# Patient Record
Sex: FEMALE | Race: WHITE | NOT HISPANIC OR LATINO | Employment: OTHER | URBAN - NONMETROPOLITAN AREA
[De-identification: names, ages, dates, MRNs, and addresses within clinical notes are randomized per-mention and may not be internally consistent; named-entity substitution may affect disease eponyms.]

---

## 2017-01-04 ENCOUNTER — OFFICE VISIT (OUTPATIENT)
Dept: ORTHOPEDICS | Facility: OTHER | Age: 62
End: 2017-01-04
Attending: ORTHOPAEDIC SURGERY
Payer: COMMERCIAL

## 2017-01-04 VITALS
SYSTOLIC BLOOD PRESSURE: 124 MMHG | TEMPERATURE: 98 F | BODY MASS INDEX: 24.84 KG/M2 | WEIGHT: 135 LBS | HEART RATE: 77 BPM | DIASTOLIC BLOOD PRESSURE: 70 MMHG | HEIGHT: 62 IN | OXYGEN SATURATION: 98 %

## 2017-01-04 DIAGNOSIS — M75.111 INCOMPLETE ROTATOR CUFF TEAR OR RUPTURE OF RIGHT SHOULDER, NOT SPECIFIED AS TRAUMATIC: Primary | ICD-10-CM

## 2017-01-04 PROCEDURE — 99213 OFFICE O/P EST LOW 20 MIN: CPT | Performed by: ORTHOPAEDIC SURGERY

## 2017-01-04 RX ORDER — TACROLIMUS 1 MG/G
OINTMENT TOPICAL
COMMUNITY
Start: 2016-10-24 | End: 2018-12-31

## 2017-01-04 ASSESSMENT — PAIN SCALES - GENERAL: PAINLEVEL: MILD PAIN (3)

## 2017-01-04 NOTE — MR AVS SNAPSHOT
After Visit Summary   1/4/2017    Chyna Delgado    MRN: [id number]           Patient Information     Date Of Birth          1955        Visit Information        Provider Department      1/4/2017 10:40 AM Jose Guadalupe Peterson MD  [facility]        Today's Diagnoses     Incomplete rotator cuff tear or rupture of right shoulder, not specified as traumatic    -  1       Care Instructions    Continue home exercise program        Follow-ups after your visit        Follow-up notes from your care team     Return in about 1 month (around 2/4/2017).      Your next 10 appointments already scheduled     Feb 24, 2017 10:20 AM   (Arrive by 10:00 AM)   SHORT with Arie Camarillo, DO   [facility] ([facility])    [address]  [facility] [address] [phone]              Who to contact     If you have questions or need follow up information about today's clinic visit or your schedule please contact  ORTH[facility]EDICS directly at [phone].  Normal or non-critical lab and imaging results will be communicated to you by Survelahart, letter or phone within 4 business days after the clinic has received the results. If you do not hear from us within 7 days, please contact the clinic through [facility] or phone. If you have a critical or abnormal lab result, we will notify you by phone as soon as possible.  Submit refill requests through M[facility] or call your pharmacy and they will forward the refill request to us. Please allow 3 business days for your refill to be completed.          Additional Information About Your Visit        MyChart Information     M[facility]oni gives you secure access to your electronic health record. If you see a primary care provider, you can also send messages to your care team and make appointments. If you have questions, please call your primary care clinic.  If you do not have a primary care provider, please call [phone] and they will assist you.        Care  "EveryWhere ID     This is your Care EveryWhere ID. This could be used by other organizations to access your [facility] medical records  [id number]        Your Vitals Were     Pulse Temperature Height BMI (Body Mass Index) Pulse Oximetry       77 98  F (36.7  C) (Tympanic) 5' 2\" (1.575 m) 24.69 kg/m2 98%        Blood Pressure from Last 3 Encounters:   01/04/17 124/70   11/22/16 118/74   11/03/16 118/68    Weight from Last 3 Encounters:   01/04/17 135 lb (61.236 kg)   11/22/16 130 lb (58.968 kg)   11/03/16 138 lb 12.8 oz (62.959 kg)              Today, you had the following     No orders found for display         Today's Medication Changes          These changes are accurate as of: 1/4/17 10:54 AM.  If you have any questions, ask your nurse or doctor.               These medicines have changed or have updated prescriptions.        Dose/Directions    ranitidine 150 MG tablet   Commonly known as:  ZANTAC   This may have changed:  when to take this   Used for:  Gastroesophageal reflux disease, esophagitis presence not specified        Dose:  150 mg   Take 1 tablet (150 mg) by mouth 2 times daily   Quantity:  60 tablet   Refills:  1                Primary Care Provider Office Phone # Fax #    Arie Bennie Camarillo [phone]       [facility] [place] [address]  [place] [address]        Thank you!     Thank you for choosing  ORTH[facility]EDICS  for your care. Our goal is always to provide you with excellent care. Hearing back from our patients is one way we can continue to improve our services. Please take a few minutes to complete the written survey that you may receive in the mail after your visit with us. Thank you!             Your Updated Medication List - Protect others around you: Learn how to safely use, store and throw away your medicines at www.disposemymeds.org.          This list is accurate as of: 1/4/17 10:54 AM.  Always use your most recent med list.                   Brand Name " Dispense Instructions for use    ANTI-DIARRHEAL PO      Take  by mouth.       ASPIRIN PO      Take 81 mg by mouth daily       Calcium-Vitamin D 600-200 MG-UNIT Tabs          dexamethasone 4 MG/ML injection    DECADRON    1 mL    Inject 1 mL (4 mg) as directed once for 1 dose Use 4 mg or dose determined by provider for iontophoresis.       diclofenac 1 % Gel topical gel    VOLTAREN    100 g    Apply to the outside of the elbow 3 times per day.       FLUoxetine 40 MG capsule    PROzac    90 capsule    Take 1 capsule (40 mg) by mouth daily       fluticasone-salmeterol 100-50 MCG/DOSE diskus inhaler    ADVAIR DISKUS    1 Inhaler    INHALE 1 PUFF TWICE DAILY       GAS-X PO      Take by mouth 2 times daily       ibuprofen 200 MG tablet    ADVIL/MOTRIN     Take 200 mg by mouth       iron 325 (65 FE) MG tablet      Take 1 tablet by mouth One tablet every 3 days       ketotifen 0.025 % Soln ophthalmic solution    ZADITOR/REFRESH ANTI-ITCH    5 mL    PLACE 2 DROPS INTO BOTH EYES 2 TIMES DAILY       magnesium 100 MG Caps      Take by mouth 2 times daily       methimazole 10 MG tablet    TAPAZOLE    90 tablet    Take 0.5 tablets (5 mg) by mouth daily       multivitamin per tablet      Take 1 tablet by mouth daily. Every day with food       omeprazole 20 MG CR capsule    priLOSEC    90 capsule    Take 1 capsule (20 mg) by mouth daily       pramipexole 0.5 MG tablet    MIRAPEX    90 tablet    TAKE 1 TABLET BY MOUTH DAILY AT BEDTIME       PSYLLIUM PO      Take 3 tsp by mouth 2 times daily.       ranitidine 150 MG tablet    ZANTAC    60 tablet    Take 1 tablet (150 mg) by mouth 2 times daily       tacrolimus 0.1 % ointment    PROTOPIC         traMADol 50 MG tablet    ULTRAM    90 tablet    TAKE 1-2 TABLETS BY MOUTH EVERY 6 HOURS AS NEEDED       vitamin E 200 UNIT capsule      Take 200 Units by mouth 2 times daily

## 2017-01-04 NOTE — NURSING NOTE
"Chief Complaint   Patient presents with     RECHECK     Follow up right shoulder.       Initial /70 mmHg  Pulse 77  Temp(Src) 98  F (36.7  C) (Tympanic)  Ht 5' 2\" (1.575 m)  Wt 135 lb (61.236 kg)  BMI 24.69 kg/m2  SpO2 98% Estimated body mass index is 24.69 kg/(m^2) as calculated from the following:    Height as of this encounter: 5' 2\" (1.575 m).    Weight as of this encounter: 135 lb (61.236 kg).  BP completed using cuff size: regular  Sarah Echols LPN      "

## 2017-01-04 NOTE — PROGRESS NOTES
Chief complaint: Rotator cuff tear right shoulder    Subjective: This 60-year-old right-handed retired female worked for [employer] until she retired in April 2016.  She presented to me on 11/22/16 with a 6 week history of right shoulder pain that came on after doing some digging.  Plain films were negative.  An MRI on 11/7/16 showed a small high-grade partial if not full-thickness tear of the anterior portion of the supraspinatus tendon.  Hoping to avoid surgery she consented to a subacromial steroid injection, agreed to go on high-strength ibuprofen, and agreed to go to physical therapy.    Today she reports that her shoulder feels much improved although she still has some pain with certain positions and activities.  Her last therapy visit was 12/29/16.  The therapist plan to continue working with her however her health insurance changed and she has not yet received her card for 2017 from the new carrier.  She therefore has not been back to therapy in a week.  She feels that she can do the exercises on her own using the elastic bands the therapist provided.  She wants to try continuing a home exercise program.    Nothing else has changed with respect to her musculoskeletal or neurologic review of systems since seen last.    Examination: Healthy-appearing female with appropriate mood and affect.  Her right shoulder range of motion is full.  Strength testing reveals mild weakness in abduction, motor weakness in external rotation, and no weakness in forward flexion.  Sandoval's test is still positive.  The neurovascular status of the right hand is intact.    Impression: Small rotator cuff tear right shoulder with symptomatic improvement    Plan: She will continue exercising at home and return a month for recheck.  She understands that if she does not improve she will be referred back to therapy.  By then her new insurance card should have come through.

## 2017-02-08 ENCOUNTER — OFFICE VISIT (OUTPATIENT)
Dept: ORTHOPEDICS | Facility: OTHER | Age: 62
End: 2017-02-08
Attending: ORTHOPAEDIC SURGERY
Payer: COMMERCIAL

## 2017-02-08 VITALS
DIASTOLIC BLOOD PRESSURE: 74 MMHG | HEART RATE: 82 BPM | RESPIRATION RATE: 17 BRPM | TEMPERATURE: 97.4 F | WEIGHT: 147 LBS | BODY MASS INDEX: 26.88 KG/M2 | OXYGEN SATURATION: 97 % | SYSTOLIC BLOOD PRESSURE: 119 MMHG

## 2017-02-08 DIAGNOSIS — M75.111 INCOMPLETE ROTATOR CUFF TEAR OR RUPTURE OF RIGHT SHOULDER, NOT SPECIFIED AS TRAUMATIC: Primary | ICD-10-CM

## 2017-02-08 PROCEDURE — 99212 OFFICE O/P EST SF 10 MIN: CPT | Performed by: ORTHOPAEDIC SURGERY

## 2017-02-08 ASSESSMENT — PAIN SCALES - GENERAL: PAINLEVEL: MILD PAIN (3)

## 2017-02-08 NOTE — MR AVS SNAPSHOT
After Visit Summary   2/8/2017    Chyna Delgado    MRN: [id number]           Patient Information     Date Of Birth          1955        Visit Information        Provider Department      2/8/2017 10:40 AM Jose Guadalupe Peterson MD  ORTHOPEDICS        Today's Diagnoses     Incomplete rotator cuff tear or rupture of right shoulder, not specified as traumatic    -  1        Follow-ups after your visit        Follow-up notes from your care team     Return if symptoms worsen or fail to improve.      Your next 10 appointments already scheduled     Feb 08, 2017 10:40 AM   (Arrive by 10:20 AM)   Return Visit with Jose Guadalupe Peterson MD    [facility] ([facility])    [address]   [place] [id number] [phone]            Feb 24, 2017 10:20 AM   (Arrive by 10:00 AM)   SHORT with Arie Camarillo DO   [facility] ([facility]    [address] [address]   [phone]              Who to contact     If you have questions or need follow up information about today's clinic visit or your schedule please contact  ORTHOPEDICS directly at [phone].  Normal or non-critical lab and imaging results will be communicated to you by MyChart, letter or phone within 4 business days after the clinic has received the results. If you do not hear from us within 7 days, please contact the clinic through MyChart or phone. If you have a critical or abnormal lab result, we will notify you by phone as soon as possible.  Submit refill requests through Accountable or call your pharmacy and they will forward the refill request to us. Please allow 3 business days for your refill to be completed.          Additional Information About Your Visit        MyChart Information     Accountable gives you secure access to your electronic health record. If you see a primary care provider, you can also send messages to your care team and make appointments. If you have questions, please  call your primary care clinic.  If you do not have a primary care provider, please call [phone] and they will assist you.        Care EveryWhere ID     This is your Care EveryWhere ID. This could be used by other organizations to access your [facility] medical records  [id number]        Your Vitals Were     Pulse Temperature Respirations Pulse Oximetry          82 97.4  F (36.3  C) 17 97%         Blood Pressure from Last 3 Encounters:   02/08/17 119/74   01/04/17 124/70   11/22/16 118/74    Weight from Last 3 Encounters:   02/08/17 147 lb (66.679 kg)   01/04/17 135 lb (61.236 kg)   11/22/16 130 lb (58.968 kg)              Today, you had the following     No orders found for display         Today's Medication Changes          These changes are accurate as of: 2/8/17 10:38 AM.  If you have any questions, ask your nurse or doctor.               These medicines have changed or have updated prescriptions.        Dose/Directions    ranitidine 150 MG tablet   Commonly known as:  ZANTAC   This may have changed:  when to take this   Used for:  Gastroesophageal reflux disease, esophagitis presence not specified        Dose:  150 mg   Take 1 tablet (150 mg) by mouth 2 times daily   Quantity:  60 tablet   Refills:  1                Primary Care Provider Office Phone # Fax #    Arie Bennie Camarillo [phone]       [facility] [place] [address] [facility] [address]        Thank you!     Thank you for choosing  ORTH[facility]EDICS  for your care. Our goal is always to provide you with excellent care. Hearing back from our patients is one way we can continue to improve our services. Please take a few minutes to complete the written survey that you may receive in the mail after your visit with us. Thank you!             Your Updated Medication List - Protect others around you: Learn how to safely use, store and throw away your medicines at www.disposemymeds.org.          This list is accurate as of:  2/8/17 10:38 AM.  Always use your most recent med list.                   Brand Name Dispense Instructions for use    ANTI-DIARRHEAL PO      Take  by mouth.       ASPIRIN PO      Take 81 mg by mouth daily       Calcium-Vitamin D 600-200 MG-UNIT Tabs          diclofenac 1 % Gel topical gel    VOLTAREN    100 g    Apply to the outside of the elbow 3 times per day.       FLUoxetine 40 MG capsule    PROzac    90 capsule    Take 1 capsule (40 mg) by mouth daily       fluticasone-salmeterol 100-50 MCG/DOSE diskus inhaler    ADVAIR DISKUS    1 Inhaler    INHALE 1 PUFF TWICE DAILY       GAS-X PO      Take by mouth 2 times daily       ibuprofen 200 MG tablet    ADVIL/MOTRIN     Take 200 mg by mouth       iron 325 (65 FE) MG tablet      Take 1 tablet by mouth One tablet every 3 days       ketotifen 0.025 % Soln ophthalmic solution    ZADITOR/REFRESH ANTI-ITCH    5 mL    PLACE 2 DROPS INTO BOTH EYES 2 TIMES DAILY       magnesium 100 MG Caps      Take by mouth 2 times daily       methimazole 10 MG tablet    TAPAZOLE    90 tablet    Take 0.5 tablets (5 mg) by mouth daily       multivitamin per tablet      Take 1 tablet by mouth daily. Every day with food       omeprazole 20 MG CR capsule    priLOSEC    90 capsule    Take 1 capsule (20 mg) by mouth daily       pramipexole 0.5 MG tablet    MIRAPEX    90 tablet    TAKE 1 TABLET BY MOUTH DAILY AT BEDTIME       PSYLLIUM PO      Take 3 tsp by mouth 2 times daily.       ranitidine 150 MG tablet    ZANTAC    60 tablet    Take 1 tablet (150 mg) by mouth 2 times daily       tacrolimus 0.1 % ointment    PROTOPIC         traMADol 50 MG tablet    ULTRAM    90 tablet    TAKE 1-2 TABLETS BY MOUTH EVERY 6 HOURS AS NEEDED       vitamin E 200 UNIT capsule      Take 200 Units by mouth 2 times daily

## 2017-02-08 NOTE — NURSING NOTE
"Chief Complaint   Patient presents with     Shoulder Pain     right shoulder follow up - much improved        Initial /74 mmHg  Pulse 82  Temp(Src) 97.4  F (36.3  C)  Resp 17  Wt 147 lb (66.679 kg)  SpO2 97% Estimated body mass index is 26.88 kg/(m^2) as calculated from the following:    Height as of 1/4/17: 5' 2\" (1.575 m).    Weight as of this encounter: 147 lb (66.679 kg).  Medication Reconciliation: complete  "

## 2017-02-08 NOTE — PROGRESS NOTES
Chief complaint: Rotator cuff tear right shoulder    Subjective: This 60-year-old right-handed retired female worked for [employer] until she retired in April 2016.  She presented to me on 11/22/16 with a 6 week history of right shoulder pain that came on after doing some digging.  Plain films were negative.  An MRI on 11/7/16 showed a small high-grade partial if not full-thickness tear of the anterior portion of the supraspinatus tendon.  Hoping to avoid surgery she consented to a subacromial steroid injection, agreed to go on high-strength ibuprofen, and agreed to go to physical therapy. In follow-up on 1/4/17 her pain was significantly less.  She chose to continue exercising the shoulder with a home program.    Today she reports continued improvement.  She still has mild anterosuperior right shoulder pain with certain activities such as stirring a pot using a horizontal motion at 90  of shoulder elevation, or with pushing material into her sewing machine.  Nevertheless, she feels she has improved enough that she is not interested in surgical intervention.    Examination: Healthy-appearing female with appropriate mood and affect.  Vital signs stable and afebrile.  The right shoulder is mildly tender to palpation on the anterior greater tuberosity.  Her right shoulder active range of motion is full.  Strength testing reveals normal strength of the supraspinatus, infraspinatus, subscapularis, and long head of the biceps, all to gross manual testing.  The neurovascular status of the right hand is intact.    Impression: Right shoulder partial or small full-thickness rotator cuff tear.  Significant symptom improvement with exercises.    Plan: The patient chooses to continue a home exercise program.  I told her that at any time she has the option of a surgical repair but at this point she is not interested in that.  She will return as needed.

## 2017-02-24 ENCOUNTER — OFFICE VISIT (OUTPATIENT)
Dept: PEDIATRICS | Facility: OTHER | Age: 62
End: 2017-02-24
Attending: INTERNAL MEDICINE
Payer: COMMERCIAL

## 2017-02-24 VITALS
HEIGHT: 62 IN | BODY MASS INDEX: 23.92 KG/M2 | SYSTOLIC BLOOD PRESSURE: 118 MMHG | OXYGEN SATURATION: 98 % | HEART RATE: 90 BPM | RESPIRATION RATE: 20 BRPM | WEIGHT: 130 LBS | DIASTOLIC BLOOD PRESSURE: 62 MMHG

## 2017-02-24 DIAGNOSIS — G25.81 RESTLESS LEGS SYNDROME: Primary | ICD-10-CM

## 2017-02-24 DIAGNOSIS — M25.511 ACUTE PAIN OF RIGHT SHOULDER: ICD-10-CM

## 2017-02-24 DIAGNOSIS — E05.00 GRAVES DISEASE: ICD-10-CM

## 2017-02-24 DIAGNOSIS — E61.1 IRON DEFICIENCY: ICD-10-CM

## 2017-02-24 LAB
ERYTHROCYTE [DISTWIDTH] IN BLOOD BY AUTOMATED COUNT: 12.3 % (ref 10–15)
FERRITIN SERPL-MCNC: 35 NG/ML (ref 8–252)
HCT VFR BLD AUTO: 37.1 % (ref 35–47)
HGB BLD-MCNC: 12.5 G/DL (ref 11.7–15.7)
IRON SATN MFR SERPL: 15 % (ref 15–46)
IRON SERPL-MCNC: 56 UG/DL (ref 35–180)
MCH RBC QN AUTO: 30.2 PG (ref 26.5–33)
MCHC RBC AUTO-ENTMCNC: 33.7 G/DL (ref 31.5–36.5)
MCV RBC AUTO: 90 FL (ref 78–100)
PLATELET # BLD AUTO: 220 10E9/L (ref 150–450)
RBC # BLD AUTO: 4.14 10E12/L (ref 3.8–5.2)
TIBC SERPL-MCNC: 378 UG/DL (ref 240–430)
WBC # BLD AUTO: 7.4 10E9/L (ref 4–11)

## 2017-02-24 PROCEDURE — 82728 ASSAY OF FERRITIN: CPT | Performed by: INTERNAL MEDICINE

## 2017-02-24 PROCEDURE — 83550 IRON BINDING TEST: CPT | Performed by: INTERNAL MEDICINE

## 2017-02-24 PROCEDURE — 99214 OFFICE O/P EST MOD 30 MIN: CPT | Performed by: INTERNAL MEDICINE

## 2017-02-24 PROCEDURE — 36415 COLL VENOUS BLD VENIPUNCTURE: CPT | Performed by: INTERNAL MEDICINE

## 2017-02-24 PROCEDURE — 83540 ASSAY OF IRON: CPT | Performed by: INTERNAL MEDICINE

## 2017-02-24 PROCEDURE — 85027 COMPLETE CBC AUTOMATED: CPT | Performed by: INTERNAL MEDICINE

## 2017-02-24 ASSESSMENT — ENCOUNTER SYMPTOMS
NAUSEA: 0
HEARTBURN: 0
DYSURIA: 0
DIARRHEA: 0
ABDOMINAL PAIN: 0
BLOOD IN STOOL: 0
CHILLS: 0
CONSTIPATION: 0
COUGH: 1
HEMATURIA: 0
FEVER: 0
WHEEZING: 0
HEADACHES: 0
SHORTNESS OF BREATH: 0
DIZZINESS: 0

## 2017-02-24 ASSESSMENT — PAIN SCALES - GENERAL: PAINLEVEL: NO PAIN (0)

## 2017-02-24 NOTE — PROGRESS NOTES
HPI  Patient is a 62 yo female who presents for a follow up on her hyperthyroidism, right shoulder pain and her restless leg syndrome.  She has recently been to her endocrinologist and her thyroid has been stable off her thyroid medications.  In regards to her right shoulder she has graduated from PT and reoports little pain, but has throbbing at night.  She reports that she is able to deal with this pain.    Additionally, she reports that her RLS has been terrible.  She reports that her legs bother her into late into the night and prevent her from sleeping.      Past Medical History   Diagnosis Date     Abnormal mammogram, unspecified 01/08/2003     Normal pathology     Back Pain 02/10/2000     Sacroiliac pain     Benign neoplasm of colon 03/10/2000     Benign paroxysmal positional vertigo 06/07/2012     Depressive disorder, not elsewhere classified 02/10/2004     Diarrhea 12/15/2010     Secondary to colectomy for familial polyposis     Gastric polyp 12/11/15     History of normal mammogram 07/18/2014     Idiopathic osteoporosis 12/15/2010     Interstitial emphysema (H) 12/15/2010     menopausal or female climacteric states 08/31/1999     Mixed hyperlipidemia 12/15/2010     Other bursitis disorders 02/04/2011     Greater trochanteric     Other forms of retinal detachment(361.89) 12/15/2010     Other malaise and fatigue 01/10/2003     Personal history of tobacco use, presenting hazards to health 12/15/2010     Restless legs syndrome (RLS) 12/15/2010     Sacroiliitis, not elsewhere classified (H) 12/15/2010     multiple sites     Tobacco use disorder 08/22/2012     Unspecified asthma(493.90) 12/15/2010     Past Surgical History   Procedure Laterality Date     Tonsillectomy       tonsillitus     Excised-benign  2002     tongue lesion     Right etopic pregnancy       Pregnancy     Removal of colon and large intestine  2000     Familial polyposis     Hc repair of nasal septum  2002     Deviated nasal septum      Lumpectomy right breast       Breast Lump     Pilonidal cyst surgery  1976     Benign tumors removed from back       Upper gi endoscopy  2009     Stomach polyps     Laparoscopic cholecystectomy       Mouth surgery       removal of spot from roof of mouth     Endoscopy upper, colonoscopy, combined N/A 9/5/2014     Procedure: COMBINED ENDOSCOPY UPPER, COLONOSCOPY;  Surgeon: Delbert Patel MD;  Location: [facility] OR     Esophagoscopy, gastroscopy, duodenoscopy (egd), combined N/A 12/11/2015     Procedure: COMBINED ESOPHAGOSCOPY, GASTROSCOPY, DUODENOSCOPY (EGD);  Surgeon: Delbert Patel MD;  Location: [facility] OR         Review of Systems   Constitutional: Negative for chills and fever.   Respiratory: Positive for cough. Negative for shortness of breath and wheezing.    Cardiovascular: Negative for chest pain and leg swelling.   Gastrointestinal: Negative for abdominal pain, blood in stool, constipation, diarrhea, heartburn and nausea.   Genitourinary: Negative for dysuria and hematuria.   Musculoskeletal: Positive for joint pain.   Neurological: Negative for dizziness and headaches.         Physical Exam   Constitutional: She is oriented to person, place, and time and well-developed, well-nourished, and in no distress. No distress.   HENT:   Mouth/Throat: No oropharyngeal exudate.   Eyes: Conjunctivae are normal. No scleral icterus.   Neck: Neck supple. No thyromegaly present.   Cardiovascular: Normal rate, regular rhythm, normal heart sounds and intact distal pulses.    No murmur heard.  Pulses:       Radial pulses are 2+ on the right side, and 2+ on the left side.   Pulmonary/Chest: Effort normal and breath sounds normal. She has no wheezes. She has no rales.   Abdominal: Soft. Bowel sounds are normal. She exhibits no shifting dullness, no distension, no abdominal bruit, no pulsatile midline mass and no mass. There is no tenderness.   Musculoskeletal: She exhibits no edema.   Neurological: She is alert and oriented to  person, place, and time.   Psychiatric: Mood, memory, affect and judgment normal.       Labs:  Results for orders placed or performed in visit on 02/24/17   CBC with platelets   Result Value Ref [facility]    WBC 7.4 4.0 - 11.0 10e9/L    RBC Count 4.14 3.8 - 5.2 10e12/L    Hemoglobin 12.5 11.7 - 15.7 g/dL    Hematocrit 37.1 35.0 - 47.0 %    MCV 90 78 - 100 fl    MCH 30.2 26.5 - 33.0 pg    MCHC 33.7 31.5 - 36.5 g/dL    RDW 12.3 10.0 - 15.0 %    Platelet Count 220 150 - 450 10e9/L   Ferritin   Result Value Ref Range    Ferritin 35 8 - 252 ng/mL   Iron and iron binding capacity   Result Value Ref Range    Iron 56 35 - 180 ug/dL    Iron Binding Cap 378 240 - 430 ug/dL    Iron Saturation Index 15 15 - 46 %           Imaging:  NA      ASSESSMENT /PLAN:  (G25.81) Restless legs syndrome  (primary encounter diagnosis)  Comment: Her iron levels are normal.  Plan:   She will continue Mirapex 0.5 mg at bedtime and add valium 2 mg at bedtime.    (E05.00) Graves disease  Comment: Stable.  Plan:   She will continue following with her endocrinologist.    (M25.511) Acute pain of right shoulder  Comment: Improved and stable.  Plan:   No further therapy needed.    (E61.1) Iron deficiency  Comment: Her iron levels are good in mid normal range.  This is unlikely affecting her RLS.  Plan:   Repeat iron levels every 6 months.  She will continue ferrous sulfate 3-4 times per day.        Follow up with Provider - 3 weeks for RLS        Arie Camarillo DO

## 2017-02-24 NOTE — MR AVS SNAPSHOT
After Visit Summary   2/24/2017    Chyna Delgado    MRN: [id number]           Patient Information     Date Of Birth          1955        Visit Information        Provider Department      2/24/2017 10:20 AM Arie Camarillo DO [facility] [place]        Today's Diagnoses     Restless legs syndrome    -  1    Graves disease        Acute pain of right shoulder        Iron deficiency           Follow-ups after your visit        Follow-up notes from your care team     Return in about 3 months (around 5/24/2017), or RLS.      Your next 10 appointments already scheduled     May 24, 2017 10:20 AM CDT   (Arrive by 10:00 AM)   SHORT with DO Zack Roca [place] ([facility])    [address]  [place] [address] [phone]              Who to contact     If you have questions or need follow up information about today's clinic visit or your schedule please contact [facility] directly at [phone].  Normal or non-critical lab and imaging results will be communicated to you by Usabillahart, letter or phone within 4 business days after the clinic has received the results. If you do not hear from us within 7 days, please contact the clinic through [email or web address] or phone. If you have a critical or abnormal lab result, we will notify you by phone as soon as possible.  Submit refill requests through [employer] or call your pharmacy and they will forward the refill request to us. Please allow 3 business days for your refill to be completed.          Additional Information About Your Visit        MyChart Information     [employer] gives you secure access to your electronic health record. If you see a primary care provider, you can also send messages to your care team and make appointments. If you have questions, please call your primary care clinic.  If you do not have a primary care provider, please call [phone] and they will assist you.        Care  "EveryWhere ID     This is your Care EveryWhere ID. This could be used by other organizations to access your [place] medical records  [id number]        Your Vitals Were     Pulse Respirations Height Pulse Oximetry BMI (Body Mass Index)       90 20 5' 2\" (1.575 m) 98% 23.78 kg/m2        Blood Pressure from Last 3 Encounters:   02/24/17 118/62   02/08/17 119/74   01/04/17 124/70    Weight from Last 3 Encounters:   02/24/17 130 lb (59 kg)   02/08/17 147 lb (66.7 kg)   01/04/17 135 lb (61.2 kg)              We Performed the Following     CBC with platelets     Ferritin     Iron and iron binding capacity          Today's Medication Changes          These changes are accurate as of: 2/24/17 12:38 PM.  If you have any questions, ask your nurse or doctor.               Stop taking these medicines if you haven't already. Please contact your care team if you have questions.     iron 325 (65 FE) MG tablet   Stopped by:  Arie Camarillo DO                    Primary Care Provider Office Phone # Fax #    Arie Camarillo [phone]       [facility] [place] [address]  [place] [address]        Thank you!     Thank you for choosing [facility]  for your care. Our goal is always to provide you with excellent care. Hearing back from our patients is one way we can continue to improve our services. Please take a few minutes to complete the written survey that you may receive in the mail after your visit with us. Thank you!             Your Updated Medication List - Protect others around you: Learn how to safely use, store and throw away your medicines at www.disposemymeds.org.          This list is accurate as of: 2/24/17 12:38 PM.  Always use your most recent med list.                   Brand Name Dispense Instructions for use    ANTI-DIARRHEAL PO      Take  by mouth.       ASPIRIN PO      Take 81 mg by mouth daily       Calcium-Vitamin D 600-200 MG-UNIT Tabs          " diclofenac 1 % Gel topical gel    VOLTAREN    100 g    Apply to the outside of the elbow 3 times per day.       FLUoxetine 40 MG capsule    PROzac    90 capsule    Take 1 capsule (40 mg) by mouth daily       fluticasone-salmeterol 100-50 MCG/DOSE diskus inhaler    ADVAIR DISKUS    1 Inhaler    INHALE 1 PUFF TWICE DAILY       GAS-X PO      Take by mouth 2 times daily       ibuprofen 200 MG tablet    ADVIL/MOTRIN     Take 200 mg by mouth       ketotifen 0.025 % Soln ophthalmic solution    ZADITOR/REFRESH ANTI-ITCH    5 mL    PLACE 2 DROPS INTO BOTH EYES 2 TIMES DAILY       magnesium 100 MG Caps      Take by mouth 2 times daily       methimazole 10 MG tablet    TAPAZOLE    90 tablet    Take 0.5 tablets (5 mg) by mouth daily       multivitamin per tablet      Take 1 tablet by mouth daily. Every day with food       omeprazole 20 MG CR capsule    priLOSEC    90 capsule    Take 1 capsule (20 mg) by mouth daily       pramipexole 0.5 MG tablet    MIRAPEX    90 tablet    TAKE 1 TABLET BY MOUTH DAILY AT BEDTIME       PSYLLIUM PO      Take 3 tsp by mouth 2 times daily.       tacrolimus 0.1 % ointment    PROTOPIC         traMADol 50 MG tablet    ULTRAM    90 tablet    TAKE 1-2 TABLETS BY MOUTH EVERY 6 HOURS AS NEEDED       vitamin E 200 UNIT capsule      Take 200 Units by mouth 2 times daily

## 2017-02-24 NOTE — NURSING NOTE
"Chief Complaint   Patient presents with     Recheck Medication     follow up GERD, taking Ranitidine once daily not helping at all       Initial /62 (BP Location: Left arm, Patient Position: Chair, Cuff Size: Adult Regular)  Pulse 90  Resp 20  Ht 5' 2\" (1.575 m)  Wt 130 lb (59 kg)  SpO2 98%  BMI 23.78 kg/m2 Estimated body mass index is 23.78 kg/(m^2) as calculated from the following:    Height as of this encounter: 5' 2\" (1.575 m).    Weight as of this encounter: 130 lb (59 kg).  Medication Reconciliation: complete   Micki Nicole      "

## 2017-03-05 RX ORDER — FERROUS SULFATE 325(65) MG
325 TABLET ORAL 2 TIMES DAILY WITH MEALS
Qty: 90 TABLET | Refills: 2 | COMMUNITY
Start: 2017-03-05

## 2017-03-06 DIAGNOSIS — K21.9 GASTROESOPHAGEAL REFLUX DISEASE, ESOPHAGITIS PRESENCE NOT SPECIFIED: ICD-10-CM

## 2017-03-13 ENCOUNTER — OFFICE VISIT (OUTPATIENT)
Dept: CHIROPRACTIC MEDICINE | Facility: OTHER | Age: 62
End: 2017-03-13
Attending: CHIROPRACTOR
Payer: COMMERCIAL

## 2017-03-13 DIAGNOSIS — M99.02 SEGMENTAL AND SOMATIC DYSFUNCTION OF THORACIC REGION: ICD-10-CM

## 2017-03-13 DIAGNOSIS — M99.03 SEGMENTAL AND SOMATIC DYSFUNCTION OF LUMBAR REGION: Primary | ICD-10-CM

## 2017-03-13 DIAGNOSIS — M54.50 ACUTE BILATERAL LOW BACK PAIN WITHOUT SCIATICA: ICD-10-CM

## 2017-03-13 PROCEDURE — 98940 CHIROPRACT MANJ 1-2 REGIONS: CPT | Performed by: CHIROPRACTOR

## 2017-03-14 NOTE — PROGRESS NOTES
Subjective Finding:    Chief compalint: Patient presents with:  Back Pain: from a fall  , Pain Scale: 6/10, Intensity: dull, Duration: 5 days, Change since last visit: , Radiating: Buttocks    Date of injury:     Activities that the pain restricts:   Home/household activities: yes.  Work duties: no.  Hobbies/social: yes.  Sleep: no.  Makes symptoms better: ice .  Makes symptoms worse: activity, lumbar extension and lumbar flexion.  Have you seen anyone else for the symptoms? Yes: MD.  Work related: no.  Automobile related injury: no.    Objective and Assessment:    Posture Analysis:   High shoulder: .  Head tilt: .  High iliac crest: .  Head carriage: .  Thoracic Kyphosis: neutral.  Lumbar Lordosis: reverse.    Lumbar Range of Motion: flexion decreased and extension decreased.  Cervical Range of Motion: .  Thoracic Range of Motion: .  Extremity Range of Motion: .    Palpation:   Quad lumb: right, referred pain: no    Segmental dysfunction pre-treatment: T8, T9, L4, L5 and Sacrum.    Assessment post-treatment:  Cervical: .  Thoracic: .  Lumbar: ROM increased and muscle spasm decreased.    Comments: .      Complicating Factors: 3 or more episodes of similar pain.    Plan / Procedure:    Expected release date: .  Treatment plan: PRN.  Instructed patient: ice 20 minutes every other hour as needed.  Short term goals: reduce pain and increase ROM.  Long term goals: increase patient functional independence.  Prognosis: very good.

## 2017-03-30 ENCOUNTER — OFFICE VISIT (OUTPATIENT)
Dept: PEDIATRICS | Facility: OTHER | Age: 62
End: 2017-03-30
Attending: INTERNAL MEDICINE
Payer: COMMERCIAL

## 2017-03-30 VITALS
WEIGHT: 140 LBS | OXYGEN SATURATION: 96 % | HEIGHT: 62 IN | DIASTOLIC BLOOD PRESSURE: 70 MMHG | SYSTOLIC BLOOD PRESSURE: 102 MMHG | BODY MASS INDEX: 25.76 KG/M2 | HEART RATE: 75 BPM | RESPIRATION RATE: 22 BRPM | TEMPERATURE: 99.2 F

## 2017-03-30 DIAGNOSIS — G25.81 RESTLESS LEG SYNDROME: Primary | ICD-10-CM

## 2017-03-30 PROCEDURE — 99213 OFFICE O/P EST LOW 20 MIN: CPT | Performed by: INTERNAL MEDICINE

## 2017-03-30 RX ORDER — FLUOXETINE 40 MG/1
40 CAPSULE ORAL DAILY
Qty: 90 CAPSULE | Refills: 1 | Status: SHIPPED | OUTPATIENT
Start: 2017-03-30 | End: 2017-10-16

## 2017-03-30 RX ORDER — PRAMIPEXOLE DIHYDROCHLORIDE 0.5 MG/1
TABLET ORAL
Qty: 180 TABLET | Refills: 2 | Status: SHIPPED | OUTPATIENT
Start: 2017-03-30 | End: 2017-08-31 | Stop reason: ALTCHOICE

## 2017-03-30 RX ORDER — TRAMADOL HYDROCHLORIDE 50 MG/1
TABLET ORAL
Qty: 90 TABLET | Refills: 0 | Status: SHIPPED | OUTPATIENT
Start: 2017-03-30 | End: 2017-05-30

## 2017-03-30 ASSESSMENT — ENCOUNTER SYMPTOMS
CONSTIPATION: 0
PALPITATIONS: 0
VOMITING: 0
COUGH: 0
HEMATURIA: 0
HEADACHES: 0
DIARRHEA: 1
WHEEZING: 0
BLOOD IN STOOL: 0
DIZZINESS: 0
ABDOMINAL PAIN: 0
MYALGIAS: 1
NAUSEA: 0
DYSURIA: 0
SHORTNESS OF BREATH: 0
FEVER: 0
CHILLS: 0

## 2017-03-30 ASSESSMENT — PAIN SCALES - GENERAL: PAINLEVEL: NO PAIN (0)

## 2017-03-30 NOTE — NURSING NOTE
"Chief Complaint   Patient presents with     Anemia     relates fu on iron levels       Initial /70 (BP Location: Left arm, Patient Position: Chair, Cuff Size: Adult Regular)  Pulse 75  Temp 99.2  F (37.3  C) (Tympanic)  Resp 22  Ht 5' 2\" (1.575 m)  Wt 140 lb (63.5 kg)  SpO2 96%  BMI 25.61 kg/m2 Estimated body mass index is 25.61 kg/(m^2) as calculated from the following:    Height as of this encounter: 5' 2\" (1.575 m).    Weight as of this encounter: 140 lb (63.5 kg).  Medication Reconciliation: complete   Mary Kahn      "

## 2017-03-30 NOTE — MR AVS SNAPSHOT
After Visit Summary   3/30/2017    Chyna Delgado    MRN: [id number]           Patient Information     Date Of Birth          1955        Visit Information        Provider Department      3/30/2017 9:40 AM Arie Camarillo DO [facility] [place]        Today's Diagnoses     Chronic midline low back pain without sciatica    -  1    Restless leg syndrome        MARGARITA (generalized anxiety disorder)        Gastroesophageal reflux disease, esophagitis presence not specified           Follow-ups after your visit        Follow-up notes from your care team     Return in about 2 months (around 5/30/2017), or RLS and iron .      Your next 10 appointments already scheduled     May 30, 2017  9:40 AM CDT   (Arrive by 9:20 AM)   SHORT with DO Zack Roca ([facility])    [address]  [place] [address]   [phone]              Who to contact     If you have questions or need follow up information about today's clinic visit or your schedule please contact [facility] directly at [phone].  Normal or non-critical lab and imaging results will be communicated to you by MyChart, letter or phone within 4 business days after the clinic has received the results. If you do not hear from us within 7 days, please contact the clinic through [employer] or phone. If you have a critical or abnormal lab result, we will notify you by phone as soon as possible.  Submit refill requests through [employer] or call your pharmacy and they will forward the refill request to us. Please allow 3 business days for your refill to be completed.          Additional Information About Your Visit        MyChart Information     [employer] gives you secure access to your electronic health record. If you see a primary care provider, you can also send messages to your care team and make appointments. If you have questions, please call your primary care clinic.  If you do  "not have a primary care provider, please call [phone] and they will assist you.        Care EveryWhere ID     This is your Care EveryWhere ID. This could be used by other organizations to access your [place] medical records  [id number]        Your Vitals Were     Pulse Temperature Respirations Height Pulse Oximetry BMI (Body Mass Index)    75 99.2  F (37.3  C) (Tympanic) 22 5' 2\" (1.575 m) 96% 25.61 kg/m2       Blood Pressure from Last 3 Encounters:   03/30/17 102/70   02/24/17 118/62   02/08/17 119/74    Weight from Last 3 Encounters:   03/30/17 140 lb (63.5 kg)   02/24/17 130 lb (59 kg)   02/08/17 147 lb (66.7 kg)              Today, you had the following     No orders found for display         Today's Medication Changes          These changes are accurate as of: 3/30/17 10:07 AM.  If you have any questions, ask your nurse or doctor.               These medicines have changed or have updated prescriptions.        Dose/Directions    pramipexole 0.5 MG tablet   Commonly known as:  MIRAPEX   This may have changed:  additional instructions   Used for:  Restless leg syndrome   Changed by:  Arie Camarillo, DO        TAKE 2 TABLET BY MOUTH DAILY AT BEDTIME   Quantity:  180 tablet   Refills:  2       traMADol 50 MG tablet   Commonly known as:  ULTRAM   This may have changed:  See the new instructions.   Used for:  Chronic midline low back pain without sciatica   Changed by:  Arie Camarillo, DO        TAKE 1-2 TABLETS BY MOUTH EVERY 6 HOURS AS NEEDED   Quantity:  90 tablet   Refills:  0            Where to get your medicines      These medications were sent to [facility] [address] [address]     Phone:  [phone]     FLUoxetine 40 MG capsule    pramipexole 0.5 MG tablet         Some of these will need a paper prescription and others can be bought over the counter.  Ask your nurse if you have questions.     Bring a paper prescription for each " of these medications     traMADol 50 MG tablet                Primary Care Provider Office Phone # Fax #    Arie Camarillo [phone]       [facility] [place] [address]  [place] [address]        Thank you!     Thank you for choosing [facility] [place]  for your care. Our goal is always to provide you with excellent care. Hearing back from our patients is one way we can continue to improve our services. Please take a few minutes to complete the written survey that you may receive in the mail after your visit with us. Thank you!             Your Updated Medication List - Protect others around you: Learn how to safely use, store and throw away your medicines at www.disposemymeds.org.          This list is accurate as of: 3/30/17 10:07 AM.  Always use your most recent med list.                   Brand Name Dispense Instructions for use    ANTI-DIARRHEAL PO      Take  by mouth.       ASPIRIN PO      Take 81 mg by mouth daily       Calcium-Vitamin D 600-200 MG-UNIT Tabs          diclofenac 1 % Gel topical gel    VOLTAREN    100 g    Apply to the outside of the elbow 3 times per day.       ferrous sulfate 325 (65 FE) MG tablet    IRON    90 tablet    Take 1 tablet (325 mg) by mouth 3 times daily (with meals)       FLUoxetine 40 MG capsule    PROzac    90 capsule    Take 1 capsule (40 mg) by mouth daily       fluticasone-salmeterol 100-50 MCG/DOSE diskus inhaler    ADVAIR DISKUS    1 Inhaler    INHALE 1 PUFF TWICE DAILY       GAS-X PO      Take by mouth 2 times daily       ibuprofen 200 MG tablet    ADVIL/MOTRIN     Take 200 mg by mouth       ketotifen 0.025 % Soln ophthalmic solution    ZADITOR/REFRESH ANTI-ITCH    5 mL    PLACE 2 DROPS INTO BOTH EYES 2 TIMES DAILY       magnesium 100 MG Caps      Take by mouth 2 times daily       multivitamin per tablet      Take 1 tablet by mouth daily. Every day with food       omeprazole 20 MG CR capsule    priLOSEC    90 capsule    Take  1 capsule (20 mg) by mouth daily       pramipexole 0.5 MG tablet    MIRAPEX    180 tablet    TAKE 2 TABLET BY MOUTH DAILY AT BEDTIME       PSYLLIUM PO      Take 3 tsp by mouth 2 times daily.       tacrolimus 0.1 % ointment    PROTOPIC         traMADol 50 MG tablet    ULTRAM    90 tablet    TAKE 1-2 TABLETS BY MOUTH EVERY 6 HOURS AS NEEDED       vitamin E 200 UNIT capsule      Take 200 Units by mouth 2 times daily

## 2017-03-30 NOTE — PROGRESS NOTES
HPI  Patient is a 62 yo female who presents for her RLS.  Her symptoms vary day to day.  She reports poor sleep on a bad night as she has to constantly move her leg.  She described and zapping which is not painful.  She has been taking 0.75 mg of Mirapex at night.  She has tried going off this mediation with no relief of her symptoms.  She has been taking her iron supplement.        Past Medical History:   Diagnosis Date     Abnormal mammogram, unspecified 01/08/2003    Normal pathology     Back Pain 02/10/2000    Sacroiliac pain     Benign neoplasm of colon 03/10/2000     Benign paroxysmal positional vertigo 06/07/2012     Depressive disorder, not elsewhere classified 02/10/2004     Diarrhea 12/15/2010    Secondary to colectomy for familial polyposis     Gastric polyp 12/11/15     History of normal mammogram 07/18/2014     Idiopathic osteoporosis 12/15/2010     Interstitial emphysema (H) 12/15/2010     menopausal or female climacteric states 08/31/1999     Mixed hyperlipidemia 12/15/2010     Other bursitis disorders 02/04/2011    Greater trochanteric     Other forms of retinal detachment(361.89) 12/15/2010     Other malaise and fatigue 01/10/2003     Personal history of tobacco use, presenting hazards to health 12/15/2010     Restless legs syndrome (RLS) 12/15/2010     Rotator cuff tendonitis      Rotator cuff tendonitis      Sacroiliitis, not elsewhere classified (H) 12/15/2010    multiple sites     Tobacco use disorder 08/22/2012     Unspecified asthma(493.90) 12/15/2010     Past Surgical History:   Procedure Laterality Date     benign tumors removed from back       ENDOSCOPY UPPER, COLONOSCOPY, COMBINED N/A 9/5/2014    Procedure: COMBINED ENDOSCOPY UPPER, COLONOSCOPY;  Surgeon: Delbert Patel MD;  Location: [facility] OR     ESOPHAGOSCOPY, GASTROSCOPY, DUODENOSCOPY (EGD), COMBINED N/A 12/11/2015    Procedure: COMBINED ESOPHAGOSCOPY, GASTROSCOPY, DUODENOSCOPY (EGD);  Surgeon: Delbert Patel MD;  Location: [facility] OR      excised-benign  2002    tongue lesion     HC REPAIR OF NASAL SEPTUM  2002    Deviated nasal septum     LAPAROSCOPIC CHOLECYSTECTOMY       lumpectomy Right breast      Breast Lump     MOUTH SURGERY      removal of spot from roof of mouth     pilonidal cyst surgery  1976     removal of colon and large intestine  2000    Familial polyposis     Right etopic pregnancy      Pregnancy     TONSILLECTOMY      tonsillitus     UPPER GI ENDOSCOPY  2009    Stomach polyps       Review of Systems   Constitutional: Negative for chills and fever.   Respiratory: Negative for cough, shortness of breath and wheezing.    Cardiovascular: Negative for chest pain, palpitations and leg swelling.   Gastrointestinal: Positive for diarrhea. Negative for abdominal pain, blood in stool, constipation, melena, nausea and vomiting.   Genitourinary: Negative for dysuria and hematuria.   Musculoskeletal: Positive for myalgias.   Neurological: Negative for dizziness and headaches.         Physical Exam   Constitutional: She is oriented to person, place, and time and well-developed, well-nourished, and in no distress. No distress.   HENT:   Head: Normocephalic.   Mouth/Throat: No oropharyngeal exudate.   Eyes: Conjunctivae are normal. No scleral icterus.   Neck: Neck supple. No thyromegaly present.   Cardiovascular: Normal rate, regular rhythm, normal heart sounds and intact distal pulses.    No murmur heard.  Pulses:       Radial pulses are 2+ on the right side, and 2+ on the left side.   Pulmonary/Chest: Effort normal and breath sounds normal. She has no wheezes. She has no rales.   Musculoskeletal: She exhibits no edema.   Neurological: She is alert and oriented to person, place, and time.     Labs:  NA      Imaging:  NA      ASSESSMENT /PLAN:  (G25.81) Restless leg syndrome  Comment: Poorly controlled despite starting ferrous sulfate for low iron stores.  Plan:   She will continue ferrous sulfate and increase pramipexole (MIRAPEX) 0.5 MG tablet to  2 tablets 2-3 hours prior to bedtime.      Follow up with Provider - 2 months for RLS and iron studies.        Arie Camarillo DO

## 2017-04-11 ENCOUNTER — OFFICE VISIT (OUTPATIENT)
Dept: CHIROPRACTIC MEDICINE | Facility: OTHER | Age: 62
End: 2017-04-11
Attending: CHIROPRACTOR
Payer: COMMERCIAL

## 2017-04-11 DIAGNOSIS — M99.03 SEGMENTAL AND SOMATIC DYSFUNCTION OF LUMBAR REGION: ICD-10-CM

## 2017-04-11 DIAGNOSIS — M54.2 CERVICALGIA: ICD-10-CM

## 2017-04-11 DIAGNOSIS — M99.02 SEGMENTAL AND SOMATIC DYSFUNCTION OF THORACIC REGION: ICD-10-CM

## 2017-04-11 DIAGNOSIS — M99.01 SEGMENTAL AND SOMATIC DYSFUNCTION OF CERVICAL REGION: Primary | ICD-10-CM

## 2017-04-11 PROCEDURE — 98941 CHIROPRACT MANJ 3-4 REGIONS: CPT | Performed by: CHIROPRACTOR

## 2017-04-11 NOTE — MR AVS SNAPSHOT
After Visit Summary   4/11/2017    Chyna Delgado    MRN: [id number]           Patient Information     Date Of Birth          1955        Visit Information        Provider Department      4/11/2017 9:40 AM [facility] [place] [facility]        Today's Diagnoses     Segmental and somatic dysfunction of cervical region    -  1    Cervicalgia        Segmental and somatic dysfunction of lumbar region        Segmental and somatic dysfunction of thoracic region           Follow-ups after your visit        Your next 10 appointments already scheduled     May 30, 2017  9:40 AM CDT   (Arrive by 9:20 AM)   SHORT with Arie Camarillo DO   [facility] ([facility])    [address]  [facility] [address]   [phone]              Who to contact     If you have questions or need follow up information about today's clinic visit or your schedule please contact  [facility] [place] directly at [phone].  Normal or non-critical lab and imaging results will be communicated to you by MyChart, letter or phone within 4 business days after the clinic has received the results. If you do not hear from us within 7 days, please contact the clinic through [employer] or phone. If you have a critical or abnormal lab result, we will notify you by phone as soon as possible.  Submit refill requests through [employer] or call your pharmacy and they will forward the refill request to us. Please allow 3 business days for your refill to be completed.          Additional Information About Your Visit        MyChart Information     [employer] gives you secure access to your electronic health record. If you see a primary care provider, you can also send messages to your care team and make appointments. If you have questions, please call your primary care clinic.  If you do not have a primary care provider, please call [phone] and they will assist you.        Care EveryWhere ID     This is  your Care EveryWhere ID. This could be used by other organizations to access your [place] medical records  [id number]         Blood Pressure from Last 3 Encounters:   03/30/17 102/70   02/24/17 118/62   02/08/17 119/74    Weight from Last 3 Encounters:   03/30/17 140 lb (63.5 kg)   02/24/17 130 lb (59 kg)   02/08/17 147 lb (66.7 kg)              We Performed the Following     CHIROPRAC MANIP,SPINAL,3-4 REGIONS        Primary Care Provider Office Phone # Fax #    Arie Camarillo [phone]       [facility] [place] [address] [place] [place] [address]        Thank you!     Thank you for choosing  [facility] [place] [facility]  for your care. Our goal is always to provide you with excellent care. Hearing back from our patients is one way we can continue to improve our services. Please take a few minutes to complete the written survey that you may receive in the mail after your visit with us. Thank you!             Your Updated Medication List - Protect others around you: Learn how to safely use, store and throw away your medicines at www.disposemymeds.org.          This list is accurate as of: 4/11/17 11:59 PM.  Always use your most recent med list.                   Brand Name Dispense Instructions for use    ANTI-DIARRHEAL PO      Take  by mouth.       ASPIRIN PO      Take 81 mg by mouth daily       Calcium-Vitamin D 600-200 MG-UNIT Tabs          diclofenac 1 % Gel topical gel    VOLTAREN    100 g    Apply to the outside of the elbow 3 times per day.       ferrous sulfate 325 (65 FE) MG tablet    IRON    90 tablet    Take 1 tablet (325 mg) by mouth 3 times daily (with meals)       FLUoxetine 40 MG capsule    PROzac    90 capsule    Take 1 capsule (40 mg) by mouth daily       fluticasone-salmeterol 100-50 MCG/DOSE diskus inhaler    ADVAIR DISKUS    1 Inhaler    INHALE 1 PUFF TWICE DAILY       GAS-X PO      Take by mouth 2 times daily       ibuprofen 200 MG tablet    ADVIL/MOTRIN     Take  200 mg by mouth       ketotifen 0.025 % Soln ophthalmic solution    ZADITOR/REFRESH ANTI-ITCH    5 mL    PLACE 2 DROPS INTO BOTH EYES 2 TIMES DAILY       magnesium 100 MG Caps      Take by mouth 2 times daily       multivitamin per tablet      Take 1 tablet by mouth daily. Every day with food       omeprazole 20 MG CR capsule    priLOSEC    90 capsule    Take 1 capsule (20 mg) by mouth daily       pramipexole 0.5 MG tablet    MIRAPEX    180 tablet    TAKE 2 TABLET BY MOUTH DAILY AT BEDTIME       PSYLLIUM PO      Take 3 tsp by mouth 2 times daily.       tacrolimus 0.1 % ointment    PROTOPIC         traMADol 50 MG tablet    ULTRAM    90 tablet    TAKE 1-2 TABLETS BY MOUTH EVERY 6 HOURS AS NEEDED       vitamin E 200 UNIT capsule      Take 200 Units by mouth 2 times daily

## 2017-04-17 NOTE — PROGRESS NOTES
Subjective Finding:    Chief compalint: Patient presents with:  Back Pain  Neck Pain  , Pain Scale: 6/10, Intensity: dull, Duration: 2 days, Change since last visit: , Radiating: Buttocks    Date of injury:     Activities that the pain restricts:   Home/household activities: yes.  Work duties: no.  Hobbies/social: yes.  Sleep: no.  Makes symptoms better: ice .  Makes symptoms worse: activity, lumbar extension and lumbar flexion.  Have you seen anyone else for the symptoms? no.  Work related: no.  Automobile related injury: no.    Objective and Assessment:    Posture Analysis:   High shoulder: .  Head tilt: .  High iliac crest: .  Head carriage: .  Thoracic Kyphosis: neutral.  Lumbar Lordosis: reverse.    Lumbar Range of Motion: flexion decreased and extension decreased.  Cervical Range of Motion: .  Thoracic Range of Motion: .  Extremity Range of Motion: .    Palpation:   Quad lumb: right, referred pain: no    Segmental dysfunction pre-treatment: T8, T9, L4, L5 and Sacrum.    C5-6    Assessment post-treatment:  Cervical: .  Thoracic: .  Lumbar: ROM increased and muscle spasm decreased.    Comments: .      Complicating Factors: .    Plan / Procedure:    Expected release date: .  Treatment plan: PRN.  Instructed patient: ice 20 minutes every other hour as needed.  Short term goals: reduce pain and increase ROM.  Long term goals: increase patient functional independence.  Prognosis: very good.

## 2017-05-02 ENCOUNTER — TELEPHONE (OUTPATIENT)
Dept: PEDIATRICS | Facility: OTHER | Age: 62
End: 2017-05-02

## 2017-05-05 ENCOUNTER — OFFICE VISIT (OUTPATIENT)
Dept: PEDIATRICS | Facility: OTHER | Age: 62
End: 2017-05-05
Attending: INTERNAL MEDICINE
Payer: COMMERCIAL

## 2017-05-05 VITALS
HEART RATE: 90 BPM | DIASTOLIC BLOOD PRESSURE: 60 MMHG | OXYGEN SATURATION: 97 % | WEIGHT: 136 LBS | SYSTOLIC BLOOD PRESSURE: 110 MMHG | BODY MASS INDEX: 24.87 KG/M2 | RESPIRATION RATE: 20 BRPM | TEMPERATURE: 98.3 F

## 2017-05-05 DIAGNOSIS — S39.011A ABDOMINAL MUSCLE STRAIN, INITIAL ENCOUNTER: ICD-10-CM

## 2017-05-05 DIAGNOSIS — M99.05 SOMATIC DYSFUNCTION OF PELVIC REGION: Primary | ICD-10-CM

## 2017-05-05 DIAGNOSIS — S76.019A STRAIN OF FLEXOR MUSCLE OF HIP, UNSPECIFIED LATERALITY, INITIAL ENCOUNTER: ICD-10-CM

## 2017-05-05 PROCEDURE — 99213 OFFICE O/P EST LOW 20 MIN: CPT | Mod: 25 | Performed by: INTERNAL MEDICINE

## 2017-05-05 PROCEDURE — 98925 OSTEOPATH MANJ 1-2 REGIONS: CPT | Performed by: INTERNAL MEDICINE

## 2017-05-05 ASSESSMENT — ENCOUNTER SYMPTOMS
PALPITATIONS: 0
CHILLS: 0
ABDOMINAL PAIN: 1
NECK PAIN: 1
HEMATURIA: 0
COUGH: 0
BLOOD IN STOOL: 0
DIZZINESS: 0
NAUSEA: 0
VOMITING: 0
FEVER: 0
SHORTNESS OF BREATH: 0
WHEEZING: 0
CONSTIPATION: 0
DYSURIA: 0
HEADACHES: 1
DIARRHEA: 0

## 2017-05-05 ASSESSMENT — PAIN SCALES - GENERAL: PAINLEVEL: MODERATE PAIN (5)

## 2017-05-05 NOTE — MR AVS SNAPSHOT
After Visit Summary   5/5/2017    Chyna Delgado    MRN: [id number]           Patient Information     Date Of Birth          1955        Visit Information        Provider Department      5/5/2017 2:00 PM Arie Camarillo DO [facility] [place]        Today's Diagnoses     Somatic dysfunction of pelvic region    -  1    Abdominal muscle strain, initial encounter        Strain of flexor muscle of hip, unspecified laterality, initial encounter           Follow-ups after your visit        Your next 10 appointments already scheduled     May 30, 2017  9:40 AM CDT   (Arrive by 9:20 AM)   SHORT with DO Kasi Rocaview St[facility] [place] ([facility] [place] [facility])    [address]  [place] [address]   [phone]              Who to contact     If you have questions or need follow up information about today's clinic visit or your schedule please contact [facility] at [phone].  Normal or non-critical lab and imaging results will be communicated to you by MyChart, letter or phone within 4 business days after the clinic has received the results. If you do not hear from us within 7 days, please contact the clinic through [employer] or phone. If you have a critical or abnormal lab result, we will notify you by phone as soon as possible.  Submit refill requests through [employer] or call your pharmacy and they will forward the refill request to us. Please allow 3 business days for your refill to be completed.          Additional Information About Your Visit        MyChart Information     [employer] gives you secure access to your electronic health record. If you see a primary care provider, you can also send messages to your care team and make appointments. If you have questions, please call your primary care clinic.  If you do not have a primary care provider, please call [phone] and they will assist you.        Care EveryWhere ID     This is your Care  EveryWhere ID. This could be used by other organizations to access your [place] medical records  [id number]        Your Vitals Were     Pulse Temperature Respirations Pulse Oximetry BMI (Body Mass Index)       90 98.3  F (36.8  C) (Tympanic) 20 97% 24.87 kg/m2        Blood Pressure from Last 3 Encounters:   05/05/17 110/60   03/30/17 102/70   02/24/17 118/62    Weight from Last 3 Encounters:   05/05/17 136 lb (61.7 kg)   03/30/17 140 lb (63.5 kg)   02/24/17 130 lb (59 kg)              Today, you had the following     No orders found for display       Primary Care Provider Office Phone # Fax #    Arie Avery Rabia,  [phone]       [facility] [place] [address] [address]        Thank you!     Thank you for choosing [facility] [place]  for your care. Our goal is always to provide you with excellent care. Hearing back from our patients is one way we can continue to improve our services. Please take a few minutes to complete the written survey that you may receive in the mail after your visit with us. Thank you!             Your Updated Medication List - Protect others around you: Learn how to safely use, store and throw away your medicines at www.disposemymeds.org.          This list is accurate as of: 5/5/17  2:50 PM.  Always use your most recent med list.                   Brand Name Dispense Instructions for use    ANTI-DIARRHEAL PO      Take  by mouth.       ASPIRIN PO      Take 81 mg by mouth daily       Calcium-Vitamin D 600-200 MG-UNIT Tabs          diclofenac 1 % Gel topical gel    VOLTAREN    100 g    Apply to the outside of the elbow 3 times per day.       ferrous sulfate 325 (65 FE) MG tablet    IRON    90 tablet    Take 1 tablet (325 mg) by mouth 3 times daily (with meals)       FLUoxetine 40 MG capsule    PROzac    90 capsule    Take 1 capsule (40 mg) by mouth daily       fluticasone-salmeterol 100-50 MCG/DOSE diskus inhaler    ADVAIR DISKUS    1  Inhaler    INHALE 1 PUFF TWICE DAILY       GAS-X PO      Take by mouth 2 times daily       ibuprofen 200 MG tablet    ADVIL/MOTRIN     Take 200 mg by mouth       ketotifen 0.025 % Soln ophthalmic solution    ZADITOR/REFRESH ANTI-ITCH    5 mL    PLACE 2 DROPS INTO BOTH EYES 2 TIMES DAILY       magnesium 100 MG Caps      Take by mouth 2 times daily       multivitamin per tablet      Take 1 tablet by mouth daily. Every day with food       omeprazole 20 MG CR capsule    priLOSEC    90 capsule    Take 1 capsule (20 mg) by mouth daily       pramipexole 0.5 MG tablet    MIRAPEX    180 tablet    TAKE 2 TABLET BY MOUTH DAILY AT BEDTIME       PSYLLIUM PO      Take 3 tsp by mouth 2 times daily.       tacrolimus 0.1 % ointment    PROTOPIC         traMADol 50 MG tablet    ULTRAM    90 tablet    TAKE 1-2 TABLETS BY MOUTH EVERY 6 HOURS AS NEEDED       vitamin E 200 UNIT capsule      Take 200 Units by mouth 2 times daily

## 2017-05-05 NOTE — NURSING NOTE
"Chief Complaint   Patient presents with     Shoulder Pain     fell twice in last two months, now bought land and was doing heavy lifting getting rid of things and thinks she over did it.      Abdominal Pain     possible hernia       Initial /60 (BP Location: Left arm, Patient Position: Chair, Cuff Size: Adult Regular)  Pulse 90  Temp 98.3  F (36.8  C) (Tympanic)  Resp 20  Wt 136 lb (61.7 kg)  SpO2 97%  BMI 24.87 kg/m2 Estimated body mass index is 24.87 kg/(m^2) as calculated from the following:    Height as of 3/30/17: 5' 2\" (1.575 m).    Weight as of this encounter: 136 lb (61.7 kg).  Medication Reconciliation: grayson Díaz LPN      "

## 2017-05-05 NOTE — PROGRESS NOTES
HPI  Patient is a 60 yo female who presents for shoulder pains after moving several items out of a house over 4 days.  She has had shoulder pain on the right which she was getting physical therapy to help in her pain and strength.  She now reports left shoulder pain.  She also reports that she has fallen on the ice without any shoulder impact.  She does have reports buttock pain bilaterally with sitting only.    Additionally, she reports that she has inguinal pain on bothe sides of the abdomen and also over the right superior region to the pubic bone.      Past Medical History:   Diagnosis Date     Abnormal mammogram, unspecified 01/08/2003    Normal pathology     Back Pain 02/10/2000    Sacroiliac pain     Benign neoplasm of colon 03/10/2000     Benign paroxysmal positional vertigo 06/07/2012     Depressive disorder, not elsewhere classified 02/10/2004     Diarrhea 12/15/2010    Secondary to colectomy for familial polyposis     Gastric polyp 12/11/15     History of normal mammogram 07/18/2014     Idiopathic osteoporosis 12/15/2010     Interstitial emphysema (H) 12/15/2010     menopausal or female climacteric states 08/31/1999     Mixed hyperlipidemia 12/15/2010     Other bursitis disorders 02/04/2011    Greater trochanteric     Other forms of retinal detachment(361.89) 12/15/2010     Other malaise and fatigue 01/10/2003     Personal history of tobacco use, presenting hazards to health 12/15/2010     Restless legs syndrome (RLS) 12/15/2010     Rotator cuff tendonitis      Rotator cuff tendonitis      Sacroiliitis, not elsewhere classified (H) 12/15/2010    multiple sites     Tobacco use disorder 08/22/2012     Unspecified asthma(493.90) 12/15/2010     Past Surgical History:   Procedure Laterality Date     benign tumors removed from back       ENDOSCOPY UPPER, COLONOSCOPY, COMBINED N/A 9/5/2014    Procedure: COMBINED ENDOSCOPY UPPER, COLONOSCOPY;  Surgeon: Delbert Patel MD;  Location: [facility] OR     ESOPHAGOSCOPY,  GASTROSCOPY, DUODENOSCOPY (EGD), COMBINED N/A 12/11/2015    Procedure: COMBINED ESOPHAGOSCOPY, GASTROSCOPY, DUODENOSCOPY (EGD);  Surgeon: Delbert Patel MD;  Location: [facility] OR     excised-benign  2002    tongue lesion     HC REPAIR OF NASAL SEPTUM  2002    Deviated nasal septum     LAPAROSCOPIC CHOLECYSTECTOMY       lumpectomy Right breast      Breast Lump     MOUTH SURGERY      removal of spot from roof of mouth     pilonidal cyst surgery  1976     removal of colon and large intestine  2000    Familial polyposis     Right etopic pregnancy      Pregnancy     TONSILLECTOMY      tonsillitus     UPPER GI ENDOSCOPY  2009    Stomach polyps           Review of Systems   Constitutional: Negative for chills and fever.   Respiratory: Negative for cough, shortness of breath and wheezing.    Cardiovascular: Negative for chest pain, palpitations and leg swelling.   Gastrointestinal: Positive for abdominal pain. Negative for blood in stool, constipation, diarrhea, nausea and vomiting.   Genitourinary: Positive for urgency. Negative for dysuria and hematuria.   Musculoskeletal: Positive for joint pain and neck pain.   Neurological: Positive for headaches. Negative for dizziness.         Physical Exam   Constitutional: She is oriented to person, place, and time and well-developed, well-nourished, and in no distress. No distress.   Abdominal: No hernia. Hernia confirmed negative in the ventral area, confirmed negative in the right inguinal area and confirmed negative in the left inguinal area.   Musculoskeletal: She exhibits no edema.   The left innominate is upslipped by 1 cm as confirmed by a superior medial malleolus, ASIS, pubic tubercle, and PSIS on the left.      Shoulder exam:  Both shoulders were examined.  The  left shoulder and right/ left upper extremity:  Has normal strength in  testing, flexion, extension, abduction, internal rotation, external rotation.     There is no noted weakness in flexion, extension,  abduction, internal rotation, external rotation.  Empty can testing is negative.  Jensen testing is negative.  Neer's testing is normal.        Neurological: She is alert and oriented to person, place, and time. She has normal strength.     Labs:  NA      Imaging:  NA      ASSESSMENT /PLAN:  (M99.05) Somatic dysfunction of pelvic region  (primary encounter diagnosis)  Comment: Left Lorenzo-pelvis (innominate up slip)  This is the reason for her buttock or ischial pain.  Plan:   OMT of the pelvis    (S39.011A) Abdominal muscle strain, initial encounter  Comment: No palpable hernia.  Plan:   Reassurance and symptoms of hernia were reviewed with the patient in the case that her symptoms change.    (S76.019A) Strain of flexor muscle of hip, unspecified laterality, initial encounter  Comment: right side which is giving her the sensation of a hernia.  Plan:   Rest from lifting and strenuous activity advised.               Innominate upslip procedure:  The lleft innominate is upslipped as confirmed by a superior ASIS, Pubic tubercle, PSIS and medial malleolus on the indicated side.    The patient was placed in the supine position.  The examiner placed one hand on the posterior calf of the left leg and the other hand on the anterior distal tibia of the leg.  The patient was asked to breath deeply and allow the examiner to hold the full weight of his/ her right/left leg and thigh.  Traction was applied in a caudad direction until the examiner sensed that the patient had relaxed the hip.  Then, a quick caudad force was applied to the left leg.    Post treatment assessment showed that the left and right innominates were inline as confirmed by the anatomical landmarks of the ASIS, pubic tubercles, PSIS, and the medial malleoli.  The patient reported that a change in her buttock pain was nit nted at this time.      Follow up with Provider - NEIDA Camarillo DO

## 2017-05-08 ENCOUNTER — OFFICE VISIT (OUTPATIENT)
Dept: CHIROPRACTIC MEDICINE | Facility: OTHER | Age: 62
End: 2017-05-08
Attending: CHIROPRACTOR
Payer: COMMERCIAL

## 2017-05-08 DIAGNOSIS — M99.02 SEGMENTAL AND SOMATIC DYSFUNCTION OF THORACIC REGION: ICD-10-CM

## 2017-05-08 DIAGNOSIS — M99.03 SEGMENTAL AND SOMATIC DYSFUNCTION OF LUMBAR REGION: Primary | ICD-10-CM

## 2017-05-08 DIAGNOSIS — M54.50 ACUTE BILATERAL LOW BACK PAIN WITHOUT SCIATICA: ICD-10-CM

## 2017-05-08 DIAGNOSIS — M99.01 SEGMENTAL AND SOMATIC DYSFUNCTION OF CERVICAL REGION: ICD-10-CM

## 2017-05-08 PROCEDURE — 98941 CHIROPRACT MANJ 3-4 REGIONS: CPT | Performed by: CHIROPRACTOR

## 2017-05-08 NOTE — MR AVS SNAPSHOT
After Visit Summary   5/8/2017    Chyna Delgado    MRN: [id number]           Patient Information     Date Of Birth          1955        Visit Information        Provider Department      5/8/2017 3:00 PM Luciano Marcum DC [facility] [place] [facility]        Today's Diagnoses     Segmental and somatic dysfunction of lumbar region    -  1    Acute bilateral low back pain without sciatica        Segmental and somatic dysfunction of thoracic region        Segmental and somatic dysfunction of cervical region           Follow-ups after your visit        Your next 10 appointments already scheduled     May 11, 2017  1:20 PM CDT   Return Visit with Luciano Marcum DC   [facility] [place] ([facility]    [address]  [place] [address]   [phone]            May 30, 2017  9:40 AM CDT   (Arrive by 9:20 AM)   SHORT with Arie Camarillo DO   [facility] [place] ([facility] [place] [facility])    [address] [address]   [phone]              Who to contact     If you have questions or need follow up information about today's clinic visit or your schedule please contact  [facility] directly at [phone].  Normal or non-critical lab and imaging results will be communicated to you by MyChart, letter or phone within 4 business days after the clinic has received the results. If you do not hear from us within 7 days, please contact the clinic through [employer] or phone. If you have a critical or abnormal lab result, we will notify you by phone as soon as possible.  Submit refill requests through [employer] or call your pharmacy and they will forward the refill request to us. Please allow 3 business days for your refill to be completed.          Additional Information About Your Visit        MyChart Information     [employer] gives you secure access to your electronic health record. If you see a primary care provider, you can also send messages to your care team and  make appointments. If you have questions, please call your primary care clinic.  If you do not have a primary care provider, please call [phone] and they will assist you.        Care EveryWhere ID     This is your Care EveryWhere ID. This could be used by other organizations to access your [place] medical records  [id number]         Blood Pressure from Last 3 Encounters:   05/05/17 110/60   03/30/17 102/70   02/24/17 118/62    Weight from Last 3 Encounters:   05/05/17 136 lb (61.7 kg)   03/30/17 140 lb (63.5 kg)   02/24/17 130 lb (59 kg)              We Performed the Following     CHIROPRAC MANIP,SPINAL,3-4 REGIONS        Primary Care Provider Office Phone # Fax #    Arie Avery DO [place] [phone]       [facility] [place] [address]  [place] [address]        Thank you!     Thank you for choosing  [facility] [place] [facility]  for your care. Our goal is always to provide you with excellent care. Hearing back from our patients is one way we can continue to improve our services. Please take a few minutes to complete the written survey that you may receive in the mail after your visit with us. Thank you!             Your Updated Medication List - Protect others around you: Learn how to safely use, store and throw away your medicines at www.disposemymeds.org.          This list is accurate as of: 5/8/17 11:59 PM.  Always use your most recent med list.                   Brand Name Dispense Instructions for use    ANTI-DIARRHEAL PO      Take  by mouth.       ASPIRIN PO      Take 81 mg by mouth daily       Calcium-Vitamin D 600-200 MG-UNIT Tabs          diclofenac 1 % Gel topical gel    VOLTAREN    100 g    Apply to the outside of the elbow 3 times per day.       ferrous sulfate 325 (65 FE) MG tablet    IRON    90 tablet    Take 1 tablet (325 mg) by mouth 3 times daily (with meals)       FLUoxetine 40 MG capsule    PROzac    90 capsule    Take 1 capsule (40 mg) by mouth daily        fluticasone-salmeterol 100-50 MCG/DOSE diskus inhaler    ADVAIR DISKUS    1 Inhaler    INHALE 1 PUFF TWICE DAILY       GAS-X PO      Take by mouth 2 times daily       ibuprofen 200 MG tablet    ADVIL/MOTRIN     Take 200 mg by mouth       ketotifen 0.025 % Soln ophthalmic solution    ZADITOR/REFRESH ANTI-ITCH    5 mL    PLACE 2 DROPS INTO BOTH EYES 2 TIMES DAILY       magnesium 100 MG Caps      Take by mouth 2 times daily       multivitamin per tablet      Take 1 tablet by mouth daily. Every day with food       omeprazole 20 MG CR capsule    priLOSEC    90 capsule    Take 1 capsule (20 mg) by mouth daily       pramipexole 0.5 MG tablet    MIRAPEX    180 tablet    TAKE 2 TABLET BY MOUTH DAILY AT BEDTIME       PSYLLIUM PO      Take 3 tsp by mouth 2 times daily.       tacrolimus 0.1 % ointment    PROTOPIC         traMADol 50 MG tablet    ULTRAM    90 tablet    TAKE 1-2 TABLETS BY MOUTH EVERY 6 HOURS AS NEEDED       vitamin E 200 UNIT capsule      Take 200 Units by mouth 2 times daily

## 2017-05-09 NOTE — PROGRESS NOTES
Subjective Finding:    Chief compalint: Patient presents with:  Back Pain: stiffness  Neck Pain  , Pain Scale: 6/10, Intensity: dull, Duration: 2 days, Change since last visit: , Radiating: Buttocks    Date of injury:     Activities that the pain restricts:   Home/household activities: yes.  Work duties: no.  Hobbies/social: yes.  Sleep: no.  Makes symptoms better: ice .  Makes symptoms worse: activity, lumbar extension and lumbar flexion.  Have you seen anyone else for the symptoms? no.  Work related: no.  Automobile related injury: no.    Objective and Assessment:    Posture Analysis:   High shoulder: .  Head tilt: .  High iliac crest: .  Head carriage: .  Thoracic Kyphosis: neutral.  Lumbar Lordosis: reverse.    Lumbar Range of Motion: flexion decreased and extension decreased.  Cervical Range of Motion: .  Thoracic Range of Motion: .  Extremity Range of Motion: .    Palpation:   Quad lumb: right, referred pain: no    Segmental dysfunction pre-treatment: T8, T9, L4, L5 and Sacrum.    C5-6    Assessment post-treatment:  Cervical: .  Thoracic: .  Lumbar: ROM increased and muscle spasm decreased.    Comments: .      Complicating Factors: .    Plan / Procedure:    Expected release date: .  Treatment plan: PRN.  Instructed patient: ice 20 minutes every other hour as needed.  Short term goals: reduce pain and increase ROM.  Long term goals: increase patient functional independence.  Prognosis: very good.

## 2017-05-11 ENCOUNTER — OFFICE VISIT (OUTPATIENT)
Dept: CHIROPRACTIC MEDICINE | Facility: OTHER | Age: 62
End: 2017-05-11
Attending: CHIROPRACTOR
Payer: COMMERCIAL

## 2017-05-11 DIAGNOSIS — M99.02 SEGMENTAL AND SOMATIC DYSFUNCTION OF THORACIC REGION: ICD-10-CM

## 2017-05-11 DIAGNOSIS — M54.50 ACUTE BILATERAL LOW BACK PAIN WITHOUT SCIATICA: ICD-10-CM

## 2017-05-11 DIAGNOSIS — M99.03 SEGMENTAL AND SOMATIC DYSFUNCTION OF LUMBAR REGION: Primary | ICD-10-CM

## 2017-05-11 DIAGNOSIS — M99.01 SEGMENTAL AND SOMATIC DYSFUNCTION OF CERVICAL REGION: ICD-10-CM

## 2017-05-11 PROCEDURE — 98941 CHIROPRACT MANJ 3-4 REGIONS: CPT | Performed by: CHIROPRACTOR

## 2017-05-11 NOTE — MR AVS SNAPSHOT
After Visit Summary   5/11/2017    Chyna Delgado    MRN: [id number]           Patient Information     Date Of Birth          1955        Visit Information        Provider Department      5/11/2017 1:20 PM Luciano Marcum DC [facility] [facility]        Today's Diagnoses     Segmental and somatic dysfunction of lumbar region    -  1    Acute bilateral low back pain without sciatica        Segmental and somatic dysfunction of thoracic region        Segmental and somatic dysfunction of cervical region           Follow-ups after your visit        Your next 10 appointments already scheduled     May 16, 2017 12:50 PM CDT   Return Visit with Luciano Marcum DC   [facility]    [address] [place], [place] [address] [address]   [phone]            May 18, 2017  1:10 PM CDT   Return Visit with Luciano Marcum DC   [facility]    [address] [place], [place] [address] [address]   [phone]            May 30, 2017  9:40 AM CDT   (Arrive by 9:20 AM)   SHORT with Arie Camarillo,    [facility]    [address] [facility] [address]   [phone]              Who to contact     If you have questions or need follow up information about today's clinic visit or your schedule please contact  [facility] LA[facility]L [facility]ON[facility] directly at [phone].  Normal or non-critical lab and imaging results will be communicated to you by MyChart, letter or phone within 4 business days after the clinic has received the results. If you do not hear from us within 7 days, please contact the clinic through MyChart or phone. If you have a critical or abnormal lab result, we will notify you by phone as soon as possible.  Submit refill requests through [employer] or call your pharmacy and they will forward the refill request to us. Please allow 3 business days for your refill to be completed.          Additional Information About Your Visit         Invup Information     Invup gives you secure access to your electronic health record. If you see a primary care provider, you can also send messages to your care team and make appointments. If you have questions, please call your primary care clinic.  If you do not have a primary care provider, please call [phone] and they will assist you.        Care EveryWhere ID     This is your Care EveryWhere ID. This could be used by other organizations to access your [place] medical records  [id number]         Blood Pressure from Last 3 Encounters:   05/05/17 110/60   03/30/17 102/70   02/24/17 118/62    Weight from Last 3 Encounters:   05/05/17 136 lb (61.7 kg)   03/30/17 140 lb (63.5 kg)   02/24/17 130 lb (59 kg)              We Performed the Following     CHIROPRAC MANIP,SPINAL,3-4 REGIONS        Primary Care Provider Office Phone # Fax #    Arie Avery DO [place] [phone]       [facility] [place] [address] [facility]  [place] [address]        Thank you!     Thank you for choosing  [facility]  for your care. Our goal is always to provide you with excellent care. Hearing back from our patients is one way we can continue to improve our services. Please take a few minutes to complete the written survey that you may receive in the mail after your visit with us. Thank you!             Your Updated Medication List - Protect others around you: Learn how to safely use, store and throw away your medicines at www.disposemymeds.org.          This list is accurate as of: 5/11/17  3:32 PM.  Always use your most recent med list.                   Brand Name Dispense Instructions for use    ANTI-DIARRHEAL PO      Take  by mouth.       ASPIRIN PO      Take 81 mg by mouth daily       Calcium-Vitamin D 600-200 MG-UNIT Tabs          diclofenac 1 % Gel topical gel    VOLTAREN    100 g    Apply to the outside of the elbow 3 times per day.       ferrous sulfate 325 (65 FE) MG tablet    IRON     90 tablet    Take 1 tablet (325 mg) by mouth 3 times daily (with meals)       FLUoxetine 40 MG capsule    PROzac    90 capsule    Take 1 capsule (40 mg) by mouth daily       fluticasone-salmeterol 100-50 MCG/DOSE diskus inhaler    ADVAIR DISKUS    1 Inhaler    INHALE 1 PUFF TWICE DAILY       GAS-X PO      Take by mouth 2 times daily       ibuprofen 200 MG tablet    ADVIL/MOTRIN     Take 200 mg by mouth       ketotifen 0.025 % Soln ophthalmic solution    ZADITOR/REFRESH ANTI-ITCH    5 mL    PLACE 2 DROPS INTO BOTH EYES 2 TIMES DAILY       magnesium 100 MG Caps      Take by mouth 2 times daily       multivitamin per tablet      Take 1 tablet by mouth daily. Every day with food       omeprazole 20 MG CR capsule    priLOSEC    90 capsule    Take 1 capsule (20 mg) by mouth daily       pramipexole 0.5 MG tablet    MIRAPEX    180 tablet    TAKE 2 TABLET BY MOUTH DAILY AT BEDTIME       PSYLLIUM PO      Take 3 tsp by mouth 2 times daily.       tacrolimus 0.1 % ointment    PROTOPIC         traMADol 50 MG tablet    ULTRAM    90 tablet    TAKE 1-2 TABLETS BY MOUTH EVERY 6 HOURS AS NEEDED       vitamin E 200 UNIT capsule      Take 200 Units by mouth 2 times daily

## 2017-05-16 ENCOUNTER — OFFICE VISIT (OUTPATIENT)
Dept: CHIROPRACTIC MEDICINE | Facility: OTHER | Age: 62
End: 2017-05-16
Attending: CHIROPRACTOR
Payer: COMMERCIAL

## 2017-05-16 DIAGNOSIS — M99.01 SEGMENTAL AND SOMATIC DYSFUNCTION OF CERVICAL REGION: ICD-10-CM

## 2017-05-16 DIAGNOSIS — M54.50 ACUTE BILATERAL LOW BACK PAIN WITHOUT SCIATICA: ICD-10-CM

## 2017-05-16 DIAGNOSIS — M99.02 SEGMENTAL AND SOMATIC DYSFUNCTION OF THORACIC REGION: ICD-10-CM

## 2017-05-16 DIAGNOSIS — M99.03 SEGMENTAL AND SOMATIC DYSFUNCTION OF LUMBAR REGION: Primary | ICD-10-CM

## 2017-05-16 PROCEDURE — 98941 CHIROPRACT MANJ 3-4 REGIONS: CPT | Performed by: CHIROPRACTOR

## 2017-05-16 NOTE — MR AVS SNAPSHOT
After Visit Summary   5/16/2017    Chyna Delgado    MRN: [id number]           Patient Information     Date Of Birth          1955        Visit Information        Provider Department      5/16/2017 12:50 PM [facility] [facility]        Today's Diagnoses     Segmental and somatic dysfunction of lumbar region    -  1    Acute bilateral low back pain without sciatica        Segmental and somatic dysfunction of thoracic region        Segmental and somatic dysfunction of cervical region           Follow-ups after your visit        Your next 10 appointments already scheduled     May 22, 2017 10:30 AM CDT   Return Visit with Luciano Marcum DC   [facility]    [address] [place], [place] [address] [address]   [phone]            May 25, 2017 10:30 AM CDT   Return Visit with Luciano Marcum DC   [facility]    [address] [place], [place] [address] [address]   [phone]            May 30, 2017  9:40 AM CDT   (Arrive by 9:20 AM)   SHORT with Arie Camarillo,    [facility]    [address] [facility] [address]   [phone]              Who to contact     If you have questions or need follow up information about today's clinic visit or your schedule please contact  [facility]ON[facility] directly at [phone].  Normal or non-critical lab and imaging results will be communicated to you by MyChart, letter or phone within 4 business days after the clinic has received the results. If you do not hear from us within 7 days, please contact the clinic through MyChart or phone. If you have a critical or abnormal lab result, we will notify you by phone as soon as possible.  Submit refill requests through [employer] or call your pharmacy and they will forward the refill request to us. Please allow 3 business days for your refill to be completed.          Additional Information About Your Visit         Dabble DB Information     [employer] gives you secure access to your electronic health record. If you see a primary care provider, you can also send messages to your care team and make appointments. If you have questions, please call your primary care clinic.  If you do not have a primary care provider, please call [phone] and they will assist you.        Care EveryWhere ID     This is your Care EveryWhere ID. This could be used by other organizations to access your [place] medical records  [id number]         Blood Pressure from Last 3 Encounters:   05/05/17 110/60   03/30/17 102/70   02/24/17 118/62    Weight from Last 3 Encounters:   05/05/17 136 lb (61.7 kg)   03/30/17 140 lb (63.5 kg)   02/24/17 130 lb (59 kg)              We Performed the Following     CHIROPRAC MANIP,SPINAL,1-2 REGIONS        Primary Care Provider Office Phone # Fax #    Arie Avery DO [place] [phone]       [facility] [place] [address]  [place] [address]        Thank you!     Thank you for choosing  [facility]  for your care. Our goal is always to provide you with excellent care. Hearing back from our patients is one way we can continue to improve our services. Please take a few minutes to complete the written survey that you may receive in the mail after your visit with us. Thank you!             Your Updated Medication List - Protect others around you: Learn how to safely use, store and throw away your medicines at www.disposemymeds.org.          This list is accurate as of: 5/16/17 11:59 PM.  Always use your most recent med list.                   Brand Name Dispense Instructions for use    ANTI-DIARRHEAL PO      Take  by mouth.       ASPIRIN PO      Take 81 mg by mouth daily       Calcium-Vitamin D 600-200 MG-UNIT Tabs          diclofenac 1 % Gel topical gel    VOLTAREN    100 g    Apply to the outside of the elbow 3 times per day.       ferrous sulfate 325 (65 FE) MG tablet    IRON     90 tablet    Take 1 tablet (325 mg) by mouth 3 times daily (with meals)       FLUoxetine 40 MG capsule    PROzac    90 capsule    Take 1 capsule (40 mg) by mouth daily       fluticasone-salmeterol 100-50 MCG/DOSE diskus inhaler    ADVAIR DISKUS    1 Inhaler    INHALE 1 PUFF TWICE DAILY       GAS-X PO      Take by mouth 2 times daily       ibuprofen 200 MG tablet    ADVIL/MOTRIN     Take 200 mg by mouth       ketotifen 0.025 % Soln ophthalmic solution    ZADITOR/REFRESH ANTI-ITCH    5 mL    PLACE 2 DROPS INTO BOTH EYES 2 TIMES DAILY       magnesium 100 MG Caps      Take by mouth 2 times daily       multivitamin per tablet      Take 1 tablet by mouth daily. Every day with food       omeprazole 20 MG CR capsule    priLOSEC    90 capsule    Take 1 capsule (20 mg) by mouth daily       pramipexole 0.5 MG tablet    MIRAPEX    180 tablet    TAKE 2 TABLET BY MOUTH DAILY AT BEDTIME       PSYLLIUM PO      Take 3 tsp by mouth 2 times daily.       tacrolimus 0.1 % ointment    PROTOPIC         traMADol 50 MG tablet    ULTRAM    90 tablet    TAKE 1-2 TABLETS BY MOUTH EVERY 6 HOURS AS NEEDED       vitamin E 200 UNIT capsule      Take 200 Units by mouth 2 times daily

## 2017-05-18 ENCOUNTER — OFFICE VISIT (OUTPATIENT)
Dept: CHIROPRACTIC MEDICINE | Facility: OTHER | Age: 62
End: 2017-05-18
Attending: CHIROPRACTOR
Payer: COMMERCIAL

## 2017-05-18 DIAGNOSIS — M54.42 ACUTE BILATERAL LOW BACK PAIN WITH BILATERAL SCIATICA: ICD-10-CM

## 2017-05-18 DIAGNOSIS — M54.41 ACUTE BILATERAL LOW BACK PAIN WITH BILATERAL SCIATICA: ICD-10-CM

## 2017-05-18 DIAGNOSIS — M99.02 SEGMENTAL AND SOMATIC DYSFUNCTION OF THORACIC REGION: ICD-10-CM

## 2017-05-18 DIAGNOSIS — M99.03 SEGMENTAL AND SOMATIC DYSFUNCTION OF LUMBAR REGION: Primary | ICD-10-CM

## 2017-05-18 DIAGNOSIS — M99.01 SEGMENTAL AND SOMATIC DYSFUNCTION OF CERVICAL REGION: ICD-10-CM

## 2017-05-18 PROCEDURE — 98941 CHIROPRACT MANJ 3-4 REGIONS: CPT | Performed by: CHIROPRACTOR

## 2017-05-18 NOTE — MR AVS SNAPSHOT
After Visit Summary   5/18/2017    Chyna Delgado    MRN: [id number]           Patient Information     Date Of Birth          1955        Visit Information        Provider Department      5/18/2017 1:10 PM [facility] [place] [facility]        Today's Diagnoses     Segmental and somatic dysfunction of lumbar region    -  1    Acute bilateral low back pain with bilateral sciatica        Segmental and somatic dysfunction of thoracic region        Segmental and somatic dysfunction of cervical region           Follow-ups after your visit        Your next 10 appointments already scheduled     May 25, 2017 10:30 AM CDT   Return Visit with Luciano Marcum DC   [facility] [place] [facility]    [address] [place], [place] [address] [address]   [phone]            May 30, 2017  8:50 AM CDT   Return Visit with Luciano Marcum DC   [facility] [place] [facility]    [address] [place], [place] [address] [address]   [phone]            May 30, 2017  9:40 AM CDT   (Arrive by 9:20 AM)   SHORT with Arie Camarillo DO   [facility] [place] [facility]    [address] [facility]  [place] [address]   [phone]            Jun 01, 2017 10:10 AM CDT   Return Visit with Luciano Marcum DC   [facility] [place] [facility]    [address] [place], [place] [address] [address]   [phone]              Who to contact     If you have questions or need follow up information about today's clinic visit or your schedule please contact  [facility] directly at [phone].  Normal or non-critical lab and imaging results will be communicated to you by MyChart, letter or phone within 4 business days after the clinic has received the results. If you do not hear from us within 7 days, please contact the clinic through MyChart or phone. If you have a critical or abnormal lab result, we will notify you by phone as soon as possible.  Submit refill requests through  [employer] or call your pharmacy and they will forward the refill request to us. Please allow 3 business days for your refill to be completed.          Additional Information About Your Visit        Vicin[employer]art Information     [employer] gives you secure access to your electronic health record. If you see a primary care provider, you can also send messages to your care team and make appointments. If you have questions, please call your primary care clinic.  If you do not have a primary care provider, please call [phone] and they will assist you.        Care EveryWhere ID     This is your Care EveryWhere ID. This could be used by other organizations to access your [place] medical records  [id number]         Blood Pressure from Last 3 Encounters:   05/05/17 110/60   03/30/17 102/70   02/24/17 118/62    Weight from Last 3 Encounters:   05/05/17 136 lb (61.7 kg)   03/30/17 140 lb (63.5 kg)   02/24/17 130 lb (59 kg)              We Performed the Following     CHIROPRAC MANIP,SPINAL,3-4 REGIONS        Primary Care Provider Office Phone # Fax #    Arie Avery DO [place] [phone]       [facility] [place] [address] [facility] [address]        Thank you!     Thank you for choosing  [facility] [place] [facility]  for your care. Our goal is always to provide you with excellent care. Hearing back from our patients is one way we can continue to improve our services. Please take a few minutes to complete the written survey that you may receive in the mail after your visit with us. Thank you!             Your Updated Medication List - Protect others around you: Learn how to safely use, store and throw away your medicines at www.disposemymeds.org.          This list is accurate as of: 5/18/17 11:59 PM.  Always use your most recent med list.                   Brand Name Dispense Instructions for use    ANTI-DIARRHEAL PO      Take  by mouth.       ASPIRIN PO      Take 81 mg by mouth daily       Calcium-Vitamin  D 600-200 MG-UNIT Tabs          diclofenac 1 % Gel topical gel    VOLTAREN    100 g    Apply to the outside of the elbow 3 times per day.       ferrous sulfate 325 (65 FE) MG tablet    IRON    90 tablet    Take 1 tablet (325 mg) by mouth 3 times daily (with meals)       FLUoxetine 40 MG capsule    PROzac    90 capsule    Take 1 capsule (40 mg) by mouth daily       fluticasone-salmeterol 100-50 MCG/DOSE diskus inhaler    ADVAIR DISKUS    1 Inhaler    INHALE 1 PUFF TWICE DAILY       GAS-X PO      Take by mouth 2 times daily       ibuprofen 200 MG tablet    ADVIL/MOTRIN     Take 200 mg by mouth       ketotifen 0.025 % Soln ophthalmic solution    ZADITOR/REFRESH ANTI-ITCH    5 mL    PLACE 2 DROPS INTO BOTH EYES 2 TIMES DAILY       magnesium 100 MG Caps      Take by mouth 2 times daily       multivitamin per tablet      Take 1 tablet by mouth daily. Every day with food       omeprazole 20 MG CR capsule    priLOSEC    90 capsule    Take 1 capsule (20 mg) by mouth daily       pramipexole 0.5 MG tablet    MIRAPEX    180 tablet    TAKE 2 TABLET BY MOUTH DAILY AT BEDTIME       PSYLLIUM PO      Take 3 tsp by mouth 2 times daily.       tacrolimus 0.1 % ointment    PROTOPIC         traMADol 50 MG tablet    ULTRAM    90 tablet    TAKE 1-2 TABLETS BY MOUTH EVERY 6 HOURS AS NEEDED       vitamin E 200 UNIT capsule      Take 200 Units by mouth 2 times daily

## 2017-05-22 ENCOUNTER — OFFICE VISIT (OUTPATIENT)
Dept: CHIROPRACTIC MEDICINE | Facility: OTHER | Age: 62
End: 2017-05-22
Attending: CHIROPRACTOR
Payer: COMMERCIAL

## 2017-05-22 DIAGNOSIS — M99.02 SEGMENTAL AND SOMATIC DYSFUNCTION OF THORACIC REGION: ICD-10-CM

## 2017-05-22 DIAGNOSIS — M54.50 ACUTE BILATERAL LOW BACK PAIN WITHOUT SCIATICA: ICD-10-CM

## 2017-05-22 DIAGNOSIS — M99.03 SEGMENTAL AND SOMATIC DYSFUNCTION OF LUMBAR REGION: Primary | ICD-10-CM

## 2017-05-22 DIAGNOSIS — M99.01 SEGMENTAL AND SOMATIC DYSFUNCTION OF CERVICAL REGION: ICD-10-CM

## 2017-05-22 PROCEDURE — 98941 CHIROPRACT MANJ 3-4 REGIONS: CPT | Performed by: CHIROPRACTOR

## 2017-05-22 NOTE — MR AVS SNAPSHOT
After Visit Summary   5/22/2017    Chyna Delgado    MRN: [id number]           Patient Information     Date Of Birth          1955        Visit Information        Provider Department      5/22/2017 10:30 AM [facility]um DC [facility] [place] [facility]        Today's Diagnoses     Segmental and somatic dysfunction of lumbar region    -  1    Acute bilateral low back pain without sciatica        Segmental and somatic dysfunction of thoracic region        Segmental and somatic dysfunction of cervical region           Follow-ups after your visit        Your next 10 appointments already scheduled     May 25, 2017 10:30 AM CDT   Return Visit with Luciano Marcum DC   [facility] [place] [facility]    [address] [place], [place] [address] [address]   [phone]            May 30, 2017  8:50 AM CDT   Return Visit with Luciano Marcum DC   [facility] [place] [facility]    [address] [place], [place] [address] [address]   [phone]            May 30, 2017  9:40 AM CDT   (Arrive by 9:20 AM)   SHORT with Arie Camarillo DO   [facility] [place] [facility] [place] [facility]    [address] [facility]  [place] [address]   [phone]            Jun 01, 2017 10:10 AM CDT   Return Visit with Luciano Marcum DC   [facility] [place] [facility]    [address] [place], [place] [address] [address]   [phone]              Who to contact     If you have questions or need follow up information about today's clinic visit or your schedule please contact  [facility] directly at [phone].  Normal or non-critical lab and imaging results will be communicated to you by MyChart, letter or phone within 4 business days after the clinic has received the results. If you do not hear from us within 7 days, please contact the clinic through [employer] or phone. If you have a critical or abnormal lab result, we will notify you by phone as soon as possible.  Submit refill requests through [employer] or  call your pharmacy and they will forward the refill request to us. Please allow 3 business days for your refill to be completed.          Additional Information About Your Visit        Photobuckethart Information     [employer] gives you secure access to your electronic health record. If you see a primary care provider, you can also send messages to your care team and make appointments. If you have questions, please call your primary care clinic.  If you do not have a primary care provider, please call [phone] and they will assist you.        Care EveryWhere ID     This is your Care EveryWhere ID. This could be used by other organizations to access your [place] medical records  [id number]         Blood Pressure from Last 3 Encounters:   05/05/17 110/60   03/30/17 102/70   02/24/17 118/62    Weight from Last 3 Encounters:   05/05/17 136 lb (61.7 kg)   03/30/17 140 lb (63.5 kg)   02/24/17 130 lb (59 kg)              We Performed the Following     CHIROPRAC MANIP,SPINAL,3-4 REGIONS        Primary Care Provider Office Phone # Fax #    Arie Bennie Camarillo [phone]       [facility] [place] [address] [facility] [address]        Thank you!     Thank you for choosing  [facility] [place] [facility]  for your care. Our goal is always to provide you with excellent care. Hearing back from our patients is one way we can continue to improve our services. Please take a few minutes to complete the written survey that you may receive in the mail after your visit with us. Thank you!             Your Updated Medication List - Protect others around you: Learn how to safely use, store and throw away your medicines at www.disposemymeds.org.          This list is accurate as of: 5/22/17 11:59 PM.  Always use your most recent med list.                   Brand Name Dispense Instructions for use    ANTI-DIARRHEAL PO      Take  by mouth.       ASPIRIN PO      Take 81 mg by mouth daily       Calcium-Vitamin D 600-200  MG-UNIT Tabs          diclofenac 1 % Gel topical gel    VOLTAREN    100 g    Apply to the outside of the elbow 3 times per day.       ferrous sulfate 325 (65 FE) MG tablet    IRON    90 tablet    Take 1 tablet (325 mg) by mouth 3 times daily (with meals)       FLUoxetine 40 MG capsule    PROzac    90 capsule    Take 1 capsule (40 mg) by mouth daily       fluticasone-salmeterol 100-50 MCG/DOSE diskus inhaler    ADVAIR DISKUS    1 Inhaler    INHALE 1 PUFF TWICE DAILY       GAS-X PO      Take by mouth 2 times daily       ibuprofen 200 MG tablet    ADVIL/MOTRIN     Take 200 mg by mouth       ketotifen 0.025 % Soln ophthalmic solution    ZADITOR/REFRESH ANTI-ITCH    5 mL    PLACE 2 DROPS INTO BOTH EYES 2 TIMES DAILY       magnesium 100 MG Caps      Take by mouth 2 times daily       multivitamin per tablet      Take 1 tablet by mouth daily. Every day with food       omeprazole 20 MG CR capsule    priLOSEC    90 capsule    Take 1 capsule (20 mg) by mouth daily       pramipexole 0.5 MG tablet    MIRAPEX    180 tablet    TAKE 2 TABLET BY MOUTH DAILY AT BEDTIME       PSYLLIUM PO      Take 3 tsp by mouth 2 times daily.       tacrolimus 0.1 % ointment    PROTOPIC         traMADol 50 MG tablet    ULTRAM    90 tablet    TAKE 1-2 TABLETS BY MOUTH EVERY 6 HOURS AS NEEDED       vitamin E 200 UNIT capsule      Take 200 Units by mouth 2 times daily

## 2017-05-22 NOTE — PROGRESS NOTES
Subjective Finding:    Chief compalint: Patient presents with:  Back Pain  , Pain Scale: 6/10, Intensity: dull, Duration: 2 days, Change since last visit: , Radiating: Buttocks    Date of injury:     Activities that the pain restricts:   Home/household activities: yes.  Work duties: no.  Hobbies/social: yes.  Sleep: no.  Makes symptoms better: ice .  Makes symptoms worse: activity, lumbar extension and lumbar flexion.  Have you seen anyone else for the symptoms? no.  Work related: no.  Automobile related injury: no.    Objective and Assessment:    Posture Analysis:   High shoulder: .  Head tilt: .  High iliac crest: .  Head carriage: .  Thoracic Kyphosis: neutral.  Lumbar Lordosis: reverse.    Lumbar Range of Motion: flexion decreased and extension decreased.  Cervical Range of Motion: .  Thoracic Range of Motion: .  Extremity Range of Motion: .    Palpation:   Quad lumb: right, referred pain: no    Segmental dysfunction pre-treatment: T8, T9, L4, L5 and Sacrum.    C5-6    Assessment post-treatment:  Cervical: .  Thoracic: .  Lumbar: ROM increased and muscle spasm decreased.    Comments: .      Complicating Factors: .    Plan / Procedure:    Expected release date: .  Treatment plan: PRN.  Instructed patient: ice 20 minutes every other hour as needed.  Short term goals: reduce pain and increase ROM.  Long term goals: increase patient functional independence.  Prognosis: very good.

## 2017-05-23 NOTE — PROGRESS NOTES
Subjective Finding:    Chief compalint: Patient presents with:  Back Pain: still having low back pain and leg pains  Neck Pain  , Pain Scale: 6/10, Intensity: dull, Duration: 2 days, Change since last visit: , Radiating: Buttocks    Date of injury:     Activities that the pain restricts:   Home/household activities: yes.  Work duties: no.  Hobbies/social: yes.  Sleep: no.  Makes symptoms better: ice .  Makes symptoms worse: activity, lumbar extension and lumbar flexion.  Have you seen anyone else for the symptoms? no.  Work related: no.  Automobile related injury: no.    Objective and Assessment:    Posture Analysis:   High shoulder: .  Head tilt: .  High iliac crest: .  Head carriage: .  Thoracic Kyphosis: neutral.  Lumbar Lordosis: reverse.    Lumbar Range of Motion: flexion decreased and extension decreased.  Cervical Range of Motion: .  Thoracic Range of Motion: .  Extremity Range of Motion: .    Palpation:   Quad lumb: right, referred pain: no    Segmental dysfunction pre-treatment: T8, T9, L4, L5 and Sacrum.    C5-6    Assessment post-treatment:  Cervical: .  Thoracic: .  Lumbar: ROM increased and muscle spasm decreased.    Comments: .      Complicating Factors: .    Plan / Procedure:    Expected release date: .  Treatment plan: PRN.  Instructed patient: ice 20 minutes every other hour as needed.  Short term goals: reduce pain and increase ROM.  Long term goals: increase patient functional independence.  Prognosis: very good.

## 2017-05-25 ENCOUNTER — OFFICE VISIT (OUTPATIENT)
Dept: CHIROPRACTIC MEDICINE | Facility: OTHER | Age: 62
End: 2017-05-25
Attending: CHIROPRACTOR
Payer: COMMERCIAL

## 2017-05-25 DIAGNOSIS — M54.50 ACUTE BILATERAL LOW BACK PAIN WITHOUT SCIATICA: ICD-10-CM

## 2017-05-25 DIAGNOSIS — M99.01 SEGMENTAL AND SOMATIC DYSFUNCTION OF CERVICAL REGION: ICD-10-CM

## 2017-05-25 DIAGNOSIS — M99.02 SEGMENTAL AND SOMATIC DYSFUNCTION OF THORACIC REGION: ICD-10-CM

## 2017-05-25 DIAGNOSIS — M99.03 SEGMENTAL AND SOMATIC DYSFUNCTION OF LUMBAR REGION: Primary | ICD-10-CM

## 2017-05-25 PROCEDURE — 98941 CHIROPRACT MANJ 3-4 REGIONS: CPT | Performed by: CHIROPRACTOR

## 2017-05-25 NOTE — MR AVS SNAPSHOT
After Visit Summary   5/25/2017    Chyna Delgado    MRN: [id number]           Patient Information     Date Of Birth          1955        Visit Information        Provider Department      5/25/2017 10:30 AM Luciano Marcum DC  [facility] [place]         Follow-ups after your visit        Your next 10 appointments already scheduled     May 30, 2017  9:40 AM CDT   (Arrive by 9:20 AM)   SHORT with Arie Camarillo DO   [facility] [place] ([facility] [place] [facility]    [address]  [place] [address]   [phone]            Jun 01, 2017 10:10 AM CDT   Return Visit with Luciano Marcum DC   [facility] [place] ([facility])    [address]  [place] [address]   [phone]              Who to contact     If you have questions or need follow up information about today's clinic visit or your schedule please contact  [facility] [place] [facility] directly at [phone].  Normal or non-critical lab and imaging results will be communicated to you by LoanLogicshart, letter or phone within 4 business days after the clinic has received the results. If you do not hear from us within 7 days, please contact the clinic through iTB Holdingst or phone. If you have a critical or abnormal lab result, we will notify you by phone as soon as possible.  Submit refill requests through [email or web address] or call your pharmacy and they will forward the refill request to us. Please allow 3 business days for your refill to be completed.          Additional Information About Your Visit        MyChart Information     [email or web address] gives you secure access to your electronic health record. If you see a primary care provider, you can also send messages to your care team and make appointments. If you have questions, please call your primary care clinic.  If you do not have a primary care provider, please call [phone] and they will assist you.        Care EveryWhere ID     This is your Care EveryWhere ID. This could be used  by other organizations to access your [place] medical records  [id number]         Blood Pressure from Last 3 Encounters:   05/05/17 110/60   03/30/17 102/70   02/24/17 118/62    Weight from Last 3 Encounters:   05/05/17 136 lb (61.7 kg)   03/30/17 140 lb (63.5 kg)   02/24/17 130 lb (59 kg)              Today, you had the following     No orders found for display       Primary Care Provider Office Phone # Fax #    Arie Camarillo [phone]       [facility] [place] [address]  [place] [address]        Thank you!     Thank you for choosing  [facility] [place] [facility]  for your care. Our goal is always to provide you with excellent care. Hearing back from our patients is one way we can continue to improve our services. Please take a few minutes to complete the written survey that you may receive in the mail after your visit with us. Thank you!             Your Updated Medication List - Protect others around you: Learn how to safely use, store and throw away your medicines at www.disposemymeds.org.          This list is accurate as of: 5/25/17  3:24 PM.  Always use your most recent med list.                   Brand Name Dispense Instructions for use    ANTI-DIARRHEAL PO      Take  by mouth.       ASPIRIN PO      Take 81 mg by mouth daily       Calcium-Vitamin D 600-200 MG-UNIT Tabs          diclofenac 1 % Gel topical gel    VOLTAREN    100 g    Apply to the outside of the elbow 3 times per day.       ferrous sulfate 325 (65 FE) MG tablet    IRON    90 tablet    Take 1 tablet (325 mg) by mouth 3 times daily (with meals)       FLUoxetine 40 MG capsule    PROzac    90 capsule    Take 1 capsule (40 mg) by mouth daily       fluticasone-salmeterol 100-50 MCG/DOSE diskus inhaler    ADVAIR DISKUS    1 Inhaler    INHALE 1 PUFF TWICE DAILY       GAS-X PO      Take by mouth 2 times daily       ibuprofen 200 MG tablet    ADVIL/MOTRIN     Take 200 mg by mouth       ketotifen 0.025 % Soln  ophthalmic solution    ZADITOR/REFRESH ANTI-ITCH    5 mL    PLACE 2 DROPS INTO BOTH EYES 2 TIMES DAILY       magnesium 100 MG Caps      Take by mouth 2 times daily       multivitamin per tablet      Take 1 tablet by mouth daily. Every day with food       omeprazole 20 MG CR capsule    priLOSEC    90 capsule    Take 1 capsule (20 mg) by mouth daily       pramipexole 0.5 MG tablet    MIRAPEX    180 tablet    TAKE 2 TABLET BY MOUTH DAILY AT BEDTIME       PSYLLIUM PO      Take 3 tsp by mouth 2 times daily.       tacrolimus 0.1 % ointment    PROTOPIC         traMADol 50 MG tablet    ULTRAM    90 tablet    TAKE 1-2 TABLETS BY MOUTH EVERY 6 HOURS AS NEEDED       vitamin E 200 UNIT capsule      Take 200 Units by mouth 2 times daily

## 2017-05-30 ENCOUNTER — OFFICE VISIT (OUTPATIENT)
Dept: PEDIATRICS | Facility: OTHER | Age: 62
End: 2017-05-30
Attending: INTERNAL MEDICINE
Payer: COMMERCIAL

## 2017-05-30 VITALS
HEART RATE: 84 BPM | OXYGEN SATURATION: 98 % | BODY MASS INDEX: 25.03 KG/M2 | HEIGHT: 62 IN | RESPIRATION RATE: 20 BRPM | WEIGHT: 136 LBS | DIASTOLIC BLOOD PRESSURE: 80 MMHG | SYSTOLIC BLOOD PRESSURE: 120 MMHG | TEMPERATURE: 98.5 F

## 2017-05-30 DIAGNOSIS — G25.81 RESTLESS LEG SYNDROME: ICD-10-CM

## 2017-05-30 DIAGNOSIS — M79.10 MYALGIA: ICD-10-CM

## 2017-05-30 DIAGNOSIS — R79.0 LOW IRON STORES: Primary | ICD-10-CM

## 2017-05-30 DIAGNOSIS — E06.9 THYROIDITIS: ICD-10-CM

## 2017-05-30 LAB
CRP SERPL-MCNC: 30.3 MG/L (ref 0–8)
ERYTHROCYTE [DISTWIDTH] IN BLOOD BY AUTOMATED COUNT: 13 % (ref 10–15)
FERRITIN SERPL-MCNC: 130 NG/ML (ref 8–252)
HCT VFR BLD AUTO: 40.7 % (ref 35–47)
HGB BLD-MCNC: 13 G/DL (ref 11.7–15.7)
IRON SATN MFR SERPL: 12 % (ref 15–46)
IRON SERPL-MCNC: 41 UG/DL (ref 35–180)
MCH RBC QN AUTO: 28.3 PG (ref 26.5–33)
MCHC RBC AUTO-ENTMCNC: 31.9 G/DL (ref 31.5–36.5)
MCV RBC AUTO: 89 FL (ref 78–100)
PLATELET # BLD AUTO: 322 10E9/L (ref 150–450)
RBC # BLD AUTO: 4.59 10E12/L (ref 3.8–5.2)
T4 FREE SERPL-MCNC: 1.19 NG/DL (ref 0.76–1.46)
TIBC SERPL-MCNC: 335 UG/DL (ref 240–430)
TSH SERPL DL<=0.05 MIU/L-ACNC: 0.61 MU/L (ref 0.4–4)
WBC # BLD AUTO: 8.2 10E9/L (ref 4–11)

## 2017-05-30 PROCEDURE — 84439 ASSAY OF FREE THYROXINE: CPT | Performed by: INTERNAL MEDICINE

## 2017-05-30 PROCEDURE — 86140 C-REACTIVE PROTEIN: CPT | Performed by: INTERNAL MEDICINE

## 2017-05-30 PROCEDURE — 84443 ASSAY THYROID STIM HORMONE: CPT | Performed by: INTERNAL MEDICINE

## 2017-05-30 PROCEDURE — 83550 IRON BINDING TEST: CPT | Performed by: INTERNAL MEDICINE

## 2017-05-30 PROCEDURE — 99214 OFFICE O/P EST MOD 30 MIN: CPT | Performed by: INTERNAL MEDICINE

## 2017-05-30 PROCEDURE — 36415 COLL VENOUS BLD VENIPUNCTURE: CPT | Performed by: INTERNAL MEDICINE

## 2017-05-30 PROCEDURE — 82728 ASSAY OF FERRITIN: CPT | Performed by: INTERNAL MEDICINE

## 2017-05-30 PROCEDURE — 85027 COMPLETE CBC AUTOMATED: CPT | Performed by: INTERNAL MEDICINE

## 2017-05-30 PROCEDURE — 83540 ASSAY OF IRON: CPT | Performed by: INTERNAL MEDICINE

## 2017-05-30 RX ORDER — TRAMADOL HYDROCHLORIDE 50 MG/1
TABLET ORAL
Qty: 90 TABLET | Refills: 0 | Status: SHIPPED | OUTPATIENT
Start: 2017-05-30 | End: 2018-08-13 | Stop reason: SINTOL

## 2017-05-30 RX ORDER — PREDNISONE 20 MG/1
20 TABLET ORAL DAILY
Qty: 5 TABLET | Refills: 0 | Status: SHIPPED | OUTPATIENT
Start: 2017-05-30 | End: 2017-06-04

## 2017-05-30 ASSESSMENT — ENCOUNTER SYMPTOMS
BLOOD IN STOOL: 0
VOMITING: 0
SENSORY CHANGE: 0
SPEECH CHANGE: 0
HEADACHES: 0
HEMATURIA: 0
NAUSEA: 0
PALPITATIONS: 0
HEARTBURN: 0
SHORTNESS OF BREATH: 0
CHILLS: 1
COUGH: 0
DIZZINESS: 0
DIARRHEA: 0
WHEEZING: 0
DYSURIA: 0
ABDOMINAL PAIN: 0
FEVER: 0
CONSTIPATION: 0

## 2017-05-30 ASSESSMENT — PAIN SCALES - GENERAL: PAINLEVEL: EXTREME PAIN (8)

## 2017-05-30 NOTE — MR AVS SNAPSHOT
After Visit Summary   5/30/2017    Chyna Delgado    MRN: [id number]           Patient Information     Date Of Birth          1955        Visit Information        Provider Department      5/30/2017 9:40 AM Arie Camarillo DO [facility] [place]        Today's Diagnoses     Low iron stores    -  1    Restless leg syndrome        Thyroiditis        Myalgia           Follow-ups after your visit        Your next 10 appointments already scheduled     Jun 01, 2017 10:10 AM CDT   Return Visit with Luciano Marcum DC   [facility]    [address]  [place] [address]   [phone]            Jun 13, 2017 10:00 AM CDT   (Arrive by 9:40 AM)   SHORT with Arie Camarillo DO   [facility] [place] ([facility]    [address]  [facility] [address]   [phone]              Who to contact     If you have questions or need follow up information about today's clinic visit or your schedule please contact [facility] directly at [phone].  Normal or non-critical lab and imaging results will be communicated to you by Chaologixhart, letter or phone within 4 business days after the clinic has received the results. If you do not hear from us within 7 days, please contact the clinic through Chaologixhart or phone. If you have a critical or abnormal lab result, we will notify you by phone as soon as possible.  Submit refill requests through Radario or call your pharmacy and they will forward the refill request to us. Please allow 3 business days for your refill to be completed.          Additional Information About Your Visit        MyChart Information     Radario gives you secure access to your electronic health record. If you see a primary care provider, you can also send messages to your care team and make appointments. If you have questions, please call your primary care clinic.  If you do not have a primary care provider, please  "call [phone] and they will assist you.        Care EveryWhere ID     This is your Care EveryWhere ID. This could be used by other organizations to access your [place] medical records  [id number]        Your Vitals Were     Pulse Temperature Respirations Height Pulse Oximetry BMI (Body Mass Index)    84 98.5  F (36.9  C) (Tympanic) 20 5' 2\" (1.575 m) 98% 24.87 kg/m2       Blood Pressure from Last 3 Encounters:   05/30/17 120/80   05/05/17 110/60   03/30/17 102/70    Weight from Last 3 Encounters:   05/30/17 136 lb (61.7 kg)   05/05/17 136 lb (61.7 kg)   03/30/17 140 lb (63.5 kg)              We Performed the Following     CBC with platelets     CRP inflammation     Ferritin     Iron and iron binding capacity     T4 free     TSH          Today's Medication Changes          These changes are accurate as of: 5/30/17 10:11 AM.  If you have any questions, ask your nurse or doctor.               Start taking these medicines.        Dose/Directions    predniSONE 20 MG tablet   Commonly known as:  DELTASONE   Used for:  Myalgia   Started by:  Arie Camarillo DO        Dose:  20 mg   Take 1 tablet (20 mg) by mouth daily for 5 days   Quantity:  5 tablet   Refills:  0            Where to get your medicines      These medications were sent to [facility] [address] [address]     Phone:  [phone]     predniSONE 20 MG tablet         Some of these will need a paper prescription and others can be bought over the counter.  Ask your nurse if you have questions.     Bring a paper prescription for each of these medications     traMADol 50 MG tablet                Primary Care Provider Office Phone # Fax #    Arie Camarillo [phone]       [facility] [place] [address]  [place] [address]        Thank you!     Thank you for choosing [facility]  for your care. Our goal is always to provide you with excellent " care. Hearing back from our patients is one way we can continue to improve our services. Please take a few minutes to complete the written survey that you may receive in the mail after your visit with us. Thank you!             Your Updated Medication List - Protect others around you: Learn how to safely use, store and throw away your medicines at www.disposemymeds.org.          This list is accurate as of: 5/30/17 10:11 AM.  Always use your most recent med list.                   Brand Name Dispense Instructions for use    ANTI-DIARRHEAL PO      Take  by mouth.       ASPIRIN PO      Take 81 mg by mouth daily       Calcium-Vitamin D 600-200 MG-UNIT Tabs          diclofenac 1 % Gel topical gel    VOLTAREN    100 g    Apply to the outside of the elbow 3 times per day.       ferrous sulfate 325 (65 FE) MG tablet    IRON    90 tablet    Take 1 tablet (325 mg) by mouth 3 times daily (with meals)       FLUoxetine 40 MG capsule    PROzac    90 capsule    Take 1 capsule (40 mg) by mouth daily       fluticasone-salmeterol 100-50 MCG/DOSE diskus inhaler    ADVAIR DISKUS    1 Inhaler    INHALE 1 PUFF TWICE DAILY       GAS-X PO      Take by mouth 2 times daily       ibuprofen 200 MG tablet    ADVIL/MOTRIN     Take 200 mg by mouth       ketotifen 0.025 % Soln ophthalmic solution    ZADITOR/REFRESH ANTI-ITCH    5 mL    PLACE 2 DROPS INTO BOTH EYES 2 TIMES DAILY       magnesium 100 MG Caps      Take by mouth 2 times daily       multivitamin per tablet      Take 1 tablet by mouth daily. Every day with food       omeprazole 20 MG CR capsule    priLOSEC    90 capsule    Take 1 capsule (20 mg) by mouth daily       pramipexole 0.5 MG tablet    MIRAPEX    180 tablet    TAKE 2 TABLET BY MOUTH DAILY AT BEDTIME       predniSONE 20 MG tablet    DELTASONE    5 tablet    Take 1 tablet (20 mg) by mouth daily for 5 days       PSYLLIUM PO      Take 3 tsp by mouth 2 times daily.       tacrolimus 0.1 % ointment    PROTOPIC         traMADol 50 MG  tablet    ULTRAM    90 tablet    TAKE 1-2 TABLETS BY MOUTH EVERY 6 HOURS AS NEEDED       vitamin E 200 UNIT capsule      Take 200 Units by mouth 2 times daily

## 2017-05-30 NOTE — PROGRESS NOTES
HPI  Patient is a 60 yo female with RLS who presents for a follow up.  She reports that her RLS symptoms have not been well controlled even with taking her ferrous sulfate 3 times per day as she has anemia.  She reports cold feet at night.  She has no relief with wrapping up her feet.     Additionally, she reports that her shoulders continue to have pain from a fall.  She has difficulty with lifting her shoulders without taking ibuprofen.  She reports shoulder aching at night.      Past Medical History:   Diagnosis Date     Abnormal mammogram, unspecified 01/08/2003    Normal pathology     Back Pain 02/10/2000    Sacroiliac pain     Benign neoplasm of colon 03/10/2000     Benign paroxysmal positional vertigo 06/07/2012     Depressive disorder, not elsewhere classified 02/10/2004     Diarrhea 12/15/2010    Secondary to colectomy for familial polyposis     Gastric polyp 12/11/15     History of normal mammogram 07/18/2014     Idiopathic osteoporosis 12/15/2010     Interstitial emphysema (H) 12/15/2010     menopausal or female climacteric states 08/31/1999     Mixed hyperlipidemia 12/15/2010     Other bursitis disorders 02/04/2011    Greater trochanteric     Other forms of retinal detachment(361.89) 12/15/2010     Other malaise and fatigue 01/10/2003     Personal history of tobacco use, presenting hazards to health 12/15/2010     Restless legs syndrome (RLS) 12/15/2010     Rotator cuff tendonitis      Rotator cuff tendonitis      Sacroiliitis, not elsewhere classified (H) 12/15/2010    multiple sites     Tobacco use disorder 08/22/2012     Unspecified asthma(493.90) 12/15/2010     Past Surgical History:   Procedure Laterality Date     benign tumors removed from back       ENDOSCOPY UPPER, COLONOSCOPY, COMBINED N/A 9/5/2014    Procedure: COMBINED ENDOSCOPY UPPER, COLONOSCOPY;  Surgeon: Delbert Patel MD;  Location: [facility]     ESOPHAGOSCOPY, GASTROSCOPY, DUODENOSCOPY (EGD), COMBINED N/A 12/11/2015    Procedure: COMBINED  ESOPHAGOSCOPY, GASTROSCOPY, DUODENOSCOPY (EGD);  Surgeon: Delbert Patel MD;  Location: [facility]     excised-benign  2002    tongue lesion     HC REPAIR OF NASAL SEPTUM  2002    Deviated nasal septum     LAPAROSCOPIC CHOLECYSTECTOMY       lumpectomy Right breast      Breast Lump     MOUTH SURGERY      removal of spot from roof of mouth     pilonidal cyst surgery  1976     removal of colon and large intestine  2000    Familial polyposis     Right etopic pregnancy      Pregnancy     TONSILLECTOMY      tonsillitus     UPPER GI ENDOSCOPY  2009    Stomach polyps         Review of Systems   Constitutional: Positive for chills. Negative for fever and malaise/fatigue.   Respiratory: Negative for cough, shortness of breath and wheezing.    Cardiovascular: Negative for chest pain, palpitations and leg swelling.   Gastrointestinal: Negative for abdominal pain, blood in stool, constipation, diarrhea, heartburn, melena, nausea and vomiting.   Genitourinary: Negative for dysuria and hematuria.   Musculoskeletal:        See HPI.   Neurological: Negative for dizziness, sensory change, speech change and headaches.         Physical Exam   Constitutional: She is oriented to person, place, and time and well-developed, well-nourished, and in no distress. No distress.   HENT:   Head: Normocephalic.   Eyes: Conjunctivae are normal. No scleral icterus.   Neck: No thyromegaly present.   Cardiovascular: Normal rate, regular rhythm, normal heart sounds and intact distal pulses.    No murmur heard.  Pulses:       Radial pulses are 2+ on the right side, and 2+ on the left side.        Femoral pulses are 2+ on the right side, and 2+ on the left side.       Popliteal pulses are 2+ on the right side, and 2+ on the left side.        Dorsalis pedis pulses are 1+ on the right side, and 1+ on the left side.        Posterior tibial pulses are 2+ on the right side, and 2+ on the left side.   Pulmonary/Chest: Effort normal and breath sounds normal. She  has no wheezes. She has no rales.   Abdominal: Soft. Bowel sounds are normal. She exhibits no shifting dullness, no distension, no abdominal bruit, no pulsatile midline mass and no mass. There is no hepatosplenomegaly. There is no tenderness.   Musculoskeletal: She exhibits no edema.   Shoulder exam:  Both shoulders were examined.  The right and left shoulder and right and left upper extremity:  Has normal strength in  testing, flexion and  extension,      There is weakness with 4/5 strength in strength in  abduction, internal rotation, external rotation.  Empty can testing is positive with 3/5 strength.  Jensen testing is negative.        .          Lymphadenopathy:     She has no cervical adenopathy.   Neurological: She is alert and oriented to person, place, and time.           Labs:  Results for orders placed or performed in visit on 05/30/17   CBC with platelets   Result Value Ref Range    WBC 8.2 4.0 - 11.0 10e9/L    RBC Count 4.59 3.8 - 5.2 10e12/L    Hemoglobin 13.0 11.7 - 15.7 g/dL    Hematocrit 40.7 35.0 - 47.0 %    MCV 89 78 - 100 fl    MCH 28.3 26.5 - 33.0 pg    MCHC 31.9 31.5 - 36.5 g/dL    RDW 13.0 10.0 - 15.0 %    Platelet Count 322 150 - 450 10e9/L   Ferritin   Result Value Ref Range    Ferritin 130 8 - 252 ng/mL   Iron and iron binding capacity   Result Value Ref Range    Iron 41 35 - 180 ug/dL    Iron Binding Cap 335 240 - 430 ug/dL    Iron Saturation Index 12 (L) 15 - 46 %   TSH   Result Value Ref Range    TSH 0.61 0.40 - 4.00 mU/L   T4 free   Result Value Ref Range    T4 Free 1.19 0.76 - 1.46 ng/dL   CRP inflammation   Result Value Ref Range    CRP Inflammation 30.3 (H) 0.0 - 8.0 mg/L           Imaging:  NA    ASSESSMENT /PLAN:  (R79.0) Low iron stores  (primary encounter diagnosis)  Comment: She is responding well to her iron.  Plan:   She will continue her iron TID    (G25.81) Restless leg syndrome  Comment: This may be related to PMR as she has an elevated CRP.  Her iron stores are  improving.  Plan:    traMADol (ULTRAM) 50 MG  Tablet every 8 hours as needed.    (E06.9) Thyroiditis  Comment: Her thyroid levels are normal.  Plan:   Repeat thyroid labs as needed.    (M79.1) Myalgia  Comment: Her CRP is elevated and her pain distribution matches PMR locations of pain.  Plan:  Trial prednisone predniSONE (DELTASONE) 20 MG tablet daily for 5 days, traMADol         (ULTRAM) 50 MG tablet every 8 hours as needed for pain.        Follow up with Provider - 2 weeks.        Arie Camarillo DO

## 2017-05-30 NOTE — NURSING NOTE
"Chief Complaint   Patient presents with     Musculoskeletal Problem     restless legs syndrome      Blood Draw     check iron     RECHECK     Follow up fall 8 weeks - pain all over     *_* Health Care Directive *_*     Has one at home       Initial /80  Pulse 84  Temp 98.5  F (36.9  C) (Tympanic)  Resp 20  Ht 5' 2\" (1.575 m)  Wt 136 lb (61.7 kg)  SpO2 98%  BMI 24.87 kg/m2 Estimated body mass index is 24.87 kg/(m^2) as calculated from the following:    Height as of this encounter: 5' 2\" (1.575 m).    Weight as of this encounter: 136 lb (61.7 kg).  Medication Reconciliation: complete   Chyna Black LPN      "

## 2017-06-13 ENCOUNTER — OFFICE VISIT (OUTPATIENT)
Dept: PEDIATRICS | Facility: OTHER | Age: 62
End: 2017-06-13
Attending: INTERNAL MEDICINE
Payer: COMMERCIAL

## 2017-06-13 VITALS
BODY MASS INDEX: 25.06 KG/M2 | RESPIRATION RATE: 20 BRPM | HEART RATE: 80 BPM | DIASTOLIC BLOOD PRESSURE: 66 MMHG | OXYGEN SATURATION: 98 % | SYSTOLIC BLOOD PRESSURE: 116 MMHG | WEIGHT: 137 LBS | TEMPERATURE: 97.9 F

## 2017-06-13 DIAGNOSIS — M79.10 MYALGIA: ICD-10-CM

## 2017-06-13 DIAGNOSIS — G25.81 RESTLESS LEGS SYNDROME (RLS): Primary | ICD-10-CM

## 2017-06-13 LAB
CRP SERPL-MCNC: 24.6 MG/L (ref 0–8)
ERYTHROCYTE [SEDIMENTATION RATE] IN BLOOD BY WESTERGREN METHOD: 16 MM/H (ref 0–30)

## 2017-06-13 PROCEDURE — 86140 C-REACTIVE PROTEIN: CPT | Performed by: INTERNAL MEDICINE

## 2017-06-13 PROCEDURE — 85652 RBC SED RATE AUTOMATED: CPT | Performed by: INTERNAL MEDICINE

## 2017-06-13 PROCEDURE — 36415 COLL VENOUS BLD VENIPUNCTURE: CPT | Performed by: INTERNAL MEDICINE

## 2017-06-13 PROCEDURE — 99213 OFFICE O/P EST LOW 20 MIN: CPT | Performed by: INTERNAL MEDICINE

## 2017-06-13 RX ORDER — PREDNISONE 20 MG/1
20 TABLET ORAL DAILY
Qty: 5 TABLET | Refills: 0 | Status: SHIPPED | OUTPATIENT
Start: 2017-06-13 | End: 2017-06-30

## 2017-06-13 RX ORDER — GABAPENTIN 100 MG/1
200 CAPSULE ORAL 3 TIMES DAILY
Qty: 180 CAPSULE | Refills: 1 | Status: SHIPPED | OUTPATIENT
Start: 2017-06-13 | End: 2017-06-22

## 2017-06-13 ASSESSMENT — ENCOUNTER SYMPTOMS
NAUSEA: 0
DYSURIA: 0
DOUBLE VISION: 0
BLOOD IN STOOL: 0
HEADACHES: 0
NECK PAIN: 1
COUGH: 0
MYALGIAS: 1
DIARRHEA: 0
FEVER: 0
BLURRED VISION: 0
CONSTIPATION: 0
DIZZINESS: 0
ABDOMINAL PAIN: 0
WHEEZING: 0
PALPITATIONS: 0
SENSORY CHANGE: 0
HEARTBURN: 0
SHORTNESS OF BREATH: 0
CHILLS: 0

## 2017-06-13 ASSESSMENT — PAIN SCALES - GENERAL: PAINLEVEL: EXTREME PAIN (8)

## 2017-06-13 NOTE — MR AVS SNAPSHOT
After Visit Summary   6/13/2017    Chyna Delgado    MRN: [id number]           Patient Information     Date Of Birth          1955        Visit Information        Provider Department      6/13/2017 10:00 AM Arie Camarillo DO [facility] [place]        Today's Diagnoses     Restless legs syndrome (RLS)    -  1    Myalgia           Follow-ups after your visit        Your next 10 appointments already scheduled     Jul 05, 2017 11:00 AM CDT   (Arrive by 10:40 AM)   SHORT with Arie Camarillo DO   [facility] [place] ([facility] [place] )    [address]  [place] [address]   [phone]              Who to contact     If you have questions or need follow up information about today's clinic visit or your schedule please contact [facility] directly at [phone].  Normal or non-critical lab and imaging results will be communicated to you by MyChart, letter or phone within 4 business days after the clinic has received the results. If you do not hear from us within 7 days, please contact the clinic through MyChart or phone. If you have a critical or abnormal lab result, we will notify you by phone as soon as possible.  Submit refill requests through [employer] or call your pharmacy and they will forward the refill request to us. Please allow 3 business days for your refill to be completed.          Additional Information About Your Visit        MyChart Information     [employer] gives you secure access to your electronic health record. If you see a primary care provider, you can also send messages to your care team and make appointments. If you have questions, please call your primary care clinic.  If you do not have a primary care provider, please call [phone] and they will assist you.        Care EveryWhere ID     This is your Care EveryWhere ID. This could be used by other organizations to access your [facility]  records  [id number]        Your Vitals Were     Pulse Temperature Respirations Pulse Oximetry BMI (Body Mass Index)       80 97.9  F (36.6  C) (Tympanic) 20 98% 25.06 kg/m2        Blood Pressure from Last 3 Encounters:   06/13/17 116/66   05/30/17 120/80   05/05/17 110/60    Weight from Last 3 Encounters:   06/13/17 137 lb (62.1 kg)   05/30/17 136 lb (61.7 kg)   05/05/17 136 lb (61.7 kg)              We Performed the Following     CRP inflammation     ESR: Erythrocyte sedimentation rate          Today's Medication Changes          These changes are accurate as of: 6/13/17  1:20 PM.  If you have any questions, ask your nurse or doctor.               Start taking these medicines.        Dose/Directions    gabapentin 100 MG capsule   Commonly known as:  NEURONTIN   Used for:  Restless legs syndrome (RLS)   Started by:  Arie Camarillo DO        Dose:  200 mg   Take 2 capsules (200 mg) by mouth 3 times daily   Quantity:  180 capsule   Refills:  1       predniSONE 20 MG tablet   Commonly known as:  DELTASONE   Used for:  Myalgia   Started by:  Arie Camarillo DO        Dose:  20 mg   Take 1 tablet (20 mg) by mouth daily   Quantity:  5 tablet   Refills:  0            Where to get your medicines      These medications were sent to [facility] [address] [address]     Phone:  [phone]     gabapentin 100 MG capsule    predniSONE 20 MG tablet                Primary Care Provider Office Phone # Fax #    Arie Camarillo [phone]       [facility] [place] [address] [address]        Thank you!     Thank you for choosing [facility]  for your care. Our goal is always to provide you with excellent care. Hearing back from our patients is one way we can continue to improve our services. Please take a few minutes to complete the written survey that you may receive in the mail after your visit  with us. Thank you!             Your Updated Medication List - Protect others around you: Learn how to safely use, store and throw away your medicines at www.disposemymeds.org.          This list is accurate as of: 6/13/17  1:20 PM.  Always use your most recent med list.                   Brand Name Dispense Instructions for use    ANTI-DIARRHEAL PO      Take  by mouth.       ASPIRIN PO      Take 81 mg by mouth daily       Calcium-Vitamin D 600-200 MG-UNIT Tabs          diclofenac 1 % Gel topical gel    VOLTAREN    100 g    Apply to the outside of the elbow 3 times per day.       ferrous sulfate 325 (65 FE) MG tablet    IRON    90 tablet    Take 1 tablet (325 mg) by mouth 3 times daily (with meals)       FLUoxetine 40 MG capsule    PROzac    90 capsule    Take 1 capsule (40 mg) by mouth daily       fluticasone-salmeterol 100-50 MCG/DOSE diskus inhaler    ADVAIR DISKUS    1 Inhaler    INHALE 1 PUFF TWICE DAILY       gabapentin 100 MG capsule    NEURONTIN    180 capsule    Take 2 capsules (200 mg) by mouth 3 times daily       GAS-X PO      Take by mouth 2 times daily       ibuprofen 200 MG tablet    ADVIL/MOTRIN     Take 200 mg by mouth       ketotifen 0.025 % Soln ophthalmic solution    ZADITOR/REFRESH ANTI-ITCH    5 mL    PLACE 2 DROPS INTO BOTH EYES 2 TIMES DAILY       magnesium 100 MG Caps      Take by mouth 2 times daily       multivitamin per tablet      Take 1 tablet by mouth daily. Every day with food       omeprazole 20 MG CR capsule    priLOSEC    90 capsule    Take 1 capsule (20 mg) by mouth daily       pramipexole 0.5 MG tablet    MIRAPEX    180 tablet    TAKE 2 TABLET BY MOUTH DAILY AT BEDTIME       predniSONE 20 MG tablet    DELTASONE    5 tablet    Take 1 tablet (20 mg) by mouth daily       PSYLLIUM PO      Take 3 tsp by mouth 2 times daily.       tacrolimus 0.1 % ointment    PROTOPIC         traMADol 50 MG tablet    ULTRAM    90 tablet    TAKE 1-2 TABLETS BY MOUTH EVERY 6 HOURS AS NEEDED       vitamin  E 200 UNIT capsule      Take 200 Units by mouth 2 times daily

## 2017-06-13 NOTE — NURSING NOTE
"Chief Complaint   Patient presents with     Musculoskeletal Problem     RLS- No improvement     Shoulder Pain     f/u. Prednisone helped for 3 days but then tried digging a hole for her garden, and        Initial /66 (BP Location: Right arm, Patient Position: Chair, Cuff Size: Adult Large)  Pulse 80  Temp 97.9  F (36.6  C) (Tympanic)  Resp 20  Wt 137 lb (62.1 kg)  SpO2 98%  BMI 25.06 kg/m2 Estimated body mass index is 25.06 kg/(m^2) as calculated from the following:    Height as of 5/30/17: 5' 2\" (1.575 m).    Weight as of this encounter: 137 lb (62.1 kg).  Medication Reconciliation: grayson Díaz LPN      "

## 2017-06-13 NOTE — PROGRESS NOTES
HPI  Patient is a 62 yo female who presents for her right shoulder pain follow up an d her RLS follow up.  In regards to her shoulder, she reports immediate pain relief with prednisone, however, she was digging and injured her shoulder again.  Her current shoulder pain is 7-8/10.  She reports tightness and aching over the entire shoulder area, across her chest and acorss her lower neck.      In regards to her restless legs, she reports pain in her legs when they are not moving.  She reports that with movement her pain is gone.  She reports daytime symptoms with prolonged driving and resting with her legs up.  She has noticed that exercise shortly before bed helped for a few hours in relieving her pain.            Past Medical History:   Diagnosis Date     Abnormal mammogram, unspecified 01/08/2003    Normal pathology     Back Pain 02/10/2000    Sacroiliac pain     Benign neoplasm of colon 03/10/2000     Benign paroxysmal positional vertigo 06/07/2012     Depressive disorder, not elsewhere classified 02/10/2004     Diarrhea 12/15/2010    Secondary to colectomy for familial polyposis     Gastric polyp 12/11/15     History of normal mammogram 07/18/2014     Idiopathic osteoporosis 12/15/2010     Interstitial emphysema (H) 12/15/2010     menopausal or female climacteric states 08/31/1999     Mixed hyperlipidemia 12/15/2010     Other bursitis disorders 02/04/2011    Greater trochanteric     Other forms of retinal detachment(361.89) 12/15/2010     Other malaise and fatigue 01/10/2003     Personal history of tobacco use, presenting hazards to health 12/15/2010     Restless legs syndrome (RLS) 12/15/2010     Rotator cuff tendonitis      Rotator cuff tendonitis      Sacroiliitis, not elsewhere classified (H) 12/15/2010    multiple sites     Tobacco use disorder 08/22/2012     Unspecified asthma(493.90) 12/15/2010     Past Surgical History:   Procedure Laterality Date     benign tumors removed from back       ENDOSCOPY UPPER,  COLONOSCOPY, COMBINED N/A 9/5/2014    Procedure: COMBINED ENDOSCOPY UPPER, COLONOSCOPY;  Surgeon: Delbert Patel MD;  Location: [facility]     ESOPHAGOSCOPY, GASTROSCOPY, DUODENOSCOPY (EGD), COMBINED N/A 12/11/2015    Procedure: COMBINED ESOPHAGOSCOPY, GASTROSCOPY, DUODENOSCOPY (EGD);  Surgeon: Delbert Patel MD;  Location: [facility] OR     excised-benign  2002    tongue lesion     HC REPAIR OF NASAL SEPTUM  2002    Deviated nasal septum     LAPAROSCOPIC CHOLECYSTECTOMY       lumpectomy Right breast      Breast Lump     MOUTH SURGERY      removal of spot from roof of mouth     pilonidal cyst surgery  1976     removal of colon and large intestine  2000    Familial polyposis     Right etopic pregnancy      Pregnancy     TONSILLECTOMY      tonsillitus     UPPER GI ENDOSCOPY  2009    Stomach polyps       Review of Systems   Constitutional: Negative for chills and fever.   Eyes: Negative for blurred vision and double vision.   Respiratory: Negative for cough, shortness of breath and wheezing.    Cardiovascular: Negative for chest pain and palpitations.   Gastrointestinal: Negative for abdominal pain, blood in stool, constipation, diarrhea, heartburn and nausea.   Genitourinary: Negative for dysuria.   Musculoskeletal: Positive for myalgias and neck pain.   Neurological: Negative for dizziness, sensory change and headaches.        See HPI         Physical Exam   Constitutional: She is oriented to person, place, and time and well-developed, well-nourished, and in no distress. No distress.   HENT:   Head: Normocephalic.   Mouth/Throat: No oropharyngeal exudate.   Eyes: EOM are normal. No scleral icterus.   Neck: Neck supple.   Cardiovascular: Normal rate, regular rhythm, normal heart sounds and intact distal pulses.    No murmur heard.  Pulmonary/Chest: Effort normal and breath sounds normal. She has no wheezes. She has no rales.   Abdominal: Soft. Bowel sounds are normal. She exhibits no shifting dullness, no distension and no  pulsatile midline mass. There is no hepatosplenomegaly. There is no tenderness.   Musculoskeletal: She exhibits no edema.   Lymphadenopathy:     She has no cervical adenopathy.   Neurological: She is alert and oriented to person, place, and time.   Psychiatric: Mood, memory, affect and judgment normal.       Labs:  Results for orders placed or performed in visit on 06/13/17   CRP inflammation   Result Value Ref Range    CRP Inflammation 24.6 (H) 0.0 - 8.0 mg/L   ESR: Erythrocyte sedimentation rate   Result Value Ref Range    Sed Rate 16 0 - 30 mm/h           Imaging:  NA      ASSESSMENT /PLAN:  (G25.81) Restless legs syndrome (RLS)  (primary encounter diagnosis)  Comment: She has limited response to Mirapex 1.0 MG at bedtime.  Plan:   Start gabapentin (NEURONTIN) 100 MG capsule, 200 mg TID and continue Mirapex      (M79.1) Myalgia  Comment: The distribution of her pain suggests either fibromyalgia or polymyalgia rheumatica.  Given that she has an elevated CRP which is lower than her previous after prednisone, the latter is more likely.  Plan:   predniSONE (DELTASONE) 20 MG tablet, daily for 5 days.      Follow up with Provider - 3 weeks for RLS and shoulder pains.           Arie Camarillo DO

## 2017-06-20 ENCOUNTER — TELEPHONE (OUTPATIENT)
Dept: INTERNAL MEDICINE | Facility: OTHER | Age: 62
End: 2017-06-20

## 2017-06-20 NOTE — TELEPHONE ENCOUNTER
9:07 AM    Reason for Call: Phone Call    Description: Pt called and said she was given prednisone, and her pain is getting way worse, she said she has an upcoming appointment in about 2 weeks, she said she would like to talk to the nurse about this    Was an appointment offered for this call? Yes    Preferred method for responding to this message: Telephone Call [phone]    If we cannot reach you directly, may we leave a detailed response at the number you provided? Yes    Can this message wait until your PCP/provider returns, if available today? PCP is in    Garima Longoria

## 2017-06-20 NOTE — TELEPHONE ENCOUNTER
Patient states her pain is at an 8-9 on pain scale. Her pain is right back to where it was previously. She states she doesn't see you for 2 weeks and the pain is so bad she can barely get out of bed. She is wondering what else we can do. Please advise. Thank you

## 2017-06-22 DIAGNOSIS — G25.81 RESTLESS LEGS SYNDROME (RLS): ICD-10-CM

## 2017-06-22 RX ORDER — GABAPENTIN 400 MG/1
400 CAPSULE ORAL 3 TIMES DAILY
Qty: 90 CAPSULE | Refills: 1 | Status: SHIPPED | OUTPATIENT
Start: 2017-06-22 | End: 2017-09-08

## 2017-06-22 NOTE — TELEPHONE ENCOUNTER
PT called again regarding this, she wants to know if her pain medication can be bumped up a little

## 2017-06-28 ENCOUNTER — TRANSFERRED RECORDS (OUTPATIENT)
Dept: HEALTH INFORMATION MANAGEMENT | Facility: HOSPITAL | Age: 62
End: 2017-06-28

## 2017-06-29 ENCOUNTER — TELEPHONE (OUTPATIENT)
Dept: PEDIATRICS | Facility: OTHER | Age: 62
End: 2017-06-29

## 2017-06-29 DIAGNOSIS — M79.10 MYALGIA: ICD-10-CM

## 2017-06-30 RX ORDER — PREDNISONE 20 MG/1
TABLET ORAL
Qty: 10 TABLET | Refills: 0 | Status: SHIPPED | OUTPATIENT
Start: 2017-06-30 | End: 2017-07-05

## 2017-06-30 NOTE — TELEPHONE ENCOUNTER
I 90 percent sure she has PMR which I discussed with her previously.  I placed an order for prednisone.  She needs to see me next week.

## 2017-06-30 NOTE — TELEPHONE ENCOUNTER
Patient states she has been having so much pain she would rather be dead than live this way. States it all started about 2.5months ago. Thought she overdid it when they were cleaning out the house next door. But it takes her 30 minutes to get out of bed in the morning. She takes the tramadol when it is really bad and it helps somewhat, but her legs hurt, her shoulders hurt, and her butt bone so much that she just cant live this way. Gabapentin 400mg tid is not helping. She is wanting to know what is going on. I asked if she remembered having any ticks on her which she states she did have small ticks noted but never has seen any bullseye rash etc. Please advise. Thank you. Before the last 2.5 months patient was fine.

## 2017-07-05 ENCOUNTER — OFFICE VISIT (OUTPATIENT)
Dept: PEDIATRICS | Facility: OTHER | Age: 62
End: 2017-07-05
Attending: INTERNAL MEDICINE
Payer: COMMERCIAL

## 2017-07-05 VITALS
RESPIRATION RATE: 20 BRPM | OXYGEN SATURATION: 96 % | BODY MASS INDEX: 24.69 KG/M2 | HEART RATE: 84 BPM | DIASTOLIC BLOOD PRESSURE: 70 MMHG | SYSTOLIC BLOOD PRESSURE: 118 MMHG | TEMPERATURE: 98.2 F | WEIGHT: 135 LBS

## 2017-07-05 DIAGNOSIS — R19.7 DIARRHEA DUE TO MALABSORPTION: Primary | ICD-10-CM

## 2017-07-05 DIAGNOSIS — M99.05 SOMATIC DYSFUNCTION OF PELVIC REGION: ICD-10-CM

## 2017-07-05 DIAGNOSIS — M79.10 MYALGIA: ICD-10-CM

## 2017-07-05 DIAGNOSIS — M35.3 POLYMYALGIA RHEUMATICA (H): ICD-10-CM

## 2017-07-05 DIAGNOSIS — K90.9 DIARRHEA DUE TO MALABSORPTION: Primary | ICD-10-CM

## 2017-07-05 LAB — CRP SERPL-MCNC: 9.3 MG/L (ref 0–8)

## 2017-07-05 PROCEDURE — 86140 C-REACTIVE PROTEIN: CPT | Performed by: INTERNAL MEDICINE

## 2017-07-05 PROCEDURE — 83993 ASSAY FOR CALPROTECTIN FECAL: CPT | Mod: 90 | Performed by: INTERNAL MEDICINE

## 2017-07-05 PROCEDURE — 83516 IMMUNOASSAY NONANTIBODY: CPT | Mod: 90 | Performed by: INTERNAL MEDICINE

## 2017-07-05 PROCEDURE — 99000 SPECIMEN HANDLING OFFICE-LAB: CPT | Performed by: INTERNAL MEDICINE

## 2017-07-05 PROCEDURE — 99214 OFFICE O/P EST MOD 30 MIN: CPT | Mod: 25 | Performed by: INTERNAL MEDICINE

## 2017-07-05 PROCEDURE — 82306 VITAMIN D 25 HYDROXY: CPT | Mod: 90 | Performed by: INTERNAL MEDICINE

## 2017-07-05 PROCEDURE — 98925 OSTEOPATH MANJ 1-2 REGIONS: CPT | Performed by: INTERNAL MEDICINE

## 2017-07-05 PROCEDURE — 36415 COLL VENOUS BLD VENIPUNCTURE: CPT | Performed by: INTERNAL MEDICINE

## 2017-07-05 PROCEDURE — 82607 VITAMIN B-12: CPT | Mod: 90 | Performed by: INTERNAL MEDICINE

## 2017-07-05 RX ORDER — PREDNISONE 20 MG/1
TABLET ORAL
Qty: 30 TABLET | Refills: 1 | Status: SHIPPED | OUTPATIENT
Start: 2017-07-05 | End: 2017-08-08

## 2017-07-05 ASSESSMENT — ENCOUNTER SYMPTOMS
DIZZINESS: 0
ABDOMINAL PAIN: 0
CHILLS: 0
FEVER: 0
BLOOD IN STOOL: 0
NAUSEA: 0
DIARRHEA: 1
HEADACHES: 0
WHEEZING: 0
PALPITATIONS: 0
MYALGIAS: 1
HEMOPTYSIS: 0
COUGH: 0
CONSTIPATION: 0
VOMITING: 0

## 2017-07-05 ASSESSMENT — PAIN SCALES - GENERAL: PAINLEVEL: NO PAIN (1)

## 2017-07-05 NOTE — PROGRESS NOTES
HPI  Patient is a 62 yo female who presents for a follow up on her muscular pains.  She has been worked up for rheumatological diseases which were all negative.  Her distribution of pain is Similar to that of PMR.  Today, she reports that she is feeling better on her prednisone.  She reports no further shoulder pain.  She reports some buttock pain over the ischial bones.    Despite her improvement omn her muscle pains her RLS is still active with 3 of 5 days where she has difficulty.  Additionally, she reports that her bowel movements are frequent and some foods have been noticed to go through her system unchanged.      Past Medical History:   Diagnosis Date     Abnormal mammogram, unspecified 01/08/2003    Normal pathology     Back Pain 02/10/2000    Sacroiliac pain     Benign neoplasm of colon 03/10/2000     Benign paroxysmal positional vertigo 06/07/2012     Depressive disorder, not elsewhere classified 02/10/2004     Diarrhea 12/15/2010    Secondary to colectomy for familial polyposis     Gastric polyp 12/11/15     History of normal mammogram 07/18/2014     Idiopathic osteoporosis 12/15/2010     Interstitial emphysema (H) 12/15/2010     menopausal or female climacteric states 08/31/1999     Mixed hyperlipidemia 12/15/2010     Other bursitis disorders 02/04/2011    Greater trochanteric     Other malaise and fatigue 01/10/2003     Personal history of tobacco use, presenting hazards to health 12/15/2010     Restless legs syndrome (RLS) 12/15/2010     Retinal detachment NEC 12/15/2010     Rotator cuff tendonitis      Rotator cuff tendonitis      Sacroiliitis, not elsewhere classified (H) 12/15/2010    multiple sites     Tobacco use disorder 08/22/2012     Unspecified asthma(493.90) 12/15/2010     Past Surgical History:   Procedure Laterality Date     benign tumors removed from back       ENDOSCOPY UPPER, COLONOSCOPY, COMBINED N/A 9/5/2014    Procedure: COMBINED ENDOSCOPY UPPER, COLONOSCOPY;  Surgeon: Delbert Patel  MD MIKE;  Location: [facility]     ESOPHAGOSCOPY, GASTROSCOPY, DUODENOSCOPY (EGD), COMBINED N/A 12/11/2015    Procedure: COMBINED ESOPHAGOSCOPY, GASTROSCOPY, DUODENOSCOPY (EGD);  Surgeon: Delbert Patel MD;  Location: [facility]     excised-benign  2002    tongue lesion     HC REPAIR OF NASAL SEPTUM  2002    Deviated nasal septum     LAPAROSCOPIC CHOLECYSTECTOMY       lumpectomy Right breast      Breast Lump     MOUTH SURGERY      removal of spot from roof of mouth     pilonidal cyst surgery  1976     removal of colon and large intestine  2000    Familial polyposis     Right etopic pregnancy      Pregnancy     TONSILLECTOMY      tonsillitus     UPPER GI ENDOSCOPY  2009    Stomach polyps       Review of Systems   Constitutional: Negative for chills and fever.   Respiratory: Negative for cough, hemoptysis and wheezing.    Cardiovascular: Negative for chest pain, palpitations and leg swelling.   Gastrointestinal: Positive for diarrhea. Negative for abdominal pain, blood in stool, constipation, melena, nausea and vomiting.   Musculoskeletal: Positive for myalgias.        See HPI.   Skin: Negative for rash.   Neurological: Negative for dizziness and headaches.       /70 (BP Location: Left arm, Patient Position: Chair, Cuff Size: Adult Regular)  Pulse 84  Temp 98.2  F (36.8  C) (Tympanic)  Resp 20  Wt 135 lb (61.2 kg)  SpO2 96%  BMI 24.69 kg/m2     Physical Exam   Constitutional: She is oriented to person, place, and time and well-developed, well-nourished, and in no distress. No distress.   HENT:   Head: Normocephalic.   Mouth/Throat: No oropharyngeal exudate.   Eyes: No scleral icterus.   Neck: Neck supple. No thyromegaly present.   Cardiovascular: Normal rate, regular rhythm, normal heart sounds and intact distal pulses.    No murmur heard.  Pulmonary/Chest: Effort normal and breath sounds normal. She has no wheezes. She has no rales.   Abdominal: Soft. She exhibits no shifting dullness, no distension, no pulsatile  midline mass and no mass. Bowel sounds are hyperactive. There is no hepatosplenomegaly. There is no tenderness.   Musculoskeletal: She exhibits no edema.   The left innominate is upslipped by 3 cm as confirmed by a superior medial malleolus, ASIS, pubic tub, and PSIS on the leftt.       Lymphadenopathy:     She has no cervical adenopathy.   Neurological: She is alert and oriented to person, place, and time.       Labs:  Results for orders placed or performed in visit on 07/05/17   CRP inflammation   Result Value Ref Range    CRP Inflammation 9.3 (H) 0.0 - 8.0 mg/L     Pending labs: Vitamin D, B12, Celiac antibodies and Calprotectin feces.      Imaging:  NA      ASSESSMENT /PLAN:  (K90.9,  R19.7) Diarrhea due to malabsorption  (primary encounter diagnosis)  Comment: Patient reports ongoing diarrhea which she did not reports previously.  She also has skin psoriasis both suggesting possible malabsorption issues.  She does get an EGD next week.  Plan:   I am sending correspondence to Dr. Patel to request that a duodenal biopsy be done to rule ot celiac and crohn's disease.   Deamidated Gliadin Peptide Sharee IgA IgG, Tissue transglutaminase sharee IgA and IgG, Vitamin D         Deficiency, Vitamin B12, Calprotectin Feces,           (M79.1) Myalgia  Comment: Her CRP and pain have come down with prednisone.  Plan:   She will continue pulse dosing of prednisone as needed.    (M35.3) Polymyalgia rheumatica (H)  Comment: She is responding well to prednisone bothe by onflammatory markers and by her symptoms.  Plan: as above.    (M99.05) Somatic dysfunction of pelvic region  Comment: Patient has a left up slipped innominate which is giving her ischial pain while sitting and walking   Plan:   OMT of the pelvis.  She was advised to follow her therapy for strengthening the pelvic girdle.        Innominate upslip procedure:  The left innominate is upslipped as confirmed by a superior ASIS, PSIS and medial malleolus on the indicated  side.    The patient was placed in the supine position.  The examiner placed one hand on the posterior calf of the left leg and the other hand on the anterior distal tibia of the leg.  The patient was asked to breath deeply and allow the examiner to hold the full weight of her left leg and thigh.  Traction was applied in a caudad direction until the examiner sensed that the patient had relaxed the hip.  Then, a quick caudad force was applied to the left leg.    Post treatment assessment showed that the left and right innominates were inline as confirmed by the anatomical landmarks of the ASIS, PSIS, and the medial malleoli.  The patient reported that her back buttock pain was better.        Follow up with Provider - NEIDA Camarillo DO

## 2017-07-05 NOTE — MR AVS SNAPSHOT
After Visit Summary   7/5/2017    Chyna Delgado    MRN: [id number]           Patient Information     Date Of Birth          1955        Visit Information        Provider Department      7/5/2017 11:00 AM Arie Camarillo, DO [facility]        Today's Diagnoses     Diarrhea due to malabsorption    -  1    Myalgia        Polymyalgia rheumatica (H)        Somatic dysfunction of pelvic region           Follow-ups after your visit        Who to contact     If you have questions or need follow up information about today's clinic visit or your schedule please contact [facility] directly at [phone].  Normal or non-critical lab and imaging results will be communicated to you by MyChart, letter or phone within 4 business days after the clinic has received the results. If you do not hear from us within 7 days, please contact the clinic through [employer] or phone. If you have a critical or abnormal lab result, we will notify you by phone as soon as possible.  Submit refill requests through Share[employer] or call your pharmacy and they will forward the refill request to us. Please allow 3 business days for your refill to be completed.          Additional Information About Your Visit        MyChart Information     Share[employer] gives you secure access to your electronic health record. If you see a primary care provider, you can also send messages to your care team and make appointments. If you have questions, please call your primary care clinic.  If you do not have a primary care provider, please call [phone] and they will assist you.        Care EveryWhere ID     This is your Care EveryWhere ID. This could be used by other organizations to access your [place] medical records  [id number]        Your Vitals Were     Pulse Temperature Respirations Pulse Oximetry BMI (Body Mass Index)       84 98.2  F (36.8  C) (Tympanic) 20 96% 24.69 kg/m2        Blood Pressure from Last 3  Encounters:   07/05/17 118/70   06/13/17 116/66   05/30/17 120/80    Weight from Last 3 Encounters:   07/05/17 135 lb (61.2 kg)   06/13/17 137 lb (62.1 kg)   05/30/17 136 lb (61.7 kg)              We Performed the Following     CRP inflammation     Deamidated Gliadin Peptide Sharee IgA IgG     HCL CALPROTECTIN FECAL     Tissue transglutaminase sharee IgA and IgG     Vitamin B12     Vitamin D Deficiency          Today's Medication Changes          These changes are accurate as of: 7/5/17 11:29 AM.  If you have any questions, ask your nurse or doctor.               These medicines have changed or have updated prescriptions.        Dose/Directions    predniSONE 20 MG tablet   Commonly known as:  DELTASONE   This may have changed:  additional instructions   Used for:  Myalgia   Changed by:  Arie Camarillo, DO        Take 40 mg by mouth for 3 days, then 20 mg by mouth for 4 days, then repeat as needed for shoulder and hip muscular pains   Quantity:  30 tablet   Refills:  1            Where to get your medicines      These medications were sent to [facility]  [place] [address] [address]     Phone:  [phone]     predniSONE 20 MG tablet                Primary Care Provider Office Phone # Fax #    Arie Camarillo [phone]       [facility] [place] [address]  [place] [address]        Equal Access to Services     [facility] CenterMANDA AH: Hadii aad ku hadasho Soomaali, waaxda luqadaha, qaybta kaalmada adeegyada, patience hinkle hayskye santana . So [facility] [phone].    ATENCIÓN: Si habla español, tiene a tinajero disposición servicios gratuitos de asistencia lingüística. Llame al [phone].    We comply with applicable federal civil rights laws and [place] laws. We do not discriminate on the basis of race, color, national origin, age, disability sex, sexual orientation or gender identity.            Thank you!     Thank you for  choosing [facility] [place]  for your care. Our goal is always to provide you with excellent care. Hearing back from our patients is one way we can continue to improve our services. Please take a few minutes to complete the written survey that you may receive in the mail after your visit with us. Thank you!             Your Updated Medication List - Protect others around you: Learn how to safely use, store and throw away your medicines at www.disposemymeds.org.          This list is accurate as of: 7/5/17 11:29 AM.  Always use your most recent med list.                   Brand Name Dispense Instructions for use Diagnosis    ANTI-DIARRHEAL PO      Take  by mouth.        ASPIRIN PO      Take 81 mg by mouth daily        Calcium-Vitamin D 600-200 MG-UNIT Tabs           diclofenac 1 % Gel topical gel    VOLTAREN    100 g    Apply to the outside of the elbow 3 times per day.    Overuse injury       ferrous sulfate 325 (65 FE) MG tablet    IRON    90 tablet    Take 325 mg by mouth 2 times daily (with meals)    Iron deficiency       FLUoxetine 40 MG capsule    PROzac    90 capsule    Take 1 capsule (40 mg) by mouth daily        fluticasone-salmeterol 100-50 MCG/DOSE diskus inhaler    ADVAIR DISKUS    1 Inhaler    INHALE 1 PUFF TWICE DAILY    Mild persistent asthma without complication       gabapentin 400 MG capsule    NEURONTIN    90 capsule    Take 1 capsule (400 mg) by mouth 3 times daily    Restless legs syndrome (RLS)       GAS-X PO      Take by mouth 2 times daily        ibuprofen 200 MG tablet    ADVIL/MOTRIN     Take 200 mg by mouth        ketotifen 0.025 % Soln ophthalmic solution    ZADITOR/REFRESH ANTI-ITCH    5 mL    PLACE 2 DROPS INTO BOTH EYES 2 TIMES DAILY    Allergic eye reaction       magnesium 100 MG Caps      Take by mouth 2 times daily        multivitamin per tablet      Take 1 tablet by mouth daily. Every day with food        omeprazole 20 MG CR capsule    priLOSEC    90 capsule    Take 1 capsule  (20 mg) by mouth daily    Gastroesophageal reflux disease, esophagitis presence not specified       pramipexole 0.5 MG tablet    MIRAPEX    180 tablet    TAKE 2 TABLET BY MOUTH DAILY AT BEDTIME    Restless leg syndrome       predniSONE 20 MG tablet    DELTASONE    30 tablet    Take 40 mg by mouth for 3 days, then 20 mg by mouth for 4 days, then repeat as needed for shoulder and hip muscular pains    Myalgia       PSYLLIUM PO      Take 3 tsp by mouth 2 times daily.        tacrolimus 0.1 % ointment    PROTOPIC          traMADol 50 MG tablet    ULTRAM    90 tablet    TAKE 1-2 TABLETS BY MOUTH EVERY 6 HOURS AS NEEDED    Restless leg syndrome, Myalgia       vitamin E 200 UNIT capsule      Take 200 Units by mouth 2 times daily

## 2017-07-05 NOTE — NURSING NOTE
"Chief Complaint   Patient presents with     Pain       Initial /70 (BP Location: Left arm, Patient Position: Chair, Cuff Size: Adult Regular)  Pulse 84  Temp 98.2  F (36.8  C) (Tympanic)  Resp 20  Wt 135 lb (61.2 kg)  SpO2 96%  BMI 24.69 kg/m2 Estimated body mass index is 24.69 kg/(m^2) as calculated from the following:    Height as of 5/30/17: 5' 2\" (1.575 m).    Weight as of this encounter: 135 lb (61.2 kg).  Medication Reconciliation: complete   Lina Díaz LPN      "

## 2017-07-06 DIAGNOSIS — R19.7 DIARRHEA DUE TO MALABSORPTION: ICD-10-CM

## 2017-07-06 DIAGNOSIS — K90.9 DIARRHEA DUE TO MALABSORPTION: ICD-10-CM

## 2017-07-06 LAB
DEPRECATED CALCIDIOL+CALCIFEROL SERPL-MC: 44 UG/L (ref 20–75)
VIT B12 SERPL-MCNC: 746 PG/ML (ref 193–986)

## 2017-07-07 ENCOUNTER — HOSPITAL ENCOUNTER (OUTPATIENT)
Facility: HOSPITAL | Age: 62
End: 2017-07-07
Attending: INTERNAL MEDICINE | Admitting: INTERNAL MEDICINE
Payer: COMMERCIAL

## 2017-07-08 ENCOUNTER — HEALTH MAINTENANCE LETTER (OUTPATIENT)
Age: 62
End: 2017-07-08

## 2017-07-10 ENCOUNTER — ANESTHESIA EVENT (OUTPATIENT)
Dept: SURGERY | Facility: HOSPITAL | Age: 62
End: 2017-07-10

## 2017-07-10 ENCOUNTER — ANESTHESIA (OUTPATIENT)
Dept: SURGERY | Facility: HOSPITAL | Age: 62
End: 2017-07-10

## 2017-07-10 ENCOUNTER — TELEPHONE (OUTPATIENT)
Dept: PEDIATRICS | Facility: OTHER | Age: 62
End: 2017-07-10

## 2017-07-10 LAB
GLIADIN IGA SER-ACNC: 1 U/ML
GLIADIN IGG SER-ACNC: NORMAL U/ML
TTG IGA SER-ACNC: NORMAL U/ML
TTG IGG SER-ACNC: 1 U/ML

## 2017-07-10 RX ORDER — ONDANSETRON 4 MG/1
4 TABLET, ORALLY DISINTEGRATING ORAL EVERY 30 MIN PRN
Status: CANCELLED | OUTPATIENT
Start: 2017-07-10

## 2017-07-10 RX ORDER — LIDOCAINE 40 MG/G
CREAM TOPICAL
Status: CANCELLED | OUTPATIENT
Start: 2017-07-10

## 2017-07-10 RX ORDER — ONDANSETRON 2 MG/ML
4 INJECTION INTRAMUSCULAR; INTRAVENOUS EVERY 30 MIN PRN
Status: CANCELLED | OUTPATIENT
Start: 2017-07-10

## 2017-07-10 RX ORDER — NALOXONE HYDROCHLORIDE 0.4 MG/ML
.1-.4 INJECTION, SOLUTION INTRAMUSCULAR; INTRAVENOUS; SUBCUTANEOUS
Status: CANCELLED | OUTPATIENT
Start: 2017-07-10 | End: 2017-07-11

## 2017-07-10 RX ORDER — SODIUM CHLORIDE, SODIUM LACTATE, POTASSIUM CHLORIDE, CALCIUM CHLORIDE 600; 310; 30; 20 MG/100ML; MG/100ML; MG/100ML; MG/100ML
INJECTION, SOLUTION INTRAVENOUS CONTINUOUS
Status: CANCELLED | OUTPATIENT
Start: 2017-07-10

## 2017-07-10 RX ORDER — MEPERIDINE HYDROCHLORIDE 25 MG/ML
12.5 INJECTION INTRAMUSCULAR; INTRAVENOUS; SUBCUTANEOUS
Status: CANCELLED | OUTPATIENT
Start: 2017-07-10

## 2017-07-10 ASSESSMENT — COPD QUESTIONNAIRES
CAT_SEVERITY: MILD
COPD: 1

## 2017-07-10 ASSESSMENT — LIFESTYLE VARIABLES: TOBACCO_USE: 1

## 2017-07-10 NOTE — ANESTHESIA PREPROCEDURE EVALUATION
Anesthesia Evaluation     . Pt has had prior anesthetic. Type: General    No history of anesthetic complications          ROS/MED HX    ENT/Pulmonary:     (+)tobacco use, Past use Intermittent asthma Treatment: Inhaler prn,  mild COPD, , . .    Neurologic:  - neg neurologic ROS     Cardiovascular:  - neg cardiovascular ROS       METS/Exercise Tolerance:     Hematologic:         Musculoskeletal:   (+) arthritis, , , -       GI/Hepatic:     (+) GERD Asymptomatic on medication,       Renal/Genitourinary:  - ROS Renal section negative       Endo:     (+) thyroid problem  hyperthyroidism, .      Psychiatric:     (+) psychiatric history anxiety and depression      Infectious Disease:  - neg infectious disease ROS       Malignancy:      - no malignancy   Other:                                                         .

## 2017-07-10 NOTE — TELEPHONE ENCOUNTER
7:41 AM    Reason for Call: Phone Call    Description: Pt was suppose to have an endoscopy today but is not feeling well. Has sinus congestion and sore throat. She called same day surgery to cancel and they told her to speak with her doctor to resched. Please call her back at [phone]    Was an appointment offered for this call? No    Preferred method for responding to this message: Telephone Call    If we cannot reach you directly, may we leave a detailed response at the number you provided? Yes    Can this message wait until your PCP/provider returns, if available today? YES, pt aware provider out today    Mana Luna

## 2017-07-11 LAB — CALPROTECTIN STL-MCNT: NORMAL MG/KG (ref 0–49.9)

## 2017-07-11 NOTE — TELEPHONE ENCOUNTER
Patient was scheduled to have endoscopy done with [facility]. Writer has contacted [facility] to notify them patient needs to be rescheduled.

## 2017-07-20 ENCOUNTER — MYC MEDICAL ADVICE (OUTPATIENT)
Dept: PEDIATRICS | Facility: OTHER | Age: 62
End: 2017-07-20

## 2017-07-31 ENCOUNTER — MYC MEDICAL ADVICE (OUTPATIENT)
Dept: PEDIATRICS | Facility: OTHER | Age: 62
End: 2017-07-31

## 2017-08-08 ENCOUNTER — MYC MEDICAL ADVICE (OUTPATIENT)
Dept: PEDIATRICS | Facility: OTHER | Age: 62
End: 2017-08-08

## 2017-08-08 DIAGNOSIS — M79.10 MYALGIA: ICD-10-CM

## 2017-08-08 RX ORDER — PREDNISONE 20 MG/1
TABLET ORAL
Qty: 30 TABLET | Refills: 1 | Status: SHIPPED | OUTPATIENT
Start: 2017-08-08 | End: 2017-08-31

## 2017-08-08 NOTE — TELEPHONE ENCOUNTER
Chyna, I reordered your prednisone to [facility].  There are refills on your script.  I will see you at yours scheduled appointment.

## 2017-08-31 ENCOUNTER — OFFICE VISIT (OUTPATIENT)
Dept: PEDIATRICS | Facility: OTHER | Age: 62
End: 2017-08-31
Attending: INTERNAL MEDICINE
Payer: COMMERCIAL

## 2017-08-31 VITALS
DIASTOLIC BLOOD PRESSURE: 72 MMHG | TEMPERATURE: 98.8 F | RESPIRATION RATE: 18 BRPM | HEART RATE: 78 BPM | WEIGHT: 138 LBS | OXYGEN SATURATION: 98 % | BODY MASS INDEX: 25.4 KG/M2 | SYSTOLIC BLOOD PRESSURE: 120 MMHG | HEIGHT: 62 IN

## 2017-08-31 DIAGNOSIS — M79.10 MYALGIA: ICD-10-CM

## 2017-08-31 DIAGNOSIS — G25.81 RESTLESS LEGS SYNDROME: Primary | ICD-10-CM

## 2017-08-31 PROCEDURE — 99214 OFFICE O/P EST MOD 30 MIN: CPT | Performed by: INTERNAL MEDICINE

## 2017-08-31 RX ORDER — PREDNISONE 20 MG/1
TABLET ORAL
Qty: 30 TABLET | Refills: 1 | Status: SHIPPED | OUTPATIENT
Start: 2017-08-31 | End: 2017-11-01

## 2017-08-31 ASSESSMENT — ENCOUNTER SYMPTOMS
DYSURIA: 0
VOMITING: 0
SHORTNESS OF BREATH: 0
FEVER: 0
HEMATURIA: 0
DIZZINESS: 0
NAUSEA: 0
ABDOMINAL PAIN: 0
HEADACHES: 0
COUGH: 1
MYALGIAS: 0
CONSTIPATION: 0
PALPITATIONS: 0
DIARRHEA: 0
BLOOD IN STOOL: 0
WHEEZING: 0
CHILLS: 0

## 2017-08-31 ASSESSMENT — PAIN SCALES - GENERAL: PAINLEVEL: NO PAIN (0)

## 2017-08-31 NOTE — MR AVS SNAPSHOT
"              After Visit Summary   8/31/2017    Chyna Delgado    MRN: [id number]           Patient Information     Date Of Birth          1955        Visit Information        Provider Department      8/31/2017 4:00 PM Arie Camarillo, DO [facility]        Today's Diagnoses     Restless legs syndrome    -  1    Myalgia           Follow-ups after your visit        Follow-up notes from your care team     Return in about 2 months (around 10/31/2017).      Who to contact     If you have questions or need follow up information about today's clinic visit or your schedule please contact [facility] directly at [phone].  Normal or non-critical lab and imaging results will be communicated to you by MyChart, letter or phone within 4 business days after the clinic has received the results. If you do not hear from us within 7 days, please contact the clinic through [employer] or phone. If you have a critical or abnormal lab result, we will notify you by phone as soon as possible.  Submit refill requests through [employer] or call your pharmacy and they will forward the refill request to us. Please allow 3 business days for your refill to be completed.          Additional Information About Your Visit        MyChart Information     [employer] gives you secure access to your electronic health record. If you see a primary care provider, you can also send messages to your care team and make appointments. If you have questions, please call your primary care clinic.  If you do not have a primary care provider, please call [phone] and they will assist you.        Care EveryWhere ID     This is your Care EveryWhere ID. This could be used by other organizations to access your [place] medical records  [id number]        Your Vitals Were     Pulse Temperature Respirations Height Pulse Oximetry BMI (Body Mass Index)    78 98.8  F (37.1  C) (Temporal) 18 5' 2\" (1.575 m) 98% 25.24 kg/m2       " Blood Pressure from Last 3 Encounters:   08/31/17 120/72   07/05/17 118/70   06/13/17 116/66    Weight from Last 3 Encounters:   08/31/17 138 lb (62.6 kg)   07/05/17 135 lb (61.2 kg)   06/13/17 137 lb (62.1 kg)              Today, you had the following     No orders found for display         Today's Medication Changes          These changes are accurate as of: 8/31/17  4:15 PM.  If you have any questions, ask your nurse or doctor.               Stop taking these medicines if you haven't already. Please contact your care team if you have questions.     pramipexole 0.5 MG tablet   Commonly known as:  MIRAPEX   Stopped by:  Arie Camarillo DO                Where to get your medicines      These medications were sent to [facility]  [place] - [address] [address]     Phone:  [phone]     predniSONE 20 MG tablet                Primary Care Provider Office Phone # Fax #    Arie Camarillo [phone]       [facility] [place] [address]  [facility] [address]        Equal Access to Services     AUDREY WICK AH: Hadii aad ku hadasho Soomaali, waaxda luqadaha, qaybta kaalmada adeegyada, waxay johnsonin hayemoryn damion bahena. So [facility] [phone].    ATENCIÓN: Si habla español, tiene a tinajero disposición servicios gratuitos de asistencia lingüística. Llame al [phone].    We comply with applicable federal civil rights laws and [place] laws. We do not discriminate on the basis of race, color, national origin, age, disability sex, sexual orientation or gender identity.            Thank you!     Thank you for choosing [facility]  for your care. Our goal is always to provide you with excellent care. Hearing back from our patients is one way we can continue to improve our services. Please take a few minutes to complete the written survey that you may receive in the mail after your visit with us. Thank you!             Your  Updated Medication List - Protect others around you: Learn how to safely use, store and throw away your medicines at www.disposemymeds.org.          This list is accurate as of: 8/31/17  4:15 PM.  Always use your most recent med list.                   Brand Name Dispense Instructions for use Diagnosis    ANTI-DIARRHEAL PO      Take  by mouth.        ASPIRIN PO      Take 81 mg by mouth daily        Calcium-Vitamin D 600-200 MG-UNIT Tabs           diclofenac 1 % Gel topical gel    VOLTAREN    100 g    Apply to the outside of the elbow 3 times per day.    Overuse injury       ferrous sulfate 325 (65 FE) MG tablet    IRON    90 tablet    Take 325 mg by mouth 2 times daily (with meals)    Iron deficiency       FLUoxetine 40 MG capsule    PROzac    90 capsule    Take 1 capsule (40 mg) by mouth daily        fluticasone-salmeterol 100-50 MCG/DOSE diskus inhaler    ADVAIR DISKUS    1 Inhaler    INHALE 1 PUFF TWICE DAILY    Mild persistent asthma without complication       gabapentin 400 MG capsule    NEURONTIN    90 capsule    Take 1 capsule (400 mg) by mouth 3 times daily    Restless legs syndrome (RLS)       GAS-X PO      Take by mouth 2 times daily        ibuprofen 200 MG tablet    ADVIL/MOTRIN     Take 200 mg by mouth        ketotifen 0.025 % Soln ophthalmic solution    ZADITOR/REFRESH ANTI-ITCH    5 mL    PLACE 2 DROPS INTO BOTH EYES 2 TIMES DAILY    Allergic eye reaction       magnesium 100 MG Caps      Take by mouth 2 times daily        multivitamin per tablet      Take 1 tablet by mouth daily. Every day with food        omeprazole 20 MG CR capsule    priLOSEC    90 capsule    Take 1 capsule (20 mg) by mouth daily    Gastroesophageal reflux disease, esophagitis presence not specified       predniSONE 20 MG tablet    DELTASONE    30 tablet    Take 40 mg by mouth for 3 days, then 20 mg by mouth for 4 days, then repeat as needed for shoulder and hip muscular pains    Myalgia       PSYLLIUM PO      Take 3 tsp by mouth 2  times daily.        tacrolimus 0.1 % ointment    PROTOPIC          traMADol 50 MG tablet    ULTRAM    90 tablet    TAKE 1-2 TABLETS BY MOUTH EVERY 6 HOURS AS NEEDED    Restless leg syndrome, Myalgia       vitamin E 200 UNIT capsule      Take 200 Units by mouth 2 times daily

## 2017-08-31 NOTE — NURSING NOTE
"Chief Complaint   Patient presents with     Musculoskeletal Problem     fu treatment with prednisone, relates doing much better       Initial /72 (BP Location: Right arm, Patient Position: Sitting, Cuff Size: Adult Regular)  Pulse 78  Temp 98.8  F (37.1  C) (Temporal)  Resp 18  Ht 5' 2\" (1.575 m)  Wt 138 lb (62.6 kg)  SpO2 98%  BMI 25.24 kg/m2 Estimated body mass index is 25.24 kg/(m^2) as calculated from the following:    Height as of this encounter: 5' 2\" (1.575 m).    Weight as of this encounter: 138 lb (62.6 kg).  Medication Reconciliation: complete   Mary Kahn      "

## 2017-08-31 NOTE — PROGRESS NOTES
HPI   Patient is a 62 yo female who presents for a follow up on her myalgias and her RLS.  She reports that since starting prednisone bursts her neck, shoulder and hip girdle pain has been well controlled.  In the past two months she has used two bursts.  Additionally, she has been on gabapentin which she has adjusted the frequency of her dosing for her RLS.  She is on  gabapentin 400 mg at 3 pm. 6 pm and 9 pm.    Past Medical History:   Diagnosis Date     Abnormal mammogram, unspecified 01/08/2003    Normal pathology     Back Pain 02/10/2000    Sacroiliac pain     Benign neoplasm of colon 03/10/2000     Benign paroxysmal positional vertigo 06/07/2012     Depressive disorder, not elsewhere classified 02/10/2004     Diarrhea 12/15/2010    Secondary to colectomy for familial polyposis     Gastric polyp 12/11/15     History of normal mammogram 07/18/2014     Idiopathic osteoporosis 12/15/2010     Interstitial emphysema (H) 12/15/2010     menopausal or female climacteric states 08/31/1999     Mixed hyperlipidemia 12/15/2010     Other bursitis disorders 02/04/2011    Greater trochanteric     Other malaise and fatigue 01/10/2003     Personal history of tobacco use, presenting hazards to health 12/15/2010     Restless legs syndrome (RLS) 12/15/2010     Retinal detachment NEC 12/15/2010     Rotator cuff tendonitis      Rotator cuff tendonitis      Sacroiliitis, not elsewhere classified (H) 12/15/2010    multiple sites     Tobacco use disorder 08/22/2012     Unspecified asthma(493.90) 12/15/2010     Past Surgical History:   Procedure Laterality Date     benign tumors removed from back       ENDOSCOPY UPPER, COLONOSCOPY, COMBINED N/A 9/5/2014    Procedure: COMBINED ENDOSCOPY UPPER, COLONOSCOPY;  Surgeon: Delbert Patel MD;  Location: [facility] OR     ESOPHAGOSCOPY, GASTROSCOPY, DUODENOSCOPY (EGD), COMBINED N/A 12/11/2015    Procedure: COMBINED ESOPHAGOSCOPY, GASTROSCOPY, DUODENOSCOPY (EGD);  Surgeon: Delbert Patel MD;   "Location: [facility] OR     excised-benign  2002    tongue lesion     HC REPAIR OF NASAL SEPTUM  2002    Deviated nasal septum     LAPAROSCOPIC CHOLECYSTECTOMY       lumpectomy Right breast      Breast Lump     MOUTH SURGERY      removal of spot from roof of mouth     pilonidal cyst surgery  1976     removal of colon and large intestine  2000    Familial polyposis     Right etopic pregnancy      Pregnancy     TONSILLECTOMY      tonsillitus     UPPER GI ENDOSCOPY  2009    Stomach polyps   '    Review of Systems   Constitutional: Negative for chills and fever.   Respiratory: Positive for cough. Negative for shortness of breath and wheezing.    Cardiovascular: Negative for chest pain, palpitations and leg swelling.   Gastrointestinal: Negative for abdominal pain, blood in stool, constipation, diarrhea, nausea and vomiting.   Genitourinary: Negative for dysuria and hematuria.   Musculoskeletal: Negative for myalgias.   Neurological: Negative for dizziness and headaches.     /72 (BP Location: Right arm, Patient Position: Sitting, Cuff Size: Adult Regular)  Pulse 78  Temp 98.8  F (37.1  C) (Temporal)  Resp 18  Ht 5' 2\" (1.575 m)  Wt 138 lb (62.6 kg)  SpO2 98%  BMI 25.24 kg/m2      Physical Exam   Constitutional: She is oriented to person, place, and time and well-developed, well-nourished, and in no distress. No distress.   HENT:   Head: Normocephalic.   Mouth/Throat: No oropharyngeal exudate.   Eyes: EOM are normal. No scleral icterus.   Cardiovascular: Normal rate, regular rhythm, normal heart sounds and intact distal pulses.    No murmur heard.  Pulmonary/Chest: Effort normal and breath sounds normal. She has no rales.   Abdominal: Soft. Bowel sounds are normal. She exhibits no shifting dullness, no distension, no pulsatile midline mass and no mass. There is no hepatosplenomegaly. There is no tenderness.   Musculoskeletal: She exhibits no edema.        Cervical back: She exhibits no tenderness, no bony tenderness, " no edema and no spasm.   Neurological: She is alert and oriented to person, place, and time.   Upper extremity strength is 5/5 without deficit.  There is no pain induced with testing strength against resistance..   Psychiatric: Mood, memory, affect and judgment normal.       Labs:  NA      Imaging:  NA      ASSESSMENT /PLAN:  (G25.81) Restless legs syndrome  (primary encounter diagnosis)  Comment: Well controlled on gabapentin.  Her frequency may need to be adjusted to a higher single dose in the near future.  Plan:   She will continue gabapentin 400 mg three times per day.  She will attempt to discontinue her Mirapex.      (M79.1) Myalgia  Comment: She has PMR as she has no other reasons for her muscle pains and her pain and inflammatory markers have responded to steroids bursts.  Plan:   predniSONE (DELTASONE) 20 MG tablet. She will attempt to use lower dosing with 40 mg on day one and 20 mgh for 4 days.  She will increase her omeprazole 20 mg to twice per day while on her prednisone bursts.          Follow up with Provider - 2 months for myalgiass and RLS        Arie Camarillo DO

## 2017-10-16 DIAGNOSIS — G25.81 RESTLESS LEGS SYNDROME (RLS): ICD-10-CM

## 2017-10-16 DIAGNOSIS — F34.1 DYSTHYMIC DISORDER: Primary | ICD-10-CM

## 2017-10-19 NOTE — TELEPHONE ENCOUNTER
Last office visit: 8/31/17    Prozac  Last refill: 3/30/17 #90, 1 R.  Last PHQ:  10/12/16 ,Score 13  Gabapentin  Last refill: 9/11/17 #90, 0 R.  Medications pended.  Please advise.  Thank you.

## 2017-10-20 RX ORDER — FLUOXETINE 40 MG/1
CAPSULE ORAL
Qty: 90 CAPSULE | Refills: 0 | Status: SHIPPED | OUTPATIENT
Start: 2017-10-20 | End: 2017-12-19

## 2017-10-20 RX ORDER — GABAPENTIN 400 MG/1
CAPSULE ORAL
Qty: 90 CAPSULE | Refills: 3 | Status: SHIPPED | OUTPATIENT
Start: 2017-10-20 | End: 2017-12-19

## 2017-11-01 ENCOUNTER — OFFICE VISIT (OUTPATIENT)
Dept: PEDIATRICS | Facility: OTHER | Age: 62
End: 2017-11-01
Attending: INTERNAL MEDICINE
Payer: COMMERCIAL

## 2017-11-01 VITALS
TEMPERATURE: 98.3 F | DIASTOLIC BLOOD PRESSURE: 60 MMHG | RESPIRATION RATE: 18 BRPM | SYSTOLIC BLOOD PRESSURE: 120 MMHG | HEART RATE: 84 BPM | OXYGEN SATURATION: 95 %

## 2017-11-01 DIAGNOSIS — G25.81 RESTLESS LEG SYNDROME: Primary | ICD-10-CM

## 2017-11-01 DIAGNOSIS — K21.9 GASTROESOPHAGEAL REFLUX DISEASE, ESOPHAGITIS PRESENCE NOT SPECIFIED: ICD-10-CM

## 2017-11-01 DIAGNOSIS — M79.10 MYALGIA: ICD-10-CM

## 2017-11-01 DIAGNOSIS — M35.3 PMR (POLYMYALGIA RHEUMATICA) (H): ICD-10-CM

## 2017-11-01 DIAGNOSIS — Z23 NEED FOR PROPHYLACTIC VACCINATION AND INOCULATION AGAINST INFLUENZA: ICD-10-CM

## 2017-11-01 PROCEDURE — 90471 IMMUNIZATION ADMIN: CPT | Performed by: INTERNAL MEDICINE

## 2017-11-01 PROCEDURE — 99214 OFFICE O/P EST MOD 30 MIN: CPT | Mod: 25 | Performed by: INTERNAL MEDICINE

## 2017-11-01 PROCEDURE — 90686 IIV4 VACC NO PRSV 0.5 ML IM: CPT | Performed by: INTERNAL MEDICINE

## 2017-11-01 RX ORDER — LOPERAMIDE HCL 2 MG
6 CAPSULE ORAL 2 TIMES DAILY
COMMUNITY

## 2017-11-01 RX ORDER — PREDNISONE 10 MG/1
15 TABLET ORAL DAILY
Qty: 135 TABLET | Refills: 3 | Status: SHIPPED | OUTPATIENT
Start: 2017-11-01 | End: 2017-12-19

## 2017-11-01 RX ORDER — GABAPENTIN 400 MG/1
400 CAPSULE ORAL
COMMUNITY
End: 2017-11-01

## 2017-11-01 RX ORDER — GABAPENTIN 400 MG/1
800 CAPSULE ORAL 3 TIMES DAILY
Qty: 180 CAPSULE | Refills: 3 | Status: SHIPPED | OUTPATIENT
Start: 2017-11-01 | End: 2018-03-08

## 2017-11-01 RX ORDER — SIMETHICONE 125 MG
CAPSULE ORAL
COMMUNITY
Start: 2013-10-24 | End: 2018-07-23

## 2017-11-01 RX ORDER — PRAMIPEXOLE DIHYDROCHLORIDE 1 MG/1
1 TABLET ORAL AT BEDTIME
Qty: 90 TABLET | Refills: 3 | Status: SHIPPED | OUTPATIENT
Start: 2017-11-01 | End: 2018-06-21 | Stop reason: ALTCHOICE

## 2017-11-01 RX ORDER — PREDNISONE 20 MG/1
20 TABLET ORAL
COMMUNITY
End: 2017-12-19

## 2017-11-01 ASSESSMENT — ENCOUNTER SYMPTOMS
WHEEZING: 0
COUGH: 0
VOMITING: 0
NAUSEA: 0
MYALGIAS: 1
HEADACHES: 0
BLOOD IN STOOL: 0
CONSTIPATION: 0
TINGLING: 0
FEVER: 0
SHORTNESS OF BREATH: 0
HEMATURIA: 0
ABDOMINAL PAIN: 0
DIZZINESS: 0
PALPITATIONS: 0
DYSURIA: 0
DIARRHEA: 0

## 2017-11-01 ASSESSMENT — PAIN SCALES - GENERAL: PAINLEVEL: NO PAIN (0)

## 2017-11-01 NOTE — PROGRESS NOTES
HPI  Patient is a 60 yo female with PMR and restless legs who presents for the later.  She reports increased leg pain for two nights and poor sleep.  She did figure this was due to her not taking her iron.  She has been taking her iron twice per day and has noted improvement.  In regards to her PMR, she reports that prednisone works for her pain.  She reports that after two days off the prednisone she hat a return of the pain in her shoulders and hips.    Past Medical History:   Diagnosis Date     Abnormal mammogram, unspecified 01/08/2003    Normal pathology     Back Pain 02/10/2000    Sacroiliac pain     Benign neoplasm of colon 03/10/2000     Benign paroxysmal positional vertigo 06/07/2012     Depressive disorder, not elsewhere classified 02/10/2004     Diarrhea 12/15/2010    Secondary to colectomy for familial polyposis     Gastric polyp 12/11/15     History of normal mammogram 07/18/2014     Idiopathic osteoporosis 12/15/2010     Interstitial emphysema (H) 12/15/2010     menopausal or female climacteric states 08/31/1999     Mixed hyperlipidemia 12/15/2010     Other bursitis disorders 02/04/2011    Greater trochanteric     Other forms of retinal detachment(361.89) 12/15/2010     Other malaise and fatigue 01/10/2003     Personal history of tobacco use, presenting hazards to health 12/15/2010     Restless legs syndrome (RLS) 12/15/2010     Rotator cuff tendonitis      Rotator cuff tendonitis      Sacroiliitis, not elsewhere classified (H) 12/15/2010    multiple sites     Tobacco use disorder 08/22/2012     Unspecified asthma(493.90) 12/15/2010     Past Surgical History:   Procedure Laterality Date     benign tumors removed from back       ENDOSCOPY UPPER, COLONOSCOPY, COMBINED N/A 9/5/2014    Procedure: COMBINED ENDOSCOPY UPPER, COLONOSCOPY;  Surgeon: Delbert Patel MD;  Location: [facility]     ESOPHAGOSCOPY, GASTROSCOPY, DUODENOSCOPY (EGD), COMBINED N/A 12/11/2015    Procedure: COMBINED ESOPHAGOSCOPY,  GASTROSCOPY, DUODENOSCOPY (EGD);  Surgeon: Delbert Patel MD;  Location: [facility]     excised-benign  2002    tongue lesion     HC REPAIR OF NASAL SEPTUM  2002    Deviated nasal septum     LAPAROSCOPIC CHOLECYSTECTOMY       lumpectomy Right breast      Breast Lump     MOUTH SURGERY      removal of spot from roof of mouth     pilonidal cyst surgery  1976     removal of colon and large intestine  2000    Familial polyposis     Right etopic pregnancy      Pregnancy     TONSILLECTOMY      tonsillitus     UPPER GI ENDOSCOPY  2009    Stomach polyps       Review of Systems   Constitutional: Negative for fever and malaise/fatigue.   Respiratory: Negative for cough, shortness of breath and wheezing.    Cardiovascular: Negative for chest pain, palpitations and leg swelling.   Gastrointestinal: Negative for abdominal pain, blood in stool, constipation, diarrhea, nausea and vomiting.   Genitourinary: Negative for dysuria and hematuria.   Musculoskeletal: Positive for myalgias.   Neurological: Negative for dizziness, tingling and headaches.       /60 (BP Location: Left arm, Patient Position: Chair, Cuff Size: Adult Large)  Pulse 84  Temp 98.3  F (36.8  C) (Tympanic)  Resp 18  SpO2 95%      Physical Exam   Constitutional: She is oriented to person, place, and time. She appears distressed.   HENT:   Head: Normocephalic.   Mouth/Throat: No oropharyngeal exudate.   Eyes: Conjunctivae are normal.   Neck: Neck supple.   Cardiovascular: Normal rate, normal heart sounds and intact distal pulses.    No murmur heard.  Pulmonary/Chest: Effort normal and breath sounds normal. She has no wheezes. She has no rales.   Abdominal: Soft. Bowel sounds are normal. She exhibits no shifting dullness, no distension, no abdominal bruit, no pulsatile midline mass and no mass. There is no hepatosplenomegaly. There is no tenderness.   Musculoskeletal: She exhibits no edema.   Lymphadenopathy:     She has no cervical adenopathy.   Neurological:  She is alert and oriented to person, place, and time.   Skin:        Psychiatric: Mood, memory, affect and judgment normal.           Labs:        Imaging:        ASSESSMENT /PLAN:    (G25.81) Restless leg syndrome  (primary encounter diagnosis)  Comment: Stable with Mirapex and iron therapy.  Plan:   pramipexole (MIRAPEX) 1 MG tablet at bedtime.    (M79.1) Myalgia  Comment: Due to PMR.  She is on steroid with her using bursts nearly weekly.  She is showing some cushing like appearance   Plan:  Decrease predniSONE (DELTASONE) 10 MG tablet to 15 mg daily.    (M35.3) PMR (polymyalgia rheumatica) (H)  Comment: As above.  She should be decreased as low as she can go on the prednisone and increase gabapentin to see if this helps in any format.  Plan:  gabapentin (NEURONTIN) 400 MG capsule, 800 mg TID   predniSONE (DELTASONE) 10 MG tablet, 15 mg daily.    (K21.9) Gastroesophageal reflux disease, esophagitis presence not specified  Comment: She is currently on chronic steroids and needs some gastric protection.  Plan:   omeprazole (PRILOSEC) 20 MG CR capsule    (Z23) Need for prophylactic vaccination and inoculation against influenza  Comment:   Plan: FLU VAC, SPLIT VIRUS IM > 3 YO (QUADRIVALENT)         [14434], Vaccine Administration, Initial         [90224]          Follow up with Provider - 6 weeks for PMR        Arie Camarillo DO

## 2017-11-01 NOTE — MR AVS SNAPSHOT
After Visit Summary   11/1/2017    Chyna Delgado    MRN: [id number]           Patient Information     Date Of Birth          1955        Visit Information        Provider Department      11/1/2017 10:40 AM Arie Camarillo DO [facility]        Today's Diagnoses     Restless leg syndrome    -  1    Allergic eye reaction        Gastroesophageal reflux disease, esophagitis presence not specified        PMR (polymyalgia rheumatica) (H)        Myalgia        Need for prophylactic vaccination and inoculation against influenza           Follow-ups after your visit        Follow-up notes from your care team     Return in about 6 weeks (around 12/13/2017), or myalgias.      Your next 10 appointments already scheduled     Dec 14, 2017 10:20 AM CST   (Arrive by 10:00 AM)   SHORT with Arie Camarillo DO   [facility] [place] ([facility] [place] )    [address]  [place] [address]   [phone]              Who to contact     If you have questions or need follow up information about today's clinic visit or your schedule please contact [facility] directly at [phone].  Normal or non-critical lab and imaging results will be communicated to you by [employer], letter or phone within 4 business days after the clinic has received the results. If you do not hear from us within 7 days, please contact the clinic through [employer] or phone. If you have a critical or abnormal lab result, we will notify you by phone as soon as possible.  Submit refill requests through [employer] or call your pharmacy and they will forward the refill request to us. Please allow 3 business days for your refill to be completed.          Additional Information About Your Visit        MyChart Information     [employer] gives you secure access to your electronic health record. If you see a primary care provider, you can also send messages to your care team and make  appointments. If you have questions, please call your primary care clinic.  If you do not have a primary care provider, please call [phone] and they will assist you.        Care EveryWhere ID     This is your Care EveryWhere ID. This could be used by other organizations to access your [facility] medical records  [id number]        Your Vitals Were     Pulse Temperature Respirations Pulse Oximetry          84 98.3  F (36.8  C) (Tympanic) 18 95%         Blood Pressure from Last 3 Encounters:   11/01/17 120/60   08/31/17 120/72   07/05/17 118/70    Weight from Last 3 Encounters:   08/31/17 138 lb (62.6 kg)   07/05/17 135 lb (61.2 kg)   06/13/17 137 lb (62.1 kg)              We Performed the Following     FLU VAC, SPLIT VIRUS IM > 3 YO (QUADRIVALENT) [45432]     Vaccine Administration, Initial [85648]          Today's Medication Changes          These changes are accurate as of: 11/1/17 11:12 AM.  If you have any questions, ask your nurse or doctor.               Start taking these medicines.        Dose/Directions    pramipexole 1 MG tablet   Commonly known as:  MIRAPEX   Used for:  Restless leg syndrome   Started by:  Arie Camarillo DO        Dose:  1 mg   Take 1 tablet (1 mg) by mouth At Bedtime   Quantity:  90 tablet   Refills:  3         These medicines have changed or have updated prescriptions.        Dose/Directions    * gabapentin 400 MG capsule   Commonly known as:  NEURONTIN   This may have changed:  Another medication with the same name was changed. Make sure you understand how and when to take each.   Used for:  Restless legs syndrome (RLS)   Changed by:  Arie Camarillo DO        Take one (1) capsule BY MOUTH three times daily   Quantity:  90 capsule   Refills:  3       * gabapentin 400 MG capsule   Commonly known as:  NEURONTIN   This may have changed:    - how much to take  - when to take this   Used for:  PMR (polymyalgia rheumatica) (H)   Changed by:  Arie Camarillo DO         Dose:  800 mg   Take 2 capsules (800 mg) by mouth 3 times daily   Quantity:  180 capsule   Refills:  3       ketotifen 0.025 % Soln ophthalmic solution   Commonly known as:  ZADITOR/REFRESH ANTI-ITCH   This may have changed:  See the new instructions.   Used for:  Allergic eye reaction   Changed by:  Arie Camarillo DO        PLACE 2 DROPS INTO BOTH EYES 2 TIMES DAILY   Quantity:  5 mL   Refills:  2       omeprazole 20 MG CR capsule   Commonly known as:  priLOSEC   This may have changed:  See the new instructions.   Used for:  Gastroesophageal reflux disease, esophagitis presence not specified   Changed by:  Arie Camarillo DO        Dose:  20 mg   Take 1 capsule (20 mg) by mouth daily   Quantity:  90 capsule   Refills:  3       * predniSONE 20 MG tablet   Commonly known as:  DELTASONE   This may have changed:  Another medication with the same name was changed. Make sure you understand how and when to take each.   Changed by:  Arie Camarillo DO        Dose:  20 mg   Take 20 mg by mouth   Refills:  0       * predniSONE 10 MG tablet   Commonly known as:  DELTASONE   This may have changed:    - medication strength  - how much to take  - how to take this  - when to take this  - additional instructions   Used for:  Myalgia, PMR (polymyalgia rheumatica) (H)   Changed by:  Arie Camarillo DO        Dose:  15 mg   Take 1.5 tablets (15 mg) by mouth daily   Quantity:  135 tablet   Refills:  3       * Notice:  This list has 4 medication(s) that are the same as other medications prescribed for you. Read the directions carefully, and ask your doctor or other care provider to review them with you.         Where to get your medicines      These medications were sent to [facility] - [place], [place] - [address] [place] [id number]     Phone:  [phone]     gabapentin 400 MG capsule    ketotifen 0.025 % Soln ophthalmic solution    omeprazole 20 MG CR capsule     pramipexole 1 MG tablet    predniSONE 10 MG tablet                Primary Care Provider Office Phone # Fax #    Arie Camarillo, [phone]       [facility] [place] [address]  [place] [address]        Equal Access to Services     AUDREY WICK : Hadii aad ku hadasho Soomaali, waaxda luqadaha, qaybta kaalmada adeegyada, waxay johnsonin hayemoryn adeyair kathineha bahena. So [facility] [phone].    ATENCIÓN: Si habla español, tiene a tinajero disposición servicios gratuitos de asistencia lingüística. Llame al [phone].    We comply with applicable federal civil rights laws and [place] laws. We do not discriminate on the basis of race, color, national origin, age, disability, sex, sexual orientation, or gender identity.            Thank you!     Thank you for choosing [facility]  for your care. Our goal is always to provide you with excellent care. Hearing back from our patients is one way we can continue to improve our services. Please take a few minutes to complete the written survey that you may receive in the mail after your visit with us. Thank you!             Your Updated Medication List - Protect others around you: Learn how to safely use, store and throw away your medicines at www.disposemymeds.org.          This list is accurate as of: 11/1/17 11:12 AM.  Always use your most recent med list.                   Brand Name Dispense Instructions for use Diagnosis    * ANTI-DIARRHEAL PO      Take  by mouth.        * loperamide 2 MG capsule    IMODIUM     Take 2 mg by mouth        ASPIRIN PO      Take 81 mg by mouth daily        Calcium-Vitamin D 600-200 MG-UNIT Tabs           diclofenac 1 % Gel topical gel    VOLTAREN    100 g    Apply to the outside of the elbow 3 times per day.    Overuse injury       ferrous sulfate 325 (65 FE) MG tablet    IRON    90 tablet    Take 325 mg by mouth 2 times daily (with meals)    Iron deficiency       FLUoxetine 40 MG capsule    PROzac     90 capsule    Take one (1) capsule BY MOUTH daily    Dysthymic disorder       fluticasone-salmeterol 100-50 MCG/DOSE diskus inhaler    ADVAIR DISKUS    1 Inhaler    INHALE 1 PUFF TWICE DAILY    Mild persistent asthma without complication       * gabapentin 400 MG capsule    NEURONTIN    90 capsule    Take one (1) capsule BY MOUTH three times daily    Restless legs syndrome (RLS)       * gabapentin 400 MG capsule    NEURONTIN    180 capsule    Take 2 capsules (800 mg) by mouth 3 times daily    PMR (polymyalgia rheumatica) (H)       * GAS-X PO      Take by mouth 2 times daily        * GAS-X EXTRA STRENGTH 125 MG Caps   Generic drug:  Simethicone      Take 4 caps twice a day        ibuprofen 200 MG tablet    ADVIL/MOTRIN     Take 200 mg by mouth        ketotifen 0.025 % Soln ophthalmic solution    ZADITOR/REFRESH ANTI-ITCH    5 mL    PLACE 2 DROPS INTO BOTH EYES 2 TIMES DAILY    Allergic eye reaction       magnesium 100 MG Caps      Take by mouth 2 times daily        multivitamin per tablet      Take 1 tablet by mouth daily. Every day with food        omeprazole 20 MG CR capsule    priLOSEC    90 capsule    Take 1 capsule (20 mg) by mouth daily    Gastroesophageal reflux disease, esophagitis presence not specified       pramipexole 1 MG tablet    MIRAPEX    90 tablet    Take 1 tablet (1 mg) by mouth At Bedtime    Restless leg syndrome       * predniSONE 20 MG tablet    DELTASONE     Take 20 mg by mouth        * predniSONE 10 MG tablet    DELTASONE    135 tablet    Take 1.5 tablets (15 mg) by mouth daily    Myalgia, PMR (polymyalgia rheumatica) (H)       PSYLLIUM PO      Take 3 tsp by mouth 2 times daily.        tacrolimus 0.1 % ointment    PROTOPIC          traMADol 50 MG tablet    ULTRAM    90 tablet    TAKE 1-2 TABLETS BY MOUTH EVERY 6 HOURS AS NEEDED    Restless leg syndrome, Myalgia       vitamin E 200 UNIT capsule      Take 200 Units by mouth 2 times daily        * Notice:  This list has 8 medication(s) that are  the same as other medications prescribed for you. Read the directions carefully, and ask your doctor or other care provider to review them with you.

## 2017-11-01 NOTE — NURSING NOTE
"Chief Complaint   Patient presents with     Musculoskeletal Problem     restless legs       Initial /60 (BP Location: Left arm, Patient Position: Chair, Cuff Size: Adult Large)  Pulse 84  Temp 98.3  F (36.8  C) (Tympanic)  Resp 18  SpO2 95% Estimated body mass index is 25.24 kg/(m^2) as calculated from the following:    Height as of 8/31/17: 5' 2\" (1.575 m).    Weight as of 8/31/17: 138 lb (62.6 kg).  Medication Reconciliation: complete   Che Trujillo LPN  "

## 2017-11-01 NOTE — PROGRESS NOTES

## 2017-11-30 ENCOUNTER — TELEPHONE (OUTPATIENT)
Dept: PEDIATRICS | Facility: OTHER | Age: 62
End: 2017-11-30

## 2017-11-30 NOTE — TELEPHONE ENCOUNTER
2:29 PM    Reason for Call: Phone Call    Description: Patient has an EGD coming up on 12-18-17 and states that she in not able to take her Iron supplement, for her restless leg syndrome, for 5 days before this appointment. If she does not take this medication, she will not be sleeping for those 5 days, so she would like to know if there is something else she can take? If you could call back at your earliest convenience.    Was an appointment offered for this call? No  If yes : Appointment type              Date    Preferred method for responding to this message: Telephone Call  What is your phone number ? [phone]    If we cannot reach you directly, may we leave a detailed response at the number you provided? Yes    Can this message wait until your PCP/provider returns, if available today? YES    Rocio Quan

## 2017-12-01 ENCOUNTER — TRANSFERRED RECORDS (OUTPATIENT)
Dept: HEALTH INFORMATION MANAGEMENT | Facility: HOSPITAL | Age: 62
End: 2017-12-01

## 2017-12-11 RX ORDER — ALBUTEROL SULFATE 90 UG/1
2 AEROSOL, METERED RESPIRATORY (INHALATION) EVERY 4 HOURS PRN
COMMUNITY
End: 2018-10-12

## 2017-12-18 ENCOUNTER — ANESTHESIA (OUTPATIENT)
Dept: SURGERY | Facility: HOSPITAL | Age: 62
End: 2017-12-18
Payer: COMMERCIAL

## 2017-12-18 ENCOUNTER — SURGERY (OUTPATIENT)
Age: 62
End: 2017-12-18

## 2017-12-18 ENCOUNTER — HOSPITAL ENCOUNTER (OUTPATIENT)
Facility: HOSPITAL | Age: 62
Discharge: HOME | End: 2017-12-18
Attending: INTERNAL MEDICINE | Admitting: INTERNAL MEDICINE
Payer: COMMERCIAL

## 2017-12-18 ENCOUNTER — ANESTHESIA EVENT (OUTPATIENT)
Dept: SURGERY | Facility: HOSPITAL | Age: 62
End: 2017-12-18
Payer: COMMERCIAL

## 2017-12-18 VITALS
DIASTOLIC BLOOD PRESSURE: 84 MMHG | BODY MASS INDEX: 25.4 KG/M2 | OXYGEN SATURATION: 96 % | HEIGHT: 62 IN | HEART RATE: 71 BPM | RESPIRATION RATE: 20 BRPM | WEIGHT: 138 LBS | SYSTOLIC BLOOD PRESSURE: 137 MMHG | TEMPERATURE: 97.1 F

## 2017-12-18 PROCEDURE — 88305 TISSUE EXAM BY PATHOLOGIST: CPT | Mod: TC | Performed by: INTERNAL MEDICINE

## 2017-12-18 PROCEDURE — 25000128 H RX IP 250 OP 636: Performed by: NURSE ANESTHETIST, CERTIFIED REGISTERED

## 2017-12-18 PROCEDURE — 25000125 ZZHC RX 250: Performed by: NURSE ANESTHETIST, CERTIFIED REGISTERED

## 2017-12-18 PROCEDURE — 40000306 ZZH STATISTIC PRE PROC ASSESS II: Performed by: INTERNAL MEDICINE

## 2017-12-18 PROCEDURE — 01999 UNLISTED ANES PROCEDURE: CPT | Performed by: NURSE ANESTHETIST, CERTIFIED REGISTERED

## 2017-12-18 PROCEDURE — 37000008 ZZH ANESTHESIA TECHNICAL FEE, 1ST 30 MIN: Performed by: INTERNAL MEDICINE

## 2017-12-18 PROCEDURE — 71000027 ZZH RECOVERY PHASE 2 EACH 15 MINS: Performed by: INTERNAL MEDICINE

## 2017-12-18 PROCEDURE — 36000050 ZZH SURGERY LEVEL 2 1ST 30 MIN: Performed by: INTERNAL MEDICINE

## 2017-12-18 PROCEDURE — 27210794 ZZH OR GENERAL SUPPLY STERILE: Performed by: INTERNAL MEDICINE

## 2017-12-18 RX ORDER — SODIUM CHLORIDE, SODIUM LACTATE, POTASSIUM CHLORIDE, CALCIUM CHLORIDE 600; 310; 30; 20 MG/100ML; MG/100ML; MG/100ML; MG/100ML
INJECTION, SOLUTION INTRAVENOUS CONTINUOUS
Status: DISCONTINUED | OUTPATIENT
Start: 2017-12-18 | End: 2017-12-18 | Stop reason: HOSPADM

## 2017-12-18 RX ORDER — ONDANSETRON 2 MG/ML
4 INJECTION INTRAMUSCULAR; INTRAVENOUS EVERY 30 MIN PRN
Status: DISCONTINUED | OUTPATIENT
Start: 2017-12-18 | End: 2017-12-18 | Stop reason: HOSPADM

## 2017-12-18 RX ORDER — NALOXONE HYDROCHLORIDE 0.4 MG/ML
.1-.4 INJECTION, SOLUTION INTRAMUSCULAR; INTRAVENOUS; SUBCUTANEOUS
Status: DISCONTINUED | OUTPATIENT
Start: 2017-12-18 | End: 2017-12-18 | Stop reason: HOSPADM

## 2017-12-18 RX ORDER — ONDANSETRON 4 MG/1
4 TABLET, ORALLY DISINTEGRATING ORAL EVERY 30 MIN PRN
Status: DISCONTINUED | OUTPATIENT
Start: 2017-12-18 | End: 2017-12-18 | Stop reason: HOSPADM

## 2017-12-18 RX ORDER — MEPERIDINE HYDROCHLORIDE 25 MG/ML
12.5 INJECTION INTRAMUSCULAR; INTRAVENOUS; SUBCUTANEOUS
Status: DISCONTINUED | OUTPATIENT
Start: 2017-12-18 | End: 2017-12-18 | Stop reason: HOSPADM

## 2017-12-18 RX ORDER — FENTANYL CITRATE 50 UG/ML
25-50 INJECTION, SOLUTION INTRAMUSCULAR; INTRAVENOUS
Status: DISCONTINUED | OUTPATIENT
Start: 2017-12-18 | End: 2017-12-18 | Stop reason: HOSPADM

## 2017-12-18 RX ORDER — ONDANSETRON 2 MG/ML
4 INJECTION INTRAMUSCULAR; INTRAVENOUS
Status: DISCONTINUED | OUTPATIENT
Start: 2017-12-18 | End: 2017-12-18 | Stop reason: HOSPADM

## 2017-12-18 RX ORDER — LIDOCAINE HYDROCHLORIDE 20 MG/ML
INJECTION, SOLUTION INFILTRATION; PERINEURAL PRN
Status: DISCONTINUED | OUTPATIENT
Start: 2017-12-18 | End: 2017-12-18

## 2017-12-18 RX ORDER — PROPOFOL 10 MG/ML
INJECTION, EMULSION INTRAVENOUS PRN
Status: DISCONTINUED | OUTPATIENT
Start: 2017-12-18 | End: 2017-12-18

## 2017-12-18 RX ORDER — LIDOCAINE 40 MG/G
CREAM TOPICAL
Status: DISCONTINUED | OUTPATIENT
Start: 2017-12-18 | End: 2017-12-18 | Stop reason: HOSPADM

## 2017-12-18 RX ORDER — ALBUTEROL SULFATE 0.83 MG/ML
2.5 SOLUTION RESPIRATORY (INHALATION) EVERY 4 HOURS PRN
Status: CANCELLED | OUTPATIENT
Start: 2017-12-18

## 2017-12-18 RX ADMIN — SODIUM CHLORIDE, POTASSIUM CHLORIDE, SODIUM LACTATE AND CALCIUM CHLORIDE 1000 ML: 600; 310; 30; 20 INJECTION, SOLUTION INTRAVENOUS at 07:43

## 2017-12-18 RX ADMIN — PROPOFOL 10 MG: 10 INJECTION, EMULSION INTRAVENOUS at 08:26

## 2017-12-18 RX ADMIN — PROPOFOL 10 MG: 10 INJECTION, EMULSION INTRAVENOUS at 08:29

## 2017-12-18 RX ADMIN — PROPOFOL 10 MG: 10 INJECTION, EMULSION INTRAVENOUS at 08:25

## 2017-12-18 RX ADMIN — LIDOCAINE HYDROCHLORIDE 40 MG: 20 INJECTION, SOLUTION INFILTRATION; PERINEURAL at 08:22

## 2017-12-18 RX ADMIN — PROPOFOL 10 MG: 10 INJECTION, EMULSION INTRAVENOUS at 08:27

## 2017-12-18 RX ADMIN — PROPOFOL 10 MG: 10 INJECTION, EMULSION INTRAVENOUS at 08:24

## 2017-12-18 RX ADMIN — PROPOFOL 20 MG: 10 INJECTION, EMULSION INTRAVENOUS at 08:22

## 2017-12-18 RX ADMIN — PROPOFOL 10 MG: 10 INJECTION, EMULSION INTRAVENOUS at 08:28

## 2017-12-18 RX ADMIN — PROPOFOL 30 MG: 10 INJECTION, EMULSION INTRAVENOUS at 08:23

## 2017-12-18 ASSESSMENT — COPD QUESTIONNAIRES: COPD: 1

## 2017-12-18 ASSESSMENT — LIFESTYLE VARIABLES: TOBACCO_USE: 1

## 2017-12-18 NOTE — DISCHARGE INSTRUCTIONS
UPPER ENDOSCOPY    PURPOSE:  An Upper Endoscopy is a procedure in which the doctor passes a flexible, lighted tube called a gastroscope through your mouth into your stomach in order to:    See the lining of the esophagus, stomach, and duodenum (first part of the small intestine).    Look for bleeding, inflammation (swelling), abnormal tumors or tissue, or ulcers.    Take biopsy specimens.  (Biopsies do not hurt.)    TELL YOUR DOCTOR IF:     You have a heart condition, heart murmur, or have had heart valve surgery.    You have had angioplasty with stents put in within the last year.    You are taking blood thinners - Aspirin, Anti-inflammatory pain pills (like Motrin, ibuprofen, Naproxen, Feldene, Advil, etc.), Coumadin, Plavix.    You have diabetes - contact your regular doctor for management of your insulin.    YOU NEED TO:    Have someone drive you home.  You are not allowed to drive until the next day.  (If you take a taxi, the person staying with you after surgery must ride with you in the taxi.  The  is not responsible for you).    Have someone stay with you for four (4) hours after you leave the hospital.    Know the time to be at admitting:  A nurse will call you with the time the afternoon before your procedure.  If your procedure is on Monday, you will be called on Friday.  If you have not been contacted with the time, call the Admitting Department at [phone] or [phone], ext. [phone] after 5 pm (Admitting is open 24 hours daily).    Talk with the Surgery Patient Education Nurses.  Please call them @ [phone] or toll free [phone] after 8:00 a.m. Monday through Friday.    TO PREPARE FOR THE PROCEDURE:      ONE WEEK BEFORE:    Stop Aspirin, anti-inflammatory pain pills (Motrin, ibuprofen, Naproxen, Feldene, Advil, etc.).  It is OK to take Tylenol (acetaminophen).    Your doctor will tell you what to do if you are on Coumadin or Plavix.     NIGHT BEFORE:    Do not eat or drink  anything after midnight.  Your stomach needs to be empty.     DAY OF YOUR PROCEDURE:    Take your pills you were told to take.  Do not take diabetic pills.  If you are on Insulin, take the dose your doctor told you to take.    Wear loose, comfortable clothing and shoes.    Remove ALL jewelry including wedding rings.  Leave valuables at home.    Come to the hospital Admitting Department located at the [facility] on the lower level.    In Same Day surgery, you will be asked to change into an exam gown.    You will be asked your name, birth date, and what you are having done by every person who is involved with your care.    Your health history, medications, and allergies will be reviewed and verified.    An IV (intravenous line) will be started in your hand.  DURING THE UPPER ENDOSCOPY:    Your doctor and a registered nurse will be with you throughout the procedure which takes about 30 minutes.    You will either lie on your left side or on your back.    Medications will be given to you to help you relax and reduce the gag reflex when swallowing the scope.    Your doctor may need to insert air into your stomach to see better.  This may cause fullness or a cramping sensation.  The air will be removed at the end of the exam.    AFTER THE PROCEDURE    You will return to Same Day Surgery to rest for about an hour before you go home.    The doctor will talk with you and your family.    A family member/friend may visit with you.    You may burp up any air remaining in your stomach.    You may feel dizzy or light-headed from the medicine.    Your nurse will go over the discharge instructions with you and your caregiver and answer any of your questions.      You will be contacted the next day to check on how you are doing.    If biopsies were taken, you will be contacted with the results usually within 3 days.  BACK AT HOME    Rest for an hour or two after you get home.    When your throat is no longer numb and you have a  gag reflex, take a few sips of cool water.  If you can swallow comfortably, you may start eating again.    You may have a mild sore throat for the rest of the day.  WHAT TO WATCH FOR:  Problems rarely occur after the exam, but it is important to be aware of the early signs of a complication.  Call your doctor immediately if you have:    Difficulty swallowing or breathing    Unusual pain in your stomach or chest    Vomiting blood or dark material that looks like coffee grounds    Black or tarry stools    Temperature over 100.6 degrees    MORE QUESTIONS?  Please ask your doctor or nurse before the exam begins  or call your doctor at the clinic.    IF YOU MUST CANCEL YOUR PROCEDURE THE EVENING/NIGHT BEFORE, PLEASE CALL HOSPITAL ADMITTING -[phone] OR TOLL FREE [phone], [phone]. [phone].    Phone Numbers:  Hospital - [phone] [phone]  [facility] - [phone]  Surgery Patient Education - [phone] or toll free [phone]    Post-Anesthesia Patient Instructions    IMMEDIATELY FOLLOWING SURGERY:  Do not drive or operate machinery for the first twenty four hours after surgery.  Do not make any important decisions for twenty four hours after surgery or while taking narcotic pain medications or sedatives.  If you develop intractable nausea and vomiting or a severe headache please notify your doctor immediately.    FOLLOW-UP:  Please make an appointment with your surgeon as instructed. You do not need to follow up with anesthesia unless specifically instructed to do so.    WOUND CARE INSTRUCTIONS (if applicable):  Keep a dry clean dressing on the anesthesia/puncture wound site if there is drainage.  Once the wound has quit draining you may leave it open to air.  Generally you should leave the bandage intact for twenty four hours unless there is drainage.  If the epidural site drains for more than 36-48 hours please call the anesthesia department.    QUESTIONS?:  Please feel free to call your  physician or the hospital  if you have any questions, and they will be happy to assist you.

## 2017-12-18 NOTE — ANESTHESIA POSTPROCEDURE EVALUATION
Patient: Chyna Delgado    Procedure(s):  UPPER ENDOSCOPY WITH BIOPSY - Wound Class: II-Clean Contaminated    Diagnosis:POLYP OF STOMACH AND DUODENUM  Diagnosis Additional Information: No value filed.    Anesthesia Type:  MAC    Note:  Anesthesia Post Evaluation    Patient location during evaluation: Bedside  Patient participation: Able to fully participate in evaluation  Level of consciousness: awake and alert  Pain management: adequate  Airway patency: patent  Cardiovascular status: acceptable  Respiratory status: acceptable  Hydration status: acceptable  PONV: none     Anesthetic complications: None          Last vitals:  Vitals:    12/18/17 0855 12/18/17 0900 12/18/17 0905   BP: 120/74 122/76 137/84   Pulse:      Resp: 20 20 20   Temp:   97.1  F (36.2  C)   SpO2:            Electronically Signed By: MAIDA Wu CRNA  December 18, 2017  1:07 PM

## 2017-12-18 NOTE — ANESTHESIA CARE TRANSFER NOTE
Patient: Chyna Delgado    Procedure(s):  UPPER ENDOSCOPY WITH BIOPSY - Wound Class: II-Clean Contaminated    Diagnosis: POLYP OF STOMACH AND DUODENUM  Diagnosis Additional Information: No value filed.    Anesthesia Type:   MAC     Note:  Airway :Nasal Cannula  Patient transferred to:Phase II  Handoff Report: Identifed the Patient, Identified the Reponsible Provider, Reviewed the pertinent medical history, Discussed the surgical course, Reviewed Intra-OP anesthesia mangement and issues during anesthesia, Set expectations for post-procedure period and Allowed opportunity for questions and acknowledgement of understanding      Vitals: (Last set prior to Anesthesia Care Transfer)    CRNA VITALS  12/18/2017 0800 - 12/18/2017 0832      12/18/2017             Pulse: 66    SpO2: 99 %                Electronically Signed By: MAIDA Maier CRNA  December 18, 2017  8:32 AM

## 2017-12-18 NOTE — OP NOTE
PROCEDURE NOTE / POST PROCEDURE NOTE:     Chyna Delgado is a 61 year old female.     INDICATION FOR PROCEDURE: History of polyps     PROCEDURE PERFORMED:  ENDOSCOPY     POST-PROCEDURE DIAGNOSIS:  polyps ? Barretts       MEDICATIONS GIVEN: Per anesthesia notes     ESTIMATED BLOOD LOSS:  less than 2mL     SPECIMENS SENT:  yes     The procedure risks, benefits and alternatives were discussed with the patient and informed consent was obtained. Monitoring the cardiopulmonary status, the patient was medicated with the medications listed above. The Olympus  video adult endoscope was introduced per os and advanced through the crycopharynx to the D3.  The patient tolerated the procedure well. The findings were as follows:     ESOPHAGUS: ? Barretts, Z-line was at 38.     STOMACH: Multiple small polyps biopsies taken     DUODENUM: normal     JEJUNUM: not seen     COMPLICATIONS: None.     IMPRESSION: polyps     RECOMMENDATIONS: continue current medications repeat EGD in one year      Delbert Patel MD, MD        CC: Arie Camarillo  CC: Delbert Patel MD

## 2017-12-18 NOTE — IP AVS SNAPSHOT
[facility] Operating Room    [address] [place] [id number]    Phone:  [phone]                                       After Visit Summary   12/18/2017    Chyna Delgado    MRN: [id number]           After Visit Summary Signature Page     I have received my discharge instructions, and my questions have been answered. I have discussed any challenges I see with this plan with the nurse or doctor.    ..........................................................................................................................................  Patient/Patient Representative Signature      ..........................................................................................................................................  Patient Representative Print Name and Relationship to Patient    ..................................................               ................................................  Date                                            Time    ..........................................................................................................................................  Reviewed by Signature/Title    ...................................................              ..............................................  Date                                                            Time

## 2017-12-18 NOTE — CONSULTS
Pre-Endoscopy History and Physical     Chyna Delgado MRN# [id number]   YOB: 1955 Age: 61 year old     Date of Procedure: 12/18/2017  Primary care provider: Arie Camarillo  Type of Endoscopy: Esophagogastroduodenoscopy with possible biopsy, possible dilation, possible foreign body removal  Reason for Procedure: History of stomach polyps  Type of Anesthesia Anticipated: MAC    HPI:    Chyna is a 61 year old female who will be undergoing the above procedure.      A history and physical has been performed. The patient's medications and allergies have been reviewed. The risks and benefits of the procedure and the sedation options and risks were discussed with the patient.  All questions were answered and informed consent was obtained.      She denies a personal or family history of anesthesia complications or bleeding disorders.     Patient Active Problem List   Diagnosis     Encounter to establish care     Mild persistent asthma     Sacroiliac pain     Annual physical exam     Dysthymic disorder     Encounter for routine gynecological examination     Seborrheic keratosis     Somatic dysfunction of pelvic region     Restless legs syndrome     Skin lesion     Otitis externa of right ear     Hand swelling     Numbness and tingling in right hand     Overuse injury     Graves disease     Preoperative examination     Simple chronic bronchitis (H)     Arthritis     ACP (advance care planning)     Cough     Pain of toe of right foot     Moderate major depression (H)     Right shoulder pain     Abdominal muscle strain     Strain of flexor muscle of hip, unspecified laterality, initial encounter     Myalgia        Past Medical History:   Diagnosis Date     Abnormal mammogram, unspecified 01/08/2003    Normal pathology     Back Pain 02/10/2000    Sacroiliac pain     Benign neoplasm of colon 03/10/2000     Benign paroxysmal positional vertigo 06/07/2012     Depressive disorder, not elsewhere  classified 02/10/2004     Diarrhea 12/15/2010    Secondary to colectomy for familial polyposis     Gastric polyp 12/11/15     History of normal mammogram 07/18/2014     Idiopathic osteoporosis 12/15/2010     Interstitial emphysema (H) 12/15/2010     menopausal or female climacteric states 08/31/1999     Mixed hyperlipidemia 12/15/2010     Other bursitis disorders 02/04/2011    Greater trochanteric     Other forms of retinal detachment(361.89) 12/15/2010     Other malaise and fatigue 01/10/2003     Personal history of tobacco use, presenting hazards to health 12/15/2010     Restless legs syndrome (RLS) 12/15/2010     Rotator cuff tendonitis      Rotator cuff tendonitis      Sacroiliitis, not elsewhere classified (H) 12/15/2010    multiple sites     Tobacco use disorder 08/22/2012     Unspecified asthma(493.90) 12/15/2010        Past Surgical History:   Procedure Laterality Date     benign tumors removed from back       ENDOSCOPY UPPER, COLONOSCOPY, COMBINED N/A 9/5/2014    Procedure: COMBINED ENDOSCOPY UPPER, COLONOSCOPY;  Surgeon: Delbert Patel MD;  Location: [facility] OR     ESOPHAGOSCOPY, GASTROSCOPY, DUODENOSCOPY (EGD), COMBINED N/A 12/11/2015    Procedure: COMBINED ESOPHAGOSCOPY, GASTROSCOPY, DUODENOSCOPY (EGD);  Surgeon: Delbert Patel MD;  Location: [facility] OR     excised-benign  2002    tongue lesion     HC REPAIR OF NASAL SEPTUM  2002    Deviated nasal septum     LAPAROSCOPIC CHOLECYSTECTOMY       lumpectomy Right breast      Breast Lump     MOUTH SURGERY      removal of spot from roof of mouth     pilonidal cyst surgery  1976     removal of colon and large intestine  2000    Familial polyposis     Right etopic pregnancy      Pregnancy     TONSILLECTOMY      tonsillitus     UPPER GI ENDOSCOPY  2009    Stomach polyps       Social History   Substance Use Topics     Smoking status: Current Every Day Smoker     Packs/day: 0.50     Years: 40.00     Types: Cigarettes     Smokeless tobacco: Never Used      Comment:  trying to quit currently     Alcohol use Yes      Comment: Weekly 3 drinks       Family History   Problem Relation Age of Onset     Other - See Comments Father      Atrial Fibrillation     CANCER Father      bladder ca     Colon Polyps Father      HEART DISEASE Father      Pacemaker     Rheumatoid Arthritis Father      C.A.D. Father 75     CABG     Chronic Obstructive Pulmonary Disease Mother      HEART DISEASE Mother      Pacemaker     Other - See Comments Mother      dementia     C.A.D. Mother 70     CABG     C.A.D. Maternal Grandmother      Unknown/Adopted Maternal Grandfather      Unknown/Adopted Paternal Grandmother      Unknown/Adopted Paternal Grandfather      Asthma Sister      CEREBROVASCULAR DISEASE Sister      GASTROINTESTINAL DISEASE Sister      peptic ulcers     Hypertension Sister      GASTROINTESTINAL DISEASE Sister      stomach tumors     Rheumatoid Arthritis Sister      CANCER Sister      facial cancer     Asthma Sister      CANCER Maternal Aunt      renal ca       Prior to Admission medications    Medication Sig Start Date End Date Taking? Authorizing Provider   albuterol (PROAIR HFA/PROVENTIL HFA/VENTOLIN HFA) 108 (90 BASE) MCG/ACT Inhaler Inhale 2 puffs into the lungs every 4 hours as needed for shortness of breath / dyspnea or wheezing   Yes Reported, Patient   Calcium Citrate-Vitamin D (CALCIUM + D PO) 1200-1000mg unit tablet chewable; one tablet with a meal orally once a day.   Yes Reported, Patient   loperamide (IMODIUM) 2 MG capsule Take 2 mg by mouth   Yes Reported, Patient   predniSONE (DELTASONE) 20 MG tablet Take 20 mg by mouth   Yes Reported, Patient   Simethicone (GAS-X EXTRA STRENGTH) 125 MG CAPS Take 4 caps twice a day 10/24/13  Yes Reported, Patient   omeprazole (PRILOSEC) 20 MG CR capsule Take 1 capsule (20 mg) by mouth daily 11/1/17  Yes Arie Camarillo DO   gabapentin (NEURONTIN) 400 MG capsule Take 2 capsules (800 mg) by mouth 3 times daily 11/1/17  Yes Arie Camarillo  DO Bennie   predniSONE (DELTASONE) 10 MG tablet Take 1.5 tablets (15 mg) by mouth daily 11/1/17  Yes Arie Camarillo DO   FLUoxetine (PROZAC) 40 MG capsule Take one (1) capsule BY MOUTH daily 10/20/17  Yes Arie Camarillo DO   gabapentin (NEURONTIN) 400 MG capsule Take one (1) capsule BY MOUTH three times daily 10/20/17  Yes Arie Camarillo DO   traMADol (ULTRAM) 50 MG tablet TAKE 1-2 TABLETS BY MOUTH EVERY 6 HOURS AS NEEDED 5/30/17  Yes Arie Camarillo DO   ferrous sulfate (IRON) 325 (65 FE) MG tablet Take 325 mg by mouth 2 times daily (with meals) 3/5/17  Yes Arie Camarillo DO   tacrolimus (PROTOPIC) 0.1 % ointment  10/24/16  Yes Reported, Patient   fluticasone-salmeterol (ADVAIR DISKUS) 100-50 MCG/DOSE diskus inhaler INHALE 1 PUFF TWICE DAILY 10/12/16  Yes Arie Camarillo DO   ASPIRIN PO Take 81 mg by mouth daily   Yes Reported, Patient   diclofenac (VOLTAREN) 1 % GEL Apply to the outside of the elbow 3 times per day. 4/20/15  Yes Arie Camarillo DO   Simethicone (GAS-X PO) Take by mouth 2 times daily   Yes Reported, Patient   vitamin E 200 UNIT capsule Take 200 Units by mouth 2 times daily   Yes Reported, Patient   magnesium 100 MG CAPS Take by mouth 2 times daily   Yes Reported, Patient   Loperamide HCl (ANTI-DIARRHEAL PO) Take  by mouth.   Yes Reported, Patient   PSYLLIUM PO Take 3 tsp by mouth 2 times daily.   Yes Reported, Patient   Multiple Vitamin (MULTIVITAMIN) per tablet Take 1 tablet by mouth daily. Every day with food 9/26/11  Yes Reported, Patient   ketotifen (ZADITOR/REFRESH ANTI-ITCH) 0.025 % SOLN ophthalmic solution PLACE 2 DROPS INTO BOTH EYES 2 TIMES DAILY 11/1/17   Arie Camarillo DO   pramipexole (MIRAPEX) 1 MG tablet Take 1 tablet (1 mg) by mouth At Bedtime 11/1/17   Arie Camarillo DO   ibuprofen (ADVIL,MOTRIN) 200 MG tablet Take 200 mg by mouth 10/12/11   Reported, Patient       Allergies   Allergen Reactions  "    Codeine Swelling     Throat swelling     Nortriptyline Other (See Comments)     Crying         REVIEW OF SYSTEMS:   5 point ROS negative except as noted above in HPI, including Gen., Resp., CV, GI &  system review.    PHYSICAL EXAM:   /85  Pulse 71  Temp 96  F (35.6  C) (Oral)  Resp 20  Ht 1.575 m (5' 2\")  Wt 62.6 kg (138 lb)  SpO2 96%  BMI 25.24 kg/m2 Estimated body mass index is 25.24 kg/(m^2) as calculated from the following:    Height as of this encounter: 1.575 m (5' 2\").    Weight as of this encounter: 62.6 kg (138 lb).   GENERAL APPEARANCE: alert, and oriented  MENTAL STATUS: alert  AIRWAY EXAM: Mallampatti Class I (visualization of the soft palate, fauces, uvula, anterior and posterior pillars)  RESP: lungs clear to auscultation - no rales, rhonchi or wheezes  CV: regular rates and rhythm  DIAGNOSTICS:    Not indicated    IMPRESSION   ASA Class 2 - Mild systemic disease    PLAN:   Plan for Esophagogastroduodenoscopy with possible biopsy, possible dilation, possible foreign body removal. We discussed the risks, benefits and alternatives and the patient wished to proceed.    The above has been forwarded to the consulting provider.      Signed Electronically by: Delbert Patel MD  December 18, 2017          "

## 2017-12-18 NOTE — ANESTHESIA PREPROCEDURE EVALUATION
Anesthesia Evaluation     . Pt has had prior anesthetic. Type: MAC and General    No history of anesthetic complications          ROS/MED HX    ENT/Pulmonary:     (+)tobacco use, Current use 6-10 cig a day packs/day  Intermittent asthma Treatment: Inhaler prn,  COPD, , . Other pulmonary disease Reactive airway disease.    Neurologic:     (+)other neuro RLS    Cardiovascular:     (+) Dyslipidemia, ----. : . . . :. .       METS/Exercise Tolerance:  >4 METS   Hematologic:     (+) History of Transfusion (hx tubal pregnancy) no previous transfusion reaction -      Musculoskeletal: Comment: Polymyalgia rhuematica  (+) arthritis, , , other musculoskeletal- polymyalgia rheumatica      GI/Hepatic:     (+) GERD Asymptomatic on medication, post colectomy     Other GI/Hepatic: chronic diarhea.   Renal/Genitourinary:  - ROS Renal section negative       Endo:     (+) thyroid problem  Thyroid disease - Other graves disease, .      Psychiatric:     (+) psychiatric history depression and anxiety      Infectious Disease:  - neg infectious disease ROS       Malignancy:      - no malignancy   Other:    (+) No chance of pregnancy C-spine cleared: N/A, no H/O Chronic Pain,no other significant disability                    Physical Exam  Normal systems: cardiovascular, pulmonary and dental    Airway   Mallampati: III  TM distance: <3 FB    Dental     Cardiovascular   Rhythm and rate: regular and normal      Pulmonary    breath sounds clear to auscultation(+) decreased breath sounds (in bases)                       Anesthesia Plan      History & Physical Review  History and physical reviewed and following examination; no interval change.    ASA Status:  3 .    NPO Status:  > 8 hours    Plan for MAC with Propofol induction. Maintenance will be Balanced.  Reason for MAC:  Chronic cardiopulmonary disease (G9), Procedure to face, neck, head or breast and Difficulty with conscious sedation (QS)  PONV prophylaxis:  Ondansetron (or other  5HT-3)  Risks and benefits of MAC anesthetic discussed including dental damage, aspiration, loss of airway, conversion to general anesthetic, CV complications, MI, stroke, death. Pt wishes to proceed.   Currently on tapering dose of steroids      Postoperative Care  Postoperative pain management:  IV analgesics.      Consents  Anesthetic plan, risks, benefits and alternatives discussed with:  Patient.  Use of blood products discussed: No .   .                          .

## 2017-12-18 NOTE — OR NURSING
Patient and responsible adult given discharge instructions with no questions regarding instructions. Ag score 20 Pain level 0/10.  Discharged from unit via ambulatory. Patient discharged to home.

## 2017-12-18 NOTE — IP AVS SNAPSHOT
MRN:[id number]                      After Visit Summary   12/18/2017    Chyna Delgado    MRN: [id number]           Thank you!     Thank you for choosing [facility] for your care. Our goal is always to provide you with excellent care. Hearing back from our patients is one way we can continue to improve our services. Please take a few minutes to complete the written survey that you may receive in the mail after you visit with us. Thank you!        Patient Information     Date Of Birth          1955        About your hospital stay     You were admitted on:  December 18, 2017 You last received care in the:  [facility]    You were discharged on:  December 18, 2017       Who to Call     For medical emergencies, please call 911.  For non-urgent questions about your medical care, please call your primary care provider or clinic, [phone]  For questions related to your surgery, please call your surgery clinic        Attending Provider     Provider Specialty    Delbert Patel MD Gastroenterology       Primary Care Provider Office Phone # Fax #    Arie Camarillo [phone]      Your next 10 appointments already scheduled     Dec 19, 2017 11:40 AM CST   (Arrive by 11:20 AM)   SHORT with Arie Camarillo DO   [facility] ([facility] )    [address]  [place] [address]   [phone]              Further instructions from your care team           UPPER ENDOSCOPY    PURPOSE:  An Upper Endoscopy is a procedure in which the doctor passes a flexible, lighted tube called a gastroscope through your mouth into your stomach in order to:    See the lining of the esophagus, stomach, and duodenum (first part of the small intestine).    Look for bleeding, inflammation (swelling), abnormal tumors or tissue, or ulcers.    Take biopsy specimens.  (Biopsies do not hurt.)    TELL YOUR DOCTOR IF:     You have a heart condition,  heart murmur, or have had heart valve surgery.    You have had angioplasty with stents put in within the last year.    You are taking blood thinners - Aspirin, Anti-inflammatory pain pills (like Motrin, ibuprofen, Naproxen, Feldene, Advil, etc.), Coumadin, Plavix.    You have diabetes - contact your regular doctor for management of your insulin.    YOU NEED TO:    Have someone drive you home.  You are not allowed to drive until the next day.  (If you take a taxi, the person staying with you after surgery must ride with you in the taxi.  The  is not responsible for you).    Have someone stay with you for four (4) hours after you leave the hospital.    Know the time to be at admitting:  A nurse will call you with the time the afternoon before your procedure.  If your procedure is on Monday, you will be called on Friday.  If you have not been contacted with the time, call the Admitting Department at [phone] or [phone], ext. [phone] after 5 pm (Admitting is open 24 hours daily).    Talk with the Surgery Patient Education Nurses.  Please call them @ [phone] or toll free [phone] after 8:00 a.m. Monday through Friday.    TO PREPARE FOR THE PROCEDURE:      ONE WEEK BEFORE:    Stop Aspirin, anti-inflammatory pain pills (Motrin, ibuprofen, Naproxen, Feldene, Advil, etc.).  It is OK to take Tylenol (acetaminophen).    Your doctor will tell you what to do if you are on Coumadin or Plavix.     NIGHT BEFORE:    Do not eat or drink anything after midnight.  Your stomach needs to be empty.     DAY OF YOUR PROCEDURE:    Take your pills you were told to take.  Do not take diabetic pills.  If you are on Insulin, take the dose your doctor told you to take.    Wear loose, comfortable clothing and shoes.    Remove ALL jewelry including wedding rings.  Leave valuables at home.    Come to the hospital Admitting Department located at the [facility] on the lower level.    In Same Day surgery, you will be  asked to change into an exam gown.    You will be asked your name, birth date, and what you are having done by every person who is involved with your care.    Your health history, medications, and allergies will be reviewed and verified.    An IV (intravenous line) will be started in your hand.  DURING THE UPPER ENDOSCOPY:    Your doctor and a registered nurse will be with you throughout the procedure which takes about 30 minutes.    You will either lie on your left side or on your back.    Medications will be given to you to help you relax and reduce the gag reflex when swallowing the scope.    Your doctor may need to insert air into your stomach to see better.  This may cause fullness or a cramping sensation.  The air will be removed at the end of the exam.    AFTER THE PROCEDURE    You will return to Same Day Surgery to rest for about an hour before you go home.    The doctor will talk with you and your family.    A family member/friend may visit with you.    You may burp up any air remaining in your stomach.    You may feel dizzy or light-headed from the medicine.    Your nurse will go over the discharge instructions with you and your caregiver and answer any of your questions.      You will be contacted the next day to check on how you are doing.    If biopsies were taken, you will be contacted with the results usually within 3 days.  BACK AT HOME    Rest for an hour or two after you get home.    When your throat is no longer numb and you have a gag reflex, take a few sips of cool water.  If you can swallow comfortably, you may start eating again.    You may have a mild sore throat for the rest of the day.  WHAT TO WATCH FOR:  Problems rarely occur after the exam, but it is important to be aware of the early signs of a complication.  Call your doctor immediately if you have:    Difficulty swallowing or breathing    Unusual pain in your stomach or chest    Vomiting blood or dark material that looks like coffee  "grounds    Black or tarry stools    Temperature over 100.6 degrees    MORE QUESTIONS?  Please ask your doctor or nurse before the exam begins  or call your doctor at the clinic.    IF YOU MUST CANCEL YOUR PROCEDURE THE EVENING/NIGHT BEFORE, PLEASE CALL HOSPITAL ADMITTING -[phone] OR TOLL FREE [phone], EXT. [phone].    Phone Numbers:  Hospital - [phone] [phone]  [facility] - [phone]  Surgery Patient Education - [phone] or toll free [phone]    Post-Anesthesia Patient Instructions    IMMEDIATELY FOLLOWING SURGERY:  Do not drive or operate machinery for the first twenty four hours after surgery.  Do not make any important decisions for twenty four hours after surgery or while taking narcotic pain medications or sedatives.  If you develop intractable nausea and vomiting or a severe headache please notify your doctor immediately.    FOLLOW-UP:  Please make an appointment with your surgeon as instructed. You do not need to follow up with anesthesia unless specifically instructed to do so.    WOUND CARE INSTRUCTIONS (if applicable):  Keep a dry clean dressing on the anesthesia/puncture wound site if there is drainage.  Once the wound has quit draining you may leave it open to air.  Generally you should leave the bandage intact for twenty four hours unless there is drainage.  If the epidural site drains for more than 36-48 hours please call the anesthesia department.    QUESTIONS?:  Please feel free to call your physician or the hospital  if you have any questions, and they will be happy to assist you.       Pending Results     No orders found from 12/16/2017 to 12/19/2017.            Admission Information     Date & Time Provider Department Dept. Phone    12/18/2017 Delbert Patel MD [facility] [phone]      Your Vitals Were     Blood Pressure Pulse Temperature Respirations Height Weight    135/85 71 96  F (35.6  C) (Oral) 20 1.575 m (5' 2\") 62.6 kg (138 " lb)    Pulse Oximetry BMI (Body Mass Index)                96% 25.24 kg/m2          [facility] Information     [facility] gives you secure access to your electronic health record. If you see a primary care provider, you can also send messages to your care team and make appointments. If you have questions, please call your primary care clinic.  If you do not have a primary care provider, please call [phone] and they will assist you.        Care EveryWhere ID     This is your Care EveryWhere ID. This could be used by other organizations to access your [facility] medical records  [id number]        Equal Access to Services     [facility]: Kevin Gayle, herbert bonds, js vizcarra, patience santana . So [facility] [phone].    ATENCIÓN: Si habla español, tiene a tinajero disposición servicios gratuitos de asistencia lingüística. [facility] [phone].    We comply with applicable federal civil rights laws and [place] laws. We do not discriminate on the basis of race, color, national origin, age, disability, sex, sexual orientation, or gender identity.               Review of your medicines      CONTINUE these medicines which have NOT CHANGED        Dose / Directions    albuterol 108 (90 BASE) MCG/ACT Inhaler   Commonly known as:  PROAIR HFA/PROVENTIL HFA/VENTOLIN HFA        Dose:  2 puff   Inhale 2 puffs into the lungs every 4 hours as needed for shortness of breath / dyspnea or wheezing   Refills:  0       * ANTI-DIARRHEAL PO        Take  by mouth.   Refills:  0       * loperamide 2 MG capsule   Commonly known as:  IMODIUM        Dose:  2 mg   Take 2 mg by mouth   Refills:  0       ASPIRIN PO        Dose:  81 mg   Take 81 mg by mouth daily   Refills:  0       CALCIUM + D PO        1200-1000mg unit tablet chewable; one tablet with a meal orally once a day.   Refills:  0       diclofenac 1 % Gel topical gel   Commonly known as:  VOLTAREN   Used for:  Overuse injury         Apply to the outside of the elbow 3 times per day.   Quantity:  100 g   Refills:  1       ferrous sulfate 325 (65 FE) MG tablet   Commonly known as:  IRON   Used for:  Iron deficiency        Dose:  325 mg   Take 325 mg by mouth 2 times daily (with meals)   Quantity:  90 tablet   Refills:  2       FLUoxetine 40 MG capsule   Commonly known as:  PROzac   Used for:  Dysthymic disorder        Take one (1) capsule BY MOUTH daily   Quantity:  90 capsule   Refills:  0       fluticasone-salmeterol 100-50 MCG/DOSE diskus inhaler   Commonly known as:  ADVAIR DISKUS   Used for:  Mild persistent asthma without complication        INHALE 1 PUFF TWICE DAILY   Quantity:  1 Inhaler   Refills:  5       * gabapentin 400 MG capsule   Commonly known as:  NEURONTIN   Used for:  Restless legs syndrome (RLS)        Take one (1) capsule BY MOUTH three times daily   Quantity:  90 capsule   Refills:  3       * gabapentin 400 MG capsule   Commonly known as:  NEURONTIN   Used for:  PMR (polymyalgia rheumatica) (H)        Dose:  800 mg   Take 2 capsules (800 mg) by mouth 3 times daily   Quantity:  180 capsule   Refills:  3       * GAS-X PO        Take by mouth 2 times daily   Refills:  0       * GAS-X EXTRA STRENGTH 125 MG Caps   Generic drug:  Simethicone        Take 4 caps twice a day   Refills:  0       ibuprofen 200 MG tablet   Commonly known as:  ADVIL/MOTRIN        Dose:  200 mg   Take 200 mg by mouth   Refills:  0       ketotifen 0.025 % Soln ophthalmic solution   Commonly known as:  ZADITOR/REFRESH ANTI-ITCH        PLACE 2 DROPS INTO BOTH EYES 2 TIMES DAILY   Quantity:  5 mL   Refills:  2       magnesium 100 MG Caps        Take by mouth 2 times daily   Refills:  0       multivitamin per tablet        Dose:  1 tablet   Take 1 tablet by mouth daily. Every day with food   Refills:  0       omeprazole 20 MG CR capsule   Commonly known as:  priLOSEC   Used for:  Gastroesophageal reflux disease, esophagitis presence not specified         Dose:  20 mg   Take 1 capsule (20 mg) by mouth daily   Quantity:  90 capsule   Refills:  3       pramipexole 1 MG tablet   Commonly known as:  MIRAPEX   Used for:  Restless leg syndrome        Dose:  1 mg   Take 1 tablet (1 mg) by mouth At Bedtime   Quantity:  90 tablet   Refills:  3       * predniSONE 20 MG tablet   Commonly known as:  DELTASONE        Dose:  20 mg   Take 20 mg by mouth   Refills:  0       * predniSONE 10 MG tablet   Commonly known as:  DELTASONE   Used for:  Myalgia, PMR (polymyalgia rheumatica) (H)        Dose:  15 mg   Take 1.5 tablets (15 mg) by mouth daily   Quantity:  135 tablet   Refills:  3       PSYLLIUM PO        Dose:  3 tsp.   Take 3 tsp by mouth 2 times daily.   Refills:  0       tacrolimus 0.1 % ointment   Commonly known as:  PROTOPIC        Refills:  0       traMADol 50 MG tablet   Commonly known as:  ULTRAM   Used for:  Restless leg syndrome, Myalgia        TAKE 1-2 TABLETS BY MOUTH EVERY 6 HOURS AS NEEDED   Quantity:  90 tablet   Refills:  0       vitamin E 200 UNIT capsule        Dose:  200 Units   Take 200 Units by mouth 2 times daily   Refills:  0       * Notice:  This list has 8 medication(s) that are the same as other medications prescribed for you. Read the directions carefully, and ask your doctor or other care provider to review them with you.             Protect others around you: Learn how to safely use, store and throw away your medicines at www.disposemymeds.org.             Medication List: This is a list of all your medications and when to take them. Check marks below indicate your daily home schedule. Keep this list as a reference.      Medications           Morning Afternoon Evening Bedtime As Needed    albuterol 108 (90 BASE) MCG/ACT Inhaler   Commonly known as:  PROAIR HFA/PROVENTIL HFA/VENTOLIN HFA   Inhale 2 puffs into the lungs every 4 hours as needed for shortness of breath / dyspnea or wheezing                                * ANTI-DIARRHEAL PO   Take  by  mouth.                                * loperamide 2 MG capsule   Commonly known as:  IMODIUM   Take 2 mg by mouth                                ASPIRIN PO   Take 81 mg by mouth daily                                CALCIUM + D PO   1200-1000mg unit tablet chewable; one tablet with a meal orally once a day.                                diclofenac 1 % Gel topical gel   Commonly known as:  VOLTAREN   Apply to the outside of the elbow 3 times per day.                                ferrous sulfate 325 (65 FE) MG tablet   Commonly known as:  IRON   Take 325 mg by mouth 2 times daily (with meals)                                FLUoxetine 40 MG capsule   Commonly known as:  PROzac   Take one (1) capsule BY MOUTH daily                                fluticasone-salmeterol 100-50 MCG/DOSE diskus inhaler   Commonly known as:  ADVAIR DISKUS   INHALE 1 PUFF TWICE DAILY                                * gabapentin 400 MG capsule   Commonly known as:  NEURONTIN   Take one (1) capsule BY MOUTH three times daily                                * gabapentin 400 MG capsule   Commonly known as:  NEURONTIN   Take 2 capsules (800 mg) by mouth 3 times daily                                * GAS-X PO   Take by mouth 2 times daily                                * GAS-X EXTRA STRENGTH 125 MG Caps   Take 4 caps twice a day   Generic drug:  Simethicone                                ibuprofen 200 MG tablet   Commonly known as:  ADVIL/MOTRIN   Take 200 mg by mouth                                ketotifen 0.025 % Soln ophthalmic solution   Commonly known as:  ZADITOR/REFRESH ANTI-ITCH   PLACE 2 DROPS INTO BOTH EYES 2 TIMES DAILY                                magnesium 100 MG Caps   Take by mouth 2 times daily                                multivitamin per tablet   Take 1 tablet by mouth daily. Every day with food                                omeprazole 20 MG CR capsule   Commonly known as:  priLOSEC   Take 1 capsule (20 mg) by mouth daily                                 pramipexole 1 MG tablet   Commonly known as:  MIRAPEX   Take 1 tablet (1 mg) by mouth At Bedtime                                * predniSONE 20 MG tablet   Commonly known as:  DELTASONE   Take 20 mg by mouth                                * predniSONE 10 MG tablet   Commonly known as:  DELTASONE   Take 1.5 tablets (15 mg) by mouth daily                                PSYLLIUM PO   Take 3 tsp by mouth 2 times daily.                                tacrolimus 0.1 % ointment   Commonly known as:  PROTOPIC                                traMADol 50 MG tablet   Commonly known as:  ULTRAM   TAKE 1-2 TABLETS BY MOUTH EVERY 6 HOURS AS NEEDED                                vitamin E 200 UNIT capsule   Take 200 Units by mouth 2 times daily                                * Notice:  This list has 8 medication(s) that are the same as other medications prescribed for you. Read the directions carefully, and ask your doctor or other care provider to review them with you.

## 2017-12-19 ENCOUNTER — OFFICE VISIT (OUTPATIENT)
Dept: PEDIATRICS | Facility: OTHER | Age: 62
End: 2017-12-19
Attending: INTERNAL MEDICINE
Payer: COMMERCIAL

## 2017-12-19 VITALS
DIASTOLIC BLOOD PRESSURE: 70 MMHG | OXYGEN SATURATION: 95 % | HEART RATE: 83 BPM | WEIGHT: 146 LBS | BODY MASS INDEX: 26.7 KG/M2 | RESPIRATION RATE: 20 BRPM | TEMPERATURE: 98.1 F | SYSTOLIC BLOOD PRESSURE: 126 MMHG

## 2017-12-19 DIAGNOSIS — K21.9 GASTROESOPHAGEAL REFLUX DISEASE, ESOPHAGITIS PRESENCE NOT SPECIFIED: ICD-10-CM

## 2017-12-19 DIAGNOSIS — M35.3 PMR (POLYMYALGIA RHEUMATICA) (H): ICD-10-CM

## 2017-12-19 DIAGNOSIS — M79.10 MYALGIA: ICD-10-CM

## 2017-12-19 DIAGNOSIS — F34.1 DYSTHYMIC DISORDER: ICD-10-CM

## 2017-12-19 LAB — COPATH REPORT: NORMAL

## 2017-12-19 PROCEDURE — 99214 OFFICE O/P EST MOD 30 MIN: CPT | Performed by: INTERNAL MEDICINE

## 2017-12-19 RX ORDER — FLUOXETINE 40 MG/1
CAPSULE ORAL
Qty: 90 CAPSULE | Refills: 3 | Status: SHIPPED | OUTPATIENT
Start: 2017-12-19 | End: 2018-08-13 | Stop reason: DRUGHIGH

## 2017-12-19 RX ORDER — PREDNISONE 10 MG/1
TABLET ORAL
Qty: 75 TABLET | Refills: 3 | Status: SHIPPED | OUTPATIENT
Start: 2017-12-19 | End: 2018-10-27

## 2017-12-19 ASSESSMENT — ENCOUNTER SYMPTOMS
PALPITATIONS: 0
FEVER: 0
CHILLS: 0
SHORTNESS OF BREATH: 0
BLOOD IN STOOL: 0
HEARTBURN: 0
DYSURIA: 0
CONSTIPATION: 0
HEMATURIA: 0
NAUSEA: 0
DIZZINESS: 0
MYALGIAS: 1
DIARRHEA: 0
WHEEZING: 0
ABDOMINAL PAIN: 0
COUGH: 0

## 2017-12-19 ASSESSMENT — ANXIETY QUESTIONNAIRES
GAD7 TOTAL SCORE: 5
7. FEELING AFRAID AS IF SOMETHING AWFUL MIGHT HAPPEN: NOT AT ALL
1. FEELING NERVOUS, ANXIOUS, OR ON EDGE: SEVERAL DAYS
6. BECOMING EASILY ANNOYED OR IRRITABLE: SEVERAL DAYS
5. BEING SO RESTLESS THAT IT IS HARD TO SIT STILL: SEVERAL DAYS
4. TROUBLE RELAXING: SEVERAL DAYS
3. WORRYING TOO MUCH ABOUT DIFFERENT THINGS: NOT AT ALL
2. NOT BEING ABLE TO STOP OR CONTROL WORRYING: SEVERAL DAYS

## 2017-12-19 ASSESSMENT — PATIENT HEALTH QUESTIONNAIRE - PHQ9: SUM OF ALL RESPONSES TO PHQ QUESTIONS 1-9: 4

## 2017-12-19 ASSESSMENT — PAIN SCALES - GENERAL: PAINLEVEL: MILD PAIN (2)

## 2017-12-19 NOTE — PROGRESS NOTES
HPI   Patient is a 60 yo fe male who presents for a follow up on her PMR.  She has been cutting down on her prednisone.  She is on 10 mg daily at the present time.  She reports pains over the posterior shoulders and neck and over the buttock and lateral thighs.  She reports a pain levels of 2/10 which resolves with her prednisone.      Additionally, she feels that her memory is off.  She has a family history of dementia with her maternal aunt 50-60s and mother at age 70.  She feels that her paternal grandmother developed dementia at an unknown age.  She has noticed that she repeats herself several times forgetting that she had already stated or asked a question.  She reports that she needs to leave notes for herself so that she does not forget thins or items.  She reports this problem was presents before she started prednisone.  She denies any heavy alcohol use.    Past Medical History:   Diagnosis Date     Abnormal mammogram, unspecified 01/08/2003    Normal pathology     Back Pain 02/10/2000    Sacroiliac pain     Benign neoplasm of colon 03/10/2000     Benign paroxysmal positional vertigo 06/07/2012     Depressive disorder, not elsewhere classified 02/10/2004     Diarrhea 12/15/2010    Secondary to colectomy for familial polyposis     Gastric polyp 12/11/15     History of normal mammogram 07/18/2014     Idiopathic osteoporosis 12/15/2010     Interstitial emphysema (H) 12/15/2010     menopausal or female climacteric states 08/31/1999     Mixed hyperlipidemia 12/15/2010     Other bursitis disorders 02/04/2011    Greater trochanteric     Other forms of retinal detachment(361.89) 12/15/2010     Other malaise and fatigue 01/10/2003     Personal history of tobacco use, presenting hazards to health 12/15/2010     Restless legs syndrome (RLS) 12/15/2010     Rotator cuff tendonitis      Rotator cuff tendonitis      Sacroiliitis, not elsewhere classified (H) 12/15/2010    multiple sites     Tobacco use disorder 08/22/2012      Unspecified asthma(493.90) 12/15/2010     Past Surgical History:   Procedure Laterality Date     benign tumors removed from back       ENDOSCOPY UPPER, COLONOSCOPY, COMBINED N/A 9/5/2014    Procedure: COMBINED ENDOSCOPY UPPER, COLONOSCOPY;  Surgeon: Delbert Patel MD;  Location: [facility]     ESOPHAGOSCOPY, GASTROSCOPY, DUODENOSCOPY (EGD), COMBINED N/A 12/11/2015    Procedure: COMBINED ESOPHAGOSCOPY, GASTROSCOPY, DUODENOSCOPY (EGD);  Surgeon: Delbert Patel MD;  Location: [facility] OR     ESOPHAGOSCOPY, GASTROSCOPY, DUODENOSCOPY (EGD), COMBINED N/A 12/18/2017    Procedure: COMBINED ESOPHAGOSCOPY, GASTROSCOPY, DUODENOSCOPY (EGD);  UPPER ENDOSCOPY WITH BIOPSY;  Surgeon: Delbert Patel MD;  Location: [facility] OR     excised-benign  2002    tongue lesion     [facility] REPAIR OF NASAL SEPTUM  2002    Deviated nasal septum     LAPAROSCOPIC CHOLECYSTECTOMY       lumpectomy Right breast      Breast Lump     MOUTH SURGERY      removal of spot from roof of mouth     pilonidal cyst surgery  1976     removal of colon and large intestine  2000    Familial polyposis     Right etopic pregnancy      Pregnancy     TONSILLECTOMY      tonsillitus     UPPER GI ENDOSCOPY  2009    Stomach polyps       Review of Systems   Constitutional: Negative for chills and fever.   Respiratory: Negative for cough, shortness of breath and wheezing.    Cardiovascular: Negative for chest pain, palpitations and leg swelling.   Gastrointestinal: Negative for abdominal pain, blood in stool, constipation, diarrhea, heartburn and nausea.   Genitourinary: Negative for dysuria and hematuria.   Musculoskeletal: Positive for myalgias.   Neurological: Negative for dizziness.         /70 (BP Location: Right arm, Patient Position: Chair, Cuff Size: Adult Regular)  Pulse 83  Temp 98.1  F (36.7  C) (Tympanic)  Resp 20  Wt 146 lb (66.2 kg)  SpO2 95%  BMI 26.7 kg/m2        Physical Exam   Constitutional: She is oriented to person, place, and time and well-developed,  well-nourished, and in no distress. No distress.   HENT:   Mouth/Throat: No oropharyngeal exudate.   Eyes: Conjunctivae are normal. No scleral icterus.   Neck: Neck supple. No thyromegaly present.   Cardiovascular: Normal rate, regular rhythm, normal heart sounds and intact distal pulses.    No murmur heard.  Pulmonary/Chest: Effort normal and breath sounds normal. She has no wheezes. She has no rales.   Abdominal: Soft. Bowel sounds are normal. She exhibits no shifting dullness, no distension, no abdominal bruit and no mass. There is no hepatosplenomegaly. There is no tenderness.   Musculoskeletal: She exhibits no edema.   Lymphadenopathy:     She has no cervical adenopathy.   Neurological: She is alert and oriented to person, place, and time.   Psychiatric: Judgment normal. Her mood appears not anxious. Her affect is labile. She does not exhibit a depressed mood.   Clock drawing with hands to indicate 4 O'clock is normal.  Her three item recall is normal.    Her concentration is normal with normal reciting of the alphabet backwards from J to A.      She is oriented to the state, county and city in which she lives.      She could not recall the current president or the president before last last president.       Labs:  NA      Imaging:  NA      ASSESSMENT /PLAN:  (M79.1) Myalgia  Comment: Due to PMR are improved of gabapentin and prednisone.  Plan:   Continue predniSONE (DELTASONE) 10 MG tablet for 5 weeks then reduce to 5 mg daily.    (M35.3) PMR (polymyalgia rheumatica) (H)  Comment:  Plan:   predniSONE (DELTASONE) 10 MG tablet as above.  She will continue gabapentin 800 mg TID      (K21.9) Gastroesophageal reflux disease, esophagitis presence not specified  Comment: She has familial polypoidosis   Plan:   She will continue omeprazole (PRILOSEC) 20 MG CR capsule    (F34.1) Dysthymic disorder  Comment: This may be affecting her memory.  Plan:   Increase FLUoxetine (PROZAC) 40 MG capsule and a 20 MFG capsule  daily      Follow up with Provider - 1 month for dysthymia and memory.      Arie Camarillo DO

## 2017-12-19 NOTE — NURSING NOTE
"Chief Complaint   Patient presents with     POLYMYLAGIA RHEUMATICA     trying to cut back on prednisone.        Initial /70 (BP Location: Right arm, Patient Position: Chair, Cuff Size: Adult Regular)  Pulse 83  Temp 98.1  F (36.7  C) (Tympanic)  Resp 20  Wt 146 lb (66.2 kg)  SpO2 95%  BMI 26.7 kg/m2 Estimated body mass index is 26.7 kg/(m^2) as calculated from the following:    Height as of 12/18/17: 5' 2\" (1.575 m).    Weight as of this encounter: 146 lb (66.2 kg).  Medication Reconciliation: complete   Lina Díaz LPN      "

## 2017-12-19 NOTE — MR AVS SNAPSHOT
After Visit Summary   12/19/2017    Chyna Delgado    MRN: [id number]           Patient Information     Date Of Birth          1955        Visit Information        Provider Department      12/19/2017 11:40 AM Arie Camarillo DO [facility] [place]        Today's Diagnoses     Myalgia        PMR (polymyalgia rheumatica) (H)        Gastroesophageal reflux disease, esophagitis presence not specified        Dysthymic disorder           Follow-ups after your visit        Follow-up notes from your care team     Return in about 1 month (around 1/19/2018), or Dysthymia.      Your next 10 appointments already scheduled     Jan 18, 2018 11:00 AM CST   (Arrive by 10:40 AM)   SHORT with Arie Camarillo DO   [facility] ([facility] )    [address] [place] [address]   [phone]              Who to contact     If you have questions or need follow up information about today's clinic visit or your schedule please contact [facility] directly at [phone].  Normal or non-critical lab and imaging results will be communicated to you by MyChart, letter or phone within 4 business days after the clinic has received the results. If you do not hear from us within 7 days, please contact the clinic through [employer] or phone. If you have a critical or abnormal lab result, we will notify you by phone as soon as possible.  Submit refill requests through [facility] [facility] or call your pharmacy and they will forward the refill request to us. Please allow 3 business days for your refill to be completed.          Additional Information About Your Visit        MyChart Information     [facility] gives you secure access to your electronic health record. If you see a primary care provider, you can also send messages to your care team and make appointments. If you have questions, please call your primary care clinic.  If you do not have a primary care provider,  please call [phone] and they will assist you.        Care EveryWhere ID     This is your Care EveryWhere ID. This could be used by other organizations to access your [place] medical records  [id number]        Your Vitals Were     Pulse Temperature Respirations Pulse Oximetry BMI (Body Mass Index)       83 98.1  F (36.7  C) (Tympanic) 20 95% 26.7 kg/m2        Blood Pressure from Last 3 Encounters:   12/19/17 126/70   12/18/17 137/84   11/01/17 120/60    Weight from Last 3 Encounters:   12/19/17 146 lb (66.2 kg)   12/18/17 138 lb (62.6 kg)   08/31/17 138 lb (62.6 kg)              Today, you had the following     No orders found for display         Today's Medication Changes          These changes are accurate as of: 12/19/17 12:05 PM.  If you have any questions, ask your nurse or doctor.               These medicines have changed or have updated prescriptions.        Dose/Directions    * FLUoxetine 40 MG capsule   Commonly known as:  PROzac   This may have changed:  See the new instructions.   Used for:  Dysthymic disorder   Changed by:  Arie Camarillo DO        Take one (1) capsule BY MOUTH daily   Quantity:  90 capsule   Refills:  3       * FLUoxetine 20 MG capsule   Commonly known as:  PROzac   This may have changed:  You were already taking a medication with the same name, and this prescription was added. Make sure you understand how and when to take each.   Used for:  Dysthymic disorder   Changed by:  Arie Camarillo DO        Take one capsule by mouth with your 40 mg capsule daily.   Quantity:  90 capsule   Refills:  1       gabapentin 400 MG capsule   Commonly known as:  NEURONTIN   This may have changed:  Another medication with the same name was removed. Continue taking this medication, and follow the directions you see here.   Used for:  PMR (polymyalgia rheumatica) (H)   Changed by:  Arie Camarillo DO        Dose:  800 mg   Take 2 capsules (800 mg) by mouth 3 times daily    Quantity:  180 capsule   Refills:  3       omeprazole 20 MG CR capsule   Commonly known as:  priLOSEC   This may have changed:  additional instructions   Used for:  Gastroesophageal reflux disease, esophagitis presence not specified   Changed by:  Arie Camarillo DO        Dose:  20 mg   Take 1 capsule (20 mg) by mouth daily Take two daily while on prednisone   Quantity:  90 capsule   Refills:  3       predniSONE 10 MG tablet   Commonly known as:  DELTASONE   This may have changed:    - how much to take  - how to take this  - when to take this  - additional instructions  - Another medication with the same name was removed. Continue taking this medication, and follow the directions you see here.   Used for:  Myalgia, PMR (polymyalgia rheumatica) (H)   Changed by:  Arie Camarillo DO        Take 10 mg by mouth daily for 5 weeks, then reduce to 5 mg daily.   Quantity:  75 tablet   Refills:  3       * Notice:  This list has 2 medication(s) that are the same as other medications prescribed for you. Read the directions carefully, and ask your doctor or other care provider to review them with you.         Where to get your medicines      These medications were sent to [facility] [address] [address]     Phone:  [phone]     FLUoxetine 20 MG capsule    FLUoxetine 40 MG capsule    omeprazole 20 MG CR capsule    predniSONE 10 MG tablet                Primary Care Provider Office Phone # Fax #    Arie Bennie Camarillo [phone]       [facility] [place] [address]  [place] [address]        Equal Access to Services     ADALBERTO WICK AH: Hadii aad ku hadasho Soomaali, waaxda luqadaha, qaybta kaalmada adeegyada, waxay johnsonin hayemoryn damion bahena. So [facility] [phone].    ATENCIÓN: Si habla español, tiene a tinajero disposición servicios gratuitos de asistencia lingüística. Llame al [phone].    We comply with  applicable federal civil rights laws and [place] laws. We do not discriminate on the basis of race, color, national origin, age, disability, sex, sexual orientation, or gender identity.            Thank you!     [facility] you [facility]  [facility] your [facility]. Our goal is always to provide you with excellent care. Hearing back from our patients is one way we can continue to improve our services. Please take a few minutes to complete the written survey that you may receive in the mail after your visit with us. Thank you!             Your Updated Medication List - Protect others around you: Learn how to safely use, store and throw away your medicines at www.disposemymeds.org.          This list is accurate as of: 12/19/17 12:05 PM.  Always use your most recent med list.                   Brand Name Dispense Instructions for use Diagnosis    albuterol 108 (90 BASE) MCG/ACT Inhaler    PROAIR HFA/PROVENTIL HFA/VENTOLIN HFA     Inhale 2 puffs into the lungs every 4 hours as needed for shortness of breath / dyspnea or wheezing        * ANTI-DIARRHEAL PO      Take  by mouth.        * loperamide 2 MG capsule    IMODIUM     Take 2 mg by mouth        ASPIRIN PO      Take 81 mg by mouth daily        CALCIUM + D PO      1200-1000mg unit tablet chewable; one tablet with a meal orally once a day.        diclofenac 1 % Gel topical gel    VOLTAREN    100 g    Apply to the outside of the elbow 3 times per day.    Overuse injury       ferrous sulfate 325 (65 FE) MG tablet    IRON    90 tablet    Take 325 mg by mouth 2 times daily (with meals)    Iron deficiency       * FLUoxetine 40 MG capsule    PROzac    90 capsule    Take one (1) capsule BY MOUTH daily    Dysthymic disorder       * FLUoxetine 20 MG capsule    PROzac    90 capsule    Take one capsule by mouth with your 40 mg capsule daily.    Dysthymic disorder       fluticasone-salmeterol 100-50 MCG/DOSE diskus inhaler    ADVAIR DISKUS    1 Inhaler    INHALE 1 PUFF  TWICE DAILY    Mild persistent asthma without complication       gabapentin 400 MG capsule    NEURONTIN    180 capsule    Take 2 capsules (800 mg) by mouth 3 times daily    PMR (polymyalgia rheumatica) (H)       * GAS-X PO      Take by mouth 2 times daily        * GAS-X EXTRA STRENGTH 125 MG Caps   Generic drug:  Simethicone      Take 4 caps twice a day        ibuprofen 200 MG tablet    ADVIL/MOTRIN     Take 200 mg by mouth        ketotifen 0.025 % Soln ophthalmic solution    ZADITOR/REFRESH ANTI-ITCH    5 mL    PLACE 2 DROPS INTO BOTH EYES 2 TIMES DAILY        magnesium 100 MG Caps      Take by mouth 2 times daily        multivitamin per tablet      Take 1 tablet by mouth daily. Every day with food        omeprazole 20 MG CR capsule    priLOSEC    90 capsule    Take 1 capsule (20 mg) by mouth daily Take two daily while on prednisone    Gastroesophageal reflux disease, esophagitis presence not specified       pramipexole 1 MG tablet    MIRAPEX    90 tablet    Take 1 tablet (1 mg) by mouth At Bedtime    Restless leg syndrome       predniSONE 10 MG tablet    DELTASONE    75 tablet    Take 10 mg by mouth daily for 5 weeks, then reduce to 5 mg daily.    Myalgia, PMR (polymyalgia rheumatica) (H)       PSYLLIUM PO      Take 3 tsp by mouth 2 times daily.        tacrolimus 0.1 % ointment    PROTOPIC          traMADol 50 MG tablet    ULTRAM    90 tablet    TAKE 1-2 TABLETS BY MOUTH EVERY 6 HOURS AS NEEDED    Restless leg syndrome, Myalgia       vitamin E 200 UNIT capsule      Take 200 Units by mouth 2 times daily        * Notice:  This list has 6 medication(s) that are the same as other medications prescribed for you. Read the directions carefully, and ask your doctor or other care provider to review them with you.

## 2017-12-20 ASSESSMENT — ANXIETY QUESTIONNAIRES: GAD7 TOTAL SCORE: 5

## 2018-01-18 ENCOUNTER — OFFICE VISIT (OUTPATIENT)
Dept: PEDIATRICS | Facility: OTHER | Age: 63
End: 2018-01-18
Attending: INTERNAL MEDICINE
Payer: COMMERCIAL

## 2018-01-18 ENCOUNTER — MEDICAL CORRESPONDENCE (OUTPATIENT)
Dept: HEALTH INFORMATION MANAGEMENT | Facility: CLINIC | Age: 63
End: 2018-01-18

## 2018-01-18 VITALS
TEMPERATURE: 97.6 F | WEIGHT: 145 LBS | SYSTOLIC BLOOD PRESSURE: 116 MMHG | DIASTOLIC BLOOD PRESSURE: 64 MMHG | HEART RATE: 78 BPM | OXYGEN SATURATION: 97 % | RESPIRATION RATE: 20 BRPM | BODY MASS INDEX: 26.52 KG/M2

## 2018-01-18 DIAGNOSIS — M79.10 MYALGIA: ICD-10-CM

## 2018-01-18 DIAGNOSIS — G25.81 RESTLESS LEGS SYNDROME: ICD-10-CM

## 2018-01-18 DIAGNOSIS — E61.1 IRON DEFICIENCY: ICD-10-CM

## 2018-01-18 DIAGNOSIS — F32.1 MODERATE MAJOR DEPRESSION (H): Primary | ICD-10-CM

## 2018-01-18 LAB
ANION GAP SERPL CALCULATED.3IONS-SCNC: 8 MMOL/L (ref 3–14)
BUN SERPL-MCNC: 19 MG/DL (ref 7–30)
CALCIUM SERPL-MCNC: 8.7 MG/DL (ref 8.5–10.1)
CHLORIDE SERPL-SCNC: 105 MMOL/L (ref 94–109)
CO2 SERPL-SCNC: 28 MMOL/L (ref 20–32)
CREAT SERPL-MCNC: 0.86 MG/DL (ref 0.52–1.04)
CRP SERPL-MCNC: 10.8 MG/L (ref 0–8)
ERYTHROCYTE [DISTWIDTH] IN BLOOD BY AUTOMATED COUNT: 12.9 % (ref 10–15)
FERRITIN SERPL-MCNC: 74 NG/ML (ref 8–252)
GFR SERPL CREATININE-BSD FRML MDRD: 66 ML/MIN/1.7M2
GLUCOSE SERPL-MCNC: 89 MG/DL (ref 70–99)
HCT VFR BLD AUTO: 42.3 % (ref 35–47)
HGB BLD-MCNC: 14.1 G/DL (ref 11.7–15.7)
IRON SATN MFR SERPL: 18 % (ref 15–46)
IRON SERPL-MCNC: 70 UG/DL (ref 35–180)
MCH RBC QN AUTO: 30.7 PG (ref 26.5–33)
MCHC RBC AUTO-ENTMCNC: 33.3 G/DL (ref 31.5–36.5)
MCV RBC AUTO: 92 FL (ref 78–100)
PLATELET # BLD AUTO: 224 10E9/L (ref 150–450)
POTASSIUM SERPL-SCNC: 3.9 MMOL/L (ref 3.4–5.3)
RBC # BLD AUTO: 4.59 10E12/L (ref 3.8–5.2)
SODIUM SERPL-SCNC: 141 MMOL/L (ref 133–144)
TIBC SERPL-MCNC: 391 UG/DL (ref 240–430)
WBC # BLD AUTO: 10.5 10E9/L (ref 4–11)

## 2018-01-18 PROCEDURE — 82728 ASSAY OF FERRITIN: CPT | Performed by: INTERNAL MEDICINE

## 2018-01-18 PROCEDURE — 36415 COLL VENOUS BLD VENIPUNCTURE: CPT | Performed by: INTERNAL MEDICINE

## 2018-01-18 PROCEDURE — 99214 OFFICE O/P EST MOD 30 MIN: CPT | Performed by: INTERNAL MEDICINE

## 2018-01-18 PROCEDURE — 86140 C-REACTIVE PROTEIN: CPT | Performed by: INTERNAL MEDICINE

## 2018-01-18 PROCEDURE — 80048 BASIC METABOLIC PNL TOTAL CA: CPT | Performed by: INTERNAL MEDICINE

## 2018-01-18 PROCEDURE — 85027 COMPLETE CBC AUTOMATED: CPT | Performed by: INTERNAL MEDICINE

## 2018-01-18 PROCEDURE — 83540 ASSAY OF IRON: CPT | Performed by: INTERNAL MEDICINE

## 2018-01-18 PROCEDURE — 83550 IRON BINDING TEST: CPT | Performed by: INTERNAL MEDICINE

## 2018-01-18 ASSESSMENT — ANXIETY QUESTIONNAIRES
5. BEING SO RESTLESS THAT IT IS HARD TO SIT STILL: SEVERAL DAYS
2. NOT BEING ABLE TO STOP OR CONTROL WORRYING: SEVERAL DAYS
3. WORRYING TOO MUCH ABOUT DIFFERENT THINGS: NOT AT ALL
1. FEELING NERVOUS, ANXIOUS, OR ON EDGE: NOT AT ALL
IF YOU CHECKED OFF ANY PROBLEMS ON THIS QUESTIONNAIRE, HOW DIFFICULT HAVE THESE PROBLEMS MADE IT FOR YOU TO DO YOUR WORK, TAKE CARE OF THINGS AT HOME, OR GET ALONG WITH OTHER PEOPLE: NOT DIFFICULT AT ALL
7. FEELING AFRAID AS IF SOMETHING AWFUL MIGHT HAPPEN: NOT AT ALL
4. TROUBLE RELAXING: NOT AT ALL
6. BECOMING EASILY ANNOYED OR IRRITABLE: NOT AT ALL
GAD7 TOTAL SCORE: 2

## 2018-01-18 ASSESSMENT — PAIN SCALES - GENERAL: PAINLEVEL: NO PAIN (0)

## 2018-01-18 ASSESSMENT — PATIENT HEALTH QUESTIONNAIRE - PHQ9: SUM OF ALL RESPONSES TO PHQ QUESTIONS 1-9: 4

## 2018-01-18 NOTE — MR AVS SNAPSHOT
After Visit Summary   1/18/2018    Chyna Delgado    MRN: [id number]           Patient Information     Date Of Birth          1955        Visit Information        Provider Department      1/18/2018 11:00 AM Arie Camarillo DO [facility] [place]        Today's Diagnoses     Graves disease    -  1    Moderate major depression (H)        Myalgia        Restless legs syndrome        Iron deficiency           Follow-ups after your visit        Follow-up notes from your care team     Return in about 3 months (around 4/18/2018) for PMR.      Your next 10 appointments already scheduled     Apr 18, 2018 10:40 AM CDT   (Arrive by 10:20 AM)   SHORT with Arie Camarillo DO   [facility] ([facility]    [address]  [place] [address]   [phone]              Who to contact     If you have questions or need follow up information about today's clinic visit or your schedule please contact [facility] directly at [phone].  Normal or non-critical lab and imaging results will be communicated to you by "[employer]hart, letter or phone within 4 business days after the clinic has received the results. If you do not hear from us within 7 days, please contact the clinic through [employer] or phone. If you have a critical or abnormal lab result, we will notify you by phone as soon as possible.  Submit refill requests through [employer] or call your pharmacy and they will forward the refill request to us. Please allow 3 business days for your refill to be completed.          Additional Information About Your Visit        MyChart Information     [employer] gives you secure access to your electronic health record. If you see a primary care provider, you can also send messages to your care team and make appointments. If you have questions, please call your primary care clinic.  If you do not have a primary care provider, please call [phone] and they  will assist you.        Care EveryWhere ID     This is your Care EveryWhere ID. This could be used by other organizations to access your [place] medical records  [id number]        Your Vitals Were     Pulse Temperature Respirations Pulse Oximetry BMI (Body Mass Index)       78 97.6  F (36.4  C) (Tympanic) 20 97% 26.52 kg/m2        Blood Pressure from Last 3 Encounters:   01/18/18 116/64   12/19/17 126/70   12/18/17 137/84    Weight from Last 3 Encounters:   01/18/18 145 lb (65.8 kg)   12/19/17 146 lb (66.2 kg)   12/18/17 138 lb (62.6 kg)              We Performed the Following     Basic metabolic panel     CBC with platelets     CRP inflammation     Ferritin     Iron and iron binding capacity        Primary Care Provider Office Phone # Fax #    Arieyehuda Camarillo  [phone]       [facility] [place] [address]  [place] [address]        Equal Access to Services     AUDREY WICK AH: Hadii aad ku hadasho Soomaali, waaxda luqadaha, qaybta kaalmada adeegyada, waxay idiin hayaan adeeg rekhaaraneha waterman'skye . So [facility] [phone].    ATENCIÓN: Si habla español, tiene a tinajero disposición servicios gratuitos de asistencia lingüística. Llame al [phone].    We comply with applicable federal civil rights laws and [place] laws. We do not discriminate on the basis of race, color, national origin, age, disability, sex, sexual orientation, or gender identity.            Thank you!     Thank you for choosing [facility] [place]  for your care. Our goal is always to provide you with excellent care. Hearing back from our patients is one way we can continue to improve our services. Please take a few minutes to complete the written survey that you may receive in the mail after your visit with us. Thank you!             Your Updated Medication List - Protect others around you: Learn how to safely use, store and throw away your medicines at www.disposemymeds.org.          This list is accurate as  of: 1/18/18 11:33 AM.  Always use your most recent med list.                   Brand Name Dispense Instructions for use Diagnosis    albuterol 108 (90 BASE) MCG/ACT Inhaler    PROAIR HFA/PROVENTIL HFA/VENTOLIN HFA     Inhale 2 puffs into the lungs every 4 hours as needed for shortness of breath / dyspnea or wheezing        * ANTI-DIARRHEAL PO      Take  by mouth.        * loperamide 2 MG capsule    IMODIUM     Take 2 mg by mouth        ASPIRIN PO      Take 81 mg by mouth daily        CALCIUM + D PO      1200-1000mg unit tablet chewable; one tablet with a meal orally once a day.        diclofenac 1 % Gel topical gel    VOLTAREN    100 g    Apply to the outside of the elbow 3 times per day.    Overuse injury       ferrous sulfate 325 (65 FE) MG tablet    IRON    90 tablet    Take 325 mg by mouth 2 times daily (with meals)    Iron deficiency       * FLUoxetine 40 MG capsule    PROzac    90 capsule    Take one (1) capsule BY MOUTH daily    Dysthymic disorder       * FLUoxetine 20 MG capsule    PROzac    90 capsule    Take one capsule by mouth with your 40 mg capsule daily.    Dysthymic disorder       fluticasone-salmeterol 100-50 MCG/DOSE diskus inhaler    ADVAIR DISKUS    1 Inhaler    INHALE 1 PUFF TWICE DAILY    Mild persistent asthma without complication       gabapentin 400 MG capsule    NEURONTIN    180 capsule    Take 2 capsules (800 mg) by mouth 3 times daily    PMR (polymyalgia rheumatica) (H)       * GAS-X PO      Take by mouth 2 times daily        * GAS-X EXTRA STRENGTH 125 MG Caps   Generic drug:  Simethicone      Take 4 caps twice a day        ibuprofen 200 MG tablet    ADVIL/MOTRIN     Take 200 mg by mouth        ketotifen 0.025 % Soln ophthalmic solution    ZADITOR/REFRESH ANTI-ITCH    5 mL    PLACE 2 DROPS INTO BOTH EYES 2 TIMES DAILY        magnesium 100 MG Caps      Take by mouth 2 times daily        multivitamin per tablet      Take 1 tablet by mouth daily. Every day with food        omeprazole 20 MG  CR capsule    priLOSEC    90 capsule    Take 1 capsule (20 mg) by mouth daily Take two daily while on prednisone    Gastroesophageal reflux disease, esophagitis presence not specified       pramipexole 1 MG tablet    MIRAPEX    90 tablet    Take 1 tablet (1 mg) by mouth At Bedtime    Restless leg syndrome       predniSONE 10 MG tablet    DELTASONE    75 tablet    Take 10 mg by mouth daily for 5 weeks, then reduce to 5 mg daily.    Myalgia, PMR (polymyalgia rheumatica) (H)       PSYLLIUM PO      Take 3 tsp by mouth 2 times daily.        tacrolimus 0.1 % ointment    PROTOPIC          traMADol 50 MG tablet    ULTRAM    90 tablet    TAKE 1-2 TABLETS BY MOUTH EVERY 6 HOURS AS NEEDED    Restless leg syndrome, Myalgia       vitamin E 200 UNIT capsule      Take 200 Units by mouth 2 times daily        * Notice:  This list has 6 medication(s) that are the same as other medications prescribed for you. Read the directions carefully, and ask your doctor or other care provider to review them with you.

## 2018-01-18 NOTE — NURSING NOTE
"Chief Complaint   Patient presents with     Memory Loss     Depression       Initial /64 (BP Location: Right arm, Patient Position: Chair, Cuff Size: Adult Regular)  Pulse 78  Temp 97.6  F (36.4  C) (Tympanic)  Resp 20  Wt 145 lb (65.8 kg)  SpO2 97%  BMI 26.52 kg/m2 Estimated body mass index is 26.52 kg/(m^2) as calculated from the following:    Height as of 12/18/17: 5' 2\" (1.575 m).    Weight as of this encounter: 145 lb (65.8 kg).  Medication Reconciliation: complete   Lina Díaz LPN      "

## 2018-01-18 NOTE — PROGRESS NOTES
SUBJECTIVE:   Chyna Delgado is a 62 year old female who presents to clinic today for the following health issues:      Depression Followup    Status since last visit: Stable     See PHQ-9 for current symptoms.  Other associated symptoms: anxiety    Complicating factors:   Significant life event:  No   Current substance abuse:  None  Anxiety or Panic symptoms:  Yes-  anxiety    PHQ-9 Score and MyChart F/U Questions 10/12/2016 12/19/2017 1/18/2018   Total Score 13 4 4   Q9: Suicide Ideation Not at all Not at all Not at all       PHQ-9  English  PHQ-9   Any Language  Suicide Assessment Five-step Evaluation and Treatment (SAFE-T)      Amount of exercise or physical activity: 6-7 days/week for an average of 15-30 minutes  Mild pace    Problems taking medications regularly: No    Medication side effects: none    Diet: regular (no restrictions)        Chronic Pain Follow-Up       Type / Location of Pain: Shoulder girdle, hips and thighs  Analgesia/pain control:       Recent changes:  same      Overall control: Comfortably manageable  Activity level/function:      Daily activities:  Can do most things most days, with some rest    Work:  not applicable  Adverse effects:  No  Adherance    Taking medication as directed?  Yes    Participating in other treatments: not applicable  Risk Factors:    Sleep:  Good    Mood/anxiety:  controlled    Recent family or social stressors:  none noted    Other aggravating factors: none  PHQ-9 SCORE 10/12/2016 12/19/2017 1/18/2018   Total Score - - -   Total Score 13 4 4     MARGARITA-7 SCORE 10/12/2016 12/19/2017 1/18/2018   Total Score 14 5 2     Encounter-Level CSA:     There are no encounter-level csa.              Problem list and histories reviewed & adjusted, as indicated.  Additional history: as documented    Patient Active Problem List   Diagnosis     Encounter to establish care     Mild persistent asthma     Sacroiliac pain     Annual physical exam     Dysthymic disorder      Encounter for routine gynecological examination     Seborrheic keratosis     Somatic dysfunction of pelvic region     Restless legs syndrome     Skin lesion     Otitis externa of right ear     Hand swelling     Numbness and tingling in right hand     Overuse injury     Graves disease     Preoperative examination     Simple chronic bronchitis (H)     Arthritis     ACP (advance care planning)     Cough     Pain of toe of right foot     Moderate major depression (H)     Right shoulder pain     Abdominal muscle strain     Strain of flexor muscle of hip, unspecified laterality, initial encounter     Myalgia     Past Surgical History:   Procedure Laterality Date     benign tumors removed from back       ENDOSCOPY UPPER, COLONOSCOPY, COMBINED N/A 9/5/2014    Procedure: COMBINED ENDOSCOPY UPPER, COLONOSCOPY;  Surgeon: Delbert Patel MD;  Location: [facility]     ESOPHAGOSCOPY, GASTROSCOPY, DUODENOSCOPY (EGD), COMBINED N/A 12/11/2015    Procedure: COMBINED ESOPHAGOSCOPY, GASTROSCOPY, DUODENOSCOPY (EGD);  Surgeon: Delbert Patel MD;  Location: [facility]     ESOPHAGOSCOPY, GASTROSCOPY, DUODENOSCOPY (EGD), COMBINED N/A 12/18/2017    Procedure: COMBINED ESOPHAGOSCOPY, GASTROSCOPY, DUODENOSCOPY (EGD);  UPPER ENDOSCOPY WITH BIOPSY;  Surgeon: Delbert Patel MD;  Location: [facility]     excised-benign  2002    tongue lesion     HC REPAIR OF NASAL SEPTUM  2002    Deviated nasal septum     LAPAROSCOPIC CHOLECYSTECTOMY       lumpectomy Right breast      Breast Lump     MOUTH SURGERY      removal of spot from roof of mouth     pilonidal cyst surgery  1976     removal of colon and large intestine  2000    Familial polyposis     Right etopic pregnancy      Pregnancy     TONSILLECTOMY      tonsillitus     UPPER GI ENDOSCOPY  2009    Stomach polyps       Social History   Substance Use Topics     Smoking status: Current Every Day Smoker     Packs/day: 0.50     Years: 40.00     Types: Cigarettes     Smokeless tobacco: Never Used      Comment:  trying to quit currently     Alcohol use Yes      Comment: Weekly 3 drinks     Family History   Problem Relation Age of Onset     Other - See Comments Father      Atrial Fibrillation     CANCER Father      bladder ca     Colon Polyps Father      HEART DISEASE Father      Pacemaker     Rheumatoid Arthritis Father      C.A.D. Father 75     CABG     Chronic Obstructive Pulmonary Disease Mother      HEART DISEASE Mother      Pacemaker     Other - See Comments Mother      dementia     C.A.D. Mother 70     CABG     C.A.D. Maternal Grandmother      Unknown/Adopted Maternal Grandfather      Unknown/Adopted Paternal Grandmother      Unknown/Adopted Paternal Grandfather      Asthma Sister      CEREBROVASCULAR DISEASE Sister      GASTROINTESTINAL DISEASE Sister      peptic ulcers     Hypertension Sister      GASTROINTESTINAL DISEASE Sister      stomach tumors     Rheumatoid Arthritis Sister      CANCER Sister      facial cancer     Asthma Sister      CANCER Maternal Aunt      renal ca             Reviewed and updated as needed this visit by clinical staffTobacco  Allergies  Meds  Med Hx  Surg Hx  Fam Hx  Soc Hx      Reviewed and updated as needed this visit by Provider         ROS:  C: NEGATIVE for fever, chills  CONSTITUTIONAL:POSITIVE  for weight gain  E: NEGATIVE for vision changes or irritation  E/M: NEGATIVE for ear, mouth and throat problems  R: NEGATIVE for significant cough or SOB  CV: NEGATIVE for chest pain, palpitations or peripheral edema  GI: NEGATIVE for nausea, abdominal pain, heartburn, or change in bowel habits  GI: POSITIVE for loose with 10 per day   : NEGATIVE for frequency, dysuria, or hematuria  MUSCULOSKELETAL  See HPI  N: NEGATIVE for weakness, dizziness or paresthesias  H: NEGATIVE for bleeding problems  P: NEGATIVE for changes in mood or affect    OBJECTIVE:     /64 (BP Location: Right arm, Patient Position: Chair, Cuff Size: Adult Regular)  Pulse 78  Temp 97.6  F (36.4  C) (Tympanic)   Resp 20  Wt 145 lb (65.8 kg)  SpO2 97%  BMI 26.52 kg/m2  Body mass index is 26.52 kg/(m^2).  GENERAL: healthy, alert and no distress  EYES: Eyes grossly normal to inspection and conjunctiva/corneas- are normal   NECK: no adenopathy, no asymmetry, masses, or scars and thyroid normal to palpation  RESP: lungs clear to auscultation - no rales, rhonchi or wheezes  CV: regular rate and rhythm, normal S1 S2, no S3 or S4, no murmur, click or rub, no peripheral edema and peripheral pulses strong  MS: no gross musculoskeletal defects noted, no edema  MS: The innominates are in alignment as confirmed by the medial malleolus, ASIS, pubic tubercles, and the PSIS on each side being on equal planes.    Log roll of the lower extremities shows normal motion in internal and external rotation on the left and the right.    NEURO: Normal strength and tone, mentation intact and speech normal  PSYCH: mentation appears normal, affect normal/bright    Diagnostic Test Results:  Results for orders placed or performed in visit on 01/18/18   Basic metabolic panel   Result Value Ref Range    Sodium 141 133 - 144 mmol/L    Potassium 3.9 3.4 - 5.3 mmol/L    Chloride 105 94 - 109 mmol/L    Carbon Dioxide 28 20 - 32 mmol/L    Anion Gap 8 3 - 14 mmol/L    Glucose 89 70 - 99 mg/dL    Urea Nitrogen 19 7 - 30 mg/dL    Creatinine 0.86 0.52 - 1.04 mg/dL    GFR Estimate 66 >60 mL/min/1.7m2    GFR Estimate If Black 80 >60 mL/min/1.7m2    Calcium 8.7 8.5 - 10.1 mg/dL   CBC with platelets   Result Value Ref Range    WBC 10.5 4.0 - 11.0 10e9/L    RBC Count 4.59 3.8 - 5.2 10e12/L    Hemoglobin 14.1 11.7 - 15.7 g/dL    Hematocrit 42.3 35.0 - 47.0 %    MCV 92 78 - 100 fl    MCH 30.7 26.5 - 33.0 pg    MCHC 33.3 31.5 - 36.5 g/dL    RDW 12.9 10.0 - 15.0 %    Platelet Count 224 150 - 450 10e9/L   CRP inflammation   Result Value Ref Range    CRP Inflammation 10.8 (H) 0.0 - 8.0 mg/L   Ferritin   Result Value Ref Range    Ferritin 74 8 - 252 ng/mL   Iron and iron  binding capacity   Result Value Ref Range    Iron 70 35 - 180 ug/dL    Iron Binding Cap 391 240 - 430 ug/dL    Iron Saturation Index 18 15 - 46 %         ASSESSMENT/PLAN:   (F32.1) Moderate major depression (H)  Comment: Controlled.  Plan:  She will continue Prozac 60 mg daily    (M79.1) Myalgia  Comment: She has PMR with well controlled pain and a reduced CRP.  Plan:   She will continue Prednisone 10 mg daily and reduce as tolerated to 5 mg daily.    (G25.81) Restless legs syndrome  Comment: Her iron stores remain good.    Plan:   She will continue Ferrous sulfate 325 mg BID.  She will reduce her Mirapex to 1/2 tablets with a return to one whole tablet if needed.    (E61.1) Iron deficiency  Comment: Stable  Plan:  She will continue Ferrous sulfate 325 mg BID.          FUTURE APPOINTMENTS:       - Follow-up visit in 3 months for PMR    Arie Camarillo DO, DO  [facility]

## 2018-01-19 ASSESSMENT — ANXIETY QUESTIONNAIRES: GAD7 TOTAL SCORE: 2

## 2018-03-08 DIAGNOSIS — M35.3 PMR (POLYMYALGIA RHEUMATICA) (H): ICD-10-CM

## 2018-03-08 RX ORDER — GABAPENTIN 400 MG/1
CAPSULE ORAL
Qty: 180 CAPSULE | Refills: 0 | Status: SHIPPED | OUTPATIENT
Start: 2018-03-08 | End: 2018-04-02

## 2018-04-18 ENCOUNTER — OFFICE VISIT (OUTPATIENT)
Dept: PEDIATRICS | Facility: OTHER | Age: 63
End: 2018-04-18
Attending: INTERNAL MEDICINE
Payer: COMMERCIAL

## 2018-04-18 VITALS
RESPIRATION RATE: 19 BRPM | DIASTOLIC BLOOD PRESSURE: 70 MMHG | SYSTOLIC BLOOD PRESSURE: 114 MMHG | WEIGHT: 145 LBS | BODY MASS INDEX: 26.68 KG/M2 | HEIGHT: 62 IN | HEART RATE: 87 BPM | OXYGEN SATURATION: 98 %

## 2018-04-18 DIAGNOSIS — G25.81 RESTLESS LEGS SYNDROME: Primary | ICD-10-CM

## 2018-04-18 DIAGNOSIS — M35.3 PMR (POLYMYALGIA RHEUMATICA) (H): ICD-10-CM

## 2018-04-18 DIAGNOSIS — M79.10 MYALGIA: ICD-10-CM

## 2018-04-18 DIAGNOSIS — F32.1 MODERATE MAJOR DEPRESSION (H): ICD-10-CM

## 2018-04-18 DIAGNOSIS — J45.40 MODERATE PERSISTENT ASTHMA WITHOUT COMPLICATION: ICD-10-CM

## 2018-04-18 DIAGNOSIS — E61.1 IRON DEFICIENCY: ICD-10-CM

## 2018-04-18 DIAGNOSIS — Z78.9 ALCOHOL USE: ICD-10-CM

## 2018-04-18 PROBLEM — F10.90 ALCOHOL USE: Status: ACTIVE | Noted: 2018-04-18

## 2018-04-18 LAB
BASOPHILS # BLD AUTO: 0.1 10E9/L (ref 0–0.2)
BASOPHILS NFR BLD AUTO: 0.6 %
CRP SERPL-MCNC: 19.3 MG/L (ref 0–8)
DIFFERENTIAL METHOD BLD: NORMAL
EOSINOPHIL # BLD AUTO: 0.1 10E9/L (ref 0–0.7)
EOSINOPHIL NFR BLD AUTO: 1.2 %
ERYTHROCYTE [DISTWIDTH] IN BLOOD BY AUTOMATED COUNT: 13.2 % (ref 10–15)
FERRITIN SERPL-MCNC: 187 NG/ML (ref 8–252)
HCT VFR BLD AUTO: 41.6 % (ref 35–47)
HGB BLD-MCNC: 14.1 G/DL (ref 11.7–15.7)
IMM GRANULOCYTES # BLD: 0 10E9/L (ref 0–0.4)
IMM GRANULOCYTES NFR BLD: 0.3 %
IRON SATN MFR SERPL: 12 % (ref 15–46)
IRON SERPL-MCNC: 43 UG/DL (ref 35–180)
LYMPHOCYTES # BLD AUTO: 1.9 10E9/L (ref 0.8–5.3)
LYMPHOCYTES NFR BLD AUTO: 21.6 %
MCH RBC QN AUTO: 30.9 PG (ref 26.5–33)
MCHC RBC AUTO-ENTMCNC: 33.9 G/DL (ref 31.5–36.5)
MCV RBC AUTO: 91 FL (ref 78–100)
MONOCYTES # BLD AUTO: 0.9 10E9/L (ref 0–1.3)
MONOCYTES NFR BLD AUTO: 10.7 %
NEUTROPHILS # BLD AUTO: 5.7 10E9/L (ref 1.6–8.3)
NEUTROPHILS NFR BLD AUTO: 65.6 %
NRBC # BLD AUTO: 0 10*3/UL
NRBC BLD AUTO-RTO: 0 /100
PLATELET # BLD AUTO: 235 10E9/L (ref 150–450)
RBC # BLD AUTO: 4.56 10E12/L (ref 3.8–5.2)
TIBC SERPL-MCNC: 355 UG/DL (ref 240–430)
WBC # BLD AUTO: 8.6 10E9/L (ref 4–11)

## 2018-04-18 PROCEDURE — 86140 C-REACTIVE PROTEIN: CPT | Performed by: INTERNAL MEDICINE

## 2018-04-18 PROCEDURE — 83550 IRON BINDING TEST: CPT | Performed by: INTERNAL MEDICINE

## 2018-04-18 PROCEDURE — 85025 COMPLETE CBC W/AUTO DIFF WBC: CPT | Performed by: INTERNAL MEDICINE

## 2018-04-18 PROCEDURE — 99214 OFFICE O/P EST MOD 30 MIN: CPT | Performed by: INTERNAL MEDICINE

## 2018-04-18 PROCEDURE — 83540 ASSAY OF IRON: CPT | Performed by: INTERNAL MEDICINE

## 2018-04-18 PROCEDURE — 82728 ASSAY OF FERRITIN: CPT | Performed by: INTERNAL MEDICINE

## 2018-04-18 PROCEDURE — 36415 COLL VENOUS BLD VENIPUNCTURE: CPT | Performed by: INTERNAL MEDICINE

## 2018-04-18 ASSESSMENT — ANXIETY QUESTIONNAIRES
IF YOU CHECKED OFF ANY PROBLEMS ON THIS QUESTIONNAIRE, HOW DIFFICULT HAVE THESE PROBLEMS MADE IT FOR YOU TO DO YOUR WORK, TAKE CARE OF THINGS AT HOME, OR GET ALONG WITH OTHER PEOPLE: NOT DIFFICULT AT ALL
6. BECOMING EASILY ANNOYED OR IRRITABLE: NOT AT ALL
1. FEELING NERVOUS, ANXIOUS, OR ON EDGE: NOT AT ALL
GAD7 TOTAL SCORE: 1
2. NOT BEING ABLE TO STOP OR CONTROL WORRYING: NOT AT ALL
7. FEELING AFRAID AS IF SOMETHING AWFUL MIGHT HAPPEN: NOT AT ALL
5. BEING SO RESTLESS THAT IT IS HARD TO SIT STILL: SEVERAL DAYS
4. TROUBLE RELAXING: NOT AT ALL
3. WORRYING TOO MUCH ABOUT DIFFERENT THINGS: NOT AT ALL

## 2018-04-18 ASSESSMENT — PAIN SCALES - GENERAL: PAINLEVEL: MILD PAIN (3)

## 2018-04-18 NOTE — MR AVS SNAPSHOT
"              After Visit Summary   4/18/2018    Chyna Delgado    MRN: [id number]           Patient Information     Date Of Birth          1955        Visit Information        Provider Department      4/18/2018 10:40 AM Arie Camarillo, DO [facility]        Today's Diagnoses     Restless legs syndrome    -  1    Iron deficiency        Moderate major depression (H)        Alcohol use        Moderate persistent asthma without complication        Myalgia           Follow-ups after your visit        Who to contact     If you have questions or need follow up information about today's clinic visit or your schedule please contact [facility] directly at [phone].  Normal or non-critical lab and imaging results will be communicated to you by op5hart, letter or phone within 4 business days after the clinic has received the results. If you do not hear from us within 7 days, please contact the clinic through op5hart or phone. If you have a critical or abnormal lab result, we will notify you by phone as soon as possible.  Submit refill requests through [employer] or call your pharmacy and they will forward the refill request to us. Please allow 3 business days for your refill to be completed.          Additional Information About Your Visit        MyChart Information     [employer] gives you secure access to your electronic health record. If you see a primary care provider, you can also send messages to your care team and make appointments. If you have questions, please call your primary care clinic.  If you do not have a primary care provider, please call [phone] and they will assist you.        Care EveryWhere ID     This is your Care EveryWhere ID. This could be used by other organizations to access your [facility] medical records  [id number]        Your Vitals Were     Pulse Respirations Height Pulse Oximetry BMI (Body Mass Index)       87 19 5' 2\" (1.575 m) 98% 26.52 kg/m2  "       Blood Pressure from Last 3 Encounters:   04/18/18 114/70   01/18/18 116/64   12/19/17 126/70    Weight from Last 3 Encounters:   04/18/18 145 lb (65.8 kg)   01/18/18 145 lb (65.8 kg)   12/19/17 146 lb (66.2 kg)              We Performed the Following     CBC with platelets and differential     CRP inflammation     Ferritin     Iron and iron binding capacity          Today's Medication Changes          These changes are accurate as of 4/18/18 11:02 AM.  If you have any questions, ask your nurse or doctor.               Start taking these medicines.        Dose/Directions    fluticasone-salmeterol 250-50 MCG/DOSE diskus inhaler   Commonly known as:  ADVAIR   Used for:  Moderate persistent asthma without complication   Replaces:  fluticasone-salmeterol 100-50 MCG/DOSE diskus inhaler   Started by:  Arie Camarillo DO        Dose:  1 puff   Inhale 1 puff into the lungs 2 times daily   Quantity:  3 Inhaler   Refills:  3         Stop taking these medicines if you haven't already. Please contact your care team if you have questions.     fluticasone-salmeterol 100-50 MCG/DOSE diskus inhaler   Commonly known as:  ADVAIR DISKUS   Replaced by:  fluticasone-salmeterol 250-50 MCG/DOSE diskus inhaler   Stopped by:  Arie Camarillo DO                Where to get your medicines      These medications were sent to [address] [address]     Phone:  [phone]     fluticasone-salmeterol 250-50 MCG/DOSE diskus inhaler                Primary Care Provider Office Phone # Fax #    Arie Camarillo [phone]       [address] [place] [id number]        Equal Access to Services     AUDREY WICK AH: Kevin Gayle, herbert bonds, qaybta kaalmaasuncion vizcarra, patience [facility] [phone].    ATENCIÓN: Si habla español, tiene a tinajero disposición servicios gratuitos de asistencia  lingüísticaPhu Aguilera al [phone].    We comply with applicable federal civil rights laws and [place] laws. We do not discriminate on the basis of race, color, national origin, age, disability, sex, sexual orientation, or gender identity.            Thank you!     Thank you for choosing [facility]  for your care. Our goal is always to provide you with excellent care. Hearing back from our patients is one way we can continue to improve our services. Please take a few minutes to complete the written survey that you may receive in the mail after your visit with us. Thank you!             Your Updated Medication List - Protect others around you: Learn how to safely use, store and throw away your medicines at www.disposemymeds.org.          This list is accurate as of 4/18/18 11:02 AM.  Always use your most recent med list.                   Brand Name Dispense Instructions for use Diagnosis    albuterol 108 (90 Base) MCG/ACT Inhaler    PROAIR HFA/PROVENTIL HFA/VENTOLIN HFA     Inhale 2 puffs into the lungs every 4 hours as needed for shortness of breath / dyspnea or wheezing        * ANTI-DIARRHEAL PO      Take  by mouth.        * loperamide 2 MG capsule    IMODIUM     Take 2 mg by mouth        ASPIRIN PO      Take 81 mg by mouth daily        CALCIUM + D PO      1200-1000mg unit tablet chewable; one tablet with a meal orally once a day.        diclofenac 1 % Gel topical gel    VOLTAREN    100 g    Apply to the outside of the elbow 3 times per day.    Overuse injury       ferrous sulfate 325 (65 Fe) MG tablet    IRON    90 tablet    Take 325 mg by mouth 2 times daily (with meals)    Iron deficiency       * FLUoxetine 40 MG capsule    PROzac    90 capsule    Take one (1) capsule BY MOUTH daily    Dysthymic disorder       * FLUoxetine 20 MG capsule    PROzac    90 capsule    Take one capsule by mouth with your 40 mg capsule daily.    Dysthymic disorder       fluticasone-salmeterol 250-50 MCG/DOSE diskus  inhaler    ADVAIR    3 Inhaler    Inhale 1 puff into the lungs 2 times daily    Moderate persistent asthma without complication       gabapentin 400 MG capsule    NEURONTIN    90 capsule    Take two (2) capsules BY MOUTH three times daily    PMR (polymyalgia rheumatica) (H)       * GAS-X PO      Take by mouth 2 times daily        * GAS-X EXTRA STRENGTH 125 MG Caps   Generic drug:  Simethicone      Take 4 caps twice a day        ibuprofen 200 MG tablet    ADVIL/MOTRIN     Take 200 mg by mouth        ketotifen 0.025 % Soln ophthalmic solution    ZADITOR/REFRESH ANTI-ITCH    5 mL    PLACE 2 DROPS INTO BOTH EYES 2 TIMES DAILY        magnesium 100 MG Caps      Take by mouth 2 times daily        multivitamin per tablet      Take 1 tablet by mouth daily. Every day with food        omeprazole 20 MG CR capsule    priLOSEC    90 capsule    Take 1 capsule (20 mg) by mouth daily Take two daily while on prednisone    Gastroesophageal reflux disease, esophagitis presence not specified       pramipexole 1 MG tablet    MIRAPEX    90 tablet    Take 1 tablet (1 mg) by mouth At Bedtime    Restless leg syndrome       predniSONE 10 MG tablet    DELTASONE    75 tablet    Take 10 mg by mouth daily for 5 weeks, then reduce to 5 mg daily.    Myalgia, PMR (polymyalgia rheumatica) (H)       PSYLLIUM PO      Take 3 tsp by mouth 2 times daily.        tacrolimus 0.1 % ointment    PROTOPIC          traMADol 50 MG tablet    ULTRAM    90 tablet    TAKE 1-2 TABLETS BY MOUTH EVERY 6 HOURS AS NEEDED    Restless leg syndrome, Myalgia       vitamin E 200 UNIT capsule      Take 200 Units by mouth 2 times daily        * Notice:  This list has 6 medication(s) that are the same as other medications prescribed for you. Read the directions carefully, and ask your doctor or other care provider to review them with you.

## 2018-04-18 NOTE — NURSING NOTE
"  Chief Complaint   Patient presents with     Depression     Pain Management       Initial /70  Pulse 87  Resp 19  Ht 5' 2\" (1.575 m)  Wt 145 lb (65.8 kg)  SpO2 98%  BMI 26.52 kg/m2 Estimated body mass index is 26.52 kg/(m^2) as calculated from the following:    Height as of this encounter: 5' 2\" (1.575 m).    Weight as of this encounter: 145 lb (65.8 kg).  Medication Reconciliation: complete   Micki Nicole      "

## 2018-04-18 NOTE — PROGRESS NOTES
SUBJECTIVE:   Chyna Delgado is a 62 year old female who presents to clinic today for the following health issues:      Depression Followup    Status since last visit: Stable     See PHQ-9 for current symptoms.  Other associated symptoms: None    Complicating factors:   Significant life event:  No   Current substance abuse:  None  Anxiety or Panic symptoms:  No    PHQ-9 10/12/2016 12/19/2017 1/18/2018   Total Score 13 4 4   Q9: Suicide Ideation Not at all Not at all Not at all       PHQ-9  English  PHQ-9   Any Language  Suicide Assessment Five-step Evaluation and Treatment (SAFE-T)      Amount of exercise or physical activity: None    Problems taking medications regularly: No    Medication side effects: none    Diet: regular (no restrictions)        RLS:  Stable.  She will have infrequent exacerbations.  She takes an extra ferrous sulfate and this seems to help.  She has been taking 6 iron pills per day 650 TID.    Alcohol overuse:  Additionally, she reports that she had been using alcohol.  She has rum 4 days per week with 3 drinks per time which she has cut down to twice.        Problem list and histories reviewed & adjusted, as indicated.  Additional history: as documented    Patient Active Problem List   Diagnosis     Encounter to establish care     Mild persistent asthma     Sacroiliac pain     Annual physical exam     Dysthymic disorder     Encounter for routine gynecological examination     Seborrheic keratosis     Somatic dysfunction of pelvic region     Restless legs syndrome     Skin lesion     Hand swelling     Numbness and tingling in right hand     Graves disease     Preoperative examination     Simple chronic bronchitis (H)     Arthritis     ACP (advance care planning)     Pain of toe of right foot     Moderate major depression (H)     Right shoulder pain     Abdominal muscle strain     Strain of flexor muscle of hip, unspecified laterality, initial encounter     Myalgia     Iron deficiency      Alcohol use     Past Surgical History:   Procedure Laterality Date     benign tumors removed from back       ENDOSCOPY UPPER, COLONOSCOPY, COMBINED N/A 9/5/2014    Procedure: COMBINED ENDOSCOPY UPPER, COLONOSCOPY;  Surgeon: Delbert Patel MD;  Location: [facility]     ESOPHAGOSCOPY, GASTROSCOPY, DUODENOSCOPY (EGD), COMBINED N/A 12/11/2015    Procedure: COMBINED ESOPHAGOSCOPY, GASTROSCOPY, DUODENOSCOPY (EGD);  Surgeon: Delbert Patel MD;  Location: [facility]     ESOPHAGOSCOPY, GASTROSCOPY, DUODENOSCOPY (EGD), COMBINED N/A 12/18/2017    Procedure: COMBINED ESOPHAGOSCOPY, GASTROSCOPY, DUODENOSCOPY (EGD);  UPPER ENDOSCOPY WITH BIOPSY;  Surgeon: Delbert Patel MD;  Location: [facility]     excised-benign  2002    tongue lesion     HC REPAIR OF NASAL SEPTUM  2002    Deviated nasal septum     LAPAROSCOPIC CHOLECYSTECTOMY       lumpectomy Right breast      Breast Lump     MOUTH SURGERY      removal of spot from roof of mouth     pilonidal cyst surgery  1976     removal of colon and large intestine  2000    Familial polyposis     Right etopic pregnancy      Pregnancy     TONSILLECTOMY      tonsillitus     UPPER GI ENDOSCOPY  2009    Stomach polyps       Social History   Substance Use Topics     Smoking status: Current Every Day Smoker     Packs/day: 0.50     Years: 40.00     Types: Cigarettes     Smokeless tobacco: Never Used      Comment: trying to quit currently     Alcohol use Yes      Comment: Weekly 3 drinks     Family History   Problem Relation Age of Onset     Other - See Comments Father      Atrial Fibrillation     CANCER Father      bladder ca     Colon Polyps Father      HEART DISEASE Father      Pacemaker     Rheumatoid Arthritis Father      C.A.D. Father 75     CABG     Chronic Obstructive Pulmonary Disease Mother      HEART DISEASE Mother      Pacemaker     Other - See Comments Mother      dementia     C.A.D. Mother 70     CABG     C.A.D. Maternal Grandmother      Unknown/Adopted Maternal Grandfather       "Unknown/Adopted Paternal Grandmother      Unknown/Adopted Paternal Grandfather      Asthma Sister      CEREBROVASCULAR DISEASE Sister      GASTROINTESTINAL DISEASE Sister      peptic ulcers     Hypertension Sister      GASTROINTESTINAL DISEASE Sister      stomach tumors     Rheumatoid Arthritis Sister      CANCER Sister      facial cancer     Asthma Sister      CANCER Maternal Aunt      renal ca           Reviewed and updated as needed this visit by clinical staff  Tobacco  Allergies  Meds  Med Hx  Surg Hx  Fam Hx  Soc Hx      Reviewed and updated as needed this visit by Provider         ROS:  CONSTITUTIONAL: NEGATIVE for fever, chills, change in weight  EYES: NEGATIVE for vision changes or irritation  ENT/MOUTH: NEGATIVE for ear, mouth and throat problems  RESP:POSITIVE for SOB/dyspnea and NEGATIVE for cough-non productive, cough-productive and wheezing  CV: NEGATIVE for chest pain, palpitations or peripheral edema  GI: NEGATIVE for nausea, abdominal pain, heartburn, or change in bowel habits  : NEGATIVE for frequency, dysuria, or hematuria  MUSCULOSKELETAL: NEGATIVE for significant arthralgias or myalgia  NEURO: POSITIVE for RLS and NEGATIVE for dizziness/lightheadedness and paresthesias   PSYCHIATRIC: NEGATIVE for changes in mood or affect    OBJECTIVE:     /70  Pulse 87  Resp 19  Ht 5' 2\" (1.575 m)  Wt 145 lb (65.8 kg)  SpO2 98%  BMI 26.52 kg/m2  Body mass index is 26.52 kg/(m^2).  GENERAL: healthy, alert and no distress  EYES: Eyes grossly normal to inspection and conjunctivae and sclerae normal  HENT: oropharynx clear and oral mucous membranes moist  NECK: no adenopathy, no asymmetry, masses, or scars and thyroid normal to palpation  RESP: lungs clear to auscultation - no rales, rhonchi or wheezes  CV: regular rate and rhythm, normal S1 S2, no S3 or S4, no murmur, click or rub, no peripheral edema and peripheral pulses strong  ABDOMEN: soft, nontender, no hepatosplenomegaly, no masses and " bowel sounds normal  ABDOMEN: no bruits heard  MS: no gross musculoskeletal defects noted, no edema  PSYCH: mentation appears normal, affect normal/bright      Diagnostic Test Results:  Results for orders placed or performed in visit on 04/18/18   Iron and iron binding capacity   Result Value Ref Range    Iron 43 35 - 180 ug/dL    Iron Binding Cap 355 240 - 430 ug/dL    Iron Saturation Index 12 (L) 15 - 46 %   Ferritin   Result Value Ref Range    Ferritin 187 8 - 252 ng/mL   CBC with platelets and differential   Result Value Ref Range    WBC 8.6 4.0 - 11.0 10e9/L    RBC Count 4.56 3.8 - 5.2 10e12/L    Hemoglobin 14.1 11.7 - 15.7 g/dL    Hematocrit 41.6 35.0 - 47.0 %    MCV 91 78 - 100 fl    MCH 30.9 26.5 - 33.0 pg    MCHC 33.9 31.5 - 36.5 g/dL    RDW 13.2 10.0 - 15.0 %    Platelet Count 235 150 - 450 10e9/L    Diff Method Automated Method     % Neutrophils 65.6 %    % Lymphocytes 21.6 %    % Monocytes 10.7 %    % Eosinophils 1.2 %    % Basophils 0.6 %    % Immature Granulocytes 0.3 %    Nucleated RBCs 0 0 /100    Absolute Neutrophil 5.7 1.6 - 8.3 10e9/L    Absolute Lymphocytes 1.9 0.8 - 5.3 10e9/L    Absolute Monocytes 0.9 0.0 - 1.3 10e9/L    Absolute Eosinophils 0.1 0.0 - 0.7 10e9/L    Absolute Basophils 0.1 0.0 - 0.2 10e9/L    Abs Immature Granulocytes 0.0 0 - 0.4 10e9/L    Absolute Nucleated RBC 0.0    CRP inflammation   Result Value Ref Range    CRP Inflammation 19.3 (H) 0.0 - 8.0 mg/L       ASSESSMENT/PLAN:   (F32.1) Moderate major depression (H)  Comment: Very well controlled.  Plan:   She will continue Prozac 60 mg daily.    (J45.40) Moderate persistent asthma without complication  Comment: Her lungs are clear on exam, however she feels that her medication is not working as it has in the past as she feel short of breath.  Plan:   Increase fluticasone-salmeterol (ADVAIR) 250-50 MCG/DOSE diskus inhaler from 100-50 MCG dosing.      (G25.81) Restless legs syndrome  (primary encounter diagnosis)  Comment:  Oddly,  her pain seems to respond to her iron pills despite her hemoglobin being normal.  Plan:   She will continue Gabapentin 800 mg TID and Mirapex 1 mg at bedtime. She may need and increase in gabapentin.    (E61.1) Iron deficiency  Comment: Her iron levels are in the mid normal range.  Her hemoglobin is normal with a normal MCV  Plan:   She will continue 325 mg of iron TID with close monitoring.    (M79.1) Myalgia  Comment: She has PMR which has responded to prednisone.  She has difficulty weaning her prednisone below 8 mg or her pain returns.  Her labs remain consistent with her PMR as her CRP is elevated.  Plan:   CRP inflammation      (Z78.9) Alcohol use  Comment: She reports that she had noticed herself drinking frequently as noted in the HPI.  She feels that she has a hold on this and she has cut down to two days per week.  Plan:   She will continue to be aware of her drinking habits          FUTURE APPOINTMENTS:       - Follow-up visit in 3 months    Arie Camarillo DO, DO  [facility]

## 2018-04-19 RX ORDER — GABAPENTIN 400 MG/1
1200 CAPSULE ORAL 3 TIMES DAILY
Qty: 180 CAPSULE | Refills: 3 | Status: SHIPPED | OUTPATIENT
Start: 2018-04-19 | End: 2018-07-23

## 2018-04-19 ASSESSMENT — PATIENT HEALTH QUESTIONNAIRE - PHQ9: SUM OF ALL RESPONSES TO PHQ QUESTIONS 1-9: 2

## 2018-04-19 ASSESSMENT — ANXIETY QUESTIONNAIRES: GAD7 TOTAL SCORE: 1

## 2018-06-14 DIAGNOSIS — F34.1 DYSTHYMIC DISORDER: ICD-10-CM

## 2018-06-20 ENCOUNTER — OFFICE VISIT (OUTPATIENT)
Dept: CHIROPRACTIC MEDICINE | Facility: OTHER | Age: 63
End: 2018-06-20
Attending: CHIROPRACTOR
Payer: COMMERCIAL

## 2018-06-20 ENCOUNTER — TELEPHONE (OUTPATIENT)
Dept: INTERNAL MEDICINE | Facility: OTHER | Age: 63
End: 2018-06-20

## 2018-06-20 DIAGNOSIS — M54.50 ACUTE RIGHT-SIDED LOW BACK PAIN WITHOUT SCIATICA: ICD-10-CM

## 2018-06-20 DIAGNOSIS — M99.02 SEGMENTAL AND SOMATIC DYSFUNCTION OF THORACIC REGION: ICD-10-CM

## 2018-06-20 DIAGNOSIS — M99.03 SEGMENTAL AND SOMATIC DYSFUNCTION OF LUMBAR REGION: Primary | ICD-10-CM

## 2018-06-20 PROCEDURE — 98940 CHIROPRACT MANJ 1-2 REGIONS: CPT | Mod: AT | Performed by: CHIROPRACTOR

## 2018-06-20 NOTE — PROGRESS NOTES
Subjective Finding:    Chief compalint: Patient presents with:  Back Pain: right sided worse  , Pain Scale: 6/10, Intensity: dull, Duration: 7 days, Change since last visit: , Radiating: Buttocks    Date of injury:     Activities that the pain restricts:   Home/household activities: yes.  Work duties: no.  Hobbies/social: yes.  Sleep: no.  Makes symptoms better: ice .  Makes symptoms worse: activity, lumbar extension and lumbar flexion.  Have you seen anyone else for the symptoms? no.  Work related: no.  Automobile related injury: no.    Objective and Assessment:    Posture Analysis:   High shoulder: .  Head tilt: .  High iliac crest: .  Head carriage: .  Thoracic Kyphosis: neutral.  Lumbar Lordosis: reverse.    Lumbar Range of Motion: flexion decreased and extension decreased.  Cervical Range of Motion: .  Thoracic Range of Motion: .  Extremity Range of Motion: .    Palpation:   Quad lumb: right, referred pain: no    Segmental dysfunction pre-treatment: T8, T9, L4, L5 and Sacrum.        Assessment post-treatment:  Cervical: .  Thoracic: .  Lumbar: ROM increased and muscle spasm decreased.    Comments: .      Complicating Factors: .    Plan / Procedure:    Expected release date: .  Treatment plan: PRN.  Instructed patient: ice 20 minutes every other hour as needed.  Short term goals: reduce pain and increase ROM.  Long term goals: increase patient functional independence.  Prognosis: very good.

## 2018-06-20 NOTE — TELEPHONE ENCOUNTER
8:43 AM    Reason for Call: OVERBOOK    Patient is having the following symptoms: Restless legs / No sleep  for  4 weeks    The patient is requesting an appointment for ASAP  with  Dr. Camarillo    Was an appointment offered for this call?   No    Preferred method for responding to this message: [phone]    If we cannot reach you directly, may we leave a detailed response at the number you provided?  Yes      Estrella Wyatt

## 2018-06-20 NOTE — MR AVS SNAPSHOT
After Visit Summary   6/20/2018    Chyna Delgado    MRN: [id number]           Patient Information     Date Of Birth          1955        Visit Information        Provider Department      6/20/2018 1:10 PM Luciano Marcum DC  [facility]        Today's Diagnoses     Segmental and somatic dysfunction of lumbar region    -  1    Acute right-sided low back pain without sciatica        Segmental and somatic dysfunction of thoracic region           Follow-ups after your visit        Your next 10 appointments already scheduled     Jun 21, 2018  4:20 PM CDT   (Arrive by 4:00 PM)   SHORT with Arie Camarillo, DO   [facility] ([facility] )    [address]  [facility] [address]   [phone]              Who to contact     If you have questions or need follow up information about today's clinic visit or your schedule please contact  [facility] directly at [phone].  Normal or non-critical lab and imaging results will be communicated to you by MyChart, letter or phone within 4 business days after the clinic has received the results. If you do not hear from us within 7 days, please contact the clinic through [employer] or phone. If you have a critical or abnormal lab result, we will notify you by phone as soon as possible.  Submit refill requests through [employer] or call your pharmacy and they will forward the refill request to us. Please allow 3 business days for your refill to be completed.          Additional Information About Your Visit        MyChart Information     [employer] gives you secure access to your electronic health record. If you see a primary care provider, you can also send messages to your care team and make appointments. If you have questions, please call your primary care clinic.  If you do not have a primary care provider, please call [phone] and they will assist you.        Care EveryWhere ID     This is your  Care EveryWhere ID. This could be used by other organizations to access your [place] medical records  [id number]         Blood Pressure from Last 3 Encounters:   04/18/18 114/70   01/18/18 116/64   12/19/17 126/70    Weight from Last 3 Encounters:   04/18/18 145 lb (65.8 kg)   01/18/18 145 lb (65.8 kg)   12/19/17 146 lb (66.2 kg)              We Performed the Following     CHIROPRAC MANIP,SPINAL,1-2 REGIONS        Primary Care Provider Office Phone # Fax #    Arie Camarillo, [phone] [address] [id number]        Equal Access to Services     AUDREY WICK : Hadii liset Gayle, waaxda luqadaha, qaybta kaalmada adeyairyaasuncion, patience santana . So [facility] [phone].    ATENCIÓN: Si habla español, tiene a tinajero disposición servicios gratuitos de asistencia lingüística. Llame al [phone].    We comply with applicable federal civil rights laws and [place] laws. We do not discriminate on the basis of race, color, national origin, age, disability, sex, sexual orientation, or gender identity.            Thank you!     Thank you for choosing  [facility]  for your care. Our goal is always to provide you with excellent care. Hearing back from our patients is one way we can continue to improve our services. Please take a few minutes to complete the written survey that you may receive in the mail after your visit with us. Thank you!             Your Updated Medication List - Protect others around you: Learn how to safely use, store and throw away your medicines at www.disposemymeds.org.          This list is accurate as of 6/20/18  2:03 PM.  Always use your most recent med list.                   Brand Name Dispense Instructions for use Diagnosis    albuterol 108 (90 Base) MCG/ACT Inhaler    PROAIR HFA/PROVENTIL HFA/VENTOLIN HFA     Inhale 2 puffs into the lungs every 4 hours as needed for shortness of breath / dyspnea or wheezing         * ANTI-DIARRHEAL PO      Take  by mouth.        * loperamide 2 MG capsule    IMODIUM     Take 2 mg by mouth        ASPIRIN PO      Take 81 mg by mouth daily        budesonide-formoterol 160-4.5 MCG/ACT Inhaler    SYMBICORT    3 Inhaler    Inhale 2 puffs into the lungs 2 times daily    Moderate persistent asthma without complication       CALCIUM + D PO      1200-1000mg unit tablet chewable; one tablet with a meal orally once a day.        diclofenac 1 % Gel topical gel    VOLTAREN    100 g    Apply to the outside of the elbow 3 times per day.    Overuse injury       ferrous sulfate 325 (65 Fe) MG tablet    IRON    90 tablet    Take 325 mg by mouth 2 times daily (with meals)    Iron deficiency       * FLUoxetine 40 MG capsule    PROzac    90 capsule    Take one (1) capsule BY MOUTH daily    Dysthymic disorder       * FLUoxetine 20 MG capsule    PROzac    90 capsule    TAKE ONE CAPSULE BY MOUTH ONCE DAILY WITH 40 MG CAPSULE    Dysthymic disorder       fluticasone-salmeterol 250-50 MCG/DOSE diskus inhaler    ADVAIR    3 Inhaler    Inhale 1 puff into the lungs 2 times daily    Moderate persistent asthma without complication       gabapentin 400 MG capsule    NEURONTIN    180 capsule    Take 3 capsules (1,200 mg) by mouth 3 times daily    PMR (polymyalgia rheumatica) (H)       * GAS-X PO      Take by mouth 2 times daily        * GAS-X EXTRA STRENGTH 125 MG Caps   Generic drug:  Simethicone      Take 4 caps twice a day        ibuprofen 200 MG tablet    ADVIL/MOTRIN     Take 200 mg by mouth        ketotifen 0.025 % Soln ophthalmic solution    ZADITOR/REFRESH ANTI-ITCH    5 mL    PLACE 2 DROPS INTO BOTH EYES 2 TIMES DAILY        magnesium 100 MG Caps      Take by mouth 2 times daily        multivitamin per tablet      Take 1 tablet by mouth daily. Every day with food        omeprazole 20 MG CR capsule    priLOSEC    90 capsule    Take 1 capsule (20 mg) by mouth daily Take two daily while on prednisone     Gastroesophageal reflux disease, esophagitis presence not specified       pramipexole 1 MG tablet    MIRAPEX    90 tablet    Take 1 tablet (1 mg) by mouth At Bedtime    Restless leg syndrome       predniSONE 10 MG tablet    DELTASONE    75 tablet    Take 10 mg by mouth daily for 5 weeks, then reduce to 5 mg daily.    Myalgia, PMR (polymyalgia rheumatica) (H)       PSYLLIUM PO      Take 3 tsp by mouth 2 times daily.        tacrolimus 0.1 % ointment    PROTOPIC          traMADol 50 MG tablet    ULTRAM    90 tablet    TAKE 1-2 TABLETS BY MOUTH EVERY 6 HOURS AS NEEDED    Restless leg syndrome, Myalgia       vitamin E 200 UNIT capsule      Take 200 Units by mouth 2 times daily        * Notice:  This list has 6 medication(s) that are the same as other medications prescribed for you. Read the directions carefully, and ask your doctor or other care provider to review them with you.

## 2018-06-20 NOTE — TELEPHONE ENCOUNTER
Please schedule patient for date/time: today 2:00 arrive 140    Have patient go to ER/Urgent Care Center. Urgent Care hours are 9:30 am to 8 pm, open 7 days a week. No.    Provider will call patient.No.    Other:

## 2018-06-21 ENCOUNTER — OFFICE VISIT (OUTPATIENT)
Dept: PEDIATRICS | Facility: OTHER | Age: 63
End: 2018-06-21
Attending: INTERNAL MEDICINE
Payer: COMMERCIAL

## 2018-06-21 VITALS
OXYGEN SATURATION: 95 % | BODY MASS INDEX: 24.69 KG/M2 | RESPIRATION RATE: 20 BRPM | TEMPERATURE: 98.1 F | DIASTOLIC BLOOD PRESSURE: 66 MMHG | WEIGHT: 135 LBS | HEART RATE: 90 BPM | SYSTOLIC BLOOD PRESSURE: 112 MMHG

## 2018-06-21 DIAGNOSIS — G25.81 RESTLESS LEGS SYNDROME (RLS): Primary | ICD-10-CM

## 2018-06-21 PROCEDURE — 99213 OFFICE O/P EST LOW 20 MIN: CPT | Performed by: INTERNAL MEDICINE

## 2018-06-21 ASSESSMENT — ANXIETY QUESTIONNAIRES
6. BECOMING EASILY ANNOYED OR IRRITABLE: NOT AT ALL
7. FEELING AFRAID AS IF SOMETHING AWFUL MIGHT HAPPEN: NOT AT ALL
5. BEING SO RESTLESS THAT IT IS HARD TO SIT STILL: SEVERAL DAYS
1. FEELING NERVOUS, ANXIOUS, OR ON EDGE: NOT AT ALL
3. WORRYING TOO MUCH ABOUT DIFFERENT THINGS: NOT AT ALL
GAD7 TOTAL SCORE: 1
2. NOT BEING ABLE TO STOP OR CONTROL WORRYING: NOT AT ALL

## 2018-06-21 ASSESSMENT — PATIENT HEALTH QUESTIONNAIRE - PHQ9: 5. POOR APPETITE OR OVEREATING: NOT AT ALL

## 2018-06-21 ASSESSMENT — PAIN SCALES - GENERAL: PAINLEVEL: MILD PAIN (2)

## 2018-06-21 NOTE — LETTER
My Depression Action Plan  Name: Chyna Delgado   Date of Birth 1955  Date: 6/21/2018    My doctor: Arie Camarillo   My clinic: [facility] [place]  [place] [address]  [phone]          GREEN    ZONE   Good Control    What it looks like:     Things are going generally well. You have normal up s and down s. You may even feel depressed from time to time, but bad moods usually last less than a day.   What you need to do:  1. Continue to care for yourself (see self care plan)  2. Check your depression survival kit and update it as needed  3. Follow your physician s recommendations including any medication.  4. Do not stop taking medication unless you consult with your physician first.           YELLOW         ZONE Getting Worse    What it looks like:     Depression is starting to interfere with your life.     It may be hard to get out of bed; you may be starting to isolate yourself from others.    Symptoms of depression are starting to last most all day and this has happened for several days.     You may have suicidal thoughts but they are not constant.   What you need to do:     1. Call your care team, your response to treatment will improve if you keep your care team informed of your progress. Yellow periods are signs an adjustment may need to be made.     2. Continue your self-care, even if you have to fake it!    3. Talk to someone in your support network    4. Open up your depression survival kit           RED    ZONE Medical Alert - Get Help    What it looks like:     Depression is seriously interfering with your life.     You may experience these or other symptoms: You can t get out of bed most days, can t work or engage in other necessary activities, you have trouble taking care of basic hygiene, or basic responsibilities, thoughts of suicide or death that will not go away, self-injurious behavior.     What you need to do:  1. Call your care team and  request a same-day appointment. If they are not available (weekends or after hours) call your local crisis line, emergency room or 911.            Depression Self Care Plan / Survival Kit    Self-Care for Depression  Here s the deal. Your body and mind are really not as separate as most people think.  What you do and think affects how you feel and how you feel influences what you do and think. This means if you do things that people who feel good do, it will help you feel better.  Sometimes this is all it takes.  There is also a place for medication and therapy depending on how severe your depression is, so be sure to consult with your medical provider and/ or Behavioral Health Consultant if your symptoms are worsening or not improving.     In order to better manage my stress, I will:    Exercise  Get some form of exercise, every day. This will help reduce pain and release endorphins, the  feel good  chemicals in your brain. This is almost as good as taking antidepressants!  This is not the same as joining a gym and then never going! (they count on that by the way ) It can be as simple as just going for a walk or doing some gardening, anything that will get you moving.      Hygiene   Maintain good hygiene (Get out of bed in the morning, Make your bed, Brush your teeth, Take a shower, and Get dressed like you were going to work, even if you are unemployed).  If your clothes don't fit try to get ones that do.    Diet  I will strive to eat foods that are good for me, drink plenty of water, and avoid excessive sugar, caffeine, alcohol, and other mood-altering substances.  Some foods that are helpful in depression are: complex carbohydrates, B vitamins, flaxseed, fish or fish oil, fresh fruits and vegetables.    Psychotherapy  I agree to participate in Individual Therapy (if recommended).    Medication  If prescribed medications, I agree to take them.  Missing doses can result in serious side effects.  I understand that  drinking alcohol, or other illicit drug use, may cause potential side effects.  I will not stop my medication abruptly without first discussing it with my provider.    Staying Connected With Others  I will stay in touch with my friends, family members, and my primary care provider/team.    Use your imagination  Be creative.  We all have a creative side; it doesn t matter if it s oil painting, sand castles, or mud pies! This will also kick up the endorphins.    Witness Beauty  (AKA stop and smell the roses) Take a look outside, even in mid-winter. Notice colors, textures. Watch the squirrels and birds.     Service to others  Be of service to others.  There is always someone else in need.  By helping others we can  get out of ourselves  and remember the really important things.  This also provides opportunities for practicing all the other parts of the program.    Humor  Laugh and be silly!  Adjust your TV habits for less news and crime-drama and more comedy.    Control your stress  Try breathing deep, massage therapy, biofeedback, and meditation. Find time to relax each day.     My support system    Clinic Contact:  Phone number:    Contact 1:  Phone number:    Contact 2:  Phone number:    Congregational/:  Phone number:    Therapist:  Phone number:    Local crisis center:    Phone number:    Other community support:  Phone number:

## 2018-06-21 NOTE — MR AVS SNAPSHOT
After Visit Summary   6/21/2018    Chyna Delgado    MRN: [id number]           Patient Information     Date Of Birth          1955        Visit Information        Provider Department      6/21/2018 4:20 PM Arie Camarillo,  [facility]        Today's Diagnoses     Restless legs syndrome (RLS)    -  1       Follow-ups after your visit        Who to contact     If you have questions or need follow up information about today's clinic visit or your schedule please contact [facility] directly at [phone].  Normal or non-critical lab and imaging results will be communicated to you by [employer], letter or phone within 4 business days after the clinic has received the results. If you do not hear from us within 7 days, please contact the clinic through [employer] or phone. If you have a critical or abnormal lab result, we will notify you by phone as soon as possible.  Submit refill requests through [employer] or call your pharmacy and they will forward the refill request to us. Please allow 3 business days for your refill to be completed.          Additional Information About Your Visit        MyChart Information     [employer] gives you secure access to your electronic health record. If you see a primary care provider, you can also send messages to your care team and make appointments. If you have questions, please call your primary care clinic.  If you do not have a primary care provider, please call [phone] and they will assist you.        Care EveryWhere ID     This is your Care EveryWhere ID. This could be used by other organizations to access your [place] medical records  [id number]        Your Vitals Were     Pulse Temperature Respirations Pulse Oximetry BMI (Body Mass Index)       90 98.1  F (36.7  C) (Tympanic) 20 95% 24.69 kg/m2        Blood Pressure from Last 3 Encounters:   06/21/18 112/66   04/18/18 114/70   01/18/18 116/64    Weight from Last 3  Encounters:   06/21/18 135 lb (61.2 kg)   04/18/18 145 lb (65.8 kg)   01/18/18 145 lb (65.8 kg)              Today, you had the following     No orders found for display         Today's Medication Changes          These changes are accurate as of 6/21/18  4:45 PM.  If you have any questions, ask your nurse or doctor.               Start taking these medicines.        Dose/Directions    Rotigotine 1 MG/24HR 24 hr patch   Commonly known as:  NEUPRO   Used for:  Restless legs syndrome (RLS)   Started by:  Arie Camarillo DO        Dose:  1 patch   Place 1 patch onto the skin daily   Quantity:  30 patch   Refills:  1         Stop taking these medicines if you haven't already. Please contact your care team if you have questions.     pramipexole 1 MG tablet   Commonly known as:  MIRAPEX   Stopped by:  Arie Camarillo DO                Where to get your medicines      [facility] [address] [address]     Phone:  [phone]     Rotigotine 1 MG/24HR 24 hr patch                Primary Care Provider Office Phone # Fax #    Arie Camarillo [phone] [address] [facility] [address]        Equal Access to Services     [facility] AH: Hadii aad ku hadasho Soomaali, waaxda luqadaha, qaybta kaalmada adeegyada, patience bahena. So [facility] [phone].    ATENCIÓN: Si habla español, tiene a tinajero disposición servicios gratuitos de asistencia lingüística. Llame al [phone].    We comply with applicable federal civil rights laws and [place] laws. We do not discriminate on the basis of race, color, national origin, age, disability, sex, sexual orientation, or gender identity.            Thank you!     Thank you for choosing [facility]  for your care. Our goal is always to provide you with excellent care. Hearing back from our patients  is one way we can continue to improve our services. Please take a few minutes to complete the written survey that you may receive in the mail after your visit with us. Thank you!             Your Updated Medication List - Protect others around you: Learn how to safely use, store and throw away your medicines at www.disposemymeds.org.          This list is accurate as of 6/21/18  4:45 PM.  Always use your most recent med list.                   Brand Name Dispense Instructions for use Diagnosis    albuterol 108 (90 Base) MCG/ACT Inhaler    PROAIR HFA/PROVENTIL HFA/VENTOLIN HFA     Inhale 2 puffs into the lungs every 4 hours as needed for shortness of breath / dyspnea or wheezing        * ANTI-DIARRHEAL PO      Take  by mouth.        * loperamide 2 MG capsule    IMODIUM     Take 2 mg by mouth        ASPIRIN PO      Take 81 mg by mouth daily        budesonide-formoterol 160-4.5 MCG/ACT Inhaler    SYMBICORT    3 Inhaler    Inhale 2 puffs into the lungs 2 times daily    Moderate persistent asthma without complication       CALCIUM + D PO      1200-1000mg unit tablet chewable; one tablet with a meal orally once a day.        diclofenac 1 % Gel topical gel    VOLTAREN    100 g    Apply to the outside of the elbow 3 times per day.    Overuse injury       ferrous sulfate 325 (65 Fe) MG tablet    IRON    90 tablet    Take 325 mg by mouth 2 times daily (with meals)    Iron deficiency       * FLUoxetine 40 MG capsule    PROzac    90 capsule    Take one (1) capsule BY MOUTH daily    Dysthymic disorder       * FLUoxetine 20 MG capsule    PROzac    90 capsule    TAKE ONE CAPSULE BY MOUTH ONCE DAILY WITH 40 MG CAPSULE    Dysthymic disorder       fluticasone-salmeterol 250-50 MCG/DOSE diskus inhaler    ADVAIR    3 Inhaler    Inhale 1 puff into the lungs 2 times daily    Moderate persistent asthma without complication       gabapentin 400 MG capsule    NEURONTIN    180 capsule    Take 3 capsules (1,200 mg) by mouth 3 times daily     PMR (polymyalgia rheumatica) (H)       * GAS-X PO      Take by mouth 2 times daily        * GAS-X EXTRA STRENGTH 125 MG Caps   Generic drug:  Simethicone      Take 4 caps twice a day        ibuprofen 200 MG tablet    ADVIL/MOTRIN     Take 200 mg by mouth        ketotifen 0.025 % Soln ophthalmic solution    ZADITOR/REFRESH ANTI-ITCH    5 mL    PLACE 2 DROPS INTO BOTH EYES 2 TIMES DAILY        magnesium 100 MG Caps      Take by mouth 2 times daily        multivitamin per tablet      Take 1 tablet by mouth daily. Every day with food        omeprazole 20 MG CR capsule    priLOSEC    90 capsule    Take 1 capsule (20 mg) by mouth daily Take two daily while on prednisone    Gastroesophageal reflux disease, esophagitis presence not specified       predniSONE 10 MG tablet    DELTASONE    75 tablet    Take 10 mg by mouth daily for 5 weeks, then reduce to 5 mg daily.    Myalgia, PMR (polymyalgia rheumatica) (H)       PSYLLIUM PO      Take 3 tsp by mouth 2 times daily.        Rotigotine 1 MG/24HR 24 hr patch    NEUPRO    30 patch    Place 1 patch onto the skin daily    Restless legs syndrome (RLS)       tacrolimus 0.1 % ointment    PROTOPIC          traMADol 50 MG tablet    ULTRAM    90 tablet    TAKE 1-2 TABLETS BY MOUTH EVERY 6 HOURS AS NEEDED    Restless leg syndrome, Myalgia       vitamin E 200 UNIT capsule      Take 200 Units by mouth 2 times daily        * Notice:  This list has 6 medication(s) that are the same as other medications prescribed for you. Read the directions carefully, and ask your doctor or other care provider to review them with you.

## 2018-06-21 NOTE — NURSING NOTE
"Chief Complaint   Patient presents with     restless legs     Insomnia       Initial /66 (BP Location: Right arm, Patient Position: Chair, Cuff Size: Adult Regular)  Pulse 90  Temp 98.1  F (36.7  C) (Tympanic)  Resp 20  Wt 135 lb (61.2 kg)  SpO2 95%  BMI 24.69 kg/m2 Estimated body mass index is 24.69 kg/(m^2) as calculated from the following:    Height as of 4/18/18: 5' 2\" (1.575 m).    Weight as of this encounter: 135 lb (61.2 kg).  Medication Reconciliation: complete    Lina Díaz LPN    "

## 2018-06-21 NOTE — PROGRESS NOTES
SUBJECTIVE:   Chyna Delgado is a 62 year old female who presents to clinic today for the following health issues:      Insomnia      Duration: ongoing, bad for 4 weeks    Description  Frequency of insomnia:  nightly  Time to fall asleep: after awhile, she just gets back up  Middle of night awakening:  nightly  Early morning awakening:  nightly    Accompanying signs and symptoms:  restless legs, snoring    History  Similar episodes in past:  YES  Previous evaluation/sleep study:  no     Precipitating or alleviating factors:  New stressful situation: no   Caffeine intake after lunchtime: YES- one cup sometimes  OTC decongestants: no   Any new medications: no     Therapies tried and outcome: caffeine avoidance and klonopin nightly( this has been d/c'd).  She feels that her RLS is keeping her up at night.  She has been taking her Mirapex. She does reports that for an unknown time frame she had been taking her gabapentin 800 instead of 1200.  She has been back on 1200 mg TID for the past week.    Musculoskeletal problem/ RLS      Duration: ongoing    Description  Location: Lower legs    Intensity:  severe    Accompanying signs and symptoms: jump, have to move day and night.  Sh does not notice the daytime motions    History  Previous similar problem: YES  Previous evaluation:  none    Precipitating or alleviating factors:  Trauma or overuse: no   Aggravating factors include: none    Therapies tried and outcome: used to use klonopin, mirapex.    She denies any leg cramps  -------------------------------------    Problem list and histories reviewed & adjusted, as indicated.  Additional history: as documented    Patient Active Problem List   Diagnosis     Encounter to establish care     Mild persistent asthma     Sacroiliac pain     Annual physical exam     Dysthymic disorder     Encounter for routine gynecological examination     Seborrheic keratosis     Somatic dysfunction of pelvic region     Restless legs  syndrome     Skin lesion     Hand swelling     Numbness and tingling in right hand     Graves disease     Preoperative examination     Simple chronic bronchitis (H)     Arthritis     ACP (advance care planning)     Pain of toe of right foot     Moderate major depression (H)     Right shoulder pain     Abdominal muscle strain     Strain of flexor muscle of hip, unspecified laterality, initial encounter     Myalgia     Iron deficiency     Alcohol use     Past Surgical History:   Procedure Laterality Date     benign tumors removed from back       ENDOSCOPY UPPER, COLONOSCOPY, COMBINED N/A 9/5/2014    Procedure: COMBINED ENDOSCOPY UPPER, COLONOSCOPY;  Surgeon: Delbert Patel MD;  Location: [facility]     ESOPHAGOSCOPY, GASTROSCOPY, DUODENOSCOPY (EGD), COMBINED N/A 12/11/2015    Procedure: COMBINED ESOPHAGOSCOPY, GASTROSCOPY, DUODENOSCOPY (EGD);  Surgeon: Delbert Patel MD;  Location: [facility]     ESOPHAGOSCOPY, GASTROSCOPY, DUODENOSCOPY (EGD), COMBINED N/A 12/18/2017    Procedure: COMBINED ESOPHAGOSCOPY, GASTROSCOPY, DUODENOSCOPY (EGD);  UPPER ENDOSCOPY WITH BIOPSY;  Surgeon: Delbert Patel MD;  Location: [facility]     excised-benign  2002    tongue lesion     HC REPAIR OF NASAL SEPTUM  2002    Deviated nasal septum     LAPAROSCOPIC CHOLECYSTECTOMY       lumpectomy Right breast      Breast Lump     MOUTH SURGERY      removal of spot from roof of mouth     pilonidal cyst surgery  1976     removal of colon and large intestine  2000    Familial polyposis     Right etopic pregnancy      Pregnancy     TONSILLECTOMY      tonsillitus     UPPER GI ENDOSCOPY  2009    Stomach polyps       Social History   Substance Use Topics     Smoking status: Current Every Day Smoker     Packs/day: 0.50     Years: 40.00     Types: Cigarettes     Smokeless tobacco: Never Used      Comment: trying to quit currently     Alcohol use Yes      Comment: Weekly 3 drinks     Family History   Problem Relation Age of Onset     Other - See Comments Father       Atrial Fibrillation     Cancer Father      bladder ca     Colon Polyps Father      HEART DISEASE Father      Pacemaker     Rheumatoid Arthritis Father      C.A.D. Father 75     CABG     Chronic Obstructive Pulmonary Disease Mother      HEART DISEASE Mother      Pacemaker     Other - See Comments Mother      dementia     C.A.D. Mother 70     CABG     C.A.D. Maternal Grandmother      Unknown/Adopted Maternal Grandfather      Unknown/Adopted Paternal Grandmother      Unknown/Adopted Paternal Grandfather      Asthma Sister      Cerebrovascular Disease Sister      GASTROINTESTINAL DISEASE Sister      peptic ulcers     Hypertension Sister      GASTROINTESTINAL DISEASE Sister      stomach tumors     Rheumatoid Arthritis Sister      Cancer Sister      facial cancer     Asthma Sister      Cancer Maternal Aunt      renal ca           Reviewed and updated as needed this visit by clinical staff  Tobacco  Allergies  Meds  Med Hx  Surg Hx  Fam Hx  Soc Hx      Reviewed and updated as needed this visit by Provider         ROS:  Constitutional, HEENT, cardiovascular, pulmonary, gi and gu systems are negative, except as otherwise noted.    OBJECTIVE:     /66 (BP Location: Right arm, Patient Position: Chair, Cuff Size: Adult Regular)  Pulse 90  Temp 98.1  F (36.7  C) (Tympanic)  Resp 20  Wt 135 lb (61.2 kg)  SpO2 95%  BMI 24.69 kg/m2  Body mass index is 24.69 kg/(m^2).  GENERAL: healthy, alert and no distress  EYES: Pupils are dilated  NECK: no adenopathy, no asymmetry, masses, or scars and thyroid normal to palpation  RESP: lungs clear to auscultation - no rales, rhonchi or wheezes  CV: regular rate and rhythm, normal S1 S2, no S3 or S4, no murmur, click or rub, no peripheral edema and peripheral pulses strong  MS: no gross musculoskeletal defects noted, no edema  NEURO: Normal strength and tone, mentation intact and speech normal, She has tremors of both hands    Diagnostic Test Results:  none      ASSESSMENT/PLAN:   (G25.81) Restless legs syndrome (RLS)  (primary encounter diagnosis)  Comment: Her Mirapex has slowly been losing its effectiveness.   She is on a maximum dose of gabapentin for her diagnosis.  There may be some effect from her missing some of her dosage of gabapentin, but I am doubtful of this.  Plan:   She will discontinue Mirapex and start Rotigotine (NEUPRO) 1 MG/24HR 24 hr patch.  She will continue Gabapentin 1200 mg TID                    FUTURE APPOINTMENTS:       - Follow-up visit in PRN    Arie Camarillo DO, DO [facility] [place]

## 2018-06-22 ENCOUNTER — TELEPHONE (OUTPATIENT)
Dept: PEDIATRICS | Facility: OTHER | Age: 63
End: 2018-06-22

## 2018-06-22 DIAGNOSIS — G25.81 RESTLESS LEGS SYNDROME (RLS): Primary | ICD-10-CM

## 2018-06-22 RX ORDER — CARBAMAZEPINE 100 MG/1
100 CAPSULE, EXTENDED RELEASE ORAL AT BEDTIME
Qty: 30 CAPSULE | Refills: 3 | Status: SHIPPED | OUTPATIENT
Start: 2018-06-22 | End: 2018-08-13

## 2018-06-22 ASSESSMENT — ANXIETY QUESTIONNAIRES: GAD7 TOTAL SCORE: 1

## 2018-06-22 ASSESSMENT — PATIENT HEALTH QUESTIONNAIRE - PHQ9: SUM OF ALL RESPONSES TO PHQ QUESTIONS 1-9: 5

## 2018-06-22 NOTE — TELEPHONE ENCOUNTER
11:00 AM    Reason for Call: Phone Call    Description: Pt stated provider prescribed her Neupro for her restless leg syndrome. The pharmacy said it would be $100 dollars a month. Wondering if there is an alternative to this med that would be cheaper? Please call her back at [phone]    Was an appointment offered for this call? No  If yes : Appointment type              Date    Preferred method for responding to this message: Telephone Call  What is your phone number ?    If we cannot reach you directly, may we leave a detailed response at the number you provided? Yes    Can this message wait until your PCP/provider returns, if available today? Not applicable    Mana Luna

## 2018-06-27 ENCOUNTER — TELEPHONE (OUTPATIENT)
Dept: INTERNAL MEDICINE | Facility: OTHER | Age: 63
End: 2018-06-27

## 2018-06-27 NOTE — TELEPHONE ENCOUNTER
Reason for call:  Medication    1. Medication Name?Carbamezapine 100 mg   2. Is this request for a refill? No  3. What Pharmacy do you use? [facility]  4. Have you contacted your pharmacy? No    5. If yes, when?  (Please note that the turn-around-time for prescriptions is 72 business hours; I am sending your request at this time. SEND TO  [facility]  )  Description: Medication is not working for restless legs  Was an appointment offered for this a call? No   Preferred method for responding to this messageTelephone Call  If we cannot reach you directly, may we leave a detailed response at the number you provided? Yes  Can this message wait until your PCP/Provider returns if not available today? N/A provider is in today

## 2018-07-02 ENCOUNTER — TELEPHONE (OUTPATIENT)
Dept: INTERNAL MEDICINE | Facility: OTHER | Age: 63
End: 2018-07-02

## 2018-07-02 NOTE — TELEPHONE ENCOUNTER
9:04 AM    Reason for Call: OVERBOOK    Patient is having the following symptoms: Changing meds / restless legs for  Ongoing, not sleeping at all    The patient is requesting an appointment for ASAP with   Dr. Camarillo    Was an appointment offered for this call?  No    Preferred method for responding to this message: [phone]    If we cannot reach you directly, may we leave a detailed response at the number you provided?  Yes      Estrella Wyatt

## 2018-07-10 ENCOUNTER — RADIANT APPOINTMENT (OUTPATIENT)
Dept: GENERAL RADIOLOGY | Facility: OTHER | Age: 63
End: 2018-07-10
Attending: NURSE PRACTITIONER
Payer: COMMERCIAL

## 2018-07-10 ENCOUNTER — OFFICE VISIT (OUTPATIENT)
Dept: INTERNAL MEDICINE | Facility: OTHER | Age: 63
End: 2018-07-10
Attending: NURSE PRACTITIONER
Payer: COMMERCIAL

## 2018-07-10 VITALS
HEIGHT: 62 IN | SYSTOLIC BLOOD PRESSURE: 117 MMHG | DIASTOLIC BLOOD PRESSURE: 81 MMHG | WEIGHT: 135 LBS | BODY MASS INDEX: 24.84 KG/M2 | OXYGEN SATURATION: 96 % | TEMPERATURE: 97.4 F | HEART RATE: 76 BPM

## 2018-07-10 DIAGNOSIS — S29.9XXA CHEST WALL INJURY, INITIAL ENCOUNTER: Primary | ICD-10-CM

## 2018-07-10 PROCEDURE — 71046 X-RAY EXAM CHEST 2 VIEWS: CPT | Mod: TC

## 2018-07-10 PROCEDURE — 99213 OFFICE O/P EST LOW 20 MIN: CPT | Performed by: NURSE PRACTITIONER

## 2018-07-10 ASSESSMENT — ENCOUNTER SYMPTOMS
COUGH: 0
MYALGIAS: 1
WEAKNESS: 0
CHEST TIGHTNESS: 0
GASTROINTESTINAL NEGATIVE: 1
NUMBNESS: 0
HEADACHES: 0
PSYCHIATRIC NEGATIVE: 1
PALPITATIONS: 0
VOMITING: 0
DIZZINESS: 0
SHORTNESS OF BREATH: 0
NAUSEA: 0

## 2018-07-10 ASSESSMENT — ANXIETY QUESTIONNAIRES
7. FEELING AFRAID AS IF SOMETHING AWFUL MIGHT HAPPEN: NOT AT ALL
5. BEING SO RESTLESS THAT IT IS HARD TO SIT STILL: NOT AT ALL
3. WORRYING TOO MUCH ABOUT DIFFERENT THINGS: NOT AT ALL
GAD7 TOTAL SCORE: 0
6. BECOMING EASILY ANNOYED OR IRRITABLE: NOT AT ALL
1. FEELING NERVOUS, ANXIOUS, OR ON EDGE: NOT AT ALL
2. NOT BEING ABLE TO STOP OR CONTROL WORRYING: NOT AT ALL
4. TROUBLE RELAXING: NOT AT ALL

## 2018-07-10 ASSESSMENT — PAIN SCALES - GENERAL: PAINLEVEL: WORST PAIN (10)

## 2018-07-10 NOTE — LETTER
My Asthma Action Plan  Name: Chyna Delgado   YOB: 1955  Date: 7/10/2018   My doctor: Jorge Salinas NP   My clinic: [facility] [place]        My Control Medicine: { :283465}  My Rescue Medicine: { :423395}  {AAP include Oral Steroid:226252} My Asthma Severity: { :836188}  Avoid your asthma triggers: { :798616}        {Is patient a child or adult?:383898}       GREEN ZONE   Good Control    I feel good    No cough or wheeze    Can work, sleep and play without asthma symptoms       Take your asthma control medicine every day.     1. If exercise triggers your asthma, take your rescue medication    15 minutes before exercise or sports, and    During exercise if you have asthma symptoms  2. Spacer to use with inhaler: If you have a spacer, make sure to use it with your inhaler             YELLOW ZONE Getting Worse  I have ANY of these:    I do not feel good    Cough or wheeze    Chest feels tight    Wake up at night   1. Keep taking your Green Zone medications  2. Start taking your rescue medicine:    every 20 minutes for up to 1 hour. Then every 4 hours for 24-48 hours.  3. If you stay in the Yellow Zone for more than 12-24 hours, contact your doctor.  4. If you do not return to the Green Zone in 12-24 hours or you get worse, start taking your oral steroid medicine if prescribed by your provider.           RED ZONE Medical Alert - Get Help  I have ANY of these:    I feel awful    Medicine is not helping    Breathing getting harder    Trouble walking or talking    Nose opens wide to breathe       1. Take your rescue medicine NOW  2. If your provider has prescribed an oral steroid medicine, start taking it NOW  3. Call your doctor NOW  4. If you are still in the Red Zone after 20 minutes and you have not reached your doctor:    Take your rescue medicine again and    Call 911 or go to the emergency room right away    See your regular doctor within 2 weeks of an Emergency Room or Urgent Care  visit for follow-up treatment.          Annual Reminders:  Meet with Asthma Educator,  Flu Shot in the Fall, consider Pneumonia Vaccination for patients with asthma (aged 19 and older).    Pharmacy:    [facility] [place] [place] [facility] [address] [facility] [address] [facility] [address]                      Asthma Triggers  How To Control Things That Make Your Asthma Worse    Triggers are things that make your asthma worse.  Look at the list below to help you find your triggers and what you can do about them.  You can help prevent asthma flare-ups by staying away from your triggers.      Trigger                                                          What you can do   Cigarette Smoke  Tobacco smoke can make asthma worse. Do not allow smoking in your home, car or around you.  Be sure no one smokes at a child s day care or school.  If you smoke, ask your health care provider for ways to help you quit.  Ask family members to quit too.  Ask your health care provider for a referral to Quit Plan to help you quit smoking, or call 1-084-109-PLAN.     Colds, Flu, Bronchitis  These are common triggers of asthma. Wash your hands often.  Don t touch your eyes, nose or mouth.  Get a flu shot every year.     Dust Mites  These are tiny bugs that live in cloth or carpet. They are too small to see. Wash sheets and blankets in hot water every week.   Encase pillows and mattress in dust mite proof covers.  Avoid having carpet if you can. If you have carpet, vacuum weekly.   Use a dust mask and HEPA vacuum.   Pollen and Outdoor Mold  Some people are allergic to trees, grass, or weed pollen, or molds. Try to keep your windows closed.  Limit time out doors when pollen count is high.   Ask you health care provider about taking medicine during allergy season.     Animal Dander  Some people are allergic to skin flakes, urine or saliva from pets with fur or feathers. Keep pets with fur or feathers out of  your home.    If you can t keep the pet outdoors, then keep the pet out of your bedroom.  Keep the bedroom door closed.  Keep pets off cloth furniture and away from stuffed toys.     Mice, Rats, and Cockroaches  Some people are allergic to the waste from these pests.   Cover food and garbage.  Clean up spills and food crumbs.  Store grease in the refrigerator.   Keep food out of the bedroom.   Indoor Mold  This can be a trigger if your home has high moisture. Fix leaking faucets, pipes, or other sources of water.   Clean moldy surfaces.  Dehumidify basement if it is damp and smelly.   Smoke, Strong Odors, and Sprays  These can reduce air quality. Stay away from strong odors and sprays, such as perfume, powder, hair spray, paints, smoke incense, paint, cleaning products, candles and new carpet.   Exercise or Sports  Some people with asthma have this trigger. Be active!  Ask your doctor about taking medicine before sports or exercise to prevent symptoms.    Warm up for 5-10 minutes before and after sports or exercise.     Other Triggers of Asthma  Cold air:  Cover your nose and mouth with a scarf.  Sometimes laughing or crying can be a trigger.  Some medicines and food can trigger asthma.

## 2018-07-10 NOTE — MR AVS SNAPSHOT
After Visit Summary   7/10/2018    Chyna Delgado    MRN: [id number]           Patient Information     Date Of Birth          1955        Visit Information        Provider Department      7/10/2018 9:00 AM Jorge Salinas NP [facility]        Today's Diagnoses     Chest wall injury, initial encounter    -  1      Care Instructions    1. Can start with heat to sternum  For 15min several times a day  2. Ibuprofen 800mg  (4 tabs) 3 times a day for 3 days.            Follow-ups after your visit        Your next 10 appointments already scheduled     Jul 23, 2018  2:00 PM CDT   (Arrive by 1:40 PM)   SHORT with Arie Camarillo, DO   [facility] ([facility] )    [address]  [place] [address]   [phone]              Who to contact     If you have questions or need follow up information about today's clinic visit or your schedule please contact [facility] directly at [phone].  Normal or non-critical lab and imaging results will be communicated to you by [employer], letter or phone within 4 business days after the clinic has received the results. If you do not hear from us within 7 days, please contact the clinic through [employer] or phone. If you have a critical or abnormal lab result, we will notify you by phone as soon as possible.  Submit refill requests through [employer] or call your pharmacy and they will forward the refill request to us. Please allow 3 business days for your refill to be completed.          Additional Information About Your Visit        [employer] Information     [employer] gives you secure access to your electronic health record. If you see a primary care provider, you can also send messages to your care team and make appointments. If you have questions, please call your primary care clinic.  If you do not have a primary care provider, please call [phone] and they will assist you.        Care EveryWhere  "ID     This is your Care EveryWhere ID. This could be used by other organizations to access your [facility] medical records  [id number]        Your Vitals Were     Pulse Temperature Height Pulse Oximetry BMI (Body Mass Index)       76 97.4  F (36.3  C) (Tympanic) 5' 2\" (1.575 m) 96% 24.69 kg/m2        Blood Pressure from Last 3 Encounters:   07/10/18 117/81   06/21/18 112/66   04/18/18 114/70    Weight from Last 3 Encounters:   07/10/18 135 lb (61.2 kg)   06/21/18 135 lb (61.2 kg)   04/18/18 145 lb (65.8 kg)              We Performed the Following     XR CHEST 2 VW (Clinic Performed)        Primary Care Provider Office Phone # Fax #    Arie Chinpe,  [phone]       [facility] [place] [address]        Equal Access to Services     AUDREY WICK : Hadii liset alcantarao Sorian, waaxda luqadaha, qaybta kaalmada adeyairyada, patience santana . So [facility] [phone].    ATENCIÓN: Si habla español, tiene a tinajero disposición servicios gratuitos de asistencia lingüística. Llame al [phone].    We comply with applicable federal civil rights laws and [place] laws. We do not discriminate on the basis of race, color, national origin, age, disability, sex, sexual orientation, or gender identity.            Thank you!     Thank you for choosing [facility] (formerly [facility])  for your care. Our goal is always to provide you with excellent care. Hearing back from our patients is one way we can continue to improve our services. Please take a few minutes to complete the written survey that you may receive in the mail after your visit with us. Thank you!             Your Updated Medication List - Protect others around you: Learn how to safely use, store and throw away your medicines at www.disposemymeds.org.          This list is accurate as of 7/10/18  9:33 AM.  Always use your most recent med list.                   Brand Name Dispense Instructions for use Diagnosis    " albuterol 108 (90 Base) MCG/ACT Inhaler    PROAIR HFA/PROVENTIL HFA/VENTOLIN HFA     Inhale 2 puffs into the lungs every 4 hours as needed for shortness of breath / dyspnea or wheezing        * ANTI-DIARRHEAL PO      Take  by mouth.        * loperamide 2 MG capsule    IMODIUM     Take 2 mg by mouth        ASPIRIN PO      Take 81 mg by mouth daily        budesonide-formoterol 160-4.5 MCG/ACT Inhaler    SYMBICORT    3 Inhaler    Inhale 2 puffs into the lungs 2 times daily    Moderate persistent asthma without complication       CALCIUM + D PO      1200-1000mg unit tablet chewable; one tablet with a meal orally once a day.        carBAMazepine 100 MG 12 hr capsule    CARBATROL    30 capsule    Take 1 capsule (100 mg) by mouth At Bedtime    Restless legs syndrome (RLS)       diclofenac 1 % Gel topical gel    VOLTAREN    100 g    Apply to the outside of the elbow 3 times per day.    Overuse injury       ferrous sulfate 325 (65 Fe) MG tablet    IRON    90 tablet    Take 325 mg by mouth 2 times daily (with meals)    Iron deficiency       * FLUoxetine 40 MG capsule    PROzac    90 capsule    Take one (1) capsule BY MOUTH daily    Dysthymic disorder       * FLUoxetine 20 MG capsule    PROzac    90 capsule    TAKE ONE CAPSULE BY MOUTH ONCE DAILY WITH 40 MG CAPSULE    Dysthymic disorder       fluticasone-salmeterol 250-50 MCG/DOSE diskus inhaler    ADVAIR    3 Inhaler    Inhale 1 puff into the lungs 2 times daily    Moderate persistent asthma without complication       gabapentin 400 MG capsule    NEURONTIN    180 capsule    Take 3 capsules (1,200 mg) by mouth 3 times daily    PMR (polymyalgia rheumatica) (H)       * GAS-X PO      Take by mouth 2 times daily        * GAS-X EXTRA STRENGTH 125 MG Caps   Generic drug:  Simethicone      Take 4 caps twice a day        ibuprofen 200 MG tablet    ADVIL/MOTRIN     Take 200 mg by mouth        ketotifen 0.025 % Soln ophthalmic solution    ZADITOR/REFRESH ANTI-ITCH    5 mL    PLACE 2  DROPS INTO BOTH EYES 2 TIMES DAILY        magnesium 100 MG Caps      Take by mouth 2 times daily        multivitamin per tablet      Take 1 tablet by mouth daily. Every day with food        omeprazole 20 MG CR capsule    priLOSEC    90 capsule    Take 1 capsule (20 mg) by mouth daily Take two daily while on prednisone    Gastroesophageal reflux disease, esophagitis presence not specified       predniSONE 10 MG tablet    DELTASONE    75 tablet    Take 10 mg by mouth daily for 5 weeks, then reduce to 5 mg daily.    Myalgia, PMR (polymyalgia rheumatica) (H)       PSYLLIUM PO      Take 3 tsp by mouth 2 times daily.        tacrolimus 0.1 % ointment    PROTOPIC          traMADol 50 MG tablet    ULTRAM    90 tablet    TAKE 1-2 TABLETS BY MOUTH EVERY 6 HOURS AS NEEDED    Restless leg syndrome, Myalgia       vitamin E 200 UNIT capsule      Take 200 Units by mouth 2 times daily        * Notice:  This list has 6 medication(s) that are the same as other medications prescribed for you. Read the directions carefully, and ask your doctor or other care provider to review them with you.

## 2018-07-10 NOTE — PATIENT INSTRUCTIONS
1. Can start with heat to sternum  For 15min several times a day  2. Ibuprofen 800mg  (4 tabs) 3 times a day for 3 days.

## 2018-07-10 NOTE — LETTER
My COPD Action Plan     Name: Chyna Delgado    YOB: 1955   Date: 7/10/2018    My doctor: Jorge Salinas NP   My clinic: [facility]    [address]  [place] [address]  [phone]  My Controller Medicine: { :934842}   Dose: ***     My Rescue Medicine: { :589698}   Dose: ***     My Flare Up Medicine: { :860605}   Dose: ***     My COPD Severity: { :785274}      Use of Oxygen: { :565880}     Make sure you've had your pneumonia   vaccines.          GREEN ZONE       Doing well today      Usual level of activity and exercise    Usual amount of cough and mucus    No shortness of breath    Usual level of health (thinking clearly, sleeping well, feel like eating) Actions:      Take daily medicines    Use oxygen as prescribed    Follow regular exercise and diet plan    Avoid cigarette smoke and other irritants that harm the lungs           YELLOW ZONE          Having a bad day or flare up      Short of breath more than usual    A lot more sputum (mucus) than usual    Sputum looks yellow, green, tan, brown or bloody    More coughing or wheezing    Fever or chills    Less energy; trouble completing activities    Trouble thinking or focusing    Using quick relief inhaler or nebulizer more often    Poor sleep; symptoms wake me up    Do not feel like eating Actions:      Get plenty of rest    Take daily medicines    Use quick relief inhaler every *** hours    If you use oxygen, call you doctor to see if you should adjust your oxygen    Do breathing exercises or other things to help you relax    Let a loved one, friend or neighbor know you are feeling worse    Call your care team if you have 2 or more symptoms.  Start taking steroids or antibiotics if directed by your care team           RED ZONE       Need medical care now      Severe shortness of breath (feel you can't breathe)    Fever, chills    Not enough breath to do any activity    Trouble coughing up mucus, walking or  talking    Blood in mucus    Frequent coughing   Rescue medicines are not working    Not able to sleep because of breathing    Feel confused or drowsy    Chest pain    Actions:      Call your health care team.  If you cannot reach your care team, call 911 or go to the emergency room.        Annual Reminders:  Meet with Care Team, Flu Shot every Fall  Pharmacy:    [facility] - [place], [place] - [address]  [facility] - [place], [place] - [address]

## 2018-07-10 NOTE — PROGRESS NOTES
SUBJECTIVE:   Chyna Delgado is a 62 year old female who presents to clinic today for the following health issues:      Musculoskeletal problem/pain      Duration: Fell Sunday night laying on bed, reached out to get glasses on floor and fell forward to her chest-thought hit corner of bed.      Description  Location: Sternum    Intensity:  severe    Accompanying signs and symptoms: none No shortness of breath, or hard time breathing.      History  Previous similar problem: no   Previous evaluation:  none    Precipitating or alleviating factors:  Trauma or overuse: YES  Aggravating factors include: leaning forward, bending down, getting out of bed    Therapies tried and outcome: nothing          Problem list and histories reviewed & adjusted, as indicated.  Additional history: as documented    Patient Active Problem List   Diagnosis     Encounter to establish care     Mild persistent asthma     Sacroiliac pain     Annual physical exam     Dysthymic disorder     Encounter for routine gynecological examination     Seborrheic keratosis     Somatic dysfunction of pelvic region     Restless legs syndrome     Skin lesion     Hand swelling     Numbness and tingling in right hand     Graves disease     Preoperative examination     Simple chronic bronchitis (H)     Arthritis     ACP (advance care planning)     Pain of toe of right foot     Moderate major depression (H)     Right shoulder pain     Abdominal muscle strain     Strain of flexor muscle of hip, unspecified laterality, initial encounter     Myalgia     Iron deficiency     Alcohol use     Past Surgical History:   Procedure Laterality Date     benign tumors removed from back       ENDOSCOPY UPPER, COLONOSCOPY, COMBINED N/A 9/5/2014    Procedure: COMBINED ENDOSCOPY UPPER, COLONOSCOPY;  Surgeon: Delbert Patel MD;  Location: [facility]     ESOPHAGOSCOPY, GASTROSCOPY, DUODENOSCOPY (EGD), COMBINED N/A 12/11/2015    Procedure: COMBINED ESOPHAGOSCOPY, GASTROSCOPY,  DUODENOSCOPY (EGD);  Surgeon: Delbert Patel MD;  Location: [facility]     ESOPHAGOSCOPY, GASTROSCOPY, DUODENOSCOPY (EGD), COMBINED N/A 12/18/2017    Procedure: COMBINED ESOPHAGOSCOPY, GASTROSCOPY, DUODENOSCOPY (EGD);  UPPER ENDOSCOPY WITH BIOPSY;  Surgeon: Delbert Patel MD;  Location: [facility] OR     excised-benign  2002    tongue lesion     HC REPAIR OF NASAL SEPTUM  2002    Deviated nasal septum     LAPAROSCOPIC CHOLECYSTECTOMY       lumpectomy Right breast      Breast Lump     MOUTH SURGERY      removal of spot from roof of mouth     pilonidal cyst surgery  1976     removal of colon and large intestine  2000    Familial polyposis     Right etopic pregnancy      Pregnancy     TONSILLECTOMY      tonsillitus     UPPER GI ENDOSCOPY  2009    Stomach polyps       Social History   Substance Use Topics     Smoking status: Current Every Day Smoker     Packs/day: 0.50     Years: 40.00     Types: Cigarettes     Smokeless tobacco: Never Used      Comment: trying to quit currently     Alcohol use Yes      Comment: Weekly 3 drinks     Family History   Problem Relation Age of Onset     Other - See Comments Father      Atrial Fibrillation     Cancer Father      bladder ca     Colon Polyps Father      HEART DISEASE Father      Pacemaker     Rheumatoid Arthritis Father      C.A.D. Father 75     CABG     Chronic Obstructive Pulmonary Disease Mother      HEART DISEASE Mother      Pacemaker     Other - See Comments Mother      dementia     C.A.D. Mother 70     CABG     C.A.D. Maternal Grandmother      Unknown/Adopted Maternal Grandfather      Unknown/Adopted Paternal Grandmother      Unknown/Adopted Paternal Grandfather      Asthma Sister      Cerebrovascular Disease Sister      GASTROINTESTINAL DISEASE Sister      peptic ulcers     Hypertension Sister      GASTROINTESTINAL DISEASE Sister      stomach tumors     Rheumatoid Arthritis Sister      Cancer Sister      facial cancer     Asthma Sister      Cancer Maternal Aunt      renal ca          Current Outpatient Prescriptions   Medication Sig Dispense Refill     albuterol (PROAIR HFA/PROVENTIL HFA/VENTOLIN HFA) 108 (90 BASE) MCG/ACT Inhaler Inhale 2 puffs into the lungs every 4 hours as needed for shortness of breath / dyspnea or wheezing       ASPIRIN PO Take 81 mg by mouth daily       budesonide-formoterol (SYMBICORT) 160-4.5 MCG/ACT Inhaler Inhale 2 puffs into the lungs 2 times daily 3 Inhaler 3     Calcium Citrate-Vitamin D (CALCIUM + D PO) 1200-1000mg unit tablet chewable; one tablet with a meal orally once a day.       carBAMazepine (CARBATROL) 100 MG 12 hr capsule Take 1 capsule (100 mg) by mouth At Bedtime 30 capsule 3     diclofenac (VOLTAREN) 1 % GEL Apply to the outside of the elbow 3 times per day. 100 g 1     ferrous sulfate (IRON) 325 (65 FE) MG tablet Take 325 mg by mouth 2 times daily (with meals) 90 tablet 2     FLUoxetine (PROZAC) 20 MG capsule TAKE ONE CAPSULE BY MOUTH ONCE DAILY WITH 40 MG CAPSULE 90 capsule 0     FLUoxetine (PROZAC) 40 MG capsule Take one (1) capsule BY MOUTH daily 90 capsule 3     fluticasone-salmeterol (ADVAIR) 250-50 MCG/DOSE diskus inhaler Inhale 1 puff into the lungs 2 times daily 3 Inhaler 3     gabapentin (NEURONTIN) 400 MG capsule Take 3 capsules (1,200 mg) by mouth 3 times daily 180 capsule 3     ibuprofen (ADVIL,MOTRIN) 200 MG tablet Take 200 mg by mouth       ketotifen (ZADITOR/REFRESH ANTI-ITCH) 0.025 % SOLN ophthalmic solution PLACE 2 DROPS INTO BOTH EYES 2 TIMES DAILY 5 mL 2     loperamide (IMODIUM) 2 MG capsule Take 2 mg by mouth       Loperamide HCl (ANTI-DIARRHEAL PO) Take  by mouth.       magnesium 100 MG CAPS Take by mouth 2 times daily       Multiple Vitamin (MULTIVITAMIN) per tablet Take 1 tablet by mouth daily. Every day with food       omeprazole (PRILOSEC) 20 MG CR capsule Take 1 capsule (20 mg) by mouth daily Take two daily while on prednisone 90 capsule 3     predniSONE (DELTASONE) 10 MG tablet Take 10 mg by mouth daily for 5 weeks, then  "reduce to 5 mg daily. 75 tablet 3     PSYLLIUM PO Take 3 tsp by mouth 2 times daily.       Simethicone (GAS-X EXTRA STRENGTH) 125 MG CAPS Take 4 caps twice a day       Simethicone (GAS-X PO) Take by mouth 2 times daily       tacrolimus (PROTOPIC) 0.1 % ointment        traMADol (ULTRAM) 50 MG tablet TAKE 1-2 TABLETS BY MOUTH EVERY 6 HOURS AS NEEDED 90 tablet 0     vitamin E 200 UNIT capsule Take 200 Units by mouth 2 times daily       Allergies   Allergen Reactions     Codeine Swelling     Throat swelling     Nortriptyline Other (See Comments)     Crying        Reviewed and updated as needed this visit by clinical staff       Reviewed and updated as needed this visit by Provider        Review of Systems   HENT: Negative.    Respiratory: Negative for cough, chest tightness and shortness of breath.         Upper chest wall pain     Cardiovascular: Negative for chest pain, palpitations and leg swelling.   Gastrointestinal: Negative.  Negative for nausea and vomiting.   Musculoskeletal: Positive for myalgias.        Chest wall pain over sternum.     Skin: Negative.    Neurological: Negative for dizziness, weakness, numbness and headaches.   Psychiatric/Behavioral: Negative.    /81 (BP Location: Left arm, Patient Position: Chair, Cuff Size: Adult Regular)  Pulse 76  Temp 97.4  F (36.3  C) (Tympanic)  Ht 5' 2\" (1.575 m)  Wt 135 lb (61.2 kg)  SpO2 96%  BMI 24.69 kg/m2  Physical Exam   Constitutional: She is oriented to person, place, and time. She appears well-developed and well-nourished.   Eyes: Conjunctivae and EOM are normal. Pupils are equal, round, and reactive to light.   Neck: Normal range of motion. Neck supple. No JVD present. No thyromegaly present.   Cardiovascular: Normal rate, regular rhythm and normal heart sounds.    No murmur heard.  Pulmonary/Chest: Effort normal and breath sounds normal. She exhibits tenderness.   Has normally protruberant chest above sternum.     Musculoskeletal: Normal range " of motion.   Lymphadenopathy:     She has no cervical adenopathy.   Neurological: She is alert and oriented to person, place, and time.   Skin: Skin is warm and dry.   Psychiatric: She has a normal mood and affect.     Results for orders placed or performed in visit on 07/10/18   XR CHEST 2 VW (Clinic Performed)    Narrative    PROCEDURE:  XR CHEST 2 VW    HISTORY:  ; Chest wall injury, initial encounter.     COMPARISON:  2015    FINDINGS:   The cardiac silhouette is normal in size. The pulmonary vasculature is  normal.  The lungs are clear. No pleural effusion or pneumothorax.      Impression    IMPRESSION:  No acute cardiopulmonary disease.      AYAZ BROCK MD         ASSESSMENT / PLAN:  (S29.9XXA) Chest wall injury, initial encounter  (primary encounter diagnosis)  Comment: CXR does not note a fractured sternum   Hot pads to chest. Ibuprofen 800mg 3 times a day for 3 days with food.      Plan: XR CHEST 2 VW (Clinic Performed)

## 2018-07-10 NOTE — NURSING NOTE
"Chief Complaint   Patient presents with     Musculoskeletal Problem     sternum       Initial /81 (BP Location: Left arm, Patient Position: Chair, Cuff Size: Adult Regular)  Pulse 76  Temp 97.4  F (36.3  C) (Tympanic)  Ht 5' 2\" (1.575 m)  Wt 135 lb (61.2 kg)  SpO2 96%  BMI 24.69 kg/m2 Estimated body mass index is 24.69 kg/(m^2) as calculated from the following:    Height as of this encounter: 5' 2\" (1.575 m).    Weight as of this encounter: 135 lb (61.2 kg).  Medication Reconciliation: complete    Grace Martinez LPN  "

## 2018-07-11 ENCOUNTER — TELEPHONE (OUTPATIENT)
Dept: PEDIATRICS | Facility: OTHER | Age: 63
End: 2018-07-11

## 2018-07-11 DIAGNOSIS — R07.89 STERNUM PAIN: Primary | ICD-10-CM

## 2018-07-11 ASSESSMENT — PATIENT HEALTH QUESTIONNAIRE - PHQ9: SUM OF ALL RESPONSES TO PHQ QUESTIONS 1-9: 6

## 2018-07-11 ASSESSMENT — ANXIETY QUESTIONNAIRES: GAD7 TOTAL SCORE: 0

## 2018-07-11 NOTE — TELEPHONE ENCOUNTER
8:14 AM    Reason for Call: Phone Call    Description: Pt was seen by Joreg Salinas yesterday for a sternum injury. Provider advised pt to take 800 mg of Ibuprofen for the pain. This is not helping with her pain and she is wondering if she can try something else? Please call her back at [phone]    Was an appointment offered for this call? No  If yes : Appointment type              Date    Preferred method for responding to this message: Telephone Call  What is your phone number ?  [phone]    If we cannot reach you directly, may we leave a detailed response at the number you provided? Yes    Can this message wait until your PCP/provider returns, if available today? Not applicable    Mana Luna

## 2018-07-12 DIAGNOSIS — R07.89 STERNUM PAIN: ICD-10-CM

## 2018-07-12 RX ORDER — HYDROCODONE BITARTRATE AND ACETAMINOPHEN 5; 325 MG/1; MG/1
1 TABLET ORAL 3 TIMES DAILY PRN
Qty: 18 TABLET | Refills: 0 | Status: SHIPPED | OUTPATIENT
Start: 2018-07-12 | End: 2018-08-13

## 2018-07-12 NOTE — TELEPHONE ENCOUNTER
Pt notified that the written RX is ready at the [facility]  registration to be picked up.

## 2018-07-12 NOTE — TELEPHONE ENCOUNTER
I talked with Chyna and she is willing to try Norco.  She understands that with any shortness of breath or changes in throat sensation she will go to the ED or seek immediate medical help.

## 2018-07-12 NOTE — TELEPHONE ENCOUNTER
Will send Lortab 5/325 can take up to 3 times a day. No refill        would need to followup with Dr. Camarillo. Jorge

## 2018-07-12 NOTE — TELEPHONE ENCOUNTER
Pt called to check on the status of this med. Please call her back at [phone]. PCP is out and needs call back today

## 2018-07-13 RX ORDER — HYDROCODONE BITARTRATE AND ACETAMINOPHEN 5; 325 MG/1; MG/1
TABLET ORAL
Qty: 18 TABLET | Refills: 0 | OUTPATIENT
Start: 2018-07-13

## 2018-07-15 ENCOUNTER — HEALTH MAINTENANCE LETTER (OUTPATIENT)
Age: 63
End: 2018-07-15

## 2018-07-16 ENCOUNTER — TELEPHONE (OUTPATIENT)
Dept: PEDIATRICS | Facility: OTHER | Age: 63
End: 2018-07-16

## 2018-07-16 NOTE — TELEPHONE ENCOUNTER
7/16/18 Received PA request through [employer] from [employer] for Gabapentin 400MG capsules. Submitted as urgent request through [employer]. Waiting for response.

## 2018-07-16 NOTE — TELEPHONE ENCOUNTER
APPROVAL 7/16/18 Received approval from [facility] for Gabapentin capsules. Approval dates 7/16/18 thru 7/16/19. Pharmacy advised. Forms scanned to [employer].

## 2018-07-23 ENCOUNTER — OFFICE VISIT (OUTPATIENT)
Dept: PEDIATRICS | Facility: OTHER | Age: 63
End: 2018-07-23
Attending: INTERNAL MEDICINE
Payer: COMMERCIAL

## 2018-07-23 VITALS
SYSTOLIC BLOOD PRESSURE: 110 MMHG | RESPIRATION RATE: 20 BRPM | TEMPERATURE: 97.9 F | HEART RATE: 78 BPM | OXYGEN SATURATION: 94 % | DIASTOLIC BLOOD PRESSURE: 68 MMHG

## 2018-07-23 DIAGNOSIS — G25.81 RESTLESS LEGS SYNDROME: ICD-10-CM

## 2018-07-23 DIAGNOSIS — E61.1 IRON DEFICIENCY: ICD-10-CM

## 2018-07-23 DIAGNOSIS — J45.20 MILD INTERMITTENT ASTHMA WITHOUT COMPLICATION: Primary | ICD-10-CM

## 2018-07-23 LAB
ANION GAP SERPL CALCULATED.3IONS-SCNC: 10 MMOL/L (ref 3–14)
BUN SERPL-MCNC: 30 MG/DL (ref 7–30)
CALCIUM SERPL-MCNC: 9.1 MG/DL (ref 8.5–10.1)
CHLORIDE SERPL-SCNC: 104 MMOL/L (ref 94–109)
CO2 SERPL-SCNC: 25 MMOL/L (ref 20–32)
CREAT SERPL-MCNC: 0.82 MG/DL (ref 0.52–1.04)
CRP SERPL-MCNC: 4.7 MG/L (ref 0–8)
ERYTHROCYTE [DISTWIDTH] IN BLOOD BY AUTOMATED COUNT: 13.6 % (ref 10–15)
GFR SERPL CREATININE-BSD FRML MDRD: 70 ML/MIN/1.7M2
GLUCOSE SERPL-MCNC: 104 MG/DL (ref 70–99)
HCT VFR BLD AUTO: 42.9 % (ref 35–47)
HGB BLD-MCNC: 14.5 G/DL (ref 11.7–15.7)
IRON SATN MFR SERPL: 17 % (ref 15–46)
IRON SERPL-MCNC: 62 UG/DL (ref 35–180)
MAGNESIUM SERPL-MCNC: 2.1 MG/DL (ref 1.6–2.3)
MCH RBC QN AUTO: 30.3 PG (ref 26.5–33)
MCHC RBC AUTO-ENTMCNC: 33.8 G/DL (ref 31.5–36.5)
MCV RBC AUTO: 90 FL (ref 78–100)
PLATELET # BLD AUTO: 237 10E9/L (ref 150–450)
POTASSIUM SERPL-SCNC: 4.1 MMOL/L (ref 3.4–5.3)
RBC # BLD AUTO: 4.79 10E12/L (ref 3.8–5.2)
SODIUM SERPL-SCNC: 139 MMOL/L (ref 133–144)
TIBC SERPL-MCNC: 360 UG/DL (ref 240–430)
WBC # BLD AUTO: 14.4 10E9/L (ref 4–11)

## 2018-07-23 PROCEDURE — 85027 COMPLETE CBC AUTOMATED: CPT | Performed by: INTERNAL MEDICINE

## 2018-07-23 PROCEDURE — 83550 IRON BINDING TEST: CPT | Performed by: INTERNAL MEDICINE

## 2018-07-23 PROCEDURE — 99000 SPECIMEN HANDLING OFFICE-LAB: CPT | Performed by: INTERNAL MEDICINE

## 2018-07-23 PROCEDURE — 99214 OFFICE O/P EST MOD 30 MIN: CPT | Performed by: INTERNAL MEDICINE

## 2018-07-23 PROCEDURE — 83735 ASSAY OF MAGNESIUM: CPT | Performed by: INTERNAL MEDICINE

## 2018-07-23 PROCEDURE — 82306 VITAMIN D 25 HYDROXY: CPT | Mod: 90 | Performed by: INTERNAL MEDICINE

## 2018-07-23 PROCEDURE — 80048 BASIC METABOLIC PNL TOTAL CA: CPT | Performed by: INTERNAL MEDICINE

## 2018-07-23 PROCEDURE — 83540 ASSAY OF IRON: CPT | Performed by: INTERNAL MEDICINE

## 2018-07-23 PROCEDURE — 36415 COLL VENOUS BLD VENIPUNCTURE: CPT | Performed by: INTERNAL MEDICINE

## 2018-07-23 PROCEDURE — 86140 C-REACTIVE PROTEIN: CPT | Performed by: INTERNAL MEDICINE

## 2018-07-23 RX ORDER — BUDESONIDE AND FORMOTEROL FUMARATE DIHYDRATE 160; 4.5 UG/1; UG/1
2 AEROSOL RESPIRATORY (INHALATION) 2 TIMES DAILY
Qty: 3 INHALER | Refills: 3 | COMMUNITY
Start: 2018-07-23 | End: 2018-10-12

## 2018-07-23 ASSESSMENT — PAIN SCALES - GENERAL: PAINLEVEL: SEVERE PAIN (7)

## 2018-07-23 ASSESSMENT — ANXIETY QUESTIONNAIRES
6. BECOMING EASILY ANNOYED OR IRRITABLE: NOT AT ALL
5. BEING SO RESTLESS THAT IT IS HARD TO SIT STILL: NEARLY EVERY DAY
GAD7 TOTAL SCORE: 5
1. FEELING NERVOUS, ANXIOUS, OR ON EDGE: NOT AT ALL
7. FEELING AFRAID AS IF SOMETHING AWFUL MIGHT HAPPEN: NOT AT ALL
2. NOT BEING ABLE TO STOP OR CONTROL WORRYING: NOT AT ALL
IF YOU CHECKED OFF ANY PROBLEMS ON THIS QUESTIONNAIRE, HOW DIFFICULT HAVE THESE PROBLEMS MADE IT FOR YOU TO DO YOUR WORK, TAKE CARE OF THINGS AT HOME, OR GET ALONG WITH OTHER PEOPLE: EXTREMELY DIFFICULT
3. WORRYING TOO MUCH ABOUT DIFFERENT THINGS: NOT AT ALL

## 2018-07-23 ASSESSMENT — PATIENT HEALTH QUESTIONNAIRE - PHQ9: 5. POOR APPETITE OR OVEREATING: MORE THAN HALF THE DAYS

## 2018-07-23 NOTE — PROGRESS NOTES
SUBJECTIVE:   Chyna Delgado is a 62 year old female who presents to clinic today for the following health issues:      Musculoskeletal problem/pain      Duration: Legs    Description  Location: Resteless legs bilaterally    Intensity:  severe    Accompanying signs and symptoms: Jittery legs day and night with worsening at night.    History  Previous similar problem: YES  Previous evaluation:  none    Precipitating or alleviating factors:  Trauma or overuse: no   Aggravating factors include: none    Therapies tried and outcome: requip, carbatrol, gabapentin, klonopin  She stopped the carbatrol due to feel ing drugged, emotional numbness and dizziness and no change in her leg symptoms.  She was on the medication for 2 days.   She is off her Requip.  She has also reduced her gabapentin to 600 mg TID which has not made her symptoms     -------------------------------------    Problem list and histories reviewed & adjusted, as indicated.  Additional history: as documented    Patient Active Problem List   Diagnosis     Encounter to establish care     Mild persistent asthma     Sacroiliac pain     Annual physical exam     Dysthymic disorder     Encounter for routine gynecological examination     Seborrheic keratosis     Somatic dysfunction of pelvic region     Restless legs syndrome     Skin lesion     Hand swelling     Numbness and tingling in right hand     Graves disease     Preoperative examination     Simple chronic bronchitis (H)     Arthritis     ACP (advance care planning)     Pain of toe of right foot     Moderate major depression (H)     Right shoulder pain     Abdominal muscle strain     Strain of flexor muscle of hip, unspecified laterality, initial encounter     Myalgia     Iron deficiency     Alcohol use     Past Surgical History:   Procedure Laterality Date     benign tumors removed from back       ENDOSCOPY UPPER, COLONOSCOPY, COMBINED N/A 9/5/2014    Procedure: COMBINED ENDOSCOPY UPPER, COLONOSCOPY;   Surgeon: Delbert Patel MD;  Location: [facility]     ESOPHAGOSCOPY, GASTROSCOPY, DUODENOSCOPY (EGD), COMBINED N/A 12/11/2015    Procedure: COMBINED ESOPHAGOSCOPY, GASTROSCOPY, DUODENOSCOPY (EGD);  Surgeon: Delbert Patel MD;  Location: [facility]     ESOPHAGOSCOPY, GASTROSCOPY, DUODENOSCOPY (EGD), COMBINED N/A 12/18/2017    Procedure: COMBINED ESOPHAGOSCOPY, GASTROSCOPY, DUODENOSCOPY (EGD);  UPPER ENDOSCOPY WITH BIOPSY;  Surgeon: Delbert Patel MD;  Location: [facility]     excised-benign  2002    tongue lesion     HC REPAIR OF NASAL SEPTUM  2002    Deviated nasal septum     LAPAROSCOPIC CHOLECYSTECTOMY       lumpectomy Right breast      Breast Lump     MOUTH SURGERY      removal of spot from roof of mouth     pilonidal cyst surgery  1976     removal of colon and large intestine  2000    Familial polyposis     Right etopic pregnancy      Pregnancy     TONSILLECTOMY      tonsillitus     UPPER GI ENDOSCOPY  2009    Stomach polyps       Social History   Substance Use Topics     Smoking status: Current Every Day Smoker     Packs/day: 0.50     Years: 40.00     Types: Cigarettes     Smokeless tobacco: Never Used      Comment: trying to quit currently     Alcohol use Yes      Comment: Weekly 3 drinks     Family History   Problem Relation Age of Onset     Other - See Comments Father      Atrial Fibrillation     Cancer Father      bladder ca     Colon Polyps Father      HEART DISEASE Father      Pacemaker     Rheumatoid Arthritis Father      C.A.D. Father 75     CABG     Chronic Obstructive Pulmonary Disease Mother      HEART DISEASE Mother      Pacemaker     Other - See Comments Mother      dementia     C.A.D. Mother 70     CABG     C.A.D. Maternal Grandmother      Unknown/Adopted Maternal Grandfather      Unknown/Adopted Paternal Grandmother      Unknown/Adopted Paternal Grandfather      Asthma Sister      Cerebrovascular Disease Sister      GASTROINTESTINAL DISEASE Sister      peptic ulcers     Hypertension Sister       GASTROINTESTINAL DISEASE Sister      stomach tumors     Rheumatoid Arthritis Sister      Cancer Sister      facial cancer     Asthma Sister      Cancer Maternal Aunt      renal ca           Reviewed and updated as needed this visit by clinical staff       Reviewed and updated as needed this visit by Provider         ROS:  CONSTITUTIONAL: NEGATIVE for fever, chills, change in weight  ENT/MOUTH: NEGATIVE for ear, mouth and throat problems  RESP:POSITIVE for SOB/dyspnea and NEGATIVE for cough-non productive, cough-productive and wheezing  CV: NEGATIVE for chest pain, palpitations or peripheral edema  GI: NEGATIVE for nausea, abdominal pain, heartburn, or change in bowel habits  : NEGATIVE for frequency, dysuria, or hematuria  MUSCULOSKELETAL:She has sternal tenderness due to sternal contusion with a fall.  NEURO: See HPI  PSYCHIATRIC: NEGATIVE for changes in mood or affect    OBJECTIVE:     /68 (BP Location: Left arm, Patient Position: Chair, Cuff Size: Adult Regular)  Pulse 78  Temp 97.9  F (36.6  C) (Tympanic)  Resp 20  SpO2 94%  There is no height or weight on file to calculate BMI.  GENERAL: healthy, alert and no distress  NECK: no adenopathy, no asymmetry, masses, or scars and thyroid normal to palpation  RESP: lungs clear to auscultation - no rales, rhonchi or wheezes  CV: regular rate and rhythm, normal S1 S2, no S3 or S4, no murmur, click or rub, no peripheral edema and peripheral pulses strong  CV: peripheral pulses strong and no peripheral edema  MS: no gross musculoskeletal defects noted, no edema  NEURO: Normal strength and tone, mentation intact and speech normal  PSYCH: mentation appears normal, affect normal/bright    Diagnostic Test Results:  Results for orders placed or performed in visit on 07/23/18   Iron and iron binding capacity   Result Value Ref Range    Iron 62 35 - 180 ug/dL    Iron Binding Cap 360 240 - 430 ug/dL    Iron Saturation Index 17 15 - 46 %   CBC with platelets   Result  Value Ref Range    WBC 14.4 (H) 4.0 - 11.0 10e9/L    RBC Count 4.79 3.8 - 5.2 10e12/L    Hemoglobin 14.5 11.7 - 15.7 g/dL    Hematocrit 42.9 35.0 - 47.0 %    MCV 90 78 - 100 fl    MCH 30.3 26.5 - 33.0 pg    MCHC 33.8 31.5 - 36.5 g/dL    RDW 13.6 10.0 - 15.0 %    Platelet Count 237 150 - 450 10e9/L   Basic metabolic panel   Result Value Ref Range    Sodium 139 133 - 144 mmol/L    Potassium 4.1 3.4 - 5.3 mmol/L    Chloride 104 94 - 109 mmol/L    Carbon Dioxide 25 20 - 32 mmol/L    Anion Gap 10 3 - 14 mmol/L    Glucose 104 (H) 70 - 99 mg/dL    Urea Nitrogen 30 7 - 30 mg/dL    Creatinine 0.82 0.52 - 1.04 mg/dL    GFR Estimate 70 >60 mL/min/1.7m2    GFR Estimate If Black 85 >60 mL/min/1.7m2    Calcium 9.1 8.5 - 10.1 mg/dL   CRP inflammation   Result Value Ref Range    CRP Inflammation 4.7 0.0 - 8.0 mg/L   Vitamin D Deficiency   Result Value Ref Range    Vitamin D Deficiency screening 35 20 - 75 ug/L   Magnesium   Result Value Ref Range    Magnesium 2.1 1.6 - 2.3 mg/dL       ASSESSMENT/PLAN:   (E61.1) Iron deficiency  Comment: Stable and improved.  Therefore, low iron is unlikely a cause of her RLS>  Plan:   She will continue her Ferrous Sulfate 325 mg BID.    (G25.81) Restless legs syndrome  Comment: Her hemoglobin, iron, sodium, vitamin D, potassium and magnesium are in the normal range.  She may have some component of leg cramping and require higher serum levels of potassium.  Plan  She will stop her Carbatrol and hold off on restarting her Requip.  She will wean off gabapentin. She will start 10 meq of potassium BID.       Start taking gabapentin 600 mg twice per day for 7 days then 300 twice per day for 7 days, then stop the medication.    FUTURE APPOINTMENTS:       - Follow-up visit in 2 months.  She will call weekly to reports how she is doing.    Arie Camarillo DO, DO  [facility] [place]

## 2018-07-23 NOTE — NURSING NOTE
"Chief Complaint   Patient presents with     Musculoskeletal Problem     RLS       Initial /68 (BP Location: Left arm, Patient Position: Chair, Cuff Size: Adult Regular)  Pulse 78  Temp 97.9  F (36.6  C) (Tympanic)  Resp 20  SpO2 94% Estimated body mass index is 24.69 kg/(m^2) as calculated from the following:    Height as of 7/10/18: 5' 2\" (1.575 m).    Weight as of 7/10/18: 135 lb (61.2 kg).  Medication Reconciliation: complete    Lina Díaz LPN    "

## 2018-07-24 ASSESSMENT — PATIENT HEALTH QUESTIONNAIRE - PHQ9: SUM OF ALL RESPONSES TO PHQ QUESTIONS 1-9: 5

## 2018-07-24 ASSESSMENT — ANXIETY QUESTIONNAIRES: GAD7 TOTAL SCORE: 5

## 2018-07-25 LAB — DEPRECATED CALCIDIOL+CALCIFEROL SERPL-MC: 35 UG/L (ref 20–75)

## 2018-07-25 RX ORDER — ERGOCALCIFEROL (VITAMIN D2) 50 MCG
4000 CAPSULE ORAL DAILY
Qty: 60 CAPSULE | Refills: 3 | Status: SHIPPED | OUTPATIENT
Start: 2018-07-25 | End: 2018-09-09

## 2018-07-25 RX ORDER — POTASSIUM CHLORIDE 750 MG/1
10 TABLET, EXTENDED RELEASE ORAL 2 TIMES DAILY
Qty: 60 TABLET | Refills: 3 | Status: SHIPPED | OUTPATIENT
Start: 2018-07-25 | End: 2018-08-09

## 2018-07-31 ENCOUNTER — MYC MEDICAL ADVICE (OUTPATIENT)
Dept: PEDIATRICS | Facility: OTHER | Age: 63
End: 2018-07-31

## 2018-07-31 DIAGNOSIS — G25.81 RLS (RESTLESS LEGS SYNDROME): ICD-10-CM

## 2018-07-31 PROCEDURE — 80048 BASIC METABOLIC PNL TOTAL CA: CPT | Performed by: INTERNAL MEDICINE

## 2018-07-31 PROCEDURE — 36415 COLL VENOUS BLD VENIPUNCTURE: CPT | Performed by: INTERNAL MEDICINE

## 2018-08-01 ENCOUNTER — TELEPHONE (OUTPATIENT)
Dept: INTERNAL MEDICINE | Facility: OTHER | Age: 63
End: 2018-08-01

## 2018-08-01 DIAGNOSIS — G25.81 RLS (RESTLESS LEGS SYNDROME): Primary | ICD-10-CM

## 2018-08-01 LAB
ANION GAP SERPL CALCULATED.3IONS-SCNC: 12 MMOL/L (ref 3–14)
BUN SERPL-MCNC: 19 MG/DL (ref 7–30)
CALCIUM SERPL-MCNC: 9 MG/DL (ref 8.5–10.1)
CHLORIDE SERPL-SCNC: 106 MMOL/L (ref 94–109)
CO2 SERPL-SCNC: 21 MMOL/L (ref 20–32)
CREAT SERPL-MCNC: 0.83 MG/DL (ref 0.52–1.04)
GFR SERPL CREATININE-BSD FRML MDRD: 69 ML/MIN/1.7M2
GLUCOSE SERPL-MCNC: 90 MG/DL (ref 70–99)
POTASSIUM SERPL-SCNC: 3.8 MMOL/L (ref 3.4–5.3)
SODIUM SERPL-SCNC: 139 MMOL/L (ref 133–144)

## 2018-08-01 NOTE — TELEPHONE ENCOUNTER
1:00 PM    Reason for Call: Phone Call    Description: Pt called and states that she would like to see if she could get a call back regarding if her lab work is done yet and if she got the [facility] done she states that she came in yesterday and lab orders were not in yet but she got labs drawn anyway and she wants to know if she has to come back in or not.     Was an appointment offered for this call? No  If yes : Appointment type              Date    Preferred method for responding to this message: Telephone Call  What is your phone number ?[phone]    If we cannot reach you directly, may we leave a detailed response at the number you provided? Yes    Can this message wait until your PCP/provider returns, if available today? NA, PCP is in     Liz Mathew

## 2018-08-01 NOTE — TELEPHONE ENCOUNTER
We are having some issues with the lab the last two days.  I do not have results for her and it does not appear that her lab was run.

## 2018-08-07 ENCOUNTER — MYC MEDICAL ADVICE (OUTPATIENT)
Dept: PEDIATRICS | Facility: OTHER | Age: 63
End: 2018-08-07

## 2018-08-08 DIAGNOSIS — Z86.39 H/O GRAVES' DISEASE: Primary | ICD-10-CM

## 2018-08-08 DIAGNOSIS — G25.81 RLS (RESTLESS LEGS SYNDROME): ICD-10-CM

## 2018-08-08 DIAGNOSIS — G25.81 RESTLESS LEGS SYNDROME: ICD-10-CM

## 2018-08-08 LAB
ANION GAP SERPL CALCULATED.3IONS-SCNC: 7 MMOL/L (ref 3–14)
BUN SERPL-MCNC: 19 MG/DL (ref 7–30)
CALCIUM SERPL-MCNC: 8.7 MG/DL (ref 8.5–10.1)
CHLORIDE SERPL-SCNC: 108 MMOL/L (ref 94–109)
CO2 SERPL-SCNC: 25 MMOL/L (ref 20–32)
CREAT SERPL-MCNC: 0.84 MG/DL (ref 0.52–1.04)
GFR SERPL CREATININE-BSD FRML MDRD: 68 ML/MIN/1.7M2
GLUCOSE SERPL-MCNC: 95 MG/DL (ref 70–99)
POTASSIUM SERPL-SCNC: 3.6 MMOL/L (ref 3.4–5.3)
SODIUM SERPL-SCNC: 140 MMOL/L (ref 133–144)

## 2018-08-08 PROCEDURE — 36415 COLL VENOUS BLD VENIPUNCTURE: CPT | Performed by: INTERNAL MEDICINE

## 2018-08-08 PROCEDURE — 84480 ASSAY TRIIODOTHYRONINE (T3): CPT | Mod: 90 | Performed by: INTERNAL MEDICINE

## 2018-08-08 PROCEDURE — 84443 ASSAY THYROID STIM HORMONE: CPT | Performed by: INTERNAL MEDICINE

## 2018-08-08 PROCEDURE — 84439 ASSAY OF FREE THYROXINE: CPT | Performed by: INTERNAL MEDICINE

## 2018-08-08 PROCEDURE — 99000 SPECIMEN HANDLING OFFICE-LAB: CPT | Performed by: INTERNAL MEDICINE

## 2018-08-08 PROCEDURE — 80048 BASIC METABOLIC PNL TOTAL CA: CPT | Performed by: INTERNAL MEDICINE

## 2018-08-08 PROCEDURE — 84481 FREE ASSAY (FT-3): CPT | Mod: 90 | Performed by: INTERNAL MEDICINE

## 2018-08-09 LAB
T3 SERPL-MCNC: 95 NG/DL (ref 60–181)
T3FREE SERPL-MCNC: 3.2 PG/ML (ref 2.3–4.2)
T4 FREE SERPL-MCNC: 0.96 NG/DL (ref 0.76–1.46)
TSH SERPL DL<=0.005 MIU/L-ACNC: 1.11 MU/L (ref 0.4–4)

## 2018-08-09 RX ORDER — POTASSIUM CHLORIDE 1500 MG/1
20 TABLET, EXTENDED RELEASE ORAL 3 TIMES DAILY
Qty: 90 TABLET | Refills: 3 | Status: SHIPPED | OUTPATIENT
Start: 2018-08-09 | End: 2018-11-16

## 2018-08-13 ENCOUNTER — MYC MEDICAL ADVICE (OUTPATIENT)
Dept: PEDIATRICS | Facility: OTHER | Age: 63
End: 2018-08-13

## 2018-08-13 DIAGNOSIS — G25.81 RESTLESS LEGS SYNDROME (RLS): Primary | ICD-10-CM

## 2018-08-13 RX ORDER — PRAMIPEXOLE DIHYDROCHLORIDE 0.12 MG/1
0.12 TABLET ORAL AT BEDTIME
Qty: 90 TABLET | Refills: 0 | Status: SHIPPED | OUTPATIENT
Start: 2018-08-13 | End: 2018-10-27

## 2018-08-13 NOTE — TELEPHONE ENCOUNTER
I talked with the patient and the high chance that her RLS could be due to her Prozac.  She will be weaning her Prozac 40 40 mg for 10 days, then 20 mg for 10 days then 20 mg every other day for 10 days.  She will restart her mirapex at the lowest dose for now.

## 2018-08-16 DIAGNOSIS — G25.81 RLS (RESTLESS LEGS SYNDROME): ICD-10-CM

## 2018-08-16 LAB
ANION GAP SERPL CALCULATED.3IONS-SCNC: 7 MMOL/L (ref 3–14)
BUN SERPL-MCNC: 22 MG/DL (ref 7–30)
CALCIUM SERPL-MCNC: 9.2 MG/DL (ref 8.5–10.1)
CHLORIDE SERPL-SCNC: 105 MMOL/L (ref 94–109)
CO2 SERPL-SCNC: 27 MMOL/L (ref 20–32)
CREAT SERPL-MCNC: 0.84 MG/DL (ref 0.52–1.04)
GFR SERPL CREATININE-BSD FRML MDRD: 68 ML/MIN/1.7M2
GLUCOSE SERPL-MCNC: 101 MG/DL (ref 70–99)
POTASSIUM SERPL-SCNC: 4.3 MMOL/L (ref 3.4–5.3)
SODIUM SERPL-SCNC: 139 MMOL/L (ref 133–144)

## 2018-08-16 PROCEDURE — 36415 COLL VENOUS BLD VENIPUNCTURE: CPT | Performed by: INTERNAL MEDICINE

## 2018-08-16 PROCEDURE — 80048 BASIC METABOLIC PNL TOTAL CA: CPT | Performed by: INTERNAL MEDICINE

## 2018-08-22 DIAGNOSIS — G25.81 RLS (RESTLESS LEGS SYNDROME): ICD-10-CM

## 2018-08-22 LAB
ANION GAP SERPL CALCULATED.3IONS-SCNC: 5 MMOL/L (ref 3–14)
BUN SERPL-MCNC: 15 MG/DL (ref 7–30)
CALCIUM SERPL-MCNC: 8.8 MG/DL (ref 8.5–10.1)
CHLORIDE SERPL-SCNC: 105 MMOL/L (ref 94–109)
CO2 SERPL-SCNC: 29 MMOL/L (ref 20–32)
CREAT SERPL-MCNC: 0.78 MG/DL (ref 0.52–1.04)
GFR SERPL CREATININE-BSD FRML MDRD: 74 ML/MIN/1.7M2
GLUCOSE SERPL-MCNC: 94 MG/DL (ref 70–99)
POTASSIUM SERPL-SCNC: 3.8 MMOL/L (ref 3.4–5.3)
SODIUM SERPL-SCNC: 139 MMOL/L (ref 133–144)

## 2018-08-22 PROCEDURE — 80048 BASIC METABOLIC PNL TOTAL CA: CPT | Performed by: INTERNAL MEDICINE

## 2018-08-22 PROCEDURE — 36415 COLL VENOUS BLD VENIPUNCTURE: CPT | Performed by: INTERNAL MEDICINE

## 2018-08-30 DIAGNOSIS — G25.81 RLS (RESTLESS LEGS SYNDROME): ICD-10-CM

## 2018-08-30 LAB
ANION GAP SERPL CALCULATED.3IONS-SCNC: 8 MMOL/L (ref 3–14)
BUN SERPL-MCNC: 21 MG/DL (ref 7–30)
CALCIUM SERPL-MCNC: 8.7 MG/DL (ref 8.5–10.1)
CHLORIDE SERPL-SCNC: 105 MMOL/L (ref 94–109)
CO2 SERPL-SCNC: 27 MMOL/L (ref 20–32)
CREAT SERPL-MCNC: 0.84 MG/DL (ref 0.52–1.04)
GFR SERPL CREATININE-BSD FRML MDRD: 68 ML/MIN/1.7M2
GLUCOSE SERPL-MCNC: 95 MG/DL (ref 70–99)
POTASSIUM SERPL-SCNC: 3.8 MMOL/L (ref 3.4–5.3)
SODIUM SERPL-SCNC: 140 MMOL/L (ref 133–144)

## 2018-08-30 PROCEDURE — 80048 BASIC METABOLIC PNL TOTAL CA: CPT | Performed by: INTERNAL MEDICINE

## 2018-08-30 PROCEDURE — 36415 COLL VENOUS BLD VENIPUNCTURE: CPT | Performed by: INTERNAL MEDICINE

## 2018-09-06 DIAGNOSIS — E87.6 HYPOKALEMIA: Primary | ICD-10-CM

## 2018-09-06 DIAGNOSIS — G25.81 RLS (RESTLESS LEGS SYNDROME): ICD-10-CM

## 2018-09-06 LAB
ANION GAP SERPL CALCULATED.3IONS-SCNC: 9 MMOL/L (ref 3–14)
BUN SERPL-MCNC: 20 MG/DL (ref 7–30)
CALCIUM SERPL-MCNC: 8.9 MG/DL (ref 8.5–10.1)
CHLORIDE SERPL-SCNC: 104 MMOL/L (ref 94–109)
CO2 SERPL-SCNC: 28 MMOL/L (ref 20–32)
CREAT SERPL-MCNC: 0.91 MG/DL (ref 0.52–1.04)
GFR SERPL CREATININE-BSD FRML MDRD: 62 ML/MIN/1.7M2
GLUCOSE SERPL-MCNC: 98 MG/DL (ref 70–99)
POTASSIUM SERPL-SCNC: 3.7 MMOL/L (ref 3.4–5.3)
SODIUM SERPL-SCNC: 141 MMOL/L (ref 133–144)

## 2018-09-06 PROCEDURE — 36415 COLL VENOUS BLD VENIPUNCTURE: CPT | Performed by: INTERNAL MEDICINE

## 2018-09-06 PROCEDURE — 80048 BASIC METABOLIC PNL TOTAL CA: CPT | Performed by: INTERNAL MEDICINE

## 2018-09-07 RX ORDER — SPIRONOLACTONE 25 MG/1
25 TABLET ORAL DAILY
Qty: 30 TABLET | Refills: 1 | Status: SHIPPED | OUTPATIENT
Start: 2018-09-07 | End: 2018-09-21

## 2018-09-13 DIAGNOSIS — G25.81 RLS (RESTLESS LEGS SYNDROME): ICD-10-CM

## 2018-09-13 LAB
ANION GAP SERPL CALCULATED.3IONS-SCNC: 10 MMOL/L (ref 3–14)
BUN SERPL-MCNC: 20 MG/DL (ref 7–30)
CALCIUM SERPL-MCNC: 9.4 MG/DL (ref 8.5–10.1)
CHLORIDE SERPL-SCNC: 106 MMOL/L (ref 94–109)
CO2 SERPL-SCNC: 23 MMOL/L (ref 20–32)
CREAT SERPL-MCNC: 0.86 MG/DL (ref 0.52–1.04)
GFR SERPL CREATININE-BSD FRML MDRD: 66 ML/MIN/1.7M2
GLUCOSE SERPL-MCNC: 98 MG/DL (ref 70–99)
POTASSIUM SERPL-SCNC: 3.9 MMOL/L (ref 3.4–5.3)
SODIUM SERPL-SCNC: 139 MMOL/L (ref 133–144)

## 2018-09-13 PROCEDURE — 36415 COLL VENOUS BLD VENIPUNCTURE: CPT | Performed by: INTERNAL MEDICINE

## 2018-09-13 PROCEDURE — 80048 BASIC METABOLIC PNL TOTAL CA: CPT | Performed by: INTERNAL MEDICINE

## 2018-09-17 ENCOUNTER — MYC MEDICAL ADVICE (OUTPATIENT)
Dept: PEDIATRICS | Facility: OTHER | Age: 63
End: 2018-09-17

## 2018-09-20 DIAGNOSIS — E87.6 HYPOKALEMIA: ICD-10-CM

## 2018-09-20 DIAGNOSIS — G25.81 RESTLESS LEGS SYNDROME (RLS): Primary | ICD-10-CM

## 2018-09-20 LAB
ANION GAP SERPL CALCULATED.3IONS-SCNC: 8 MMOL/L (ref 3–14)
BUN SERPL-MCNC: 23 MG/DL (ref 7–30)
CALCIUM SERPL-MCNC: 9.3 MG/DL (ref 8.5–10.1)
CHLORIDE SERPL-SCNC: 107 MMOL/L (ref 94–109)
CO2 SERPL-SCNC: 25 MMOL/L (ref 20–32)
CREAT SERPL-MCNC: 0.86 MG/DL (ref 0.52–1.04)
GFR SERPL CREATININE-BSD FRML MDRD: 67 ML/MIN/1.7M2
GLUCOSE SERPL-MCNC: 106 MG/DL (ref 70–99)
POTASSIUM SERPL-SCNC: 3.4 MMOL/L (ref 3.4–5.3)
SODIUM SERPL-SCNC: 140 MMOL/L (ref 133–144)

## 2018-09-20 PROCEDURE — 80048 BASIC METABOLIC PNL TOTAL CA: CPT | Performed by: INTERNAL MEDICINE

## 2018-09-20 PROCEDURE — 36415 COLL VENOUS BLD VENIPUNCTURE: CPT | Performed by: INTERNAL MEDICINE

## 2018-09-21 RX ORDER — SPIRONOLACTONE 25 MG/1
25 TABLET ORAL 2 TIMES DAILY
Qty: 60 TABLET | Refills: 3 | Status: SHIPPED | OUTPATIENT
Start: 2018-09-21 | End: 2018-12-17

## 2018-09-27 DIAGNOSIS — G25.81 RESTLESS LEGS SYNDROME (RLS): ICD-10-CM

## 2018-09-27 LAB
ANION GAP SERPL CALCULATED.3IONS-SCNC: 6 MMOL/L (ref 3–14)
BUN SERPL-MCNC: 18 MG/DL (ref 7–30)
CALCIUM SERPL-MCNC: 8.8 MG/DL (ref 8.5–10.1)
CHLORIDE SERPL-SCNC: 104 MMOL/L (ref 94–109)
CO2 SERPL-SCNC: 29 MMOL/L (ref 20–32)
CREAT SERPL-MCNC: 0.91 MG/DL (ref 0.52–1.04)
GFR SERPL CREATININE-BSD FRML MDRD: 62 ML/MIN/1.7M2
GLUCOSE SERPL-MCNC: 72 MG/DL (ref 70–99)
POTASSIUM SERPL-SCNC: 4 MMOL/L (ref 3.4–5.3)
SODIUM SERPL-SCNC: 139 MMOL/L (ref 133–144)

## 2018-09-27 PROCEDURE — 80048 BASIC METABOLIC PNL TOTAL CA: CPT | Performed by: INTERNAL MEDICINE

## 2018-09-27 PROCEDURE — 36415 COLL VENOUS BLD VENIPUNCTURE: CPT | Performed by: INTERNAL MEDICINE

## 2018-10-04 DIAGNOSIS — G25.81 RESTLESS LEGS SYNDROME (RLS): ICD-10-CM

## 2018-10-04 LAB
ANION GAP SERPL CALCULATED.3IONS-SCNC: 5 MMOL/L (ref 3–14)
BUN SERPL-MCNC: 20 MG/DL (ref 7–30)
CALCIUM SERPL-MCNC: 8.9 MG/DL (ref 8.5–10.1)
CHLORIDE SERPL-SCNC: 105 MMOL/L (ref 94–109)
CO2 SERPL-SCNC: 26 MMOL/L (ref 20–32)
CREAT SERPL-MCNC: 0.89 MG/DL (ref 0.52–1.04)
GFR SERPL CREATININE-BSD FRML MDRD: 64 ML/MIN/1.7M2
GLUCOSE SERPL-MCNC: 81 MG/DL (ref 70–99)
POTASSIUM SERPL-SCNC: 4.1 MMOL/L (ref 3.4–5.3)
SODIUM SERPL-SCNC: 136 MMOL/L (ref 133–144)

## 2018-10-04 PROCEDURE — 36415 COLL VENOUS BLD VENIPUNCTURE: CPT | Performed by: INTERNAL MEDICINE

## 2018-10-04 PROCEDURE — 80048 BASIC METABOLIC PNL TOTAL CA: CPT | Performed by: INTERNAL MEDICINE

## 2018-10-11 DIAGNOSIS — G25.81 RESTLESS LEGS SYNDROME (RLS): ICD-10-CM

## 2018-10-11 LAB
ANION GAP SERPL CALCULATED.3IONS-SCNC: 7 MMOL/L (ref 3–14)
BUN SERPL-MCNC: 20 MG/DL (ref 7–30)
CALCIUM SERPL-MCNC: 8.9 MG/DL (ref 8.5–10.1)
CHLORIDE SERPL-SCNC: 106 MMOL/L (ref 94–109)
CO2 SERPL-SCNC: 25 MMOL/L (ref 20–32)
CREAT SERPL-MCNC: 0.94 MG/DL (ref 0.52–1.04)
GFR SERPL CREATININE-BSD FRML MDRD: 60 ML/MIN/1.7M2
GLUCOSE SERPL-MCNC: 53 MG/DL (ref 70–99)
POTASSIUM SERPL-SCNC: 4 MMOL/L (ref 3.4–5.3)
SODIUM SERPL-SCNC: 138 MMOL/L (ref 133–144)

## 2018-10-11 PROCEDURE — 80048 BASIC METABOLIC PNL TOTAL CA: CPT | Performed by: INTERNAL MEDICINE

## 2018-10-11 PROCEDURE — 36415 COLL VENOUS BLD VENIPUNCTURE: CPT | Performed by: INTERNAL MEDICINE

## 2018-10-12 ENCOUNTER — OFFICE VISIT (OUTPATIENT)
Dept: PEDIATRICS | Facility: OTHER | Age: 63
End: 2018-10-12
Attending: INTERNAL MEDICINE
Payer: COMMERCIAL

## 2018-10-12 VITALS
HEART RATE: 88 BPM | BODY MASS INDEX: 25.24 KG/M2 | DIASTOLIC BLOOD PRESSURE: 76 MMHG | RESPIRATION RATE: 20 BRPM | SYSTOLIC BLOOD PRESSURE: 118 MMHG | OXYGEN SATURATION: 95 % | TEMPERATURE: 98.6 F | WEIGHT: 138 LBS

## 2018-10-12 DIAGNOSIS — J44.1 COPD EXACERBATION (H): ICD-10-CM

## 2018-10-12 DIAGNOSIS — K52.9 CHRONIC DIARRHEA: ICD-10-CM

## 2018-10-12 DIAGNOSIS — Z90.49 S/P COLON RESECTION: ICD-10-CM

## 2018-10-12 DIAGNOSIS — J45.40 MODERATE PERSISTENT ASTHMA, UNSPECIFIED WHETHER COMPLICATED: ICD-10-CM

## 2018-10-12 DIAGNOSIS — Z23 NEED FOR PROPHYLACTIC VACCINATION AND INOCULATION AGAINST INFLUENZA: Primary | ICD-10-CM

## 2018-10-12 PROCEDURE — 99214 OFFICE O/P EST MOD 30 MIN: CPT | Mod: 25 | Performed by: INTERNAL MEDICINE

## 2018-10-12 PROCEDURE — 90471 IMMUNIZATION ADMIN: CPT | Performed by: INTERNAL MEDICINE

## 2018-10-12 PROCEDURE — 90686 IIV4 VACC NO PRSV 0.5 ML IM: CPT | Performed by: INTERNAL MEDICINE

## 2018-10-12 RX ORDER — DICYCLOMINE HYDROCHLORIDE 10 MG/1
10 CAPSULE ORAL
Qty: 120 CAPSULE | Refills: 1 | Status: SHIPPED | OUTPATIENT
Start: 2018-10-12 | End: 2018-12-29

## 2018-10-12 RX ORDER — ALBUTEROL SULFATE 90 UG/1
2 AEROSOL, METERED RESPIRATORY (INHALATION) EVERY 4 HOURS PRN
Qty: 1 INHALER | Refills: 2 | Status: SHIPPED | OUTPATIENT
Start: 2018-10-12 | End: 2020-03-16

## 2018-10-12 ASSESSMENT — ANXIETY QUESTIONNAIRES
GAD7 TOTAL SCORE: 0
7. FEELING AFRAID AS IF SOMETHING AWFUL MIGHT HAPPEN: NOT AT ALL
2. NOT BEING ABLE TO STOP OR CONTROL WORRYING: NOT AT ALL
1. FEELING NERVOUS, ANXIOUS, OR ON EDGE: NOT AT ALL
3. WORRYING TOO MUCH ABOUT DIFFERENT THINGS: NOT AT ALL
6. BECOMING EASILY ANNOYED OR IRRITABLE: NOT AT ALL
5. BEING SO RESTLESS THAT IT IS HARD TO SIT STILL: NOT AT ALL

## 2018-10-12 ASSESSMENT — PAIN SCALES - GENERAL: PAINLEVEL: NO PAIN (0)

## 2018-10-12 ASSESSMENT — PATIENT HEALTH QUESTIONNAIRE - PHQ9: 5. POOR APPETITE OR OVEREATING: NOT AT ALL

## 2018-10-12 NOTE — LETTER
My COPD Action Plan     Name: Chyna Delgado    YOB: 1955   Date: 10/12/2018    My doctor: Arie Camarillo, DO, DO   My clinic: [facility] [place]    [address]  [place] [address]  [phone]  My Controller Medicine: Symbicort   Dose: 2 puffs twice daily     My Rescue Medicine: Albuterol nebulizer solution    Dose: 2 puffs every 4 hours as needed     My Flare Up Medicine: Prednisone   Dose: 1.5mg tablets daily     My COPD Severity: unknown      Use of Oxygen: Oxygen Not Prescribed      Make sure you've had your pneumonia   vaccines.          GREEN ZONE       Doing well today      Usual level of activity and exercise    Usual amount of cough and mucus    No shortness of breath    Usual level of health (thinking clearly, sleeping well, feel like eating) Actions:      Take daily medicines    Use oxygen as prescribed    Follow regular exercise and diet plan    Avoid cigarette smoke and other irritants that harm the lungs           YELLOW ZONE          Having a bad day or flare up      Short of breath more than usual    A lot more sputum (mucus) than usual    Sputum looks yellow, green, tan, brown or bloody    More coughing or wheezing    Fever or chills    Less energy; trouble completing activities    Trouble thinking or focusing    Using quick relief inhaler or nebulizer more often    Poor sleep; symptoms wake me up    Do not feel like eating Actions:      Get plenty of rest    Take daily medicines    Use quick relief inhaler every 4 hours    If you use oxygen, call you doctor to see if you should adjust your oxygen    Do breathing exercises or other things to help you relax    Let a loved one, friend or neighbor know you are feeling worse    Call your care team if you have 2 or more symptoms.  Start taking steroids or antibiotics if directed by your care team           RED ZONE       Need medical care now      Severe shortness of breath (feel you can't  breathe)    Fever, chills    Not enough breath to do any activity    Trouble coughing up mucus, walking or talking    Blood in mucus    Frequent coughing   Rescue medicines are not working    Not able to sleep because of breathing    Feel confused or drowsy    Chest pain    Actions:      Call your health care team.  If you cannot reach your care team, call 911 or go to the emergency room.        Annual Reminders:  Meet with Care Team, Flu Shot every Fall  Pharmacy:    [facility] - [place], [place] - [address]  [facility] - [place], [place] - [address]

## 2018-10-12 NOTE — PROGRESS NOTES
SUBJECTIVE:   Chyna Delgado is a 62 year old female who presents to clinic today for the following health issues:      Diarrhea      Duration: ongoing    Description:       Consistency of stool: watery       Blood in stool: no        Number of loose stools past 24 hours: has slowed down but cannot eat after 5pm. Is up every hour to use the bathroom    Intensity:  severe    Accompanying signs and symptoms:       Fever: no        Nausea/vomitting: no        Abdominal pain: no        Weight loss: YES    History (recent antibiotics or travel/ill contacts/med changes/testing done): Had URI but no antibiotics or sick contacts    Precipitating or alleviating factors: None    Therapies tried and outcome: Metamucil at 3pm and am, pm. Gas X qid and antidiarrheal three tabs bid. Is ready for referral to get colostomy in march or April next year.      She denies any fevers or sweating.  She denies any abdominal pain or nausea.  She reports worsening symptoms for several weeks.  She did have similar symptoms with her active graves.  She does reports bloating.  She has a complete colon resection due to familial polyposis.  She can get her bowel movement to slow down with fiber which she has increased to 2 times per day.    Wt Readings from Last 5 Encounters:   10/12/18 138 lb (62.6 kg)   07/10/18 135 lb (61.2 kg)   06/21/18 135 lb (61.2 kg)   04/18/18 145 lb (65.8 kg)   01/18/18 145 lb (65.8 kg)     BP Readings from Last 6 Encounters:   10/12/18 118/76   07/23/18 110/68   07/10/18 117/81   06/21/18 112/66   04/18/18 114/70   01/18/18 116/64         -------------------------------------    Problem list and histories reviewed & adjusted, as indicated.  Additional history: as documented    Patient Active Problem List   Diagnosis     Encounter to establish care     Mild persistent asthma     Sacroiliac pain     Annual physical exam     Dysthymic disorder     Encounter for routine gynecological examination     Seborrheic  keratosis     Somatic dysfunction of pelvic region     Restless legs syndrome     Skin lesion     Hand swelling     Numbness and tingling in right hand     Graves disease     Preoperative examination     Simple chronic bronchitis (H)     Arthritis     ACP (advance care planning)     Pain of toe of right foot     Moderate major depression (H)     Right shoulder pain     Abdominal muscle strain     Strain of flexor muscle of hip, unspecified laterality, initial encounter     Myalgia     Iron deficiency     Alcohol use     Past Surgical History:   Procedure Laterality Date     benign tumors removed from back       COLON SURGERY N/A     HX: Total colectomy with J-pouch reconstruction.      ENDOSCOPY UPPER, COLONOSCOPY, COMBINED N/A 9/5/2014    Procedure: COMBINED ENDOSCOPY UPPER, COLONOSCOPY;  Surgeon: Delbert Patel MD;  Location: [facility]     ESOPHAGOSCOPY, GASTROSCOPY, DUODENOSCOPY (EGD), COMBINED N/A 12/11/2015    Procedure: COMBINED ESOPHAGOSCOPY, GASTROSCOPY, DUODENOSCOPY (EGD);  Surgeon: Delbert Patel MD;  Location: [facility]     ESOPHAGOSCOPY, GASTROSCOPY, DUODENOSCOPY (EGD), COMBINED N/A 12/18/2017    Procedure: COMBINED ESOPHAGOSCOPY, GASTROSCOPY, DUODENOSCOPY (EGD);  UPPER ENDOSCOPY WITH BIOPSY;  Surgeon: Delbert Patel MD;  Location: [facility]     excised-benign  2002    tongue lesion     HC REPAIR OF NASAL SEPTUM  2002    Deviated nasal septum     LAPAROSCOPIC CHOLECYSTECTOMY       lumpectomy Right breast      Breast Lump     MOUTH SURGERY      removal of spot from roof of mouth     pilonidal cyst surgery  1976     removal of colon and large intestine  2000    Familial polyposis     Right etopic pregnancy      Pregnancy     TONSILLECTOMY      tonsillitus     UPPER GI ENDOSCOPY  2009    Stomach polyps       Social History   Substance Use Topics     Smoking status: Current Every Day Smoker     Packs/day: 0.50     Years: 40.00     Types: Cigarettes     Smokeless tobacco: Never Used      Comment: trying to quit  currently     Alcohol use Yes      Comment: Weekly 3 drinks     Family History   Problem Relation Age of Onset     Other - See Comments Father      Atrial Fibrillation     Cancer Father      bladder ca     Colon Polyps Father      HEART DISEASE Father      Pacemaker     Rheumatoid Arthritis Father      C.A.D. Father 75     CABG     Chronic Obstructive Pulmonary Disease Mother      HEART DISEASE Mother      Pacemaker     Other - See Comments Mother      dementia     C.A.D. Mother 70     CABG     C.A.D. Maternal Grandmother      Unknown/Adopted Maternal Grandfather      Unknown/Adopted Paternal Grandmother      Unknown/Adopted Paternal Grandfather      Asthma Sister      Cerebrovascular Disease Sister      GASTROINTESTINAL DISEASE Sister      peptic ulcers     Hypertension Sister      GASTROINTESTINAL DISEASE Sister      stomach tumors     Rheumatoid Arthritis Sister      Cancer Sister      facial cancer     Asthma Sister      Cancer Maternal Aunt      renal ca           Reviewed and updated as needed this visit by clinical staff  Tobacco  Allergies  Meds  Med Hx  Surg Hx  Fam Hx  Soc Hx      Reviewed and updated as needed this visit by Provider         ROS:  CONSTITUTIONAL: NEGATIVE for fever, chills, change in weight  EYES: NEGATIVE for vision changes or irritation  ENT/MOUTH: POSITIVE for nasal congestion and postnasal drainage and NEGATIVE for ear pain both and sore throat  RESP:POSITIVE for cough-non productive and SOB/dyspnea and NEGATIVE for wheezing  CV: NEGATIVE for chest pain, palpitations or peripheral edema  GI: POSITIVE for diarrhea and gas or bloating and NEGATIVE for abdominal pain , constipation, melena, nausea, vomiting and weight loss  : NEGATIVE for frequency, dysuria, or hematuria  MUSCULOSKELETAL: NEGATIVE for significant arthralgias or myalgia  MUSCULOSKELETAL:Leg cramping on occasion at night.  ENDOCRINE: NEGATIVE for temperature intolerance, skin/hair changes  PSYCHIATRIC: NEGATIVE for  changes in mood or affect    OBJECTIVE:     /76 (BP Location: Left arm, Patient Position: Chair, Cuff Size: Adult Regular)  Pulse 88  Temp 98.6  F (37  C) (Tympanic)  Resp 20  Wt 138 lb (62.6 kg)  SpO2 95%  BMI 25.24 kg/m2  Body mass index is 25.24 kg/(m^2).  GENERAL: healthy, alert and no distress  EYES: Eyes grossly normal to inspection and conjunctivae and sclerae normal  NECK: no adenopathy, no asymmetry, masses, or scars and thyroid normal to palpation  RESP: lungs clear to auscultation - no rales, rhonchi or wheezes  CV: regular rate and rhythm, normal S1 S2, no S3 or S4, no murmur, click or rub, no peripheral edema and peripheral pulses strong  ABDOMEN: soft, nontender, no hepatosplenomegaly, no masses and bowel sounds normal  ABDOMEN: no palpable or pulsatile masses and No shifting dullness  MS: no gross musculoskeletal defects noted, no edema  PSYCH: mentation appears normal, affect normal/bright    Diagnostic Test Results:  none     ASSESSMENT/PLAN:   (Z90.49) S/P colon resection  Comment: She has chronic diarrhea which has been worsening.  Plan:   dicyclomine (BENTYL) 10 MG capsule    (K52.9) Chronic diarrhea  Comment: Her serum labs for celiac disease were negative over on year ago.  She has graves disease with normal labs on recent check.  Her potassium has been stable.  Plan:  Start dicyclomine (BENTYL) 10 MG capsule before meals and at bedtime.    (J45.40) Moderate persistent asthma, unspecified whether complicated  Comment: Chyna has never had PFT testing and she carries both a diagnosis of  and asthma in a current smoker.  Plan:   General PFT Lab (Please always keep checked), albuterol (PROAIR HFA/PROVENTIL HFA/VENTOLIN         HFA) 108 (90 Base) MCG/ACT inhaler 2 puffs every 4 hours as needed.  She will continue Symbicort 2 puffs BID     (J44.1) COPD exacerbation (H)  Comment: As above.  Plan:   General PFT Lab (Please always keep checked) and as above.      (Z23) Need for  prophylactic vaccination and inoculation against influenza  (primary encounter diagnosis)  Comment:   Plan: HC FLU VAC PRESRV FREE QUAD SPLIT VIR 3+YRS IM,        Vaccine Administration, Initial [[id number]]                  FUTURE APPOINTMENTS:       - Follow-up visit in 3 weeks for diarrhea.    Arie Camarillo DO, DO  [facility] [place]      Injectable Influenza Immunization Documentation    1.  Is the person to be vaccinated sick today?   No    2. Does the person to be vaccinated have an allergy to a component   of the vaccine?   No  Egg Allergy Algorithm Link    3. Has the person to be vaccinated ever had a serious reaction   to influenza vaccine in the past?   No    4. Has the person to be vaccinated ever had Guillain-Barré syndrome?   No    Form completed by Lina Díaz LPN

## 2018-10-12 NOTE — LETTER
My Asthma Action Plan  Name: Chyna Delgado   YOB: 1955  Date: 10/12/2018   My doctor: Arie Camarillo, DO, DO   My clinic: [facility] - [place]        My Control Medicine: Budesonide + formoterol (Symbicort) -  160/4.5 mcg 2 puffs twice daily  My Rescue Medicine: Albuterol nebulizer solution 2 puffs every 4 hours prn  My Oral Steroid Medicine: Prednisone My Asthma Severity: Depends on day  Avoid your asthma triggers: upper respiratory infections, humidity, exercise or sports and cold air               GREEN ZONE   Good Control    I feel good    No cough or wheeze    Can work, sleep and play without asthma symptoms       Take your asthma control medicine every day.     1. If exercise triggers your asthma, take your rescue medication    15 minutes before exercise or sports, and    During exercise if you have asthma symptoms  2. Spacer to use with inhaler: If you have a spacer, make sure to use it with your inhaler             YELLOW ZONE Getting Worse  I have ANY of these:    I do not feel good    Cough or wheeze    Chest feels tight    Wake up at night   1. Keep taking your Green Zone medications  2. Start taking your rescue medicine:    every 20 minutes for up to 1 hour. Then every 4 hours for 24-48 hours.  3. If you stay in the Yellow Zone for more than 12-24 hours, contact your doctor.  4. If you do not return to the Green Zone in 12-24 hours or you get worse, start taking your oral steroid medicine if prescribed by your provider.           RED ZONE Medical Alert - Get Help  I have ANY of these:    I feel awful    Medicine is not helping    Breathing getting harder    Trouble walking or talking    Nose opens wide to breathe       1. Take your rescue medicine NOW  2. If your provider has prescribed an oral steroid medicine, start taking it NOW  3. Call your doctor NOW  4. If you are still in the Red Zone after 20 minutes and you have not reached your doctor:    Take your  rescue medicine again and    Call 911 or go to the emergency room right away    See your regular doctor within 2 weeks of an Emergency Room or Urgent Care visit for follow-up treatment.          Annual Reminders:  Meet with Asthma Educator,  Flu Shot in the Fall, consider Pneumonia Vaccination for patients with asthma (aged 19 and older).    Pharmacy:    [facility] - [place], [place] - [address] [facility] [address]                      Asthma Triggers  How To Control Things That Make Your Asthma Worse    Triggers are things that make your asthma worse.  Look at the list below to help you find your triggers and what you can do about them.  You can help prevent asthma flare-ups by staying away from your triggers.      Trigger                                                          What you can do   Cigarette Smoke  Tobacco smoke can make asthma worse. Do not allow smoking in your home, car or around you.  Be sure no one smokes at a child s day care or school.  If you smoke, ask your health care provider for ways to help you quit.  Ask family members to quit too.  Ask your health care provider for a referral to Quit Plan to help you quit smoking, or call 7-394-556-PLAN.     Colds, Flu, Bronchitis  These are common triggers of asthma. Wash your hands often.  Don t touch your eyes, nose or mouth.  Get a flu shot every year.     Dust Mites  These are tiny bugs that live in cloth or carpet. They are too small to see. Wash sheets and blankets in hot water every week.   Encase pillows and mattress in dust mite proof covers.  Avoid having carpet if you can. If you have carpet, vacuum weekly.   Use a dust mask and HEPA vacuum.   Pollen and Outdoor Mold  Some people are allergic to trees, grass, or weed pollen, or molds. Try to keep your windows closed.  Limit time out doors when pollen count is high.   Ask you health care provider about taking medicine during allergy  season.     Animal Dander  Some people are allergic to skin flakes, urine or saliva from pets with fur or feathers. Keep pets with fur or feathers out of your home.    If you can t keep the pet outdoors, then keep the pet out of your bedroom.  Keep the bedroom door closed.  Keep pets off cloth furniture and away from stuffed toys.     Mice, Rats, and Cockroaches  Some people are allergic to the waste from these pests.   Cover food and garbage.  Clean up spills and food crumbs.  Store grease in the refrigerator.   Keep food out of the bedroom.   Indoor Mold  This can be a trigger if your home has high moisture. Fix leaking faucets, pipes, or other sources of water.   Clean moldy surfaces.  Dehumidify basement if it is damp and smelly.   Smoke, Strong Odors, and Sprays  These can reduce air quality. Stay away from strong odors and sprays, such as perfume, powder, hair spray, paints, smoke incense, paint, cleaning products, candles and new carpet.   Exercise or Sports  Some people with asthma have this trigger. Be active!  Ask your doctor about taking medicine before sports or exercise to prevent symptoms.    Warm up for 5-10 minutes before and after sports or exercise.     Other Triggers of Asthma  Cold air:  Cover your nose and mouth with a scarf.  Sometimes laughing or crying can be a trigger.  Some medicines and food can trigger asthma.

## 2018-10-12 NOTE — NURSING NOTE
"Chief Complaint   Patient presents with     Diarrhea     Flu Shot       Initial /76 (BP Location: Left arm, Patient Position: Chair, Cuff Size: Adult Regular)  Pulse 88  Temp 98.6  F (37  C) (Tympanic)  Resp 20  Wt 138 lb (62.6 kg)  SpO2 95%  BMI 25.24 kg/m2 Estimated body mass index is 25.24 kg/(m^2) as calculated from the following:    Height as of 7/10/18: 5' 2\" (1.575 m).    Weight as of this encounter: 138 lb (62.6 kg).  Medication Reconciliation: complete    Lina Díaz LPN    "

## 2018-10-12 NOTE — MR AVS SNAPSHOT
After Visit Summary   10/12/2018    Chyna Delgado    MRN: [id number]           Patient Information     Date Of Birth          1955        Visit Information        Provider Department      10/12/2018 1:40 PM Arie Camarillo DO [facility]        Today's Diagnoses     Need for prophylactic vaccination and inoculation against influenza    -  1    Moderate persistent asthma, unspecified whether complicated        COPD exacerbation (H)        S/P colon resection        Chronic diarrhea           Follow-ups after your visit        Follow-up notes from your care team     Return in about 3 weeks (around 11/2/2018) for diarrhea.      Who to contact     If you have questions or need follow up information about today's clinic visit or your schedule please contact [facility] directly at [phone].  Normal or non-critical lab and imaging results will be communicated to you by MyChart, letter or phone within 4 business days after the clinic has received the results. If you do not hear from us within 7 days, please contact the clinic through MyChart or phone. If you have a critical or abnormal lab result, we will notify you by phone as soon as possible.  Submit refill requests through [employer] or call your pharmacy and they will forward the refill request to us. Please allow 3 business days for your refill to be completed.          Additional Information About Your Visit        MyChart Information     [employer] gives you secure access to your electronic health record. If you see a primary care provider, you can also send messages to your care team and make appointments. If you have questions, please call your primary care clinic.  If you do not have a primary care provider, please call [phone] and they will assist you.        Care EveryWhere ID     This is your Care EveryWhere ID. This could be used by other organizations to access your [facility]  medical records  [id number]        Your Vitals Were     Pulse Temperature Respirations Pulse Oximetry BMI (Body Mass Index)       88 98.6  F (37  C) (Tympanic) 20 95% 25.24 kg/m2        Blood Pressure from Last 3 Encounters:   10/12/18 118/76   07/23/18 110/68   07/10/18 117/81    Weight from Last 3 Encounters:   10/12/18 138 lb (62.6 kg)   07/10/18 135 lb (61.2 kg)   06/21/18 135 lb (61.2 kg)              We Performed the Following     HC FLU VAC PRESRV FREE QUAD SPLIT VIR 3+YRS IM     Vaccine Administration, Initial [54923]          Today's Medication Changes          These changes are accurate as of 10/12/18  2:24 PM.  If you have any questions, ask your nurse or doctor.               Start taking these medicines.        Dose/Directions    dicyclomine 10 MG capsule   Commonly known as:  BENTYL   Used for:  S/P colon resection, Chronic diarrhea   Started by:  Arie Camarillo DO        Dose:  10 mg   Take 1 capsule (10 mg) by mouth 3 times daily (before meals)   Quantity:  120 capsule   Refills:  1         These medicines have changed or have updated prescriptions.        Dose/Directions    budesonide-formoterol 160-4.5 MCG/ACT Inhaler   Commonly known as:  SYMBICORT   This may have changed:  Another medication with the same name was removed. Continue taking this medication, and follow the directions you see here.   Used for:  Moderate persistent asthma without complication   Changed by:  Arie Camarillo DO        Dose:  2 puff   Inhale 2 puffs into the lungs 2 times daily   Quantity:  3 Inhaler   Refills:  3            Where to get your medicines      These medications were sent to [facility]- [place], [place] - [place] - [address] [facility] [address] [facility]  [address] [address]     Phone:  [phone]     albuterol 108 (90 Base) MCG/ACT inhaler    dicyclomine 10 MG capsule                Primary Care Provider Office Phone # Fax #    Arie Camarillo [phone]  [phone]       [address] [facility] [facility] [address]        Equal Access to Services     AUDREY WICK : Hadii liset larose hadiviso Sorian, waaxda luqadaha, qaybta kaalmada zackery, patience johnsonin hayaachong garciayair helms esther bahena. So [facility] [phone].    ATENCIÓN: Si habla español, tiene a tinajero disposición servicios gratuitos de asistencia lingüística. Willy al [phone].    We comply with applicable federal civil rights laws and [place] laws. We do not discriminate on the basis of race, color, national origin, age, disability, sex, sexual orientation, or gender identity.            Thank you!     Thank you for choosing [facility]  for your care. Our goal is always to provide you with excellent care. Hearing back from our patients is one way we can continue to improve our services. Please take a few minutes to complete the written survey that you may receive in the mail after your visit with us. Thank you!             Your Updated Medication List - Protect others around you: Learn how to safely use, store and throw away your medicines at www.disposemymeds.org.          This list is accurate as of 10/12/18  2:24 PM.  Always use your most recent med list.                   Brand Name Dispense Instructions for use Diagnosis    albuterol 108 (90 Base) MCG/ACT inhaler    PROAIR HFA/PROVENTIL HFA/VENTOLIN HFA    1 Inhaler    Inhale 2 puffs into the lungs every 4 hours as needed for shortness of breath / dyspnea or wheezing    Moderate persistent asthma, unspecified whether complicated       ASPIRIN PO      Take 81 mg by mouth daily Every other day.        budesonide-formoterol 160-4.5 MCG/ACT Inhaler    SYMBICORT    3 Inhaler    Inhale 2 puffs into the lungs 2 times daily    Moderate persistent asthma without complication       CALCIUM + D PO      1200-1000mg unit tablet chewable; one tablet with a meal orally once a day.        diclofenac 1 % Gel topical gel    VOLTAREN    100 g    Apply to the  outside of the elbow 3 times per day.    Overuse injury       dicyclomine 10 MG capsule    BENTYL    120 capsule    Take 1 capsule (10 mg) by mouth 3 times daily (before meals)    S/P colon resection, Chronic diarrhea       ferrous sulfate 325 (65 Fe) MG tablet    IRON    90 tablet    Take 325 mg by mouth 2 times daily (with meals)    Iron deficiency       FLUoxetine 20 MG capsule    PROzac    90 capsule    TAKE ONE CAPSULE BY MOUTH ONCE DAILY WITH 40 MG CAPSULE    Dysthymic disorder       GAS-X PO      Take by mouth 2 times daily        ibuprofen 200 MG tablet    ADVIL/MOTRIN     Take 200 mg by mouth        ketotifen 0.025 % Soln ophthalmic solution    ZADITOR/REFRESH ANTI-ITCH    5 mL    PLACE 2 DROPS INTO BOTH EYES 2 TIMES DAILY        loperamide 2 MG capsule    IMODIUM     Take 2 mg by mouth        magnesium 100 MG Caps      Take by mouth 2 times daily        multivitamin per tablet      Take 1 tablet by mouth daily. Every day with food        omeprazole 20 MG CR capsule    priLOSEC    90 capsule    TAKE 1 CAPSULE (20 MG) BY MOUTH DAILY    Gastroesophageal reflux disease, esophagitis presence not specified       potassium chloride SA 20 MEQ CR tablet    K-DUR/KLOR-CON M    90 tablet    Take 1 tablet (20 mEq) by mouth 3 times daily    Restless legs syndrome       pramipexole 0.125 MG tablet    MIRAPEX    90 tablet    Take 1 tablet (0.125 mg) by mouth At Bedtime    Restless legs syndrome (RLS)       predniSONE 10 MG tablet    DELTASONE    75 tablet    Take 10 mg by mouth daily for 5 weeks, then reduce to 5 mg daily.    Myalgia, PMR (polymyalgia rheumatica) (H)       PSYLLIUM PO      Take 3 tsp by mouth 2 times daily.        spironolactone 25 MG tablet    ALDACTONE    60 tablet    Take 1 tablet (25 mg) by mouth 2 times daily    Hypokalemia       tacrolimus 0.1 % ointment    PROTOPIC          vitamin D 2000 units tablet     60 tablet    TAKE TWO TABLETS BY MOUTH EVERY DAY    Restless legs syndrome       vitamin E  200 UNIT capsule      Take 200 Units by mouth 2 times daily

## 2018-10-13 ASSESSMENT — ANXIETY QUESTIONNAIRES: GAD7 TOTAL SCORE: 0

## 2018-10-13 ASSESSMENT — ASTHMA QUESTIONNAIRES: ACT_TOTALSCORE: 16

## 2018-10-13 ASSESSMENT — PATIENT HEALTH QUESTIONNAIRE - PHQ9: SUM OF ALL RESPONSES TO PHQ QUESTIONS 1-9: 3

## 2018-10-19 DIAGNOSIS — Z90.49 S/P COLON RESECTION: ICD-10-CM

## 2018-10-19 DIAGNOSIS — K52.9 CHRONIC DIARRHEA: ICD-10-CM

## 2018-10-19 RX ORDER — DICYCLOMINE HYDROCHLORIDE 10 MG/1
CAPSULE ORAL
Qty: 120 CAPSULE | OUTPATIENT
Start: 2018-10-19

## 2018-10-22 ENCOUNTER — HOSPITAL ENCOUNTER (OUTPATIENT)
Dept: RESPIRATORY THERAPY | Facility: HOSPITAL | Age: 63
End: 2018-10-22
Attending: INTERNAL MEDICINE
Payer: COMMERCIAL

## 2018-10-22 LAB
COHGB MFR BLD: 2.6 % (ref 0–2)
HGB BLD-MCNC: 13.9 G/DL (ref 11.7–15.7)

## 2018-10-22 PROCEDURE — 85018 HEMOGLOBIN: CPT | Performed by: INTERNAL MEDICINE

## 2018-10-22 PROCEDURE — 36415 COLL VENOUS BLD VENIPUNCTURE: CPT | Performed by: INTERNAL MEDICINE

## 2018-10-22 PROCEDURE — 94729 DIFFUSING CAPACITY: CPT

## 2018-10-22 PROCEDURE — 94726 PLETHYSMOGRAPHY LUNG VOLUMES: CPT | Mod: 26 | Performed by: INTERNAL MEDICINE

## 2018-10-22 PROCEDURE — 94060 EVALUATION OF WHEEZING: CPT

## 2018-10-22 PROCEDURE — 94729 DIFFUSING CAPACITY: CPT | Performed by: INTERNAL MEDICINE

## 2018-10-22 PROCEDURE — 94060 EVALUATION OF WHEEZING: CPT | Mod: 26 | Performed by: INTERNAL MEDICINE

## 2018-10-22 PROCEDURE — 25000125 ZZHC RX 250: Performed by: INTERNAL MEDICINE

## 2018-10-22 PROCEDURE — 82375 ASSAY CARBOXYHB QUANT: CPT | Performed by: INTERNAL MEDICINE

## 2018-10-22 PROCEDURE — 94726 PLETHYSMOGRAPHY LUNG VOLUMES: CPT

## 2018-10-22 RX ORDER — ALBUTEROL SULFATE 0.83 MG/ML
2.5 SOLUTION RESPIRATORY (INHALATION)
Status: COMPLETED | OUTPATIENT
Start: 2018-10-22 | End: 2018-10-22

## 2018-10-22 RX ADMIN — ALBUTEROL SULFATE 2.5 MG: 2.5 SOLUTION RESPIRATORY (INHALATION) at 07:49

## 2018-10-24 NOTE — PROGRESS NOTES
SUBJECTIVE:   Chyna Delgado is a 62 year old female who presents to clinic today for the following health issues:      Diarrhea      Duration: ongoing    Description:       Consistency of stool: formed       Blood in stool: no        Number of loose stools past 24 hours: 3    Intensity:  mild    Accompanying signs and symptoms:       Fever: no        Nausea/vomitting: no        Abdominal pain: YES- the other day but only ate a little bit of apple       Weight loss: no     History (recent antibiotics or travel/ill contacts/med changes/testing done): No    Precipitating or alleviating factors: None    Therapies tried and outcome: Citracel/Metamucil and Gas X. ,metamucil, anti diarrheal, aldactone for low potassium.      She was recently placed on Bentyl which has reduced her bowel movements down to 10 from 20 per day.  She denies any dizziness.  She is getting some dry mouth.  She does reports that most of her bowel movements are from 5 am to 9 am.    -------------------------------------    Problem list and histories reviewed & adjusted, as indicated.  Additional history: as documented    Patient Active Problem List   Diagnosis     Encounter to establish care     Mild persistent asthma     Sacroiliac pain     Annual physical exam     Dysthymic disorder     Encounter for routine gynecological examination     Seborrheic keratosis     Somatic dysfunction of pelvic region     Restless legs syndrome     Skin lesion     Hand swelling     Numbness and tingling in right hand     Graves disease     Preoperative examination     Simple chronic bronchitis (H)     Arthritis     ACP (advance care planning)     Pain of toe of right foot     Moderate major depression (H)     Right shoulder pain     Abdominal muscle strain     Strain of flexor muscle of hip, unspecified laterality, initial encounter     Myalgia     Iron deficiency     Alcohol use     Functional diarrhea     Past Surgical History:   Procedure Laterality Date      benign tumors removed from back       COLON SURGERY N/A     HX: Total colectomy with J-pouch reconstruction.      ENDOSCOPY UPPER, COLONOSCOPY, COMBINED N/A 9/5/2014    Procedure: COMBINED ENDOSCOPY UPPER, COLONOSCOPY;  Surgeon: Delbert Patel MD;  Location: [facility]     ESOPHAGOSCOPY, GASTROSCOPY, DUODENOSCOPY (EGD), COMBINED N/A 12/11/2015    Procedure: COMBINED ESOPHAGOSCOPY, GASTROSCOPY, DUODENOSCOPY (EGD);  Surgeon: Delbert Patel MD;  Location: [facility]     ESOPHAGOSCOPY, GASTROSCOPY, DUODENOSCOPY (EGD), COMBINED N/A 12/18/2017    Procedure: COMBINED ESOPHAGOSCOPY, GASTROSCOPY, DUODENOSCOPY (EGD);  UPPER ENDOSCOPY WITH BIOPSY;  Surgeon: Delbert Patel MD;  Location: [facility] OR     excised-benign  2002    tongue lesion     HC REPAIR OF NASAL SEPTUM  2002    Deviated nasal septum     LAPAROSCOPIC CHOLECYSTECTOMY       lumpectomy Right breast      Breast Lump     MOUTH SURGERY      removal of spot from roof of mouth     pilonidal cyst surgery  1976     removal of colon and large intestine  2000    Familial polyposis     Right etopic pregnancy      Pregnancy     TONSILLECTOMY      tonsillitus     UPPER GI ENDOSCOPY  2009    Stomach polyps       Social History   Substance Use Topics     Smoking status: Current Every Day Smoker     Packs/day: 0.50     Years: 40.00     Types: Cigarettes     Smokeless tobacco: Never Used      Comment: trying to quit currently     Alcohol use Yes      Comment: Weekly 3 drinks     Family History   Problem Relation Age of Onset     Other - See Comments Father      Atrial Fibrillation     Cancer Father      bladder ca     Colon Polyps Father      HEART DISEASE Father      Pacemaker     Rheumatoid Arthritis Father      C.A.D. Father 75     CABG     Chronic Obstructive Pulmonary Disease Mother      HEART DISEASE Mother      Pacemaker     Other - See Comments Mother      dementia     C.A.D. Mother 70     CABG     C.A.D. Maternal Grandmother      Unknown/Adopted Maternal Grandfather       Unknown/Adopted Paternal Grandmother      Unknown/Adopted Paternal Grandfather      Asthma Sister      Cerebrovascular Disease Sister      GASTROINTESTINAL DISEASE Sister      peptic ulcers     Hypertension Sister      GASTROINTESTINAL DISEASE Sister      stomach tumors     Rheumatoid Arthritis Sister      Cancer Sister      facial cancer     Asthma Sister      Cancer Maternal Aunt      renal ca           Reviewed and updated as needed this visit by clinical staff       Reviewed and updated as needed this visit by Provider         ROS:  Constitutional, HEENT, cardiovascular, pulmonary, gi and gu systems are negative, except as otherwise noted.    OBJECTIVE:     /76 (BP Location: Right arm, Patient Position: Chair, Cuff Size: Adult Regular)  Pulse 90  Temp 98  F (36.7  C) (Tympanic)  Resp 20  Wt 138 lb (62.6 kg)  SpO2 98%  BMI 25.24 kg/m2  Body mass index is 25.24 kg/(m^2).  GENERAL: healthy, alert and no distress  EYES: Eyes grossly normal to inspection and conjunctivae and sclerae normal  NECK: no adenopathy, no asymmetry, masses, or scars and thyroid normal to palpation  RESP: lungs clear to auscultation - no rales, rhonchi or wheezes  CV: regular rate and rhythm, normal S1 S2, no S3 or S4, no murmur, click or rub, no peripheral edema and peripheral pulses strong  ABDOMEN: soft, nontender, no hepatosplenomegaly, no masses and bowel sounds normal  ABDOMEN: no bruits heard  MS: no gross musculoskeletal defects noted, no edema  PSYCH: mentation appears normal, affect normal/bright    Diagnostic Test Results:  Results for orders placed or performed in visit on 10/29/18   Comprehensive metabolic panel   Result Value Ref Range    Sodium 140 133 - 144 mmol/L    Potassium 4.1 3.4 - 5.3 mmol/L    Chloride 107 94 - 109 mmol/L    Carbon Dioxide 28 20 - 32 mmol/L    Anion Gap 5 3 - 14 mmol/L    Glucose 77 70 - 99 mg/dL    Urea Nitrogen 19 7 - 30 mg/dL    Creatinine 0.86 0.52 - 1.04 mg/dL    GFR Estimate 67 >60  mL/min/1.7m2    GFR Estimate If Black 81 >60 mL/min/1.7m2    Calcium 9.0 8.5 - 10.1 mg/dL    Bilirubin Total 0.2 0.2 - 1.3 mg/dL    Albumin 3.9 3.4 - 5.0 g/dL    Protein Total 7.6 6.8 - 8.8 g/dL    Alkaline Phosphatase 105 40 - 150 U/L    ALT 48 0 - 50 U/L    AST 22 0 - 45 U/L       ASSESSMENT/PLAN:   (E87.6) Hypokalemia  (primary encounter diagnosis)  Comment: Stable potassium.  This should further improve with less diarrhea.  Plan:   Repeat Comprehensive metabolic panel at the next visit.    (K59.1) Functional diarrhea  Comment: Much improved with bentyl.  She has no further diarrhea.  She does have frequent bowel movements   Plan:  Continue bentyl before meals and add a dose at bedtime.              FUTURE APPOINTMENTS:       - Follow-up visit in 2 months for IBs, leg cramping and muscle pains.    Arie Camarillo DO, DO  [facility] [place]

## 2018-10-27 DIAGNOSIS — G25.81 RESTLESS LEGS SYNDROME (RLS): ICD-10-CM

## 2018-10-27 DIAGNOSIS — M79.10 MYALGIA: ICD-10-CM

## 2018-10-27 DIAGNOSIS — M35.3 PMR (POLYMYALGIA RHEUMATICA) (H): ICD-10-CM

## 2018-10-29 ENCOUNTER — OFFICE VISIT (OUTPATIENT)
Dept: PEDIATRICS | Facility: OTHER | Age: 63
End: 2018-10-29
Attending: INTERNAL MEDICINE
Payer: COMMERCIAL

## 2018-10-29 VITALS
WEIGHT: 138 LBS | OXYGEN SATURATION: 98 % | HEART RATE: 90 BPM | SYSTOLIC BLOOD PRESSURE: 104 MMHG | TEMPERATURE: 98 F | RESPIRATION RATE: 20 BRPM | BODY MASS INDEX: 25.24 KG/M2 | DIASTOLIC BLOOD PRESSURE: 76 MMHG

## 2018-10-29 DIAGNOSIS — E87.6 HYPOKALEMIA: ICD-10-CM

## 2018-10-29 DIAGNOSIS — E87.6 HYPOKALEMIA: Primary | ICD-10-CM

## 2018-10-29 DIAGNOSIS — K59.1 FUNCTIONAL DIARRHEA: ICD-10-CM

## 2018-10-29 LAB
ALBUMIN SERPL-MCNC: 3.9 G/DL (ref 3.4–5)
ALP SERPL-CCNC: 105 U/L (ref 40–150)
ALT SERPL W P-5'-P-CCNC: 48 U/L (ref 0–50)
ANION GAP SERPL CALCULATED.3IONS-SCNC: 5 MMOL/L (ref 3–14)
AST SERPL W P-5'-P-CCNC: 22 U/L (ref 0–45)
BILIRUB SERPL-MCNC: 0.2 MG/DL (ref 0.2–1.3)
BUN SERPL-MCNC: 19 MG/DL (ref 7–30)
CALCIUM SERPL-MCNC: 9 MG/DL (ref 8.5–10.1)
CHLORIDE SERPL-SCNC: 107 MMOL/L (ref 94–109)
CO2 SERPL-SCNC: 28 MMOL/L (ref 20–32)
CREAT SERPL-MCNC: 0.86 MG/DL (ref 0.52–1.04)
GFR SERPL CREATININE-BSD FRML MDRD: 67 ML/MIN/1.7M2
GLUCOSE SERPL-MCNC: 77 MG/DL (ref 70–99)
POTASSIUM SERPL-SCNC: 4.1 MMOL/L (ref 3.4–5.3)
PROT SERPL-MCNC: 7.6 G/DL (ref 6.8–8.8)
SODIUM SERPL-SCNC: 140 MMOL/L (ref 133–144)

## 2018-10-29 PROCEDURE — 99213 OFFICE O/P EST LOW 20 MIN: CPT | Performed by: INTERNAL MEDICINE

## 2018-10-29 PROCEDURE — 36415 COLL VENOUS BLD VENIPUNCTURE: CPT | Performed by: INTERNAL MEDICINE

## 2018-10-29 PROCEDURE — 80053 COMPREHEN METABOLIC PANEL: CPT | Performed by: INTERNAL MEDICINE

## 2018-10-29 RX ORDER — PREDNISONE 10 MG/1
TABLET ORAL
Qty: 135 TABLET | Refills: 0 | Status: SHIPPED | OUTPATIENT
Start: 2018-10-29 | End: 2019-02-07

## 2018-10-29 RX ORDER — PRAMIPEXOLE DIHYDROCHLORIDE 0.12 MG/1
TABLET ORAL
Qty: 90 TABLET | Refills: 1 | Status: SHIPPED | OUTPATIENT
Start: 2018-10-29 | End: 2019-01-18

## 2018-10-29 ASSESSMENT — PAIN SCALES - GENERAL: PAINLEVEL: NO PAIN (0)

## 2018-10-29 NOTE — MR AVS SNAPSHOT
After Visit Summary   10/29/2018    Chyna Delgado    MRN: [id number]           Patient Information     Date Of Birth          1955        Visit Information        Provider Department      10/29/2018 9:20 AM Arie Camarillo,  [facility]        Today's Diagnoses     Hypokalemia    -  1    Functional diarrhea           Follow-ups after your visit        Follow-up notes from your care team     Return in about 2 months (around 12/29/2018) for diarrhea .      Who to contact     If you have questions or need follow up information about today's clinic visit or your schedule please contact [facility] directly at [phone].  Normal or non-critical lab and imaging results will be communicated to you by MyChart, letter or phone within 4 business days after the clinic has received the results. If you do not hear from us within 7 days, please contact the clinic through [employer] Network[employer] or phone. If you have a critical or abnormal lab result, we will notify you by phone as soon as possible.  Submit refill requests through [email or web address] or call your pharmacy and they will forward the refill request to us. Please allow 3 business days for your refill to be completed.          Additional Information About Your Visit        MyChart Information     [email or web address] gives you secure access to your electronic health record. If you see a primary care provider, you can also send messages to your care team and make appointments. If you have questions, please call your primary care clinic.  If you do not have a primary care provider, please call [phone] and they will assist you.        Care EveryWhere ID     This is your Care EveryWhere ID. This could be used by other organizations to access your [facility] medical records  [id number]        Your Vitals Were     Pulse Temperature Respirations Pulse Oximetry BMI (Body Mass Index)       90 98  F (36.7  C) (Tympanic) 20 98% 25.24  kg/m2        Blood Pressure from Last 3 Encounters:   10/29/18 104/76   10/12/18 118/76   07/23/18 110/68    Weight from Last 3 Encounters:   10/29/18 138 lb (62.6 kg)   10/12/18 138 lb (62.6 kg)   07/10/18 135 lb (61.2 kg)               Primary Care Provider Office Phone # Fax #    Arie Camarillo [phone] [address] [facility] [address]        Equal Access to Services     ADALBERTO WICK : Hadii aad ku hadasho Soomaali, waaxda luqadaha, qaybta kaalmada adeegyada, waxay johnsonin hayskye santaan . So [facility] [phone].    ATENCIÓN: Si habla español, tiene a tinajero disposición servicios gratuitos de asistencia lingüística. Llame al [phone].    We comply with applicable federal civil rights laws and [place] laws. We do not discriminate on the basis of race, color, national origin, age, disability, sex, sexual orientation, or gender identity.            Thank you!     Thank you for choosing [facility]  for your care. Our goal is always to provide you with excellent care. Hearing back from our patients is one way we can continue to improve our services. Please take a few minutes to complete the written survey that you may receive in the mail after your visit with us. Thank you!             Your Updated Medication List - Protect others around you: Learn how to safely use, store and throw away your medicines at www.disposemymeds.org.          This list is accurate as of 10/29/18  9:26 AM.  Always use your most recent med list.                   Brand Name Dispense Instructions for use Diagnosis    albuterol 108 (90 Base) MCG/ACT inhaler    PROAIR HFA/PROVENTIL HFA/VENTOLIN HFA    1 Inhaler    Inhale 2 puffs into the lungs every 4 hours as needed for shortness of breath / dyspnea or wheezing    Moderate persistent asthma, unspecified whether complicated       ASPIRIN PO      Take 81 mg by mouth daily Every other day.        budesonide-formoterol  160-4.5 MCG/ACT Inhaler    SYMBICORT    3 Inhaler    Inhale 2 puffs into the lungs 2 times daily    Moderate persistent asthma without complication       CALCIUM + D PO      1200-1000mg unit tablet chewable; one tablet with a meal orally once a day.        diclofenac 1 % Gel topical gel    VOLTAREN    100 g    Apply to the outside of the elbow 3 times per day.    Overuse injury       dicyclomine 10 MG capsule    BENTYL    120 capsule    Take 1 capsule (10 mg) by mouth 3 times daily (before meals)    S/P colon resection, Chronic diarrhea       ferrous sulfate 325 (65 Fe) MG tablet    IRON    90 tablet    Take 325 mg by mouth 2 times daily (with meals)    Iron deficiency       FLUoxetine 20 MG capsule    PROzac    90 capsule    TAKE ONE CAPSULE BY MOUTH ONCE DAILY WITH 40 MG CAPSULE    Dysthymic disorder       GAS-X PO      Take by mouth 2 times daily        ibuprofen 200 MG tablet    ADVIL/MOTRIN     Take 200 mg by mouth        ketotifen 0.025 % Soln ophthalmic solution    ZADITOR/REFRESH ANTI-ITCH    5 mL    PLACE 2 DROPS INTO BOTH EYES 2 TIMES DAILY        loperamide 2 MG capsule    IMODIUM     Take 2 mg by mouth        magnesium 100 MG Caps      Take by mouth 2 times daily        multivitamin per tablet      Take 1 tablet by mouth daily. Every day with food        omeprazole 20 MG CR capsule    priLOSEC    90 capsule    TAKE 1 CAPSULE (20 MG) BY MOUTH DAILY    Gastroesophageal reflux disease, esophagitis presence not specified       potassium chloride SA 20 MEQ CR tablet    K-DUR/KLOR-CON M    90 tablet    Take 1 tablet (20 mEq) by mouth 3 times daily    Restless legs syndrome       pramipexole 0.125 MG tablet    MIRAPEX    90 tablet    Take 1 tablet (0.125 mg) by mouth At Bedtime    Restless legs syndrome (RLS)       predniSONE 10 MG tablet    DELTASONE    75 tablet    Take 10 mg by mouth daily for 5 weeks, then reduce to 5 mg daily.    Myalgia, PMR (polymyalgia rheumatica) (H)       PSYLLIUM PO      Take 3 tsp  by mouth 2 times daily.        spironolactone 25 MG tablet    ALDACTONE    60 tablet    Take 1 tablet (25 mg) by mouth 2 times daily    Hypokalemia       tacrolimus 0.1 % ointment    PROTOPIC          vitamin D 2000 units tablet     60 tablet    TAKE TWO TABLETS BY MOUTH EVERY DAY    Restless legs syndrome       vitamin E 200 UNIT capsule      Take 200 Units by mouth 2 times daily

## 2018-10-29 NOTE — NURSING NOTE
"Chief Complaint   Patient presents with     Diarrhea       Initial /76 (BP Location: Right arm, Patient Position: Chair, Cuff Size: Adult Regular)  Pulse 90  Temp 98  F (36.7  C) (Tympanic)  Resp 20  Wt 138 lb (62.6 kg)  SpO2 98%  BMI 25.24 kg/m2 Estimated body mass index is 25.24 kg/(m^2) as calculated from the following:    Height as of 7/10/18: 5' 2\" (1.575 m).    Weight as of this encounter: 138 lb (62.6 kg).  Medication Reconciliation: complete    Lina Díaz LPN      "

## 2018-10-29 NOTE — TELEPHONE ENCOUNTER
Prednisone  Last office visit: 10/29/18  Last refill: 12/19/17 #75, 3 R  Current medication list states Prednisone 10 mg take 10 mg for 5 weeks, then reduce to 5 mg daily.  Pharmacy requesting Prednisone 10 mg take 1 and 1/2 tablet daily.  Please advise.  Thank you.

## 2018-11-19 ENCOUNTER — MYC MEDICAL ADVICE (OUTPATIENT)
Dept: PEDIATRICS | Facility: OTHER | Age: 63
End: 2018-11-19

## 2018-12-02 NOTE — MR AVS SNAPSHOT
After Visit Summary   3/13/2017    Chyna Delgado    MRN: [id number]           Patient Information     Date Of Birth          1955        Visit Information        Provider Department      3/13/2017 11:30 AM Luciano Marcum DC [facility]        Today's Diagnoses     Segmental and somatic dysfunction of lumbar region    -  1    Acute bilateral low back pain without sciatica        Segmental and somatic dysfunction of thoracic region           Follow-ups after your visit        Your next 10 appointments already scheduled     Mar 30, 2017  9:40 AM CDT   (Arrive by 9:20 AM)   SHORT with Arie Camarillo DO   [facility] ([facility])    [address]  [place] [employer] [facility] [address]   [phone]              Who to contact     If you have questions or need follow up information about today's clinic visit or your schedule please contact  [facility] directly at [phone].  Normal or non-critical lab and imaging results will be communicated to you by MyChart, letter or phone within 4 business days after the clinic has received the results. If you do not hear from us within 7 days, please contact the clinic through MyChart or phone. If you have a critical or abnormal lab result, we will notify you by phone as soon as possible.  Submit refill requests through [employer] or call your pharmacy and they will forward the refill request to us. Please allow 3 business days for your refill to be completed.          Additional Information About Your Visit        MyChart Information     [employer] gives you secure access to your electronic health record. If you see a primary care provider, you can also send messages to your care team and make appointments. If you have questions, please call your primary care clinic.  If you do not have a primary care provider, please call [phone] and they will assist you.        Care EveryWhere ID     This is your Care EveryWhere  ID. This could be used by other organizations to access your [place] medical records  [id number]         Blood Pressure from Last 3 Encounters:   02/24/17 118/62   02/08/17 119/74   01/04/17 124/70    Weight from Last 3 Encounters:   02/24/17 130 lb (59 kg)   02/08/17 147 lb (66.7 kg)   01/04/17 135 lb (61.2 kg)              We Performed the Following     CHIROPRAC MANIP,SPINAL,1-2 REGIONS        Primary Care Provider Office Phone # Fax #    Arie Camarillo,  [phone]       [facility] [address] [place] [address]        Thank you!     Thank you for choosing  [facility]  for your care. Our goal is always to provide you with excellent care. Hearing back from our patients is one way we can continue to improve our services. Please take a few minutes to complete the written survey that you may receive in the mail after your visit with us. Thank you!             Your Updated Medication List - Protect others around you: Learn how to safely use, store and throw away your medicines at www.disposemymeds.org.          This list is accurate as of: 3/13/17 11:59 PM.  Always use your most recent med list.                   Brand Name Dispense Instructions for use    ANTI-DIARRHEAL PO      Take  by mouth.       ASPIRIN PO      Take 81 mg by mouth daily       Calcium-Vitamin D 600-200 MG-UNIT Tabs          diclofenac 1 % Gel topical gel    VOLTAREN    100 g    Apply to the outside of the elbow 3 times per day.       ferrous sulfate 325 (65 FE) MG tablet    IRON    90 tablet    Take 1 tablet (325 mg) by mouth 3 times daily (with meals)       FLUoxetine 40 MG capsule    PROzac    90 capsule    Take 1 capsule (40 mg) by mouth daily       fluticasone-salmeterol 100-50 MCG/DOSE diskus inhaler    ADVAIR DISKUS    1 Inhaler    INHALE 1 PUFF TWICE DAILY       GAS-X PO      Take by mouth 2 times daily       ibuprofen 200 MG tablet    ADVIL/MOTRIN     Take 200 mg by mouth        Patient is a 54 year old  Female who presents with a chief complaint of s/p right MCA CVA with deficits (02 Dec 2018 09:00)    Patient seen and examined. Patient reports nausea resolved and BP improving. Denies vomiting, abdominal pain or any other complaints at time of assessment.      MEDICATIONS  (STANDING):  aspirin  chewable 81 milliGRAM(s) Oral daily  atorvastatin 80 milliGRAM(s) Oral at bedtime  clopidogrel Tablet 75 milliGRAM(s) Oral daily  dextrose 5%. 1000 milliLiter(s) (50 mL/Hr) IV Continuous <Continuous>  dextrose 50% Injectable 12.5 Gram(s) IV Push once  dextrose 50% Injectable 25 Gram(s) IV Push once  dextrose 50% Injectable 25 Gram(s) IV Push once  docusate sodium 100 milliGRAM(s) Oral three times a day  enoxaparin Injectable 40 milliGRAM(s) SubCutaneous every 24 hours  FLUoxetine 20 milliGRAM(s) Oral daily  insulin glargine Injectable (LANTUS) 10 Unit(s) SubCutaneous at bedtime  insulin lispro (HumaLOG) corrective regimen sliding scale   SubCutaneous three times a day before meals  insulin lispro (HumaLOG) corrective regimen sliding scale   SubCutaneous at bedtime  lisinopril 5 milliGRAM(s) Oral daily  multivitamin 1 Tablet(s) Oral daily  senna 2 Tablet(s) Oral at bedtime    MEDICATIONS  (PRN):  acetaminophen   Tablet .. 650 milliGRAM(s) Oral every 6 hours PRN Moderate Pain (4 - 6)  dextrose 40% Gel 15 Gram(s) Oral once PRN Blood Glucose LESS THAN 70 milliGRAM(s)/deciliter  glucagon  Injectable 1 milliGRAM(s) IntraMuscular once PRN Glucose LESS THAN 70 milligrams/deciliter  ondansetron    Tablet 4 milliGRAM(s) Oral every 6 hours PRN Nausea and/or Vomiting  polyethylene glycol 3350 17 Gram(s) Oral daily PRN Constipation      REVIEW OF SYSTEMS:  CONSTITUTIONAL: No fever, weight loss, has mild fatigue, but slept well  EYES: No eye pain, visual disturbances, or discharge  ENMT:  No difficulty hearing, tinnitus, vertigo; No sinus or throat pain  NECK: No pain or stiffness  RESPIRATORY: No cough, wheezing, chills or hemoptysis; No shortness of breath  CARDIOVASCULAR: No chest pain, palpitations, dizziness, or leg swelling  GASTROINTESTINAL: No abdominal or epigastric pain, no nausea, no vomiting, or hematemesis; No diarrhea or constipation. No melena or hematochezia.  GENITOURINARY: No dysuria, frequency, hematuria, or incontinence  NEUROLOGICAL: No headaches, memory loss, loss of strength, numbness, or tremors  SKIN: Lesion left buttock. No itching, burning  ENDOCRINE: No heat or cold intolerance; No hair loss  MUSCULOSKELETAL: No joint pain or swelling; No muscle, back, or extremity pain  PSYCHIATRIC: No depression, anxiety, mood swings, or difficulty sleeping  HEME/LYMPH: No easy bruising, or bleeding gums  ALLERGY AND IMMUNOLOGIC: No hives or eczema    PHYSICAL EXAM:  T(C): 36.8 (12-02-18 @ 07:44), Max: 36.8 (12-01-18 @ 21:42)  HR: 56 (12-02-18 @ 07:44) (56 - 70)  BP: 158/69 (12-02-18 @ 07:44) (148/74 - 170/81)  RR: 14 (12-02-18 @ 07:44) (14 - 14)  SpO2: 97% (12-02-18 @ 07:44) (97% - 100%)    I&O's Summary    01 Dec 2018 07:01  -  02 Dec 2018 07:00  --------------------------------------------------------  IN: 750 mL / OUT: 2 mL / NET: 748 mL          GENERAL: NAD, well-groomed, well-developed  HEAD:  Atraumatic, Normocephalic  EYES: EOMI, PERRL, conjunctiva and sclera clear  ENMT: Moist mucous membranes  NECK: Supple, No JVD, Normal thyroid  NERVOUS SYSTEM:  Alert & Oriented X3, Good concentration; no new deficit, left sided facial droop, left UE and LE weakness, decreased sensation on left side.  CHEST/LUNG: Clear to ascultation bilaterally; No rales, rhonchi, wheezing, or rubs  HEART: Regular rate and rhythm; No murmurs, rubs, or gallops  ABDOMEN: Soft, Nontender, Nondistended; Bowel sounds present  EXTREMITIES:  2+ Peripheral Pulses, No clubbing, cyanosis, or edema  SKIN: No rash      LABS: none today    CAPILLARY BLOOD GLUCOSE  POCT Blood Glucose.: 154 mg/dL (02 Dec 2018 07:46)  POCT Blood Glucose.: 154 mg/dL (01 Dec 2018 21:36)  POCT Blood Glucose.: 178 mg/dL (01 Dec 2018 17:12)  POCT Blood Glucose.: 210 mg/dL (01 Dec 2018 12:09)            RADIOLOGY & ADDITIONAL TESTS:    Imaging Personally Reviewed:  [x] YES  [ ] NO    Consultant(s) Notes Reviewed:  [x] YES  [ ] NO    Care Discussed with Consultants/Other Providers [x] YES  [ ] NO ketotifen 0.025 % Soln ophthalmic solution    ZADITOR/REFRESH ANTI-ITCH    5 mL    PLACE 2 DROPS INTO BOTH EYES 2 TIMES DAILY       magnesium 100 MG Caps      Take by mouth 2 times daily       multivitamin per tablet      Take 1 tablet by mouth daily. Every day with food       omeprazole 20 MG CR capsule    priLOSEC    90 capsule    Take 1 capsule (20 mg) by mouth daily       pramipexole 0.5 MG tablet    MIRAPEX    90 tablet    TAKE 1 TABLET BY MOUTH DAILY AT BEDTIME       PSYLLIUM PO      Take 3 tsp by mouth 2 times daily.       tacrolimus 0.1 % ointment    PROTOPIC         traMADol 50 MG tablet    ULTRAM    90 tablet    TAKE 1-2 TABLETS BY MOUTH EVERY 6 HOURS AS NEEDED       vitamin E 200 UNIT capsule      Take 200 Units by mouth 2 times daily          Patient is a 54 year old  Female who presents with a chief complaint of s/p right MCA CVA with deficits (02 Dec 2018 09:00)    Patient seen and examined. Patient reports nausea resolved and BP improving. Denies vomiting, abdominal pain, states her appetite is slow picking up but has constipation, denies any other complaints at time of assessment.      MEDICATIONS  (STANDING):  aspirin  chewable 81 milliGRAM(s) Oral daily  atorvastatin 80 milliGRAM(s) Oral at bedtime  clopidogrel Tablet 75 milliGRAM(s) Oral daily  dextrose 5%. 1000 milliLiter(s) (50 mL/Hr) IV Continuous <Continuous>  dextrose 50% Injectable 12.5 Gram(s) IV Push once  dextrose 50% Injectable 25 Gram(s) IV Push once  dextrose 50% Injectable 25 Gram(s) IV Push once  docusate sodium 100 milliGRAM(s) Oral three times a day  enoxaparin Injectable 40 milliGRAM(s) SubCutaneous every 24 hours  FLUoxetine 20 milliGRAM(s) Oral daily  insulin glargine Injectable (LANTUS) 10 Unit(s) SubCutaneous at bedtime  insulin lispro (HumaLOG) corrective regimen sliding scale   SubCutaneous three times a day before meals  insulin lispro (HumaLOG) corrective regimen sliding scale   SubCutaneous at bedtime  lisinopril 5 milliGRAM(s) Oral daily  multivitamin 1 Tablet(s) Oral daily  senna 2 Tablet(s) Oral at bedtime    MEDICATIONS  (PRN):  acetaminophen   Tablet .. 650 milliGRAM(s) Oral every 6 hours PRN Moderate Pain (4 - 6)  dextrose 40% Gel 15 Gram(s) Oral once PRN Blood Glucose LESS THAN 70 milliGRAM(s)/deciliter  glucagon  Injectable 1 milliGRAM(s) IntraMuscular once PRN Glucose LESS THAN 70 milligrams/deciliter  ondansetron    Tablet 4 milliGRAM(s) Oral every 6 hours PRN Nausea and/or Vomiting  polyethylene glycol 3350 17 Gram(s) Oral daily PRN Constipation      REVIEW OF SYSTEMS:  CONSTITUTIONAL: No fever, weight loss, has mild fatigue, but slept well  EYES: No eye pain, visual disturbances, or discharge  ENMT:  No difficulty hearing, tinnitus, vertigo; No sinus or throat pain  NECK: No pain or stiffness  RESPIRATORY: No cough, wheezing, chills or hemoptysis; No shortness of breath  CARDIOVASCULAR: No chest pain, palpitations, dizziness, or leg swelling  GASTROINTESTINAL: No abdominal or epigastric pain, no nausea, no vomiting, or hematemesis; No diarrhea. Has constipation. No melena or hematochezia.  GENITOURINARY: No dysuria, frequency, hematuria, or incontinence  NEUROLOGICAL: No headaches, memory loss, loss of strength, numbness, or tremors  SKIN: Lesion left buttock. No itching, burning  ENDOCRINE: No heat or cold intolerance; No hair loss  MUSCULOSKELETAL: No joint pain or swelling; No muscle, back, or extremity pain  PSYCHIATRIC: No depression, anxiety, mood swings, or difficulty sleeping  HEME/LYMPH: No easy bruising, or bleeding gums  ALLERGY AND IMMUNOLOGIC: No hives or eczema    PHYSICAL EXAM:  T(C): 36.8 (12-02-18 @ 07:44), Max: 36.8 (12-01-18 @ 21:42)  HR: 56 (12-02-18 @ 07:44) (56 - 70)  BP: 158/69 (12-02-18 @ 07:44) (148/74 - 170/81)  RR: 14 (12-02-18 @ 07:44) (14 - 14)  SpO2: 97% (12-02-18 @ 07:44) (97% - 100%)    I&O's Summary    01 Dec 2018 07:01  -  02 Dec 2018 07:00  --------------------------------------------------------  IN: 750 mL / OUT: 2 mL / NET: 748 mL          GENERAL: NAD, well-groomed, well-developed  HEAD:  Atraumatic, Normocephalic  EYES: EOMI, PERRL, conjunctiva and sclera clear  ENMT: Moist mucous membranes  NECK: Supple, No JVD, Normal thyroid  NERVOUS SYSTEM:  Alert & Oriented X3, Good concentration; no new deficit, left sided facial droop, left UE and LE weakness, decreased sensation on left side.  CHEST/LUNG: Clear to ascultation bilaterally; No rales, rhonchi, wheezing, or rubs  HEART: Regular rate and rhythm; No murmurs, rubs, or gallops  ABDOMEN: Soft, Nontender, Nondistended; Bowel sounds present  EXTREMITIES:  2+ Peripheral Pulses, No clubbing, cyanosis, or edema  SKIN: No rash      LABS: none today    CAPILLARY BLOOD GLUCOSE  POCT Blood Glucose.: 154 mg/dL (02 Dec 2018 07:46)  POCT Blood Glucose.: 154 mg/dL (01 Dec 2018 21:36)  POCT Blood Glucose.: 178 mg/dL (01 Dec 2018 17:12)  POCT Blood Glucose.: 210 mg/dL (01 Dec 2018 12:09)            RADIOLOGY & ADDITIONAL TESTS:    Imaging Personally Reviewed:  [x] YES  [ ] NO    Consultant(s) Notes Reviewed:  [x] YES  [ ] NO    Care Discussed with Consultants/Other Providers [x] YES  [ ] NO

## 2018-12-06 DIAGNOSIS — M35.3 PMR (POLYMYALGIA RHEUMATICA) (H): ICD-10-CM

## 2018-12-06 NOTE — TELEPHONE ENCOUNTER
gabapentin (NEURONTIN) 400 MG capsule    Last Written Prescription Date:  Med not on list   Last Fill Quantity: 0,   # refills: 0  Last Office Visit: 10/29/18  Future Office visit:    Next 5 appointments (look out 90 days)     Dec 31, 2018  1:20 PM CST   (Arrive by 1:00 PM)   SHORT with Arie Camarillo DO   [facility] ([facility] )    [address] [place] [address]   [phone]                   Routing refill request to provider for review/approval because:  Drug not on the [facility], P or [facility] refill protocol or controlled substance

## 2018-12-07 RX ORDER — GABAPENTIN 400 MG/1
CAPSULE ORAL
Qty: 180 CAPSULE | Refills: 4 | Status: SHIPPED | OUTPATIENT
Start: 2018-12-07 | End: 2019-02-26

## 2018-12-07 NOTE — TELEPHONE ENCOUNTER
Med discontinued on 7/23/18 by Dr Camarillo for reason- none. Per office visit notes from that day- Start taking gabapentin 600 mg twice per day for 7 days then 300 twice per day for 7 days, then stop the medication.    Please advise. Thank you!

## 2018-12-12 ENCOUNTER — TELEPHONE (OUTPATIENT)
Dept: PEDIATRICS | Facility: OTHER | Age: 63
End: 2018-12-12

## 2018-12-12 NOTE — TELEPHONE ENCOUNTER
GENERIC ADULT  Chyna Delgado is a 62 year old female who calls with back pain rates as 10 on 0-10 pain scale. Located mid back pain, describes as constant and sharp. Ibupofen gives pt little relief. Pt reports no injury, no other symptoms at this time. Pt wants to be seen ASAP.     RECOMMENDED DISPOSITION:  pt scheduled with pcp declined to see anyone else  Next 5 appointments (look out 90 days)    Dec 13, 2018 11:10 AM CST  Return Visit with Luciano Marcum DC  [facility] [address]  [facility] [place] [address]  [phone]   Dec 26, 2018  2:00 PM CST  (Arrive by 1:40 PM)  SHORT with Arie Camarillo DO  [facility] [address] [facility] [address]  [phone]   Dec 31, 2018  1:20 PM CST  (Arrive by 1:00 PM)  SHORT with Arie Camarillo DO  [facility] ([facility] ) [address] [place] [address]  [phone]        Will comply with recommendation: Yes    If further questions/concerns or if symptoms do not improve, worsen or new symptoms develop, call your PCP or [facility] as soon as possible.      Guideline used: Adult Telephone Protocols Office Version 3rd Edition - Ubaldo Edward MD, FACEP      Elaina Perez, RN

## 2018-12-13 ENCOUNTER — OFFICE VISIT (OUTPATIENT)
Dept: CHIROPRACTIC MEDICINE | Facility: OTHER | Age: 63
End: 2018-12-13
Attending: CHIROPRACTOR
Payer: COMMERCIAL

## 2018-12-13 DIAGNOSIS — M99.02 SEGMENTAL AND SOMATIC DYSFUNCTION OF THORACIC REGION: ICD-10-CM

## 2018-12-13 DIAGNOSIS — M99.03 SEGMENTAL AND SOMATIC DYSFUNCTION OF LUMBAR REGION: Primary | ICD-10-CM

## 2018-12-13 DIAGNOSIS — M99.01 SEGMENTAL AND SOMATIC DYSFUNCTION OF CERVICAL REGION: ICD-10-CM

## 2018-12-13 DIAGNOSIS — M54.50 ACUTE BILATERAL LOW BACK PAIN WITHOUT SCIATICA: ICD-10-CM

## 2018-12-13 PROCEDURE — 98940 CHIROPRACT MANJ 1-2 REGIONS: CPT | Mod: AT | Performed by: CHIROPRACTOR

## 2018-12-17 DIAGNOSIS — E87.6 HYPOKALEMIA: ICD-10-CM

## 2018-12-17 NOTE — PROGRESS NOTES
Subjective Finding:    Chief compalint: Patient presents with:  Back Pain  Neck Pain  , Pain Scale: 6/10, Intensity: dull, Duration: 7 days, Change since last visit: , Radiating: Buttocks    Date of injury:     Activities that the pain restricts:   Home/household activities: yes.  Work duties: no.  Hobbies/social: yes.  Sleep: no.  Makes symptoms better: ice .  Makes symptoms worse: activity, lumbar extension and lumbar flexion.  Have you seen anyone else for the symptoms? no.  Work related: no.  Automobile related injury: no.    Objective and Assessment:    Posture Analysis:   High shoulder: .  Head tilt: .  High iliac crest: .  Head carriage: .  Thoracic Kyphosis: neutral.  Lumbar Lordosis: reverse.    Lumbar Range of Motion: flexion decreased and extension decreased.  Cervical Range of Motion: .  Thoracic Range of Motion: .  Extremity Range of Motion: .    Palpation:   Quad lumb: right, referred pain: no    Segmental dysfunction pre-treatment: T8, T9, L4, L5 and Sacrum.        Assessment post-treatment:  Cervical: .  Thoracic: .  Lumbar: ROM increased and muscle spasm decreased.    Comments: .      Complicating Factors: .    Plan / Procedure:    Expected release date: .  Treatment plan: PRN.  Instructed patient: ice 20 minutes every other hour as needed.  Short term goals: reduce pain and increase ROM.  Long term goals: increase patient functional independence.  Prognosis: very good.

## 2018-12-18 ENCOUNTER — OFFICE VISIT (OUTPATIENT)
Dept: CHIROPRACTIC MEDICINE | Facility: OTHER | Age: 63
End: 2018-12-18
Attending: CHIROPRACTOR
Payer: COMMERCIAL

## 2018-12-18 DIAGNOSIS — M99.03 SEGMENTAL AND SOMATIC DYSFUNCTION OF LUMBAR REGION: ICD-10-CM

## 2018-12-18 DIAGNOSIS — M99.01 SEGMENTAL AND SOMATIC DYSFUNCTION OF CERVICAL REGION: ICD-10-CM

## 2018-12-18 DIAGNOSIS — M54.50 ACUTE BILATERAL LOW BACK PAIN WITHOUT SCIATICA: ICD-10-CM

## 2018-12-18 DIAGNOSIS — M99.02 SEGMENTAL AND SOMATIC DYSFUNCTION OF THORACIC REGION: Primary | ICD-10-CM

## 2018-12-18 PROCEDURE — 98941 CHIROPRACT MANJ 3-4 REGIONS: CPT | Mod: AT | Performed by: CHIROPRACTOR

## 2018-12-18 RX ORDER — SPIRONOLACTONE 25 MG/1
25 TABLET ORAL 2 TIMES DAILY
Qty: 60 TABLET | Refills: 1 | Status: SHIPPED | OUTPATIENT
Start: 2018-12-18 | End: 2019-01-25

## 2018-12-19 NOTE — PROGRESS NOTES
Subjective Finding:    Chief compalint: Patient presents with:  Back Pain  , Pain Scale: 6/10, Intensity: dull, Duration: 7 days, Change since last visit: , Radiating:  ribs    Date of injury:     Activities that the pain restricts:   Home/household activities: yes.  Work duties: no.  Hobbies/social: yes.  Sleep: no.  Makes symptoms better: ice .  Makes symptoms worse: activity, lumbar extension and lumbar flexion.  Have you seen anyone else for the symptoms? no.  Work related: no.  Automobile related injury: no.    Objective and Assessment:    Posture Analysis:   High shoulder: .  Head tilt: .  High iliac crest: .  Head carriage: .  Thoracic Kyphosis: neutral.  Lumbar Lordosis: reverse.    Lumbar Range of Motion: flexion decreased and extension decreased.  Cervical Range of Motion: .  Thoracic Range of Motion: .  Extremity Range of Motion: .    Palpation:   T spine pain    Segmental dysfunction pre-treatment: T8, T9, L4, L5 and Sacrum.     C7    Assessment post-treatment:  Cervical: .  Thoracic: .  Lumbar: ROM increased and muscle spasm decreased.    Comments: .      Complicating Factors: .    Plan / Procedure:    Expected release date: .  Treatment plan: PRN.  Instructed patient: ice 20 minutes every other hour as needed.  Short term goals: reduce pain and increase ROM.  Long term goals: increase patient functional independence.  Prognosis: very good.

## 2018-12-27 NOTE — PROGRESS NOTES
"  SUBJECTIVE:   Chyna Delgado is a 63 year old female who presents to clinic today for the following health issues:      Gastrointestinal symptoms/IBS      Duration: chronic    Description:           REFLUX SYMPTOMS - heartburn, belching, problems swallowing solids and liquids, cough and chest pain      Intensity:  mild    Accompanying signs and symptoms:  diarrhea    History  Previous {similar problem: YES- chronic  Previous evaluation:  EGD and colonoscopy    Aggravating factors: none    Alleviating factors: nothing    Other Therapies tried: Tums calms symptoms.  She is on Omeprazole 20 mg daily.        Wt Readings from Last 5 Encounters:   12/31/18 63.5 kg (140 lb)   10/29/18 62.6 kg (138 lb)   10/12/18 62.6 kg (138 lb)   07/10/18 61.2 kg (135 lb)   06/21/18 61.2 kg (135 lb)     Musculoskeletal problem/pain      Duration: 2 days ago    Description  Location: right leg/knee    Intensity:  mild    Accompanying signs and symptoms: right leg- \"water on knee\" swelling and brusing      History  Previous similar problem: no   Previous evaluation:  none    Precipitating or alleviating factors:  Trauma or overuse: no   Aggravating factors include: none    Therapies tried and outcome: nothing    She is taking her prednisone less than 10 mg daily.  -------------------------------------    Problem list and histories reviewed & adjusted, as indicated.  Additional history: as documented    Patient Active Problem List   Diagnosis     Encounter to establish care     Mild persistent asthma     Sacroiliac pain     Annual physical exam     Dysthymic disorder     Encounter for routine gynecological examination     Seborrheic keratosis     Somatic dysfunction of pelvic region     Restless legs syndrome     Skin lesion     Hand swelling     Numbness and tingling in right hand     Graves disease     Preoperative examination     Simple chronic bronchitis (H)     Arthritis     ACP (advance care planning)     Pain of toe of right " foot     Moderate major depression (H)     Right shoulder pain     Abdominal muscle strain     Strain of flexor muscle of hip, unspecified laterality, initial encounter     Myalgia     Iron deficiency     Alcohol use     Functional diarrhea     Past Surgical History:   Procedure Laterality Date     benign tumors removed from back       COLON SURGERY N/A     HX: Total colectomy with J-pouch reconstruction.      ENDOSCOPY UPPER, COLONOSCOPY, COMBINED N/A 9/5/2014    Procedure: COMBINED ENDOSCOPY UPPER, COLONOSCOPY;  Surgeon: Delbert Patel MD;  Location: [facility]     ESOPHAGOSCOPY, GASTROSCOPY, DUODENOSCOPY (EGD), COMBINED N/A 12/11/2015    Procedure: COMBINED ESOPHAGOSCOPY, GASTROSCOPY, DUODENOSCOPY (EGD);  Surgeon: Delbert Patel MD;  Location: [facility]     ESOPHAGOSCOPY, GASTROSCOPY, DUODENOSCOPY (EGD), COMBINED N/A 12/18/2017    Procedure: COMBINED ESOPHAGOSCOPY, GASTROSCOPY, DUODENOSCOPY (EGD);  UPPER ENDOSCOPY WITH BIOPSY;  Surgeon: Delbert Patel MD;  Location: [facility]     excised-benign  2002    tongue lesion     HC REPAIR OF NASAL SEPTUM  2002    Deviated nasal septum     LAPAROSCOPIC CHOLECYSTECTOMY       lumpectomy Right breast      Breast Lump     MOUTH SURGERY      removal of spot from roof of mouth     pilonidal cyst surgery  1976     removal of colon and large intestine  2000    Familial polyposis     Right etopic pregnancy      Pregnancy     TONSILLECTOMY      tonsillitus     UPPER GI ENDOSCOPY  2009    Stomach polyps       Social History     Tobacco Use     Smoking status: Current Every Day Smoker     Packs/day: 0.50     Years: 40.00     Pack years: 20.00     Types: Cigarettes     Smokeless tobacco: Never Used     Tobacco comment: trying to quit currently   Substance Use Topics     Alcohol use: Yes     Comment: Weekly 3 drinks     Family History   Problem Relation Age of Onset     Other - See Comments Father         Atrial Fibrillation     Cancer Father         bladder ca     Colon Polyps Father       Heart Disease Father         Pacemaker     Rheumatoid Arthritis Father      C.A.D. Father 75        CABG     Chronic Obstructive Pulmonary Disease Mother      Heart Disease Mother         Pacemaker     Other - See Comments Mother         dementia     C.A.D. Mother 70        CABG     C.A.D. Maternal Grandmother      Unknown/Adopted Maternal Grandfather      Unknown/Adopted Paternal Grandmother      Unknown/Adopted Paternal Grandfather      Asthma Sister      Cerebrovascular Disease Sister      Gastrointestinal Disease Sister         peptic ulcers     Hypertension Sister      Gastrointestinal Disease Sister         stomach tumors     Rheumatoid Arthritis Sister      Cancer Sister         facial cancer     Asthma Sister      Cancer Maternal Aunt         renal ca           Reviewed and updated as needed this visit by clinical staff       Reviewed and updated as needed this visit by Provider         ROS:  CONSTITUTIONAL: NEGATIVE for fever, chills, change in weight  EYES: NEGATIVE for vision changes or irritation  ENT/MOUTH: NEGATIVE for ear, mouth and throat problems  RESP: NEGATIVE for significant cough or SOB  CV: NEGATIVE for chest pain, palpitations or peripheral edema  GI: NEGATIVE for nausea, abdominal pain, heartburn, or change in bowel habits  GI: See HPI.  , POSITIVE for heartburn or reflux and Loose bowel movement 10 per day and 2-3 per night and NEGATIVE for hematemesis, hematochezia and melena  : NEGATIVE for frequency, dysuria, or hematuria  MUSCULOSKELETAL:See HPI and right knee pain and swelling  NEURO: NEGATIVE for weakness, dizziness or paresthesias  HEME: NEGATIVE for bleeding problems  HEME/ALLERGY/IMMUNE: ecchymosis of the right lower leg  PSYCHIATRIC: NEGATIVE for changes in mood or affect    OBJECTIVE:     /72   Pulse 76   Temp 98  F (36.7  C) (Tympanic)   Wt 63.5 kg (140 lb)   SpO2 97%   BMI 25.61 kg/m    Body mass index is 25.61 kg/m .  GENERAL: healthy, alert and no  distress  GENERAL: cushingoid appearance   NECK: no adenopathy, no asymmetry, masses, or scars and thyroid normal to palpation  RESP: lungs clear to auscultation - no rales, rhonchi or wheezes  RESP: rales bilateral  CV: regular rate and rhythm, normal S1 S2, no S3 or S4, no murmur, click or rub, no peripheral edema and peripheral pulses strong  ABDOMEN: soft, nontender, no hepatosplenomegaly, no masses and bowel sounds normal  ABDOMEN: no bruits heard  MS: no gross musculoskeletal defects noted, no edema  MS: right patellar insertion swelling   SKIN: no suspicious lesions or rashes  NEURO: Normal strength and tone, mentation intact and speech normal    Diagnostic Test Results:  Results for orders placed or performed in visit on 12/31/18 (from the past 24 hour(s))   CBC with platelets   Result Value Ref Range    WBC 15.8 (H) 4.0 - 11.0 10e9/L    RBC Count 4.75 3.8 - 5.2 10e12/L    Hemoglobin 15.0 11.7 - 15.7 g/dL    Hematocrit 44.1 35.0 - 47.0 %    MCV 93 78 - 100 fl    MCH 31.6 26.5 - 33.0 pg    MCHC 34.0 31.5 - 36.5 g/dL    RDW 13.2 10.0 - 15.0 %    Platelet Count 278 150 - 450 10e9/L   Comprehensive metabolic panel   Result Value Ref Range    Sodium 135 133 - 144 mmol/L    Potassium 4.2 3.4 - 5.3 mmol/L    Chloride 102 94 - 109 mmol/L    Carbon Dioxide 28 20 - 32 mmol/L    Anion Gap 5 3 - 14 mmol/L    Glucose 106 (H) 70 - 99 mg/dL    Urea Nitrogen 30 7 - 30 mg/dL    Creatinine 0.90 0.52 - 1.04 mg/dL    GFR Estimate 67 >60 mL/min/[1.73_m2]    GFR Estimate If Black 78 >60 mL/min/[1.73_m2]    Calcium 9.5 8.5 - 10.1 mg/dL    Bilirubin Total 0.4 0.2 - 1.3 mg/dL    Albumin 4.1 3.4 - 5.0 g/dL    Protein Total 7.5 6.8 - 8.8 g/dL    Alkaline Phosphatase 110 40 - 150 U/L    ALT 36 0 - 50 U/L    AST 19 0 - 45 U/L       ASSESSMENT/PLAN:   (Z90.49) S/P colon resection  Comment: She has bowel movement frequency on bentyl  Plan:   dicyclomine (BENTYL) 10 MG capsule, 20 mg TID    (K52.9) Chronic diarrhea  Comment: Secondary to  MARGARITA and short bowels   Plan:   Increase dicyclomine (BENTYL) 10 MG capsule, 20 mg TID    (K21.9) Gastroesophageal reflux disease, esophagitis presence not specified  Comment: Secondary to prednisone use.  Plan:   Increase omeprazole (PRILOSEC) 20 MG DR capsule to BID.    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  She has had well controlled PMR on this medication, but the adverse effects are likely to cause serious complications with chronic use vs intermittent use.  Plan:   Start taking 5 mg( 1/2 tablet) of prednisone daily for 10 days, then 2.5 mg daily for 10 days.    (M70.51) Patellar bursitis of right knee  Comment: Reassurance given.  Plan:   She was advised to wear knee pads when scrubbing the floors.    (Z12.31) Encounter for screening mammogram for breast cancer  (primary encounter diagnosis)  Comment:   Plan:   MA SCREENING DIGITAL BILATERAL ([place])    (Z72.0) Tobacco abuse  Comment:   Plan: Tobacco Cessation - Order to Satisfy Health         Maintenance                FUTURE APPOINTMENTS:       - Follow-up visit in 2 weeks     Arie Camarillo DO,   [facility] - [place]

## 2018-12-29 DIAGNOSIS — Z90.49 S/P COLON RESECTION: ICD-10-CM

## 2018-12-29 DIAGNOSIS — K52.9 CHRONIC DIARRHEA: ICD-10-CM

## 2018-12-31 ENCOUNTER — OFFICE VISIT (OUTPATIENT)
Dept: PEDIATRICS | Facility: OTHER | Age: 63
End: 2018-12-31
Attending: INTERNAL MEDICINE
Payer: COMMERCIAL

## 2018-12-31 VITALS
TEMPERATURE: 98 F | DIASTOLIC BLOOD PRESSURE: 72 MMHG | BODY MASS INDEX: 25.61 KG/M2 | WEIGHT: 140 LBS | HEART RATE: 76 BPM | OXYGEN SATURATION: 97 % | SYSTOLIC BLOOD PRESSURE: 104 MMHG

## 2018-12-31 DIAGNOSIS — Z90.49 S/P COLON RESECTION: ICD-10-CM

## 2018-12-31 DIAGNOSIS — R68.89 CUSHINGOID FACIES: ICD-10-CM

## 2018-12-31 DIAGNOSIS — Z12.31 ENCOUNTER FOR SCREENING MAMMOGRAM FOR BREAST CANCER: Primary | ICD-10-CM

## 2018-12-31 DIAGNOSIS — K52.9 CHRONIC DIARRHEA: ICD-10-CM

## 2018-12-31 DIAGNOSIS — M70.51 PATELLAR BURSITIS OF RIGHT KNEE: ICD-10-CM

## 2018-12-31 DIAGNOSIS — K21.9 GASTROESOPHAGEAL REFLUX DISEASE, ESOPHAGITIS PRESENCE NOT SPECIFIED: ICD-10-CM

## 2018-12-31 DIAGNOSIS — Z72.0 TOBACCO ABUSE: ICD-10-CM

## 2018-12-31 LAB
ALBUMIN SERPL-MCNC: 4.1 G/DL (ref 3.4–5)
ALP SERPL-CCNC: 110 U/L (ref 40–150)
ALT SERPL W P-5'-P-CCNC: 36 U/L (ref 0–50)
ANION GAP SERPL CALCULATED.3IONS-SCNC: 5 MMOL/L (ref 3–14)
AST SERPL W P-5'-P-CCNC: 19 U/L (ref 0–45)
BILIRUB SERPL-MCNC: 0.4 MG/DL (ref 0.2–1.3)
BUN SERPL-MCNC: 30 MG/DL (ref 7–30)
CALCIUM SERPL-MCNC: 9.5 MG/DL (ref 8.5–10.1)
CHLORIDE SERPL-SCNC: 102 MMOL/L (ref 94–109)
CO2 SERPL-SCNC: 28 MMOL/L (ref 20–32)
CREAT SERPL-MCNC: 0.9 MG/DL (ref 0.52–1.04)
ERYTHROCYTE [DISTWIDTH] IN BLOOD BY AUTOMATED COUNT: 13.2 % (ref 10–15)
GFR SERPL CREATININE-BSD FRML MDRD: 67 ML/MIN/{1.73_M2}
GLUCOSE SERPL-MCNC: 106 MG/DL (ref 70–99)
HCT VFR BLD AUTO: 44.1 % (ref 35–47)
HGB BLD-MCNC: 15 G/DL (ref 11.7–15.7)
MCH RBC QN AUTO: 31.6 PG (ref 26.5–33)
MCHC RBC AUTO-ENTMCNC: 34 G/DL (ref 31.5–36.5)
MCV RBC AUTO: 93 FL (ref 78–100)
PLATELET # BLD AUTO: 278 10E9/L (ref 150–450)
POTASSIUM SERPL-SCNC: 4.2 MMOL/L (ref 3.4–5.3)
PROT SERPL-MCNC: 7.5 G/DL (ref 6.8–8.8)
RBC # BLD AUTO: 4.75 10E12/L (ref 3.8–5.2)
SODIUM SERPL-SCNC: 135 MMOL/L (ref 133–144)
WBC # BLD AUTO: 15.8 10E9/L (ref 4–11)

## 2018-12-31 PROCEDURE — G0463 HOSPITAL OUTPT CLINIC VISIT: HCPCS

## 2018-12-31 PROCEDURE — 85027 COMPLETE CBC AUTOMATED: CPT | Mod: ZL | Performed by: INTERNAL MEDICINE

## 2018-12-31 PROCEDURE — 99214 OFFICE O/P EST MOD 30 MIN: CPT | Performed by: INTERNAL MEDICINE

## 2018-12-31 PROCEDURE — 80053 COMPREHEN METABOLIC PANEL: CPT | Mod: ZL | Performed by: INTERNAL MEDICINE

## 2018-12-31 PROCEDURE — 36415 COLL VENOUS BLD VENIPUNCTURE: CPT | Mod: ZL | Performed by: INTERNAL MEDICINE

## 2018-12-31 RX ORDER — DICYCLOMINE HYDROCHLORIDE 10 MG/1
10 CAPSULE ORAL
Qty: 120 CAPSULE | Refills: 1 | Status: SHIPPED | OUTPATIENT
Start: 2018-12-31 | End: 2018-12-31

## 2018-12-31 RX ORDER — DICYCLOMINE HYDROCHLORIDE 10 MG/1
20 CAPSULE ORAL
Qty: 180 CAPSULE | Refills: 3 | Status: SHIPPED | OUTPATIENT
Start: 2018-12-31 | End: 2019-06-27

## 2018-12-31 ASSESSMENT — ANXIETY QUESTIONNAIRES
3. WORRYING TOO MUCH ABOUT DIFFERENT THINGS: NOT AT ALL
4. TROUBLE RELAXING: NOT AT ALL
GAD7 TOTAL SCORE: 0
2. NOT BEING ABLE TO STOP OR CONTROL WORRYING: NOT AT ALL
1. FEELING NERVOUS, ANXIOUS, OR ON EDGE: NOT AT ALL
7. FEELING AFRAID AS IF SOMETHING AWFUL MIGHT HAPPEN: NOT AT ALL
6. BECOMING EASILY ANNOYED OR IRRITABLE: NOT AT ALL
5. BEING SO RESTLESS THAT IT IS HARD TO SIT STILL: NOT AT ALL

## 2018-12-31 ASSESSMENT — PATIENT HEALTH QUESTIONNAIRE - PHQ9: SUM OF ALL RESPONSES TO PHQ QUESTIONS 1-9: 0

## 2018-12-31 ASSESSMENT — PAIN SCALES - GENERAL: PAINLEVEL: NO PAIN (0)

## 2018-12-31 NOTE — NURSING NOTE
"No chief complaint on file.      Initial /72   Pulse 76   Temp 98  F (36.7  C) (Tympanic)   Wt 63.5 kg (140 lb)   SpO2 97%   BMI 25.61 kg/m   Estimated body mass index is 25.61 kg/m  as calculated from the following:    Height as of 7/10/18: 1.575 m (5' 2\").    Weight as of this encounter: 63.5 kg (140 lb).  Medication Reconciliation: complete    Yessenia Vidal LPN    "

## 2018-12-31 NOTE — PATIENT INSTRUCTIONS

## 2019-01-01 ASSESSMENT — ANXIETY QUESTIONNAIRES: GAD7 TOTAL SCORE: 0

## 2019-01-13 ENCOUNTER — MYC MEDICAL ADVICE (OUTPATIENT)
Dept: INTERNAL MEDICINE | Facility: OTHER | Age: 64
End: 2019-01-13

## 2019-01-15 ENCOUNTER — OFFICE VISIT (OUTPATIENT)
Dept: CHIROPRACTIC MEDICINE | Facility: OTHER | Age: 64
End: 2019-01-15
Attending: CHIROPRACTOR
Payer: COMMERCIAL

## 2019-01-15 DIAGNOSIS — M99.02 SEGMENTAL AND SOMATIC DYSFUNCTION OF THORACIC REGION: ICD-10-CM

## 2019-01-15 DIAGNOSIS — M99.03 SEGMENTAL AND SOMATIC DYSFUNCTION OF LUMBAR REGION: Primary | ICD-10-CM

## 2019-01-15 DIAGNOSIS — M54.50 ACUTE BILATERAL LOW BACK PAIN WITHOUT SCIATICA: ICD-10-CM

## 2019-01-15 DIAGNOSIS — M99.01 SEGMENTAL AND SOMATIC DYSFUNCTION OF CERVICAL REGION: ICD-10-CM

## 2019-01-15 PROCEDURE — 98941 CHIROPRACT MANJ 3-4 REGIONS: CPT | Mod: AT | Performed by: CHIROPRACTOR

## 2019-01-15 NOTE — PROGRESS NOTES
SUBJECTIVE:   Chyna Delgado is a 63 year old female who presents to clinic today for the following health issues:      Gastrointestinal symptoms/ IBS      Duration: Ongoing    Description:           ABDOMINAL PAIN - None  .   Pain is described as NA and radiates to NA.    Intensity:  mild    Accompanying signs and symptoms:  None, diarrhea but has slowed down alot    History  Previous {similar problem: YES  Previous evaluation:  GI consultation    Aggravating factors: , fatty meals, caffeine, alcohol and spicy foods    Alleviating factors: None    Other Therapies tried: Bentyl, Fiber, gasx anti diarrheal      She has bowel movement 4 bowel movements mainly overnight and in the morning.    Musculoskeletal problem/pain      Duration: Onging    Description  Location: Legs, muscle pains. Had RLS 3 nights in a row    Intensity:  mild    Accompanying signs and symptoms: none    History  Previous similar problem: YES  Previous evaluation:  none    Precipitating or alleviating factors:  Trauma or overuse: no   Aggravating factors include: none    Therapies tried and outcome: Prednisone and mirapex          BP Readings from Last 6 Encounters:   01/16/19 102/70   12/31/18 104/72   10/29/18 104/76   10/12/18 118/76   07/23/18 110/68   07/10/18 117/81     Problem list and histories reviewed & adjusted, as indicated.  Additional history: as documented    Patient Active Problem List   Diagnosis     Encounter to establish care     Mild persistent asthma     Sacroiliac pain     Annual physical exam     Dysthymic disorder     Encounter for routine gynecological examination     Seborrheic keratosis     Somatic dysfunction of pelvic region     Restless legs syndrome     Skin lesion     Hand swelling     Numbness and tingling in right hand     Graves disease     Preoperative examination     Simple chronic bronchitis (H)     Arthritis     ACP (advance care planning)     Pain of toe of right foot     Moderate major depression (H)      Right shoulder pain     Abdominal muscle strain     Strain of flexor muscle of hip, unspecified laterality, initial encounter     Myalgia     Iron deficiency     Alcohol use     Functional diarrhea     Past Surgical History:   Procedure Laterality Date     benign tumors removed from back       COLON SURGERY N/A     HX: Total colectomy with J-pouch reconstruction.      ENDOSCOPY UPPER, COLONOSCOPY, COMBINED N/A 9/5/2014    Procedure: COMBINED ENDOSCOPY UPPER, COLONOSCOPY;  Surgeon: Delbert Patel MD;  Location: [facility]     ESOPHAGOSCOPY, GASTROSCOPY, DUODENOSCOPY (EGD), COMBINED N/A 12/11/2015    Procedure: COMBINED ESOPHAGOSCOPY, GASTROSCOPY, DUODENOSCOPY (EGD);  Surgeon: Delbert Patel MD;  Location: [facility]     ESOPHAGOSCOPY, GASTROSCOPY, DUODENOSCOPY (EGD), COMBINED N/A 12/18/2017    Procedure: COMBINED ESOPHAGOSCOPY, GASTROSCOPY, DUODENOSCOPY (EGD);  UPPER ENDOSCOPY WITH BIOPSY;  Surgeon: Delbert Patel MD;  Location: [facility]     excised-benign  2002    tongue lesion     HC REPAIR OF NASAL SEPTUM  2002    Deviated nasal septum     LAPAROSCOPIC CHOLECYSTECTOMY       lumpectomy Right breast      Breast Lump     MOUTH SURGERY      removal of spot from roof of mouth     pilonidal cyst surgery  1976     removal of colon and large intestine  2000    Familial polyposis     Right etopic pregnancy      Pregnancy     TONSILLECTOMY      tonsillitus     UPPER GI ENDOSCOPY  2009    Stomach polyps       Social History     Tobacco Use     Smoking status: Current Every Day Smoker     Packs/day: 0.50     Years: 40.00     Pack years: 20.00     Types: Cigarettes     Smokeless tobacco: Never Used     Tobacco comment: trying to quit currently   Substance Use Topics     Alcohol use: Yes     Comment: Weekly 3 drinks     Family History   Problem Relation Age of Onset     Other - See Comments Father         Atrial Fibrillation     Cancer Father         bladder ca     Colon Polyps Father      Heart Disease Father         Pacemaker      Rheumatoid Arthritis Father      C.A.D. Father 75        CABG     Chronic Obstructive Pulmonary Disease Mother      Heart Disease Mother         Pacemaker     Other - See Comments Mother         dementia     C.A.D. Mother 70        CABG     C.A.D. Maternal Grandmother      Unknown/Adopted Maternal Grandfather      Unknown/Adopted Paternal Grandmother      Unknown/Adopted Paternal Grandfather      Asthma Sister      Cerebrovascular Disease Sister      Gastrointestinal Disease Sister         peptic ulcers     Hypertension Sister      Gastrointestinal Disease Sister         stomach tumors     Rheumatoid Arthritis Sister      Cancer Sister         facial cancer     Asthma Sister      Cancer Maternal Aunt         renal ca           Reviewed and updated as needed this visit by clinical staff  Tobacco  Allergies  Meds  Med Hx  Surg Hx  Fam Hx  Soc Hx      Reviewed and updated as needed this visit by Provider         ROS:  Constitutional, HEENT, cardiovascular, pulmonary, gi and gu systems are negative, except as otherwise noted.    OBJECTIVE:     /70 (BP Location: Left arm, Patient Position: Chair, Cuff Size: Adult Regular)   Pulse 90   Temp 98.8  F (37.1  C) (Tympanic)   Resp 20   SpO2 97%   There is no height or weight on file to calculate BMI.  GENERAL: healthy, alert and no distress  EYES: Eyes grossly normal to inspection and conjunctivae and sclerae normal  HENT: ear canals and TM's normal, nose and mouth without ulcers or lesions  NECK: no adenopathy, no asymmetry, masses, or scars and thyroid normal to palpation  She does have a cushingoid like posterior neck  RESP: lungs clear to auscultation - no rales, rhonchi or wheezes  CV: regular rate and rhythm, normal S1 S2, no S3 or S4, no murmur, click or rub, no peripheral edema and peripheral pulses strong  ABDOMEN: soft, nontender, no hepatosplenomegaly, no masses and bowel sounds normal  ABDOMEN: no bruits heard  MS: no gross musculoskeletal  defects noted, no edema  NEURO: Normal strength and tone and tremor of the hands  PSYCH: mentation appears normal, affect normal/bright    Diagnostic Test Results:  No results found for this or any previous visit (from the past 24 hour(s)).    ASSESSMENT/PLAN:   (K58.9) Irritable bowel syndrome without diarrhea  (primary encounter diagnosis)  Comment: She has no abdominal pain or cramping.  Her bowel movements have slowed down.  Plan:   She will continue Bentyl 20 mg TID    (R53.83) Fatigue, unspecified type  Comment: This is most likely due to adrenal suppression from steroid use.  I would expect this to improve in 3 weeks. However, she may have hypothyroidism or low iron.  Plan:   We will run labs in three week:  CBC with platelets, TSH with free T4 reflex,         Iron and iron binding capacity, Ferritin    (R40.0) Has daytime drowsiness  Comment: As above.  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  She was having moon facies and a buffalo hump.  Plan:   As above:CBC with platelets, TSH with free T4 reflex,         Iron and iron binding capacity, Ferritin    (D50.9) Iron deficiency anemia, unspecified iron deficiency anemia type   Comment:   Plan:   Iron and iron binding capacity, Ferritin to be checked prior to the next visit.      (R09.82) Post-nasal drip  Comment: She has a coarse voice from post nasal drip.  Plan:   Start montelukast (SINGULAIR) 10 MG tablet and cetirizine (ZYRTEC) 10 MG tablet                      FUTURE APPOINTMENTS:       - Follow-up visit in 3 -4 weeks     Arie Camarillo DO, DO  [facility] - [place]

## 2019-01-15 NOTE — PROGRESS NOTES
Subjective Finding:    Chief compalint: Patient presents with:  Back Pain: sharp pains  , Pain Scale: 6/10, Intensity: dull, Duration: 7 days, Change since last visit: , Radiating:  ribs    Date of injury:     Activities that the pain restricts:   Home/household activities: yes.  Work duties: no.  Hobbies/social: yes.  Sleep: no.  Makes symptoms better: ice .  Makes symptoms worse: activity, lumbar extension and lumbar flexion.  Have you seen anyone else for the symptoms? no.  Work related: no.  Automobile related injury: no.    Objective and Assessment:    Posture Analysis:   High shoulder: .  Head tilt: .  High iliac crest: .  Head carriage: .  Thoracic Kyphosis: neutral.  Lumbar Lordosis: reverse.    Lumbar Range of Motion: flexion decreased and extension decreased.  Cervical Range of Motion: .  Thoracic Range of Motion: .  Extremity Range of Motion: .    Palpation:   T spine pain    Segmental dysfunction pre-treatment: T8, T9, L4, L5 and Sacrum.     C7    Assessment post-treatment:  Cervical: .  Thoracic: .  Lumbar: ROM increased and muscle spasm decreased.    Comments: .      Complicating Factors: .    Plan / Procedure:    Expected release date: .  Treatment plan: PRN.  Instructed patient: ice 20 minutes every other hour as needed.  Short term goals: reduce pain and increase ROM.  Long term goals: increase patient functional independence.  Prognosis: very good.

## 2019-01-16 ENCOUNTER — OFFICE VISIT (OUTPATIENT)
Dept: INTERNAL MEDICINE | Facility: OTHER | Age: 64
End: 2019-01-16
Attending: INTERNAL MEDICINE
Payer: COMMERCIAL

## 2019-01-16 VITALS
RESPIRATION RATE: 20 BRPM | SYSTOLIC BLOOD PRESSURE: 102 MMHG | HEART RATE: 90 BPM | OXYGEN SATURATION: 97 % | TEMPERATURE: 98.8 F | DIASTOLIC BLOOD PRESSURE: 70 MMHG

## 2019-01-16 DIAGNOSIS — D50.9 IRON DEFICIENCY ANEMIA, UNSPECIFIED IRON DEFICIENCY ANEMIA TYPE: ICD-10-CM

## 2019-01-16 DIAGNOSIS — E27.40 STEROID-INDUCED ADRENAL SUPPRESSION (H): ICD-10-CM

## 2019-01-16 DIAGNOSIS — R09.82 POST-NASAL DRIP: ICD-10-CM

## 2019-01-16 DIAGNOSIS — K21.9 GASTROESOPHAGEAL REFLUX DISEASE, ESOPHAGITIS PRESENCE NOT SPECIFIED: ICD-10-CM

## 2019-01-16 DIAGNOSIS — K58.9 IRRITABLE BOWEL SYNDROME WITHOUT DIARRHEA: Primary | ICD-10-CM

## 2019-01-16 DIAGNOSIS — R40.0 HAS DAYTIME DROWSINESS: ICD-10-CM

## 2019-01-16 DIAGNOSIS — G25.81 RESTLESS LEGS SYNDROME (RLS): ICD-10-CM

## 2019-01-16 DIAGNOSIS — T38.0X5A STEROID-INDUCED ADRENAL SUPPRESSION (H): ICD-10-CM

## 2019-01-16 DIAGNOSIS — R53.83 FATIGUE, UNSPECIFIED TYPE: ICD-10-CM

## 2019-01-16 LAB
ANION GAP SERPL CALCULATED.3IONS-SCNC: 6 MMOL/L (ref 3–14)
BUN SERPL-MCNC: 25 MG/DL (ref 7–30)
CALCIUM SERPL-MCNC: 9.3 MG/DL (ref 8.5–10.1)
CHLORIDE SERPL-SCNC: 105 MMOL/L (ref 94–109)
CO2 SERPL-SCNC: 24 MMOL/L (ref 20–32)
CREAT SERPL-MCNC: 0.96 MG/DL (ref 0.52–1.04)
GFR SERPL CREATININE-BSD FRML MDRD: 62 ML/MIN/{1.73_M2}
GLUCOSE SERPL-MCNC: 118 MG/DL (ref 70–99)
POTASSIUM SERPL-SCNC: 4.4 MMOL/L (ref 3.4–5.3)
SODIUM SERPL-SCNC: 135 MMOL/L (ref 133–144)

## 2019-01-16 PROCEDURE — G0463 HOSPITAL OUTPT CLINIC VISIT: HCPCS

## 2019-01-16 PROCEDURE — 99214 OFFICE O/P EST MOD 30 MIN: CPT | Performed by: INTERNAL MEDICINE

## 2019-01-16 PROCEDURE — 36415 COLL VENOUS BLD VENIPUNCTURE: CPT | Mod: ZL | Performed by: INTERNAL MEDICINE

## 2019-01-16 PROCEDURE — 80048 BASIC METABOLIC PNL TOTAL CA: CPT | Mod: ZL | Performed by: INTERNAL MEDICINE

## 2019-01-16 RX ORDER — CETIRIZINE HYDROCHLORIDE 10 MG/1
10 TABLET ORAL AT BEDTIME
Qty: 90 TABLET | Refills: 1 | Status: SHIPPED | OUTPATIENT
Start: 2019-01-16 | End: 2019-07-31

## 2019-01-16 RX ORDER — OMEPRAZOLE 40 MG/1
40 CAPSULE, DELAYED RELEASE ORAL DAILY
Qty: 90 CAPSULE | Refills: 1 | Status: ON HOLD | OUTPATIENT
Start: 2019-01-16 | End: 2019-02-23

## 2019-01-16 RX ORDER — MONTELUKAST SODIUM 10 MG/1
10 TABLET ORAL AT BEDTIME
Qty: 90 TABLET | Refills: 1 | Status: SHIPPED | OUTPATIENT
Start: 2019-01-16 | End: 2019-07-31

## 2019-01-16 ASSESSMENT — PAIN SCALES - GENERAL: PAINLEVEL: NO PAIN (1)

## 2019-01-16 NOTE — NURSING NOTE
"Chief Complaint   Patient presents with     Abdominal Pain       Initial /70 (BP Location: Left arm, Patient Position: Chair, Cuff Size: Adult Regular)   Pulse 90   Temp 98.8  F (37.1  C) (Tympanic)   Resp 20   SpO2 97%  Estimated body mass index is 25.61 kg/m  as calculated from the following:    Height as of 7/10/18: 1.575 m (5' 2\").    Weight as of 12/31/18: 63.5 kg (140 lb).  Medication Reconciliation: complete    Lina Díaz LPN    "

## 2019-01-18 ENCOUNTER — ANCILLARY PROCEDURE (OUTPATIENT)
Dept: MAMMOGRAPHY | Facility: OTHER | Age: 64
End: 2019-01-18
Attending: INTERNAL MEDICINE
Payer: COMMERCIAL

## 2019-01-18 DIAGNOSIS — Z12.31 ENCOUNTER FOR SCREENING MAMMOGRAM FOR BREAST CANCER: ICD-10-CM

## 2019-01-18 DIAGNOSIS — G25.81 RESTLESS LEGS SYNDROME (RLS): ICD-10-CM

## 2019-01-18 PROCEDURE — 77063 BREAST TOMOSYNTHESIS BI: CPT | Mod: TC

## 2019-01-18 RX ORDER — PRAMIPEXOLE DIHYDROCHLORIDE 0.12 MG/1
TABLET ORAL
Qty: 90 TABLET | Refills: 1 | Status: SHIPPED | OUTPATIENT
Start: 2019-01-18 | End: 2019-07-17

## 2019-01-25 DIAGNOSIS — E87.6 HYPOKALEMIA: ICD-10-CM

## 2019-01-28 RX ORDER — SPIRONOLACTONE 25 MG/1
25 TABLET ORAL 2 TIMES DAILY
Qty: 60 TABLET | Refills: 5 | Status: SHIPPED | OUTPATIENT
Start: 2019-01-28 | End: 2019-02-07

## 2019-02-05 DIAGNOSIS — G25.81 RESTLESS LEGS SYNDROME (RLS): ICD-10-CM

## 2019-02-05 DIAGNOSIS — R40.0 HAS DAYTIME DROWSINESS: ICD-10-CM

## 2019-02-05 DIAGNOSIS — E27.40 STEROID-INDUCED ADRENAL SUPPRESSION (H): ICD-10-CM

## 2019-02-05 DIAGNOSIS — D50.9 IRON DEFICIENCY ANEMIA, UNSPECIFIED IRON DEFICIENCY ANEMIA TYPE: ICD-10-CM

## 2019-02-05 DIAGNOSIS — T38.0X5A STEROID-INDUCED ADRENAL SUPPRESSION (H): ICD-10-CM

## 2019-02-05 DIAGNOSIS — R53.83 FATIGUE, UNSPECIFIED TYPE: ICD-10-CM

## 2019-02-05 LAB
ANION GAP SERPL CALCULATED.3IONS-SCNC: 8 MMOL/L (ref 3–14)
BUN SERPL-MCNC: 19 MG/DL (ref 7–30)
CALCIUM SERPL-MCNC: 8.8 MG/DL (ref 8.5–10.1)
CHLORIDE SERPL-SCNC: 106 MMOL/L (ref 94–109)
CO2 SERPL-SCNC: 23 MMOL/L (ref 20–32)
CREAT SERPL-MCNC: 0.86 MG/DL (ref 0.52–1.04)
ERYTHROCYTE [DISTWIDTH] IN BLOOD BY AUTOMATED COUNT: 12.5 % (ref 10–15)
FERRITIN SERPL-MCNC: 210 NG/ML (ref 8–252)
GFR SERPL CREATININE-BSD FRML MDRD: 71 ML/MIN/{1.73_M2}
GLUCOSE SERPL-MCNC: 93 MG/DL (ref 70–99)
HCT VFR BLD AUTO: 40.5 % (ref 35–47)
HGB BLD-MCNC: 13.6 G/DL (ref 11.7–15.7)
IRON SATN MFR SERPL: 27 % (ref 15–46)
IRON SERPL-MCNC: 86 UG/DL (ref 35–180)
MCH RBC QN AUTO: 30.8 PG (ref 26.5–33)
MCHC RBC AUTO-ENTMCNC: 33.6 G/DL (ref 31.5–36.5)
MCV RBC AUTO: 92 FL (ref 78–100)
PLATELET # BLD AUTO: 260 10E9/L (ref 150–450)
POTASSIUM SERPL-SCNC: 3.9 MMOL/L (ref 3.4–5.3)
RBC # BLD AUTO: 4.42 10E12/L (ref 3.8–5.2)
SODIUM SERPL-SCNC: 137 MMOL/L (ref 133–144)
TIBC SERPL-MCNC: 323 UG/DL (ref 240–430)
TSH SERPL DL<=0.005 MIU/L-ACNC: 0.74 MU/L (ref 0.4–4)
WBC # BLD AUTO: 11.1 10E9/L (ref 4–11)

## 2019-02-05 PROCEDURE — 82728 ASSAY OF FERRITIN: CPT | Mod: ZL | Performed by: INTERNAL MEDICINE

## 2019-02-05 PROCEDURE — 85027 COMPLETE CBC AUTOMATED: CPT | Mod: ZL | Performed by: INTERNAL MEDICINE

## 2019-02-05 PROCEDURE — 83550 IRON BINDING TEST: CPT | Mod: ZL | Performed by: INTERNAL MEDICINE

## 2019-02-05 PROCEDURE — 83540 ASSAY OF IRON: CPT | Mod: ZL | Performed by: INTERNAL MEDICINE

## 2019-02-05 PROCEDURE — 36415 COLL VENOUS BLD VENIPUNCTURE: CPT | Mod: ZL | Performed by: INTERNAL MEDICINE

## 2019-02-05 PROCEDURE — 80048 BASIC METABOLIC PNL TOTAL CA: CPT | Mod: ZL | Performed by: INTERNAL MEDICINE

## 2019-02-05 PROCEDURE — 84443 ASSAY THYROID STIM HORMONE: CPT | Mod: ZL | Performed by: INTERNAL MEDICINE

## 2019-02-05 NOTE — PROGRESS NOTES
SUBJECTIVE:   Chyna Delgado is a 63 year old female who presents to clinic today for the following health issues:      Follow up Gastrointestinal symptoms      Duration: ongoing    Description:           ABDOMINAL PAIN - none  .   Pain is described as NA and radiates to NA.    Intensity:  mild    Accompanying signs and symptoms:  diarrhea    History  Previous similar problem: YES  Previous evaluation:  GI consultation    Aggravating factors: none, fatty meals, caffeine, alcohol and spicy foods    Alleviating factors: nothing    Other Therapies tried: Bentyl, Fiber, Gas X       She denies nausea, one loose stool per day, with multiple stools in the evening.      Chronic Pain Follow-Up       Type / Location of Pain: RLS   Analgesia/pain control:       Recent changes:  Worse increasing RLS       Overall control: Tolerable with discomfort  Activity level/function:      Daily activities:  Able to do all daily activities    Work:  not applicable  Adverse effects:  No  Adherance    Taking medication as directed?  Yes    Participating in other treatments: yes  Risk Factors:    Sleep:  Fair    Mood/anxiety:  controlled    Recent family or social stressors:  none noted    Other aggravating factors: none  PHQ-9 SCORE 7/23/2018 10/12/2018 12/31/2018   PHQ-9 Total Score - - -   PHQ-9 Total Score 5 3 0     MARGARITA-7 SCORE 7/23/2018 10/12/2018 12/31/2018   Total Score 5 0 0     Encounter-Level CSA:    There are no encounter-level csa.     Patient-Level CSA:    There are no patient-level csa.       BP Readings from Last 6 Encounters:   02/07/19 108/60   01/16/19 102/70   12/31/18 104/72   10/29/18 104/76   10/12/18 118/76   07/23/18 110/68         Wt Readings from Last 5 Encounters:   02/07/19 57.2 kg (126 lb)   12/31/18 63.5 kg (140 lb)   10/29/18 62.6 kg (138 lb)   10/12/18 62.6 kg (138 lb)   07/10/18 61.2 kg (135 lb)         Asthma:  No ACT was performed today:  She does reports that her control could be better as she felt  better on Advair than Symbicort.    ACT Total Scores 7/7/2014 10/12/2016 10/12/2018   ACT TOTAL SCORE 19 - -   ASTHMA ER VISITS 0 = None - -   ASTHMA HOSPITALIZATIONS 0 = None - -   ACT TOTAL SCORE (Goal Greater than or Equal to 20) - 25 16   In the past 12 months, how many times did you visit the emergency room for your asthma without being admitted to the hospital? - 0 0   In the past 12 months, how many times were you hospitalized overnight because of your asthma? - 0 0     Problem list and histories reviewed & adjusted, as indicated.  Additional history: as documented    Patient Active Problem List   Diagnosis     Encounter to establish care     Mild persistent asthma     Sacroiliac pain     Annual physical exam     Dysthymic disorder     Encounter for routine gynecological examination     Seborrheic keratosis     Somatic dysfunction of pelvic region     Restless legs syndrome     Skin lesion     Hand swelling     Numbness and tingling in right hand     Graves disease     Preoperative examination     Simple chronic bronchitis (H)     Arthritis     ACP (advance care planning)     Pain of toe of right foot     Moderate major depression (H)     Right shoulder pain     Abdominal muscle strain     Strain of flexor muscle of hip, unspecified laterality, initial encounter     Myalgia     Iron deficiency     Alcohol use     Functional diarrhea     Past Surgical History:   Procedure Laterality Date     benign tumors removed from back       COLON SURGERY N/A     HX: Total colectomy with J-pouch reconstruction.      ENDOSCOPY UPPER, COLONOSCOPY, COMBINED N/A 9/5/2014    Procedure: COMBINED ENDOSCOPY UPPER, COLONOSCOPY;  Surgeon: Delbert Patel MD;  Location: [facility]     ESOPHAGOSCOPY, GASTROSCOPY, DUODENOSCOPY (EGD), COMBINED N/A 12/11/2015    Procedure: COMBINED ESOPHAGOSCOPY, GASTROSCOPY, DUODENOSCOPY (EGD);  Surgeon: Delbert Patel MD;  Location: [facility]     ESOPHAGOSCOPY, GASTROSCOPY, DUODENOSCOPY (EGD), COMBINED N/A  12/18/2017    Procedure: COMBINED ESOPHAGOSCOPY, GASTROSCOPY, DUODENOSCOPY (EGD);  UPPER ENDOSCOPY WITH BIOPSY;  Surgeon: Delbert Patel MD;  Location: [facility]     excised-benign  2002    tongue lesion     HC REPAIR OF NASAL SEPTUM  2002    Deviated nasal septum     LAPAROSCOPIC CHOLECYSTECTOMY       lumpectomy Right breast      Breast Lump     MOUTH SURGERY      removal of spot from roof of mouth     pilonidal cyst surgery  1976     removal of colon and large intestine  2000    Familial polyposis     Right etopic pregnancy      Pregnancy     TONSILLECTOMY      tonsillitus     UPPER GI ENDOSCOPY  2009    Stomach polyps       Social History     Tobacco Use     Smoking status: Current Every Day Smoker     Packs/day: 0.50     Years: 40.00     Pack years: 20.00     Types: Cigarettes     Smokeless tobacco: Never Used     Tobacco comment: trying to quit currently   Substance Use Topics     Alcohol use: Yes     Comment: Weekly 3 drinks     Family History   Problem Relation Age of Onset     Other - See Comments Father         Atrial Fibrillation     Cancer Father         bladder ca     Colon Polyps Father      Heart Disease Father         Pacemaker     Rheumatoid Arthritis Father      C.A.D. Father 75        CABG     Chronic Obstructive Pulmonary Disease Mother      Heart Disease Mother         Pacemaker     Other - See Comments Mother         dementia     C.A.D. Mother 70        CABG     C.A.D. Maternal Grandmother      Unknown/Adopted Maternal Grandfather      Unknown/Adopted Paternal Grandmother      Unknown/Adopted Paternal Grandfather      Asthma Sister      Cerebrovascular Disease Sister      Gastrointestinal Disease Sister         peptic ulcers     Hypertension Sister      Gastrointestinal Disease Sister         stomach tumors     Rheumatoid Arthritis Sister      Cancer Sister         facial cancer     Asthma Sister      Cancer Maternal Aunt         renal ca           Reviewed and updated as needed this visit by  clinical staff       Reviewed and updated as needed this visit by Provider         ROS:  Constitutional, HEENT, cardiovascular, pulmonary, gi and gu systems are negative, except as otherwise noted.    OBJECTIVE:     /60   Pulse 91   Temp 98.9  F (37.2  C) (Tympanic)   Resp 19   Wt 57.2 kg (126 lb)   SpO2 97%   BMI 23.05 kg/m    Body mass index is 23.05 kg/m .  GENERAL: healthy, alert and no distress  EYES: conjunctivae and sclerae normal  NECK: no adenopathy, no asymmetry, masses, or scars and thyroid normal to palpation  RESP: lungs clear to auscultation - no rales, rhonchi or wheezes  CV: regular rate and rhythm, normal S1 S2, no S3 or S4, no murmur, click or rub, no peripheral edema and peripheral pulses strong  ABDOMEN: soft, nontender, no hepatosplenomegaly, no masses and bowel sounds normal  ABDOMEN: no bruits heard  MS: no gross musculoskeletal defects noted, no edema  NEURO: Normal strength and tone, mentation intact and speech normal  PSYCH: mentation appears normal, affect normal/bright    Diagnostic Test Results:  Recent Labs   Lab Test 02/05/19  0942 01/16/19  1453    135   POTASSIUM 3.9 4.4   CHLORIDE 106 105   CO2 23 24   ANIONGAP 8 6   GLC 93 118*   BUN 19 25   CR 0.86 0.96   СВЕТЛАНА 8.8 9.3     Results for orders placed or performed in visit on 02/07/19   Magnesium   Result Value Ref Range    Magnesium 1.8 1.6 - 2.3 mg/dL         ASSESSMENT/PLAN:   (E87.6) Hypokalemia  (primary encounter diagnosis)  Comment:   Plan:   Increase spironolactone (ALDACTONE) from 25 mg to 50 MG tablet BID, continue potassium chloride 20 meq TID and continue Basic         metabolic panel every 1-2 weeks.    (M79.10) Myalgia  Comment: Secondary to PMR.  She has been weaned on her prednisone  Plan:   She will continue predniSONE (DELTASONE) 5 MG tablet    (M35.3) PMR (polymyalgia rheumatica) (H)  Comment:   Plan:   She will continue predniSONE (DELTASONE) 5 MG tablet    (J45.40) Moderate persistent asthma  without complication  Comment: She feels that her asthma could e better controlled.  She is currently on Symbicort.   Plan:   She will continue fluticasone-salmeterol (ADVAIR DISKUS) with an increased dose to 250-50 MCG/DOSE inhaler    (K21.0) Gastroesophageal reflux disease with esophagitis  Comment:   Plan:   GASTROENTEROLOGY ADULT REF PROCEDURE ONLY [facility] [place] [phone]    (G25.81) Restless legs syndrome (RLS)  Comment: Stable.   Plan:  She will continue gabapentin 1200 mg TID and Mirapex 0.125 mg at bedtime.              FUTURE APPOINTMENTS:       - Follow-up visit PRN and in 4 to 6 months.    Arie Camarillo DO,   [facility] [place]

## 2019-02-07 ENCOUNTER — OFFICE VISIT (OUTPATIENT)
Dept: INTERNAL MEDICINE | Facility: OTHER | Age: 64
End: 2019-02-07
Attending: INTERNAL MEDICINE
Payer: COMMERCIAL

## 2019-02-07 VITALS
RESPIRATION RATE: 19 BRPM | TEMPERATURE: 98.9 F | OXYGEN SATURATION: 97 % | WEIGHT: 126 LBS | DIASTOLIC BLOOD PRESSURE: 60 MMHG | BODY MASS INDEX: 23.05 KG/M2 | HEART RATE: 91 BPM | SYSTOLIC BLOOD PRESSURE: 108 MMHG

## 2019-02-07 DIAGNOSIS — M79.10 MYALGIA: ICD-10-CM

## 2019-02-07 DIAGNOSIS — G25.81 RESTLESS LEGS SYNDROME (RLS): ICD-10-CM

## 2019-02-07 DIAGNOSIS — E87.6 HYPOKALEMIA: Primary | ICD-10-CM

## 2019-02-07 DIAGNOSIS — K21.00 GASTROESOPHAGEAL REFLUX DISEASE WITH ESOPHAGITIS: ICD-10-CM

## 2019-02-07 DIAGNOSIS — M35.3 PMR (POLYMYALGIA RHEUMATICA) (H): ICD-10-CM

## 2019-02-07 DIAGNOSIS — J45.40 MODERATE PERSISTENT ASTHMA WITHOUT COMPLICATION: ICD-10-CM

## 2019-02-07 LAB — MAGNESIUM SERPL-MCNC: 1.8 MG/DL (ref 1.6–2.3)

## 2019-02-07 PROCEDURE — 83735 ASSAY OF MAGNESIUM: CPT | Mod: ZL | Performed by: INTERNAL MEDICINE

## 2019-02-07 PROCEDURE — 36415 COLL VENOUS BLD VENIPUNCTURE: CPT | Mod: ZL | Performed by: INTERNAL MEDICINE

## 2019-02-07 PROCEDURE — 99214 OFFICE O/P EST MOD 30 MIN: CPT | Performed by: INTERNAL MEDICINE

## 2019-02-07 PROCEDURE — G0463 HOSPITAL OUTPT CLINIC VISIT: HCPCS

## 2019-02-07 RX ORDER — PREDNISONE 5 MG/1
5 TABLET ORAL DAILY
Qty: 90 TABLET | Refills: 1 | Status: SHIPPED | OUTPATIENT
Start: 2019-02-07 | End: 2019-08-01

## 2019-02-07 RX ORDER — SPIRONOLACTONE 50 MG/1
50 TABLET, FILM COATED ORAL 2 TIMES DAILY
Qty: 180 TABLET | Refills: 1 | Status: SHIPPED | OUTPATIENT
Start: 2019-02-07 | End: 2020-02-03

## 2019-02-07 ASSESSMENT — PAIN SCALES - GENERAL: PAINLEVEL: MILD PAIN (2)

## 2019-02-07 NOTE — NURSING NOTE
"Chief Complaint   Patient presents with     Gastrointestinal Problem       Initial /60   Pulse 91   Temp 98.9  F (37.2  C) (Tympanic)   Resp 19   Wt 57.2 kg (126 lb)   SpO2 97%   BMI 23.05 kg/m   Estimated body mass index is 23.05 kg/m  as calculated from the following:    Height as of 7/10/18: 1.575 m (5' 2\").    Weight as of this encounter: 57.2 kg (126 lb).  Medication Reconciliation: complete    Micki Nicole LPN    "

## 2019-02-11 DIAGNOSIS — E87.6 HYPOKALEMIA: ICD-10-CM

## 2019-02-11 LAB
ANION GAP SERPL CALCULATED.3IONS-SCNC: 7 MMOL/L (ref 3–14)
BUN SERPL-MCNC: 17 MG/DL (ref 7–30)
CALCIUM SERPL-MCNC: 9 MG/DL (ref 8.5–10.1)
CHLORIDE SERPL-SCNC: 107 MMOL/L (ref 94–109)
CO2 SERPL-SCNC: 26 MMOL/L (ref 20–32)
CREAT SERPL-MCNC: 0.92 MG/DL (ref 0.52–1.04)
GFR SERPL CREATININE-BSD FRML MDRD: 66 ML/MIN/{1.73_M2}
GLUCOSE SERPL-MCNC: 78 MG/DL (ref 70–99)
POTASSIUM SERPL-SCNC: 4 MMOL/L (ref 3.4–5.3)
SODIUM SERPL-SCNC: 140 MMOL/L (ref 133–144)

## 2019-02-11 PROCEDURE — 36415 COLL VENOUS BLD VENIPUNCTURE: CPT | Mod: ZL | Performed by: INTERNAL MEDICINE

## 2019-02-11 PROCEDURE — 80048 BASIC METABOLIC PNL TOTAL CA: CPT | Mod: ZL | Performed by: INTERNAL MEDICINE

## 2019-02-14 ENCOUNTER — OFFICE VISIT (OUTPATIENT)
Dept: OBGYN | Facility: OTHER | Age: 64
End: 2019-02-14
Attending: NURSE PRACTITIONER
Payer: COMMERCIAL

## 2019-02-14 VITALS
WEIGHT: 126 LBS | SYSTOLIC BLOOD PRESSURE: 104 MMHG | HEIGHT: 62 IN | OXYGEN SATURATION: 96 % | HEART RATE: 79 BPM | DIASTOLIC BLOOD PRESSURE: 66 MMHG | BODY MASS INDEX: 23.19 KG/M2

## 2019-02-14 DIAGNOSIS — Z01.419 ENCOUNTER FOR GYNECOLOGICAL EXAMINATION WITHOUT ABNORMAL FINDING: ICD-10-CM

## 2019-02-14 DIAGNOSIS — Z12.4 SCREENING FOR CERVICAL CANCER: Primary | ICD-10-CM

## 2019-02-14 PROCEDURE — G0463 HOSPITAL OUTPT CLINIC VISIT: HCPCS | Mod: 25 | Performed by: COUNSELOR

## 2019-02-14 PROCEDURE — 87624 HPV HI-RISK TYP POOLED RSLT: CPT | Mod: ZL | Performed by: NURSE PRACTITIONER

## 2019-02-14 PROCEDURE — G0476 HPV COMBO ASSAY CA SCREEN: HCPCS | Mod: ZL | Performed by: NURSE PRACTITIONER

## 2019-02-14 PROCEDURE — G0463 HOSPITAL OUTPT CLINIC VISIT: HCPCS | Performed by: COUNSELOR

## 2019-02-14 PROCEDURE — G0123 SCREEN CERV/VAG THIN LAYER: HCPCS | Mod: ZL | Performed by: NURSE PRACTITIONER

## 2019-02-14 PROCEDURE — 99000 SPECIMEN HANDLING OFFICE-LAB: CPT | Performed by: NURSE PRACTITIONER

## 2019-02-14 PROCEDURE — 99213 OFFICE O/P EST LOW 20 MIN: CPT | Performed by: NURSE PRACTITIONER

## 2019-02-14 ASSESSMENT — MIFFLIN-ST. JEOR: SCORE: 1069.78

## 2019-02-14 ASSESSMENT — PAIN SCALES - GENERAL: PAINLEVEL: EXTREME PAIN (8)

## 2019-02-14 NOTE — PROGRESS NOTES
CC:  Annual gyn exam  HPI:  Chyna Delgado is a 63 year old female  No LMP recorded. Patient is postmenopausal. mammogram is current and up to date with her PCP.  No vaginal bleeding or irritation. No other c/o.      Past GYN history:  No STD history  STI testing offered?  Declined       Last PAP smear:  Normal  Mammograms up to date: yes      Past Medical History:   Diagnosis Date     Abnormal mammogram, unspecified 01/08/2003    Normal pathology     Back Pain 02/10/2000    Sacroiliac pain     Benign neoplasm of colon 03/10/2000     Benign paroxysmal positional vertigo 06/07/2012     Depressive disorder, not elsewhere classified 02/10/2004     Diarrhea 12/15/2010    Secondary to colectomy for familial polyposis     Gastric polyp 12/11/2015, 12/18/2017    recurrent      History of normal mammogram 07/18/2014, 1/18/2019     Idiopathic osteoporosis 12/15/2010     Interstitial emphysema (H) 12/15/2010     menopausal or female climacteric states 08/31/1999     Mixed hyperlipidemia 12/15/2010     Other bursitis disorders 02/04/2011    Greater trochanteric     Other forms of retinal detachment(361.89) 12/15/2010     Other malaise and fatigue 01/10/2003     Personal history of tobacco use, presenting hazards to health 12/15/2010     Restless legs syndrome (RLS) 12/15/2010     Rotator cuff tendonitis      Rotator cuff tendonitis      Sacroiliitis, not elsewhere classified (H) 12/15/2010    multiple sites     Tobacco use disorder 08/22/2012     Unspecified asthma(493.90) 12/15/2010       Past Surgical History:   Procedure Laterality Date     benign tumors removed from back       COLON SURGERY N/A     HX: Total colectomy with J-pouch reconstruction.      ENDOSCOPY UPPER, COLONOSCOPY, COMBINED N/A 9/5/2014    Procedure: COMBINED ENDOSCOPY UPPER, COLONOSCOPY;  Surgeon: Delbert Patel MD;  Location: [facility]     ESOPHAGOSCOPY, GASTROSCOPY, DUODENOSCOPY (EGD), COMBINED N/A 12/11/2015    Procedure: COMBINED ESOPHAGOSCOPY,  GASTROSCOPY, DUODENOSCOPY (EGD);  Surgeon: Delbert Patel MD;  Location: [facility]     ESOPHAGOSCOPY, GASTROSCOPY, DUODENOSCOPY (EGD), COMBINED N/A 12/18/2017    Procedure: COMBINED ESOPHAGOSCOPY, GASTROSCOPY, DUODENOSCOPY (EGD);  UPPER ENDOSCOPY WITH BIOPSY;  Surgeon: Delbert Patel MD;  Location: [facility]     excised-benign  2002    tongue lesion     HC REPAIR OF NASAL SEPTUM  2002    Deviated nasal septum     LAPAROSCOPIC CHOLECYSTECTOMY       lumpectomy Right breast      Breast Lump     MOUTH SURGERY      removal of spot from roof of mouth     pilonidal cyst surgery  1976     removal of colon and large intestine  2000    Familial polyposis     Right etopic pregnancy      Pregnancy     TONSILLECTOMY      tonsillitus     UPPER GI ENDOSCOPY  2009    Stomach polyps       Family History   Problem Relation Age of Onset     Other - See Comments Father         Atrial Fibrillation     Cancer Father         bladder ca     Colon Polyps Father      Heart Disease Father         Pacemaker     Rheumatoid Arthritis Father      C.A.D. Father 75        CABG     Chronic Obstructive Pulmonary Disease Mother      Heart Disease Mother         Pacemaker     Other - See Comments Mother         dementia     C.A.D. Mother 70        CABG     C.A.D. Maternal Grandmother      Unknown/Adopted Maternal Grandfather      Unknown/Adopted Paternal Grandmother      Unknown/Adopted Paternal Grandfather      Asthma Sister      Cerebrovascular Disease Sister      Gastrointestinal Disease Sister         peptic ulcers     Hypertension Sister      Gastrointestinal Disease Sister         stomach tumors     Rheumatoid Arthritis Sister      Cancer Sister         facial cancer     Asthma Sister      Cancer Maternal Aunt         renal ca       Current Outpatient Medications   Medication Sig Dispense Refill     albuterol (PROAIR HFA/PROVENTIL HFA/VENTOLIN HFA) 108 (90 Base) MCG/ACT inhaler Inhale 2 puffs into the lungs every 4 hours as needed for shortness of  breath / dyspnea or wheezing 1 Inhaler 2     ASPIRIN PO Take 81 mg by mouth daily Every other day.       Calcium Citrate-Vitamin D (CALCIUM + D PO) 1200-1000mg unit tablet chewable; one tablet with a meal orally once a day.       cetirizine (ZYRTEC) 10 MG tablet Take 1 tablet (10 mg) by mouth At Bedtime 90 tablet 1     Cholecalciferol (VITAMIN D) 2000 units tablet TAKE TWO TABLETS BY MOUTH EVERY DAY 60 tablet 5     diclofenac (VOLTAREN) 1 % GEL Apply to the outside of the elbow 3 times per day. 100 g 1     dicyclomine (BENTYL) 10 MG capsule Take 2 capsules (20 mg) by mouth 3 times daily (before meals) 180 capsule 3     ferrous sulfate (IRON) 325 (65 FE) MG tablet Take 325 mg by mouth 2 times daily (with meals) 90 tablet 2     fluticasone-salmeterol (ADVAIR DISKUS) 250-50 MCG/DOSE inhaler Inhale 1 puff into the lungs 2 times daily 3 Inhaler 3     gabapentin (NEURONTIN) 400 MG capsule Take 3 capsules (1,200 mg) by mouth 3 times daily 180 capsule 4     ibuprofen (ADVIL,MOTRIN) 200 MG tablet Take 200 mg by mouth       ketotifen (ZADITOR/REFRESH ANTI-ITCH) 0.025 % SOLN ophthalmic solution PLACE 2 DROPS INTO BOTH EYES 2 TIMES DAILY 5 mL 2     loperamide (IMODIUM) 2 MG capsule Take 2 mg by mouth       magnesium 100 MG CAPS Take by mouth 2 times daily       montelukast (SINGULAIR) 10 MG tablet Take 1 tablet (10 mg) by mouth At Bedtime 90 tablet 1     Multiple Vitamin (MULTIVITAMIN) per tablet Take 1 tablet by mouth daily. Every day with food       omeprazole (PRILOSEC) 40 MG DR capsule Take 1 capsule (40 mg) by mouth daily 90 capsule 1     potassium chloride SA (K-DUR/KLOR-CON M) 20 MEQ CR tablet Take 1 tablet (20 mEq) by mouth 3 times daily 90 tablet 5     pramipexole (MIRAPEX) 0.125 MG tablet Take 1 tablet (0.125 mg) by mouth At Bedtime 90 tablet 1     predniSONE (DELTASONE) 5 MG tablet Take 5 mg by mouth daily. 90 tablet 1     PSYLLIUM PO Take 3 tsp by mouth 2 times daily.       Simethicone (GAS-X PO) Take by mouth 2  "times daily       spironolactone (ALDACTONE) 50 MG tablet Take 1 tablet (50 mg) by mouth 2 times daily 180 tablet 1     vitamin E 200 UNIT capsule Take 200 Units by mouth 2 times daily         Allergies: Codeine and Nortriptyline    ROS:  CONSTITUTIONAL:NEGATIVE for fever, chills, change in weight  BREAST: NEGATIVE for masses, tenderness or discharge  : negative for, dysparunia, incontinence, vaginal discharge and bleeding    EXAM:  Blood pressure 104/66, pulse 79, height 1.575 m (5' 2\"), weight 57.2 kg (126 lb), SpO2 96 %, not currently breastfeeding.   BMI= Body mass index is 23.05 kg/m .  General - pleasant female in no acute distress.  Neck - supple without lymphadenopathy or thyromegaly.    Abdomen - soft, nontender, nondistended, no hepatosplenomegaly.  Pelvic - EG: normal adult female, BUS: within normal limits, Vagina: atrophic, no discharge, Cervix: no lesions or CMT, Uterus: firm, normal sized and nontender, Adnexae: no masses or tenderness.  Rectovaginal - deferred.  Musculoskeletal - no gross deformities.  Neurological - normal strength, sensation, and mental status.      ASSESSMENT/PLAN:  (Z12.4) Screening for cervical cancer  (primary encounter diagnosis)  Comment:   Plan: A pap thin layer screen with  HPV - recommended        age 30 - 65 years (select HPV order below), HPV        High Risk Types DNA Cervical            (Z01.419) Encounter for gynecological examination without abnormal finding  Comment:   Plan: return annually      Discussed exercise and healthy eating, including calcium intake.  She should return to the clinic in one year, or sooner if problems arise.    "

## 2019-02-14 NOTE — NURSING NOTE
"Chief Complaint   Patient presents with     Gyn Exam       Initial /66 (BP Location: Left arm, Patient Position: Sitting, Cuff Size: Adult Regular)   Pulse 79   Ht 1.575 m (5' 2\")   Wt 57.2 kg (126 lb)   SpO2 96%   BMI 23.05 kg/m   Estimated body mass index is 23.05 kg/m  as calculated from the following:    Height as of this encounter: 1.575 m (5' 2\").    Weight as of this encounter: 57.2 kg (126 lb).  Medication Reconciliation: complete    Mierla Hernandez LPN    "

## 2019-02-14 NOTE — PATIENT INSTRUCTIONS
Patient Education     Breast Health: Breast Self-Awareness  What is breast self-awareness?  Breast self-awareness is knowing how your breasts normally look and feel. Your breasts change as you go through different stages of your life. So it s important to learn what is normal for your breasts. Breast self-awareness helps you notice any changes in your breasts right away. Report any changes to your healthcare provider.  Why is breast self-awareness important?  Many experts now say that women should focus on breast self-awareness instead of doing a breast self-examination (BSE). These experts include the American Cancer Society, the U.S. Preventive Services Task Force, and the American Congress of Obstetricians and Gynecologists. Some experts even advise not teaching women to do a BSE. That s because research hasn t shown a clear benefit to doing BSEs.  Breast self-awareness is different than a BSE. Breast self-awareness isn t about following a certain method and schedule. It s about knowing what's normal for your breasts. That way you can notice even small changes right away. If you see any changes, report them to your healthcare provider.  Changes to look for  Call your healthcare provider if you find any changes in your breasts that concern you. These changes may include:    A lump    Nipple discharge other than breastmilk, especially a bloody discharge    Swelling    A change in size or shape    Skin irritation, such as redness, thickening, or dimpling of the skin    Swollen lymph nodes in the armpit    Nipple problems, such as pain or redness  If you find a lump  Contact your provider if you find lumpiness in one breast, feel something different in the tissue, or feel a definite lump. Sometimes lumpiness may be due to menstrual changes. But there may be reason for concern.  Your provider may want to see you right away if you have:    Nipple discharge that is bloody    Skin changes on your breast, such as  dimpling or puckering  It s normal to be upset if you find a lump. But it s important to contact your provider right away. Remember that most breast lumps are benign. This means they are not cancer.  Date Last Reviewed: 8/1/2017 2000-2018 [employer]. [address] [place] [id number]. All rights reserved. This information is not intended as a substitute for professional medical care. Always follow your healthcare professional's instructions.           Patient Education     Why Have a Pap Test?  Early on, cervical changes don't cause symptoms. Often, the only way to know you have cervical changes is to do a Pap test. A Pap test can find these problems early, when they are easier to treat. Pap tests can also detect some infections of the cervix and vagina.  What is a Pap test?  A Pap test is a procedure that helps find changes in the cervix that may lead to cancer. The cervix is the part of the uterus that opens into the vagina. For this test, a small sample of cells is taken from the cervix. This is done in your healthcare provider s office. The cells are then analyzed in a lab. A Pap test is a safe procedure. It takes just a few minutes and causes little or no discomfort.  The HPV connection  Human papillomavirus or HPV is a family of viruses that spread through skin contact. Certain types are almost always spread through sexual contact. Some HPV types cause genital warts (condyloma). But not all types of HPV cause visible symptoms. Certain types cause cell changes (dysplasia) in the cervix that can lead to cancer. In fact, HPV infection is the most important risk factor for cervical cancer. Healthcare providers can now test for HPV. Testing for HPV is often done with the Pap test. That s why it s important to have Pap tests as recommended by your healthcare provider. This helps ensure that any abnormal cells will be found and treated before they become cancerous.  Who should have a Pap  test and HPV test?  Ask your healthcare provider when to start having Pap tests, whether you should have an HPV test done at the same time, and how often to have them. Follow these guidelines from the American Cancer Society for cervical cancer screening:    A first Pap test at age 21. And then every 3 years until age 29, HPV testing is not recommended during this time, though it may be done to follow-up on an abnormal Pap test.    Starting at age 30, the preferred testing is a Pap test done with an HPV test every 5 years. This should be done until age 65. Another option for women in this 30 to 65 age group is to have just the Pap test done every 3 years.    You may need a different screening schedule if you are at high risk for cervical cancer. Risk factors include having HIV, a weak immune system, or exposure to the medicine ROSEMARIE while your mother was pregnant with you. Talk with your healthcare provider about the best schedule for you.    If you re over 65 and have had regular screenings for the last 10 years with no abnormal results in the last 20 years, you may stop cervical cancer screening.    If you had a hysterectomy that included removing your cervix, you can stop screening unless the hysterectomy was done to treat cervical cancer or pre-cancer. If you still have your cervix after the hysterectomy, you should continue screening according to the above guidelines.    Routine testing does not need to be done each year. However, if your test is abnormal, your provider will let you know how often to be tested.     Women who have been vaccinated for HPV should still follow these guidelines.    If you have had cervical cancer, talk to your healthcare provider about the screening plan that's best for you.  Date Last Reviewed: 9/1/2017 2000-2018 [employer]. [address] [place] [address], [place] [address]. All rights reserved. This information is not intended as a substitute for professional medical care.  Always follow your healthcare professional's instructions.           Patient Education     Why Have a Pap Test?  Early on, cervical changes don't cause symptoms. Often, the only way to know you have cervical changes is to do a Pap test. A Pap test can find these problems early, when they are easier to treat. Pap tests can also detect some infections of the cervix and vagina.  What is a Pap test?  A Pap test is a procedure that helps find changes in the cervix that may lead to cancer. The cervix is the part of the uterus that opens into the vagina. For this test, a small sample of cells is taken from the cervix. This is done in your healthcare provider s office. The cells are then analyzed in a lab. A Pap test is a safe procedure. It takes just a few minutes and causes little or no discomfort.  The HPV connection  Human papillomavirus or HPV is a family of viruses that spread through skin contact. Certain types are almost always spread through sexual contact. Some HPV types cause genital warts (condyloma). But not all types of HPV cause visible symptoms. Certain types cause cell changes (dysplasia) in the cervix that can lead to cancer. In fact, HPV infection is the most important risk factor for cervical cancer. Healthcare providers can now test for HPV. Testing for HPV is often done with the Pap test. That s why it s important to have Pap tests as recommended by your healthcare provider. This helps ensure that any abnormal cells will be found and treated before they become cancerous.  Who should have a Pap test and HPV test?  Ask your healthcare provider when to start having Pap tests, whether you should have an HPV test done at the same time, and how often to have them. Follow these guidelines from the American Cancer Society for cervical cancer screening:    A first Pap test at age 21. And then every 3 years until age 29, HPV testing is not recommended during this time, though it may be done to follow-up on an  abnormal Pap test.    Starting at age 30, the preferred testing is a Pap test done with an HPV test every 5 years. This should be done until age 65. Another option for women in this 30 to 65 age group is to have just the Pap test done every 3 years.    You may need a different screening schedule if you are at high risk for cervical cancer. Risk factors include having HIV, a weak immune system, or exposure to the medicine ROSEMARIE while your mother was pregnant with you. Talk with your healthcare provider about the best schedule for you.    If you re over 65 and have had regular screenings for the last 10 years with no abnormal results in the last 20 years, you may stop cervical cancer screening.    If you had a hysterectomy that included removing your cervix, you can stop screening unless the hysterectomy was done to treat cervical cancer or pre-cancer. If you still have your cervix after the hysterectomy, you should continue screening according to the above guidelines.    Routine testing does not need to be done each year. However, if your test is abnormal, your provider will let you know how often to be tested.     Women who have been vaccinated for HPV should still follow these guidelines.    If you have had cervical cancer, talk to your healthcare provider about the screening plan that's best for you.  Date Last Reviewed: 9/1/2017 2000-2018 [employer]. [address] [place] [address], [place] [address]. All rights reserved. This information is not intended as a substitute for professional medical care. Always follow your healthcare professional's instructions.           Patient Education     Breast Health: Breast Self-Awareness  What is breast self-awareness?  Breast self-awareness is knowing how your breasts normally look and feel. Your breasts change as you go through different stages of your life. So it s important to learn what is normal for your breasts. Breast self-awareness helps you notice any  changes in your breasts right away. Report any changes to your healthcare provider.  Why is breast self-awareness important?  Many experts now say that women should focus on breast self-awareness instead of doing a breast self-examination (BSE). These experts include the American Cancer Society, the U.S. Preventive Services Task Force, and the American Congress of Obstetricians and Gynecologists. Some experts even advise not teaching women to do a BSE. That s because research hasn t shown a clear benefit to doing BSEs.  Breast self-awareness is different than a BSE. Breast self-awareness isn t about following a certain method and schedule. It s about knowing what's normal for your breasts. That way you can notice even small changes right away. If you see any changes, report them to your healthcare provider.  Changes to look for  Call your healthcare provider if you find any changes in your breasts that concern you. These changes may include:    A lump    Nipple discharge other than breastmilk, especially a bloody discharge    Swelling    A change in size or shape    Skin irritation, such as redness, thickening, or dimpling of the skin    Swollen lymph nodes in the armpit    Nipple problems, such as pain or redness  If you find a lump  Contact your provider if you find lumpiness in one breast, feel something different in the tissue, or feel a definite lump. Sometimes lumpiness may be due to menstrual changes. But there may be reason for concern.  Your provider may want to see you right away if you have:    Nipple discharge that is bloody    Skin changes on your breast, such as dimpling or puckering  It s normal to be upset if you find a lump. But it s important to contact your provider right away. Remember that most breast lumps are benign. This means they are not cancer.  Date Last Reviewed: 8/1/2017 2000-2018 [employer]. [address] [facility], [place] [address]. All rights reserved. This  information is not intended as a substitute for professional medical care. Always follow your healthcare professional's instructions.           Patient Education     Why Have a Pap Test?  Early on, cervical changes don't cause symptoms. Often, the only way to know you have cervical changes is to do a Pap test. A Pap test can find these problems early, when they are easier to treat. Pap tests can also detect some infections of the cervix and vagina.  What is a Pap test?  A Pap test is a procedure that helps find changes in the cervix that may lead to cancer. The cervix is the part of the uterus that opens into the vagina. For this test, a small sample of cells is taken from the cervix. This is done in your healthcare provider s office. The cells are then analyzed in a lab. A Pap test is a safe procedure. It takes just a few minutes and causes little or no discomfort.  The HPV connection  Human papillomavirus or HPV is a family of viruses that spread through skin contact. Certain types are almost always spread through sexual contact. Some HPV types cause genital warts (condyloma). But not all types of HPV cause visible symptoms. Certain types cause cell changes (dysplasia) in the cervix that can lead to cancer. In fact, HPV infection is the most important risk factor for cervical cancer. Healthcare providers can now test for HPV. Testing for HPV is often done with the Pap test. That s why it s important to have Pap tests as recommended by your healthcare provider. This helps ensure that any abnormal cells will be found and treated before they become cancerous.  Who should have a Pap test and HPV test?  Ask your healthcare provider when to start having Pap tests, whether you should have an HPV test done at the same time, and how often to have them. Follow these guidelines from the American Cancer Society for cervical cancer screening:    A first Pap test at age 21. And then every 3 years until age 29, HPV testing is  not recommended during this time, though it may be done to follow-up on an abnormal Pap test.    Starting at age 30, the preferred testing is a Pap test done with an HPV test every 5 years. This should be done until age 65. Another option for women in this 30 to 65 age group is to have just the Pap test done every 3 years.    You may need a different screening schedule if you are at high risk for cervical cancer. Risk factors include having HIV, a weak immune system, or exposure to the medicine ROSEMARIE while your mother was pregnant with you. Talk with your healthcare provider about the best schedule for you.    If you re over 65 and have had regular screenings for the last 10 years with no abnormal results in the last 20 years, you may stop cervical cancer screening.    If you had a hysterectomy that included removing your cervix, you can stop screening unless the hysterectomy was done to treat cervical cancer or pre-cancer. If you still have your cervix after the hysterectomy, you should continue screening according to the above guidelines.    Routine testing does not need to be done each year. However, if your test is abnormal, your provider will let you know how often to be tested.     Women who have been vaccinated for HPV should still follow these guidelines.    If you have had cervical cancer, talk to your healthcare provider about the screening plan that's best for you.  Date Last Reviewed: 9/1/2017 2000-2018 [employer]. [address] [place] [address], [place] [address]. All rights reserved. This information is not intended as a substitute for professional medical care. Always follow your healthcare professional's instructions.

## 2019-02-19 ENCOUNTER — TELEPHONE (OUTPATIENT)
Dept: PEDIATRICS | Facility: OTHER | Age: 64
End: 2019-02-19

## 2019-02-19 NOTE — TELEPHONE ENCOUNTER
"Pt called, reports that she is on prednisone for polymyalgia rheumatica. States that she has tapering down on prednisone and has been taking 5mg for the last few weeks. Pt reports that she is \"hurting and sore all over.\" States that this started 4 days ago. She is wondering if she can increase her dose of prednisone. Please advise. Thank you!    "

## 2019-02-20 DIAGNOSIS — E87.6 HYPOKALEMIA: ICD-10-CM

## 2019-02-20 LAB
ANION GAP SERPL CALCULATED.3IONS-SCNC: 9 MMOL/L (ref 3–14)
BUN SERPL-MCNC: 21 MG/DL (ref 7–30)
CALCIUM SERPL-MCNC: 9.4 MG/DL (ref 8.5–10.1)
CHLORIDE SERPL-SCNC: 107 MMOL/L (ref 94–109)
CO2 SERPL-SCNC: 23 MMOL/L (ref 20–32)
CREAT SERPL-MCNC: 0.9 MG/DL (ref 0.52–1.04)
GFR SERPL CREATININE-BSD FRML MDRD: 67 ML/MIN/{1.73_M2}
GLUCOSE SERPL-MCNC: 96 MG/DL (ref 70–99)
POTASSIUM SERPL-SCNC: 4.5 MMOL/L (ref 3.4–5.3)
SODIUM SERPL-SCNC: 139 MMOL/L (ref 133–144)

## 2019-02-20 PROCEDURE — 80048 BASIC METABOLIC PNL TOTAL CA: CPT | Mod: ZL | Performed by: INTERNAL MEDICINE

## 2019-02-20 PROCEDURE — 36415 COLL VENOUS BLD VENIPUNCTURE: CPT | Mod: ZL | Performed by: INTERNAL MEDICINE

## 2019-02-21 LAB
COPATH REPORT: NORMAL
PAP: NORMAL

## 2019-02-22 LAB
FINAL DIAGNOSIS: NORMAL
HPV HR 12 DNA CVX QL NAA+PROBE: NEGATIVE
HPV16 DNA SPEC QL NAA+PROBE: NEGATIVE
HPV18 DNA SPEC QL NAA+PROBE: NEGATIVE
SPECIMEN DESCRIPTION: NORMAL
SPECIMEN SOURCE CVX/VAG CYTO: NORMAL

## 2019-02-23 ENCOUNTER — APPOINTMENT (OUTPATIENT)
Dept: GENERAL RADIOLOGY | Facility: HOSPITAL | Age: 64
DRG: 482 | End: 2019-02-23
Attending: NURSE PRACTITIONER
Payer: COMMERCIAL

## 2019-02-23 ENCOUNTER — ANESTHESIA EVENT (OUTPATIENT)
Dept: SURGERY | Facility: HOSPITAL | Age: 64
End: 2019-02-23
Payer: COMMERCIAL

## 2019-02-23 ENCOUNTER — HOSPITAL ENCOUNTER (INPATIENT)
Facility: HOSPITAL | Age: 64
LOS: 2 days | Discharge: NURSING, REHAB OR LONG-TERM CARE | DRG: 482 | End: 2019-02-25
Attending: NURSE PRACTITIONER | Admitting: ORTHOPAEDIC SURGERY
Payer: COMMERCIAL

## 2019-02-23 DIAGNOSIS — S72.001A FRACTURE OF FEMORAL NECK, RIGHT (H): ICD-10-CM

## 2019-02-23 DIAGNOSIS — S72.001A CLOSED FRACTURE OF NECK OF RIGHT FEMUR, INITIAL ENCOUNTER (H): Primary | ICD-10-CM

## 2019-02-23 PROBLEM — K59.1 FUNCTIONAL DIARRHEA: Status: ACTIVE | Noted: 2018-10-26

## 2019-02-23 LAB
ALBUMIN SERPL-MCNC: 4 G/DL (ref 3.4–5)
ALBUMIN UR-MCNC: NEGATIVE MG/DL
ALP SERPL-CCNC: 125 U/L (ref 40–150)
ALT SERPL W P-5'-P-CCNC: 37 U/L (ref 0–50)
ANION GAP SERPL CALCULATED.3IONS-SCNC: 10 MMOL/L (ref 3–14)
APPEARANCE UR: CLEAR
AST SERPL W P-5'-P-CCNC: 16 U/L (ref 0–45)
BACTERIA #/AREA URNS HPF: ABNORMAL /HPF
BILIRUB SERPL-MCNC: 0.5 MG/DL (ref 0.2–1.3)
BILIRUB UR QL STRIP: NEGATIVE
BUN SERPL-MCNC: 20 MG/DL (ref 7–30)
CALCIUM SERPL-MCNC: 9.4 MG/DL (ref 8.5–10.1)
CHLORIDE SERPL-SCNC: 103 MMOL/L (ref 94–109)
CO2 SERPL-SCNC: 24 MMOL/L (ref 20–32)
COLOR UR AUTO: ABNORMAL
CREAT SERPL-MCNC: 0.98 MG/DL (ref 0.52–1.04)
ERYTHROCYTE [DISTWIDTH] IN BLOOD BY AUTOMATED COUNT: 12.5 % (ref 10–15)
GFR SERPL CREATININE-BSD FRML MDRD: 61 ML/MIN/{1.73_M2}
GLUCOSE SERPL-MCNC: 120 MG/DL (ref 70–99)
GLUCOSE UR STRIP-MCNC: NEGATIVE MG/DL
HCT VFR BLD AUTO: 41.9 % (ref 35–47)
HGB BLD-MCNC: 14.5 G/DL (ref 11.7–15.7)
HGB UR QL STRIP: NEGATIVE
HYALINE CASTS #/AREA URNS LPF: 1 /LPF
INR PPP: 1.09 (ref 0.8–1.2)
KETONES UR STRIP-MCNC: NEGATIVE MG/DL
LEUKOCYTE ESTERASE UR QL STRIP: NEGATIVE
MCH RBC QN AUTO: 31 PG (ref 26.5–33)
MCHC RBC AUTO-ENTMCNC: 34.6 G/DL (ref 31.5–36.5)
MCV RBC AUTO: 90 FL (ref 78–100)
MUCOUS THREADS #/AREA URNS LPF: PRESENT /LPF
NITRATE UR QL: NEGATIVE
PH UR STRIP: 5.5 PH (ref 4.7–8)
PLATELET # BLD AUTO: 262 10E9/L (ref 150–450)
POTASSIUM SERPL-SCNC: 4.6 MMOL/L (ref 3.4–5.3)
PROT SERPL-MCNC: 7.8 G/DL (ref 6.8–8.8)
RBC # BLD AUTO: 4.67 10E12/L (ref 3.8–5.2)
RBC #/AREA URNS AUTO: <1 /HPF (ref 0–2)
SODIUM SERPL-SCNC: 137 MMOL/L (ref 133–144)
SOURCE: ABNORMAL
SP GR UR STRIP: 1.02 (ref 1–1.03)
SQUAMOUS #/AREA URNS AUTO: 4 /HPF (ref 0–1)
UROBILINOGEN UR STRIP-MCNC: NORMAL MG/DL (ref 0–2)
WBC # BLD AUTO: 11.8 10E9/L (ref 4–11)
WBC #/AREA URNS AUTO: 1 /HPF (ref 0–5)

## 2019-02-23 PROCEDURE — 80053 COMPREHEN METABOLIC PANEL: CPT | Performed by: INTERNAL MEDICINE

## 2019-02-23 PROCEDURE — 93005 ELECTROCARDIOGRAM TRACING: CPT

## 2019-02-23 PROCEDURE — 12000000 ZZH R&B MED SURG/OB

## 2019-02-23 PROCEDURE — 72170 X-RAY EXAM OF PELVIS: CPT | Mod: TC

## 2019-02-23 PROCEDURE — 85027 COMPLETE CBC AUTOMATED: CPT | Performed by: INTERNAL MEDICINE

## 2019-02-23 PROCEDURE — G0463 HOSPITAL OUTPT CLINIC VISIT: HCPCS

## 2019-02-23 PROCEDURE — 93010 ELECTROCARDIOGRAM REPORT: CPT | Performed by: INTERNAL MEDICINE

## 2019-02-23 PROCEDURE — 85610 PROTHROMBIN TIME: CPT | Performed by: INTERNAL MEDICINE

## 2019-02-23 PROCEDURE — 99214 OFFICE O/P EST MOD 30 MIN: CPT | Mod: Z6 | Performed by: NURSE PRACTITIONER

## 2019-02-23 PROCEDURE — 25000132 ZZH RX MED GY IP 250 OP 250 PS 637: Performed by: INTERNAL MEDICINE

## 2019-02-23 PROCEDURE — 25800030 ZZH RX IP 258 OP 636: Performed by: INTERNAL MEDICINE

## 2019-02-23 PROCEDURE — 25000132 ZZH RX MED GY IP 250 OP 250 PS 637: Performed by: HOSPITALIST

## 2019-02-23 PROCEDURE — 81001 URINALYSIS AUTO W/SCOPE: CPT | Performed by: INTERNAL MEDICINE

## 2019-02-23 PROCEDURE — 40000268 ZZH STATISTIC NO CHARGES

## 2019-02-23 PROCEDURE — 99223 1ST HOSP IP/OBS HIGH 75: CPT | Mod: AI | Performed by: INTERNAL MEDICINE

## 2019-02-23 PROCEDURE — 36415 COLL VENOUS BLD VENIPUNCTURE: CPT | Performed by: INTERNAL MEDICINE

## 2019-02-23 PROCEDURE — 73502 X-RAY EXAM HIP UNI 2-3 VIEWS: CPT | Mod: TC

## 2019-02-23 PROCEDURE — 99221 1ST HOSP IP/OBS SF/LOW 40: CPT | Mod: 57 | Performed by: ORTHOPAEDIC SURGERY

## 2019-02-23 RX ORDER — GABAPENTIN 400 MG/1
1200 CAPSULE ORAL 3 TIMES DAILY
Status: DISCONTINUED | OUTPATIENT
Start: 2019-02-23 | End: 2019-02-25 | Stop reason: HOSPADM

## 2019-02-23 RX ORDER — ONDANSETRON 2 MG/ML
4 INJECTION INTRAMUSCULAR; INTRAVENOUS EVERY 6 HOURS PRN
Status: DISCONTINUED | OUTPATIENT
Start: 2019-02-23 | End: 2019-02-25 | Stop reason: HOSPADM

## 2019-02-23 RX ORDER — SPIRONOLACTONE 25 MG/1
50 TABLET ORAL
Status: DISCONTINUED | OUTPATIENT
Start: 2019-02-23 | End: 2019-02-25 | Stop reason: HOSPADM

## 2019-02-23 RX ORDER — DICYCLOMINE HYDROCHLORIDE 10 MG/1
20 CAPSULE ORAL
Status: DISCONTINUED | OUTPATIENT
Start: 2019-02-23 | End: 2019-02-25 | Stop reason: HOSPADM

## 2019-02-23 RX ORDER — SIMETHICONE 80 MG
160 TABLET,CHEWABLE ORAL 4 TIMES DAILY PRN
Status: DISCONTINUED | OUTPATIENT
Start: 2019-02-23 | End: 2019-02-25 | Stop reason: HOSPADM

## 2019-02-23 RX ORDER — IBUPROFEN 200 MG
200 TABLET ORAL EVERY 6 HOURS PRN
Status: DISCONTINUED | OUTPATIENT
Start: 2019-02-23 | End: 2019-02-25 | Stop reason: HOSPADM

## 2019-02-23 RX ORDER — SIMETHICONE 125 MG
500 CAPSULE ORAL 2 TIMES DAILY
COMMUNITY

## 2019-02-23 RX ORDER — ACETAMINOPHEN 325 MG/1
650 TABLET ORAL EVERY 4 HOURS PRN
Status: DISCONTINUED | OUTPATIENT
Start: 2019-02-23 | End: 2019-02-25 | Stop reason: HOSPADM

## 2019-02-23 RX ORDER — NICOTINE 21 MG/24HR
1 PATCH, TRANSDERMAL 24 HOURS TRANSDERMAL DAILY PRN
Status: DISCONTINUED | OUTPATIENT
Start: 2019-02-23 | End: 2019-02-25 | Stop reason: HOSPADM

## 2019-02-23 RX ORDER — ONDANSETRON 4 MG/1
4 TABLET, ORALLY DISINTEGRATING ORAL EVERY 6 HOURS PRN
Status: DISCONTINUED | OUTPATIENT
Start: 2019-02-23 | End: 2019-02-25 | Stop reason: HOSPADM

## 2019-02-23 RX ORDER — HYDROCODONE BITARTRATE AND ACETAMINOPHEN 5; 325 MG/1; MG/1
1-2 TABLET ORAL EVERY 4 HOURS PRN
Status: DISCONTINUED | OUTPATIENT
Start: 2019-02-23 | End: 2019-02-25 | Stop reason: HOSPADM

## 2019-02-23 RX ORDER — LIDOCAINE 40 MG/G
CREAM TOPICAL
Status: DISCONTINUED | OUTPATIENT
Start: 2019-02-23 | End: 2019-02-25 | Stop reason: HOSPADM

## 2019-02-23 RX ORDER — ALBUTEROL SULFATE 90 UG/1
2 AEROSOL, METERED RESPIRATORY (INHALATION) EVERY 4 HOURS PRN
Status: DISCONTINUED | OUTPATIENT
Start: 2019-02-23 | End: 2019-02-25 | Stop reason: HOSPADM

## 2019-02-23 RX ORDER — SODIUM CHLORIDE, SODIUM LACTATE, POTASSIUM CHLORIDE, CALCIUM CHLORIDE 600; 310; 30; 20 MG/100ML; MG/100ML; MG/100ML; MG/100ML
INJECTION, SOLUTION INTRAVENOUS CONTINUOUS
Status: DISCONTINUED | OUTPATIENT
Start: 2019-02-23 | End: 2019-02-25

## 2019-02-23 RX ORDER — PRAMIPEXOLE DIHYDROCHLORIDE 0.12 MG/1
0.12 TABLET ORAL
Status: DISCONTINUED | OUTPATIENT
Start: 2019-02-23 | End: 2019-02-25 | Stop reason: HOSPADM

## 2019-02-23 RX ORDER — NALOXONE HYDROCHLORIDE 0.4 MG/ML
.1-.4 INJECTION, SOLUTION INTRAMUSCULAR; INTRAVENOUS; SUBCUTANEOUS
Status: DISCONTINUED | OUTPATIENT
Start: 2019-02-23 | End: 2019-02-25 | Stop reason: HOSPADM

## 2019-02-23 RX ORDER — PREDNISONE 5 MG/1
5 TABLET ORAL
Status: DISCONTINUED | OUTPATIENT
Start: 2019-02-24 | End: 2019-02-25 | Stop reason: HOSPADM

## 2019-02-23 RX ORDER — LOPERAMIDE HCL 2 MG
6 CAPSULE ORAL 2 TIMES DAILY
Status: DISCONTINUED | OUTPATIENT
Start: 2019-02-23 | End: 2019-02-25 | Stop reason: HOSPADM

## 2019-02-23 RX ORDER — HYDROMORPHONE HYDROCHLORIDE 1 MG/ML
0.2 INJECTION, SOLUTION INTRAMUSCULAR; INTRAVENOUS; SUBCUTANEOUS
Status: DISCONTINUED | OUTPATIENT
Start: 2019-02-23 | End: 2019-02-25 | Stop reason: HOSPADM

## 2019-02-23 RX ADMIN — GABAPENTIN 1200 MG: 400 CAPSULE ORAL at 15:59

## 2019-02-23 RX ADMIN — SPIRONOLACTONE 50 MG: 25 TABLET, FILM COATED ORAL at 15:59

## 2019-02-23 RX ADMIN — SIMETHICONE 160 MG: 80 TABLET, CHEWABLE ORAL at 20:07

## 2019-02-23 RX ADMIN — GABAPENTIN 1200 MG: 400 CAPSULE ORAL at 20:07

## 2019-02-23 RX ADMIN — SODIUM CHLORIDE, POTASSIUM CHLORIDE, SODIUM LACTATE AND CALCIUM CHLORIDE: 600; 310; 30; 20 INJECTION, SOLUTION INTRAVENOUS at 16:01

## 2019-02-23 RX ADMIN — IBUPROFEN 200 MG: 200 TABLET, FILM COATED ORAL at 18:15

## 2019-02-23 RX ADMIN — PRAMIPEXOLE DIHYDROCHLORIDE 0.12 MG: 0.12 TABLET ORAL at 20:07

## 2019-02-23 RX ADMIN — PSYLLIUM HUSK 1 PACKET: 3.4 POWDER ORAL at 20:02

## 2019-02-23 RX ADMIN — DICYCLOMINE HYDROCHLORIDE 20 MG: 10 CAPSULE ORAL at 15:59

## 2019-02-23 RX ADMIN — HYDROCODONE BITARTRATE AND ACETAMINOPHEN 2 TABLET: 5; 325 TABLET ORAL at 20:06

## 2019-02-23 RX ADMIN — LOPERAMIDE HYDROCHLORIDE 6 MG: 2 CAPSULE ORAL at 20:02

## 2019-02-23 ASSESSMENT — ACTIVITIES OF DAILY LIVING (ADL)
ADLS_ACUITY_SCORE: 16
ADLS_ACUITY_SCORE: 18

## 2019-02-23 ASSESSMENT — ENCOUNTER SYMPTOMS
ACTIVITY CHANGE: 1
EYES NEGATIVE: 1
FEVER: 0
COLOR CHANGE: 0
APPETITE CHANGE: 0
WOUND: 0
COUGH: 0
CHEST TIGHTNESS: 0
SHORTNESS OF BREATH: 0

## 2019-02-23 ASSESSMENT — MIFFLIN-ST. JEOR: SCORE: 1092.25

## 2019-02-23 NOTE — CONSULTS
Patient is a 63-year-old female with chief complaint of pain in her right hip.  She fell last night at her home when she states she became dizzy and lost her balance.  She had persistent hip pain, but was able to ambulate with crutches.  She presented to the emergency room today and was evaluated, found to have a impacted femoral neck fracture on the right hip.    Past medical history is significant for mild persistent asthma, which is medically controlled.  She has had significant abdominal surgery in the past, she had no difficulty with recovery, bleeding or anesthetic complications.  Her medication list was reviewed and updated today.  She does take chronic steroid medication for control of her reactive airway disease.    Allergies: Patient has an allergy to codeine, which she stated causes swelling but no rash or respiratory symptoms, nortriptyline caused emotional upset but no actual allergic or anaphylactic type reactions.    Review of systems: Recently she is been in stable health, no recent changes in her medications, no recent hospitalizations.    Physical exam: Patient is an alert, healthy-appearing adult female.    HEENT: Patient wears corrective lenses, has a midline trachea.    Chest: Patient's chest is clear, she has normal respiratory effort and excursion.  Cardiovascular: Patient has regular rate and rhythm, normal vital signs.    Musculoskeletal: The patient has tenderness over her right hip but no leg length discrepancy, she has normal sensation, there is no internal rotation or other deformity at the leg or foot.  X-rays are reviewed and demonstrate an impacted femoral neck fracture on the right side, some small surgical staples are visible in the lower pelvis consistent with her previous abdominal/GYN surgery.    Assessment and plan: The patient was counseled regarding the injury and recommended treatment.  Since fracture is nondisplaced should be a good candidate for cannulated screw fixation.   This would enable a fracture to be stabilized and significantly lower the risk of displacement and complication.  After discussing the recommended treatment with the patient, she is in agreement with this treatment plan.  I have notified the nursing supervisor, and the plan is to proceed with the surgery tomorrow morning, this would be a cannulated screw fixation of the impacted right femoral neck fracture.  It is likely that her chronic steroid use has affected her bone health.  I will discuss this with her internist, Dr. Camarillo, to see if there is any possible alternatives to chronic corticosteroid treatment.

## 2019-02-23 NOTE — ED PROVIDER NOTES
"  History     Chief Complaint   Patient presents with     Fall     \"fell last night from a standing position injuring right hip, did not loose consciousness, did not hit head, denies neck pain.\"      HPI  Chyna Delgado is a 63 year old female who had just been outside with her dog, came back in, bent over to clean off the dog and felt dizzy when she stood up again. She did not want her  to know she was dizzy, so tried to walk to the kitchen to grab the counter, but didn't make it. She fell, landing on her right hip and shoulder. She denies LOC. It is painful to bear weight on her right hip. She did not notice any swelling or bruising. No deformity. She was using a cane to ambulate at home, but was having difficulty, so has been using crutches. She has not used any ice to the area. Denies shoulder pain.    Allergies:  Allergies   Allergen Reactions     Codeine Swelling     Throat swelling     Nortriptyline Other (See Comments)     Crying        Problem List:    Patient Active Problem List    Diagnosis Date Noted     Annual physical exam 06/28/2013     Priority: High     Closed fracture of neck of right femur (H) 02/23/2019     Priority: Medium     Functional diarrhea 10/26/2018     Priority: Medium     Iron deficiency 04/18/2018     Priority: Medium     Alcohol use 04/18/2018     Priority: Medium     Myalgia 05/30/2017     Priority: Medium     Abdominal muscle strain 05/05/2017     Priority: Medium     Strain of flexor muscle of hip, unspecified laterality, initial encounter 05/05/2017     Priority: Medium     Right shoulder pain 11/03/2016     Priority: Medium     Moderate major depression (H) 10/12/2016     Priority: Medium     ACP (advance care planning) 05/09/2016     Priority: Medium     Advance Care Planning 5/9/2016: ACP Review of Chart / Resources Provided:  Reviewed chart for advance care plan.  Chyna Delgado has been provided information and resources to begin or update their advance care " plan.  Added by Lina PONCE Bu             Pain of toe of right foot 05/09/2016     Priority: Medium     Arthritis 02/12/2016     Priority: Medium     Preoperative examination 12/02/2015     Priority: Medium     Simple chronic bronchitis (H) 12/02/2015     Priority: Medium     Graves disease 11/05/2015     Priority: Medium     Hand swelling 04/20/2015     Priority: Medium     Numbness and tingling in right hand 04/20/2015     Priority: Medium     Skin lesion 07/07/2014     Priority: Medium     Right cheek       Restless legs syndrome 04/16/2014     Priority: Medium     Somatic dysfunction of pelvic region 10/08/2013     Priority: Medium     Encounter for routine gynecological examination 07/12/2013     Priority: Medium     Problem list name updated by automated process. Provider to review       Seborrheic keratosis 07/12/2013     Priority: Medium     [employer] Update utility       Dysthymic disorder 06/30/2013     Priority: Medium     Mild persistent asthma 06/28/2013     Priority: Medium     Sacroiliac pain 06/28/2013     Priority: Medium     Encounter to establish care 06/14/2013     Priority: Medium        Past Medical History:    Past Medical History:   Diagnosis Date     Abnormal mammogram, unspecified 01/08/2003     Back Pain 02/10/2000     Benign neoplasm of colon 03/10/2000     Benign paroxysmal positional vertigo 06/07/2012     Depressive disorder, not elsewhere classified 02/10/2004     Diarrhea 12/15/2010     Gastric polyp 12/11/2015, 12/18/2017     History of normal mammogram 07/18/2014, 1/18/2019     Idiopathic osteoporosis 12/15/2010     Interstitial emphysema (H) 12/15/2010     menopausal or female climacteric states 08/31/1999     Mixed hyperlipidemia 12/15/2010     Other bursitis disorders 02/04/2011     Other forms of retinal detachment(361.89) 12/15/2010     Other malaise and fatigue 01/10/2003     Personal history of tobacco use, presenting hazards to health 12/15/2010     Restless legs syndrome (RLS)  12/15/2010     Rotator cuff tendonitis      Rotator cuff tendonitis      Sacroiliitis, not elsewhere classified (H) 12/15/2010     Tobacco use disorder 08/22/2012     Unspecified asthma(493.90) 12/15/2010       Past Surgical History:    Past Surgical History:   Procedure Laterality Date     benign tumors removed from back       COLON SURGERY N/A     HX: Total colectomy with J-pouch reconstruction.      ENDOSCOPY UPPER, COLONOSCOPY, COMBINED N/A 9/5/2014    Procedure: COMBINED ENDOSCOPY UPPER, COLONOSCOPY;  Surgeon: Delbert Patel MD;  Location: [facility]     ESOPHAGOSCOPY, GASTROSCOPY, DUODENOSCOPY (EGD), COMBINED N/A 12/11/2015    Procedure: COMBINED ESOPHAGOSCOPY, GASTROSCOPY, DUODENOSCOPY (EGD);  Surgeon: Delbert Patel MD;  Location: [facility]     ESOPHAGOSCOPY, GASTROSCOPY, DUODENOSCOPY (EGD), COMBINED N/A 12/18/2017    Procedure: COMBINED ESOPHAGOSCOPY, GASTROSCOPY, DUODENOSCOPY (EGD);  UPPER ENDOSCOPY WITH BIOPSY;  Surgeon: Delbert Patel MD;  Location: [facility]     excised-benign  2002    tongue lesion     HC REPAIR OF NASAL SEPTUM  2002    Deviated nasal septum     LAPAROSCOPIC CHOLECYSTECTOMY       lumpectomy Right breast      Breast Lump     MOUTH SURGERY      removal of spot from roof of mouth     pilonidal cyst surgery  1976     removal of colon and large intestine  2000    Familial polyposis     Right etopic pregnancy      Pregnancy     TONSILLECTOMY      tonsillitus     UPPER GI ENDOSCOPY  2009    Stomach polyps       Family History:    Family History   Problem Relation Age of Onset     Other - See Comments Father         Atrial Fibrillation     Cancer Father         bladder ca     Colon Polyps Father      Heart Disease Father         Pacemaker     Rheumatoid Arthritis Father      C.A.D. Father 75        CABG     Chronic Obstructive Pulmonary Disease Mother      Heart Disease Mother         Pacemaker     Other - See Comments Mother         dementia     C.A.D. Mother 70        CABG     C.A.D. Maternal  Grandmother      Unknown/Adopted Maternal Grandfather      Unknown/Adopted Paternal Grandmother      Unknown/Adopted Paternal Grandfather      Asthma Sister      Cerebrovascular Disease Sister      Gastrointestinal Disease Sister         peptic ulcers     Hypertension Sister      Gastrointestinal Disease Sister         stomach tumors     Rheumatoid Arthritis Sister      Cancer Sister         facial cancer     Asthma Sister      Cancer Maternal Aunt         renal ca       Social History:  Marital Status:   [2]  Social History     Tobacco Use     Smoking status: Current Every Day Smoker     Packs/day: 0.50     Years: 40.00     Pack years: 20.00     Types: Cigarettes     Smokeless tobacco: Never Used     Tobacco comment: trying to quit currently   Substance Use Topics     Alcohol use: Yes     Comment: Weekly 3 drinks     Drug use: No        Medications:      ASPIRIN PO   Calcium Citrate-Vitamin D (CALCIUM + D PO)   cetirizine (ZYRTEC) 10 MG tablet   Cholecalciferol (VITAMIN D) 2000 units tablet   dicyclomine (BENTYL) 10 MG capsule   ferrous sulfate (IRON) 325 (65 FE) MG tablet   fluticasone-salmeterol (ADVAIR DISKUS) 250-50 MCG/DOSE inhaler   gabapentin (NEURONTIN) 400 MG capsule   ibuprofen (ADVIL,MOTRIN) 200 MG tablet   ketotifen (ZADITOR/REFRESH ANTI-ITCH) 0.025 % SOLN ophthalmic solution   magnesium 100 MG CAPS   montelukast (SINGULAIR) 10 MG tablet   Multiple Vitamin (MULTIVITAMIN) per tablet   omeprazole (PRILOSEC) 40 MG DR capsule   potassium chloride SA (K-DUR/KLOR-CON M) 20 MEQ CR tablet   pramipexole (MIRAPEX) 0.125 MG tablet   predniSONE (DELTASONE) 5 MG tablet   spironolactone (ALDACTONE) 50 MG tablet   vitamin E 200 UNIT capsule   albuterol (PROAIR HFA/PROVENTIL HFA/VENTOLIN HFA) 108 (90 Base) MCG/ACT inhaler   diclofenac (VOLTAREN) 1 % GEL   loperamide (IMODIUM) 2 MG capsule   PSYLLIUM PO   Simethicone (GAS-X PO)         Review of Systems   Constitutional: Positive for activity change. Negative for  appetite change and fever.   HENT: Negative.    Eyes: Negative.    Respiratory: Negative for cough, chest tightness and shortness of breath.    Cardiovascular: Negative for chest pain.   Musculoskeletal: Positive for gait problem.        Right hip pain   Skin: Negative for color change and wound.   All other systems reviewed and are negative.      Physical Exam   BP: 111/64  Pulse: 87  Temp: 99.4  F (37.4  C)  Resp: 18  Weight: 59 kg (130 lb)  SpO2: 97 %      Physical Exam   Constitutional: She is oriented to person, place, and time. She appears well-developed and well-nourished. No distress.   HENT:   Head: Normocephalic and atraumatic.   Eyes: No scleral icterus.   Neck: Normal range of motion. Neck supple.   Musculoskeletal:        Right hip: She exhibits decreased range of motion, decreased strength and tenderness. She exhibits no swelling, no crepitus and no deformity.   Neurological: She is alert and oriented to person, place, and time.   Skin: Skin is warm and dry. No rash noted. She is not diaphoretic. No erythema. No pallor.       ED Course        Procedures    Results for orders placed or performed during the hospital encounter of 02/23/19 (from the past 24 hour(s))   XR Pelvis 1/2 Views    Narrative    PROCEDURE: XR PELVIS 1/2 VW 2/23/2019 11:38 AM    HISTORY: Fell onto right hip from standing. Unable to fully bear  weight. Hx of osteopenia.    COMPARISONS: None.    TECHNIQUE: 1 view    FINDINGS: The pelvis is intact. There is sclerosis seen in the femoral  neck on the right suspicious for an impacted fracture. Hip x-rays of  the right hip are recommended.         Impression    IMPRESSION: Possible impacted femoral neck fracture on the right    AYAZ BROCK MD   XR Hip Right 2-3 Views    Narrative    PROCEDURE:  XR HIP RIGHT 2-3 VIEWS    HISTORY: fall on right hip with inability to fully bear weight    COMPARISON:  None.    TECHNIQUE:  2 views of the right hip were obtained.    FINDINGS: There Is an  impacted right femoral neck fracture. The  acetabulum is intact.       Impression    IMPRESSION: Femoral neck fracture on the right      AYAZ BROCK MD       Medications - No data to display    Assessments & Plan (with Medical Decision Making)     I have reviewed the nursing notes.    I have reviewed the findings, diagnosis, plan and need for follow up with the patient.   Discussed with Dr. Kolb, orthopedic on call. Plan for admission and fracture repair tomorrow. Dr. Menchaca, hospitalist, kindly accepted [facility] for admission. Report called to Miladis Mayberry RN and Chyna transported to room 3222 in stable condition.       Medication List      There are no discharge medications for this visit.         Final diagnoses:   Fracture of femoral neck, right (H)       2/23/2019   [facility]     Melida Beavers, APRN CNP  02/23/19 1411

## 2019-02-23 NOTE — PLAN OF CARE
Face to face report given with opportunity to observe patient.    Report given to Eliseo CANADA RN.     Miladis Mayberry   2/23/2019  3:21 PM

## 2019-02-23 NOTE — LETTER
[facility]  [address]  [place] [address] [phone]    FACSIMILE TRANSMITTAL SHEET        FROM: Asha TUCKER Case Manager  PHONE: [phone]  DATE: 02/26/19          NOTES/COMMENTS:  Patient discharge orders,     **Patient will be calling to schedule outpatient rehab.                                      IF YOU DID NOT RECEIVE THE CORRECT NUMBER OF PAGES OR THE FAX DID NOT COME THROUGH CLEARLY, PLEASE CALL THE SENDER     CONFIDENTIALITY STATEMENT: Confidential information that may accompany this transmission contains protected health information under state and federal law and is legally privileged. This information is intended only for the use of the individual or entity named above and may be used only for carrying out treatment, payment or other healthcare operations. The recipient or person responsible for delivering this information is prohibited by law from disclosing this information without proper authorization to any other party, unless required to do so by law or regulation. If you are not the intended recipient, you are hereby notified that any review, dissemination, distribution, or copying of this message is strictly prohibited. If you have received this communication in error, please destroy the materials and contact us immediately by calling the number listed above. No response indicates that the information was received by the appropriate authorized party

## 2019-02-23 NOTE — ED TRIAGE NOTES
Pt presents today alone for c/o right hip pain after having an episode of orthostatic hypotension last night. She is using crutches to help her walk today.

## 2019-02-23 NOTE — PLAN OF CARE
[facility] Inpatient Admission Note:    Patient admitted to 3222/3222-1 at approximately 2:15 via wheel chair accompanied by transport tech from emergency room . Report received from Darwin Beavers in SBAR format at 2:09 via telephone. Patient transferred to bed via self.. Patient is alert and oriented X 3, reports pain; rates at 3 on 0-10 scale.  Patient oriented to room, unit, hourly rounding, and plan of care. Explained admission packet and patient handbook with patient bill of rights brochure. Will continue to monitor and document as needed.     Inpatient Nursing criteria listed below was met:    Health care directives status obtained and documented: Yes    Care Everywhere authorization obtained Yes    MRSA swab completed for patient 65 years and older: No, pt refused    Patient identifies a surrogate decision maker: Yes If yes, who:Los () Contact Information:[phone]    Core Measure diagnosis present:No.      If initial lactic acid >2.0, repeat lactic acid drawn within one hour of arrival to unit: NA. If no, state reason: n/a    Vaccination assessment and education completed: Yes   Vaccinations received prior to admission: Pneumovax yes  Influenza(seasonal)  YES   Vaccination(s) ordered: not given today because pt current    Clergy visit ordered if patient requests: N/A    Skin issues/needs documented: Yes    Isolation Patient: no Education given, correct sign in place and documentation row added to PCS:  Yes    Fall Prevention Yes: Care plan updated, education given and documented, sticker and magnet in place: Yes    Care Plan initiated: Yes    Education Documented (including assessment): Yes    Patient has discharge needs : No If yes, please explain:n/a

## 2019-02-23 NOTE — H&P
Admitted:     02/23/2019      CHIEF COMPLAINT:  Right hip pain.      HISTORY OF PRESENT ILLNESS:  Chyna is a 63-year-old female who was at home.  She went out to let her dog out.  She is trying to quit smoking and did go out and have a cigarette and then started to get a little lightheaded.  She came into the house and got even more lightheaded and was reaching for the counter and lost her balance and fell onto her right side.  She immediately had pain in that hip area.  Spent the night at home and had a lot of discomfort today, so she came into urgent care for evaluation.  She was up around using some crutches.  X-rays in the urgent care center did show an impacted right femoral neck fracture.  Her pain is a moderate amount at this time.  Dr. Amrik Kolb was consulted, and he has agreed to fix Chyna's hip.  This will be performed tomorrow.  She is admitted to the [facility] for pain control in preparation for surgery.      She is relatively uncomfortable currently.  She did not have any syncope during this episode.  She had no palpitations, no double vision.  She was aware the entire time.  He has had no recent cold or flu bugs at all.  No breathing issues.  No chest pains at all.  No peripheral edema.  Does have what sounds like polymyalgia rheumatica.  She is on 5 mg of prednisone daily.  Occasionally, she will go up to 7 mg,  which she did a couple days ago.  She has had no bleeding at all.  No blood in her stool, no blood in her urine.  She tends at always have looser stool.  She is status post a total colectomy due to familial polyposis.  Also has some mild asthma and is in the process of trying to quit smoking.      She has never had any issues with any of her surgeries.  No anesthesia problems.  The only allergy she really has is just some codeine giving her a little bit of swelling of her throat.  She has had other pain medications, however, without problem.      PAST MEDICAL HISTORY:   1.  Familial  polyposis.  She is status post a total colectomy with a J pouch reconstruction.   2.  Laparoscopic cholecystectomy.    3.  She is status post right breast lumpectomy.   4.  Nasal septum surgery for deviated nasal septum.   5.  Tonsillectomy.   6.  Right ectopic pregnancy.   7.  She has some chronic diarrhea secondary to her colectomy.   8.  History of some mild asthma/interstitial emphysema.   9.  Hyperlipidemia.   10.  Restless legs syndrome.   11.  Tobacco dependence.      MEDICATIONS AT HOME:   1.  Prednisone 5 mg daily.   2.  ProAir metered dose inhaler 2 puffs every 4 hours p.r.n. shortness of breath.   3.  Aspirin 81 mg every other day.   4.  Zyrtec 10 mg daily.   5.  Bentyl 10 mg, she takes 2 tabs t.i.d. with meals.   6.  Advair Diskus 250/50 one puff twice a day.   7.  Neurontin 1200 mg t.i.d.   8.  Singular 10 mg daily.   9.  Multivitamin daily.   10.  Prilosec 40 mg daily.   11.  Potassium chloride 20 mEq t.i.d.   12.  Mirapex 0.25 mg at bedtime.   13.  Spironolactone 50 mg p.o. b.i.d.      ALLERGIES:  CODEINE AND NORTRIPTYLINE.      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  Is in the process of trying to quit smoking.  Social drinker.      FAMILY HISTORY:  Positive for coronary artery disease in mom.      REVIEW OF SYSTEMS:  Pertinent positives and negatives noted above in the HPI.      PHYSICAL EXAMINATION:   GENERAL:  Alert, pleasant middle-aged female in no obvious distress.   VITAL SIGNS:  Her last vital signs show her blood pressure 120/60, pulse 100, temperature 98.8, sats are 94% on room air.  Weight was 59.4 kg.   HEENT:  Normocephalic, atraumatic.  Pupils equal, round, reactive.  Sclerae are clear.   NECK:  Supple, no obvious lymphadenopathy or thyromegaly.  Mucous membranes are moist.   LUNGS:  Clear to auscultation anteriorly and posteriorly.   CARDIAC:  Regular rate and rhythm, normal S1, S2, no audible murmurs, rubs, or gallops.  Neck veins are flat.  No carotid  bruits.  Pulses are 2+ and equal throughout.   ABDOMEN:  Surgical scar present.  Soft, nontender, bowel sounds are positive.  No masses or organomegaly.   EXTREMITIES:  Right leg is somewhat shortened, slightly externally rotated.  She is rather tender in the groin area.  There is no significant edema, cyanosis, or clubbing.   NEUROLOGIC:  She is nonfocal.      EKG is pending.      LABORATORY DATA:  Show sodium 137, potassium 4.6, chloride 103, CO2 is 24, BUN 20, creatinine 0.98, glucose 120, anion gap 10, calcium 9.4, albumin 4.0, total protein 7.8, bilirubin 0.5, alkaline phosphatase 125, ALT 37, AST 16.  White count 7800, hemoglobin 14.5, platelet count is 262,000.  INR is 1.09.  Pelvic x-ray shows the femoral neck fracture on the right.  It is impacted.  Pelvis looks fine.      ASSESSMENT:   1.  Impacted right femoral neck fracture, secondary to fall.  The patient has now been seen by Dr. Amrik Kolb, of Orthopedic Surgery.  Will have this repaired tomorrow.  She will be n.p.o. after midnight.  IV fluids will be started at that time.  She has had no previous anesthetic complications or issues.  Does have some underlying lung disease due to some mild asthma/emphysema but currently is doing very well.  Do not expect any major issues.  I believe she is optimized as best she will be for this procedure tomorrow.   2.  Asthma/emphysema.  Will continue with her Advair and bronchodilators on a p.r.n. basis.  O2 sats are fine at this time.   3.  History of hypertension.  Blood pressure stable at this point.  Will monitor.   4.  Status post colectomy.  Does have some issues with loose stools ever since then, using the Bentyl with each meal.  Imodium p.r.n.  Her electrolytes look fine at this point, as does liver profile.   5.  Tobacco dependence.  We will offer a nicotine patch.        Anticipated hospital stay is likely to be greater than or equal to 2 midnights.  Patient anticipates she will be able to go home at the time  of discharge.      CODE STATUS:  She is full code.         MILAN MCKEE MD             D: 2019   T: 2019   MT: JADA      Name:     DEIDRE SORENSEN   MRN:      [id number]        Account:      [id number]   :      1955        Admitted:     2019                   Document: [id number]       cc: Arie Rabia DO

## 2019-02-24 ENCOUNTER — ANESTHESIA (OUTPATIENT)
Dept: SURGERY | Facility: HOSPITAL | Age: 64
DRG: 482 | End: 2019-02-24
Payer: COMMERCIAL

## 2019-02-24 ENCOUNTER — ANESTHESIA EVENT (OUTPATIENT)
Dept: SURGERY | Facility: HOSPITAL | Age: 64
DRG: 482 | End: 2019-02-24
Payer: COMMERCIAL

## 2019-02-24 ENCOUNTER — APPOINTMENT (OUTPATIENT)
Dept: GENERAL RADIOLOGY | Facility: HOSPITAL | Age: 64
DRG: 482 | End: 2019-02-24
Attending: ORTHOPAEDIC SURGERY
Payer: COMMERCIAL

## 2019-02-24 LAB
ANION GAP SERPL CALCULATED.3IONS-SCNC: 8 MMOL/L (ref 3–14)
BUN SERPL-MCNC: 15 MG/DL (ref 7–30)
CALCIUM SERPL-MCNC: 8.5 MG/DL (ref 8.5–10.1)
CHLORIDE SERPL-SCNC: 106 MMOL/L (ref 94–109)
CO2 SERPL-SCNC: 25 MMOL/L (ref 20–32)
CREAT SERPL-MCNC: 0.8 MG/DL (ref 0.52–1.04)
ERYTHROCYTE [DISTWIDTH] IN BLOOD BY AUTOMATED COUNT: 12.6 % (ref 10–15)
GFR SERPL CREATININE-BSD FRML MDRD: 78 ML/MIN/{1.73_M2}
GLUCOSE SERPL-MCNC: 91 MG/DL (ref 70–99)
HCT VFR BLD AUTO: 36.6 % (ref 35–47)
HGB BLD-MCNC: 12.4 G/DL (ref 11.7–15.7)
MCH RBC QN AUTO: 30.4 PG (ref 26.5–33)
MCHC RBC AUTO-ENTMCNC: 33.9 G/DL (ref 31.5–36.5)
MCV RBC AUTO: 90 FL (ref 78–100)
PLATELET # BLD AUTO: 210 10E9/L (ref 150–450)
POTASSIUM SERPL-SCNC: 4.1 MMOL/L (ref 3.4–5.3)
RBC # BLD AUTO: 4.08 10E12/L (ref 3.8–5.2)
SODIUM SERPL-SCNC: 139 MMOL/L (ref 133–144)
WBC # BLD AUTO: 8.9 10E9/L (ref 4–11)

## 2019-02-24 PROCEDURE — 25000128 H RX IP 250 OP 636: Performed by: NURSE ANESTHETIST, CERTIFIED REGISTERED

## 2019-02-24 PROCEDURE — 27236 TREAT THIGH FRACTURE: CPT | Performed by: NURSE ANESTHETIST, CERTIFIED REGISTERED

## 2019-02-24 PROCEDURE — 71000015 ZZH RECOVERY PHASE 1 LEVEL 2 EA ADDTL HR: Performed by: ORTHOPAEDIC SURGERY

## 2019-02-24 PROCEDURE — 25000132 ZZH RX MED GY IP 250 OP 250 PS 637: Performed by: INTERNAL MEDICINE

## 2019-02-24 PROCEDURE — 99231 SBSQ HOSP IP/OBS SF/LOW 25: CPT | Performed by: INTERNAL MEDICINE

## 2019-02-24 PROCEDURE — 37000008 ZZH ANESTHESIA TECHNICAL FEE, 1ST 30 MIN: Performed by: ORTHOPAEDIC SURGERY

## 2019-02-24 PROCEDURE — 36000060 ZZH SURGERY LEVEL 3 W FLUORO 1ST 30 MIN: Performed by: ORTHOPAEDIC SURGERY

## 2019-02-24 PROCEDURE — 25000125 ZZHC RX 250: Performed by: NURSE ANESTHETIST, CERTIFIED REGISTERED

## 2019-02-24 PROCEDURE — 85027 COMPLETE CBC AUTOMATED: CPT | Performed by: INTERNAL MEDICINE

## 2019-02-24 PROCEDURE — 80048 BASIC METABOLIC PNL TOTAL CA: CPT | Performed by: INTERNAL MEDICINE

## 2019-02-24 PROCEDURE — 40000277 XR SURGERY CARM FLUORO LESS THAN 5 MIN W STILLS: Mod: TC

## 2019-02-24 PROCEDURE — 12000000 ZZH R&B MED SURG/OB

## 2019-02-24 PROCEDURE — 27110028 ZZH OR GENERAL SUPPLY NON-STERILE: Performed by: ORTHOPAEDIC SURGERY

## 2019-02-24 PROCEDURE — 94640 AIRWAY INHALATION TREATMENT: CPT

## 2019-02-24 PROCEDURE — 27236 TREAT THIGH FRACTURE: CPT | Performed by: ORTHOPAEDIC SURGERY

## 2019-02-24 PROCEDURE — 25800030 ZZH RX IP 258 OP 636: Performed by: NURSE ANESTHETIST, CERTIFIED REGISTERED

## 2019-02-24 PROCEDURE — 36000058 ZZH SURGERY LEVEL 3 EA 15 ADDTL MIN: Performed by: ORTHOPAEDIC SURGERY

## 2019-02-24 PROCEDURE — 36415 COLL VENOUS BLD VENIPUNCTURE: CPT | Performed by: INTERNAL MEDICINE

## 2019-02-24 PROCEDURE — C1713 ANCHOR/SCREW BN/BN,TIS/BN: HCPCS | Performed by: ORTHOPAEDIC SURGERY

## 2019-02-24 PROCEDURE — 27210794 ZZH OR GENERAL SUPPLY STERILE: Performed by: ORTHOPAEDIC SURGERY

## 2019-02-24 PROCEDURE — 99140 ANES COMP EMERGENCY COND: CPT | Performed by: NURSE ANESTHETIST, CERTIFIED REGISTERED

## 2019-02-24 PROCEDURE — 37000009 ZZH ANESTHESIA TECHNICAL FEE, EACH ADDTL 15 MIN: Performed by: ORTHOPAEDIC SURGERY

## 2019-02-24 PROCEDURE — 25000132 ZZH RX MED GY IP 250 OP 250 PS 637: Performed by: HOSPITALIST

## 2019-02-24 PROCEDURE — 25800030 ZZH RX IP 258 OP 636: Performed by: INTERNAL MEDICINE

## 2019-02-24 PROCEDURE — 27211024 ZZHC OR SUPPLY OTHER OPNP: Performed by: ORTHOPAEDIC SURGERY

## 2019-02-24 PROCEDURE — 25000128 H RX IP 250 OP 636: Performed by: INTERNAL MEDICINE

## 2019-02-24 PROCEDURE — 71000014 ZZH RECOVERY PHASE 1 LEVEL 2 FIRST HR: Performed by: ORTHOPAEDIC SURGERY

## 2019-02-24 PROCEDURE — C1769 GUIDE WIRE: HCPCS | Performed by: ORTHOPAEDIC SURGERY

## 2019-02-24 PROCEDURE — 0QH634Z INSERTION OF INTERNAL FIXATION DEVICE INTO RIGHT UPPER FEMUR, PERCUTANEOUS APPROACH: ICD-10-PCS | Performed by: ORTHOPAEDIC SURGERY

## 2019-02-24 DEVICE — IMPLANTABLE DEVICE
Type: IMPLANTABLE DEVICE | Site: HIP | Status: NON-FUNCTIONAL
Removed: 2022-07-20

## 2019-02-24 RX ORDER — ONDANSETRON 2 MG/ML
4 INJECTION INTRAMUSCULAR; INTRAVENOUS EVERY 30 MIN PRN
Status: DISCONTINUED | OUTPATIENT
Start: 2019-02-24 | End: 2019-02-24 | Stop reason: HOSPADM

## 2019-02-24 RX ORDER — BUPIVACAINE HYDROCHLORIDE 7.5 MG/ML
INJECTION, SOLUTION INTRASPINAL PRN
Status: DISCONTINUED | OUTPATIENT
Start: 2019-02-24 | End: 2019-02-24

## 2019-02-24 RX ORDER — CEFAZOLIN SODIUM 1 G/3ML
INJECTION, POWDER, FOR SOLUTION INTRAMUSCULAR; INTRAVENOUS PRN
Status: DISCONTINUED | OUTPATIENT
Start: 2019-02-24 | End: 2019-02-24

## 2019-02-24 RX ORDER — EPHEDRINE SULFATE 50 MG/ML
INJECTION, SOLUTION INTRAMUSCULAR; INTRAVENOUS; SUBCUTANEOUS PRN
Status: DISCONTINUED | OUTPATIENT
Start: 2019-02-24 | End: 2019-02-24

## 2019-02-24 RX ORDER — KETAMINE HYDROCHLORIDE 10 MG/ML
INJECTION, SOLUTION INTRAMUSCULAR; INTRAVENOUS PRN
Status: DISCONTINUED | OUTPATIENT
Start: 2019-02-24 | End: 2019-02-24

## 2019-02-24 RX ORDER — ALBUTEROL SULFATE 0.83 MG/ML
2.5 SOLUTION RESPIRATORY (INHALATION) EVERY 4 HOURS PRN
Status: DISCONTINUED | OUTPATIENT
Start: 2019-02-24 | End: 2019-02-24 | Stop reason: HOSPADM

## 2019-02-24 RX ORDER — ONDANSETRON 4 MG/1
4 TABLET, ORALLY DISINTEGRATING ORAL EVERY 30 MIN PRN
Status: DISCONTINUED | OUTPATIENT
Start: 2019-02-24 | End: 2019-02-24 | Stop reason: HOSPADM

## 2019-02-24 RX ORDER — LIDOCAINE HYDROCHLORIDE 20 MG/ML
INJECTION, SOLUTION INFILTRATION; PERINEURAL PRN
Status: DISCONTINUED | OUTPATIENT
Start: 2019-02-24 | End: 2019-02-24

## 2019-02-24 RX ORDER — MEPERIDINE HYDROCHLORIDE 50 MG/ML
12.5 INJECTION INTRAMUSCULAR; INTRAVENOUS; SUBCUTANEOUS
Status: DISCONTINUED | OUTPATIENT
Start: 2019-02-24 | End: 2019-02-24 | Stop reason: HOSPADM

## 2019-02-24 RX ORDER — HYDRALAZINE HYDROCHLORIDE 20 MG/ML
2.5-5 INJECTION INTRAMUSCULAR; INTRAVENOUS EVERY 10 MIN PRN
Status: DISCONTINUED | OUTPATIENT
Start: 2019-02-24 | End: 2019-02-24 | Stop reason: HOSPADM

## 2019-02-24 RX ORDER — SODIUM CHLORIDE, SODIUM LACTATE, POTASSIUM CHLORIDE, CALCIUM CHLORIDE 600; 310; 30; 20 MG/100ML; MG/100ML; MG/100ML; MG/100ML
INJECTION, SOLUTION INTRAVENOUS CONTINUOUS PRN
Status: DISCONTINUED | OUTPATIENT
Start: 2019-02-24 | End: 2019-02-24

## 2019-02-24 RX ORDER — SODIUM CHLORIDE, SODIUM LACTATE, POTASSIUM CHLORIDE, CALCIUM CHLORIDE 600; 310; 30; 20 MG/100ML; MG/100ML; MG/100ML; MG/100ML
INJECTION, SOLUTION INTRAVENOUS CONTINUOUS
Status: DISCONTINUED | OUTPATIENT
Start: 2019-02-24 | End: 2019-02-24 | Stop reason: HOSPADM

## 2019-02-24 RX ORDER — NALOXONE HYDROCHLORIDE 0.4 MG/ML
.1-.4 INJECTION, SOLUTION INTRAMUSCULAR; INTRAVENOUS; SUBCUTANEOUS
Status: DISCONTINUED | OUTPATIENT
Start: 2019-02-24 | End: 2019-02-24 | Stop reason: HOSPADM

## 2019-02-24 RX ORDER — KETOROLAC TROMETHAMINE 30 MG/ML
15 INJECTION, SOLUTION INTRAMUSCULAR; INTRAVENOUS ONCE
Status: COMPLETED | OUTPATIENT
Start: 2019-02-24 | End: 2019-02-24

## 2019-02-24 RX ORDER — PROPOFOL 10 MG/ML
INJECTION, EMULSION INTRAVENOUS CONTINUOUS PRN
Status: DISCONTINUED | OUTPATIENT
Start: 2019-02-24 | End: 2019-02-24

## 2019-02-24 RX ORDER — FENTANYL CITRATE 50 UG/ML
25-50 INJECTION, SOLUTION INTRAMUSCULAR; INTRAVENOUS
Status: DISCONTINUED | OUTPATIENT
Start: 2019-02-24 | End: 2019-02-24 | Stop reason: HOSPADM

## 2019-02-24 RX ADMIN — PSYLLIUM HUSK 1 PACKET: 3.4 POWDER ORAL at 12:59

## 2019-02-24 RX ADMIN — OMEPRAZOLE 40 MG: 20 CAPSULE, DELAYED RELEASE ORAL at 12:52

## 2019-02-24 RX ADMIN — FENTANYL CITRATE 50 MCG: 50 INJECTION, SOLUTION INTRAMUSCULAR; INTRAVENOUS at 11:07

## 2019-02-24 RX ADMIN — DICYCLOMINE HYDROCHLORIDE 20 MG: 10 CAPSULE ORAL at 15:59

## 2019-02-24 RX ADMIN — FENTANYL CITRATE 50 MCG: 50 INJECTION, SOLUTION INTRAMUSCULAR; INTRAVENOUS at 11:36

## 2019-02-24 RX ADMIN — SPIRONOLACTONE 50 MG: 25 TABLET, FILM COATED ORAL at 15:59

## 2019-02-24 RX ADMIN — FENTANYL CITRATE 50 MCG: 50 INJECTION, SOLUTION INTRAMUSCULAR; INTRAVENOUS at 11:19

## 2019-02-24 RX ADMIN — CEFAZOLIN 1 G: 1 INJECTION, POWDER, FOR SOLUTION INTRAMUSCULAR; INTRAVENOUS at 09:30

## 2019-02-24 RX ADMIN — SODIUM CHLORIDE, POTASSIUM CHLORIDE, SODIUM LACTATE AND CALCIUM CHLORIDE: 600; 310; 30; 20 INJECTION, SOLUTION INTRAVENOUS at 01:46

## 2019-02-24 RX ADMIN — FLUTICASONE FUROATE AND VILANTEROL TRIFENATATE 1 PUFF: 200; 25 POWDER RESPIRATORY (INHALATION) at 08:53

## 2019-02-24 RX ADMIN — LOPERAMIDE HYDROCHLORIDE 4 MG: 2 CAPSULE ORAL at 12:53

## 2019-02-24 RX ADMIN — FENTANYL CITRATE 50 MCG: 50 INJECTION, SOLUTION INTRAMUSCULAR; INTRAVENOUS at 11:51

## 2019-02-24 RX ADMIN — LOPERAMIDE HYDROCHLORIDE 6 MG: 2 CAPSULE ORAL at 20:35

## 2019-02-24 RX ADMIN — GABAPENTIN 1200 MG: 400 CAPSULE ORAL at 12:50

## 2019-02-24 RX ADMIN — GABAPENTIN 1200 MG: 400 CAPSULE ORAL at 17:19

## 2019-02-24 RX ADMIN — HYDROMORPHONE HYDROCHLORIDE 0.2 MG: 1 INJECTION, SOLUTION INTRAMUSCULAR; INTRAVENOUS; SUBCUTANEOUS at 15:54

## 2019-02-24 RX ADMIN — KETOROLAC TROMETHAMINE 15 MG: 30 INJECTION, SOLUTION INTRAMUSCULAR at 11:25

## 2019-02-24 RX ADMIN — BUPIVACAINE HYDROCHLORIDE IN DEXTROSE 1.6 ML: 7.5 INJECTION, SOLUTION SUBARACHNOID at 09:21

## 2019-02-24 RX ADMIN — SODIUM CHLORIDE, POTASSIUM CHLORIDE, SODIUM LACTATE AND CALCIUM CHLORIDE: 600; 310; 30; 20 INJECTION, SOLUTION INTRAVENOUS at 10:09

## 2019-02-24 RX ADMIN — HYDROMORPHONE HYDROCHLORIDE 0.2 MG: 1 INJECTION, SOLUTION INTRAMUSCULAR; INTRAVENOUS; SUBCUTANEOUS at 20:35

## 2019-02-24 RX ADMIN — MIDAZOLAM 2 MG: 1 INJECTION INTRAMUSCULAR; INTRAVENOUS at 09:24

## 2019-02-24 RX ADMIN — Medication 10 MG: at 09:59

## 2019-02-24 RX ADMIN — GABAPENTIN 1200 MG: 400 CAPSULE ORAL at 23:13

## 2019-02-24 RX ADMIN — KETAMINE HYDROCHLORIDE 10 MG: 10 INJECTION, SOLUTION INTRAMUSCULAR; INTRAVENOUS at 09:18

## 2019-02-24 RX ADMIN — PSYLLIUM HUSK 1 PACKET: 3.4 POWDER ORAL at 20:34

## 2019-02-24 RX ADMIN — FENTANYL CITRATE 50 MCG: 50 INJECTION, SOLUTION INTRAMUSCULAR; INTRAVENOUS at 10:50

## 2019-02-24 RX ADMIN — HYDROCODONE BITARTRATE AND ACETAMINOPHEN 2 TABLET: 5; 325 TABLET ORAL at 01:38

## 2019-02-24 RX ADMIN — MIDAZOLAM 2 MG: 1 INJECTION INTRAMUSCULAR; INTRAVENOUS at 09:18

## 2019-02-24 RX ADMIN — HYDROCODONE BITARTRATE AND ACETAMINOPHEN 2 TABLET: 5; 325 TABLET ORAL at 21:44

## 2019-02-24 RX ADMIN — KETAMINE HYDROCHLORIDE 10 MG: 10 INJECTION, SOLUTION INTRAMUSCULAR; INTRAVENOUS at 09:37

## 2019-02-24 RX ADMIN — SODIUM CHLORIDE, POTASSIUM CHLORIDE, SODIUM LACTATE AND CALCIUM CHLORIDE: 600; 310; 30; 20 INJECTION, SOLUTION INTRAVENOUS at 12:29

## 2019-02-24 RX ADMIN — HYDROCODONE BITARTRATE AND ACETAMINOPHEN 1 TABLET: 5; 325 TABLET ORAL at 14:23

## 2019-02-24 RX ADMIN — PROPOFOL 75 MCG/KG/MIN: 10 INJECTION, EMULSION INTRAVENOUS at 09:37

## 2019-02-24 RX ADMIN — Medication 10 MG: at 09:42

## 2019-02-24 RX ADMIN — LIDOCAINE HYDROCHLORIDE 40 MG: 20 INJECTION, SOLUTION INFILTRATION; PERINEURAL at 09:37

## 2019-02-24 RX ADMIN — SIMETHICONE 80 MG: 80 TABLET, CHEWABLE ORAL at 12:57

## 2019-02-24 RX ADMIN — HYDROMORPHONE HYDROCHLORIDE 0.2 MG: 1 INJECTION, SOLUTION INTRAMUSCULAR; INTRAVENOUS; SUBCUTANEOUS at 13:07

## 2019-02-24 RX ADMIN — SODIUM CHLORIDE, POTASSIUM CHLORIDE, SODIUM LACTATE AND CALCIUM CHLORIDE: 600; 310; 30; 20 INJECTION, SOLUTION INTRAVENOUS at 09:12

## 2019-02-24 RX ADMIN — SODIUM CHLORIDE, POTASSIUM CHLORIDE, SODIUM LACTATE AND CALCIUM CHLORIDE: 600; 310; 30; 20 INJECTION, SOLUTION INTRAVENOUS at 22:04

## 2019-02-24 RX ADMIN — HYDROCORTISONE SODIUM SUCCINATE 100 MG: 100 INJECTION, POWDER, FOR SOLUTION INTRAMUSCULAR; INTRAVENOUS at 09:31

## 2019-02-24 RX ADMIN — KETAMINE HYDROCHLORIDE 10 MG: 10 INJECTION, SOLUTION INTRAMUSCULAR; INTRAVENOUS at 09:27

## 2019-02-24 ASSESSMENT — ACTIVITIES OF DAILY LIVING (ADL)
ADLS_ACUITY_SCORE: 16

## 2019-02-24 ASSESSMENT — COPD QUESTIONNAIRES
COPD: 1
CAT_SEVERITY: MODERATE

## 2019-02-24 ASSESSMENT — LIFESTYLE VARIABLES: TOBACCO_USE: 1

## 2019-02-24 NOTE — ANESTHESIA CARE TRANSFER NOTE
Patient: Chyna Delgado    Procedure(s):  Percutaneous Screw Fixation of Right Hip Fracture    Diagnosis: Right Femoral Neck Fracture  Diagnosis Additional Information: No value filed.    Anesthesia Type:   Spinal     Note:  Airway :Nasal Cannula  Patient transferred to:PACU  Handoff Report: Identifed the Patient, Identified the Reponsible Provider, Reviewed the pertinent medical history, Discussed the surgical course, Reviewed Intra-OP anesthesia mangement and issues during anesthesia, Set expectations for post-procedure period and Allowed opportunity for questions and acknowledgement of understanding      Vitals: (Last set prior to Anesthesia Care Transfer)    CRNA VITALS  2/24/2019 1003 - 2/24/2019 1048      2/24/2019             Pulse:  80    SpO2:  100 %                Electronically Signed By: MAIDA Dodd CRNA  February 24, 2019  10:48 AM

## 2019-02-24 NOTE — ANESTHESIA PREPROCEDURE EVALUATION
Anesthesia Pre-Procedure Evaluation    Patient: Chyna Delgado   MRN: [id number] : 1955          Preoperative Diagnosis: Right Femoral Neck Fracture    Procedure(s):  OPEN REDUCTION INTERNAL FIXATION HIP    Past Medical History:   Diagnosis Date     Abnormal mammogram, unspecified 2003    Normal pathology     Back Pain 02/10/2000    Sacroiliac pain     Benign neoplasm of colon 03/10/2000     Benign paroxysmal positional vertigo 2012     Depressive disorder, not elsewhere classified 02/10/2004     Diarrhea 12/15/2010    Secondary to colectomy for familial polyposis     Gastric polyp 2015, 2017    recurrent      History of normal mammogram 2014, 2019     Idiopathic osteoporosis 12/15/2010     Interstitial emphysema (H) 12/15/2010     menopausal or female climacteric states 1999     Mixed hyperlipidemia 12/15/2010     Other bursitis disorders 2011    Greater trochanteric     Other forms of retinal detachment(361.89) 12/15/2010     Other malaise and fatigue 01/10/2003     Personal history of tobacco use, presenting hazards to health 12/15/2010     Restless legs syndrome (RLS) 12/15/2010     Rotator cuff tendonitis      Rotator cuff tendonitis      Sacroiliitis, not elsewhere classified (H) 12/15/2010    multiple sites     Tobacco use disorder 2012     Unspecified asthma(493.90) 12/15/2010     Past Surgical History:   Procedure Laterality Date     benign tumors removed from back       COLON SURGERY N/A     HX: Total colectomy with J-pouch reconstruction.      ENDOSCOPY UPPER, COLONOSCOPY, COMBINED N/A 2014    Procedure: COMBINED ENDOSCOPY UPPER, COLONOSCOPY;  Surgeon: Delbert Patel MD;  Location: [facility]     ESOPHAGOSCOPY, GASTROSCOPY, DUODENOSCOPY (EGD), COMBINED N/A 2015    Procedure: COMBINED ESOPHAGOSCOPY, GASTROSCOPY, DUODENOSCOPY (EGD);  Surgeon: Delbert Patel MD;  Location: [facility]     ESOPHAGOSCOPY, GASTROSCOPY, DUODENOSCOPY (EGD),  COMBINED N/A 12/18/2017    Procedure: COMBINED ESOPHAGOSCOPY, GASTROSCOPY, DUODENOSCOPY (EGD);  UPPER ENDOSCOPY WITH BIOPSY;  Surgeon: Delbert Patel MD;  Location: [facility]     excised-benign  2002    tongue lesion     HC REPAIR OF NASAL SEPTUM  2002    Deviated nasal septum     LAPAROSCOPIC CHOLECYSTECTOMY       lumpectomy Right breast      Breast Lump     MOUTH SURGERY      removal of spot from roof of mouth     pilonidal cyst surgery  1976     removal of colon and large intestine  2000    Familial polyposis     Right etopic pregnancy      Pregnancy     TONSILLECTOMY      tonsillitus     UPPER GI ENDOSCOPY  2009    Stomach polyps       Anesthesia Evaluation     . Pt has had prior anesthetic.     No history of anesthetic complications          ROS/MED HX    ENT/Pulmonary:     (+)tobacco use, Current use Mild Persistent asthma moderate COPD, , . .    Neurologic: Comment: RLS      Cardiovascular:     (+) Dyslipidemia, hypertension----. : . . . :. .       METS/Exercise Tolerance:     Hematologic:  - neg hematologic  ROS       Musculoskeletal:   (+) arthritis, , fracture lower extremity: Hip, -       GI/Hepatic:  - neg GI/hepatic ROS       Renal/Genitourinary:         Endo:         Psychiatric:     (+) psychiatric history depression      Infectious Disease:  - neg infectious disease ROS       Malignancy:      - no malignancy   Other:    - neg other ROS                      Physical Exam  Normal systems: cardiovascular, pulmonary and dental    Airway   Mallampati: II  TM distance: >3 FB  Neck ROM: full    Dental     Cardiovascular   Rhythm and rate: regular and normal      Pulmonary    breath sounds clear to auscultation            Lab Results   Component Value Date    WBC 8.9 02/24/2019    HGB 12.4 02/24/2019    HCT 36.6 02/24/2019     02/24/2019    CRP 4.7 07/23/2018    SED 16 06/13/2017     02/24/2019    POTASSIUM 4.1 02/24/2019    CHLORIDE 106 02/24/2019    CO2 25 02/24/2019    BUN 15 02/24/2019    CR  "0.80 02/24/2019    GLC 91 02/24/2019    СВЕТЛАНА 8.5 02/24/2019    MAG 1.8 02/07/2019    ALBUMIN 4.0 02/23/2019    PROTTOTAL 7.8 02/23/2019    ALT 37 02/23/2019    AST 16 02/23/2019    ALKPHOS 125 02/23/2019    BILITOTAL 0.5 02/23/2019    INR 1.09 02/23/2019    TSH 0.74 02/05/2019    T4 0.96 08/08/2018    T3 95 08/08/2018       Preop Vitals  BP Readings from Last 3 Encounters:   02/24/19 117/69   02/14/19 104/66   02/07/19 108/60    Pulse Readings from Last 3 Encounters:   02/23/19 88   02/14/19 79   02/07/19 91      Resp Readings from Last 3 Encounters:   02/24/19 18   02/07/19 19   01/16/19 20    SpO2 Readings from Last 3 Encounters:   02/24/19 95%   02/14/19 96%   02/07/19 97%      Temp Readings from Last 1 Encounters:   02/24/19 98.7  F (37.1  C) (Tympanic)    Ht Readings from Last 1 Encounters:   02/23/19 1.575 m (5' 2\")      Wt Readings from Last 1 Encounters:   02/23/19 59.4 kg (130 lb 15.3 oz)    Estimated body mass index is 23.95 kg/m  as calculated from the following:    Height as of this encounter: 1.575 m (5' 2\").    Weight as of this encounter: 59.4 kg (130 lb 15.3 oz).       Anesthesia Plan      History & Physical Review  History and physical reviewed and following examination; no interval change.    ASA Status:  3 emergent.    NPO Status:  > 6 hours    Plan for Spinal Maintenance will be TIVA.           Postoperative Care      Consents  Anesthetic plan, risks, benefits and alternatives discussed with:  Patient..                 Cristobal Martin, APRN CRNA  "

## 2019-02-24 NOTE — PLAN OF CARE
"Reason for hospital stay:  Rt Hip Fracture    Most recent vitals: /78   Pulse 88   Temp 98.8  F (37.1  C) (Tympanic)   Resp 18   Ht 1.575 m (5' 2\")   Wt 59.4 kg (130 lb 15.3 oz)   SpO2 93%   BMI 23.95 kg/m      Pain Management:  Complains of occasional right hip pain especially with movement - gave ibuprofen with adequate pain relief.      Orientation:  A+O x4 - calls appropriately and makes needs known. Pleasant and talkative, visiting with  in room.      Cardiac:  AP regular    Respiratory:  Lungs diminished with expiratory wheezes in bilateral bases. Sats 93% on RA.     GI:  Has chronic loose stools from history of colon resection- takes imodium daily. Bowel sounds present x4.    :  Denies any issues voiding    IVF:  LR infusing at 100mL/hr    Mobility:  Up with assist 1, crutches, and gait belt.     Safety: Alarms on     Comments:  Patient requesting Imodium, Gas-X, and metamucil this evening. Spoke with Dr. Catalan and received telephone order for Imodium and metamucil as at home - Imodium (2mg tabs) Take 3 tabs twice daily, and metamucil 1 tablespoon bid.    2/23/2019  6:32 PM  Eliseo Quinn      "

## 2019-02-24 NOTE — PLAN OF CARE
Face to face report given with opportunity to observe patient.    Report given to Chris Quinn   2/23/2019  11:01 PM

## 2019-02-24 NOTE — PROGRESS NOTES
Patient was seen postoperatively.  Alert pleasant and really no distress at all.  Foot is still little bit numb.  No nausea vomiting.  Uncomplicated surgery.  She is doing very well.  We will have physical therapy see her tomorrow.  Discharge when safely ambulating.  Her plan is to go home.

## 2019-02-24 NOTE — ANESTHESIA PROCEDURE NOTES
Peripheral nerve/Neuraxial procedure note : intrathecal  Pre-Procedure    Location: OR    Procedure Times:2/24/2019 9:18 AM and 2/24/2019 9:21 AM  Pre-Anesthestic Checklist: patient identified, IV checked, site marked, risks and benefits discussed, informed consent, monitors and equipment checked, pre-op evaluation, at physician/surgeon's request and post-op pain management    Timeout  Correct Patient: Yes   Correct Procedure: Yes   Correct Site: Yes   Correct Laterality: Yes   Correct Position: Yes   Site Marked: Yes   .   Procedure Documentation  ASA 3  .    Procedure:    Intrathecal.  Insertion Site:L3-4  (midline approach)      Patient Prep;povidone-iodine 7.5% surgical scrub.  .  Needle: Eddi tip Spinal Needle (gauge): 25  Spinal/LP Needle Length (inches): 3.5 # of attempts: 1 and  # of redirects:  1 Introducer used Introducer: 20 G .       Assessment/Narrative  Paresthesias: No.  .  .  clear CSF fluid removed . Time Injected: 09:21

## 2019-02-24 NOTE — OP NOTE
Preoperative diagnosis: Impacted, minimally displaced fracture of the right femoral neck.    Postoperative diagnosis: Impacted, minimally displaced fracture of the right femoral neck.    Procedure performed: Cannulated screw fixation of the right femoral neck fracture.    Anesthesia utilized: Sedation plus intrathecal spinal anesthesia.    Estimated blood loss, less than 10 cc, wound classification: Clean, complications: None.    Description of procedure: After the patient had been given her spinal anesthetic, she was positioned on the fracture table.  Fluoroscopy was then utilized to maximize visualization of the minimally displaced impacted femoral neck fracture on the right.  The leg was then prepped and draped.  A small incision was made over the lateral aspect of the upper leg and with fluoroscopic assistance, guidewire was placed through the lateral femoral cortex into the femoral head.  Position of this pin was confirmed in the AP and lateral planes.  2 additional guidewires were then placed adjacent to this pin.  The length of the required screw was then measured, the pins were then overdrilled and the cannulated screws placed into the femoral head.  With fluoroscopic assistance, confirmation of appropriate position was performed.  The leg was then placed through a range of motion, there was no apparent penetration into the hip joint, the fracture itself remained very stable after the fixation.  The small wound was then closed, sterile dressing applied, patient was taken to the intensive care unit to complete her recovery.  Postoperative plan will be weightbearing as tolerated with a walker for approximately 4-6 weeks as the fracture stabilizes.  She will be given a physical therapy and occupational therapy consult and assistance, as well as  to address her needs when she is able to be discharged from the hospital.  She will be given appropriate supportive care during her early hospitalization  postoperative period as well.

## 2019-02-24 NOTE — PLAN OF CARE
A&O. VSS/AF with initial preoperative assessment. Denies pain at rest. O2 88% RA increasing to 94%  with CDB. To OR at 0900 via bed. Returns to floor for wake up, turned over to my care at 1215 receiving report from Monica HOLMAN RN.   A&O. VSS/AF O2 93-94% 2L NC eventually weaning to RA. Dressing to right hip incision with small shadowing marked and unchanged this shift. Ice on. SCDs to BLEs. Pain control achieved with 0.2 mg dilaudid x 1 dose and one norco later in shift. OOB  to BR with walker and SBA. Regular diet, tolerates well. Medications managed differently from ordered ; patient taking 2 lomotil instead of 3 and 1 simethicone instead of 2 per her home regimen r/t to GI history.  Face to face report given with opportunity to observe patient.    Report given to Eliseo Candelario   2/24/2019  3:59 PM

## 2019-02-24 NOTE — PLAN OF CARE
VSS, afebrile, HRR, lungs clear but DIM, bowels active, RT hip has some swelling, more like a knot, on the side of the front of her thigh, no bruising noted, pt able to get up to the bathroom with crutches with minimal assistance, refuses to use bedside commode, and has minimal pain. Pt has chronic diarrhea, up 2x with this. Pt only received 2 Norco this shift at the very start, awake this morning denying pain. Pt is alert and oriented x 4, makes needs known, uses the call light appropriately.     Face to face report given with opportunity to observe patient.    Report given to GARRETT Lozano   2/24/2019  8:04 AM

## 2019-02-24 NOTE — PROGRESS NOTES
Assessment completed see flowsheet.    LOC: alert and oriented, very pleasant and conversational  Others present: Patient and her daughter    Dx: femur fx  Chronic Disease: HTN    Lives with: her  Los   Living at:  Home in [place]  Transportation: YES She normally drives, but will get rides from [place] and other family as needed    Primary PCP: Arie Camarillo  Support System:  Family and friends  Homecare/PCA: none  /County Services:   none  : NO      [facility] Referral line called: anabell    Health Care Directive: YES lists Los, Essie, and Andrew as her health care agents    Pharmacy: [facility]  Meds management: YES manages independently with out difficulty    Adequate Resources for needs (housing, utilities, food/med): YES  Household chores: independent, most will be done by Los now  Work/community/social activity: YES as desired    ADLs: independent  Ambulation:previously independent, without device  Falls: says she falls on the ice every winter and so uses cleats on her boots; no other recent falls other than this one  Nutrition: shops and preps meals independently, most will be done by Los now; she has dietary limitations to accommodate her colectomy and gastric reflux. No concerns about appetite or nutrition  Sleep: has restless legs but sleeps well with her med for that   Equipment used: none    Mental health: no concerns; has a history of depression but no long takes a med for this and feels mentally healthy at this time  Substance abuse: smokes, but is angry about falling as a result of smoking and so intends to quit. Will work with her PCP or insurance if additional support is needed. Has 3-4 drinks per week. No street drug use.   Exposure to violence/abuse: not asked  Stressors: none voiced    Able to Return to Prior Living Arrangements: to be determined    Choice of Vendor: na    Plan: she adamantly plans to return home via [place]; she will borrow a neighbors walker; she  would be willing to go to [facility] for outpatient rehab    Barriers: none known    BRENT: average    Medicare IM letter reviewed with her today.

## 2019-02-24 NOTE — PROGRESS NOTES
[facility]    Hospitalist Progress Note      Assessment & Plan   Chyna Delgado is a 63 year old female who was admitted on 2/23/2019.      1.  Right femoral neck fracture: Patient is doing actually relatively well minimal discomfort.  Plan is to go to the OR today to have this repaired by Dr. Kolb.  She is ready for surgery optimized as best she can be.  Expect no medical complications.  She anticipates being able to be discharged home once recovers from surgical intervention.  We will discuss DVT prophylaxis with orthopedic surgery    2.  History of asthma/emphysema: She is on Advair continue this and as needed bronchodilators.  Oxygen saturations are fine no wheezing lungs are clear.    3.  Cardiac status: History of some mild hypertension no issues currently.  Has had no chest pains palpitations at all this is not an episode of syncope.    4.  Status post total colectomy.  Does have some chronic loose stools because of this.  Takes Bentyl daily along with as needed Imodium.    5.  Tobacco dependence.  Continue nicotine patch.      Diet: NPO per Anesthesia Guidelines for Procedure/Surgery Except for: Meds  Fluids:  cc an hour    DVT Prophylaxis: Pneumatic Compression Devices  Code Status: Full Code    Disposition: Expected discharge in 1-2 days days once recovers from surgery.    Eddie Menchaca    Interval History   Patient did well overnight.  Some discomfort but not bad.  Denies any shortness of breath or chest pain no nausea vomiting.  No abdominal pain.  Vital signs remained stable.    -Data reviewed today: I reviewed all new labs and imaging results over the last 24 hours. I personally reviewed no images or EKG's today.    Physical Exam   Temp: 98.4  F (36.9  C) Temp src: Tympanic BP: 99/66 Pulse: 88 Heart Rate: 80 Resp: 18 SpO2: 96 %(with CDB) O2 Device: None (Room air)    Vitals:    02/23/19 1031 02/23/19 1300   Weight: 59 kg (130 lb) 59.4 kg (130 lb 15.3 oz)     Vital Signs with  Ranges  Temp:  [98.4  F (36.9  C)-99.4  F (37.4  C)] 98.4  F (36.9  C)  Pulse:  [] 88  Heart Rate:  [75-88] 80  Resp:  [16-19] 18  BP: ()/(62-78) 99/66  SpO2:  [93 %-97 %] 96 %  I/O last 3 completed shifts:  In: 1636 [P.O.:240; I.V.:1396]  Out: 650 [Urine:650]    Peripheral IV 02/23/19 Left Lower forearm (Active)   Site Assessment WDL 2/24/2019  8:55 AM   Line Status Infusing 2/24/2019  8:55 AM   Phlebitis Scale 0-->no symptoms 2/24/2019  8:55 AM   Infiltration Scale 0 2/24/2019  8:55 AM   Number of days: 1     No line/device    Constitutional: Alert and oriented x3. No distress    HEENT: Normocephalic/atraumatic, PERRL, EOMI, mouth moist, neck supple, no LN.     Cardiovascular: RRR. no Murmur, no  rubs, or gallops.  JVD no.  Bruits no.  Pulses 3+    Respiratory: Clear to auscultation bilaterally    Abdomen: Soft, nontender, nondistended, no organomegaly. Bowel sounds present    Extremities: Warm/dry. No edema  CMS intact right leg no shortening  Is flexing at hip    Neuro:   Non focal, cranial nerves intact, Moves all extremities.  Medications     lactated ringers 100 mL/hr at 02/24/19 0146       dicyclomine  20 mg Oral TID AC     fluticasone-vilanterol  1 puff Inhalation Daily     gabapentin  1,200 mg Oral TID     ketotifen  1 drop Both Eyes BID     loperamide  6 mg Oral BID     nicotine   Transdermal Q8H     nicotine   Transdermal Daily     omeprazole  40 mg Oral QAM AC     predniSONE  5 mg Oral Daily with breakfast     psyllium  1 packet Oral BID     sodium chloride (PF)  3 mL Intracatheter Q8H     spironolactone  50 mg Oral BID       Data   Recent Labs   Lab 02/24/19  0518 02/23/19  1429 02/20/19  0852   WBC 8.9 11.8*  --    HGB 12.4 14.5  --    MCV 90 90  --     262  --    INR  --  1.09  --     137 139   POTASSIUM 4.1 4.6 4.5   CHLORIDE 106 103 107   CO2 25 24 23   BUN 15 20 21   CR 0.80 0.98 0.90   ANIONGAP 8 10 9   СВЕТЛАНА 8.5 9.4 9.4   GLC 91 120* 96   ALBUMIN  --  4.0  --    PROTTOTAL   --  7.8  --    BILITOTAL  --  0.5  --    ALKPHOS  --  125  --    ALT  --  37  --    AST  --  16  --        Recent Results (from the past 24 hour(s))   XR Pelvis 1/2 Views    Narrative    PROCEDURE: XR PELVIS 1/2 VW 2/23/2019 11:38 AM    HISTORY: Fell onto right hip from standing. Unable to fully bear  weight. Hx of osteopenia.    COMPARISONS: None.    TECHNIQUE: 1 view    FINDINGS: The pelvis is intact. There is sclerosis seen in the femoral  neck on the right suspicious for an impacted fracture. Hip x-rays of  the right hip are recommended.         Impression    IMPRESSION: Possible impacted femoral neck fracture on the right    AYAZ BROCK MD   XR Hip Right 2-3 Views    Narrative    PROCEDURE:  XR HIP RIGHT 2-3 VIEWS    HISTORY: fall on right hip with inability to fully bear weight    COMPARISON:  None.    TECHNIQUE:  2 views of the right hip were obtained.    FINDINGS: There Is an impacted right femoral neck fracture. The  acetabulum is intact.       Impression    IMPRESSION: Femoral neck fracture on the right      AYAZ BROCK MD

## 2019-02-24 NOTE — OR NURSING
Pt transferred to medical surgical RN care. Denies pain at this time. Medicated with Fentanyl IV and Toradol IV in PACU phase for headache 5/10, and hip pain. Spinal wore off quickly once in PACU phase. Reports some numbness and tingling in toes, but moves everything with ease. Ice to right hip, and dressing marked for small amount of scant serosanguinous fluid on dressing. Weaned of oxygen, but did need to go back on once pain medications kicked in. Oxygen saturations 92-95% on 2LPM via NC. Report given to Blake TUCKER. Trip 19.

## 2019-02-25 ENCOUNTER — APPOINTMENT (OUTPATIENT)
Dept: OCCUPATIONAL THERAPY | Facility: HOSPITAL | Age: 64
DRG: 482 | End: 2019-02-25
Attending: ORTHOPAEDIC SURGERY
Payer: COMMERCIAL

## 2019-02-25 ENCOUNTER — APPOINTMENT (OUTPATIENT)
Dept: PHYSICAL THERAPY | Facility: HOSPITAL | Age: 64
DRG: 482 | End: 2019-02-25
Payer: COMMERCIAL

## 2019-02-25 VITALS
HEIGHT: 62 IN | SYSTOLIC BLOOD PRESSURE: 100 MMHG | WEIGHT: 130.95 LBS | DIASTOLIC BLOOD PRESSURE: 61 MMHG | RESPIRATION RATE: 16 BRPM | BODY MASS INDEX: 24.1 KG/M2 | TEMPERATURE: 97.8 F | OXYGEN SATURATION: 97 % | HEART RATE: 76 BPM

## 2019-02-25 PROBLEM — S72.009A HIP FRACTURE REQUIRING OPERATIVE REPAIR (H): Status: ACTIVE | Noted: 2019-02-25

## 2019-02-25 LAB
CREAT SERPL-MCNC: 0.74 MG/DL (ref 0.52–1.04)
ERYTHROCYTE [DISTWIDTH] IN BLOOD BY AUTOMATED COUNT: 12.5 % (ref 10–15)
GFR SERPL CREATININE-BSD FRML MDRD: 85 ML/MIN/{1.73_M2}
GLUCOSE BLDC GLUCOMTR-MCNC: 123 MG/DL (ref 70–99)
HCT VFR BLD AUTO: 33.1 % (ref 35–47)
HGB BLD-MCNC: 11.4 G/DL (ref 11.7–15.7)
MCH RBC QN AUTO: 31.6 PG (ref 26.5–33)
MCHC RBC AUTO-ENTMCNC: 34.4 G/DL (ref 31.5–36.5)
MCV RBC AUTO: 92 FL (ref 78–100)
PLATELET # BLD AUTO: 182 10E9/L (ref 150–450)
PLATELET # BLD AUTO: 209 10E9/L (ref 150–450)
RBC # BLD AUTO: 3.61 10E12/L (ref 3.8–5.2)
WBC # BLD AUTO: 7.1 10E9/L (ref 4–11)

## 2019-02-25 PROCEDURE — 25000132 ZZH RX MED GY IP 250 OP 250 PS 637: Performed by: HOSPITALIST

## 2019-02-25 PROCEDURE — 36415 COLL VENOUS BLD VENIPUNCTURE: CPT | Performed by: ORTHOPAEDIC SURGERY

## 2019-02-25 PROCEDURE — 85027 COMPLETE CBC AUTOMATED: CPT | Performed by: INTERNAL MEDICINE

## 2019-02-25 PROCEDURE — 40000193 ZZH STATISTIC PT WARD VISIT

## 2019-02-25 PROCEDURE — 99238 HOSP IP/OBS DSCHRG MGMT 30/<: CPT | Performed by: INTERNAL MEDICINE

## 2019-02-25 PROCEDURE — 25800030 ZZH RX IP 258 OP 636: Performed by: INTERNAL MEDICINE

## 2019-02-25 PROCEDURE — 97530 THERAPEUTIC ACTIVITIES: CPT | Mod: GP

## 2019-02-25 PROCEDURE — 00000146 ZZHCL STATISTIC GLUCOSE BY METER IP

## 2019-02-25 PROCEDURE — 36415 COLL VENOUS BLD VENIPUNCTURE: CPT | Performed by: INTERNAL MEDICINE

## 2019-02-25 PROCEDURE — 97165 OT EVAL LOW COMPLEX 30 MIN: CPT | Mod: GO

## 2019-02-25 PROCEDURE — 82565 ASSAY OF CREATININE: CPT | Performed by: ORTHOPAEDIC SURGERY

## 2019-02-25 PROCEDURE — 85049 AUTOMATED PLATELET COUNT: CPT | Performed by: ORTHOPAEDIC SURGERY

## 2019-02-25 PROCEDURE — 25000128 H RX IP 250 OP 636: Performed by: ORTHOPAEDIC SURGERY

## 2019-02-25 PROCEDURE — 25000125 ZZHC RX 250: Performed by: INTERNAL MEDICINE

## 2019-02-25 PROCEDURE — 97161 PT EVAL LOW COMPLEX 20 MIN: CPT | Mod: GP

## 2019-02-25 PROCEDURE — 40000133 ZZH STATISTIC OT WARD VISIT

## 2019-02-25 PROCEDURE — 25000132 ZZH RX MED GY IP 250 OP 250 PS 637: Performed by: INTERNAL MEDICINE

## 2019-02-25 PROCEDURE — 40000275 ZZH STATISTIC RCP TIME EA 10 MIN

## 2019-02-25 PROCEDURE — 94640 AIRWAY INHALATION TREATMENT: CPT

## 2019-02-25 RX ORDER — CEFAZOLIN SODIUM 1 G/50ML
1 INJECTION, SOLUTION INTRAVENOUS EVERY 8 HOURS
Status: DISCONTINUED | OUTPATIENT
Start: 2019-02-25 | End: 2019-02-25 | Stop reason: HOSPADM

## 2019-02-25 RX ORDER — SODIUM CHLORIDE 9 MG/ML
INJECTION, SOLUTION INTRAVENOUS CONTINUOUS
Status: DISCONTINUED | OUTPATIENT
Start: 2019-02-25 | End: 2019-02-25 | Stop reason: HOSPADM

## 2019-02-25 RX ORDER — HYDROCODONE BITARTRATE AND ACETAMINOPHEN 5; 325 MG/1; MG/1
1-2 TABLET ORAL EVERY 4 HOURS PRN
Qty: 30 TABLET | Refills: 0 | Status: SHIPPED | OUTPATIENT
Start: 2019-02-25 | End: 2019-09-30

## 2019-02-25 RX ORDER — NALOXONE HYDROCHLORIDE 0.4 MG/ML
.1-.4 INJECTION, SOLUTION INTRAMUSCULAR; INTRAVENOUS; SUBCUTANEOUS
Status: DISCONTINUED | OUTPATIENT
Start: 2019-02-25 | End: 2019-02-25 | Stop reason: HOSPADM

## 2019-02-25 RX ORDER — ASPIRIN 325 MG
325 TABLET ORAL DAILY
Qty: 30 TABLET | Refills: 0 | COMMUNITY
Start: 2019-02-25 | End: 2019-04-02

## 2019-02-25 RX ORDER — LIDOCAINE 40 MG/G
CREAM TOPICAL
Status: DISCONTINUED | OUTPATIENT
Start: 2019-02-25 | End: 2019-02-25 | Stop reason: HOSPADM

## 2019-02-25 RX ADMIN — CEFAZOLIN SODIUM 1 G: 1 INJECTION, SOLUTION INTRAVENOUS at 11:58

## 2019-02-25 RX ADMIN — FLUTICASONE FUROATE AND VILANTEROL TRIFENATATE 1 PUFF: 200; 25 POWDER RESPIRATORY (INHALATION) at 08:14

## 2019-02-25 RX ADMIN — PRAMIPEXOLE DIHYDROCHLORIDE 0.12 MG: 0.12 TABLET ORAL at 00:58

## 2019-02-25 RX ADMIN — GABAPENTIN 1200 MG: 400 CAPSULE ORAL at 09:16

## 2019-02-25 RX ADMIN — IBUPROFEN 200 MG: 200 TABLET, FILM COATED ORAL at 06:03

## 2019-02-25 RX ADMIN — HYDROCODONE BITARTRATE AND ACETAMINOPHEN 2 TABLET: 5; 325 TABLET ORAL at 12:45

## 2019-02-25 RX ADMIN — HYDROCODONE BITARTRATE AND ACETAMINOPHEN 2 TABLET: 5; 325 TABLET ORAL at 03:53

## 2019-02-25 RX ADMIN — DICYCLOMINE HYDROCHLORIDE 20 MG: 10 CAPSULE ORAL at 12:19

## 2019-02-25 RX ADMIN — PSYLLIUM HUSK 1 PACKET: 3.4 POWDER ORAL at 09:18

## 2019-02-25 RX ADMIN — PREDNISONE 5 MG: 5 TABLET ORAL at 07:58

## 2019-02-25 RX ADMIN — HYDROCODONE BITARTRATE AND ACETAMINOPHEN 2 TABLET: 5; 325 TABLET ORAL at 08:06

## 2019-02-25 RX ADMIN — OMEPRAZOLE 40 MG: 20 CAPSULE, DELAYED RELEASE ORAL at 07:58

## 2019-02-25 RX ADMIN — SODIUM CHLORIDE, POTASSIUM CHLORIDE, SODIUM LACTATE AND CALCIUM CHLORIDE: 600; 310; 30; 20 INJECTION, SOLUTION INTRAVENOUS at 07:57

## 2019-02-25 RX ADMIN — GABAPENTIN 1200 MG: 400 CAPSULE ORAL at 14:09

## 2019-02-25 RX ADMIN — DICYCLOMINE HYDROCHLORIDE 20 MG: 10 CAPSULE ORAL at 07:58

## 2019-02-25 RX ADMIN — KETOTIFEN FUMARATE 1 DROP: 0.35 SOLUTION/ DROPS OPHTHALMIC at 09:21

## 2019-02-25 RX ADMIN — LOPERAMIDE HYDROCHLORIDE 6 MG: 2 CAPSULE ORAL at 07:58

## 2019-02-25 ASSESSMENT — ACTIVITIES OF DAILY LIVING (ADL)
ADLS_ACUITY_SCORE: 16

## 2019-02-25 NOTE — PLAN OF CARE
"Pt is A&Ox 4. Assessment as charted. Last BM yesterday. Urine is yellow in color and clear. Reports pain in her right hip.  Small amount of serosanguinous drainage marked on dressing.    No falls or injuries this shift. Pt uses call light appropriately.   Will continue to monitor. /70   Pulse 76   Temp 98  F (36.7  C) (Tympanic)   Resp 16   Ht 1.575 m (5' 2\")   Wt 59.4 kg (130 lb 15.3 oz)   SpO2 92%   BMI 23.95 kg/m      "

## 2019-02-25 NOTE — PLAN OF CARE
Physical Therapy Discharge Summary    Reason for therapy discharge:    Discharged to home.    Progress towards therapy goal(s). See goals on Care Plan in [facility] electronic health record for goal details.  Goals met    Therapy recommendation(s):    Continued therapy is recommended.  Rationale/Recommendations:  OP PT recommended s/p ORIF.

## 2019-02-25 NOTE — ANESTHESIA POSTPROCEDURE EVALUATION
Patient: Chyna Delgado    Procedure(s):  Percutaneous Screw Fixation of Right Hip Fracture    Diagnosis:Right Femoral Neck Fracture  Diagnosis Additional Information: No value filed.    Anesthesia Type:  Spinal    Note:  Anesthesia Post Evaluation    Patient location during evaluation: PACU  Patient participation: Able to fully participate in evaluation  Level of consciousness: awake and alert  Pain management: adequate  Airway patency: patent  Cardiovascular status: acceptable  Respiratory status: acceptable  Hydration status: acceptable  PONV: none             Last vitals:  Vitals:    02/25/19 0356 02/25/19 0403 02/25/19 0745   BP: 124/70  104/58   Pulse:      Resp: 16     Temp: 98  F (36.7  C)  97.8  F (36.6  C)   SpO2:  92% 97%         Electronically Signed By: MAIDA Dodd CRNA  February 25, 2019  10:18 AM

## 2019-02-25 NOTE — PLAN OF CARE
Discharge Planner PT   Patient plan for discharge: Home with outpatient PT  Current status: Pt completed all transfers independently. Ambulated 400ft c FWW and supervision. Ascended/descended 5 stairs modified independent c rail. Pt steady and safe with all mobility and pain very well controlled.   Barriers to return to prior living situation: None  Recommendations for discharge: Home with Outpatient PT. Pt reports she is going to get a walker from a friend  Rationale for recommendations: See PT eval        Entered by: Prudence Sandoval 02/25/2019 10:54 AM

## 2019-02-25 NOTE — PROGRESS NOTES
Inpatient Physical Therapy Evaluation    Name: Chyna Delgado MRN# [id number]   Age: 63 year old YOB: 1955     Date of Consultation: 2019  Consultation is requested by:  Dr. Kolb   Specific Consultation Request:  Hip Fracture Post-Op  Primary care provider: Arie Camarillo    General Information:   Onset Date: 19     History of Current Problem/Admission: Fracture of femoral neck, right (H) [S72.001A]    Prior Level of Function: Pt is independent with all mobility at baseline with no assistive device. Reports she got dizzy at home and fell. Denies other recent falls   Ambulation: 0 - Independent   Transferrin - Independent   Toiletin - Independent    Bathin - Independent   Dressin - Independent   Eatin - Independent   Communication: 0 - Understands/communicates without difficulty  Swallowin - swallows foods/liquids without difficulty  Cognition: 0 - no cognitive issues reported    Fall history within the last 6 months: yes    Current Living Situation: Patient has 3 stairs to enter the home. Patient has a railing on one side.    Current Equipment Used at Home: Shower chair, plans to borrow walker from a friend    Patient & Family Goals: Return home     Past Medical History:   Past Medical History:   Diagnosis Date     Abnormal mammogram, unspecified 2003    Normal pathology     Back Pain 02/10/2000    Sacroiliac pain     Benign neoplasm of colon 03/10/2000     Benign paroxysmal positional vertigo 2012     Depressive disorder, not elsewhere classified 02/10/2004     Diarrhea 12/15/2010    Secondary to colectomy for familial polyposis     Gastric polyp 2015, 2017    recurrent      History of normal mammogram 2014, 2019     Idiopathic osteoporosis 12/15/2010     Interstitial emphysema (H) 12/15/2010     menopausal or female climacteric states 1999     Mixed hyperlipidemia 12/15/2010     Other bursitis disorders  02/04/2011    Greater trochanteric     Other forms of retinal detachment(361.89) 12/15/2010     Other malaise and fatigue 01/10/2003     Personal history of tobacco use, presenting hazards to health 12/15/2010     Restless legs syndrome (RLS) 12/15/2010     Rotator cuff tendonitis      Rotator cuff tendonitis      Sacroiliitis, not elsewhere classified (H) 12/15/2010    multiple sites     Tobacco use disorder 08/22/2012     Unspecified asthma(493.90) 12/15/2010       Past Surgical History:  Past Surgical History:   Procedure Laterality Date     benign tumors removed from back       COLON SURGERY N/A     HX: Total colectomy with J-pouch reconstruction.      ENDOSCOPY UPPER, COLONOSCOPY, COMBINED N/A 9/5/2014    Procedure: COMBINED ENDOSCOPY UPPER, COLONOSCOPY;  Surgeon: Delbert Patel MD;  Location: [facility]     ESOPHAGOSCOPY, GASTROSCOPY, DUODENOSCOPY (EGD), COMBINED N/A 12/11/2015    Procedure: COMBINED ESOPHAGOSCOPY, GASTROSCOPY, DUODENOSCOPY (EGD);  Surgeon: Delbert Patel MD;  Location: [facility]     ESOPHAGOSCOPY, GASTROSCOPY, DUODENOSCOPY (EGD), COMBINED N/A 12/18/2017    Procedure: COMBINED ESOPHAGOSCOPY, GASTROSCOPY, DUODENOSCOPY (EGD);  UPPER ENDOSCOPY WITH BIOPSY;  Surgeon: Delbert Patel MD;  Location: [facility]     excised-benign  2002    tongue lesion     HC REPAIR OF NASAL SEPTUM  2002    Deviated nasal septum     LAPAROSCOPIC CHOLECYSTECTOMY       lumpectomy Right breast      Breast Lump     MOUTH SURGERY      removal of spot from roof of mouth     pilonidal cyst surgery  1976     removal of colon and large intestine  2000    Familial polyposis     Right etopic pregnancy      Pregnancy     TONSILLECTOMY      tonsillitus     UPPER GI ENDOSCOPY  2009    Stomach polyps       Medications:   Current Facility-Administered Medications   Medication     acetaminophen (TYLENOL) tablet 650 mg     albuterol (PROAIR HFA/PROVENTIL HFA/VENTOLIN HFA) 108 (90 Base) MCG/ACT inhaler 2 puff     benzocaine-menthol (CEPACOL)  15-3.6 MG lozenge 1-2 lozenge     ceFAZolin (ANCEF) intermittent infusion 1 g     dicyclomine (BENTYL) capsule 20 mg     [START ON 2/26/2019] enoxaparin (LOVENOX) injection 40 mg     fluticasone-vilanterol (BREO ELLIPTA) 200-25 MCG/INH inhaler 1 puff     gabapentin (NEURONTIN) capsule 1,200 mg     HYDROcodone-acetaminophen (NORCO) 5-325 MG per tablet 1-2 tablet     HYDROmorphone (PF) (DILAUDID) injection 0.2 mg     ibuprofen (ADVIL/MOTRIN) tablet 200 mg     ketotifen (ZADITOR/REFRESH ANTI-ITCH) 0.025 % ophthalmic solution 1 drop     lidocaine (LMX4) kit     lidocaine (LMX4) kit     lidocaine 1 % 0.1-1 mL     lidocaine 1 % 0.1-1 mL     loperamide (IMODIUM) capsule 6 mg     melatonin tablet 1 mg     naloxone (NARCAN) injection 0.1-0.4 mg     naloxone (NARCAN) injection 0.1-0.4 mg     nicotine (NICODERM CQ) 14 MG/24HR 24 hr patch 1 patch     nicotine Patch in Place     nicotine patch REMOVAL     omeprazole (priLOSEC) CR capsule 40 mg     ondansetron (ZOFRAN-ODT) ODT tab 4 mg    Or     ondansetron (ZOFRAN) injection 4 mg     pramipexole (MIRAPEX) tablet 0.125 mg     predniSONE (DELTASONE) tablet 5 mg     psyllium (METAMUCIL/KONSYL) Packet 1 packet     simethicone (MYLICON) chewable tablet 160 mg     sodium chloride (PF) 0.9% PF flush 3 mL     sodium chloride (PF) 0.9% PF flush 3 mL     sodium chloride (PF) 0.9% PF flush 3 mL     sodium chloride (PF) 0.9% PF flush 3 mL     sodium chloride 0.9% infusion     spironolactone (ALDACTONE) tablet 50 mg       Weight Bearing Status: WBAT      Cognitive Status Examination:  Orientation: awake and alert  Level of Consciousness: alert  Follows Commands and Answers Questions: 100% of the time  Personal Safety and Judgement: Intact  Memory: Immediate recall intact  Comments:     Pain:   Currently in pain? No  Pain Location? Currently denies pain   Pain Rating:     Physical Therapy Evaluation:   Integumentary/Edema: No issues observed  ROM: WFL   Strength: MMT not performed RLE due to  post surgical status but mild weakness demonstrated. LLE strength normal  Bed Mobility: Independent  Transfers: Modified Independent c walker  Gait: Pt ambulated 400ft c FWW and SBA. Step-through gait with good WB RLE  Stairs: Ascended/descended 5 stairs c step-to gait, unilateral rail mod I  Balance: Fair  Sensory: No issues observed  Coordination: Normal     Physical Therapy Interventions: Gait Training , Strengthening, Transfer Training and Home Programming Guidelines    Clinical Impressions:  Criteria for Skilled Therapeutic Intervention Met: Yes, treatment indicated  PT Diagnosis: s/p R hip ORIF  Influenced by the following impairments: Impaired gait, weakness   Functional limitations due to impairment: Decreased tolerance for functional mobility tasks   Clinical presentation: Stable/Uncomplicated  Clinical presentation rationale: Clinical judgement   Clinical Decision making (complexity): Low Complexity  Frequency: 6 times/week  Predicted Duration of Therapy Intervention (days/wks): 5 days  Anticipated Discharge Disposition: Home with Outpatient Therapy   Anticipated Equipment Needs at Discharge: NA  Risks and Benefits of therapy have been explained: Yes  Patient, Family & other staff in agreement with plan of care: Yes  Clinical Impression Comments: Pt doing very well POD 1. She completed all mobility mod I with walker. Recommend discharge home with outpatient PT. Plan to treat pt during her stay.     Total Eval Time: 15

## 2019-02-25 NOTE — PROGRESS NOTES
"Talked with Chyna this morning who says she feels \"like a queen!\". She has already worked with PT and feels confident she will do well at home with outpatient PT. She would like outpt PT at [facility] and will call to schedule this herself. Her  will drive her home when ready.   "

## 2019-02-25 NOTE — PLAN OF CARE
"Reason for hospital stay:  Right Hip Fracture / POD #0    Most recent vitals: /68   Pulse 76   Temp 97.5  F (36.4  C) (Tympanic)   Resp 16   Ht 1.575 m (5' 2\")   Wt 59.4 kg (130 lb 15.3 oz)   SpO2 95%   BMI 23.95 kg/m      Pain Management:  Complains of right hip pain - gave IV Dilaudid and PO Norco with effective pain management.  Also using ice PRN    Orientation:  A+O x4 - calls appropriately and makes needs known.     Cardiac:  AP regular    Respiratory:  Lungs clear - sats 95% on RA.     GI:  Bowel sounds active x4 - good appetite - ate all of supper.     :  Voiding adequate urine.     Skin Issues:  Dressing to right hip clean, dry, intact. Scant drainage on dressing - borders marked.  CMS remains intact to RLE.     IVF:  LR infusing at 100mL/hr    Mobility:  Up with standby assist, gait belt and walker. Ambulated to bathroom.     Safety:  Alarms on    2/24/2019  8:06 PM  Eliseo Quinn        "

## 2019-02-25 NOTE — PLAN OF CARE
Discharge Planner OT   OT hollie completed   Patient plan for discharge: Home with 's assistance  Current status: Mod(I) for ADLs and functional mobility   Barriers to return to prior living situation: None  Recommendations for discharge: Home with assistance   Rationale for recommendations: Pt completed ADLs and functional mobility with Mod(I). Pt has no concerns about ADLs upon discharge. Pt has a good support system at home, and she has no concerns with her home set up. Continued skilled OT intervention is not recommended at this time.        Entered by: Rocio Romano 02/25/2019 3:12 PM

## 2019-02-25 NOTE — PLAN OF CARE
Patient discharged at 3:43 PM via wheel chair accompanied by spouse and staff. Prescriptions sent with patient to fill . All belongings sent with patient.     Discharge instructions reviewed with patient. Listed belongings gathered and returned to patient. Clothes and medications    Core Measures and Vaccines  Core Measures applicable during stay: No. If yes, state diagnosis  Pneumonia and Influenza given prior to discharge, if indicated: No    Surgical Patient   Surgical Procedures during stay:   Did patient receive discharge instruction on wound care and recognition of infection symptoms? Yes    MISC  Follow up appointment made:  Yes  Home and hospital aquired medications returned to patient: Yes  Patient reports pain was well managed at discharge: Yes

## 2019-02-25 NOTE — PROGRESS NOTES
Inpatient Occupational Therapy Evaluation    Name: Chyna Delgado MRN# [id number]   Age: 63 year old YOB: 1955     Date of Consultation: 2019  Consultation is requested by:  Dr. Kolb  Specific Consultation Request:  Hip Fx Post Op   Primary care provider: Arie Camarillo    General Information:   Onset Date: 19    History of Current Problem/Admission: Fracture of femoral neck, right (H) [S72.001A]    Prior Level of Function: Pt previously independent in ADLs.   Ambulation: 0 - Independent   Transferrin - Independent   Toiletin - Independent    Bathin - Independent   Dressin - Independent   Eatin - Independent   Communication: 0 - Understands/communicates without difficulty  Swallowin - swallows foods/liquids without difficulty  Cognition: 0 - no cognitive issues reported    Fall history within the last 6 months: Yes - 1     Current Living Situation: Pt lives with her  in a 1 story plus a basement. 3 steps to enter home and a normal flight of stairs to basement. Bathroom is on the main level. Pt was unsure of height of toilet; the cupboard and sink are nearby but no grab bars. They have a shower/tub combo; pt has a seat she can put in but no grab bars in the shower.    Current Equipment Used at Home: Has a bench for the shower/tub     Patient & Family Goals: Pt plans on returning home      Past Medical History:   Past Medical History:   Diagnosis Date     Abnormal mammogram, unspecified 2003    Normal pathology     Back Pain 02/10/2000    Sacroiliac pain     Benign neoplasm of colon 03/10/2000     Benign paroxysmal positional vertigo 2012     Depressive disorder, not elsewhere classified 02/10/2004     Diarrhea 12/15/2010    Secondary to colectomy for familial polyposis     Gastric polyp 2015, 2017    recurrent      History of normal mammogram 2014, 2019     Idiopathic osteoporosis 12/15/2010      Interstitial emphysema (H) 12/15/2010     menopausal or female climacteric states 08/31/1999     Mixed hyperlipidemia 12/15/2010     Other bursitis disorders 02/04/2011    Greater trochanteric     Other forms of retinal detachment(361.89) 12/15/2010     Other malaise and fatigue 01/10/2003     Personal history of tobacco use, presenting hazards to health 12/15/2010     Restless legs syndrome (RLS) 12/15/2010     Rotator cuff tendonitis      Rotator cuff tendonitis      Sacroiliitis, not elsewhere classified (H) 12/15/2010    multiple sites     Tobacco use disorder 08/22/2012     Unspecified asthma(493.90) 12/15/2010       Past Surgical History:  Past Surgical History:   Procedure Laterality Date     benign tumors removed from back       COLON SURGERY N/A     HX: Total colectomy with J-pouch reconstruction.      ENDOSCOPY UPPER, COLONOSCOPY, COMBINED N/A 9/5/2014    Procedure: COMBINED ENDOSCOPY UPPER, COLONOSCOPY;  Surgeon: Delbert Patel MD;  Location: [facility]     ESOPHAGOSCOPY, GASTROSCOPY, DUODENOSCOPY (EGD), COMBINED N/A 12/11/2015    Procedure: COMBINED ESOPHAGOSCOPY, GASTROSCOPY, DUODENOSCOPY (EGD);  Surgeon: Delbert Patel MD;  Location: [facility]     ESOPHAGOSCOPY, GASTROSCOPY, DUODENOSCOPY (EGD), COMBINED N/A 12/18/2017    Procedure: COMBINED ESOPHAGOSCOPY, GASTROSCOPY, DUODENOSCOPY (EGD);  UPPER ENDOSCOPY WITH BIOPSY;  Surgeon: Delbert Patel MD;  Location: [facility]     excised-benign  2002    tongue lesion     HC REPAIR OF NASAL SEPTUM  2002    Deviated nasal septum     LAPAROSCOPIC CHOLECYSTECTOMY       lumpectomy Right breast      Breast Lump     MOUTH SURGERY      removal of spot from roof of mouth     pilonidal cyst surgery  1976     removal of colon and large intestine  2000    Familial polyposis     Right etopic pregnancy      Pregnancy     TONSILLECTOMY      tonsillitus     UPPER GI ENDOSCOPY  2009    Stomach polyps       Medications:   Current Facility-Administered Medications   Medication      acetaminophen (TYLENOL) tablet 650 mg     albuterol (PROAIR HFA/PROVENTIL HFA/VENTOLIN HFA) 108 (90 Base) MCG/ACT inhaler 2 puff     benzocaine-menthol (CEPACOL) 15-3.6 MG lozenge 1-2 lozenge     ceFAZolin (ANCEF) intermittent infusion 1 g     dicyclomine (BENTYL) capsule 20 mg     [START ON 2/26/2019] enoxaparin (LOVENOX) injection 40 mg     fluticasone-vilanterol (BREO ELLIPTA) 200-25 MCG/INH inhaler 1 puff     gabapentin (NEURONTIN) capsule 1,200 mg     HYDROcodone-acetaminophen (NORCO) 5-325 MG per tablet 1-2 tablet     HYDROmorphone (PF) (DILAUDID) injection 0.2 mg     ibuprofen (ADVIL/MOTRIN) tablet 200 mg     ketotifen (ZADITOR/REFRESH ANTI-ITCH) 0.025 % ophthalmic solution 1 drop     lidocaine (LMX4) kit     lidocaine (LMX4) kit     lidocaine 1 % 0.1-1 mL     lidocaine 1 % 0.1-1 mL     loperamide (IMODIUM) capsule 6 mg     melatonin tablet 1 mg     naloxone (NARCAN) injection 0.1-0.4 mg     naloxone (NARCAN) injection 0.1-0.4 mg     nicotine (NICODERM CQ) 14 MG/24HR 24 hr patch 1 patch     nicotine Patch in Place     nicotine patch REMOVAL     omeprazole (priLOSEC) CR capsule 40 mg     ondansetron (ZOFRAN-ODT) ODT tab 4 mg    Or     ondansetron (ZOFRAN) injection 4 mg     pramipexole (MIRAPEX) tablet 0.125 mg     predniSONE (DELTASONE) tablet 5 mg     psyllium (METAMUCIL/KONSYL) Packet 1 packet     simethicone (MYLICON) chewable tablet 160 mg     sodium chloride (PF) 0.9% PF flush 3 mL     sodium chloride (PF) 0.9% PF flush 3 mL     sodium chloride (PF) 0.9% PF flush 3 mL     sodium chloride (PF) 0.9% PF flush 3 mL     sodium chloride 0.9% infusion     spironolactone (ALDACTONE) tablet 50 mg       Weight Bearing Status: WBAT      Cognitive Status Examination:  Orientation: awake and alert and oriented to time, place and person  Level of Consciousness: alert  Follows Commands and Answers Questions: 100% of the time  Personal Safety and Judgement: Intact  Memory: Immediate recall intact, Short-term memory  intact and Long-term memory intact  Comments: No cognitive deficits observed     Pain:   Currently in pain? No  Pain Location? N/A  Pain Ratin (No pain)    Occupational Therapy Evaluation:   Integumentary/Edema: No significant findings    Range of Motion: TRAMAINE's WFL   Strength: JOSE ENRIQUEs grossly 4/5  Hand Strength: Bilat gross grasp good   Muscle Tone Assessment: No deficits   Coordination: Normal     Mobility:   Transfer Skills: Mod(I)  Sit to Stand: Mod(I)  Toilet Transfer: Mod(I)   Balance: No LOB   Gait: Pt ambulated in room to the bathroom using FWW with Mod(I)     ADLs:   Lower Body Dressing: Mod(I) doffing/donning bilateral socks   Grooming: Mod(I) standing at the sink     IADLs:   Previous Responsibilities of the Patient: Meal Prep, Housekeeping, Laundry, Medication Management and Driving   Comments: Pt's  can assist     Activities of Daily Living Analysis:   Impairments Contributing to Impaired Activities of Daily Living: None    Occupational Therapy Interventions: Eval only     Clinical Impressions:  Criteria for Skilled Therapeutic Intervention Met: Evaluation Only and No problems identified which require skilled intervention  OT Diagnosis: Post op hip fx   Influenced by the following impairments: None  Functional limitations due to impairment: None  Clinical presentation: Stable/Uncomplicated  Clinical presentation rationale: Clinical judgment   Clinical Decision making (complexity): Low Complexity  Frequency: Eval only   Predicted Duration of Therapy Intervention (days/wks): Today   Anticipated Discharge Disposition: Home with Assist  Anticipated Equipment Needs at Discharge: None  Risks and Benefits of therapy have been explained: Yes  Patient, Family & other staff in agreement with plan of care: Yes  Clinical Impression Comments: Pt completed ADLs and functional mobility with Mod(I). Pt has no concerns about ADLs upon discharge. Pt has a good support system at home and she has no concerns with  her home set up. Continued skilled OT intervention is not recommended at this time.     Total Eval Time: 15

## 2019-02-25 NOTE — DISCHARGE INSTRUCTIONS
Appointments:     *Please call [facility] at [phone] onTuesday February 26th and schedule a Hospital follow-up with Dr. Kolb within 7 days after discharge.     *Call [facility] Therapy dept phone [phone], option #8 to set up outpatient rehab appointments.

## 2019-02-25 NOTE — PHARMACY
[facility]    Pharmacy      Antimicrobial Stewardship Note     Current antimicrobial therapy:  Anti-infectives (From now, onward)    Start     Dose/Rate Route Frequency Ordered Stop    02/25/19 1145  ceFAZolin (ANCEF) intermittent infusion 1 g      1 g  over 30 Minutes Intravenous EVERY 8 HOURS 02/25/19 1120 02/26/19 0344          Indication: perioperative pharmacoprophylaxis    Days of Therapy: 2     Pertinent labs:  Creatinine   Creatinine   Date Value Ref Range Status   02/25/2019 0.74 0.52 - 1.04 mg/dL Final   02/24/2019 0.80 0.52 - 1.04 mg/dL Final   02/23/2019 0.98 0.52 - 1.04 mg/dL Final     WBC   WBC   Date Value Ref Range Status   02/25/2019 7.1 4.0 - 11.0 10e9/L Final   02/24/2019 8.9 4.0 - 11.0 10e9/L Final   02/23/2019 11.8 (H) 4.0 - 11.0 10e9/L Final     Procalcitonin No results found for: PCAL  CRP   CRP Inflammation   Date Value Ref Range Status   07/23/2018 4.7 0.0 - 8.0 mg/L Final   04/18/2018 19.3 (H) 0.0 - 8.0 mg/L Final   01/18/2018 10.8 (H) 0.0 - 8.0 mg/L Final     Culture Results: none     Recommendations/Interventions:  1. Per nurse, provider plans on discontinuing final dose. No recommendations at this time.     Nya Rocha, [facility]  February 25, 2019

## 2019-02-25 NOTE — PROVIDER NOTIFICATION
Dr Kolb called re: clarification of ancef order; order q 8 hrs after intra op dose per MAR? One dose given; may discontinue now

## 2019-02-25 NOTE — PLAN OF CARE
A&O. VSS/AF. RA 93-95%. Denies pain at rest; otherwise managed with norco 2 tabs x 2 doses shift with stated decrerase in pain. Dressing to right hip incision intact with shadowing marked and no change this shift. Ambulates with walker and SBA. PT/OT works with pt, see their notes. Dr Kolb contacted with questions regarding discharge; see note. He is here to see patient prior to discharge and writes Rx for pain medication which patient has in hand. Dr Menchaca aware and will write discharge orders. Spouse is at bedside.Two bags of home medications returned to patient and reviewed with spouse, seals are unbroken and ID#s match.  Face to face report given with opportunity to observe patient.    Report given to Roshni Candelario   2/25/2019  3:40 PM

## 2019-02-25 NOTE — PROGRESS NOTES
[facility]    Hospitalist Progress Note      Assessment & Plan   Chyna Delgado is a 63 year old female who was admitted on 2/23/2019.      1.  Postop day #1 status post right ORIF hip fracture: Patient has some discomfort overnight.  Oral pain meds are working fine.  No fevers.  CMS intact right leg no swelling.  We will have physical therapy today.  We discussed DVT prophylaxis with Dr. Kolb.  Patient wishes to be discharged hopefully even later today.  We will see how she does with this cycle therapy.  Hemoglobin 11.4    2.  History of mild asthma/COPD/tobacco dependence: Dropped down a little bit her sats overnight.  Lungs are basically clear however.  Will be able to get off the oxygen without trouble.  No significant wheezing.      Diet: Advance Diet as Tolerated: Regular Diet Adult  Fluids: iv lock    DVT Prophylaxis: Defer to primary service  Code Status: Full Code    Disposition: Expected discharge in 1-2 days once ambulating safely.    Eddie Menchaca    Interval History   Patient alert pleasant no obvious distress.  Did have fair amount discomfort overnight.  Better today.  Is also a little hesitant to take the narcotics but told her to keep on top of things.  No shortness of breath.  No nausea vomiting.  Fevers.    -Data reviewed today: I reviewed all new labs and imaging results over the last 24 hours. I personally reviewed no images or EKG's today.    Physical Exam   Temp: 97.8  F (36.6  C) Temp src: Tympanic BP: 104/58 Pulse: 76 Heart Rate: 71 Resp: 16 SpO2: 97 % O2 Device: Nasal cannula Oxygen Delivery: 1 LPM  Vitals:    02/23/19 1031 02/23/19 1300   Weight: 59 kg (130 lb) 59.4 kg (130 lb 15.3 oz)     Vital Signs with Ranges  Temp:  [97.2  F (36.2  C)-98.8  F (37.1  C)] 97.8  F (36.6  C)  Pulse:  [76] 76  Heart Rate:  [71-85] 71  Resp:  [12-25] 16  BP: ()/(53-92) 104/58  SpO2:  [85 %-99 %] 97 %  I/O last 3 completed shifts:  In: 3605 [P.O.:960; I.V.:2645]  Out: 1550  [Urine:1550]    Peripheral IV 02/23/19 Left Lower forearm (Active)   Site Assessment WDL 2/25/2019  8:10 AM   Line Status Infusing 2/25/2019  8:10 AM   Phlebitis Scale 0-->no symptoms 2/25/2019  8:10 AM   Infiltration Scale 0 2/25/2019  8:10 AM   Number of days: 2       Incision/Surgical Site 02/24/19 Right Hip (Active)   Incision Assessment UTV 2/25/2019  8:00 AM   Hillary-Incision Assessment UTV 2/24/2019 11:52 PM   Closure Sutures 2/24/2019 10:50 AM   Incision Drainage Amount Small 2/24/2019 11:52 PM   Drainage Description Sanguinous 2/24/2019 11:52 PM   Incision Care Ice applied 2/24/2019 11:52 PM   Dressing Intervention Dressing marked - No change 2/25/2019  8:00 AM   Number of days: 1     No line/device    Constitutional: Alert and oriented x3. No distress    HEENT: Normocephalic/atraumatic, PERRL, EOMI, mouth moist, neck supple, no LN.     Cardiovascular: RRR. no Murmur, no  rubs, or gallops.  JVD no.  Bruits no.  Pulses 3+    Respiratory: Clear to auscultation bilaterally    Abdomen: Soft, nontender, nondistended, no organomegaly. Bowel sounds present    Extremities: Warm/dry. No edema CNS intact.  Just some mild spotting on dressing.    Neuro:   Non focal, cranial nerves intact, Moves all extremities.  Medications       dicyclomine  20 mg Oral TID AC     fluticasone-vilanterol  1 puff Inhalation Daily     gabapentin  1,200 mg Oral TID     ketotifen  1 drop Both Eyes BID     loperamide  6 mg Oral BID     nicotine   Transdermal Q8H     nicotine   Transdermal Daily     omeprazole  40 mg Oral QAM AC     predniSONE  5 mg Oral Daily with breakfast     psyllium  1 packet Oral BID     sodium chloride (PF)  3 mL Intracatheter Q8H     spironolactone  50 mg Oral BID       Data   Recent Labs   Lab 02/25/19  0654 02/24/19  0518 02/23/19  1429 02/20/19  0852   WBC 7.1 8.9 11.8*  --    HGB 11.4* 12.4 14.5  --    MCV 92 90 90  --     210 262  --    INR  --   --  1.09  --    NA  --  139 137 139   POTASSIUM  --  4.1 4.6  4.5   CHLORIDE  --  106 103 107   CO2  --  25 24 23   BUN  --  15 20 21   CR  --  0.80 0.98 0.90   ANIONGAP  --  8 10 9   СВЕТЛАНА  --  8.5 9.4 9.4   GLC  --  91 120* 96   ALBUMIN  --   --  4.0  --    PROTTOTAL  --   --  7.8  --    BILITOTAL  --   --  0.5  --    ALKPHOS  --   --  125  --    ALT  --   --  37  --    AST  --   --  16  --        Recent Results (from the past 24 hour(s))   XR Surgery STEFANIA Fluoro L/T 5 Min w Stills    Narrative    This exam was marked as non-reportable because it will not be read by a   radiologist or a [place] non-radiologist provider.

## 2019-02-25 NOTE — PLAN OF CARE
Face to face report given with opportunity to observe patient.    Report given to Priscilla Quinn   2/24/2019  11:30 PM

## 2019-02-25 NOTE — PROGRESS NOTES
"CLINICAL NUTRITION SERVICES  -  ASSESSMENT NOTE    Chyna Delgado : Admission Nutrition Risk Screen - unintentional weight los (prednisone dose changes)    63 yof admitted for fracture of neck of right femur. 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 Takes benadryl and imodium. Fatty foods, caffeine, alcohol and spicy foods seem to worsen diarrhea. Appetite has been good since admission. Noted some weight loss. Pt was tapering down on prednisone.    Diet Order: Regular  Intake: 100%    Height: 5' 2\"  Weight: 130 lbs 15.25 oz  Body mass index is 23.95 kg/m .  Weight Status:  Normal BMI  IBWR: 101-135lb  Weight History: 12lb (8.6%) x 4 months  130lb - 2/23/19  126lb - 2/14/19  140lb - 12/31/18- measured vs estimated?  138lb - 10/29/18  135lb - 7/10/18  145lb - 4/18/18    Estimated Energy Needs: 0258-3354 kcals (30-35 Kcal/Kg)   Estimated Protein Needs: 60-70 grams protein (1-1.2 g pro/Kg)    Malnutrition Diagnosis: Patient does not meet two of the criteria necessary for diagnosing malnutrition    NUTRITION RECOMMENDATIONS  - Continue to encourage intake    MONITORING AND EVALUATION:  RD will monitor intake, weight, labs    "

## 2019-02-25 NOTE — PLAN OF CARE
Face to face report given with opportunity to observe patient.    Report given to GARRETT Lozano   2/25/2019  6:51 AM

## 2019-02-26 ENCOUNTER — TELEPHONE (OUTPATIENT)
Dept: ORTHOPEDICS | Facility: OTHER | Age: 64
End: 2019-02-26

## 2019-02-26 ENCOUNTER — MEDICAL CORRESPONDENCE (OUTPATIENT)
Dept: HEALTH INFORMATION MANAGEMENT | Facility: CLINIC | Age: 64
End: 2019-02-26

## 2019-02-26 DIAGNOSIS — S72.001A CLOSED FRACTURE OF NECK OF RIGHT FEMUR (H): Primary | ICD-10-CM

## 2019-02-26 NOTE — PROGRESS NOTES
Name: Chyna Delgado    MRN#: [id number]    Reason for Hospitalization: Fracture of femoral neck, right (H) [S72.001A]    BRENT: average    Discharge Date: 2/26/2019    Medicare IM letter reviewed with her last on 2/24    Patient / Family response to discharge plan: plan aligns with her wishes    Follow-Up Appt: No future appointments.    Other Providers (Care Coordinator, [facility] Services, PCA services etc): Yes [facility] faxed with orders    Discharge Disposition: home via       Catarina Marquis RN

## 2019-02-26 NOTE — TELEPHONE ENCOUNTER
Esperanza from [facility] called asking the weight bearing status of this newly referred patient. After reviewing discharge instructions and speaking with our PA she is weight bearing as tolerated. Left message for Esperanza to return if needs.

## 2019-02-26 NOTE — DISCHARGE SUMMARY
Admit Date:     02/23/2019   Discharge Date:     02/25/2019      DATE OF ADMISSION:  02/23/2019      DATE OF DISCHARGE:  02/25/2019       DISCHARGE DIAGNOSES:   1.  Closed fracture of right femoral neck.   2.  Persistent asthma.    3.  Functional diarrhea.      PROCEDURES:  Cannulated screw fixation of the right femoral neck fracture, Dr. Amrik Kolb.      HOSPITAL COURSE:  Chyna is a 63-year-old female who ended up getting dizzy and falling in her home on her right hip.  She had immediate pain, did not come in at evening, but came in the next day as she could not bear weight.  She was seen in urgent care.  She was found to have an impacted femoral neck fracture on the right.  She did not have any syncope at all.  She was hemodynamically stable.  Labs are all fine.  She was admitted, placed on pain control, and was taken to the OR by Dr. Kolb on 02/24 where she underwent the screw fixation of the right femoral neck.  Uncomplicated surgery.  Had some pain the next night; however, today she has been doing well.  She ambulated with physical therapy, doing great, having no real complaints at all.  Hemoglobin was 11.4.  Platelet count is 209,000.  So, at this point, she feels safe enough as does Physical Therapy that she can be discharged.  Dr. Kolb also okayed her discharge.  She will be sent home.  She will get outpatient physical therapy.      DISCHARGE MEDICATIONS:  She will be on the following medications:   1.  Albuterol inhaler 2 puffs every 4 hours p.r.n.   2.  Aspirin 325 mg daily for 6 weeks.   3.  Calcium with vitamin D daily.   4.  Zyrtec 10 mg daily.   5.  Bentyl 20 mg t.i.d. before meals.   6.  Ferrous sulfate 325 b.i.d.   7.  Advair Diskus 250/50, one puff twice a day.   8.  Gabapentin 1200 mg t.i.d.   9.  Hydrocodone/APAP 5/325 1-2 every 4 hours p.r.n. #30 was given.   10.  Ibuprofen 200 mg daily p.r.n.   11.  Imodium 2 mg.  She takes 3 tabs b.i.d.   12.  Magnesium 200 mg daily.   13.  Singulair 10 mg  daily.   14.  Omeprazole 20 mg b.i.d.   15.  Potassium chloride 20 mEq t.i.d.   16.  Mirapex 0.125 mg at bedtime.   17.  Prednisone 5 mg daily.     18.  Simethicone b.i.d.   19.  Spironolactone 50 mg daily.   20.  Vitamin D.   21.  Vitamin E.      ACTIVITIES:  Up with a walker which she has at home.        FOLLOWUP:  She should have followup with Dr. Kolb 7 days after discharge.      PLAN:  She will have outpatient physical therapy at [facility].        FOLLOWUP:  With Dr. Camarillo as needed also.      CODE STATUS:  She is a full code.         MILAN MCKEE MD             D: 2019   T: 2019   MT: MARCO      Name:     DEIDRE SORENSEN   MRN:      [id number]        Account:        [id number]   :      1955           Admit Date:     2019                                  Discharge Date: 2019      Document: [id number]       cc: Arie Camarillo DO

## 2019-03-01 ENCOUNTER — TELEPHONE (OUTPATIENT)
Dept: CASE MANAGEMENT | Facility: HOSPITAL | Age: 64
End: 2019-03-01

## 2019-03-01 NOTE — TELEPHONE ENCOUNTER
Chyna Delgado, was discharged to home on 2/25/19 from [facility]. I spoke today with her regarding the patient's discharge.   She indicates she has receive(d) sufficient information upon discharge. Medications were reviewed in full on discharge, including: Medications to be started; medications to be stopped; medications to be continued from preadmission and any side effects. Prescriptions were received at discharge and were able to be filled. Medications are being taken as prescribed.   She indicates they have an appointment scheduled for 3/5/19 and have transportation available. They are able to confirm their appointment time and have it written down.   Questions regarding discharge instructions or condition have been answered.  Per their request, the following employee (s) can be recognized for their outstanding services delivered:  [place]ake, professional, thorough, caring   Suggestions to improve service: n/a  She was informed they may receive a survey in the mail from the hospital. Asked if they would kindly complete the survey in order for [facility] to know if services fully met patient needs.

## 2019-03-05 ENCOUNTER — OFFICE VISIT (OUTPATIENT)
Dept: ORTHOPEDICS | Facility: OTHER | Age: 64
End: 2019-03-05
Attending: ORTHOPAEDIC SURGERY
Payer: COMMERCIAL

## 2019-03-05 ENCOUNTER — ANCILLARY PROCEDURE (OUTPATIENT)
Dept: GENERAL RADIOLOGY | Facility: OTHER | Age: 64
End: 2019-03-05
Attending: ORTHOPAEDIC SURGERY
Payer: COMMERCIAL

## 2019-03-05 VITALS
HEIGHT: 62 IN | SYSTOLIC BLOOD PRESSURE: 120 MMHG | WEIGHT: 130 LBS | TEMPERATURE: 98 F | OXYGEN SATURATION: 98 % | DIASTOLIC BLOOD PRESSURE: 80 MMHG | HEART RATE: 68 BPM | BODY MASS INDEX: 23.92 KG/M2

## 2019-03-05 DIAGNOSIS — S72.001A CLOSED FRACTURE OF NECK OF RIGHT FEMUR (H): ICD-10-CM

## 2019-03-05 DIAGNOSIS — S72.002D CLOSED DISPLACED FRACTURE OF LEFT FEMORAL NECK WITH ROUTINE HEALING: Primary | ICD-10-CM

## 2019-03-05 PROCEDURE — G0463 HOSPITAL OUTPT CLINIC VISIT: HCPCS | Mod: 25

## 2019-03-05 PROCEDURE — G0463 HOSPITAL OUTPT CLINIC VISIT: HCPCS

## 2019-03-05 PROCEDURE — 99207 ZZC FRACTURE CARE IN GLOBAL PERIOD: CPT | Performed by: ORTHOPAEDIC SURGERY

## 2019-03-05 PROCEDURE — 73552 X-RAY EXAM OF FEMUR 2/>: CPT | Mod: TC,RT

## 2019-03-05 ASSESSMENT — PAIN SCALES - GENERAL: PAINLEVEL: MODERATE PAIN (5)

## 2019-03-05 ASSESSMENT — MIFFLIN-ST. JEOR: SCORE: 1087.93

## 2019-03-05 NOTE — NURSING NOTE
"Chief Complaint   Patient presents with     Surgical Followup     Right Hip       Initial /80   Pulse 68   Temp 98  F (36.7  C) (Tympanic)   Ht 1.575 m (5' 2\")   Wt 59 kg (130 lb)   SpO2 98%   BMI 23.78 kg/m   Estimated body mass index is 23.78 kg/m  as calculated from the following:    Height as of this encounter: 1.575 m (5' 2\").    Weight as of this encounter: 59 kg (130 lb).  Medication Reconciliation: complete    Savananh Briggs LPN  "

## 2019-03-05 NOTE — PROGRESS NOTES
Patient presents today approximately weeks after her healing screw fixation of her right impacted femoral neck fracture.  She presents to the clinic today walking with a cane.  Her wound is clean and dry, she has no calf tenderness or swelling, x-rays demonstrate the fracture to be stably fixed with no change in position since the initial operative films.  The plan is for her to continue to use her walker or cane, be very careful, continue to monitor for any complications, otherwise follow-up in approximately 4 weeks for repeat exam and x-ray.    We also discussed the need to visit with Dr. Camarillo to discuss treatment for her Osteoporosis, as this was very likely a major contributing factor in her fracture.

## 2019-03-08 DIAGNOSIS — S72.009A HIP FRACTURE REQUIRING OPERATIVE REPAIR (H): Primary | ICD-10-CM

## 2019-04-01 ENCOUNTER — TELEPHONE (OUTPATIENT)
Dept: PEDIATRICS | Facility: OTHER | Age: 64
End: 2019-04-01

## 2019-04-01 NOTE — TELEPHONE ENCOUNTER
4/1/19 Received PA request from [employer] for Gabapentin. Submitted request through [facility]. Waiting for response.

## 2019-04-01 NOTE — TELEPHONE ENCOUNTER
APPROVAL 4/1/19 Received quantity limit exception approval from [facility] for Gabapentin. #270 for 30 days. Approval dates 1/1/19 through 12/31/19. Pharmacy advised. Forms scanned to [employer].

## 2019-04-02 ENCOUNTER — ANCILLARY PROCEDURE (OUTPATIENT)
Dept: GENERAL RADIOLOGY | Facility: OTHER | Age: 64
End: 2019-04-02
Attending: ORTHOPAEDIC SURGERY
Payer: COMMERCIAL

## 2019-04-02 ENCOUNTER — OFFICE VISIT (OUTPATIENT)
Dept: ORTHOPEDICS | Facility: OTHER | Age: 64
End: 2019-04-02
Attending: ORTHOPAEDIC SURGERY
Payer: COMMERCIAL

## 2019-04-02 VITALS
OXYGEN SATURATION: 95 % | TEMPERATURE: 98.5 F | HEART RATE: 78 BPM | DIASTOLIC BLOOD PRESSURE: 70 MMHG | WEIGHT: 130 LBS | HEIGHT: 62 IN | BODY MASS INDEX: 23.92 KG/M2 | SYSTOLIC BLOOD PRESSURE: 100 MMHG

## 2019-04-02 DIAGNOSIS — S72.009A HIP FRACTURE REQUIRING OPERATIVE REPAIR (H): ICD-10-CM

## 2019-04-02 DIAGNOSIS — S72.002D CLOSED DISPLACED FRACTURE OF LEFT FEMORAL NECK WITH ROUTINE HEALING: Primary | ICD-10-CM

## 2019-04-02 PROCEDURE — 73502 X-RAY EXAM HIP UNI 2-3 VIEWS: CPT | Mod: TC

## 2019-04-02 PROCEDURE — 99207 ZZC FRACTURE CARE IN GLOBAL PERIOD: CPT | Performed by: PHYSICIAN ASSISTANT

## 2019-04-02 PROCEDURE — G0463 HOSPITAL OUTPT CLINIC VISIT: HCPCS

## 2019-04-02 ASSESSMENT — PAIN SCALES - GENERAL: PAINLEVEL: NO PAIN (1)

## 2019-04-02 ASSESSMENT — MIFFLIN-ST. JEOR: SCORE: 1087.93

## 2019-04-02 NOTE — PROGRESS NOTES
"Chief Complaint: 6-weeks status post screw fixation of impacted right femoral neck    Subjective: Patient has been using a cane for ambulation. She has been attending PT at [facility] and feels this is going well and that she is getting stronger. She has occasional pain \"hot fork\" stabbing pains, but these are rare. She denies N/T/B into the RLE. Has been noticing she has a \"gait\" issue when she does not use a cane, her description of this is analogous to a gluteal lurch. She has not been in yet to see Dr. Camarillo about her osteoporosis/osteopenia.     There have been no changes in regards to her MSK or neurological ROS since seen last.     Objective: /70   Pulse 78   Temp 98.5  F (36.9  C) (Tympanic)   Ht 1.575 m (5' 2\")   Wt 59 kg (130 lb)   SpO2 95%   BMI 23.78 kg/m    Age-appropriate, pleasant 63-year old female in no acute distress. She is alert and oriented, respiratory efforts are non-labored.     X-rays; taken today of the hip and pelvis reveal the fracture to be stable and in good alignment. There has been no change since her post-operative films.     Impression: Same as chief complaint    Plan: Recommended that patient get into see Dr. Camarillo about her osteoporosis. She is to follow up with our clinic in 6-weeks for XOA of the right hip. Continue with PT and utilizing a cane for ambulation, especially on uneven surfaces. Told her that her gluteal lurch should get better as she gets stronger.   "

## 2019-04-02 NOTE — NURSING NOTE
"Chief Complaint   Patient presents with     RECHECK     Right Hip   Xrays first       Initial /70   Pulse 78   Temp 98.5  F (36.9  C) (Tympanic)   Ht 1.575 m (5' 2\")   Wt 59 kg (130 lb)   SpO2 95%   BMI 23.78 kg/m   Estimated body mass index is 23.78 kg/m  as calculated from the following:    Height as of this encounter: 1.575 m (5' 2\").    Weight as of this encounter: 59 kg (130 lb).  Medication Reconciliation: complete    Savannah Briggs LPN  "

## 2019-04-16 DIAGNOSIS — K21.9 GASTROESOPHAGEAL REFLUX DISEASE, ESOPHAGITIS PRESENCE NOT SPECIFIED: ICD-10-CM

## 2019-04-16 DIAGNOSIS — R09.82 POST-NASAL DRIP: ICD-10-CM

## 2019-04-17 RX ORDER — MONTELUKAST SODIUM 10 MG/1
10 TABLET ORAL AT BEDTIME
Qty: 90 TABLET | Refills: 1 | OUTPATIENT
Start: 2019-04-17 | End: 2020-04-16

## 2019-04-17 RX ORDER — CETIRIZINE HYDROCHLORIDE 10 MG/1
10 TABLET ORAL AT BEDTIME
Qty: 90 TABLET | Refills: 1 | OUTPATIENT
Start: 2019-04-17 | End: 2020-04-16

## 2019-04-17 NOTE — TELEPHONE ENCOUNTER
Omeprazole 40mg      Last Written Prescription Date:  Not on list  Last Fill Quantity: -,   # refills: -  Last Office Visit: 2/7/19  Future Office visit:    Next 5 appointments (look out 90 days)    May 14, 2019 11:00 AM CDT  (Arrive by 10:45 AM)  Return Visit with Lindsey Luevano PA-C  [facility] [address] [facility] [address]  [phone]           Routing refill request to provider for review/approval because:  Drug not active on patient's medication list    Pt does have 20mg dose listed as historical on med list. Please advise. Thank you!

## 2019-04-18 RX ORDER — OMEPRAZOLE 40 MG/1
40 CAPSULE, DELAYED RELEASE ORAL DAILY
Qty: 90 CAPSULE | Refills: 1 | Status: SHIPPED | OUTPATIENT
Start: 2019-04-18 | End: 2020-01-03

## 2019-04-19 DIAGNOSIS — S72.001A CLOSED FRACTURE OF NECK OF RIGHT FEMUR (H): Primary | ICD-10-CM

## 2019-05-01 DIAGNOSIS — G25.81 RESTLESS LEGS SYNDROME: ICD-10-CM

## 2019-05-01 RX ORDER — POTASSIUM CHLORIDE 1500 MG/1
TABLET, EXTENDED RELEASE ORAL
Qty: 90 TABLET | Refills: 5 | Status: SHIPPED | OUTPATIENT
Start: 2019-05-01 | End: 2019-11-18

## 2019-05-07 RX ORDER — ONDANSETRON 4 MG/1
4 TABLET, ORALLY DISINTEGRATING ORAL EVERY 30 MIN PRN
Status: CANCELLED | OUTPATIENT
Start: 2019-05-07

## 2019-05-07 RX ORDER — ONDANSETRON 2 MG/ML
4 INJECTION INTRAMUSCULAR; INTRAVENOUS EVERY 30 MIN PRN
Status: CANCELLED | OUTPATIENT
Start: 2019-05-07

## 2019-05-07 RX ORDER — SODIUM CHLORIDE, SODIUM LACTATE, POTASSIUM CHLORIDE, CALCIUM CHLORIDE 600; 310; 30; 20 MG/100ML; MG/100ML; MG/100ML; MG/100ML
INJECTION, SOLUTION INTRAVENOUS CONTINUOUS
Status: CANCELLED | OUTPATIENT
Start: 2019-05-07

## 2019-05-07 RX ORDER — NALOXONE HYDROCHLORIDE 0.4 MG/ML
.1-.4 INJECTION, SOLUTION INTRAMUSCULAR; INTRAVENOUS; SUBCUTANEOUS
Status: CANCELLED | OUTPATIENT
Start: 2019-05-07 | End: 2019-05-08

## 2019-05-07 ASSESSMENT — COPD QUESTIONNAIRES
CAT_SEVERITY: MILD
COPD: 1

## 2019-05-07 NOTE — ANESTHESIA PREPROCEDURE EVALUATION
Anesthesia Pre-Procedure Evaluation    Patient: Chyna Deglado   MRN: [id number] : 1955          Preoperative Diagnosis: GASTRIC POLYP, HX OF POLYPS    Procedure(s):  UPPER ENDOSCOPY AND  COLONOSCOPY    Past Medical History:   Diagnosis Date     Abnormal mammogram, unspecified 2003    Normal pathology     Back Pain 02/10/2000    Sacroiliac pain     Benign neoplasm of colon 03/10/2000     Benign paroxysmal positional vertigo 2012     Depressive disorder, not elsewhere classified 02/10/2004     Diarrhea 12/15/2010    Secondary to colectomy for familial polyposis     Gastric polyp 2015, 2017    recurrent      History of normal mammogram 2014, 2019     Idiopathic osteoporosis 12/15/2010     Interstitial emphysema (H) 12/15/2010     menopausal or female climacteric states 1999     Mixed hyperlipidemia 12/15/2010     Other bursitis disorders 2011    Greater trochanteric     Other forms of retinal detachment(361.89) 12/15/2010     Other malaise and fatigue 01/10/2003     Personal history of tobacco use, presenting hazards to health 12/15/2010     Restless legs syndrome (RLS) 12/15/2010     Rotator cuff tendonitis      Rotator cuff tendonitis      Sacroiliitis, not elsewhere classified (H) 12/15/2010    multiple sites     Tobacco use disorder 2012     Unspecified asthma(493.90) 12/15/2010     Past Surgical History:   Procedure Laterality Date     benign tumors removed from back       CLOSED REDUCTION, PERCUTANEOUS PINNING HIP Right 2019    Procedure: Percutaneous Screw Fixation of Right Hip Fracture;  Surgeon: Amrik Kolb DO;  Location: [facility] OR     COLON SURGERY N/A     HX: Total colectomy with J-pouch reconstruction.      ENDOSCOPY UPPER, COLONOSCOPY, COMBINED N/A 2014    Procedure: COMBINED ENDOSCOPY UPPER, COLONOSCOPY;  Surgeon: Delbert Patel MD;  Location: [facility] OR     ESOPHAGOSCOPY, GASTROSCOPY, DUODENOSCOPY (EGD), COMBINED N/A 2015     Procedure: COMBINED ESOPHAGOSCOPY, GASTROSCOPY, DUODENOSCOPY (EGD);  Surgeon: Delbert Patel MD;  Location: [facility] OR     ESOPHAGOSCOPY, GASTROSCOPY, DUODENOSCOPY (EGD), COMBINED N/A 12/18/2017    Procedure: COMBINED ESOPHAGOSCOPY, GASTROSCOPY, DUODENOSCOPY (EGD);  UPPER ENDOSCOPY WITH BIOPSY;  Surgeon: Delbert Patel MD;  Location: [facility] OR     excised-benign  2002    tongue lesion     HC REPAIR OF NASAL SEPTUM  2002    Deviated nasal septum     LAPAROSCOPIC CHOLECYSTECTOMY       lumpectomy Right breast      Breast Lump     MOUTH SURGERY      removal of spot from roof of mouth     pilonidal cyst surgery  1976     removal of colon and large intestine  2000    Familial polyposis     Right etopic pregnancy      Pregnancy     TONSILLECTOMY      tonsillitus     UPPER GI ENDOSCOPY  2009    Stomach polyps       Anesthesia Evaluation     . Pt has had prior anesthetic.     No history of anesthetic complications          ROS/MED HX    ENT/Pulmonary: Comment: Chronic bronchitis    (+)tobacco use, Current use <1.0 PPD packs/day  Mild Persistent asthma mild COPD, , . .    Neurologic: Comment: Restless leg syndrome      Cardiovascular:  - neg cardiovascular ROS       METS/Exercise Tolerance:     Hematologic:  - neg hematologic  ROS       Musculoskeletal: Comment: myalgia  (+) arthritis,  -       GI/Hepatic:     (+) GERD bowel prep,       Renal/Genitourinary:  - ROS Renal section negative       Endo:     (+) thyroid problem  Thyroid disease - Other graves disease, .      Psychiatric:     (+) psychiatric history depression      Infectious Disease:  - neg infectious disease ROS       Malignancy:      - no malignancy   Other:    - neg other ROS                      Physical Exam  Normal systems: cardiovascular and pulmonary    Airway   Mallampati: III  TM distance: >3 FB  Neck ROM: full    Dental   (+) caps  Comment: Upper incisor caps     Cardiovascular   Rhythm and rate: regular and normal      Pulmonary    breath sounds clear to  "auscultation            Lab Results   Component Value Date    WBC 7.1 02/25/2019    HGB 11.4 (L) 02/25/2019    HCT 33.1 (L) 02/25/2019     02/25/2019    CRP 4.7 07/23/2018    SED 16 06/13/2017     02/24/2019    POTASSIUM 4.1 02/24/2019    CHLORIDE 106 02/24/2019    CO2 25 02/24/2019    BUN 15 02/24/2019    CR 0.74 02/25/2019    GLC 91 02/24/2019    СВЕТЛАНА 8.5 02/24/2019    MAG 1.8 02/07/2019    ALBUMIN 4.0 02/23/2019    PROTTOTAL 7.8 02/23/2019    ALT 37 02/23/2019    AST 16 02/23/2019    ALKPHOS 125 02/23/2019    BILITOTAL 0.5 02/23/2019    INR 1.09 02/23/2019    TSH 0.74 02/05/2019    T4 0.96 08/08/2018    T3 95 08/08/2018       Preop Vitals  BP Readings from Last 3 Encounters:   04/02/19 100/70   03/05/19 120/80   02/25/19 100/61    Pulse Readings from Last 3 Encounters:   04/02/19 78   03/05/19 68   02/24/19 76      Resp Readings from Last 3 Encounters:   02/25/19 16   02/07/19 19   01/16/19 20    SpO2 Readings from Last 3 Encounters:   04/02/19 95%   03/05/19 98%   02/25/19 97%      Temp Readings from Last 1 Encounters:   04/02/19 98.5  F (36.9  C) (Tympanic)    Ht Readings from Last 1 Encounters:   04/02/19 1.575 m (5' 2\")      Wt Readings from Last 1 Encounters:   04/02/19 59 kg (130 lb)    Estimated body mass index is 23.78 kg/m  as calculated from the following:    Height as of 4/2/19: 1.575 m (5' 2\").    Weight as of 4/2/19: 59 kg (130 lb).       Anesthesia Plan      History & Physical Review  History and physical reviewed and following examination; no interval change.    ASA Status:  3 .    NPO Status:  > 8 hours    Plan for MAC with Intravenous and Propofol induction. Maintenance will be TIVA.  Reason for MAC:  Chronic cardiopulmonary disease (G9), Procedure to face, neck, head or breast and Other - see comments  PONV prophylaxis:  Ondansetron (or other 5HT-3)  Surgeon requests deep sedation. Patient is an ASA 3 and will have prolonged procedure as planned combined upper endoscopy and " colonoscopy. Will provide MAC.       Postoperative Care  Postoperative pain management:  IV analgesics.      Consents  Anesthetic plan, risks, benefits and alternatives discussed with:  Patient..                 MAIDA Butler CRNA

## 2019-05-08 NOTE — PROGRESS NOTES
Chief Complaint: Status post screw fixation of impacted right femoral neck, 2/24/2019    HPI: She fell on 2/22/2019 at her home when she states she became dizzy and lost her balance. She had persistent hip pain, but was able to ambulate with crutches.  She presented to the emergency room on 2/23/2019 and was evaluated, found to have a impacted femoral neck fracture on the right hip.  Dr. Kolb consulted on the patient and on 4/24/2019 patient underwent a cannulated screw fixation of the right femoral neck fracture    On 3/5/2019, x-rays demonstrate the fracture to be stably fixed with no change in position since the initial operative films. The plan was for her to continue to use her walker or cane, be very careful, continue to monitor for any complications, otherwise follow-up in approximately 4 weeks for repeat exam and x-ray.    On 4/6/2019, patient was still using a cane for ambulation. She has been attending PT at [facility] and feels this is going well and that she is getting stronger. She had not been in yet to see Dr. Camarillo about her osteopenia/osteoporosis I recommended that she do this. She was to follow up with our clinic in 6-weeks for XOA of the right hip. Continue with PT and utilizing a cane for ambulation, especially on uneven surfaces. We also told her that her gluteal lurch should get better as she gets stronger and continues with PT.    Subjective: Patient has been off her cane for a few weeks now and still notices that she has a lurch.  Upon asking her if she is still attending physical therapy, she reports she attended 4 sessions and then her  did not believe that physical therapy would help her so he did not want her going anymore.  She has not been performing her home exercises and is curious if she should be doing these or not.  She denies numbness, tingling or burning into the right lower extremity.  She denies any recent injury or trauma since seen last.    Patient is not an  "appointment yet to see Dr. Camarillo in regards to her osteoporosis.    There have been no other changes in regards to her musculoskeletal or neurological review of systems since the last.    Objective: /62   Pulse 80   Temp 97.7  F (36.5  C) (Tympanic)   Ht 1.575 m (5' 2\")   Wt 59.4 kg (131 lb)   SpO2 98%   BMI 23.96 kg/m    Age-appropriate, pleasant 63-year old female in no acute distress. She is alert and oriented and expressed proper mood and affect. Respiratory efforts are non-labored. Patient has a gluteal lurch on the right, she requires no ambulatory device. She is non-tender to palpation. Active range of motion is lacking approximately 5 to 10 degrees in all planes. Provocative testing is negative. Strength is graded 3+ out of 5 compared to the contralateral extremity.  Neurovascular status of the right lower extremities intact, posterior tibial pulse is 2+, light sensation is intact distal tips of the toes.    X-rays; plain films of the right hip and pelvis taken today show a healing femoral neck fracture. Alignment is unchanged since last set of films on 4/2/2019. Three screwed utilized for the fixation remain in good alignment. Pelvis is normal.     Impression: Status post screw fixation of impacted right femoral neck, 2/24/2019    Plan: Continue to recommend that patient make an appointment to see Dr. Camarillo in regards to her osteoporosis. Continue with home exercises to work on strength, ROM and balance. She should notice that as the stronger her gluteal and associated muscles become, that the less prominent her gluteal lurch will be. She may use heat, ice, OTC analgesics as needed for pain/swellig control. Would recommend that if she is on uneven or icy surfaces that she use caution and potentially utilize a cane. She will follow up with our office on a PRN basis.   "

## 2019-05-09 ENCOUNTER — HOSPITAL ENCOUNTER (OUTPATIENT)
Facility: HOSPITAL | Age: 64
Discharge: HOME | End: 2019-05-09
Attending: INTERNAL MEDICINE | Admitting: INTERNAL MEDICINE
Payer: COMMERCIAL

## 2019-05-09 ENCOUNTER — ANESTHESIA (OUTPATIENT)
Dept: SURGERY | Facility: HOSPITAL | Age: 64
End: 2019-05-09
Payer: COMMERCIAL

## 2019-05-09 VITALS
BODY MASS INDEX: 23.96 KG/M2 | DIASTOLIC BLOOD PRESSURE: 78 MMHG | RESPIRATION RATE: 20 BRPM | SYSTOLIC BLOOD PRESSURE: 98 MMHG | WEIGHT: 131 LBS | TEMPERATURE: 97 F | OXYGEN SATURATION: 96 %

## 2019-05-09 PROCEDURE — 88305 TISSUE EXAM BY PATHOLOGIST: CPT | Mod: TC | Performed by: INTERNAL MEDICINE

## 2019-05-09 PROCEDURE — 71000027 ZZH RECOVERY PHASE 2 EACH 15 MINS: Performed by: INTERNAL MEDICINE

## 2019-05-09 PROCEDURE — 27210794 ZZH OR GENERAL SUPPLY STERILE: Performed by: INTERNAL MEDICINE

## 2019-05-09 PROCEDURE — 43239 EGD BIOPSY SINGLE/MULTIPLE: CPT | Performed by: NURSE ANESTHETIST, CERTIFIED REGISTERED

## 2019-05-09 PROCEDURE — 37000009 ZZH ANESTHESIA TECHNICAL FEE, EACH ADDTL 15 MIN: Performed by: INTERNAL MEDICINE

## 2019-05-09 PROCEDURE — 25000128 H RX IP 250 OP 636: Performed by: NURSE ANESTHETIST, CERTIFIED REGISTERED

## 2019-05-09 PROCEDURE — 36000052 ZZH SURGERY LEVEL 2 EA 15 ADDTL MIN: Performed by: INTERNAL MEDICINE

## 2019-05-09 PROCEDURE — 36000050 ZZH SURGERY LEVEL 2 1ST 30 MIN: Performed by: INTERNAL MEDICINE

## 2019-05-09 PROCEDURE — 40000306 ZZH STATISTIC PRE PROC ASSESS II: Performed by: INTERNAL MEDICINE

## 2019-05-09 PROCEDURE — 37000008 ZZH ANESTHESIA TECHNICAL FEE, 1ST 30 MIN: Performed by: INTERNAL MEDICINE

## 2019-05-09 PROCEDURE — 43239 EGD BIOPSY SINGLE/MULTIPLE: CPT | Performed by: ANESTHESIOLOGY

## 2019-05-09 PROCEDURE — 25800030 ZZH RX IP 258 OP 636: Performed by: NURSE ANESTHETIST, CERTIFIED REGISTERED

## 2019-05-09 RX ORDER — SODIUM CHLORIDE, SODIUM LACTATE, POTASSIUM CHLORIDE, CALCIUM CHLORIDE 600; 310; 30; 20 MG/100ML; MG/100ML; MG/100ML; MG/100ML
INJECTION, SOLUTION INTRAVENOUS CONTINUOUS
Status: DISCONTINUED | OUTPATIENT
Start: 2019-05-09 | End: 2019-05-09 | Stop reason: HOSPADM

## 2019-05-09 RX ORDER — LIDOCAINE 40 MG/G
CREAM TOPICAL
Status: DISCONTINUED | OUTPATIENT
Start: 2019-05-09 | End: 2019-05-09 | Stop reason: HOSPADM

## 2019-05-09 RX ORDER — PROPOFOL 10 MG/ML
INJECTION, EMULSION INTRAVENOUS PRN
Status: DISCONTINUED | OUTPATIENT
Start: 2019-05-09 | End: 2019-05-09

## 2019-05-09 RX ADMIN — PROPOFOL 30 MG: 10 INJECTION, EMULSION INTRAVENOUS at 10:06

## 2019-05-09 RX ADMIN — SODIUM CHLORIDE, POTASSIUM CHLORIDE, SODIUM LACTATE AND CALCIUM CHLORIDE 1000 ML: 600; 310; 30; 20 INJECTION, SOLUTION INTRAVENOUS at 08:51

## 2019-05-09 RX ADMIN — PROPOFOL 20 MG: 10 INJECTION, EMULSION INTRAVENOUS at 10:08

## 2019-05-09 RX ADMIN — PROPOFOL 10 MG: 10 INJECTION, EMULSION INTRAVENOUS at 10:17

## 2019-05-09 RX ADMIN — PROPOFOL 20 MG: 10 INJECTION, EMULSION INTRAVENOUS at 10:19

## 2019-05-09 RX ADMIN — PROPOFOL 30 MG: 10 INJECTION, EMULSION INTRAVENOUS at 10:11

## 2019-05-09 RX ADMIN — PROPOFOL 20 MG: 10 INJECTION, EMULSION INTRAVENOUS at 10:12

## 2019-05-09 RX ADMIN — PROPOFOL 20 MG: 10 INJECTION, EMULSION INTRAVENOUS at 10:15

## 2019-05-09 RX ADMIN — PROPOFOL 30 MG: 10 INJECTION, EMULSION INTRAVENOUS at 10:23

## 2019-05-09 RX ADMIN — PROPOFOL 20 MG: 10 INJECTION, EMULSION INTRAVENOUS at 10:14

## 2019-05-09 RX ADMIN — PROPOFOL 20 MG: 10 INJECTION, EMULSION INTRAVENOUS at 10:26

## 2019-05-09 RX ADMIN — PROPOFOL 30 MG: 10 INJECTION, EMULSION INTRAVENOUS at 10:20

## 2019-05-09 RX ADMIN — PROPOFOL 50 MG: 10 INJECTION, EMULSION INTRAVENOUS at 10:04

## 2019-05-09 ASSESSMENT — LIFESTYLE VARIABLES: TOBACCO_USE: 1

## 2019-05-09 NOTE — ANESTHESIA CARE TRANSFER NOTE
Patient: Chyna Delgado    Procedure(s):  UPPER ENDOSCOPY  WITH BIOPSIES AND  COLONOSCOPY WITH BIOPSIES    Diagnosis: GASTRIC POLYP, HX OF POLYPS  Diagnosis Additional Information: No value filed.    Anesthesia Type:   MAC     Note:  Airway :Nasal Cannula  Patient transferred to:Phase II  Handoff Report: Identifed the Patient, Identified the Reponsible Provider, Reviewed the pertinent medical history, Discussed the surgical course, Reviewed Intra-OP anesthesia mangement and issues during anesthesia, Set expectations for post-procedure period and Allowed opportunity for questions and acknowledgement of understanding      Vitals: (Last set prior to Anesthesia Care Transfer)    CRNA VITALS  5/9/2019 1003 - 5/9/2019 1040      5/9/2019             Pulse:  82    Ht Rate:  81    SpO2:  97 %    Resp Rate (set):  8                Electronically Signed By: MAIDA Butler CRNA  May 9, 2019  10:40 AM

## 2019-05-09 NOTE — H&P
Pre-Endoscopy History and Physical     Chyna Delgaod MRN# [id number]   YOB: 1955 Age: 63 year old     Primary care provider: Arie Camarillo  Type of Endoscopy: Colonoscopy with possible biopsy, possible polypectomy and Esophagogastroduodenoscopy with possible biopsy, possible dilation, possible foreign body removal  Reason for Procedure: Familial polyposis syndrome  Type of Anesthesia Anticipated: MAC    HPI:    Chyna is a 63 year old female who will be undergoing the above procedure.      A history and physical has been performed. The patient's medications and allergies have been reviewed. The risks and benefits of the procedure and the sedation options and risks were discussed with the patient.  All questions were answered and informed consent was obtained.      She denies a personal or family history of anesthesia complications or bleeding disorders.     Patient Active Problem List   Diagnosis     Encounter to establish care     Mild persistent asthma     Sacroiliac pain     Annual physical exam     Dysthymic disorder     Encounter for routine gynecological examination     Seborrheic keratosis     Somatic dysfunction of pelvic region     Restless legs syndrome     Skin lesion     Hand swelling     Numbness and tingling in right hand     Graves disease     Preoperative examination     Simple chronic bronchitis (H)     Arthritis     ACP (advance care planning)     Pain of toe of right foot     Moderate major depression (H)     Right shoulder pain     Abdominal muscle strain     Strain of flexor muscle of hip, unspecified laterality, initial encounter     Myalgia     Iron deficiency     Alcohol use     Functional diarrhea     Closed fracture of neck of right femur (H)     Closed right hip fracture (H)     Hip fracture requiring operative repair (H)        Past Medical History:   Diagnosis Date     Abnormal mammogram, unspecified 01/08/2003    Normal pathology     Back Pain 02/10/2000     Sacroiliac pain     Benign neoplasm of colon 03/10/2000     Benign paroxysmal positional vertigo 06/07/2012     Depressive disorder, not elsewhere classified 02/10/2004     Diarrhea 12/15/2010    Secondary to colectomy for familial polyposis     Gastric polyp 12/11/2015, 12/18/2017    recurrent      History of normal mammogram 07/18/2014, 1/18/2019     Idiopathic osteoporosis 12/15/2010     Interstitial emphysema (H) 12/15/2010     menopausal or female climacteric states 08/31/1999     Mixed hyperlipidemia 12/15/2010     Other bursitis disorders 02/04/2011    Greater trochanteric     Other forms of retinal detachment(361.89) 12/15/2010     Other malaise and fatigue 01/10/2003     Personal history of tobacco use, presenting hazards to health 12/15/2010     Restless legs syndrome (RLS) 12/15/2010     Rotator cuff tendonitis      Rotator cuff tendonitis      Sacroiliitis, not elsewhere classified (H) 12/15/2010    multiple sites     Tobacco use disorder 08/22/2012     Unspecified asthma(493.90) 12/15/2010        Past Surgical History:   Procedure Laterality Date     benign tumors removed from back       CLOSED REDUCTION, PERCUTANEOUS PINNING HIP Right 2/24/2019    Procedure: Percutaneous Screw Fixation of Right Hip Fracture;  Surgeon: Amrik Kolb DO;  Location: [facility] OR     COLON SURGERY N/A     HX: Total colectomy with J-pouch reconstruction.      ENDOSCOPY UPPER, COLONOSCOPY, COMBINED N/A 9/5/2014    Procedure: COMBINED ENDOSCOPY UPPER, COLONOSCOPY;  Surgeon: Delbert Patel MD;  Location: [facility] OR     ESOPHAGOSCOPY, GASTROSCOPY, DUODENOSCOPY (EGD), COMBINED N/A 12/11/2015    Procedure: COMBINED ESOPHAGOSCOPY, GASTROSCOPY, DUODENOSCOPY (EGD);  Surgeon: Delbert Patel MD;  Location: [facility] OR     ESOPHAGOSCOPY, GASTROSCOPY, DUODENOSCOPY (EGD), COMBINED N/A 12/18/2017    Procedure: COMBINED ESOPHAGOSCOPY, GASTROSCOPY, DUODENOSCOPY (EGD);  UPPER ENDOSCOPY WITH BIOPSY;  Surgeon: Delbert Patel MD;  Location: [facility]      excised-benign  2002    tongue lesion     HC REPAIR OF NASAL SEPTUM  2002    Deviated nasal septum     LAPAROSCOPIC CHOLECYSTECTOMY       lumpectomy Right breast      Breast Lump     MOUTH SURGERY      removal of spot from roof of mouth     pilonidal cyst surgery  1976     removal of colon and large intestine  2000    Familial polyposis     Right etopic pregnancy      Pregnancy     TONSILLECTOMY      tonsillitus     UPPER GI ENDOSCOPY  2009    Stomach polyps       Social History     Tobacco Use     Smoking status: Former Smoker     Years: 40.00     Types: Cigarettes     Last attempt to quit: 3/22/2019     Years since quittin.1     Smokeless tobacco: Never Used   Substance Use Topics     Alcohol use: Yes     Comment: Weekly 3 drinks       Family History   Problem Relation Age of Onset     Other - See Comments Father         Atrial Fibrillation     Cancer Father         bladder ca     Colon Polyps Father      Heart Disease Father         Pacemaker     Rheumatoid Arthritis Father      C.A.D. Father 75        CABG     Chronic Obstructive Pulmonary Disease Mother      Heart Disease Mother         Pacemaker     Other - See Comments Mother         dementia     C.A.D. Mother 70        CABG     C.A.D. Maternal Grandmother      Unknown/Adopted Maternal Grandfather      Unknown/Adopted Paternal Grandmother      Unknown/Adopted Paternal Grandfather      Asthma Sister      Cerebrovascular Disease Sister      Gastrointestinal Disease Sister         peptic ulcers     Hypertension Sister      Gastrointestinal Disease Sister         stomach tumors     Rheumatoid Arthritis Sister      Cancer Sister         facial cancer     Asthma Sister      Cancer Maternal Aunt         renal ca       Prior to Admission medications    Medication Sig Start Date End Date Taking? Authorizing Provider   albuterol (PROAIR HFA/PROVENTIL HFA/VENTOLIN HFA) 108 (90 Base) MCG/ACT inhaler Inhale 2 puffs into the lungs every 4 hours as needed for  shortness of breath / dyspnea or wheezing 10/12/18  Yes Arie Camarillo DO   Calcium Citrate-Vitamin D (CALCIUM + D PO) 1200-1000mg unit tablet chewable; one tablet with a meal orally once a day.   Yes Reported, Patient   cetirizine (ZYRTEC) 10 MG tablet Take 1 tablet (10 mg) by mouth At Bedtime 1/16/19 1/16/20 Yes Arie Camarillo DO   dicyclomine (BENTYL) 10 MG capsule Take 2 capsules (20 mg) by mouth 3 times daily (before meals) 12/31/18  Yes Arie Camarillo DO   ferrous sulfate (IRON) 325 (65 FE) MG tablet Take 325 mg by mouth 2 times daily (with meals) 3/5/17  Yes Arie Camarillo DO   fluticasone-salmeterol (ADVAIR DISKUS) 250-50 MCG/DOSE inhaler Inhale 1 puff into the lungs 2 times daily 2/7/19  Yes Arie Camarillo DO   gabapentin (NEURONTIN) 400 MG capsule Take 3 capsules (1,200 mg) by mouth 3 times daily 2/26/19  Yes Arie Camarillo DO   loperamide (IMODIUM) 2 MG capsule Take 6 mg by mouth 2 times daily Pt states takes 3 tabs (6 mg) twice a day unless she needs to take more, depending on what she eats.   Yes Reported, Patient   magnesium 100 MG CAPS Take by mouth 2 times daily   Yes Reported, Patient   montelukast (SINGULAIR) 10 MG tablet Take 1 tablet (10 mg) by mouth At Bedtime 1/16/19 1/16/20 Yes Arie Camarillo DO   Multiple Vitamin (MULTIVITAMIN) per tablet Take 1 tablet by mouth daily. Every day with food 9/26/11  Yes Reported, Patient   omeprazole (PRILOSEC) 20 MG DR capsule Take 20 mg by mouth 2 times daily   Yes Reported, Patient   omeprazole (PRILOSEC) 40 MG DR capsule Take 1 capsule (40 mg) by mouth daily 4/18/19  Yes Arie Camarillo DO   potassium chloride ER (K-DUR/KLOR-CON M) 20 MEQ CR tablet Take 1 tablet (20 mEq) by mouth 3 times daily 5/1/19  Yes Arie Camarillo DO   pramipexole (MIRAPEX) 0.125 MG tablet Take 1 tablet (0.125 mg) by mouth At Bedtime 1/18/19  Yes Arie Camarillo DO   predniSONE (DELTASONE)  "5 MG tablet Take 5 mg by mouth daily. 2/7/19  Yes Arie Camarillo DO   PSYLLIUM PO Take 1 Tablespoonful by mouth 2 times daily    Yes Reported, Patient   Simethicone 125 MG CAPS Take 500 mg by mouth 2 times daily   Yes Reported, Patient   spironolactone (ALDACTONE) 50 MG tablet Take 1 tablet (50 mg) by mouth 2 times daily 2/7/19  Yes Arie Camarillo DO   vitamin D3 (CHOLECALCIFEROL) 2000 units tablet TAKE TWO TABLETS BY MOUTH EVERY DAY 3/14/19  Yes Arie Camarillo DO   vitamin E 200 UNIT capsule Take 200 Units by mouth 2 times daily   Yes Reported, Patient   HYDROcodone-acetaminophen (NORCO) 5-325 MG tablet Take 1-2 tablets by mouth every 4 hours as needed 2/25/19   Eddie Menchaca MD   ibuprofen (ADVIL,MOTRIN) 200 MG tablet Take 200 mg by mouth 10/12/11   Reported, Patient   ketotifen (ZADITOR/REFRESH ANTI-ITCH) 0.025 % SOLN ophthalmic solution PLACE 2 DROPS INTO BOTH EYES 2 TIMES DAILY 11/1/17   Arie Camarillo DO       Allergies   Allergen Reactions     Codeine Swelling     Throat swelling-Patient can tolerate Hydrocodone & morphine     Nortriptyline Other (See Comments)     Crying         REVIEW OF SYSTEMS:   5 point ROS negative except as noted above in HPI, including Gen., Resp., CV, GI &  system review.    PHYSICAL EXAM:   /84   Temp 97.8  F (36.6  C) (Oral)   Resp 16   Wt 59.4 kg (131 lb)   SpO2 98%   BMI 23.96 kg/m   Estimated body mass index is 23.96 kg/m  as calculated from the following:    Height as of 4/2/19: 1.575 m (5' 2\").    Weight as of this encounter: 59.4 kg (131 lb).   GENERAL APPEARANCE: alert, and oriented  MENTAL STATUS: alert  AIRWAY EXAM: Mallampatti Class I (visualization of the soft palate, fauces, uvula, anterior and posterior pillars)  RESP: lungs clear to auscultation - no rales, rhonchi or wheezes  CV: regular rates and rhythm  DIAGNOSTICS:    Not indicated    IMPRESSION   ASA Class 2 - Mild systemic disease    PLAN:   Plan for " Colonoscopy with possible biopsy, possible polypectomy and Esophagogastroduodenoscopy with possible biopsy, possible dilation, possible foreign body removal. We discussed the risks, benefits and alternatives and the patient wished to proceed.    The above has been forwarded to the consulting provider.      Signed Electronically by: Tai Diaz  May 9, 2019

## 2019-05-09 NOTE — OR NURSING
Drank fluids no nausea or vomiting IV dced.Patient and responsible adult given discharge instructions with no questions regarding instructions. Ag score 20 Pain level 0/10.  Discharged from unit via ambulatory. Patient discharged to home.

## 2019-05-09 NOTE — BRIEF OP NOTE
[facility]    Brief Operative Note    Pre-operative diagnosis: GASTRIC POLYP, HX OF POLYPS  Post-operative diagnosis Gastric polyps, Duodenal polyp, Ileitis and Pouchitis  Procedure: Procedure(s):  UPPER ENDOSCOPY  WITH BIOPSIES AND  COLONOSCOPY WITH BIOPSIES  Surgeon: Surgeon(s) and Role:     * Tai Diaz MD - Primary  Anesthesia: Monitor Anesthesia Care   Estimated blood loss: None  Drains: None  Specimens:   ID Type Source Tests Collected by Time Destination   A : small bowel biopsies Biopsy Small Intestine, Duodenum SURGICAL PATHOLOGY EXAM Tai Diaz MD 5/9/2019 10:15 AM    B : duodenal polyp Biopsy Small Intestine, Duodenum SURGICAL PATHOLOGY EXAM Tai Diaz MD 5/9/2019 10:15 AM    C : gastric polyps Polyp Other SURGICAL PATHOLOGY EXAM Tai Diaz MD 5/9/2019 10:17 AM    D : gastric biopsies Tissue Other SURGICAL PATHOLOGY EXAM Tai Diaz MD 5/9/2019 10:18 AM    E : esophagus biopsies @35cm r/o Jewell's Biopsy Esophagus SURGICAL PATHOLOGY EXAM Tai Diaz MD 5/9/2019 10:19 AM    F : ileum ulcer Biopsy Small Intestine, Ileum SURGICAL PATHOLOGY EXAM Tai Diaz MD 5/9/2019 10:30 AM    G : Ileum pouch biopsies Biopsy Small Intestine, Ileum SURGICAL PATHOLOGY EXAM Tai Diaz MD 5/9/2019 10:31 AM    H : rectal cuff biopsies Biopsy Large Intestine, Rectum SURGICAL PATHOLOGY EXAM Tai Diaz MD 5/9/2019 10:33 AM      Findings:   None.  Complications: None.  Implants:  * No implants in log *

## 2019-05-09 NOTE — DISCHARGE INSTRUCTIONS

## 2019-05-09 NOTE — ANESTHESIA POSTPROCEDURE EVALUATION
Patient: Chyna Delgado    Procedure(s):  UPPER ENDOSCOPY  WITH BIOPSIES AND  COLONOSCOPY WITH BIOPSIES    Diagnosis:GASTRIC POLYP, HX OF POLYPS  Diagnosis Additional Information: No value filed.    Anesthesia Type:  MAC    Note:  Anesthesia Post Evaluation    Patient location during evaluation: Bedside and Phase 2  Patient participation: Able to fully participate in evaluation  Level of consciousness: awake and alert  Pain management: adequate  Airway patency: patent  Cardiovascular status: acceptable  Respiratory status: acceptable  Hydration status: stable  PONV: none     Anesthetic complications: None          Last vitals:  Vitals:    05/09/19 0845   BP: 110/84   Resp: 16   Temp: 97.8  F (36.6  C)   SpO2: 98%         Electronically Signed By: Jun Ramires MD  May 9, 2019  11:21 AM

## 2019-05-09 NOTE — OP NOTE
Procedure Date: 05/09/2019      PROCEDURE:     1.  EGD with multiple biopsies.   2.  Ileoscopy with multiple biopsies.      HISTORY OF PRESENT ILLNESS:  Please refer to H and P for further details.      PHYSICAL EXAMINATION:  Cardiopulmonary examination unchanged from previous exam.  Please refer to H and P for further details.      MEDICATIONS:  MAC per Anesthesia Service.  Monitored parameters were within normal limits throughout.      DESCRIPTION OF PROCEDURE:  After informed consent was obtained, the patient was placed in the left lateral decubitus position.  An adult video gastroscope was advanced all the way to the third portion of duodenum.  There was a small tiny polyp in the second portion of the duodenum.  This was biopsied.        Turning our attention to the stomach, I noted multiple polyps.  They appear to be fundic gland polyps and not those associated with familial adenomatous polyposis as this patient carries a history of FAP.  Multiple biopsies were obtained from the gastric mucosa, as well as the polyps.  The patient has mild to moderate gastritis with a copious amount of bile within the gastric lumen.  Retroflexion view revealed no further abnormalities.  The distal esophagus had Jewell's epithelium and these were biopsied.  There were multiple tongues of Jewell's.  The air was then desufflated and the scope withdrawn fully and completely without any complications.      We then proceeded with a colonoscopy.  Noted that this patient had a total colectomy for a presumed of FAP.  The patient's terminal ileum was intubated.  There was a pouch stricture, but this was easily dilated and traversed using the scope.  The terminal ileum was intubated approximately 3 feet.  The rest of the ileum was normal.  There were some ulcers close to the pouch that were biopsied.  Multiple biopsies were obtained from the pouch itself as well.  Noted that the patient still had at least 1-2 cm of rectal cuff proximal  to the anal canal.  Multiple biopsies were obtained from the rectal mucosa.  The air was then desufflated and the scope withdrawn fully and completely without any complications.      IMPRESSION:   1.  Pouchitis.   2.  Jewell's esophagus, M1C0.   3.  Bowel reflux gastropathy.   4.  Gastric polyps.   5.  Duodenal polyp.      RECOMMENDATIONS:   1.  Continue omeprazole.   2.  Follow the biopsies.   3.  I will see the patient in the clinic in about 3 months.  Question if this is indeed FAP.  If this is FAP, the patient will need to have surveillance endoscopies as well as other diagnostic imaging studies that is required for this diagnosis.   4.  Noted chronic diarrhea.   5.  I will start the patient on Cipro and Flagyl.      Thank you for allowing us to participate in her management.         YANG CERDA MD             D: 2019   T: 2019   MT: SALO      Name:     DEIDRE SORENSEN   MRN:      [id number]        Account:        [id number]   :      1955           Procedure Date: 2019      Document: [id number]       cc: Arie Rabia DO

## 2019-05-10 LAB — COPATH REPORT: NORMAL

## 2019-05-14 ENCOUNTER — OFFICE VISIT (OUTPATIENT)
Dept: ORTHOPEDICS | Facility: OTHER | Age: 64
End: 2019-05-14
Attending: PHYSICIAN ASSISTANT
Payer: COMMERCIAL

## 2019-05-14 ENCOUNTER — ANCILLARY PROCEDURE (OUTPATIENT)
Dept: GENERAL RADIOLOGY | Facility: OTHER | Age: 64
End: 2019-05-14
Attending: ORTHOPAEDIC SURGERY
Payer: COMMERCIAL

## 2019-05-14 VITALS
SYSTOLIC BLOOD PRESSURE: 100 MMHG | HEIGHT: 62 IN | DIASTOLIC BLOOD PRESSURE: 62 MMHG | WEIGHT: 131 LBS | HEART RATE: 80 BPM | BODY MASS INDEX: 24.11 KG/M2 | TEMPERATURE: 97.7 F | OXYGEN SATURATION: 98 %

## 2019-05-14 DIAGNOSIS — Z98.890 POST-OPERATIVE STATE: Primary | ICD-10-CM

## 2019-05-14 DIAGNOSIS — S72.001A CLOSED FRACTURE OF NECK OF RIGHT FEMUR (H): ICD-10-CM

## 2019-05-14 DIAGNOSIS — S72.002D CLOSED DISPLACED FRACTURE OF LEFT FEMORAL NECK WITH ROUTINE HEALING: ICD-10-CM

## 2019-05-14 PROCEDURE — G0463 HOSPITAL OUTPT CLINIC VISIT: HCPCS

## 2019-05-14 PROCEDURE — 73502 X-RAY EXAM HIP UNI 2-3 VIEWS: CPT | Mod: TC

## 2019-05-14 PROCEDURE — 99207 ZZC FRACTURE CARE IN GLOBAL PERIOD: CPT | Performed by: PHYSICIAN ASSISTANT

## 2019-05-14 ASSESSMENT — PAIN SCALES - GENERAL: PAINLEVEL: NO PAIN (0)

## 2019-05-14 ASSESSMENT — MIFFLIN-ST. JEOR: SCORE: 1092.46

## 2019-05-14 NOTE — NURSING NOTE
"Chief Complaint   Patient presents with     RECHECK     Right Hip       Initial /62   Pulse 80   Temp 97.7  F (36.5  C) (Tympanic)   Ht 1.575 m (5' 2\")   Wt 59.4 kg (131 lb)   SpO2 98%   BMI 23.96 kg/m   Estimated body mass index is 23.96 kg/m  as calculated from the following:    Height as of this encounter: 1.575 m (5' 2\").    Weight as of this encounter: 59.4 kg (131 lb).  Medication Reconciliation: complete    Savannah Briggs LPN  "

## 2019-05-16 DIAGNOSIS — E87.6 HYPOKALEMIA: ICD-10-CM

## 2019-05-17 RX ORDER — SPIRONOLACTONE 50 MG/1
50 TABLET, FILM COATED ORAL 2 TIMES DAILY
Qty: 180 TABLET | Refills: 1 | OUTPATIENT
Start: 2019-05-17

## 2019-05-20 DIAGNOSIS — K52.9 CHRONIC DIARRHEA: ICD-10-CM

## 2019-05-20 DIAGNOSIS — Z90.49 S/P COLON RESECTION: ICD-10-CM

## 2019-05-21 RX ORDER — DICYCLOMINE HYDROCHLORIDE 10 MG/1
20 CAPSULE ORAL
Qty: 180 CAPSULE | Refills: 3 | OUTPATIENT
Start: 2019-05-21

## 2019-06-27 DIAGNOSIS — K52.9 CHRONIC DIARRHEA: ICD-10-CM

## 2019-06-27 DIAGNOSIS — Z90.49 S/P COLON RESECTION: ICD-10-CM

## 2019-06-28 RX ORDER — DICYCLOMINE HYDROCHLORIDE 10 MG/1
20 CAPSULE ORAL
Qty: 180 CAPSULE | Refills: 2 | Status: SHIPPED | OUTPATIENT
Start: 2019-06-28 | End: 2019-09-20

## 2019-07-17 ENCOUNTER — TELEPHONE (OUTPATIENT)
Dept: PEDIATRICS | Facility: OTHER | Age: 64
End: 2019-07-17

## 2019-07-17 NOTE — TELEPHONE ENCOUNTER
Please advise. Pharmacy called looking for clarification. Pharmacy has two different prescription strengths for Spironolactone.  Spironolactone 25 MG, take one tablet BID, and Spironolactone 50 MG, take one tablet BID. Pharmacy wondering which strength patient should be on.   Per office visit note from 02/07/19 patient was increased from 25MG BID to 50MG BID.  Spironolactone 25MG BID was filled today (07/17/19) and sent to pharmacy.    Please advise.    Pharmacy would like a call in regards to correct dosage.

## 2019-07-25 NOTE — PROGRESS NOTES
Subjective     Chyna Delgado is a 63 year old female who presents to clinic today for the following health issues:    HPI   GERD/Heartburn      Duration: ongoing 5 years    Description (location/character/radiation): stomach, chest     Intensity:  mild    Accompanying signs and symptoms:  food getting stuck: YES  nausea/vomiting/blood: no   abdominal pain: no  black/tarry or bloody stools: no :    History (similar episodes/previous evaluation): yes    Precipitating or alleviating factors:  worse with fatty foods, spicy foods and caffeinated drinks.  current NSAID/Aspirin use: YES     Therapies tried and outcome: Omeprazole (Prilosec) works    She continues to feel foods get stuck in her throat in particular meats get stuck in her throat.    Hypokalemia      Intensity:  none    Accompanying signs and symptoms: none    History (similar episodes/previous evaluation): yes    Precipitating or alleviating factors: none    Therapies tried and outcome: potassium supplements are working   Her muscle cramps are much better.      Asthma Follow-Up    Was ACT completed today?    Yes    ACT Total Scores 8/1/2019   ACT TOTAL SCORE -   ASTHMA ER VISITS -   ASTHMA HOSPITALIZATIONS -   ACT TOTAL SCORE (Goal Greater than or Equal to 20) 20   In the past 12 months, how many times did you visit the emergency room for your asthma without being admitted to the hospital? 0   In the past 12 months, how many times were you hospitalized overnight because of your asthma? 0       How many days per week do you miss taking your asthma controller medication?  3    Please describe any recent triggers for your asthma: smoke, humidity, exercise or sports and cold air    Have you had any Emergency Room Visits, Urgent Care Visits, or Hospital Admissions since your last office visit?  No        Amount of exercise or physical activity: 6-7 days/week for an average of 45-60 minutes    Problems taking medications regularly: No    Medication side  effects: none    Diet: colon specific diet       Recent right hip fracture:  She had fixation done in February 2019.  She has not has a DEXAscan done in years.      Patient has quite a few medication concerns        -------------------------------------    Patient Active Problem List   Diagnosis     Encounter to establish care     Mild persistent asthma     Sacroiliac pain     Annual physical exam     Dysthymic disorder     Encounter for routine gynecological examination     Seborrheic keratosis     Somatic dysfunction of pelvic region     Restless legs syndrome     Skin lesion     Hand swelling     Numbness and tingling in right hand     Graves disease     Preoperative examination     Simple chronic bronchitis (H)     Arthritis     ACP (advance care planning)     Pain of toe of right foot     Moderate major depression (H)     Right shoulder pain     Abdominal muscle strain     Strain of flexor muscle of hip, unspecified laterality, initial encounter     Myalgia     Iron deficiency     Alcohol use     Functional diarrhea     Closed fracture of neck of right femur (H)     Closed right hip fracture (H)     Hip fracture requiring operative repair (H)     Past Surgical History:   Procedure Laterality Date     benign tumors removed from back       CLOSED REDUCTION, PERCUTANEOUS PINNING HIP Right 2/24/2019    Procedure: Percutaneous Screw Fixation of Right Hip Fracture;  Surgeon: Amrik Kolb DO;  Location: [facility]     COLON SURGERY N/A     HX: Total colectomy with J-pouch reconstruction.      ENDOSCOPY UPPER, COLONOSCOPY, COMBINED N/A 9/5/2014    Procedure: COMBINED ENDOSCOPY UPPER, COLONOSCOPY;  Surgeon: Delbert Patel MD;  Location: [facility]     ENDOSCOPY UPPER, COLONOSCOPY, COMBINED N/A 5/9/2019    Procedure: UPPER ENDOSCOPY  WITH BIOPSIES AND  COLONOSCOPY WITH BIOPSIES;  Surgeon: Tai Diaz MD;  Location: [facility]     ESOPHAGOSCOPY, GASTROSCOPY, DUODENOSCOPY (EGD), COMBINED N/A 12/11/2015    Procedure: COMBINED  ESOPHAGOSCOPY, GASTROSCOPY, DUODENOSCOPY (EGD);  Surgeon: Delbert Patel MD;  Location: [facility]     ESOPHAGOSCOPY, GASTROSCOPY, DUODENOSCOPY (EGD), COMBINED N/A 12/18/2017    Procedure: COMBINED ESOPHAGOSCOPY, GASTROSCOPY, DUODENOSCOPY (EGD);  UPPER ENDOSCOPY WITH BIOPSY;  Surgeon: Delbert Patel MD;  Location: [facility]     excised-benign  2002    tongue lesion     HC REPAIR OF NASAL SEPTUM  2002    Deviated nasal septum     LAPAROSCOPIC CHOLECYSTECTOMY       lumpectomy Right breast      Breast Lump     MOUTH SURGERY      removal of spot from roof of mouth     pilonidal cyst surgery  1976     removal of colon and large intestine  2000    Familial polyposis     Right etopic pregnancy      Pregnancy     TONSILLECTOMY      tonsillitus     UPPER GI ENDOSCOPY  2009    Stomach polyps       Social History     Tobacco Use     Smoking status: Current Every Day Smoker     Packs/day: 0.50     Years: 40.00     Pack years: 20.00     Types: Cigarettes     Smokeless tobacco: Never Used   Substance Use Topics     Alcohol use: Yes     Comment: Weekly 3 drinks     Family History   Problem Relation Age of Onset     Other - See Comments Father         Atrial Fibrillation     Cancer Father         bladder ca     Colon Polyps Father      Heart Disease Father         Pacemaker     Rheumatoid Arthritis Father      C.A.D. Father 75        CABG     Chronic Obstructive Pulmonary Disease Mother      Heart Disease Mother         Pacemaker     Other - See Comments Mother         dementia     C.A.D. Mother 70        CABG     C.A.D. Maternal Grandmother      Unknown/Adopted Maternal Grandfather      Unknown/Adopted Paternal Grandmother      Unknown/Adopted Paternal Grandfather      Asthma Sister      Cerebrovascular Disease Sister      Gastrointestinal Disease Sister         peptic ulcers     Hypertension Sister      Gastrointestinal Disease Sister         stomach tumors     Rheumatoid Arthritis Sister      Cancer Sister         facial cancer      Asthma Sister      Cancer Maternal Aunt         renal ca           -------------------------------------  Reviewed and updated as needed this visit by Provider         Review of Systems   ROS COMP: CONSTITUTIONAL: NEGATIVE for fever, chills, change in weight  EYES: NEGATIVE for vision changes or irritation  ENT/MOUTH: NEGATIVE for ear, mouth and throat problems  RESP: NEGATIVE for significant cough or SOB  CV: NEGATIVE for chest pain, palpitations or peripheral edema  GI: POSITIVE for diarrhea and NEGATIVE for abdominal pain , constipation, gas or bloating, hematemesis, melena, nausea and poor appetite  : NEGATIVE for frequency, dysuria, or hematuria  MUSCULOSKELETAL: NEGATIVE for significant arthralgias or myalgia  NEURO: NEGATIVE for weakness, dizziness or paresthesias  PSYCHIATRIC: NEGATIVE for changes in mood or affect      Objective    /70 (BP Location: Left arm, Patient Position: Chair, Cuff Size: Adult Large)   Pulse 86   Temp 98.6  F (37  C) (Tympanic)   Wt 59.8 kg (131 lb 12.8 oz)   SpO2 95%   BMI 24.11 kg/m    Body mass index is 24.11 kg/m .  Physical Exam   GENERAL: healthy, alert and no distress  EYES: Eyes grossly normal to inspection and conjunctivae and sclerae normal  HENT: oropharynx clear and oral mucous membranes moist  NECK: no adenopathy, no asymmetry, masses, or scars and thyroid normal to palpation  RESP: lungs clear to auscultation - no rales, rhonchi or wheezes  CV: regular rate and rhythm, normal S1 S2, no S3 or S4, no murmur, click or rub, no peripheral edema and peripheral pulses strong  ABDOMEN: soft, nontender, no hepatosplenomegaly, no masses and bowel sounds normal  ABDOMEN: no bruits heard  MS: no gross musculoskeletal defects noted, no edema  SKIN: no suspicious lesions or rashes  SKIN: Fatty deposit buffalo hump noted over the lower neck  PSYCH: mentation appears normal, affect normal/bright    Diagnostic Test Results:  Labs reviewed in Epic      Results for orders  placed or performed in visit on 08/01/19   Comprehensive metabolic panel   Result Value Ref Range    Sodium 137 133 - 144 mmol/L    Potassium 4.5 3.4 - 5.3 mmol/L    Chloride 103 94 - 109 mmol/L    Carbon Dioxide 27 20 - 32 mmol/L    Anion Gap 7 3 - 14 mmol/L    Glucose 103 (H) 70 - 99 mg/dL    Urea Nitrogen 22 7 - 30 mg/dL    Creatinine 0.95 0.52 - 1.04 mg/dL    GFR Estimate 63 >60 mL/min/[1.73_m2]    GFR Estimate If Black 73 >60 mL/min/[1.73_m2]    Calcium 9.8 8.5 - 10.1 mg/dL    Bilirubin Total 0.4 0.2 - 1.3 mg/dL    Albumin 4.0 3.4 - 5.0 g/dL    Protein Total 8.0 6.8 - 8.8 g/dL    Alkaline Phosphatase 99 40 - 150 U/L    ALT 38 0 - 50 U/L    AST 20 0 - 45 U/L   CBC with platelets   Result Value Ref Range    WBC 11.9 (H) 4.0 - 11.0 10e9/L    RBC Count 4.94 3.8 - 5.2 10e12/L    Hemoglobin 15.5 11.7 - 15.7 g/dL    Hematocrit 44.8 35.0 - 47.0 %    MCV 91 78 - 100 fl    MCH 31.4 26.5 - 33.0 pg    MCHC 34.6 31.5 - 36.5 g/dL    RDW 12.8 10.0 - 15.0 %    Platelet Count 309 150 - 450 10e9/L   Phosphorus   Result Value Ref Range    Phosphorus 3.4 2.5 - 4.5 mg/dL   Vitamin D Deficiency   Result Value Ref Range    Vitamin D Deficiency screening 83 (H) 20 - 75 ug/L   Magnesium   Result Value Ref Range    Magnesium 2.2 1.6 - 2.3 mg/dL   Erythrocyte sedimentation rate auto   Result Value Ref Range    Sed Rate 7 0 - 30 mm/h   CRP inflammation   Result Value Ref Range    CRP Inflammation 12.5 (H) 0.0 - 8.0 mg/L   Anti Nuclear Yudelka IgG by IFA with Reflex   Result Value Ref Range    KOURTNEY interpretation Negative NEG^Negative   Rheumatoid factor   Result Value Ref Range    Rheumatoid Factor <20 <20 IU/mL   Interleukin 6 Blood   Result Value Ref Range    Interleukin 6 Blood 4.63 (H) <3.01 pg/mL   Parathyroid Hormone Intact   Result Value Ref Range    Parathyroid Hormone Intact 20 18 - 80 pg/mL             8/5/19  2:00 PM [id number] [facility] DEXA    PACS Images      Show images for DX Hip/Pelvis/Spine   Study Result     PROCEDURE: DX  HIP/PELVIS/SPINE 8/5/2019 2:00 PM     HISTORY: Recent fracture of hip (H); Post-menopause     COMPARISONS: None.     TECHNIQUE: Bone mineral density was calculated in the left hip and in  the lumbar spine.     FINDINGS: Lowest bone mineral density is in the lumbar spine. Bone  mineral density measures 0.554 g/sq cm with a T score -4.5. This  patient is considered osteoporotic according to World Health  Organization guidelines.                                                                        IMPRESSION: Osteoporosis.     CARROLL UMANZOR MD     8/5/19  2:20 PM [id number] [facility] INTERVENTIONAL RAD    PACS Images      Show images for XR Esophagram   Study Result     XR ESOPHAGRAM, 8/5/2019 2:20 PM     History: Female, age 63 years; Pharyngoesophageal dysphagia     Comparison: None.     Fluoro: 1.02 minutes.     Technique: The patient was evaluated fluoroscopically while ingesting  radiodense contrast of varying consistencies.     FINDINGS: The patient has a well coordinated swallow. Intermittent  gastroesophageal reflux was noted to the midesophagus. There is subtle  irregularity of the distal esophageal mucosa suggesting localized  esophagitis. Very small sliding hiatal hernia associated with  intermittent reflux. Gastric mucosa is grossly normal.                                                                      IMPRESSION:   1.  Very small sliding hiatal hernia with intermittent  gastroesophageal reflux to the proximal esophagus.     2.  Mild esophagitis in the distal esophagus. No evidence of  well-defined stricture or extrinsic mass effect.     YEIMI HIGHTOWER MD       Results for orders placed or performed in visit on 08/01/19   Comprehensive metabolic panel   Result Value Ref Range    Sodium 137 133 - 144 mmol/L    Potassium 4.5 3.4 - 5.3 mmol/L    Chloride 103 94 - 109 mmol/L    Carbon Dioxide 27 20 - 32 mmol/L    Anion Gap 7 3 - 14 mmol/L    Glucose 103 (H) 70 - 99 mg/dL    Urea  Nitrogen 22 7 - 30 mg/dL    Creatinine 0.95 0.52 - 1.04 mg/dL    GFR Estimate 63 >60 mL/min/[1.73_m2]    GFR Estimate If Black 73 >60 mL/min/[1.73_m2]    Calcium 9.8 8.5 - 10.1 mg/dL    Bilirubin Total 0.4 0.2 - 1.3 mg/dL    Albumin 4.0 3.4 - 5.0 g/dL    Protein Total 8.0 6.8 - 8.8 g/dL    Alkaline Phosphatase 99 40 - 150 U/L    ALT 38 0 - 50 U/L    AST 20 0 - 45 U/L   CBC with platelets   Result Value Ref Range    WBC 11.9 (H) 4.0 - 11.0 10e9/L    RBC Count 4.94 3.8 - 5.2 10e12/L    Hemoglobin 15.5 11.7 - 15.7 g/dL    Hematocrit 44.8 35.0 - 47.0 %    MCV 91 78 - 100 fl    MCH 31.4 26.5 - 33.0 pg    MCHC 34.6 31.5 - 36.5 g/dL    RDW 12.8 10.0 - 15.0 %    Platelet Count 309 150 - 450 10e9/L   Phosphorus   Result Value Ref Range    Phosphorus 3.4 2.5 - 4.5 mg/dL   Vitamin D Deficiency   Result Value Ref Range    Vitamin D Deficiency screening 83 (H) 20 - 75 ug/L   Magnesium   Result Value Ref Range    Magnesium 2.2 1.6 - 2.3 mg/dL   Erythrocyte sedimentation rate auto   Result Value Ref Range    Sed Rate 7 0 - 30 mm/h   CRP inflammation   Result Value Ref Range    CRP Inflammation 12.5 (H) 0.0 - 8.0 mg/L   Anti Nuclear Yudelka IgG by IFA with Reflex   Result Value Ref Range    KOURTNEY interpretation Negative NEG^Negative   Rheumatoid factor   Result Value Ref Range    Rheumatoid Factor <20 <20 IU/mL   Interleukin 6 Blood   Result Value Ref Range    Interleukin 6 Blood 4.63 (H) <3.01 pg/mL   Parathyroid Hormone Intact   Result Value Ref Range    Parathyroid Hormone Intact 20 18 - 80 pg/mL     8/5/19  2:20 PM [id number] [facility] INTERVENTIONAL RAD    PACS Images      Show images for XR Esophagram   Study Result     XR ESOPHAGRAM, 8/5/2019 2:20 PM     History: Female, age 63 years; Pharyngoesophageal dysphagia     Comparison: None.     Fluoro: 1.02 minutes.     Technique: The patient was evaluated fluoroscopically while ingesting  radiodense contrast of varying consistencies.     FINDINGS: The patient has a well  coordinated swallow. Intermittent  gastroesophageal reflux was noted to the midesophagus. There is subtle  irregularity of the distal esophageal mucosa suggesting localized  esophagitis. Very small sliding hiatal hernia associated with  intermittent reflux. Gastric mucosa is grossly normal.                                                                      IMPRESSION:   1.  Very small sliding hiatal hernia with intermittent  gastroesophageal reflux to the proximal esophagus.     2.  Mild esophagitis in the distal esophagus. No evidence of  well-defined stricture or extrinsic mass effect.     YEIMI HIGHTOWER MD         Assessment & Plan     (S72.009A) Recent fracture of hip (H)  (primary encounter diagnosis)  Comment:   Her calcium levels is normal with a kvng parathyroid hormone level.  She does show osteoporosis on her DEXA scan.  Her magnesium Phosphorus and Vitamin D levels are normal.  Plan:   She will continue continue Vitamin D3 2000 IU daily.  Add Calcium carbonate 750 mg BID.      (Z78.0) Post-menopause  Comment: She has osteoporosis on DEXA   Plan:  She will continue continue Vitamin D3 2000 IU daily.  Add Calcium carbonate 750 mg BID and start RECLAST     (E87.6) Hypokalemia  Comment: Stable in mid normal range.  Plan:   Stable.  She will continue Aldactone 50 mg BID and potassium 20 meq TID.    (K21.9) Gastroesophageal reflux disease, esophagitis presence not specified  Comment: Her hemoglobin is normal.  Her prednisone is likely causing some irritation of the esophagus.  Plan:   She will Prilosec 40 mg daily.        (J45.40) Moderate persistent asthma without complication  Comment: Stable and controlled  Plan:  She will continue ADVAIR 250/50 BID    (E55.9) Vitamin D deficiency  Comment: Her vitamin D is on the high side which is probably good considering her osteoporosis.  Plan:  She will continue Vitamin D 200 international unit(s)      (R13.14) Pharyngoesophageal dysphagia  Comment: She likely is  having some pill esophagitis from GERD  Plan:  She will need an EGD.  Otherwise, she will continue her omeprazole.    (M79.10) Myalgia  Comment: She has PMR requires prednisone for her pain control.  Her current CRP shows a slight elevation.  Plan:  She will continue predniSONE (DELTASONE) 10 MG tablet      (G25.81) Restless legs syndrome (RLS)  Comment: Stable   Plan:   She will continue Mirapex 0.125 mg at bedtime.    (M35.3) Polymyalgia rheumatica (H)  Comment:   Plan:   She will continue predniSONE (DELTASONE) 10 MG tablet    (R52) Whole body pain  Comment: Further testing for RA is negative   Plan:  Treat PMR.       Tobacco Cessation:   reports that she has been smoking cigarettes.  She has a 20.00 pack-year smoking history. She has never used smokeless tobacco.          FUTURE APPOINTMENTS:       - Follow-up visit in  6-8 weeks       No follow-ups on file.    Arie Camarillo DO, DO  [facility]

## 2019-07-31 DIAGNOSIS — R09.82 POST-NASAL DRIP: ICD-10-CM

## 2019-07-31 RX ORDER — CETIRIZINE HYDROCHLORIDE 10 MG/1
10 TABLET ORAL AT BEDTIME
Qty: 90 TABLET | Refills: 1 | Status: SHIPPED | OUTPATIENT
Start: 2019-07-31 | End: 2020-02-18

## 2019-07-31 RX ORDER — MONTELUKAST SODIUM 10 MG/1
10 TABLET ORAL AT BEDTIME
Qty: 90 TABLET | Refills: 1 | Status: SHIPPED | OUTPATIENT
Start: 2019-07-31 | End: 2020-01-17

## 2019-08-01 ENCOUNTER — OFFICE VISIT (OUTPATIENT)
Dept: INTERNAL MEDICINE | Facility: OTHER | Age: 64
End: 2019-08-01
Attending: INTERNAL MEDICINE
Payer: COMMERCIAL

## 2019-08-01 VITALS
OXYGEN SATURATION: 95 % | DIASTOLIC BLOOD PRESSURE: 70 MMHG | TEMPERATURE: 98.6 F | WEIGHT: 131.8 LBS | HEART RATE: 86 BPM | BODY MASS INDEX: 24.11 KG/M2 | SYSTOLIC BLOOD PRESSURE: 110 MMHG

## 2019-08-01 DIAGNOSIS — Z78.0 POST-MENOPAUSE: ICD-10-CM

## 2019-08-01 DIAGNOSIS — S72.009A RECENT FRACTURE OF HIP (H): Primary | ICD-10-CM

## 2019-08-01 DIAGNOSIS — E55.9 VITAMIN D DEFICIENCY: ICD-10-CM

## 2019-08-01 DIAGNOSIS — K21.9 GASTROESOPHAGEAL REFLUX DISEASE, ESOPHAGITIS PRESENCE NOT SPECIFIED: ICD-10-CM

## 2019-08-01 DIAGNOSIS — J45.40 MODERATE PERSISTENT ASTHMA WITHOUT COMPLICATION: ICD-10-CM

## 2019-08-01 DIAGNOSIS — M79.10 MYALGIA: ICD-10-CM

## 2019-08-01 DIAGNOSIS — M81.0 AGE-RELATED OSTEOPOROSIS WITHOUT CURRENT PATHOLOGICAL FRACTURE: ICD-10-CM

## 2019-08-01 DIAGNOSIS — M35.3 PMR (POLYMYALGIA RHEUMATICA) (H): ICD-10-CM

## 2019-08-01 DIAGNOSIS — M35.3 POLYMYALGIA RHEUMATICA (H): ICD-10-CM

## 2019-08-01 DIAGNOSIS — E87.6 HYPOKALEMIA: ICD-10-CM

## 2019-08-01 DIAGNOSIS — R52 WHOLE BODY PAIN: ICD-10-CM

## 2019-08-01 DIAGNOSIS — R13.14 PHARYNGOESOPHAGEAL DYSPHAGIA: ICD-10-CM

## 2019-08-01 DIAGNOSIS — G25.81 RESTLESS LEGS SYNDROME (RLS): ICD-10-CM

## 2019-08-01 LAB
ALBUMIN SERPL-MCNC: 4 G/DL (ref 3.4–5)
ALP SERPL-CCNC: 99 U/L (ref 40–150)
ALT SERPL W P-5'-P-CCNC: 38 U/L (ref 0–50)
ANION GAP SERPL CALCULATED.3IONS-SCNC: 7 MMOL/L (ref 3–14)
AST SERPL W P-5'-P-CCNC: 20 U/L (ref 0–45)
BILIRUB SERPL-MCNC: 0.4 MG/DL (ref 0.2–1.3)
BUN SERPL-MCNC: 22 MG/DL (ref 7–30)
CALCIUM SERPL-MCNC: 9.8 MG/DL (ref 8.5–10.1)
CHLORIDE SERPL-SCNC: 103 MMOL/L (ref 94–109)
CO2 SERPL-SCNC: 27 MMOL/L (ref 20–32)
CREAT SERPL-MCNC: 0.95 MG/DL (ref 0.52–1.04)
CRP SERPL-MCNC: 12.5 MG/L (ref 0–8)
ERYTHROCYTE [DISTWIDTH] IN BLOOD BY AUTOMATED COUNT: 12.8 % (ref 10–15)
ERYTHROCYTE [SEDIMENTATION RATE] IN BLOOD BY WESTERGREN METHOD: 7 MM/H (ref 0–30)
GFR SERPL CREATININE-BSD FRML MDRD: 63 ML/MIN/{1.73_M2}
GLUCOSE SERPL-MCNC: 103 MG/DL (ref 70–99)
HCT VFR BLD AUTO: 44.8 % (ref 35–47)
HGB BLD-MCNC: 15.5 G/DL (ref 11.7–15.7)
MAGNESIUM SERPL-MCNC: 2.2 MG/DL (ref 1.6–2.3)
MCH RBC QN AUTO: 31.4 PG (ref 26.5–33)
MCHC RBC AUTO-ENTMCNC: 34.6 G/DL (ref 31.5–36.5)
MCV RBC AUTO: 91 FL (ref 78–100)
PHOSPHATE SERPL-MCNC: 3.4 MG/DL (ref 2.5–4.5)
PLATELET # BLD AUTO: 309 10E9/L (ref 150–450)
POTASSIUM SERPL-SCNC: 4.5 MMOL/L (ref 3.4–5.3)
PROT SERPL-MCNC: 8 G/DL (ref 6.8–8.8)
RBC # BLD AUTO: 4.94 10E12/L (ref 3.8–5.2)
SODIUM SERPL-SCNC: 137 MMOL/L (ref 133–144)
WBC # BLD AUTO: 11.9 10E9/L (ref 4–11)

## 2019-08-01 PROCEDURE — 99000 SPECIMEN HANDLING OFFICE-LAB: CPT | Performed by: INTERNAL MEDICINE

## 2019-08-01 PROCEDURE — 80053 COMPREHEN METABOLIC PANEL: CPT | Mod: ZL | Performed by: INTERNAL MEDICINE

## 2019-08-01 PROCEDURE — 36415 COLL VENOUS BLD VENIPUNCTURE: CPT | Mod: ZL | Performed by: INTERNAL MEDICINE

## 2019-08-01 PROCEDURE — 86140 C-REACTIVE PROTEIN: CPT | Mod: ZL | Performed by: INTERNAL MEDICINE

## 2019-08-01 PROCEDURE — 85652 RBC SED RATE AUTOMATED: CPT | Mod: ZL | Performed by: INTERNAL MEDICINE

## 2019-08-01 PROCEDURE — 84100 ASSAY OF PHOSPHORUS: CPT | Mod: ZL | Performed by: INTERNAL MEDICINE

## 2019-08-01 PROCEDURE — G0463 HOSPITAL OUTPT CLINIC VISIT: HCPCS

## 2019-08-01 PROCEDURE — 83735 ASSAY OF MAGNESIUM: CPT | Mod: ZL | Performed by: INTERNAL MEDICINE

## 2019-08-01 PROCEDURE — 86431 RHEUMATOID FACTOR QUANT: CPT | Mod: ZL | Performed by: INTERNAL MEDICINE

## 2019-08-01 PROCEDURE — 83970 ASSAY OF PARATHORMONE: CPT | Mod: ZL | Performed by: INTERNAL MEDICINE

## 2019-08-01 PROCEDURE — 85027 COMPLETE CBC AUTOMATED: CPT | Mod: ZL | Performed by: INTERNAL MEDICINE

## 2019-08-01 PROCEDURE — 99214 OFFICE O/P EST MOD 30 MIN: CPT | Performed by: INTERNAL MEDICINE

## 2019-08-01 PROCEDURE — 86038 ANTINUCLEAR ANTIBODIES: CPT | Mod: ZL | Performed by: INTERNAL MEDICINE

## 2019-08-01 PROCEDURE — 83520 IMMUNOASSAY QUANT NOS NONAB: CPT | Mod: ZL | Performed by: INTERNAL MEDICINE

## 2019-08-01 PROCEDURE — 82306 VITAMIN D 25 HYDROXY: CPT | Mod: ZL | Performed by: INTERNAL MEDICINE

## 2019-08-01 RX ORDER — PREDNISONE 10 MG/1
10 TABLET ORAL DAILY
Qty: 90 TABLET | Refills: 1 | Status: SHIPPED | OUTPATIENT
Start: 2019-08-01 | End: 2020-09-09 | Stop reason: DRUGHIGH

## 2019-08-01 ASSESSMENT — ASTHMA QUESTIONNAIRES
QUESTION_1 LAST FOUR WEEKS HOW MUCH OF THE TIME DID YOUR ASTHMA KEEP YOU FROM GETTING AS MUCH DONE AT WORK, SCHOOL OR AT HOME: A LITTLE OF THE TIME
ACT_TOTALSCORE: 20
QUESTION_3 LAST FOUR WEEKS HOW OFTEN DID YOUR ASTHMA SYMPTOMS (WHEEZING, COUGHING, SHORTNESS OF BREATH, CHEST TIGHTNESS OR PAIN) WAKE YOU UP AT NIGHT OR EARLIER THAN USUAL IN THE MORNING: NOT AT ALL
QUESTION_4 LAST FOUR WEEKS HOW OFTEN HAVE YOU USED YOUR RESCUE INHALER OR NEBULIZER MEDICATION (SUCH AS ALBUTEROL): NOT AT ALL
QUESTION_2 LAST FOUR WEEKS HOW OFTEN HAVE YOU HAD SHORTNESS OF BREATH: THREE TO SIX TIMES A WEEK
QUESTION_5 LAST FOUR WEEKS HOW WOULD YOU RATE YOUR ASTHMA CONTROL: SOMEWHAT CONTROLLED

## 2019-08-01 ASSESSMENT — ANXIETY QUESTIONNAIRES
IF YOU CHECKED OFF ANY PROBLEMS ON THIS QUESTIONNAIRE, HOW DIFFICULT HAVE THESE PROBLEMS MADE IT FOR YOU TO DO YOUR WORK, TAKE CARE OF THINGS AT HOME, OR GET ALONG WITH OTHER PEOPLE: NOT DIFFICULT AT ALL
GAD7 TOTAL SCORE: 2
7. FEELING AFRAID AS IF SOMETHING AWFUL MIGHT HAPPEN: NOT AT ALL
3. WORRYING TOO MUCH ABOUT DIFFERENT THINGS: SEVERAL DAYS
1. FEELING NERVOUS, ANXIOUS, OR ON EDGE: NOT AT ALL
5. BEING SO RESTLESS THAT IT IS HARD TO SIT STILL: NOT AT ALL
6. BECOMING EASILY ANNOYED OR IRRITABLE: SEVERAL DAYS
4. TROUBLE RELAXING: NOT AT ALL
2. NOT BEING ABLE TO STOP OR CONTROL WORRYING: NOT AT ALL

## 2019-08-01 ASSESSMENT — PATIENT HEALTH QUESTIONNAIRE - PHQ9: SUM OF ALL RESPONSES TO PHQ QUESTIONS 1-9: 4

## 2019-08-01 ASSESSMENT — PAIN SCALES - GENERAL: PAINLEVEL: NO PAIN (0)

## 2019-08-01 NOTE — NURSING NOTE
"Chief Complaint   Patient presents with     Recheck Medication       Initial /70 (BP Location: Left arm, Patient Position: Chair, Cuff Size: Adult Large)   Pulse 86   Temp 98.6  F (37  C) (Tympanic)   Wt 59.8 kg (131 lb 12.8 oz)   SpO2 95%   BMI 24.11 kg/m   Estimated body mass index is 24.11 kg/m  as calculated from the following:    Height as of 5/14/19: 1.575 m (5' 2\").    Weight as of this encounter: 59.8 kg (131 lb 12.8 oz).  Medication Reconciliation: complete     Chris Duval LPN      "

## 2019-08-02 LAB — PTH-INTACT SERPL-MCNC: 20 PG/ML (ref 18–80)

## 2019-08-02 ASSESSMENT — ASTHMA QUESTIONNAIRES: ACT_TOTALSCORE: 20

## 2019-08-02 ASSESSMENT — ANXIETY QUESTIONNAIRES: GAD7 TOTAL SCORE: 2

## 2019-08-04 LAB — DEPRECATED CALCIDIOL+CALCIFEROL SERPL-MC: 83 UG/L (ref 20–75)

## 2019-08-05 ENCOUNTER — HOSPITAL ENCOUNTER (OUTPATIENT)
Dept: GENERAL RADIOLOGY | Facility: HOSPITAL | Age: 64
End: 2019-08-05
Attending: INTERNAL MEDICINE
Payer: COMMERCIAL

## 2019-08-05 ENCOUNTER — HOSPITAL ENCOUNTER (OUTPATIENT)
Dept: BONE DENSITY | Facility: HOSPITAL | Age: 64
Discharge: HOME | End: 2019-08-05
Attending: INTERNAL MEDICINE | Admitting: INTERNAL MEDICINE
Payer: COMMERCIAL

## 2019-08-05 DIAGNOSIS — Z78.0 POST-MENOPAUSE: ICD-10-CM

## 2019-08-05 DIAGNOSIS — S72.009A RECENT FRACTURE OF HIP (H): ICD-10-CM

## 2019-08-05 DIAGNOSIS — R13.14 PHARYNGOESOPHAGEAL DYSPHAGIA: ICD-10-CM

## 2019-08-05 LAB
ANA SER QL IF: NEGATIVE
RHEUMATOID FACT SER NEPH-ACNC: <20 IU/ML (ref 0–20)

## 2019-08-05 PROCEDURE — 25500045 ZZH RX 255: Performed by: INTERNAL MEDICINE

## 2019-08-05 PROCEDURE — 77080 DXA BONE DENSITY AXIAL: CPT | Mod: TC

## 2019-08-05 PROCEDURE — 74220 X-RAY XM ESOPHAGUS 1CNTRST: CPT | Mod: TC

## 2019-08-05 RX ADMIN — ANTACID/ANTIFLATULENT 4 G: 380; 550; 10; 10 GRANULE, EFFERVESCENT ORAL at 14:23

## 2019-08-06 LAB — IL6 SERPL-MCNC: 4.63 PG/ML

## 2019-08-13 DIAGNOSIS — G25.81 RESTLESS LEGS SYNDROME: ICD-10-CM

## 2019-08-14 RX ORDER — CHOLECALCIFEROL (VITAMIN D3) 50 MCG
TABLET ORAL
Qty: 60 TABLET | Refills: 3 | Status: SHIPPED | OUTPATIENT
Start: 2019-08-14 | End: 2019-12-04

## 2019-08-14 NOTE — TELEPHONE ENCOUNTER
Vitamin D3  Last Written Prescription Date: 3/14/19  Last Fill Quantity: 60 # of Refills: 5  Last Office Visit: 8/1/19

## 2019-09-03 DIAGNOSIS — M81.0 OSTEOPOROSIS: ICD-10-CM

## 2019-09-03 RX ORDER — HEPARIN SODIUM (PORCINE) LOCK FLUSH IV SOLN 100 UNIT/ML 100 UNIT/ML
5 SOLUTION INTRAVENOUS
Status: CANCELLED | OUTPATIENT
Start: 2019-09-03

## 2019-09-03 RX ORDER — CALCIUM CARBONATE 750 MG/1
750 TABLET, CHEWABLE ORAL
Qty: 180 TABLET | Refills: 3 | Status: SHIPPED | OUTPATIENT
Start: 2019-09-03

## 2019-09-03 RX ORDER — HEPARIN SODIUM,PORCINE 10 UNIT/ML
5 VIAL (ML) INTRAVENOUS
Status: CANCELLED | OUTPATIENT
Start: 2019-09-03

## 2019-09-03 RX ORDER — ZOLEDRONIC ACID 5 MG/100ML
5 INJECTION, SOLUTION INTRAVENOUS ONCE
Status: CANCELLED
Start: 2019-09-03

## 2019-09-10 ENCOUNTER — TRANSFERRED RECORDS (OUTPATIENT)
Dept: HEALTH INFORMATION MANAGEMENT | Facility: CLINIC | Age: 64
End: 2019-09-10

## 2019-09-13 ENCOUNTER — TELEPHONE (OUTPATIENT)
Dept: INFUSION THERAPY | Facility: OTHER | Age: 64
End: 2019-09-13

## 2019-09-13 ENCOUNTER — INFUSION THERAPY VISIT (OUTPATIENT)
Dept: INFUSION THERAPY | Facility: OTHER | Age: 64
End: 2019-09-13
Attending: INTERNAL MEDICINE
Payer: COMMERCIAL

## 2019-09-13 VITALS
BODY MASS INDEX: 24.93 KG/M2 | DIASTOLIC BLOOD PRESSURE: 90 MMHG | HEIGHT: 62 IN | HEART RATE: 86 BPM | WEIGHT: 135.5 LBS | TEMPERATURE: 97.2 F | SYSTOLIC BLOOD PRESSURE: 134 MMHG | OXYGEN SATURATION: 95 %

## 2019-09-13 DIAGNOSIS — M81.0 OSTEOPOROSIS: Primary | ICD-10-CM

## 2019-09-13 PROCEDURE — 96365 THER/PROPH/DIAG IV INF INIT: CPT

## 2019-09-13 PROCEDURE — 25000128 H RX IP 250 OP 636: Performed by: INTERNAL MEDICINE

## 2019-09-13 RX ORDER — HEPARIN SODIUM,PORCINE 10 UNIT/ML
5 VIAL (ML) INTRAVENOUS
Status: CANCELLED | OUTPATIENT
Start: 2019-09-13

## 2019-09-13 RX ORDER — ZOLEDRONIC ACID 5 MG/100ML
5 INJECTION, SOLUTION INTRAVENOUS ONCE
Status: COMPLETED | OUTPATIENT
Start: 2019-09-13 | End: 2019-09-13

## 2019-09-13 RX ORDER — ZOLEDRONIC ACID 5 MG/100ML
5 INJECTION, SOLUTION INTRAVENOUS ONCE
Status: CANCELLED
Start: 2019-09-13

## 2019-09-13 RX ORDER — HEPARIN SODIUM (PORCINE) LOCK FLUSH IV SOLN 100 UNIT/ML 100 UNIT/ML
5 SOLUTION INTRAVENOUS
Status: CANCELLED | OUTPATIENT
Start: 2019-09-13

## 2019-09-13 RX ADMIN — ZOLEDRONIC ACID 5 MG: 5 INJECTION, SOLUTION INTRAVENOUS at 11:24

## 2019-09-13 ASSESSMENT — MIFFLIN-ST. JEOR: SCORE: 1113

## 2019-09-13 NOTE — TELEPHONE ENCOUNTER
During reclast infusion today, iv infiltrated, area of infiltration soft, pink 5 cm diameter.  Pressure dressing applied pt educated on s/sx and when to call or be seen.

## 2019-09-13 NOTE — PROGRESS NOTES
Patient is a 63 year old female here today for infusion of reclast per order of Dr Camarillo.  Patient meets parameters for today's infusion. Patient identified with two identifiers, order verified, and verbal consent for today's infusion obtained from patient.    Recent lab values 8/1/19:  Calcium: 9.8  Serum creatinine: 0.95    Patient meets order parameters for today's treatment.    Patient denies questions or concerns regarding infusion and/or medication(s) being administered.    1140 IV pump verified with reclast 5mg dose, drug, and rate of administration.  Infusion administered per protocol.  Patient tolerated infusion well, no signs or symptoms of adverse reaction noted.  IV infiltrated, area soft, pink, 5cm diameter area noted. Pressure dressing applied. Pt instructed on s/sx of when to call or to be seen. Pt verbalizes understanding.     Telephone encounter sent to Dr Rabia khourying IV infiltration of reclast.     IV removed, catheter intact.  Site clean, dry and intact. Covered with a sterile bandage, pressure dressing applied. slight pressure applied for 30 seconds.  Pt instructed to leave bandage intact for a minimum of one hour, and to call with questions or concerns.  Copy of appointments, discharge instructions, and after visit summary (AVS) provided to patient.  Patient states understanding, discharged ambulatory.

## 2019-09-13 NOTE — PROGRESS NOTES
24 gauge angio cath inserted into Rt arm.  Immediate blood return noted.  IV secured with sterile, transparent dressing and tape.  Patient tolerated well, denies pain or discomfort at this time.  Flushes easily without resistance, no signs or symptoms of infiltration or infection.   Patient denies questions or concerns regarding infusion and/or medication(s) being administered.

## 2019-09-13 NOTE — PATIENT INSTRUCTIONS
Patient Education     Patient Education    Zoledronic Acid Solution for injection    Zoledronic Acid Solution for injection [Hypercalcemia of Malignancy]    Zoledronic Acid Solution for injection [Pagets Disease]  Zoledronic Acid Solution for injection [Hypercalcemia of Malignancy]  What is this medicine?  ZOLEDRONIC ACID (FAY le dron ik AS id) lowers the amount of calcium loss from bone. It is used to treat too much calcium in your blood from cancer. It is also used to prevent complications of cancer that has spread to the bone.  This medicine may be used for other purposes; ask your health care provider or pharmacist if you have questions.  What should I tell my health care provider before I take this medicine?  They need to know if you have any of these conditions:    aspirin-sensitive asthma    cancer, especially if you are receiving medicines used to treat cancer    dental disease or wear dentures    infection    kidney disease    receiving corticosteroids like dexamethasone or prednisone    an unusual or allergic reaction to zoledronic acid, other medicines, foods, dyes, or preservatives    pregnant or trying to get pregnant    breast-feeding  How should I use this medicine?  This medicine is for infusion into a vein. It is given by a health care professional in a hospital or clinic setting.  Talk to your pediatrician regarding the use of this medicine in children. Special care may be needed.  Overdosage: If you think you have taken too much of this medicine contact a poison control center or emergency room at once.  NOTE: This medicine is only for you. Do not share this medicine with others.  What if I miss a dose?  It is important not to miss your dose. Call your doctor or health care professional if you are unable to keep an appointment.  What may interact with this medicine?    certain antibiotics given by injection    NSAIDs, medicines for pain and inflammation, like ibuprofen or naproxen    some  diuretics like bumetanide, furosemide    teriparatide    thalidomide  This list may not describe all possible interactions. Give your health care provider a list of all the medicines, herbs, non-prescription drugs, or dietary supplements you use. Also tell them if you smoke, drink alcohol, or use illegal drugs. Some items may interact with your medicine.  What should I watch for while using this medicine?  Visit your doctor or health care professional for regular checkups. It may be some time before you see the benefit from this medicine. Do not stop taking your medicine unless your doctor tells you to. Your doctor may order blood tests or other tests to see how you are doing.  Women should inform their doctor if they wish to become pregnant or think they might be pregnant. There is a potential for serious side effects to an unborn child. Talk to your health care professional or pharmacist for more information.  You should make sure that you get enough calcium and vitamin D while you are taking this medicine. Discuss the foods you eat and the vitamins you take with your health care professional.  Some people who take this medicine have severe bone, joint, and/or muscle pain. This medicine may also increase your risk for jaw problems or a broken thigh bone. Tell your doctor right away if you have severe pain in your jaw, bones, joints, or muscles. Tell your doctor if you have any pain that does not go away or that gets worse.  Tell your dentist and dental surgeon that you are taking this medicine. You should not have major dental surgery while on this medicine. See your dentist to have a dental exam and fix any dental problems before starting this medicine. Take good care of your teeth while on this medicine. Make sure you see your dentist for regular follow-up appointments.  What side effects may I notice from receiving this medicine?  Side effects that you should report to your doctor or health care professional as  soon as possible:    allergic reactions like skin rash, itching or hives, swelling of the face, lips, or tongue    anxiety, confusion, or depression    breathing problems    changes in vision    eye pain    feeling faint or lightheaded, falls    jaw pain, especially after dental work    mouth sores    muscle cramps, stiffness, or weakness    redness, blistering, peeling or loosening of the skin, including inside the mouth    trouble passing urine or change in the amount of urine  Side effects that usually do not require medical attention (report to your doctor or health care professional if they continue or are bothersome):    bone, joint, or muscle pain    constipation    diarrhea    fever    hair loss    irritation at site where injected    loss of appetite    nausea, vomiting    stomach upset    trouble sleeping    trouble swallowing    weak or tired  This list may not describe all possible side effects. Call your doctor for medical advice about side effects. You may report side effects to FDA at 0-150-FDA-5092.  Where should I keep my medicine?  This drug is given in a hospital or clinic and will not be stored at home.  NOTE:This sheet is a summary. It may not cover all possible information. If you have questions about this medicine, talk to your doctor, pharmacist, or health care provider. Copyright  2016 Gold Standard

## 2019-09-23 DIAGNOSIS — M35.3 PMR (POLYMYALGIA RHEUMATICA) (H): ICD-10-CM

## 2019-09-25 RX ORDER — GABAPENTIN 400 MG/1
CAPSULE ORAL
Qty: 270 CAPSULE | Refills: 4 | Status: SHIPPED | OUTPATIENT
Start: 2019-09-25 | End: 2020-04-06

## 2019-09-25 NOTE — TELEPHONE ENCOUNTER
gabapentin (NEURONTIN) 400 MG capsule      Last Written Prescription Date:  2-26-19  Last Fill Quantity: 270,   # refills: 4  Last Office Visit: 8-1-19  Future Office visit:    Next 5 appointments (look out 90 days)    Sep 30, 2019  1:20 PM CDT  (Arrive by 1:00 PM)  SHORT with Arie Camarillo DO  [facility] [place] ([facility] [place] ) [address]  [place] [address]  [phone]           Routing refill request to provider for review/approval because:  Drug not on the [facility], [facility] or [facility] refill protocol or controlled substance

## 2019-09-25 NOTE — PROGRESS NOTES
Subjective     Chyna Delgado is a 63 year old female who presents to clinic today for the following health issues:    HPI   Chronic Pain Follow-Up       Type / Location of Pain: wide spread   Analgesia/pain control:       Recent changes:  same      Overall control: Tolerable with discomfort  Activity level/function:      Daily activities:  Can do most things most days, with some rest    Work:  not applicable  Adverse effects:  No  Adherance    Taking medication as directed?  Yes    Participating in other treatments: yes  Risk Factors:    Sleep:  Fair    Mood/anxiety:  controlled    Recent family or social stressors:  Daughter bought a house and now is breaking up with boyfriend     Other aggravating factors: not taking medication   PHQ-9 SCORE 10/12/2018 12/31/2018 8/1/2019   PHQ-9 Total Score - - -   PHQ-9 Total Score 3 0 4     MARGARITA-7 SCORE 10/12/2018 12/31/2018 8/1/2019   Total Score 0 0 2     Encounter-Level CSA:    There are no encounter-level csa.     Patient-Level CSA:    There are no patient-level csa.           How many servings of fruits and vegetables do you eat daily?  0-1    On average, how many sweetened beverages do you drink each day (soda, juice, sweet tea, etc)?   0  How many days per week do you miss taking your medication? 2    What makes it hard for you to take your medications?  remembering to take        -------------------------------------    Patient Active Problem List   Diagnosis     Encounter to establish care     Mild persistent asthma     Sacroiliac pain     Annual physical exam     Dysthymic disorder     Encounter for routine gynecological examination     Seborrheic keratosis     Somatic dysfunction of pelvic region     Restless legs syndrome     Skin lesion     Hand swelling     Numbness and tingling in right hand     Graves disease     Preoperative examination     Simple chronic bronchitis (H)     Arthritis     ACP (advance care planning)     Pain of toe of right foot     Moderate  major depression (H)     Right shoulder pain     Abdominal muscle strain     Strain of flexor muscle of hip, unspecified laterality, initial encounter     Myalgia     Iron deficiency     Alcohol use     Functional diarrhea     Closed fracture of neck of right femur (H)     Closed right hip fracture (H)     Hip fracture requiring operative repair (H)     Osteoporosis     Androgenetic alopecia     Benign neoplasm of stomach     Colon polyposis     Family history of colonic polyps     Family history of skin cancer     Hypothyroidism     Seborrheic dermatitis, unspecified     Telogen effluvium     Past Surgical History:   Procedure Laterality Date     benign tumors removed from back       CLOSED REDUCTION, PERCUTANEOUS PINNING HIP Right 2/24/2019    Procedure: Percutaneous Screw Fixation of Right Hip Fracture;  Surgeon: Amrik Kolb DO;  Location: [facility]     COLON SURGERY N/A     HX: Total colectomy with J-pouch reconstruction.      ENDOSCOPY UPPER, COLONOSCOPY, COMBINED N/A 9/5/2014    Procedure: COMBINED ENDOSCOPY UPPER, COLONOSCOPY;  Surgeon: Delbert Patel MD;  Location: [facility]     ENDOSCOPY UPPER, COLONOSCOPY, COMBINED N/A 5/9/2019    Procedure: UPPER ENDOSCOPY  WITH BIOPSIES AND  COLONOSCOPY WITH BIOPSIES;  Surgeon: Tai Diaz MD;  Location: [facility]     ESOPHAGOSCOPY, GASTROSCOPY, DUODENOSCOPY (EGD), COMBINED N/A 12/11/2015    Procedure: COMBINED ESOPHAGOSCOPY, GASTROSCOPY, DUODENOSCOPY (EGD);  Surgeon: Delbert Patel MD;  [facility]     ESOPHAGOSCOPY, GASTROSCOPY, DUODENOSCOPY (EGD), COMBINED N/A 12/18/2017    Procedure: COMBINED ESOPHAGOSCOPY, GASTROSCOPY, DUODENOSCOPY (EGD);  UPPER ENDOSCOPY WITH BIOPSY;  Surgeon: Delbert Patel MD;  Location: [facility]     excised-benign  2002    tongue lesion     HC REPAIR OF NASAL SEPTUM  2002    Deviated nasal septum     LAPAROSCOPIC CHOLECYSTECTOMY       lumpectomy Right breast      Breast Lump     MOUTH SURGERY      removal of spot from roof of mouth      pilonidal cyst surgery  1976     removal of colon and large intestine  2000    Familial polyposis     Right etopic pregnancy      Pregnancy     TONSILLECTOMY      tonsillitus     UPPER GI ENDOSCOPY  2009    Stomach polyps       Social History     Tobacco Use     Smoking status: Current Every Day Smoker     Packs/day: 0.50     Years: 40.00     Pack years: 20.00     Types: Cigarettes     Smokeless tobacco: Never Used   Substance Use Topics     Alcohol use: Yes     Comment: Weekly 3 drinks     Family History   Problem Relation Age of Onset     Other - See Comments Father         Atrial Fibrillation     Cancer Father         bladder ca     Colon Polyps Father      Heart Disease Father         Pacemaker     Rheumatoid Arthritis Father      C.A.D. Father 75        CABG     Chronic Obstructive Pulmonary Disease Mother      Heart Disease Mother         Pacemaker     Other - See Comments Mother         dementia     C.A.D. Mother 70        CABG     C.A.D. Maternal Grandmother      Unknown/Adopted Maternal Grandfather      Unknown/Adopted Paternal Grandmother      Unknown/Adopted Paternal Grandfather      Asthma Sister      Cerebrovascular Disease Sister      Gastrointestinal Disease Sister         peptic ulcers     Hypertension Sister      Gastrointestinal Disease Sister         stomach tumors     Rheumatoid Arthritis Sister      Cancer Sister         facial cancer     Asthma Sister      Cancer Maternal Aunt         renal ca           -------------------------------------  Reviewed and updated as needed this visit by Provider         Review of Systems   ROS COMP: CONSTITUTIONAL: NEGATIVE for fever, chills, change in weight  INTEGUMENTARY/SKIN: NEGATIVE for worrisome rashes, moles or lesions  EYES: NEGATIVE for vision changes or irritation  ENT/MOUTH: NEGATIVE for ear, mouth and throat problems  RESP: NEGATIVE for significant cough or SOB  CV: NEGATIVE for chest pain, palpitations or peripheral edema  GI: NEGATIVE for nausea,  abdominal pain, heartburn, or change in bowel habits  : NEGATIVE for frequency, dysuria, or hematuria  MUSCULOSKELETAL:See HPI.  She rates her overall pain at 1-2/10  NEURO: NEGATIVE for weakness, dizziness or paresthesias  ENDOCRINE: NEGATIVE for temperature intolerance, skin/hair changes  PSYCHIATRIC: NEGATIVE for changes in mood or affect      Objective    /60 (BP Location: Right arm, Patient Position: Chair, Cuff Size: Adult Regular)   Pulse 97   Temp 98.5  F (36.9  C) (Tympanic)   Wt 60.3 kg (133 lb)   SpO2 95%   BMI 24.32 kg/m    Body mass index is 24.32 kg/m .  Physical Exam   GENERAL: healthy, alert and no distress  EYES: Eyes grossly normal to inspection and conjunctivae and sclerae normal  NECK: no adenopathy, no asymmetry, masses, or scars and thyroid normal to palpation  RESP: lungs clear to auscultation - no rales, rhonchi or wheezes  CV: regular rate and rhythm, normal S1 S2, no S3 or S4, no murmur, click or rub, no peripheral edema and peripheral pulses strong  ABDOMEN: soft, nontender, no hepatosplenomegaly, no masses and bowel sounds normal  ABDOMEN: no bruits heard  MS: no gross musculoskeletal defects noted, no edema  SKIN: Noted [place] hump  NEURO: Normal strength and tone, mentation intact and speech normal    Diagnostic Test Results:  Labs reviewed in Epic  Results for orders placed or performed in visit on 09/30/19   CBC with platelets   Result Value Ref Range    WBC 11.2 (H) 4.0 - 11.0 10e9/L    RBC Count 4.46 3.8 - 5.2 10e12/L    Hemoglobin 13.9 11.7 - 15.7 g/dL    Hematocrit 41.3 35.0 - 47.0 %    MCV 93 78 - 100 fl    MCH 31.2 26.5 - 33.0 pg    MCHC 33.7 31.5 - 36.5 g/dL    RDW 12.6 10.0 - 15.0 %    Platelet Count 251 150 - 450 10e9/L   Comprehensive metabolic panel   Result Value Ref Range    Sodium 139 133 - 144 mmol/L    Potassium 4.4 3.4 - 5.3 mmol/L    Chloride 108 94 - 109 mmol/L    Carbon Dioxide 25 20 - 32 mmol/L    Anion Gap 6 3 - 14 mmol/L    Glucose 110 (H) 70 - 99  mg/dL    Urea Nitrogen 17 7 - 30 mg/dL    Creatinine 0.91 0.52 - 1.04 mg/dL    GFR Estimate 66 >60 mL/min/[1.73_m2]    GFR Estimate If Black 76 >60 mL/min/[1.73_m2]    Calcium 8.9 8.5 - 10.1 mg/dL    Bilirubin Total PENDING 0.2 - 1.3 mg/dL    Albumin PENDING 3.4 - 5.0 g/dL    Protein Total PENDING 6.8 - 8.8 g/dL    Alkaline Phosphatase PENDING 40 - 150 U/L    ALT PENDING 0 - 50 U/L    AST PENDING 0 - 45 U/L           Recent Labs   Lab Test 09/30/19  1347 08/01/19  1443    137   POTASSIUM 4.4 4.5   CHLORIDE 108 103   CO2 25 27   ANIONGAP 6 7   * 103*   BUN 17 22   CR 0.91 0.95   СВЕТЛАНА 8.9 9.8         Assessment & Plan     (M35.3) PMR (polymyalgia rheumatica) (H)  (primary encounter diagnosis)  Comment: She is stable with her pain.  She does have some risk factors for being on prednisone with osteoporosis being the main issue.  Plan:   She will continue Prednisone 10 mg daily and consideration for starting methotrexate.    (E87.6) Hypokalemia  Comment: Stable   Plan:   She will continue Aldactone 50 mg BID and KCL 20 meq BID       Tobacco Cessation:   reports that she has been smoking cigarettes. She has a 20.00 pack-year smoking history. She has never used smokeless tobacco.  Tobacco Cessation Action Plan: Information offered: Patient not interested at this time        FUTURE APPOINTMENTS:       - Follow-up visit in 2 months for chronic pain and PMR    No follow-ups on file.    Arie Camarillo DO, DO  [facility] [place]

## 2019-09-30 ENCOUNTER — OFFICE VISIT (OUTPATIENT)
Dept: INTERNAL MEDICINE | Facility: OTHER | Age: 64
End: 2019-09-30
Attending: INTERNAL MEDICINE
Payer: COMMERCIAL

## 2019-09-30 VITALS
HEART RATE: 97 BPM | OXYGEN SATURATION: 95 % | SYSTOLIC BLOOD PRESSURE: 100 MMHG | BODY MASS INDEX: 24.32 KG/M2 | DIASTOLIC BLOOD PRESSURE: 60 MMHG | WEIGHT: 133 LBS | TEMPERATURE: 98.5 F

## 2019-09-30 DIAGNOSIS — M35.3 PMR (POLYMYALGIA RHEUMATICA) (H): Primary | ICD-10-CM

## 2019-09-30 DIAGNOSIS — E87.6 HYPOKALEMIA: ICD-10-CM

## 2019-09-30 LAB
ALBUMIN SERPL-MCNC: 3.8 G/DL (ref 3.4–5)
ALP SERPL-CCNC: 69 U/L (ref 40–150)
ALT SERPL W P-5'-P-CCNC: 27 U/L (ref 0–50)
ANION GAP SERPL CALCULATED.3IONS-SCNC: 6 MMOL/L (ref 3–14)
AST SERPL W P-5'-P-CCNC: 12 U/L (ref 0–45)
BILIRUB SERPL-MCNC: 0.3 MG/DL (ref 0.2–1.3)
BUN SERPL-MCNC: 17 MG/DL (ref 7–30)
CALCIUM SERPL-MCNC: 8.9 MG/DL (ref 8.5–10.1)
CHLORIDE SERPL-SCNC: 108 MMOL/L (ref 94–109)
CO2 SERPL-SCNC: 25 MMOL/L (ref 20–32)
CREAT SERPL-MCNC: 0.91 MG/DL (ref 0.52–1.04)
ERYTHROCYTE [DISTWIDTH] IN BLOOD BY AUTOMATED COUNT: 12.6 % (ref 10–15)
GFR SERPL CREATININE-BSD FRML MDRD: 66 ML/MIN/{1.73_M2}
GLUCOSE SERPL-MCNC: 110 MG/DL (ref 70–99)
HCT VFR BLD AUTO: 41.3 % (ref 35–47)
HGB BLD-MCNC: 13.9 G/DL (ref 11.7–15.7)
MCH RBC QN AUTO: 31.2 PG (ref 26.5–33)
MCHC RBC AUTO-ENTMCNC: 33.7 G/DL (ref 31.5–36.5)
MCV RBC AUTO: 93 FL (ref 78–100)
PLATELET # BLD AUTO: 251 10E9/L (ref 150–450)
POTASSIUM SERPL-SCNC: 4.4 MMOL/L (ref 3.4–5.3)
PROT SERPL-MCNC: 7 G/DL (ref 6.8–8.8)
RBC # BLD AUTO: 4.46 10E12/L (ref 3.8–5.2)
SODIUM SERPL-SCNC: 139 MMOL/L (ref 133–144)
WBC # BLD AUTO: 11.2 10E9/L (ref 4–11)

## 2019-09-30 PROCEDURE — G0463 HOSPITAL OUTPT CLINIC VISIT: HCPCS

## 2019-09-30 PROCEDURE — 99213 OFFICE O/P EST LOW 20 MIN: CPT | Performed by: INTERNAL MEDICINE

## 2019-09-30 PROCEDURE — 80053 COMPREHEN METABOLIC PANEL: CPT | Mod: ZL | Performed by: INTERNAL MEDICINE

## 2019-09-30 PROCEDURE — 36415 COLL VENOUS BLD VENIPUNCTURE: CPT | Mod: ZL | Performed by: INTERNAL MEDICINE

## 2019-09-30 PROCEDURE — 85027 COMPLETE CBC AUTOMATED: CPT | Mod: ZL | Performed by: INTERNAL MEDICINE

## 2019-09-30 ASSESSMENT — PAIN SCALES - GENERAL: PAINLEVEL: MILD PAIN (2)

## 2019-10-15 ENCOUNTER — IMMUNIZATION (OUTPATIENT)
Dept: FAMILY MEDICINE | Facility: OTHER | Age: 64
End: 2019-10-15
Attending: INTERNAL MEDICINE
Payer: COMMERCIAL

## 2019-10-15 DIAGNOSIS — Z23 NEED FOR PROPHYLACTIC VACCINATION AND INOCULATION AGAINST INFLUENZA: Primary | ICD-10-CM

## 2019-10-15 PROCEDURE — 90662 IIV NO PRSV INCREASED AG IM: CPT

## 2019-10-15 PROCEDURE — G0008 ADMIN INFLUENZA VIRUS VAC: HCPCS

## 2019-11-18 DIAGNOSIS — G25.81 RESTLESS LEGS SYNDROME: ICD-10-CM

## 2019-11-20 RX ORDER — POTASSIUM CHLORIDE 1500 MG/1
TABLET, EXTENDED RELEASE ORAL
Qty: 90 TABLET | Refills: 0 | Status: SHIPPED | OUTPATIENT
Start: 2019-11-20 | End: 2020-04-06

## 2019-12-13 NOTE — PROGRESS NOTES
Subjective     Chyna Delgado is a 63 year old female who presents to clinic today for the following health issues:    HPI   Chronic Pain Follow-Up       Type / Location of Pain: Her current  Pain is in the groin area for the past two months.  She has pain mainly on the right side.  She had a broken right femur in February 2019 with surgery on 02/24/2019  Analgesia/pain control:       Recent changes:  improved      Overall control: Comfortably manageable  Activity level/function:      Daily activities:  Can do most things most days, with some rest    Work:  not applicable  Adverse effects:  No  Adherance    Taking medication as directed?  Yes    Participating in other treatments: not applicable  Risk Factors:    Sleep:  Fair    Mood/anxiety:  controlled    Recent family or social stressors:  Family dog passed away    Other aggravating factors: unknown to her    Walking aggravates her groin pain  She has pulling sensation.  She denies grinding or clicking  PHQ-9 SCORE 10/12/2018 12/31/2018 8/1/2019   PHQ-9 Total Score - - -   PHQ-9 Total Score 3 0 4     MARGARITA-7 SCORE 10/12/2018 12/31/2018 8/1/2019   Total Score 0 0 2     Encounter-Level CSA:    There are no encounter-level csa.     Patient-Level CSA:    There are no patient-level csa.         How many servings of fruits and vegetables do you eat daily?  0-1    On average, how many sweetened beverages do you drink each day (Examples: soda, juice, sweet tea, etc.  Do NOT count diet or artificially sweetened beverages)?   0    How many days per week do you miss taking your medication? 0        Osteoporosis:  She had RECLAST done this pats fall and reports nausea and diarrhea 2 days post infusion.  -------------------------------------    Patient Active Problem List   Diagnosis     Encounter to establish care     Mild persistent asthma     Sacroiliac pain     Annual physical exam     Dysthymic disorder     Encounter for routine gynecological examination     Seborrheic  keratosis     Somatic dysfunction of pelvic region     Restless legs syndrome     Skin lesion     Hand swelling     Numbness and tingling in right hand     Graves disease     Preoperative examination     Simple chronic bronchitis (H)     Arthritis     ACP (advance care planning)     Pain of toe of right foot     Moderate major depression (H)     Right shoulder pain     Abdominal muscle strain     Strain of flexor muscle of hip, unspecified laterality, initial encounter     Myalgia     Iron deficiency     Alcohol use     Functional diarrhea     Closed fracture of neck of right femur (H)     Closed right hip fracture (H)     Hip fracture requiring operative repair (H)     Osteoporosis     Androgenetic alopecia     Benign neoplasm of stomach     Colon polyposis     Family history of colonic polyps     Family history of skin cancer     Hypothyroidism     Seborrheic dermatitis, unspecified     Telogen effluvium     Past Surgical History:   Procedure Laterality Date     benign tumors removed from back       CLOSED REDUCTION, PERCUTANEOUS PINNING HIP Right 2/24/2019    Procedure: Percutaneous Screw Fixation of Right Hip Fracture;  Surgeon: Amrik Kolb DO;  [facility]: [facility]     COLON SURGERY N/A     HX: Total colectomy with J-pouch reconstruction.      ENDOSCOPY UPPER, COLONOSCOPY, COMBINED N/A 9/5/2014    Procedure: COMBINED ENDOSCOPY UPPER, COLONOSCOPY;  Surgeon: Delbert Patel MD;  [facility]     ENDOSCOPY UPPER, COLONOSCOPY, COMBINED N/A 5/9/2019    Procedure: UPPER ENDOSCOPY  WITH BIOPSIES AND  COLONOSCOPY WITH BIOPSIES;  Surgeon: Tai Diaz MD;  [facility]     ESOPHAGOSCOPY, GASTROSCOPY, DUODENOSCOPY (EGD), COMBINED N/A 12/11/2015    Procedure: COMBINED ESOPHAGOSCOPY, GASTROSCOPY, DUODENOSCOPY (EGD);  Surgeon: Delbert Patel MD;  [facility]     ESOPHAGOSCOPY, GASTROSCOPY, DUODENOSCOPY (EGD), COMBINED N/A 12/18/2017    Procedure: COMBINED ESOPHAGOSCOPY, GASTROSCOPY, DUODENOSCOPY (EGD);   UPPER ENDOSCOPY WITH BIOPSY;  Surgeon: Delbert Patel MD;  [facility]: [facility]     excised-benign  2002    tongue lesion     HC REPAIR OF NASAL SEPTUM  2002    Deviated nasal septum     LAPAROSCOPIC CHOLECYSTECTOMY       lumpectomy Right breast      Breast Lump     MOUTH SURGERY      removal of spot from roof of mouth     pilonidal cyst surgery  1976     removal of colon and large intestine  2000    Familial polyposis     Right etopic pregnancy      Pregnancy     TONSILLECTOMY      tonsillitus     UPPER GI ENDOSCOPY  2009    Stomach polyps       Social History     Tobacco Use     Smoking status: Current Every Day Smoker     Packs/day: 0.50     Years: 40.00     Pack years: 20.00     Types: Cigarettes     Smokeless tobacco: Never Used   Substance Use Topics     Alcohol use: Yes     Comment: Weekly 3 drinks     Family History   Problem Relation Age of Onset     Other - See Comments Father         Atrial Fibrillation     Cancer Father         bladder ca     Colon Polyps Father      Heart Disease Father         Pacemaker     Rheumatoid Arthritis Father      C.A.D. Father 75        CABG     Chronic Obstructive Pulmonary Disease Mother      Heart Disease Mother         Pacemaker     Other - See Comments Mother         dementia     C.A.D. Mother 70        CABG     C.A.D. Maternal Grandmother      Unknown/Adopted Maternal Grandfather      Unknown/Adopted Paternal Grandmother      Unknown/Adopted Paternal Grandfather      Asthma Sister      Cerebrovascular Disease Sister      Gastrointestinal Disease Sister         peptic ulcers     Hypertension Sister      Gastrointestinal Disease Sister         stomach tumors     Rheumatoid Arthritis Sister      Cancer Sister         facial cancer     Asthma Sister      Cancer Maternal Aunt         renal ca           -------------------------------------    Reviewed and updated as needed this visit by Provider         Review of Systems   ROS COMP: Constitutional, HEENT, cardiovascular,  pulmonary, gi and gu systems are negative, except as otherwise noted.      Objective    /80 (BP Location: Right arm, Patient Position: Right side, Cuff Size: Adult Large)   Pulse 90   Temp 98.7  F (37.1  C) (Tympanic)   Wt 58.8 kg (129 lb 9.6 oz)   SpO2 95%   BMI 23.70 kg/m    Body mass index is 23.7 kg/m .  Physical Exam   GENERAL: healthy, alert and no distress  EYES: conjunctivae and sclerae normal  NECK: no adenopathy, no asymmetry, masses, or scars and thyroid normal to palpation  RESP: lungs clear to auscultation - no rales, rhonchi or wheezes  CV: regular rate and rhythm, normal S1 S2, no S3 or S4, no murmur, click or rub, no peripheral edema and peripheral pulses strong  ABDOMEN: soft, nontender, no hepatosplenomegaly, no masses and bowel sounds normal  ABDOMEN: no bruits heard  MS: no gross musculoskeletal defects noted, no edema  NEURO: Normal strength and tone, mentation intact and speech normal  PSYCH: mentation appears normal, affect normal/bright    Diagnostic Test Results:  Labs reviewed in Epic  Results for orders placed or performed in visit on 12/16/19 (from the past 24 hour(s))   CBC with platelets   Result Value Ref Range    WBC 11.7 (H) 4.0 - 11.0 10e9/L    RBC Count 4.75 3.8 - 5.2 10e12/L    Hemoglobin 14.9 11.7 - 15.7 g/dL    Hematocrit 43.4 35.0 - 47.0 %    MCV 91 78 - 100 fl    MCH 31.4 26.5 - 33.0 pg    MCHC 34.3 31.5 - 36.5 g/dL    RDW 12.7 10.0 - 15.0 %    Platelet Count 243 150 - 450 10e9/L   Comprehensive metabolic panel   Result Value Ref Range    Sodium 136 133 - 144 mmol/L    Potassium 4.5 3.4 - 5.3 mmol/L    Chloride 107 94 - 109 mmol/L    Carbon Dioxide 25 20 - 32 mmol/L    Anion Gap 4 3 - 14 mmol/L    Glucose 109 (H) 70 - 99 mg/dL    Urea Nitrogen 18 7 - 30 mg/dL    Creatinine 0.90 0.52 - 1.04 mg/dL    GFR Estimate 67 >60 mL/min/[1.73_m2]    GFR Estimate If Black 78 >60 mL/min/[1.73_m2]    Calcium 9.3 8.5 - 10.1 mg/dL    Bilirubin Total 0.2 0.2 - 1.3 mg/dL    Albumin  4.1 3.4 - 5.0 g/dL    Protein Total 7.7 6.8 - 8.8 g/dL    Alkaline Phosphatase 66 40 - 150 U/L    ALT 30 0 - 50 U/L    AST 15 0 - 45 U/L   CRP inflammation   Result Value Ref Range    CRP Inflammation 6.8 0.0 - 8.0 mg/L   Erythrocyte sedimentation rate auto   Result Value Ref Range    Sed Rate 6 0 - 30 mm/h                   ASSESSMENT /PLAN:  (M35.3) PMR (polymyalgia rheumatica) (H)  (primary encounter diagnosis)  Comment: Stable with her labs stable  Plan:   Reduce prednisone to 5 mg daily.    (S72.009A) Recent fracture of hip (H)  Comment: She is doing well s/p 9 month ORIF  Plan:   No further therapy is needed.    (E87.6) Hypokalemia  Comment: Stable with her potassium in the Mid 4 range and aldactone 50 mg BID.  Plan:   She will continue Potassium 20 mg CR TID             Follow up with Provider - 6 weeks       Arie Camarillo DO,

## 2019-12-16 ENCOUNTER — OFFICE VISIT (OUTPATIENT)
Dept: INTERNAL MEDICINE | Facility: OTHER | Age: 64
End: 2019-12-16
Attending: INTERNAL MEDICINE
Payer: COMMERCIAL

## 2019-12-16 VITALS
WEIGHT: 129.6 LBS | TEMPERATURE: 98.7 F | DIASTOLIC BLOOD PRESSURE: 80 MMHG | BODY MASS INDEX: 23.7 KG/M2 | SYSTOLIC BLOOD PRESSURE: 108 MMHG | HEART RATE: 90 BPM | OXYGEN SATURATION: 95 %

## 2019-12-16 DIAGNOSIS — E87.6 HYPOKALEMIA: ICD-10-CM

## 2019-12-16 DIAGNOSIS — S72.009A RECENT FRACTURE OF HIP (H): ICD-10-CM

## 2019-12-16 DIAGNOSIS — M35.3 PMR (POLYMYALGIA RHEUMATICA) (H): Primary | ICD-10-CM

## 2019-12-16 DIAGNOSIS — H60.392 INFECTIVE OTITIS EXTERNA, LEFT: ICD-10-CM

## 2019-12-16 DIAGNOSIS — J45.40 MODERATE PERSISTENT ASTHMA WITHOUT COMPLICATION: ICD-10-CM

## 2019-12-16 LAB
ALBUMIN SERPL-MCNC: 4.1 G/DL (ref 3.4–5)
ALP SERPL-CCNC: 66 U/L (ref 40–150)
ALT SERPL W P-5'-P-CCNC: 30 U/L (ref 0–50)
ANION GAP SERPL CALCULATED.3IONS-SCNC: 4 MMOL/L (ref 3–14)
AST SERPL W P-5'-P-CCNC: 15 U/L (ref 0–45)
BILIRUB SERPL-MCNC: 0.2 MG/DL (ref 0.2–1.3)
BUN SERPL-MCNC: 18 MG/DL (ref 7–30)
CALCIUM SERPL-MCNC: 9.3 MG/DL (ref 8.5–10.1)
CHLORIDE SERPL-SCNC: 107 MMOL/L (ref 94–109)
CO2 SERPL-SCNC: 25 MMOL/L (ref 20–32)
CREAT SERPL-MCNC: 0.9 MG/DL (ref 0.52–1.04)
CRP SERPL-MCNC: 6.8 MG/L (ref 0–8)
ERYTHROCYTE [DISTWIDTH] IN BLOOD BY AUTOMATED COUNT: 12.7 % (ref 10–15)
ERYTHROCYTE [SEDIMENTATION RATE] IN BLOOD BY WESTERGREN METHOD: 6 MM/H (ref 0–30)
GFR SERPL CREATININE-BSD FRML MDRD: 67 ML/MIN/{1.73_M2}
GLUCOSE SERPL-MCNC: 109 MG/DL (ref 70–99)
HCT VFR BLD AUTO: 43.4 % (ref 35–47)
HGB BLD-MCNC: 14.9 G/DL (ref 11.7–15.7)
MCH RBC QN AUTO: 31.4 PG (ref 26.5–33)
MCHC RBC AUTO-ENTMCNC: 34.3 G/DL (ref 31.5–36.5)
MCV RBC AUTO: 91 FL (ref 78–100)
PLATELET # BLD AUTO: 243 10E9/L (ref 150–450)
POTASSIUM SERPL-SCNC: 4.5 MMOL/L (ref 3.4–5.3)
PROT SERPL-MCNC: 7.7 G/DL (ref 6.8–8.8)
RBC # BLD AUTO: 4.75 10E12/L (ref 3.8–5.2)
SODIUM SERPL-SCNC: 136 MMOL/L (ref 133–144)
WBC # BLD AUTO: 11.7 10E9/L (ref 4–11)

## 2019-12-16 PROCEDURE — 85652 RBC SED RATE AUTOMATED: CPT | Mod: ZL | Performed by: INTERNAL MEDICINE

## 2019-12-16 PROCEDURE — 85027 COMPLETE CBC AUTOMATED: CPT | Mod: ZL | Performed by: INTERNAL MEDICINE

## 2019-12-16 PROCEDURE — G0463 HOSPITAL OUTPT CLINIC VISIT: HCPCS

## 2019-12-16 PROCEDURE — 36415 COLL VENOUS BLD VENIPUNCTURE: CPT | Mod: ZL | Performed by: INTERNAL MEDICINE

## 2019-12-16 PROCEDURE — 99213 OFFICE O/P EST LOW 20 MIN: CPT | Performed by: INTERNAL MEDICINE

## 2019-12-16 PROCEDURE — 80053 COMPREHEN METABOLIC PANEL: CPT | Mod: ZL | Performed by: INTERNAL MEDICINE

## 2019-12-16 PROCEDURE — 86140 C-REACTIVE PROTEIN: CPT | Mod: ZL | Performed by: INTERNAL MEDICINE

## 2019-12-16 RX ORDER — CIPROFLOXACIN 0.5 MG/.25ML
0.25 SOLUTION/ DROPS AURICULAR (OTIC) 2 TIMES DAILY
Qty: 10 ML | Refills: 4 | Status: SHIPPED | OUTPATIENT
Start: 2019-12-16 | End: 2020-03-06

## 2019-12-16 ASSESSMENT — ANXIETY QUESTIONNAIRES
1. FEELING NERVOUS, ANXIOUS, OR ON EDGE: NOT AT ALL
5. BEING SO RESTLESS THAT IT IS HARD TO SIT STILL: NOT AT ALL
2. NOT BEING ABLE TO STOP OR CONTROL WORRYING: SEVERAL DAYS
4. TROUBLE RELAXING: NOT AT ALL
IF YOU CHECKED OFF ANY PROBLEMS ON THIS QUESTIONNAIRE, HOW DIFFICULT HAVE THESE PROBLEMS MADE IT FOR YOU TO DO YOUR WORK, TAKE CARE OF THINGS AT HOME, OR GET ALONG WITH OTHER PEOPLE: NOT DIFFICULT AT ALL
6. BECOMING EASILY ANNOYED OR IRRITABLE: NOT AT ALL
3. WORRYING TOO MUCH ABOUT DIFFERENT THINGS: SEVERAL DAYS
GAD7 TOTAL SCORE: 2
7. FEELING AFRAID AS IF SOMETHING AWFUL MIGHT HAPPEN: NOT AT ALL

## 2019-12-16 ASSESSMENT — PAIN SCALES - GENERAL: PAINLEVEL: NO PAIN (0)

## 2019-12-16 ASSESSMENT — PATIENT HEALTH QUESTIONNAIRE - PHQ9: SUM OF ALL RESPONSES TO PHQ QUESTIONS 1-9: 6

## 2019-12-17 ASSESSMENT — ANXIETY QUESTIONNAIRES: GAD7 TOTAL SCORE: 2

## 2019-12-19 NOTE — TELEPHONE ENCOUNTER
[facility]  Last Written Prescription Date: 2/7/19  Last Fill Quantity: 3 # of Refills: 3  Last Office Visit: 12/16/19

## 2020-01-02 ENCOUNTER — TELEPHONE (OUTPATIENT)
Dept: PEDIATRICS | Facility: OTHER | Age: 65
End: 2020-01-02

## 2020-01-02 NOTE — TELEPHONE ENCOUNTER
Patient calls today stating she has been sick for a week, wants to be treated over the phone for the following suspected URI   Cough and cold for a week- states she can not come to clinic.    Coughing up lots of yellow green phlegm   Cough is harsh with rib pain.   Has not measured but feels feverish     Head ache with sinus congestion     Patient wonders about getting a script for an antibiotic called into [facility]

## 2020-01-02 NOTE — TELEPHONE ENCOUNTER
Patient wondering about her handicapped parking form.             Called and spoke with patient and offered the options of scheduling with another provider or UC/ER and she denied both. I did inform her if things get worse to get to go to UC/ER and she agreed

## 2020-01-08 ENCOUNTER — TELEPHONE (OUTPATIENT)
Dept: PEDIATRICS | Facility: OTHER | Age: 65
End: 2020-01-08

## 2020-01-08 NOTE — TELEPHONE ENCOUNTER
APPROVAL received from [facility] for Gabapentin. Approval dates 1/1/20 through 12/31/20. Pharmacy advised. Documentation scanned to Epic.

## 2020-01-08 NOTE — TELEPHONE ENCOUNTER
Received PA request from [facility] for Gabapentin 400MG Capsules. Submitted request through [facility]. Waiting for response from [facility].

## 2020-01-09 ENCOUNTER — MYC MEDICAL ADVICE (OUTPATIENT)
Dept: INTERNAL MEDICINE | Facility: OTHER | Age: 65
End: 2020-01-09

## 2020-01-10 ENCOUNTER — TELEPHONE (OUTPATIENT)
Dept: PEDIATRICS | Facility: OTHER | Age: 65
End: 2020-01-10

## 2020-01-10 NOTE — TELEPHONE ENCOUNTER
Called and left message that the form she is requesting is at the  at the [facility] ready for

## 2020-01-16 DIAGNOSIS — R09.82 POST-NASAL DRIP: ICD-10-CM

## 2020-01-16 DIAGNOSIS — G25.81 RESTLESS LEGS SYNDROME (RLS): ICD-10-CM

## 2020-01-17 RX ORDER — PRAMIPEXOLE DIHYDROCHLORIDE 0.12 MG/1
TABLET ORAL
Qty: 90 TABLET | Refills: 1 | Status: SHIPPED | OUTPATIENT
Start: 2020-01-17 | End: 2020-07-15

## 2020-01-17 RX ORDER — MONTELUKAST SODIUM 10 MG/1
10 TABLET ORAL AT BEDTIME
Qty: 90 TABLET | Refills: 1 | Status: SHIPPED | OUTPATIENT
Start: 2020-01-17 | End: 2020-04-28

## 2020-02-08 ENCOUNTER — HEALTH MAINTENANCE LETTER (OUTPATIENT)
Age: 65
End: 2020-02-08

## 2020-03-03 ENCOUNTER — OFFICE VISIT (OUTPATIENT)
Dept: FAMILY MEDICINE | Facility: OTHER | Age: 65
End: 2020-03-03
Attending: NURSE PRACTITIONER
Payer: COMMERCIAL

## 2020-03-03 ENCOUNTER — ANCILLARY PROCEDURE (OUTPATIENT)
Dept: GENERAL RADIOLOGY | Facility: OTHER | Age: 65
End: 2020-03-03
Attending: NURSE PRACTITIONER
Payer: COMMERCIAL

## 2020-03-03 VITALS
DIASTOLIC BLOOD PRESSURE: 72 MMHG | WEIGHT: 118 LBS | TEMPERATURE: 97.7 F | OXYGEN SATURATION: 93 % | BODY MASS INDEX: 22.28 KG/M2 | SYSTOLIC BLOOD PRESSURE: 105 MMHG | HEART RATE: 72 BPM | HEIGHT: 61 IN

## 2020-03-03 DIAGNOSIS — R06.02 SHORTNESS OF BREATH: ICD-10-CM

## 2020-03-03 DIAGNOSIS — J40 BRONCHITIS: Primary | ICD-10-CM

## 2020-03-03 LAB
BASOPHILS # BLD AUTO: 0 10E9/L (ref 0–0.2)
BASOPHILS NFR BLD AUTO: 0.4 %
DIFFERENTIAL METHOD BLD: NORMAL
EOSINOPHIL # BLD AUTO: 0 10E9/L (ref 0–0.7)
EOSINOPHIL NFR BLD AUTO: 0.3 %
ERYTHROCYTE [DISTWIDTH] IN BLOOD BY AUTOMATED COUNT: 12.7 % (ref 10–15)
HCT VFR BLD AUTO: 42.7 % (ref 35–47)
HGB BLD-MCNC: 14.6 G/DL (ref 11.7–15.7)
IMM GRANULOCYTES # BLD: 0.1 10E9/L (ref 0–0.4)
IMM GRANULOCYTES NFR BLD: 0.6 %
LYMPHOCYTES # BLD AUTO: 1.2 10E9/L (ref 0.8–5.3)
LYMPHOCYTES NFR BLD AUTO: 15.5 %
MCH RBC QN AUTO: 30.7 PG (ref 26.5–33)
MCHC RBC AUTO-ENTMCNC: 34.2 G/DL (ref 31.5–36.5)
MCV RBC AUTO: 90 FL (ref 78–100)
MONOCYTES # BLD AUTO: 0.7 10E9/L (ref 0–1.3)
MONOCYTES NFR BLD AUTO: 8.7 %
NEUTROPHILS # BLD AUTO: 5.9 10E9/L (ref 1.6–8.3)
NEUTROPHILS NFR BLD AUTO: 74.5 %
NRBC # BLD AUTO: 0 10*3/UL
NRBC BLD AUTO-RTO: 0 /100
PLATELET # BLD AUTO: 279 10E9/L (ref 150–450)
RBC # BLD AUTO: 4.75 10E12/L (ref 3.8–5.2)
WBC # BLD AUTO: 7.9 10E9/L (ref 4–11)

## 2020-03-03 PROCEDURE — 85025 COMPLETE CBC W/AUTO DIFF WBC: CPT | Mod: ZL | Performed by: NURSE PRACTITIONER

## 2020-03-03 PROCEDURE — G0463 HOSPITAL OUTPT CLINIC VISIT: HCPCS

## 2020-03-03 PROCEDURE — 36415 COLL VENOUS BLD VENIPUNCTURE: CPT | Mod: ZL | Performed by: NURSE PRACTITIONER

## 2020-03-03 PROCEDURE — 99203 OFFICE O/P NEW LOW 30 MIN: CPT | Performed by: NURSE PRACTITIONER

## 2020-03-03 PROCEDURE — G0463 HOSPITAL OUTPT CLINIC VISIT: HCPCS | Mod: 25

## 2020-03-03 PROCEDURE — 71046 X-RAY EXAM CHEST 2 VIEWS: CPT | Mod: TC

## 2020-03-03 RX ORDER — AZITHROMYCIN 250 MG/1
TABLET, FILM COATED ORAL
Qty: 6 TABLET | Refills: 0 | Status: SHIPPED | OUTPATIENT
Start: 2020-03-03 | End: 2020-09-09

## 2020-03-03 RX ORDER — PREDNISONE 20 MG/1
20 TABLET ORAL DAILY
Qty: 5 TABLET | Refills: 0 | Status: SHIPPED | OUTPATIENT
Start: 2020-03-03 | End: 2020-03-08

## 2020-03-03 ASSESSMENT — PATIENT HEALTH QUESTIONNAIRE - PHQ9: SUM OF ALL RESPONSES TO PHQ QUESTIONS 1-9: 0

## 2020-03-03 ASSESSMENT — ANXIETY QUESTIONNAIRES
5. BEING SO RESTLESS THAT IT IS HARD TO SIT STILL: NOT AT ALL
GAD7 TOTAL SCORE: 0
4. TROUBLE RELAXING: NOT AT ALL
6. BECOMING EASILY ANNOYED OR IRRITABLE: NOT AT ALL
2. NOT BEING ABLE TO STOP OR CONTROL WORRYING: NOT AT ALL
3. WORRYING TOO MUCH ABOUT DIFFERENT THINGS: NOT AT ALL
1. FEELING NERVOUS, ANXIOUS, OR ON EDGE: NOT AT ALL
7. FEELING AFRAID AS IF SOMETHING AWFUL MIGHT HAPPEN: NOT AT ALL

## 2020-03-03 ASSESSMENT — MIFFLIN-ST. JEOR: SCORE: 1013

## 2020-03-03 ASSESSMENT — PAIN SCALES - GENERAL: PAINLEVEL: NO PAIN (0)

## 2020-03-03 NOTE — PATIENT INSTRUCTIONS

## 2020-03-03 NOTE — PROGRESS NOTES
Subjective     Chyna Delgado is a 64 year old female who presents to clinic today for the following health issues:    HPI   RESPIRATORY SYMPTOMS      Duration:started about a month ago lasted 13 days, now started again on 2/25    Description  nasal congestion, cough, fatigue/malaise and hoarse voice    Severity: severe    Accompanying signs and symptoms: productive cough    History (predisposing factors):  asthma and tobacco abuse    Precipitating or alleviating factors: laying down makes it worse    Therapies tried and outcome:  Cough syrup, supresses cough        Patient Active Problem List   Diagnosis     Encounter to establish care     Mild persistent asthma     Sacroiliac pain     Annual physical exam     Dysthymic disorder     Encounter for routine gynecological examination     Seborrheic keratosis     Somatic dysfunction of pelvic region     Restless legs syndrome     Skin lesion     Hand swelling     Numbness and tingling in right hand     Graves disease     Preoperative examination     Simple chronic bronchitis (H)     Arthritis     ACP (advance care planning)     Pain of toe of right foot     Moderate major depression (H)     Right shoulder pain     Abdominal muscle strain     Strain of flexor muscle of hip, unspecified laterality, initial encounter     Myalgia     Iron deficiency     Alcohol use     Functional diarrhea     Closed fracture of neck of right femur (H)     Closed right hip fracture (H)     Hip fracture requiring operative repair (H)     Osteoporosis     Androgenetic alopecia     Benign neoplasm of stomach     Colon polyposis     Family history of colonic polyps     Family history of skin cancer     Hypothyroidism     Seborrheic dermatitis, unspecified     Telogen effluvium     Past Surgical History:   Procedure Laterality Date     benign tumors removed from back       CLOSED REDUCTION, PERCUTANEOUS PINNING HIP Right 2/24/2019    Procedure: Percutaneous Screw Fixation of Right Hip  Fracture;  Surgeon: Amrik Kolb DO;  Location: [facility]     COLON SURGERY N/A     HX: Total colectomy with J-pouch reconstruction.      ENDOSCOPY UPPER, COLONOSCOPY, COMBINED N/A 9/5/2014    Procedure: COMBINED ENDOSCOPY UPPER, COLONOSCOPY;  Surgeon: Delbert Patel MD;  Location: [facility]     ENDOSCOPY UPPER, COLONOSCOPY, COMBINED N/A 5/9/2019    Procedure: UPPER ENDOSCOPY  WITH BIOPSIES AND  COLONOSCOPY WITH BIOPSIES;  Surgeon: Tai Diaz MD;  Location: [facility]     ESOPHAGOSCOPY, GASTROSCOPY, DUODENOSCOPY (EGD), COMBINED N/A 12/11/2015    Procedure: COMBINED ESOPHAGOSCOPY, GASTROSCOPY, DUODENOSCOPY (EGD);  Surgeon: Delbert Patel MD;  Location: [facility]     ESOPHAGOSCOPY, GASTROSCOPY, DUODENOSCOPY (EGD), COMBINED N/A 12/18/2017    Procedure: COMBINED ESOPHAGOSCOPY, GASTROSCOPY, DUODENOSCOPY (EGD);  UPPER ENDOSCOPY WITH BIOPSY;  Surgeon: Delbert Patel MD;  Location: [facility]     excised-benign  2002    tongue lesion     HC REPAIR OF NASAL SEPTUM  2002    Deviated nasal septum     LAPAROSCOPIC CHOLECYSTECTOMY       lumpectomy Right breast      Breast Lump     MOUTH SURGERY      removal of spot from roof of mouth     pilonidal cyst surgery  1976     removal of colon and large intestine  2000    Familial polyposis     Right etopic pregnancy      Pregnancy     TONSILLECTOMY      tonsillitus     UPPER GI ENDOSCOPY  2009    Stomach polyps       Social History     Tobacco Use     Smoking status: Former Smoker     Packs/day: 0.50     Years: 40.00     Pack years: 20.00     Types: Cigarettes     Smokeless tobacco: Never Used   Substance Use Topics     Alcohol use: Not Currently     Comment: Weekly 3 drinks     Family History   Problem Relation Age of Onset     Other - See Comments Father         Atrial Fibrillation     Cancer Father         bladder ca     Colon Polyps Father      Heart Disease Father         Pacemaker     Rheumatoid Arthritis Father      C.A.D. Father 75        CABG     Chronic Obstructive Pulmonary  Disease Mother      Heart Disease Mother         Pacemaker     Other - See Comments Mother         dementia     C.A.D. Mother 70        CABG     C.A.D. Maternal Grandmother      Unknown/Adopted Maternal Grandfather      Unknown/Adopted Paternal Grandmother      Unknown/Adopted Paternal Grandfather      Asthma Sister      Cerebrovascular Disease Sister      Gastrointestinal Disease Sister         peptic ulcers     Hypertension Sister      Gastrointestinal Disease Sister         stomach tumors     Rheumatoid Arthritis Sister      Cancer Sister         facial cancer     Asthma Sister      Cancer Maternal Aunt         renal ca         Current Outpatient Medications   Medication Sig Dispense Refill     albuterol (PROAIR HFA/PROVENTIL HFA/VENTOLIN HFA) 108 (90 Base) MCG/ACT inhaler Inhale 2 puffs into the lungs every 4 hours as needed for shortness of breath / dyspnea or wheezing 1 Inhaler 2     calcium carbonate 750 MG CHEW Take 1 tablet (750 mg) by mouth 2 times daily 180 tablet 3     Calcium Citrate-Vitamin D (CALCIUM + D PO) 1200-1000mg unit tablet chewable; one tablet with a meal orally once a day.       cetirizine 10 MG PO tablet Take 1 tablet (10 mg) by mouth At Bedtime 90 tablet 1     ciprofloxacin (CETRAXAL) 0.2 % otic solution Place 0.25 mLs into the right ear 2 times daily 10 mL 4     dicyclomine (BENTYL) 10 MG capsule Take 2 capsules (20 mg) by mouth 3 times daily (before meals) 180 capsule 0     ferrous sulfate (IRON) 325 (65 FE) MG tablet Take 325 mg by mouth 2 times daily (with meals) 90 tablet 2     fluticasone-salmeterol (ADVAIR) 250-50 MCG/DOSE inhaler Inhale 1 puff into the lungs 2 times daily 60 Inhaler 3     gabapentin (NEURONTIN) 400 MG capsule Take 3 capsules (1,200 mg) by mouth 3 times daily 270 capsule 4     ibuprofen (ADVIL,MOTRIN) 200 MG tablet Take 200 mg by mouth       ketotifen (ZADITOR/REFRESH ANTI-ITCH) 0.025 % SOLN ophthalmic solution PLACE 2 DROPS INTO BOTH EYES 2 TIMES DAILY 5 mL 2      loperamide (IMODIUM) 2 MG capsule Take 6 mg by mouth 2 times daily Pt states takes 3 tabs (6 mg) twice a day unless she needs to take more, depending on what she eats.       magnesium 100 MG CAPS Take by mouth 2 times daily       montelukast (SINGULAIR) 10 MG tablet Take 1 tablet (10 mg) by mouth At Bedtime 90 tablet 1     Multiple Vitamin (MULTIVITAMIN) per tablet Take 1 tablet by mouth daily. Every day with food       omeprazole (PRILOSEC) 20 MG DR capsule Take 20 mg by mouth 2 times daily       omeprazole (PRILOSEC) 40 MG DR capsule Take 1 capsule (40 mg) by mouth daily 90 capsule 1     potassium chloride ER (K-DUR/KLOR-CON M) 20 MEQ CR tablet Take 1 tablet (20 mEq) by mouth 3 times daily 90 tablet 0     pramipexole (MIRAPEX) 0.125 MG tablet Take 1 tablet (0.125 mg) by mouth At Bedtime 90 tablet 1     predniSONE (DELTASONE) 10 MG tablet Take 1 tablet (10 mg) by mouth daily 90 tablet 1     predniSONE (DELTASONE) 5 MG tablet Take 5 mg by mouth daily. 90 tablet 1     PSYLLIUM PO Take 1 Tablespoonful by mouth 2 times daily        Simethicone 125 MG CAPS Take 500 mg by mouth 2 times daily       spironolactone (ALDACTONE) 50 MG tablet Take 1 tablet (50 mg) by mouth 2 times daily 180 tablet 1     vitamin D3 (CHOLECALCIFEROL) 2000 units (50 mcg) tablet TAKE TWO TABLETS BY MOUTH EVERY DAY 60 tablet 0     vitamin E 200 UNIT capsule Take 200 Units by mouth 2 times daily       Allergies   Allergen Reactions     Codeine Swelling     Throat swelling-Patient can tolerate Hydrocodone & morphine     Nortriptyline Other (See Comments)     Crying      BP Readings from Last 3 Encounters:   03/03/20 105/72   12/16/19 108/80   09/30/19 100/60    Wt Readings from Last 3 Encounters:   03/03/20 53.5 kg (118 lb)   12/16/19 58.8 kg (129 lb 9.6 oz)   09/30/19 60.3 kg (133 lb)                    Reviewed and updated as needed this visit by Provider         Review of Systems   ROS COMP: CONSTITUTIONAL:fatigue and fever low  "grade  INTEGUMENTARY/SKIN: NEGATIVE for worrisome rashes, moles or lesions  ENT/MOUTH: NEGATIVE for ear, mouth and throat problems  RESP:cough productive and SOB HX of asthma  CV: NEGATIVE for chest pain, palpitations or peripheral edema  GI: NEGATIVE for nausea, abdominal pain, heartburn, or change in bowel habits  : denies dysuria   NEURO: NEGATIVE for weakness, dizziness or paresthesias      Objective    /72   Pulse 72   Temp 97.7  F (36.5  C)   Ht 1.55 m (5' 1.02\")   Wt 53.5 kg (118 lb)   SpO2 93%   BMI 22.28 kg/m    Body mass index is 22.28 kg/m .  Physical Exam   GENERAL: alert and no distress  NECK: no adenopathy, no asymmetry, masses, or scars and thyroid normal to palpation  RESP: decreased breath sounds throughout and wheezing throughout   CV: regular rate and rhythm, normal S1 S2, no S3 or S4, no murmur, click or rub, no peripheral edema and peripheral pulses strong  ABDOMEN: soft, nontender, no hepatosplenomegaly, no masses and bowel sounds normal  PSYCH: mentation appears normal, affect normal/bright  LYMPH: no cervical adenopathy    Diagnostic Test Results:  Labs reviewed in Epic  Results for orders placed or performed in visit on 03/03/20 (from the past 24 hour(s))   CBC with platelets and differential   Result Value Ref Range    WBC 7.9 4.0 - 11.0 10e9/L    RBC Count 4.75 3.8 - 5.2 10e12/L    Hemoglobin 14.6 11.7 - 15.7 g/dL    Hematocrit 42.7 35.0 - 47.0 %    MCV 90 78 - 100 fl    MCH 30.7 26.5 - 33.0 pg    MCHC 34.2 31.5 - 36.5 g/dL    RDW 12.7 10.0 - 15.0 %    Platelet Count 279 150 - 450 10e9/L    Diff Method Automated Method     % Neutrophils 74.5 %    % Lymphocytes 15.5 %    % Monocytes 8.7 %    % Eosinophils 0.3 %    % Basophils 0.4 %    % Immature Granulocytes 0.6 %    Nucleated RBCs 0 0 /100    Absolute Neutrophil 5.9 1.6 - 8.3 10e9/L    Absolute Lymphocytes 1.2 0.8 - 5.3 10e9/L    Absolute Monocytes 0.7 0.0 - 1.3 10e9/L    Absolute Eosinophils 0.0 0.0 - 0.7 10e9/L    Absolute " Basophils 0.0 0.0 - 0.2 10e9/L    Abs Immature Granulocytes 0.1 0 - 0.4 10e9/L    Absolute Nucleated RBC 0.0          PROCEDURE: XR CHEST 2 VW 3/3/2020 1:51 PM  HISTORY: Shortness of breath  COMPARISONS: 7/10/2018.  TECHNIQUE: 2 views.     FINDINGS: Heart and pulmonary vasculature are normal. No acute  infiltrate or effusion is seen. Lungs are hyperinflated with  flattening of the diaphragm.     Mild degenerative change is seen in the spine. There is an interval  compression deformity in the upper thoracic spine.                                                                   IMPRESSION: No acute infiltrate or effusion.     CARROLL UMANZOR MD    Assessment & Plan     1. Bronchitis  With recurrent illness over this past month plan to treat with antibiotic and increase prednisone for 5 days. She can continue on regular dose of prednisone once she has completed the 5 day course   Follow up if no improvement   Go to the ER if symptoms worsen  - XR CHEST 2 VW (Clinic Performed); Future  - CBC with platelets and differential  - azithromycin (ZITHROMAX Z-JANIS) 250 MG tablet; Take 1 tablets day 1 and 1 tablet days 2-5  Dispense: 6 tablet; Refill: 0  - predniSONE (DELTASONE) 20 MG tablet; Take 1 tablet (20 mg) by mouth daily for 5 days  Dispense: 5 tablet; Refill: 0       See Patient Instructions    No follow-ups on file.    MAIDA Knapp [facility]

## 2020-03-04 ASSESSMENT — ANXIETY QUESTIONNAIRES: GAD7 TOTAL SCORE: 0

## 2020-03-06 ENCOUNTER — NURSE TRIAGE (OUTPATIENT)
Dept: PEDIATRICS | Facility: OTHER | Age: 65
End: 2020-03-06

## 2020-03-06 ENCOUNTER — HOSPITAL ENCOUNTER (EMERGENCY)
Facility: HOSPITAL | Age: 65
Discharge: HOME | End: 2020-03-06
Attending: PHYSICIAN ASSISTANT | Admitting: PHYSICIAN ASSISTANT
Payer: COMMERCIAL

## 2020-03-06 VITALS
SYSTOLIC BLOOD PRESSURE: 119 MMHG | DIASTOLIC BLOOD PRESSURE: 79 MMHG | WEIGHT: 118 LBS | TEMPERATURE: 98.6 F | RESPIRATION RATE: 16 BRPM | OXYGEN SATURATION: 94 % | HEART RATE: 81 BPM | BODY MASS INDEX: 22.28 KG/M2

## 2020-03-06 DIAGNOSIS — J98.01 BRONCHOSPASM: ICD-10-CM

## 2020-03-06 LAB
ANION GAP SERPL CALCULATED.3IONS-SCNC: 11 MMOL/L (ref 3–14)
BASOPHILS # BLD AUTO: 0 10E9/L (ref 0–0.2)
BASOPHILS NFR BLD AUTO: 0.3 %
BUN SERPL-MCNC: 27 MG/DL (ref 7–30)
CALCIUM SERPL-MCNC: 9.1 MG/DL (ref 8.5–10.1)
CHLORIDE SERPL-SCNC: 105 MMOL/L (ref 94–109)
CO2 SERPL-SCNC: 20 MMOL/L (ref 20–32)
CREAT SERPL-MCNC: 0.82 MG/DL (ref 0.52–1.04)
D DIMER PPP FEU-MCNC: 0.4 UG/ML FEU (ref 0–0.5)
DIFFERENTIAL METHOD BLD: NORMAL
EOSINOPHIL # BLD AUTO: 0 10E9/L (ref 0–0.7)
EOSINOPHIL NFR BLD AUTO: 0 %
ERYTHROCYTE [DISTWIDTH] IN BLOOD BY AUTOMATED COUNT: 12.6 % (ref 10–15)
GFR SERPL CREATININE-BSD FRML MDRD: 75 ML/MIN/{1.73_M2}
GLUCOSE SERPL-MCNC: 107 MG/DL (ref 70–99)
HCT VFR BLD AUTO: 38.8 % (ref 35–47)
HGB BLD-MCNC: 13.4 G/DL (ref 11.7–15.7)
IMM GRANULOCYTES # BLD: 0.1 10E9/L (ref 0–0.4)
IMM GRANULOCYTES NFR BLD: 1.7 %
LYMPHOCYTES # BLD AUTO: 0.9 10E9/L (ref 0.8–5.3)
LYMPHOCYTES NFR BLD AUTO: 13 %
MCH RBC QN AUTO: 31 PG (ref 26.5–33)
MCHC RBC AUTO-ENTMCNC: 34.5 G/DL (ref 31.5–36.5)
MCV RBC AUTO: 90 FL (ref 78–100)
MONOCYTES # BLD AUTO: 0.6 10E9/L (ref 0–1.3)
MONOCYTES NFR BLD AUTO: 7.9 %
NEUTROPHILS # BLD AUTO: 5.3 10E9/L (ref 1.6–8.3)
NEUTROPHILS NFR BLD AUTO: 77.1 %
NRBC # BLD AUTO: 0 10*3/UL
NRBC BLD AUTO-RTO: 0 /100
NT-PROBNP SERPL-MCNC: 151 PG/ML (ref 0–900)
PLATELET # BLD AUTO: 273 10E9/L (ref 150–450)
POTASSIUM SERPL-SCNC: 4.5 MMOL/L (ref 3.4–5.3)
RBC # BLD AUTO: 4.32 10E12/L (ref 3.8–5.2)
SODIUM SERPL-SCNC: 136 MMOL/L (ref 133–144)
TROPONIN I SERPL-MCNC: <0.015 UG/L (ref 0–0.04)
WBC # BLD AUTO: 6.9 10E9/L (ref 4–11)

## 2020-03-06 PROCEDURE — 84484 ASSAY OF TROPONIN QUANT: CPT | Performed by: PHYSICIAN ASSISTANT

## 2020-03-06 PROCEDURE — 25000131 ZZH RX MED GY IP 250 OP 636 PS 637: Performed by: PHYSICIAN ASSISTANT

## 2020-03-06 PROCEDURE — 80048 BASIC METABOLIC PNL TOTAL CA: CPT | Performed by: PHYSICIAN ASSISTANT

## 2020-03-06 PROCEDURE — 99284 EMERGENCY DEPT VISIT MOD MDM: CPT | Mod: Z6 | Performed by: PHYSICIAN ASSISTANT

## 2020-03-06 PROCEDURE — 85379 FIBRIN DEGRADATION QUANT: CPT | Performed by: PHYSICIAN ASSISTANT

## 2020-03-06 PROCEDURE — 83880 ASSAY OF NATRIURETIC PEPTIDE: CPT | Performed by: PHYSICIAN ASSISTANT

## 2020-03-06 PROCEDURE — 99283 EMERGENCY DEPT VISIT LOW MDM: CPT | Mod: 25

## 2020-03-06 PROCEDURE — 85025 COMPLETE CBC W/AUTO DIFF WBC: CPT | Performed by: PHYSICIAN ASSISTANT

## 2020-03-06 PROCEDURE — 94640 AIRWAY INHALATION TREATMENT: CPT

## 2020-03-06 PROCEDURE — 40000275 ZZH STATISTIC RCP TIME EA 10 MIN

## 2020-03-06 PROCEDURE — 36415 COLL VENOUS BLD VENIPUNCTURE: CPT | Performed by: PHYSICIAN ASSISTANT

## 2020-03-06 PROCEDURE — 25000125 ZZHC RX 250: Performed by: PHYSICIAN ASSISTANT

## 2020-03-06 RX ORDER — PREDNISONE 20 MG/1
40 TABLET ORAL ONCE
Status: COMPLETED | OUTPATIENT
Start: 2020-03-06 | End: 2020-03-06

## 2020-03-06 RX ORDER — PREDNISONE 20 MG/1
TABLET ORAL
Qty: 21 TABLET | Refills: 0 | Status: SHIPPED | OUTPATIENT
Start: 2020-03-06 | End: 2020-11-02

## 2020-03-06 RX ORDER — ALBUTEROL SULFATE 0.83 MG/ML
2.5 SOLUTION RESPIRATORY (INHALATION)
Status: DISCONTINUED | OUTPATIENT
Start: 2020-03-06 | End: 2020-03-06 | Stop reason: HOSPADM

## 2020-03-06 RX ORDER — IPRATROPIUM BROMIDE AND ALBUTEROL SULFATE 2.5; .5 MG/3ML; MG/3ML
3 SOLUTION RESPIRATORY (INHALATION) ONCE
Status: COMPLETED | OUTPATIENT
Start: 2020-03-06 | End: 2020-03-06

## 2020-03-06 RX ADMIN — ALBUTEROL SULFATE 2.5 MG: 2.5 SOLUTION RESPIRATORY (INHALATION) at 19:07

## 2020-03-06 RX ADMIN — ALBUTEROL SULFATE 2.5 MG: 2.5 SOLUTION RESPIRATORY (INHALATION) at 18:00

## 2020-03-06 RX ADMIN — IPRATROPIUM BROMIDE AND ALBUTEROL SULFATE 3 ML: .5; 3 SOLUTION RESPIRATORY (INHALATION) at 17:54

## 2020-03-06 RX ADMIN — PREDNISONE 40 MG: 20 TABLET ORAL at 19:12

## 2020-03-06 NOTE — ED NOTES
Pt reading paper, visitor with her in lobby. Pt states she is doing ok and understands about the wait.

## 2020-03-06 NOTE — ED TRIAGE NOTES
"Pt states was seen on Tuesday for URI and started on prednisone and a Z jasson. Pt states her breathing doesn't feel any better \"I am not getting better\".   "

## 2020-03-06 NOTE — ED AVS SNAPSHOT
[facility]  [address] [address]  Phone:  [phone]                                    Chyna Delgado   MRN: [id number]    Department:  [facility]   Date of Visit:  3/6/2020           After Visit Summary Signature Page    I have received my discharge instructions, and my questions have been answered. I have discussed any challenges I see with this plan with the nurse or doctor.    ..........................................................................................................................................  Patient/Patient Representative Signature      ..........................................................................................................................................  Patient Representative Print Name and Relationship to Patient    ..................................................               ................................................  Date                                   Time    ..........................................................................................................................................  Reviewed by Signature/Title    ...................................................              ..............................................  Date                                               Time          22EPIC Rev 08/18

## 2020-03-06 NOTE — TELEPHONE ENCOUNTER
Patient calling back and wondered if  responded to message? She is still SOB and again advised ED and to have someone drive her there.She states she really does not want to go in.Encouraged her to go in an have a MD evaluate her  And to be seen in ED/UC. She verbalized understanding.Unsure if she will take care advice.    Susie Zhou RN

## 2020-03-06 NOTE — TELEPHONE ENCOUNTER
Patient calliing and has SOB.She has asthma.Was in on 3.3.2020 and was given ABX and Prednisone.She is not getting any better.She is SOB with activity and is using her inhalers more frequently and when she does use them she coughs.She had mentioned getting a nebulizer machine and is wondering if this would help. Advised her to go to ED. She said she will not. Advised again to go to ED and she said no. Updated her will send message to PCP, but advise ED care.She declined.    Please note.     Reason for Disposition    Asthma medicine (nebulizer or inhaler) is needed more frequently than q 4 hours to keep you comfortable    Additional Information    Negative: [1] Breathing stopped AND [2] hasn't returned    Negative: Choking on something    Negative: Severe difficulty breathing (e.g., struggling for each breath, speaks in single words)    Negative: Bluish (or gray) lips or face now    Negative: Difficult to awaken or acting confused (e.g., disoriented, slurred speech)    Negative: Passed out (i.e., lost consciousness, collapsed and was not responding)    Negative: Wheezing started suddenly after medicine, an allergic food or bee sting    Negative: Stridor    Negative: Slow, shallow and weak breathing    Negative: Sounds like a life-threatening emergency to the triager    Negative: Chest pain    Negative: [1] Wheezing (high pitched whistling sound) AND [2] previous asthma attacks or use of asthma medicines    Negative: [1] Difficulty breathing AND [2] only from stuffy or runny nose    [1] Difficulty breathing AND [2] only present when coughing    Negative: Severe difficulty breathing (e.g., struggling for each breath, speaks in single words)    Negative: Bluish (or gray) lips or face now    Negative: [1] Difficulty breathing AND [2] exposure to flames, smoke, or fumes    Negative: [1] Stridor AND [2] difficulty breathing    Negative: Sounds like a life-threatening emergency to the triager    [1] Previous asthma attacks  AND [2] this feels like asthma attack    Negative: [1] MODERATE asthma attack (e.g., SOB at rest, speaks in phrases, audible wheezes) AND [2] not resolved after 2 nebulizer or inhaler treatments given 20 minutes apart    Negative: [1] Peak flow rate 50-80% of baseline level (YELLOW zone) AND [2] not resolved after 2 nebulizer or inhaler treatments given 20 minutes apart    Negative: [1] SEVERE asthma attack (e.g., very SOB at rest, speaks in single words, loud wheezes) AND [2] not resolved after 2 nebulizer or inhaler treatments    Negative: Peak flow rate less than 50% of baseline level (RED zone)    Negative: [1] Severe wheezing or coughing AND [2] doesn't have neb or inhaler available    Negative: Chest pain    Negative: [1] Hospitalized before with asthma AND [2] feels the same now    Negative: Severe difficulty breathing (e.g., struggling for each breath, speak in single words, pulse > 120)    Negative: Bluish (or gray) lips or face now    Negative: Difficult to awaken or acting confused (e.g., disoriented, slurred speech)    Negative: Passed out (i.e., lost consciousness, collapsed and was not responding)    Negative: Wheezing started suddenly after medicine, an allergic food, or bee sting    Negative: Sounds like a life-threatening emergency to the triager    Protocols used: ASTHMA ATTACK-A-AH, BREATHING DIFFICULTY-A-AH, COUGH - ACUTE [id number]

## 2020-03-07 ASSESSMENT — ENCOUNTER SYMPTOMS
NEUROLOGICAL NEGATIVE: 1
CHEST TIGHTNESS: 1
CARDIOVASCULAR NEGATIVE: 1
EYES NEGATIVE: 1
FATIGUE: 1
FEVER: 0
WHEEZING: 1
SHORTNESS OF BREATH: 1
COUGH: 1
GASTROINTESTINAL NEGATIVE: 1

## 2020-03-07 NOTE — ED PROVIDER NOTES
History     Chief Complaint   Patient presents with     Respiratory Problems     HPI  Chyna Delgado is a 64 year old female who presents emergency department with complaints of ongoing respiratory problems 4 days after having been seen by her primary care provider where she was diagnosed with bronchitis and given prescriptions for azithromycin and prednisone 20 mg daily.  Patient is normally on 5 mg of prednisone daily with history of COPD and Graves' disease.  She denies new fever, upper respiratory congestion, chest pain, lightheadedness.  She has no prior history of coronary artery disease and no prior DVT or pulmonary embolism.    Allergies:  Allergies   Allergen Reactions     Codeine Swelling     Throat swelling-Patient can tolerate Hydrocodone & morphine     Nortriptyline Other (See Comments)     Crying        Problem List:    Patient Active Problem List    Diagnosis Date Noted     Annual physical exam 06/28/2013     Priority: High     Osteoporosis 09/03/2019     Priority: Medium     Hip fracture requiring operative repair (H) 02/25/2019     Priority: Medium     Closed fracture of neck of right femur (H) 02/23/2019     Priority: Medium     Closed right hip fracture (H) 02/23/2019     Priority: Medium     Functional diarrhea 10/26/2018     Priority: Medium     Iron deficiency 04/18/2018     Priority: Medium     Alcohol use 04/18/2018     Priority: Medium     Myalgia 05/30/2017     Priority: Medium     Abdominal muscle strain 05/05/2017     Priority: Medium     Strain of flexor muscle of hip, unspecified laterality, initial encounter 05/05/2017     Priority: Medium     Right shoulder pain 11/03/2016     Priority: Medium     Moderate major depression (H) 10/12/2016     Priority: Medium     ACP (advance care planning) 05/09/2016     Priority: Medium     Advance Care Planning 5/9/2016: ACP Review of Chart / Resources Provided:  Reviewed chart for advance care plan.  Chyna Delgado has been provided  information and resources to begin or update their advance care plan.  Added by Lina Díaz             Pain of toe of right foot 05/09/2016     Priority: Medium     Arthritis 02/12/2016     Priority: Medium     Preoperative examination 12/02/2015     Priority: Medium     Simple chronic bronchitis (H) 12/02/2015     Priority: Medium     Graves disease 11/05/2015     Priority: Medium     Hand swelling 04/20/2015     Priority: Medium     Numbness and tingling in right hand 04/20/2015     Priority: Medium     Skin lesion 07/07/2014     Priority: Medium     Right cheek       Restless legs syndrome 04/16/2014     Priority: Medium     Somatic dysfunction of pelvic region 10/08/2013     Priority: Medium     Encounter for routine gynecological examination 07/12/2013     Priority: Medium     Problem list name updated by automated process. 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     Priority: Medium     Overview:   IMO Update 10/11       Seborrheic dermatitis, unspecified 10/12/2011     Priority: Medium     Overview:   IMO Update 10/11       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     Priority: Medium     Hypothyroidism 07/07/2011     Priority: Medium        Past Medical History:    Past Medical History:   Diagnosis Date     Abnormal mammogram, unspecified 01/08/2003     Back Pain 02/10/2000     Benign neoplasm of colon 03/10/2000     Benign paroxysmal positional  vertigo 06/07/2012     Depressive disorder, not elsewhere classified 02/10/2004     Diarrhea 12/15/2010     Gastric polyp 12/11/2015, 12/18/2017     History of normal mammogram 07/18/2014, 1/18/2019     Idiopathic osteoporosis 12/15/2010     Interstitial emphysema (H) 12/15/2010     menopausal or female climacteric states 08/31/1999     Mixed hyperlipidemia 12/15/2010     Other bursitis disorders 02/04/2011     Other forms of retinal detachment(361.89) 12/15/2010     Other malaise and fatigue 01/10/2003     Personal history of tobacco use, presenting hazards to health 12/15/2010     Restless legs syndrome (RLS) 12/15/2010     Rotator cuff tendonitis      Rotator cuff tendonitis      Sacroiliitis, not elsewhere classified (H) 12/15/2010     Tobacco use disorder 08/22/2012     Unspecified asthma(493.90) 12/15/2010       Past Surgical History:    Past Surgical History:   Procedure Laterality Date     benign tumors removed from back       CLOSED REDUCTION, PERCUTANEOUS PINNING HIP Right 2/24/2019    Procedure: Percutaneous Screw Fixation of Right Hip Fracture;  Surgeon: Amrik Kolb DO;  Location: [facility]     COLON SURGERY N/A     HX: Total colectomy with J-pouch reconstruction.      ENDOSCOPY UPPER, COLONOSCOPY, COMBINED N/A 9/5/2014    Procedure: COMBINED ENDOSCOPY UPPER, COLONOSCOPY;  Surgeon: Delbert Paetl MD;  Location: [facility]     ENDOSCOPY UPPER, COLONOSCOPY, COMBINED N/A 5/9/2019    Procedure: UPPER ENDOSCOPY  WITH BIOPSIES AND  COLONOSCOPY WITH BIOPSIES;  Surgeon: Tai Diaz MD;  Location: [facility]     ESOPHAGOSCOPY, GASTROSCOPY, DUODENOSCOPY (EGD), COMBINED N/A 12/11/2015    Procedure: COMBINED ESOPHAGOSCOPY, GASTROSCOPY, DUODENOSCOPY (EGD);  Surgeon: Delbert Patel MD;  Location: [facility]     ESOPHAGOSCOPY, GASTROSCOPY, DUODENOSCOPY (EGD), COMBINED N/A 12/18/2017    Procedure: COMBINED ESOPHAGOSCOPY, GASTROSCOPY, DUODENOSCOPY (EGD);  UPPER ENDOSCOPY WITH BIOPSY;  Surgeon: Delbert Patel MD;  Location:  [facility]     excised-benign  2002    tongue lesion     HC REPAIR OF NASAL SEPTUM  2002    Deviated nasal septum     LAPAROSCOPIC CHOLECYSTECTOMY       lumpectomy Right breast      Breast Lump     MOUTH SURGERY      removal of spot from roof of mouth     pilonidal cyst surgery  1976     removal of colon and large intestine  2000    Familial polyposis     Right etopic pregnancy      Pregnancy     TONSILLECTOMY      tonsillitus     UPPER GI ENDOSCOPY  2009    Stomach polyps       Family History:    Family History   Problem Relation Age of Onset     Other - See Comments Father         Atrial Fibrillation     Cancer Father         bladder ca     Colon Polyps Father      Heart Disease Father         Pacemaker     Rheumatoid Arthritis Father      C.A.D. Father 75        CABG     Chronic Obstructive Pulmonary Disease Mother      Heart Disease Mother         Pacemaker     Other - See Comments Mother         dementia     C.A.D. Mother 70        CABG     C.A.D. Maternal Grandmother      Unknown/Adopted Maternal Grandfather      Unknown/Adopted Paternal Grandmother      Unknown/Adopted Paternal Grandfather      Asthma Sister      Cerebrovascular Disease Sister      Gastrointestinal Disease Sister         peptic ulcers     Hypertension Sister      Gastrointestinal Disease Sister         stomach tumors     Rheumatoid Arthritis Sister      Cancer Sister         facial cancer     Asthma Sister      Cancer Maternal Aunt         renal ca       Social History:  Marital Status:   [2]  Social History     Tobacco Use     Smoking status: Former Smoker     Packs/day: 0.50     Years: 40.00     Pack years: 20.00     Types: Cigarettes     Smokeless tobacco: Never Used   Substance Use Topics     Alcohol use: Not Currently     Comment: Weekly 3 drinks     Drug use: No        Medications:    albuterol (PROAIR HFA/PROVENTIL HFA/VENTOLIN HFA) 108 (90 Base) MCG/ACT inhaler  azithromycin (ZITHROMAX Z-JANIS) 250 MG tablet  calcium carbonate 750 MG  CHEW  Calcium Citrate-Vitamin D (CALCIUM + D PO)  cetirizine 10 MG PO tablet  dicyclomine (BENTYL) 10 MG capsule  ferrous sulfate (IRON) 325 (65 FE) MG tablet  fluticasone-salmeterol (ADVAIR) 250-50 MCG/DOSE inhaler  gabapentin (NEURONTIN) 400 MG capsule  ibuprofen (ADVIL,MOTRIN) 200 MG tablet  ketotifen (ZADITOR/REFRESH ANTI-ITCH) 0.025 % SOLN ophthalmic solution  loperamide (IMODIUM) 2 MG capsule  magnesium 100 MG CAPS  montelukast (SINGULAIR) 10 MG tablet  Multiple Vitamin (MULTIVITAMIN) per tablet  omeprazole (PRILOSEC) 20 MG DR capsule  omeprazole (PRILOSEC) 40 MG DR capsule  potassium chloride ER (K-DUR/KLOR-CON M) 20 MEQ CR tablet  pramipexole (MIRAPEX) 0.125 MG tablet  predniSONE (DELTASONE) 10 MG tablet  predniSONE (DELTASONE) 20 MG tablet  predniSONE (DELTASONE) 20 MG tablet  predniSONE (DELTASONE) 5 MG tablet  PSYLLIUM PO  Simethicone 125 MG CAPS  spironolactone (ALDACTONE) 50 MG tablet  vitamin D3 (CHOLECALCIFEROL) 2000 units (50 mcg) tablet  vitamin E 200 UNIT capsule          Review of Systems   Constitutional: Positive for fatigue. Negative for fever.   HENT: Negative.    Eyes: Negative.    Respiratory: Positive for cough, chest tightness, shortness of breath and wheezing.    Cardiovascular: Negative.    Gastrointestinal: Negative.    Neurological: Negative.        Physical Exam   BP: 127/74  Pulse: 73  Heart Rate: 84  Temp: 98.1  F (36.7  C)  Resp: 16  Weight: 53.5 kg (118 lb)  SpO2: 97 %      Physical Exam  Constitutional:       General: She is not in acute distress.     Appearance: She is well-developed. She is not diaphoretic.   HENT:      Head: Normocephalic and atraumatic.      Mouth/Throat:      Mouth: Mucous membranes are moist.   Eyes:      General: No scleral icterus.     Extraocular Movements: Extraocular movements intact.      Conjunctiva/sclera: Conjunctivae normal.      Pupils: Pupils are equal, round, and reactive to light.   Neck:      Musculoskeletal: Normal range of motion and neck  supple.   Cardiovascular:      Rate and Rhythm: Normal rate.      Pulses: Normal pulses.      Heart sounds: Normal heart sounds. No murmur.   Pulmonary:      Effort: Pulmonary effort is normal.      Breath sounds: Wheezing (bilateral with decreased breath sounds) present.   Musculoskeletal: Normal range of motion.   Skin:     General: Skin is warm and dry.      Coloration: Skin is not pale.      Findings: No erythema or rash.   Neurological:      General: No focal deficit present.      Mental Status: She is alert and oriented to person, place, and time.         ED Course        Procedures  Patient was given DuoNeb which she did not feel significantly helped her symptoms.  She was subsequently given albuterol nebs again with minimal improvement.  Lung sounds did improve.  Patient walked for 200 feet and had no significant change in her heart rate or oxygen saturations however at the end of that she did feel significant dyspnea.  Troponin, BNP, d-dimer were all unremarkable.    Increased dose of prednisone to 40 mg daily and recommended patient return in 1 to 2 days if no significant improvement.  Recommended immediate return the emergency department if significantly worsening symptoms or if new fever were to develop.               Results for orders placed or performed during the hospital encounter of 03/06/20 (from the past 24 hour(s))   Basic metabolic panel   Result Value Ref Range    Sodium 136 133 - 144 mmol/L    Potassium 4.5 3.4 - 5.3 mmol/L    Chloride 105 94 - 109 mmol/L    Carbon Dioxide 20 20 - 32 mmol/L    Anion Gap 11 3 - 14 mmol/L    Glucose 107 (H) 70 - 99 mg/dL    Urea Nitrogen 27 7 - 30 mg/dL    Creatinine 0.82 0.52 - 1.04 mg/dL    GFR Estimate 75 >60 mL/min/[1.73_m2]    GFR Estimate If Black 87 >60 mL/min/[1.73_m2]    Calcium 9.1 8.5 - 10.1 mg/dL   CBC with platelets differential   Result Value Ref Range    WBC 6.9 4.0 - 11.0 10e9/L    RBC Count 4.32 3.8 - 5.2 10e12/L    Hemoglobin 13.4 11.7 - 15.7  g/dL    Hematocrit 38.8 35.0 - 47.0 %    MCV 90 78 - 100 fl    MCH 31.0 26.5 - 33.0 pg    MCHC 34.5 31.5 - 36.5 g/dL    RDW 12.6 10.0 - 15.0 %    Platelet Count 273 150 - 450 10e9/L    Diff Method Automated Method     % Neutrophils 77.1 %    % Lymphocytes 13.0 %    % Monocytes 7.9 %    % Eosinophils 0.0 %    % Basophils 0.3 %    % Immature Granulocytes 1.7 %    Nucleated RBCs 0 0 /100    Absolute Neutrophil 5.3 1.6 - 8.3 10e9/L    Absolute Lymphocytes 0.9 0.8 - 5.3 10e9/L    Absolute Monocytes 0.6 0.0 - 1.3 10e9/L    Absolute Eosinophils 0.0 0.0 - 0.7 10e9/L    Absolute Basophils 0.0 0.0 - 0.2 10e9/L    Abs Immature Granulocytes 0.1 0 - 0.4 10e9/L    Absolute Nucleated RBC 0.0    D dimer quantitative   Result Value Ref Range    D Dimer 0.4 0.0 - 0.50 ug/ml FEU   Troponin I   Result Value Ref Range    Troponin I ES <0.015 0.000 - 0.045 ug/L   Nt probnp inpatient (BNP)   Result Value Ref Range    N-Terminal Pro BNP Inpatient 151 0 - 900 pg/mL       Medications   albuterol (PROVENTIL) neb solution 2.5 mg (2.5 mg Nebulization Given 3/6/20 1907)   ipratropium - albuterol 0.5 mg/2.5 mg/3 mL (DUONEB) neb solution 3 mL (3 mLs Nebulization Given 3/6/20 1754)   predniSONE (DELTASONE) tablet 40 mg (40 mg Oral Given 3/6/20 1912)       Assessments & Plan (with Medical Decision Making)     I have reviewed the nursing notes.    I have reviewed the findings, diagnosis, plan and need for follow up with the patient.      New Prescriptions    PREDNISONE (DELTASONE) 20 MG TABLET    2 tabs by mouth daily for 6 days, 1 tab daily for 6 days, 1/2 tab daily for 6 days       Final diagnoses:   Bronchospasm     CASEY Elizabeth on 3/7/2020 at 9:50 AM   3/6/2020   [facility]     Warren Adames PA  03/07/20 [id number]

## 2020-03-13 DIAGNOSIS — J45.40 MODERATE PERSISTENT ASTHMA, UNSPECIFIED WHETHER COMPLICATED: ICD-10-CM

## 2020-03-16 RX ORDER — ALBUTEROL SULFATE 90 UG/1
AEROSOL, METERED RESPIRATORY (INHALATION)
Qty: 18 G | Refills: 2 | Status: SHIPPED | OUTPATIENT
Start: 2020-03-16 | End: 2022-10-04

## 2020-03-16 NOTE — TELEPHONE ENCOUNTER
albuterol (PROAIR HFA/PROVENTIL HFA/VENTOLIN HFA) 108 (90 Base) MCG/ACT       Last Written Prescription Date:  10/12/18  Last Fill Quantity: 1,   # refills: 2  Last Office Visit: 3/3/20  Future Office visit:    Next 5 appointments (look out 90 days)    Mar 25, 2020  2:40 PM CDT  (Arrive by 2:25 PM)  SHORT with Arie Camarillo,   [facility] [place] ([facility] [place] ) [address]  [place] [address]  [phone]           Routing refill request to provider for review/approval because:  Asthma Protocol Failed due to:     Asthma control assessment score within normal limits in last 6 months    ACT Total Scores 8/1/2019   ACT TOTAL SCORE -   ASTHMA ER VISITS -   ASTHMA HOSPITALIZATIONS -   ACT TOTAL SCORE (Goal Greater than or Equal to 20) 20   In the past 12 months, how many times did you visit the emergency room for your asthma without being admitted to the hospital? 0   In the past 12 months, how many times were you hospitalized overnight because of your asthma? 0

## 2020-03-24 DIAGNOSIS — K52.9 CHRONIC DIARRHEA: ICD-10-CM

## 2020-03-24 DIAGNOSIS — Z90.49 S/P COLON RESECTION: ICD-10-CM

## 2020-03-24 RX ORDER — DICYCLOMINE HYDROCHLORIDE 10 MG/1
20 CAPSULE ORAL
Qty: 180 CAPSULE | Refills: 0 | Status: SHIPPED | OUTPATIENT
Start: 2020-03-24 | End: 2020-06-24

## 2020-04-04 DIAGNOSIS — G25.81 RESTLESS LEGS SYNDROME: ICD-10-CM

## 2020-04-04 DIAGNOSIS — M35.3 PMR (POLYMYALGIA RHEUMATICA) (H): ICD-10-CM

## 2020-04-06 RX ORDER — CHOLECALCIFEROL (VITAMIN D3) 50 MCG
TABLET ORAL
Qty: 100 TABLET | Refills: 0 | Status: SHIPPED | OUTPATIENT
Start: 2020-04-06 | End: 2020-06-08

## 2020-04-06 RX ORDER — GABAPENTIN 400 MG/1
CAPSULE ORAL
Qty: 270 CAPSULE | Refills: 4 | Status: SHIPPED | OUTPATIENT
Start: 2020-04-06 | End: 2020-12-01

## 2020-04-06 RX ORDER — POTASSIUM CHLORIDE 1500 MG/1
TABLET, EXTENDED RELEASE ORAL
Qty: 90 TABLET | Refills: 0 | Status: SHIPPED | OUTPATIENT
Start: 2020-04-06 | End: 2020-05-15

## 2020-04-06 NOTE — TELEPHONE ENCOUNTER
vitamin D3 (CHOLECALCIFEROL) 2000 units (50 mcg) tablet       Last Written Prescription Date:  12/4/2019  Last Fill Quantity: 60,   # refills: 0  Last Office Visit: 3/3/2020       gabapentin (NEURONTIN) 400 MG capsule        Last Written Prescription Date:  9/25/2019  Last Fill Quantity: 270,   # refills: 4  Last Office Visit3/3/2020:     potassium chloride ER (K-DUR/KLOR-CON M) 20 MEQ CR tablet       Last Written Prescription Date:  11/20/2019  Last Fill Quantity: 90,   # refills: 0  Last Office Visit: 3/3/2020

## 2020-04-28 DIAGNOSIS — R09.82 POST-NASAL DRIP: ICD-10-CM

## 2020-04-28 RX ORDER — MONTELUKAST SODIUM 10 MG/1
10 TABLET ORAL AT BEDTIME
Qty: 90 TABLET | Refills: 1 | Status: SHIPPED | OUTPATIENT
Start: 2020-04-28 | End: 2020-10-28

## 2020-04-28 NOTE — TELEPHONE ENCOUNTER
montelukast (SINGULAIR) 10 MG tablet         Last Written Prescription Date:  1/17/20  Last Fill Quantity: 90,   # refills: 1  Last Office Visit: 3/3/20  Future Office visit:       Routing refill request to provider for review/approval because:  Leukotriene Inhibitors Protocol Failed    Asthma control assessment score within normal limits in last 6 months    ACT Total Scores 8/1/2019   ACT TOTAL SCORE -   ASTHMA ER VISITS -   ASTHMA HOSPITALIZATIONS -   ACT TOTAL SCORE (Goal Greater than or Equal to 20) 20   In the past 12 months, how many times did you visit the emergency room for your asthma without being admitted to the hospital? 0   In the past 12 months, how many times were you hospitalized overnight because of your asthma? 0

## 2020-05-15 DIAGNOSIS — G25.81 RESTLESS LEGS SYNDROME: ICD-10-CM

## 2020-05-15 RX ORDER — POTASSIUM CHLORIDE 1500 MG/1
TABLET, EXTENDED RELEASE ORAL
Qty: 90 TABLET | Refills: 3 | Status: SHIPPED | OUTPATIENT
Start: 2020-05-15 | End: 2020-10-09

## 2020-06-08 DIAGNOSIS — K21.9 GASTROESOPHAGEAL REFLUX DISEASE, ESOPHAGITIS PRESENCE NOT SPECIFIED: ICD-10-CM

## 2020-06-08 DIAGNOSIS — G25.81 RESTLESS LEGS SYNDROME: ICD-10-CM

## 2020-06-08 DIAGNOSIS — E87.6 HYPOKALEMIA: ICD-10-CM

## 2020-06-08 RX ORDER — CHOLECALCIFEROL (VITAMIN D3) 50 MCG
TABLET ORAL
Qty: 100 TABLET | Refills: 0 | Status: SHIPPED | OUTPATIENT
Start: 2020-06-08 | End: 2020-08-13

## 2020-06-08 RX ORDER — SPIRONOLACTONE 50 MG/1
50 TABLET, FILM COATED ORAL 2 TIMES DAILY
Qty: 180 TABLET | Refills: 0 | Status: SHIPPED | OUTPATIENT
Start: 2020-06-08 | End: 2020-09-28

## 2020-06-08 NOTE — TELEPHONE ENCOUNTER
Prilosec      Last Written Prescription Date:  1/3/2020  Last Fill Quantity: 90,   # refills: 1  Last Office Visit: 3/3/2020    Spironolactone      Last Written Prescription Date:  2/3/2020  Last Fill Quantity: 180,   # refills: 1  Last Office Visit: 3/3/2020    Vitamin D3      Last Written Prescription Date:  4/6/2020  Last Fill Quantity: 100,   # refills: 0  Last Office Visit: 3/3/2020

## 2020-06-09 RX ORDER — OMEPRAZOLE 40 MG/1
40 CAPSULE, DELAYED RELEASE ORAL DAILY
Qty: 90 CAPSULE | Refills: 1 | Status: SHIPPED | OUTPATIENT
Start: 2020-06-09 | End: 2020-12-01

## 2020-06-24 DIAGNOSIS — Z90.49 S/P COLON RESECTION: ICD-10-CM

## 2020-06-24 DIAGNOSIS — K52.9 CHRONIC DIARRHEA: ICD-10-CM

## 2020-06-24 RX ORDER — DICYCLOMINE HYDROCHLORIDE 10 MG/1
20 CAPSULE ORAL
Qty: 180 CAPSULE | Refills: 0 | Status: SHIPPED | OUTPATIENT
Start: 2020-06-24 | End: 2020-08-27

## 2020-07-14 DIAGNOSIS — G25.81 RESTLESS LEGS SYNDROME (RLS): ICD-10-CM

## 2020-07-15 RX ORDER — PRAMIPEXOLE DIHYDROCHLORIDE 0.12 MG/1
TABLET ORAL
Qty: 90 TABLET | Refills: 1 | Status: SHIPPED | OUTPATIENT
Start: 2020-07-15 | End: 2021-03-01

## 2020-07-15 NOTE — TELEPHONE ENCOUNTER
pramipexole 0.125 mg tablet       Last Written Prescription Date:  1/17/20  Last Fill Quantity: 90,   # refills: 1  Last Office Visit: 3/3/20  Future Office visit:       Routing refill request to provider for review/approval because:        Antiparkinson's Agents Protocol Failed    Recent (6 mo) or future (30 days) visit within the authorizing provider's specialty

## 2020-07-24 DIAGNOSIS — K90.9 DIARRHEA DUE TO MALABSORPTION: ICD-10-CM

## 2020-07-24 DIAGNOSIS — R19.7 DIARRHEA DUE TO MALABSORPTION: ICD-10-CM

## 2020-07-27 RX ORDER — DIPHENOXYLATE HCL/ATROPINE 2.5-.025MG
1-2 TABLET ORAL 4 TIMES DAILY PRN
Qty: 240 TABLET | Refills: 1 | Status: SHIPPED | OUTPATIENT
Start: 2020-07-27 | End: 2021-04-07

## 2020-07-30 DIAGNOSIS — R09.82 POST-NASAL DRIP: ICD-10-CM

## 2020-08-03 RX ORDER — CETIRIZINE HYDROCHLORIDE 10 MG/1
10 TABLET ORAL AT BEDTIME
Qty: 90 TABLET | Refills: 1 | Status: SHIPPED | OUTPATIENT
Start: 2020-08-03 | End: 2021-02-03

## 2020-08-13 DIAGNOSIS — G25.81 RESTLESS LEGS SYNDROME: ICD-10-CM

## 2020-08-13 DIAGNOSIS — M79.10 MYALGIA: ICD-10-CM

## 2020-08-13 DIAGNOSIS — M35.3 PMR (POLYMYALGIA RHEUMATICA) (H): ICD-10-CM

## 2020-08-13 RX ORDER — PREDNISONE 5 MG/1
5 TABLET ORAL DAILY
Qty: 90 TABLET | Refills: 1 | Status: SHIPPED | OUTPATIENT
Start: 2020-08-13 | End: 2020-11-19

## 2020-08-13 RX ORDER — DM/PE/ACETAMINOPHEN/CHLORPHENR 10-5-325-2
TABLET, SEQUENTIAL ORAL
Qty: 100 TABLET | Refills: 0 | Status: SHIPPED | OUTPATIENT
Start: 2020-08-13 | End: 2020-10-09

## 2020-08-13 NOTE — TELEPHONE ENCOUNTER
Omeprazole failed protocol due to a diagnosis of osteoporosis on record   no pedal edema/no cyanosis/no clubbing

## 2020-08-13 NOTE — TELEPHONE ENCOUNTER
prednisone      Last Written Prescription Date:  3/6/2020  Last Fill Quantity: 21 tab,   # refills: 0  Last Office Visit: 3/3/2020  Future Office visit:       Routing refill request to provider for review/approval because:    GNP vitamin D      Last Written Prescription Date:  6/8/2020  Last Fill Quantity: 100 tab,   # refills: 0  Last Office Visit: 3/3/2020  Future Office visit:       Routing refill request to provider for review/approval because:

## 2020-08-24 ENCOUNTER — TELEPHONE (OUTPATIENT)
Dept: INFUSION THERAPY | Facility: OTHER | Age: 65
End: 2020-08-24

## 2020-08-24 NOTE — TELEPHONE ENCOUNTER
Our records indicate that Chyna is due for her next reclast infusion. She had her last reclast infusion 9/13/2019. Would you like Chyna to receive her next reclast infusion?

## 2020-08-26 RX ORDER — ZOLEDRONIC ACID 5 MG/100ML
5 INJECTION, SOLUTION INTRAVENOUS ONCE
Status: CANCELLED
Start: 2020-08-26

## 2020-08-26 RX ORDER — HEPARIN SODIUM,PORCINE 10 UNIT/ML
5 VIAL (ML) INTRAVENOUS
Status: CANCELLED | OUTPATIENT
Start: 2020-08-26

## 2020-08-26 RX ORDER — HEPARIN SODIUM (PORCINE) LOCK FLUSH IV SOLN 100 UNIT/ML 100 UNIT/ML
5 SOLUTION INTRAVENOUS
Status: CANCELLED | OUTPATIENT
Start: 2020-08-26

## 2020-08-26 NOTE — TELEPHONE ENCOUNTER
Please sign updated reclast therapy plan, once therapy plan is signed, we will call to schedule patient.

## 2020-08-27 NOTE — TELEPHONE ENCOUNTER
Please call patient to schedule for reclast infusion. Level 2. Updated reclast therapy signed by Dr Camarillo.

## 2020-09-09 ENCOUNTER — OFFICE VISIT (OUTPATIENT)
Dept: PEDIATRICS | Facility: OTHER | Age: 65
End: 2020-09-09
Attending: INTERNAL MEDICINE
Payer: COMMERCIAL

## 2020-09-09 VITALS
TEMPERATURE: 98.2 F | OXYGEN SATURATION: 97 % | WEIGHT: 130 LBS | DIASTOLIC BLOOD PRESSURE: 70 MMHG | BODY MASS INDEX: 24.54 KG/M2 | SYSTOLIC BLOOD PRESSURE: 110 MMHG | HEART RATE: 85 BPM

## 2020-09-09 DIAGNOSIS — E61.1 IRON DEFICIENCY: ICD-10-CM

## 2020-09-09 DIAGNOSIS — Z13.220 SCREENING CHOLESTEROL LEVEL: ICD-10-CM

## 2020-09-09 DIAGNOSIS — M79.89 SWELLING OF RIGHT HAND: Primary | ICD-10-CM

## 2020-09-09 DIAGNOSIS — M35.3 PMR (POLYMYALGIA RHEUMATICA) (H): ICD-10-CM

## 2020-09-09 DIAGNOSIS — M10.041 IDIOPATHIC GOUT OF RIGHT HAND, UNSPECIFIED CHRONICITY: ICD-10-CM

## 2020-09-09 LAB
ALBUMIN SERPL-MCNC: 3.9 G/DL (ref 3.4–5)
ALP SERPL-CCNC: 81 U/L (ref 40–150)
ALT SERPL W P-5'-P-CCNC: 40 U/L (ref 0–50)
ANION GAP SERPL CALCULATED.3IONS-SCNC: 3 MMOL/L (ref 3–14)
AST SERPL W P-5'-P-CCNC: 28 U/L (ref 0–45)
BILIRUB SERPL-MCNC: 0.2 MG/DL (ref 0.2–1.3)
BUN SERPL-MCNC: 21 MG/DL (ref 7–30)
CALCIUM SERPL-MCNC: 9.4 MG/DL (ref 8.5–10.1)
CHLORIDE SERPL-SCNC: 110 MMOL/L (ref 94–109)
CHOLEST SERPL-MCNC: 215 MG/DL
CO2 SERPL-SCNC: 24 MMOL/L (ref 20–32)
CREAT SERPL-MCNC: 0.81 MG/DL (ref 0.52–1.04)
CRP SERPL-MCNC: 9.7 MG/L (ref 0–8)
ERYTHROCYTE [DISTWIDTH] IN BLOOD BY AUTOMATED COUNT: 12.7 % (ref 10–15)
ERYTHROCYTE [SEDIMENTATION RATE] IN BLOOD BY WESTERGREN METHOD: 7 MM/H (ref 0–30)
GFR SERPL CREATININE-BSD FRML MDRD: 75 ML/MIN/{1.73_M2}
GLUCOSE SERPL-MCNC: 105 MG/DL (ref 70–99)
HCT VFR BLD AUTO: 43.6 % (ref 35–47)
HDLC SERPL-MCNC: 43 MG/DL
HGB BLD-MCNC: 14.8 G/DL (ref 11.7–15.7)
LDLC SERPL CALC-MCNC: 106 MG/DL
MCH RBC QN AUTO: 31.2 PG (ref 26.5–33)
MCHC RBC AUTO-ENTMCNC: 33.9 G/DL (ref 31.5–36.5)
MCV RBC AUTO: 92 FL (ref 78–100)
NONHDLC SERPL-MCNC: 172 MG/DL
PLATELET # BLD AUTO: 267 10E9/L (ref 150–450)
POTASSIUM SERPL-SCNC: 4.5 MMOL/L (ref 3.4–5.3)
PROT SERPL-MCNC: 7.7 G/DL (ref 6.8–8.8)
RBC # BLD AUTO: 4.75 10E12/L (ref 3.8–5.2)
SODIUM SERPL-SCNC: 137 MMOL/L (ref 133–144)
TRIGL SERPL-MCNC: 332 MG/DL
URATE SERPL-MCNC: 6.2 MG/DL (ref 2.6–6)
WBC # BLD AUTO: 13.3 10E9/L (ref 4–11)

## 2020-09-09 PROCEDURE — 99214 OFFICE O/P EST MOD 30 MIN: CPT | Performed by: INTERNAL MEDICINE

## 2020-09-09 PROCEDURE — 90471 IMMUNIZATION ADMIN: CPT | Performed by: INTERNAL MEDICINE

## 2020-09-09 PROCEDURE — 36415 COLL VENOUS BLD VENIPUNCTURE: CPT | Mod: ZL | Performed by: INTERNAL MEDICINE

## 2020-09-09 PROCEDURE — 85027 COMPLETE CBC AUTOMATED: CPT | Mod: ZL | Performed by: INTERNAL MEDICINE

## 2020-09-09 PROCEDURE — G0463 HOSPITAL OUTPT CLINIC VISIT: HCPCS

## 2020-09-09 PROCEDURE — 80053 COMPREHEN METABOLIC PANEL: CPT | Mod: ZL | Performed by: INTERNAL MEDICINE

## 2020-09-09 PROCEDURE — 84550 ASSAY OF BLOOD/URIC ACID: CPT | Mod: ZL | Performed by: INTERNAL MEDICINE

## 2020-09-09 PROCEDURE — 86140 C-REACTIVE PROTEIN: CPT | Mod: ZL | Performed by: INTERNAL MEDICINE

## 2020-09-09 PROCEDURE — 85652 RBC SED RATE AUTOMATED: CPT | Mod: ZL | Performed by: INTERNAL MEDICINE

## 2020-09-09 PROCEDURE — 80061 LIPID PANEL: CPT | Mod: ZL | Performed by: INTERNAL MEDICINE

## 2020-09-09 RX ORDER — ALLOPURINOL 100 MG/1
100 TABLET ORAL 2 TIMES DAILY
Qty: 180 TABLET | Refills: 1 | Status: SHIPPED | OUTPATIENT
Start: 2020-09-09 | End: 2021-03-01

## 2020-09-09 ASSESSMENT — PAIN SCALES - GENERAL: PAINLEVEL: NO PAIN (1)

## 2020-09-09 NOTE — NURSING NOTE
"Chief Complaint   Patient presents with     Arthritis       Initial /70 (BP Location: Left arm, Patient Position: Chair, Cuff Size: Adult Regular)   Pulse 85   Temp 98.2  F (36.8  C) (Tympanic)   Wt 59 kg (130 lb)   SpO2 97%   BMI 24.54 kg/m   Estimated body mass index is 24.54 kg/m  as calculated from the following:    Height as of 3/3/20: 1.55 m (5' 1.02\").    Weight as of this encounter: 59 kg (130 lb).  Medication Reconciliation: complete  Chris Duval LPN  "

## 2020-09-09 NOTE — PROGRESS NOTES
Subjective    Arthritis     HPI     Chronic Pain Follow-Up  Arthritis and PMR  Where in your body do you have pain? Pointer finger of right hand.  She reports that her joint gets swollen after activity.  She reports that the knuckle hurts to touch and is hot when inflamed..   How has your pain affected your ability to work? Not applicable  Which of these pain treatments have you tried since your last clinic visit? Cold, Heat and Massage  How well are you sleeping? Fair  How has your mood been since your last visit? About the same  Have you had a significant life event? No  Other aggravating factors: using finger  Taking medication as directed? Yes     She has minimal shoulder pains.  She has some pains in her anterior and lateral thighs.  She is on prednisone 5 mg daily and has flare dosing with last use one months ago.  PHQ-9 SCORE 8/1/2019 12/16/2019 3/3/2020   PHQ-9 Total Score - - -   PHQ-9 Total Score 4 6 0     MARGARITA-7 SCORE 8/1/2019 12/16/2019 3/3/2020   Total Score 2 2 0     No flowsheet data found.  Encounter-Level CSA:    There are no encounter-level csa.     Patient-Level CSA:    There are no patient-level csa.         How many servings of fruits and vegetables do you eat daily?  0-1    On average, how many sweetened beverages do you drink each day (Examples: soda, juice, sweet tea, etc.  Do NOT count diet or artificially sweetened beverages)?   0    How many days per week do you exercise enough to make your heart beat faster? 3 or less    How many minutes a day do you exercise enough to make your heart beat faster? 9 or less    How many days per week do you miss taking your medication? 0          Review of Systems  Constitutional, eye, ENT, skin, respiratory, cardiac, and GI are normal except as otherwise noted.    Problem List  Patient Active Problem List    Diagnosis Date Noted     Annual physical exam 06/28/2013     Priority: High     Osteoporosis 09/03/2019     Priority: Medium     Hip fracture  requiring operative repair (H) 02/25/2019     Priority: Medium     Closed fracture of neck of right femur (H) 02/23/2019     Priority: Medium     Closed right hip fracture (H) 02/23/2019     Priority: Medium     Functional diarrhea 10/26/2018     Priority: Medium     Iron deficiency 04/18/2018     Priority: Medium     Alcohol use 04/18/2018     Priority: Medium     Myalgia 05/30/2017     Priority: Medium     Abdominal muscle strain 05/05/2017     Priority: Medium     Strain of flexor muscle of hip, unspecified laterality, initial encounter 05/05/2017     Priority: Medium     Right shoulder pain 11/03/2016     Priority: Medium     Moderate major depression (H) 10/12/2016     Priority: Medium     ACP (advance care planning) 05/09/2016     Priority: Medium     Advance Care Planning 5/9/2016: ACP Review of Chart / Resources Provided:  Reviewed chart for advance care plan.  Chyna ABRAM Delgado has been provided information and resources to begin or update their advance care plan.  Added by Lina PONCE Buus             Pain of toe of right foot 05/09/2016     Priority: Medium     Arthritis 02/12/2016     Priority: Medium     Preoperative examination 12/02/2015     Priority: Medium     Simple chronic bronchitis (H) 12/02/2015     Priority: Medium     Graves disease 11/05/2015     Priority: Medium     Hand swelling 04/20/2015     Priority: Medium     Numbness and tingling in right hand 04/20/2015     Priority: Medium     Skin lesion 07/07/2014     Priority: Medium     Right cheek       Restless legs syndrome 04/16/2014     Priority: Medium     Somatic dysfunction of pelvic region 10/08/2013     Priority: Medium     Encounter for routine gynecological examination 07/12/2013     Priority: Medium     Problem list name updated by automated process. 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  cetirizine (ZYRTEC) 10 MG tablet, Take 1 tablet (10 mg) by mouth At Bedtime  dicyclomine (BENTYL) 10 MG capsule, Take 2 capsules (20 mg) by mouth 3 times daily (before meals)  diphenoxylate-atropine (LOMOTIL) 2.5-0.025 MG tablet, Take 1-2 tablets by mouth 4 times daily as needed for diarrhea  ferrous sulfate (IRON) 325 (65 FE) MG tablet, Take 325 mg by mouth 2 times daily (with meals)  fluticasone-salmeterol (ADVAIR) 250-50 MCG/DOSE inhaler, Inhale 1 puff into the lungs 2 times daily  gabapentin (NEURONTIN) 400 MG capsule, Take 3 capsules (1,200 mg) by mouth 3 times daily  GNP VITAMIN D MAXIMUM STRENGTH 50 MCG (2000 UT) tablet, TAKE TWO TABLETS BY MOUTH EVERY DAY  ibuprofen (ADVIL,MOTRIN) 200 MG tablet, Take 200 mg by mouth every 4 hours as needed   ketotifen (ZADITOR/REFRESH ANTI-ITCH) 0.025 % SOLN ophthalmic solution, PLACE 2 DROPS INTO BOTH EYES 2 TIMES DAILY  loperamide (IMODIUM) 2 MG capsule, Take 6 mg by mouth 2 times daily Pt states takes 3 tabs (6 mg) twice a day unless she needs to take more, depending on  what she eats.  magnesium 100 MG CAPS, Take by mouth 2 times daily  montelukast (SINGULAIR) 10 MG tablet, Take 1 tablet (10 mg) by mouth At Bedtime  Multiple Vitamin (MULTIVITAMIN) per tablet, Take 1 tablet by mouth daily. Every day with food  omeprazole (PRILOSEC) 20 MG DR capsule, Take 20 mg by mouth 2 times daily  omeprazole (PRILOSEC) 40 MG DR capsule, Take 1 capsule (40 mg) by mouth daily  potassium chloride ER (KLOR-CON M) 20 MEQ CR tablet, Take 1 tablet (20 mEq) by mouth 3 times daily  pramipexole (MIRAPEX) 0.125 MG tablet, Take 1 tablet (0.125 mg) by mouth At Bedtime  predniSONE (DELTASONE) 10 MG tablet, Take 1 tablet (10 mg) by mouth daily  predniSONE (DELTASONE) 20 MG tablet, 2 tabs by mouth daily for 6 days, 1 tab daily for 6 days, 1/2 tab daily for 6 days  predniSONE (DELTASONE) 5 MG tablet, Take 5 mg by mouth daily.  PSYLLIUM PO, Take 1 Tablespoonful by mouth 2 times daily   Simethicone 125 MG CAPS, Take 500 mg by mouth 2 times daily  spironolactone (ALDACTONE) 50 MG tablet, Take 1 tablet (50 mg) by mouth 2 times daily  VENTOLIN  (90 Base) MCG/ACT inhaler, Inhale 2 puffs into the lungs every 4 hours as needed for shortness of breath / dyspnea or wheezing  vitamin E 200 UNIT capsule, Take 200 Units by mouth 2 times daily    No current facility-administered medications on file prior to visit.     Allergies  Allergies   Allergen Reactions     Codeine Swelling     Throat swelling-Patient can tolerate Hydrocodone & morphine     Nortriptyline Other (See Comments)     Crying      Reviewed and updated as needed this visit by Provider           Objective    /70 (BP Location: Left arm, Patient Position: Chair, Cuff Size: Adult Regular)   Pulse 85   Temp 98.2  F (36.8  C) (Tympanic)   Wt 59 kg (130 lb)   SpO2 97%   BMI 24.54 kg/m    Facility age limit for growth percentiles is 20 years.    Physical Exam  GENERAL: healthy, alert and no distress  EYES: Eyes grossly normal to inspection, EOMI and  conjunctivae and sclerae normal  NECK: no adenopathy, no asymmetry, masses, or scars and thyroid normal to palpation  RESP: lungs clear to auscultation - no rales, rhonchi or wheezes  CV: regular rate and rhythm, normal S1 S2, no S3 or S4, no murmur, click or rub, no peripheral edema and peripheral pulses strong  ABDOMEN: soft, nontender, no hepatosplenomegaly, no masses and bowel sounds normal  MS: no gross musculoskeletal defects noted, no edema  MS: no edema and no joint swelling of the hands, knees or ankles  PSYCH: mentation appears normal, affect normal/bright    Diagnostics:  Results for orders placed or performed in visit on 09/09/20   Lipid Profile     Status: Abnormal   Result Value Ref Range    Cholesterol 215 (H) <200 mg/dL    Triglycerides 332 (H) <150 mg/dL    HDL Cholesterol 43 (L) >49 mg/dL    LDL Cholesterol Calculated 106 (H) <100 mg/dL    Non HDL Cholesterol 172 (H) <130 mg/dL   CBC with platelets     Status: Abnormal   Result Value Ref Range    WBC 13.3 (H) 4.0 - 11.0 10e9/L    RBC Count 4.75 3.8 - 5.2 10e12/L    Hemoglobin 14.8 11.7 - 15.7 g/dL    Hematocrit 43.6 35.0 - 47.0 %    MCV 92 78 - 100 fl    MCH 31.2 26.5 - 33.0 pg    MCHC 33.9 31.5 - 36.5 g/dL    RDW 12.7 10.0 - 15.0 %    Platelet Count 267 150 - 450 10e9/L   ESR: Erythrocyte sedimentation rate     Status: None   Result Value Ref Range    Sed Rate 7 0 - 30 mm/h   CRP inflammation     Status: Abnormal   Result Value Ref Range    CRP Inflammation 9.7 (H) 0.0 - 8.0 mg/L   Comprehensive metabolic panel     Status: Abnormal   Result Value Ref Range    Sodium 137 133 - 144 mmol/L    Potassium 4.5 3.4 - 5.3 mmol/L    Chloride 110 (H) 94 - 109 mmol/L    Carbon Dioxide 24 20 - 32 mmol/L    Anion Gap 3 3 - 14 mmol/L    Glucose 105 (H) 70 - 99 mg/dL    Urea Nitrogen 21 7 - 30 mg/dL    Creatinine 0.81 0.52 - 1.04 mg/dL    GFR Estimate 75 >60 mL/min/[1.73_m2]    GFR Estimate If Black 87 >60 mL/min/[1.73_m2]    Calcium 9.4 8.5 - 10.1 mg/dL     Bilirubin Total 0.2 0.2 - 1.3 mg/dL    Albumin 3.9 3.4 - 5.0 g/dL    Protein Total 7.7 6.8 - 8.8 g/dL    Alkaline Phosphatase 81 40 - 150 U/L    ALT 40 0 - 50 U/L    AST 28 0 - 45 U/L   Uric acid     Status: Abnormal   Result Value Ref Range    Uric Acid 6.2 (H) 2.6 - 6.0 mg/dL           Assessment & Plan    (M79.89) Swelling of right hand  (primary encounter diagnosis)  Comment: Right hand 1st MTP showed edema and erythema on pictures taken and she has an elevated uric acid.  Plan:  As below.    (M10.041) Idiopathic gout of right hand, unspecified chronicity  Comment: Her WBC and CRP are mildly elevated and likely due to her recent gout attack.  Plan:   Start allopurinol (ZYLOPRIM) 100 MG tablet BID.    (E61.1) Iron deficiency  Comment: Her hemoglobin is normal  Plan:   Continue Iron tablets and recheck iron at the next visit.    (M35.3) PMR (polymyalgia rheumatica) (H)  Comment: Well controlled with a normal ESR.  Her WBC and CRP are mildy elevated, but most likely due to her gout attack.  Plan:   She will continue prednisone 5 mg daily.  She has burst dosing available for increased muscle pains.    (Z13.220) Screening cholesterol level  Comment: The 10-year ASCVD risk score (Drew SYDNIE Jr., et al., 2013) is: 5.3%    Values used to calculate the score:      Age: 64 years      Sex: Female      Is Non- : No      Diabetic: No      Tobacco smoker: No      Systolic Blood Pressure: 110 mmHg      Is BP treated: No      HDL Cholesterol: 43 mg/dL      Total Cholesterol: 215 mg/dL    Plan:   Repeat in 5 years as her risk is low for cardiovascular disease.              Follow Up  No follow-ups on file.  in 6 month(s) for Asthma, pneumococcal vaccine and iron level check with Dr. Sandrine Camarillo DO,

## 2020-09-14 ENCOUNTER — INFUSION THERAPY VISIT (OUTPATIENT)
Dept: INFUSION THERAPY | Facility: OTHER | Age: 65
End: 2020-09-14
Attending: INTERNAL MEDICINE
Payer: COMMERCIAL

## 2020-09-14 VITALS — WEIGHT: 130.95 LBS | BODY MASS INDEX: 24.1 KG/M2 | HEIGHT: 62 IN

## 2020-09-14 DIAGNOSIS — M81.0 AGE-RELATED OSTEOPOROSIS WITHOUT CURRENT PATHOLOGICAL FRACTURE: Primary | ICD-10-CM

## 2020-09-14 PROCEDURE — 25800030 ZZH RX IP 258 OP 636: Performed by: INTERNAL MEDICINE

## 2020-09-14 PROCEDURE — 96365 THER/PROPH/DIAG IV INF INIT: CPT

## 2020-09-14 PROCEDURE — 25000128 H RX IP 250 OP 636: Performed by: INTERNAL MEDICINE

## 2020-09-14 RX ORDER — ZOLEDRONIC ACID 5 MG/100ML
5 INJECTION, SOLUTION INTRAVENOUS ONCE
Status: COMPLETED | OUTPATIENT
Start: 2020-09-14 | End: 2020-09-14

## 2020-09-14 RX ORDER — HEPARIN SODIUM (PORCINE) LOCK FLUSH IV SOLN 100 UNIT/ML 100 UNIT/ML
5 SOLUTION INTRAVENOUS
Status: CANCELLED | OUTPATIENT
Start: 2020-09-14

## 2020-09-14 RX ORDER — HEPARIN SODIUM,PORCINE 10 UNIT/ML
5 VIAL (ML) INTRAVENOUS
Status: CANCELLED | OUTPATIENT
Start: 2020-09-14

## 2020-09-14 RX ORDER — ZOLEDRONIC ACID 5 MG/100ML
5 INJECTION, SOLUTION INTRAVENOUS ONCE
Status: CANCELLED
Start: 2020-09-14

## 2020-09-14 RX ADMIN — ZOLEDRONIC ACID 5 MG: 5 INJECTION, SOLUTION INTRAVENOUS at 09:44

## 2020-09-14 RX ADMIN — SODIUM CHLORIDE 250 ML: 9 INJECTION, SOLUTION INTRAVENOUS at 09:43

## 2020-09-14 ASSESSMENT — MIFFLIN-ST. JEOR: SCORE: 1087.25

## 2020-09-14 NOTE — PATIENT INSTRUCTIONS
Patient Education   Zometa or Reclast  Generic Name: Zoledronic Acid  Drug type  Zometa or Reclast works to slow the growth of cancer in the bones.  What this drug is used for  Bone metastasis (spread of cancer to the bones), or ________________________________________  How this drug is given  This drug is given through an IV line, a small tube inserted into a vein. Please let your nurse know right away if you feel pain, discomfort or heat during your infusion; or if you see redness on the skin where the IV is placed.  The amount of medicine that you receive depends on many things. Your doctor will decide on the best dose for you.   Make sure your health care team knows about all the medicines and supplements you take. This will help prevent drug interactions.   Side effects  Most people do not have all the side effects listed. Side effects usually go away after the treatment is complete. Some of the rare side effects are not listed here, so tell your doctor if you have any unusual symptoms.  Common side effects   (occur in more than 3 out of 10 of people):    Weakness    Fever    Chills    Feeling tired    Feeling dizzy    Feeling sleepy  Less common side effects   (occur in less than 3 out of 10 people):    Bone, joint or muscle pain    Headache    Confusion    Redness at IV site    Low blood calcium    Feel sick to your stomach (nausea)  Rare but serious side effects    Damage to the jaw bone. Tell your dentist that you are taking this drug before having any dental work. Be sure to have regular dental exams.    Kidney problems  Call your doctor right away if you have:    Signs of an allergic reaction: Breathing problems, feel like your throat is closing up, swelling of the mouth, face, lips or tongue, or hives (red, itchy blotches on the skin).    Fever of 100.5  F (38 C) or higher or chills    Feel faint or dizzy    Feel confused  Call your doctor within 24 hours if you feel:    So sick to your stomach, that you  cannot eat    So tired that you cannot care for yourself    You are vomiting more than 5 times in 24 hours    Unable to eat or drink for 24 hours  If you have any side effects, call us. We can help you cope with them.   Other information: __________________________  _________________________________________  _________________________________________  For more information, see:  www.chemocare.com   www.medlineplus.gov/druginformation.htm  www.cancer.gov/cancertopics/coping/chemotherapy-and-you  For informational purposes only. Not to replace the advice of your health care provider.   Copyright   2016 [facility]. All rights reserved. [employer] 605050 - 11/16.

## 2020-09-14 NOTE — PROGRESS NOTES
Patient reported with last Reclast she had symptoms that included weakness. Nausea and vomiting.  Patient was concerned. She stated she had a conversation with her PCP regarding it but he still wanted her to try it again.  Patient states she feels comfortable with having the infusion.  Patient was given education regarding Reclast including side effects and adverse reactions.  Patient informed to call Dr. Deluca with any future concerns.  Advised her to speak with PCP immediately if she experiences vomiting or weakness that is excessive of 5 times in 24 hours up to 48 hours after infusion.

## 2020-09-14 NOTE — PROGRESS NOTES
Patient is a 64 year old female  here accompanied by self  today for infusion of Reclast per order of Dr. Camarillo.  Patient identified with two identifiers, order verified, and verbal consent for today's infusion obtained from patient.      Patient  lab values: Calcium 9.4 Creatinine: 0.81       Patient meets order parameters for today's treatment.     22  gauge angio cath inserted into right ante cubical.  Immediate blood return noted.  IV secured with sterile, transparent dressing and tape.  Patient tolerated well, denies pain or discomfort at this time.  Flushes easily without resistance, no signs or symptoms of infiltration or infection.   Patient denies questions or concerns regarding infusion and/or medication(s) being administered.  0944 IV pump verified with dose, drug, and rate of administration.  Infusion administered per protocol.  Patient tolerated infusion well, no signs or symptoms of adverse reaction noted.  Patient denies pain nor discomfort.     IV removed, catheter intact.  Site clean, dry and intact.  No signs or symptoms of infiltration or infection.  Covered with a sterile bandage, slight pressure applied for 30 seconds.  Pt instructed to leave bandage intact for a minimum of one hour, and to call with questions or concerns.  Copy of appointments, discharge instructions, and after visit summary (AVS) provided to patient.  Patient states understanding, discharged.

## 2020-09-25 DIAGNOSIS — E87.6 HYPOKALEMIA: ICD-10-CM

## 2020-09-28 RX ORDER — SPIRONOLACTONE 50 MG/1
50 TABLET, FILM COATED ORAL 2 TIMES DAILY
Qty: 180 TABLET | Refills: 0 | Status: SHIPPED | OUTPATIENT
Start: 2020-09-28 | End: 2020-12-28

## 2020-10-28 DIAGNOSIS — R09.82 POST-NASAL DRIP: ICD-10-CM

## 2020-10-28 RX ORDER — MONTELUKAST SODIUM 10 MG/1
10 TABLET ORAL AT BEDTIME
Qty: 90 TABLET | Refills: 1 | Status: SHIPPED | OUTPATIENT
Start: 2020-10-28 | End: 2021-05-25

## 2020-10-28 NOTE — TELEPHONE ENCOUNTER
Singulair       Last Written Prescription Date:  4-  Last Fill Quantity: 90,   # refills: 1  Last Office Visit: 9-9-2020  Future Office visit:       Routing refill request to provider for review/approval because:

## 2020-10-30 NOTE — PROGRESS NOTES
"Subjective     Chyna Delgado is a 64 year old female who presents to clinic today for the following health issues:    HPI         New Patient/Transfer of Care  Gout/ Single Inflamed Joint  Onset/Duration: August  Description:   Location: hand - right  Joint Swelling: YES  Redness: no  Pain: YES  Intensity: moderate, severe  Progression of Symptoms: same  Accompanying Signs & Symptoms:  Fevers: no  History:   Trauma to the area: no  Previous history of gout: YES  Recent illness: no  Alcohol use: YES  Diuretic use: no  Precipitating or alleviating factors: None  Therapies tried and outcome: allopurinol    Would to discuss DEXA Scan   Please discuss Daily Aspirin     Chyna presents today for establishment of care.  She recently had labs done in early September.  She denies any acute issues but does report bruising and questions use of Asa.  It is also noted she is taking Spironolactone BID, she is unsure why she is taking such a high dose.  She was started on Allopurinol during her last visit with PCP and still reports some pain in the MCP of right index finger.  She questions if allopurinol is working.            Review of Systems   Constitutional, HEENT, cardiovascular, pulmonary, GI, , musculoskeletal, neuro, skin, endocrine and psych systems are negative, except as otherwise noted.      Objective    /76 (BP Location: Left arm, Patient Position: Chair, Cuff Size: Adult Regular)   Pulse 72   Temp 97.4  F (36.3  C) (Tympanic)   Ht 1.575 m (5' 2\")   Wt 59 kg (130 lb)   SpO2 98%   BMI 23.78 kg/m    Body mass index is 23.78 kg/m .  Physical Exam   GENERAL: Alert and no distress  EYES: Eyes grossly normal to inspection, PERRL and conjunctivae and sclerae normal  HENT: ear canals and TM's normal  NECK: no adenopathy, no asymmetry, masses, or scars and thyroid normal to palpation  RESP: lungs clear to auscultation - no rales, rhonchi or wheezes  BREAST: normal without masses, tenderness or nipple " discharge and no palpable axillary masses or adenopathy  CV: regular rate and rhythm, normal S1 S2, no S3 or S4, no murmur, click or rub, no peripheral edema and peripheral pulses strong  ABDOMEN: soft, nontender, no hepatosplenomegaly, no masses and bowel sounds normal  MS: no gross musculoskeletal defects noted, no edema  SKIN: no suspicious lesions or rashes  NEURO: Normal strength and tone, mentation intact and speech normal  PSYCH: mentation appears normal, affect normal/bright    No results found for this or any previous visit (from the past 24 hour(s)).  No results found for any visits on 11/02/20.  Office Visit on 09/09/2020   Component Date Value Ref Range Status     Cholesterol 09/09/2020 215* <200 mg/dL Final    Desirable:       <200 mg/dl     Triglycerides 09/09/2020 332* <150 mg/dL Final    Comment: Borderline high:  150-199 mg/dl  High:             200-499 mg/dl  Very high:       >499 mg/dl  Non Fasting       HDL Cholesterol 09/09/2020 43* >49 mg/dL Final     LDL Cholesterol Calculated 09/09/2020 106* <100 mg/dL Final    Comment: Above desirable:  100-129 mg/dl  Borderline High:  130-159 mg/dL  High:             160-189 mg/dL  Very high:       >189 mg/dl       Non HDL Cholesterol 09/09/2020 172* <130 mg/dL Final    Comment: Above Desirable:  130-159 mg/dl  Borderline high:  160-189 mg/dl  High:             190-219 mg/dl  Very high:       >219 mg/dl       WBC 09/09/2020 13.3* 4.0 - 11.0 10e9/L Final     RBC Count 09/09/2020 4.75  3.8 - 5.2 10e12/L Final     Hemoglobin 09/09/2020 14.8  11.7 - 15.7 g/dL Final     Hematocrit 09/09/2020 43.6  35.0 - 47.0 % Final     MCV 09/09/2020 92  78 - 100 fl Final     MCH 09/09/2020 31.2  26.5 - 33.0 pg Final     MCHC 09/09/2020 33.9  31.5 - 36.5 g/dL Final     RDW 09/09/2020 12.7  10.0 - 15.0 % Final     Platelet Count 09/09/2020 267  150 - 450 10e9/L Final     Sed Rate 09/09/2020 7  0 - 30 mm/h Final     CRP Inflammation 09/09/2020 9.7* 0.0 - 8.0 mg/L Final      Sodium 09/09/2020 137  133 - 144 mmol/L Final     Potassium 09/09/2020 4.5  3.4 - 5.3 mmol/L Final     Chloride 09/09/2020 110* 94 - 109 mmol/L Final     Carbon Dioxide 09/09/2020 24  20 - 32 mmol/L Final     Anion Gap 09/09/2020 3  3 - 14 mmol/L Final     Glucose 09/09/2020 105* 70 - 99 mg/dL Final    Non Fasting     Urea Nitrogen 09/09/2020 21  7 - 30 mg/dL Final     Creatinine 09/09/2020 0.81  0.52 - 1.04 mg/dL Final     GFR Estimate 09/09/2020 75  >60 mL/min/[1.73_m2] Final    Comment: Non  GFR Calc  Starting 12/18/2018, serum creatinine based estimated GFR (eGFR) will be   calculated using the Chronic Kidney Disease Epidemiology Collaboration   (CKD-EPI) equation.       GFR Estimate If Black 09/09/2020 87  >60 mL/min/[1.73_m2] Final    Comment:  GFR Calc  Starting 12/18/2018, serum creatinine based estimated GFR (eGFR) will be   calculated using the Chronic Kidney Disease Epidemiology Collaboration   (CKD-EPI) equation.       Calcium 09/09/2020 9.4  8.5 - 10.1 mg/dL Final     Bilirubin Total 09/09/2020 0.2  0.2 - 1.3 mg/dL Final     Albumin 09/09/2020 3.9  3.4 - 5.0 g/dL Final     Protein Total 09/09/2020 7.7  6.8 - 8.8 g/dL Final     Alkaline Phosphatase 09/09/2020 81  40 - 150 U/L Final     ALT 09/09/2020 40  0 - 50 U/L Final     AST 09/09/2020 28  0 - 45 U/L Final     Uric Acid 09/09/2020 6.2* 2.6 - 6.0 mg/dL Final           Assessment & Plan   Problem List Items Addressed This Visit     None      Visit Diagnoses     Inflammation around joint    -  Primary    Relevant Orders    Uric acid (Completed)    Need for prophylactic vaccination and inoculation against influenza        Relevant Orders    FLUZONE HIGH DOSE 65+  [00309] (Completed)    Pneumococcal vaccine 13 valent PCV13 IM (Prevnar) [37660] (Completed)    ADMIN INFLUENZA (For MEDICARE Patients ONLY) [] (Completed)    ADMIN PNEUMOVAX VACCINE (For MEDICARE Patients ONLY) [] (Completed)             Valente JONES  DO Sandrine  [facility]

## 2020-11-02 ENCOUNTER — OFFICE VISIT (OUTPATIENT)
Dept: CHIROPRACTIC MEDICINE | Facility: OTHER | Age: 65
End: 2020-11-02
Attending: CHIROPRACTOR
Payer: COMMERCIAL

## 2020-11-02 ENCOUNTER — OFFICE VISIT (OUTPATIENT)
Dept: INTERNAL MEDICINE | Facility: OTHER | Age: 65
End: 2020-11-02
Attending: INTERNAL MEDICINE
Payer: COMMERCIAL

## 2020-11-02 VITALS
DIASTOLIC BLOOD PRESSURE: 76 MMHG | HEIGHT: 62 IN | HEART RATE: 72 BPM | SYSTOLIC BLOOD PRESSURE: 112 MMHG | OXYGEN SATURATION: 98 % | TEMPERATURE: 97.4 F | WEIGHT: 130 LBS | BODY MASS INDEX: 23.92 KG/M2

## 2020-11-02 DIAGNOSIS — M54.50 ACUTE BILATERAL LOW BACK PAIN WITHOUT SCIATICA: ICD-10-CM

## 2020-11-02 DIAGNOSIS — Z23 NEED FOR PROPHYLACTIC VACCINATION AND INOCULATION AGAINST INFLUENZA: ICD-10-CM

## 2020-11-02 DIAGNOSIS — M77.9 INFLAMMATION AROUND JOINT: Primary | ICD-10-CM

## 2020-11-02 DIAGNOSIS — M99.02 SEGMENTAL AND SOMATIC DYSFUNCTION OF THORACIC REGION: ICD-10-CM

## 2020-11-02 DIAGNOSIS — M99.03 SEGMENTAL AND SOMATIC DYSFUNCTION OF LUMBAR REGION: Primary | ICD-10-CM

## 2020-11-02 LAB — URATE SERPL-MCNC: 4.1 MG/DL (ref 2.6–6)

## 2020-11-02 PROCEDURE — 99215 OFFICE O/P EST HI 40 MIN: CPT | Performed by: INTERNAL MEDICINE

## 2020-11-02 PROCEDURE — 84550 ASSAY OF BLOOD/URIC ACID: CPT | Mod: ZL | Performed by: INTERNAL MEDICINE

## 2020-11-02 PROCEDURE — G0008 ADMIN INFLUENZA VIRUS VAC: HCPCS

## 2020-11-02 PROCEDURE — G0463 HOSPITAL OUTPT CLINIC VISIT: HCPCS | Mod: 25

## 2020-11-02 PROCEDURE — 36415 COLL VENOUS BLD VENIPUNCTURE: CPT | Mod: ZL | Performed by: INTERNAL MEDICINE

## 2020-11-02 PROCEDURE — G0463 HOSPITAL OUTPT CLINIC VISIT: HCPCS

## 2020-11-02 PROCEDURE — 98941 CHIROPRACT MANJ 3-4 REGIONS: CPT | Mod: AT | Performed by: CHIROPRACTOR

## 2020-11-02 PROCEDURE — G0009 ADMIN PNEUMOCOCCAL VACCINE: HCPCS

## 2020-11-02 ASSESSMENT — ANXIETY QUESTIONNAIRES
1. FEELING NERVOUS, ANXIOUS, OR ON EDGE: NOT AT ALL
7. FEELING AFRAID AS IF SOMETHING AWFUL MIGHT HAPPEN: NOT AT ALL
4. TROUBLE RELAXING: SEVERAL DAYS
2. NOT BEING ABLE TO STOP OR CONTROL WORRYING: NOT AT ALL
5. BEING SO RESTLESS THAT IT IS HARD TO SIT STILL: SEVERAL DAYS
GAD7 TOTAL SCORE: 3
3. WORRYING TOO MUCH ABOUT DIFFERENT THINGS: NOT AT ALL
6. BECOMING EASILY ANNOYED OR IRRITABLE: SEVERAL DAYS

## 2020-11-02 ASSESSMENT — PAIN SCALES - GENERAL: PAINLEVEL: SEVERE PAIN (6)

## 2020-11-02 ASSESSMENT — PATIENT HEALTH QUESTIONNAIRE - PHQ9: SUM OF ALL RESPONSES TO PHQ QUESTIONS 1-9: 2

## 2020-11-02 ASSESSMENT — MIFFLIN-ST. JEOR: SCORE: 1082.93

## 2020-11-02 NOTE — NURSING NOTE
"Chief Complaint   Patient presents with     Establish Care       Initial /76 (BP Location: Left arm, Patient Position: Chair, Cuff Size: Adult Regular)   Pulse 72   Temp 97.4  F (36.3  C) (Tympanic)   Ht 1.575 m (5' 2\")   Wt 59 kg (130 lb)   SpO2 98%   BMI 23.78 kg/m   Estimated body mass index is 23.78 kg/m  as calculated from the following:    Height as of this encounter: 1.575 m (5' 2\").    Weight as of this encounter: 59 kg (130 lb).  Medication Reconciliation: complete  YEVGENIY TRAMMELL LPN  "

## 2020-11-03 ASSESSMENT — ANXIETY QUESTIONNAIRES: GAD7 TOTAL SCORE: 3

## 2021-01-08 NOTE — PROGRESS NOTES
Assessment & Plan   Problem List Items Addressed This Visit        Nervous and Auditory    PMR (polymyalgia rheumatica) (H) - Primary      Other Visit Diagnoses     Hypertriglyceridemia        Relevant Medications    atorvastatin (LIPITOR) 20 MG tablet    Encounter for screening mammogram for breast cancer        Relevant Orders    MA Screen Bilateral w/Truman           Review of external notes as documented above         20 minutes spent on the date of the encounter doing chart review, review of test results, interpretation of tests, patient visit and documentation            FUTURE APPOINTMENTS:       - Follow-up visit in 2-3 months    No follow-ups on file.    Valente Deluca, DO  [facility]    Cristina Clemente is a 65 year old who presents to clinic today for the following health issues     HPI     Chyna presents today for follow up.  She has a history of PMR.  She remains on Prednisone 5 mg Po daily.  She is due for a mammogram.  He labs from September were reviewed and look fine except for her hypertriglyceridemia.        Asthma Follow-Up    Was ACT completed today?    Yes    ACT Total Scores 1/11/2021   ACT TOTAL SCORE -   ASTHMA ER VISITS -   ASTHMA HOSPITALIZATIONS -   ACT TOTAL SCORE (Goal Greater than or Equal to 20) 17   In the past 12 months, how many times did you visit the emergency room for your asthma without being admitted to the hospital? 0   In the past 12 months, how many times were you hospitalized overnight because of your asthma? 0         How many days per week do you miss taking your asthma controller medication?  7    Please describe any recent triggers for your asthma: exercise or sports    Have you had any Emergency Room Visits, Urgent Care Visits, or Hospital Admissions since your last office visit?  No            Review of Systems   Constitutional, HEENT, cardiovascular, pulmonary, gi and gu systems are negative, except as otherwise noted.       Objective    There were no vitals taken for this visit.  There is no height or weight on file to calculate BMI.  Physical Exam   GENERAL: healthy, alert and no distress  RESP: lungs clear to auscultation - no rales, rhonchi or wheezes  CV: regular rate and rhythm, normal S1 S2, no S3 or S4, no murmur, click or rub, no peripheral edema and peripheral pulses strong  MS: no gross musculoskeletal defects noted, no edema  SKIN: no suspicious lesions or rashes  NEURO: Normal strength and tone, mentation intact and speech normal  PSYCH: mentation appears normal, affect normal/bright    Office Visit on 11/02/2020   Component Date Value Ref Range Status     Uric Acid 11/02/2020 4.1  2.6 - 6.0 mg/dL Final     No results found for any visits on 01/11/21.  No results found for this or any previous visit (from the past 24 hour(s)).

## 2021-01-11 ENCOUNTER — OFFICE VISIT (OUTPATIENT)
Dept: INTERNAL MEDICINE | Facility: OTHER | Age: 66
End: 2021-01-11
Attending: INTERNAL MEDICINE
Payer: COMMERCIAL

## 2021-01-11 VITALS
SYSTOLIC BLOOD PRESSURE: 100 MMHG | TEMPERATURE: 97.6 F | WEIGHT: 130 LBS | HEART RATE: 77 BPM | DIASTOLIC BLOOD PRESSURE: 72 MMHG | BODY MASS INDEX: 23.78 KG/M2 | OXYGEN SATURATION: 98 %

## 2021-01-11 DIAGNOSIS — E78.1 HYPERTRIGLYCERIDEMIA: ICD-10-CM

## 2021-01-11 DIAGNOSIS — Z12.31 ENCOUNTER FOR SCREENING MAMMOGRAM FOR BREAST CANCER: ICD-10-CM

## 2021-01-11 DIAGNOSIS — M35.3 PMR (POLYMYALGIA RHEUMATICA) (H): Primary | ICD-10-CM

## 2021-01-11 PROCEDURE — 99214 OFFICE O/P EST MOD 30 MIN: CPT | Performed by: INTERNAL MEDICINE

## 2021-01-11 PROCEDURE — G0463 HOSPITAL OUTPT CLINIC VISIT: HCPCS

## 2021-01-11 RX ORDER — ATORVASTATIN CALCIUM 20 MG/1
20 TABLET, FILM COATED ORAL DAILY
Qty: 30 TABLET | Refills: 3 | Status: SHIPPED | OUTPATIENT
Start: 2021-01-11 | End: 2021-05-11

## 2021-01-11 ASSESSMENT — ANXIETY QUESTIONNAIRES
4. TROUBLE RELAXING: SEVERAL DAYS
5. BEING SO RESTLESS THAT IT IS HARD TO SIT STILL: NOT AT ALL
1. FEELING NERVOUS, ANXIOUS, OR ON EDGE: SEVERAL DAYS
3. WORRYING TOO MUCH ABOUT DIFFERENT THINGS: NOT AT ALL
GAD7 TOTAL SCORE: 2
6. BECOMING EASILY ANNOYED OR IRRITABLE: NOT AT ALL
2. NOT BEING ABLE TO STOP OR CONTROL WORRYING: NOT AT ALL
7. FEELING AFRAID AS IF SOMETHING AWFUL MIGHT HAPPEN: NOT AT ALL

## 2021-01-11 ASSESSMENT — ASTHMA QUESTIONNAIRES
QUESTION_1 LAST FOUR WEEKS HOW MUCH OF THE TIME DID YOUR ASTHMA KEEP YOU FROM GETTING AS MUCH DONE AT WORK, SCHOOL OR AT HOME: A LITTLE OF THE TIME
ACT_TOTALSCORE: 17
QUESTION_5 LAST FOUR WEEKS HOW WOULD YOU RATE YOUR ASTHMA CONTROL: SOMEWHAT CONTROLLED
QUESTION_3 LAST FOUR WEEKS HOW OFTEN DID YOUR ASTHMA SYMPTOMS (WHEEZING, COUGHING, SHORTNESS OF BREATH, CHEST TIGHTNESS OR PAIN) WAKE YOU UP AT NIGHT OR EARLIER THAN USUAL IN THE MORNING: ONCE OR TWICE
QUESTION_2 LAST FOUR WEEKS HOW OFTEN HAVE YOU HAD SHORTNESS OF BREATH: THREE TO SIX TIMES A WEEK
QUESTION_4 LAST FOUR WEEKS HOW OFTEN HAVE YOU USED YOUR RESCUE INHALER OR NEBULIZER MEDICATION (SUCH AS ALBUTEROL): TWO OR THREE TIMES PER WEEK

## 2021-01-11 ASSESSMENT — PATIENT HEALTH QUESTIONNAIRE - PHQ9: SUM OF ALL RESPONSES TO PHQ QUESTIONS 1-9: 1

## 2021-01-11 ASSESSMENT — PAIN SCALES - GENERAL: PAINLEVEL: NO PAIN (0)

## 2021-01-11 NOTE — NURSING NOTE
"Chief Complaint   Patient presents with     Asthma       Initial /72 (BP Location: Left arm, Patient Position: Chair, Cuff Size: Adult Regular)   Pulse 77   Temp 97.6  F (36.4  C) (Tympanic)   Wt 59 kg (130 lb)   SpO2 98%   BMI 23.78 kg/m   Estimated body mass index is 23.78 kg/m  as calculated from the following:    Height as of 11/2/20: 1.575 m (5' 2\").    Weight as of this encounter: 59 kg (130 lb).  Medication Reconciliation: complete  YEVGENIY TRAMMELL LPN  "

## 2021-01-12 ASSESSMENT — ANXIETY QUESTIONNAIRES: GAD7 TOTAL SCORE: 2

## 2021-01-12 ASSESSMENT — ASTHMA QUESTIONNAIRES: ACT_TOTALSCORE: 17

## 2021-01-15 ENCOUNTER — TELEPHONE (OUTPATIENT)
Dept: INTERNAL MEDICINE | Facility: OTHER | Age: 66
End: 2021-01-15

## 2021-01-15 NOTE — TELEPHONE ENCOUNTER
Received an APPROVAL from [employer] for Gabapentin 400mg capsules. Effective 01/01/2021 to 12/31/2021. Forms scanned to [facility].

## 2021-01-15 NOTE — TELEPHONE ENCOUNTER
Received a PA from [employer] for Gabapentin 400mg capsules. Submitted on CMM. Waiting for a response.

## 2021-02-02 ENCOUNTER — ANCILLARY PROCEDURE (OUTPATIENT)
Dept: MAMMOGRAPHY | Facility: OTHER | Age: 66
End: 2021-02-02
Attending: INTERNAL MEDICINE
Payer: COMMERCIAL

## 2021-02-02 DIAGNOSIS — Z12.31 ENCOUNTER FOR SCREENING MAMMOGRAM FOR BREAST CANCER: ICD-10-CM

## 2021-02-02 PROCEDURE — 77063 BREAST TOMOSYNTHESIS BI: CPT | Mod: TC

## 2021-02-03 DIAGNOSIS — R09.82 POST-NASAL DRIP: ICD-10-CM

## 2021-02-03 RX ORDER — CETIRIZINE HYDROCHLORIDE 10 MG/1
10 TABLET ORAL AT BEDTIME
Qty: 90 TABLET | Refills: 1 | Status: SHIPPED | OUTPATIENT
Start: 2021-02-03 | End: 2021-07-15

## 2021-02-03 NOTE — TELEPHONE ENCOUNTER
Zyrtec 10mg      Last Written Prescription Date:  8/3/20  Last Fill Quantity: 90,   # refills: 1  Last Office Visit: 1/11/21  Future Office visit:       Routing refill request to provider for review/approval because:

## 2021-02-25 DIAGNOSIS — G25.81 RESTLESS LEGS SYNDROME: ICD-10-CM

## 2021-02-25 DIAGNOSIS — E87.6 HYPOKALEMIA: ICD-10-CM

## 2021-02-25 RX ORDER — DM/PE/ACETAMINOPHEN/CHLORPHENR 10-5-325-2
TABLET, SEQUENTIAL ORAL
Qty: 100 TABLET | Refills: 0 | Status: SHIPPED | OUTPATIENT
Start: 2021-02-25 | End: 2021-05-11

## 2021-02-25 RX ORDER — SPIRONOLACTONE 50 MG/1
50 TABLET, FILM COATED ORAL 2 TIMES DAILY
Qty: 180 TABLET | Refills: 0 | Status: SHIPPED | OUTPATIENT
Start: 2021-02-25 | End: 2021-05-25

## 2021-02-25 NOTE — TELEPHONE ENCOUNTER
GNP Vitamin D 2000UT  Last Visit 11/02/20  Last Fill 12/01/20  Next Visit not scheduled      Spironolactone 50 mg  Last office visit: 11/02/20  Last refill: 12/28/20    Thank you.

## 2021-03-01 DIAGNOSIS — G25.81 RESTLESS LEGS SYNDROME (RLS): ICD-10-CM

## 2021-03-01 DIAGNOSIS — M10.041 IDIOPATHIC GOUT OF RIGHT HAND, UNSPECIFIED CHRONICITY: ICD-10-CM

## 2021-03-01 RX ORDER — PRAMIPEXOLE DIHYDROCHLORIDE 0.12 MG/1
TABLET ORAL
Qty: 90 TABLET | Refills: 1 | Status: SHIPPED | OUTPATIENT
Start: 2021-03-01 | End: 2021-09-29

## 2021-03-01 RX ORDER — ALLOPURINOL 100 MG/1
100 TABLET ORAL 2 TIMES DAILY
Qty: 180 TABLET | Refills: 1 | Status: SHIPPED | OUTPATIENT
Start: 2021-03-01 | End: 2021-09-14

## 2021-03-01 NOTE — TELEPHONE ENCOUNTER
mirapex 0.125mg  Last Visit 1/11/21  Last Fill 7/15/20  Next Visit not scheduled      Allopurinol 100 mg  Last office visit: 1/11/21  Last refill: 9/09/20    Thank you.

## 2021-03-12 ENCOUNTER — OFFICE VISIT (OUTPATIENT)
Dept: FAMILY MEDICINE | Facility: OTHER | Age: 66
End: 2021-03-12
Attending: NURSE PRACTITIONER
Payer: COMMERCIAL

## 2021-03-12 ENCOUNTER — NURSE TRIAGE (OUTPATIENT)
Dept: INTERNAL MEDICINE | Facility: OTHER | Age: 66
End: 2021-03-12

## 2021-03-12 VITALS
WEIGHT: 137 LBS | DIASTOLIC BLOOD PRESSURE: 74 MMHG | HEART RATE: 100 BPM | TEMPERATURE: 97.7 F | HEIGHT: 62 IN | OXYGEN SATURATION: 96 % | SYSTOLIC BLOOD PRESSURE: 115 MMHG | BODY MASS INDEX: 25.21 KG/M2

## 2021-03-12 DIAGNOSIS — R31.0 GROSS HEMATURIA: ICD-10-CM

## 2021-03-12 DIAGNOSIS — R82.90 ABNORMAL URINE FINDINGS: ICD-10-CM

## 2021-03-12 DIAGNOSIS — R35.0 URINARY FREQUENCY: ICD-10-CM

## 2021-03-12 DIAGNOSIS — N30.01 ACUTE CYSTITIS WITH HEMATURIA: Primary | ICD-10-CM

## 2021-03-12 DIAGNOSIS — R31.9 HEMATURIA: Primary | ICD-10-CM

## 2021-03-12 LAB
ALBUMIN UR-MCNC: 30 MG/DL
APPEARANCE UR: ABNORMAL
BACTERIA #/AREA URNS HPF: ABNORMAL /HPF
BILIRUB UR QL STRIP: NEGATIVE
COLOR UR AUTO: YELLOW
GLUCOSE UR STRIP-MCNC: NEGATIVE MG/DL
HGB UR QL STRIP: ABNORMAL
KETONES UR STRIP-MCNC: ABNORMAL MG/DL
LEUKOCYTE ESTERASE UR QL STRIP: ABNORMAL
NITRATE UR QL: NEGATIVE
NON-SQ EPI CELLS #/AREA URNS LPF: ABNORMAL /LPF
PH UR STRIP: 6 PH (ref 5–7)
RBC #/AREA URNS AUTO: ABNORMAL /HPF
SOURCE: ABNORMAL
SP GR UR STRIP: 1.02 (ref 1–1.03)
UROBILINOGEN UR STRIP-ACNC: 0.2 EU/DL (ref 0.2–1)
WBC #/AREA URNS AUTO: ABNORMAL /HPF

## 2021-03-12 PROCEDURE — G0463 HOSPITAL OUTPT CLINIC VISIT: HCPCS

## 2021-03-12 PROCEDURE — 87088 URINE BACTERIA CULTURE: CPT | Mod: ZL | Performed by: NURSE PRACTITIONER

## 2021-03-12 PROCEDURE — 99214 OFFICE O/P EST MOD 30 MIN: CPT | Performed by: NURSE PRACTITIONER

## 2021-03-12 PROCEDURE — 87186 SC STD MICRODIL/AGAR DIL: CPT | Mod: ZL | Performed by: NURSE PRACTITIONER

## 2021-03-12 PROCEDURE — 81001 URINALYSIS AUTO W/SCOPE: CPT | Mod: ZL | Performed by: NURSE PRACTITIONER

## 2021-03-12 PROCEDURE — 87086 URINE CULTURE/COLONY COUNT: CPT | Mod: ZL | Performed by: NURSE PRACTITIONER

## 2021-03-12 RX ORDER — DICYCLOMINE HYDROCHLORIDE 10 MG/1
CAPSULE ORAL
COMMUNITY
Start: 2021-02-25 | End: 2021-04-07

## 2021-03-12 RX ORDER — NITROFURANTOIN 25; 75 MG/1; MG/1
100 CAPSULE ORAL 2 TIMES DAILY
Qty: 14 CAPSULE | Refills: 0 | Status: SHIPPED | OUTPATIENT
Start: 2021-03-12 | End: 2021-03-19

## 2021-03-12 ASSESSMENT — ANXIETY QUESTIONNAIRES
6. BECOMING EASILY ANNOYED OR IRRITABLE: NOT AT ALL
3. WORRYING TOO MUCH ABOUT DIFFERENT THINGS: NOT AT ALL
1. FEELING NERVOUS, ANXIOUS, OR ON EDGE: NOT AT ALL
GAD7 TOTAL SCORE: 1
2. NOT BEING ABLE TO STOP OR CONTROL WORRYING: NOT AT ALL
IF YOU CHECKED OFF ANY PROBLEMS ON THIS QUESTIONNAIRE, HOW DIFFICULT HAVE THESE PROBLEMS MADE IT FOR YOU TO DO YOUR WORK, TAKE CARE OF THINGS AT HOME, OR GET ALONG WITH OTHER PEOPLE: NOT DIFFICULT AT ALL
7. FEELING AFRAID AS IF SOMETHING AWFUL MIGHT HAPPEN: NOT AT ALL
4. TROUBLE RELAXING: SEVERAL DAYS
5. BEING SO RESTLESS THAT IT IS HARD TO SIT STILL: NOT AT ALL

## 2021-03-12 ASSESSMENT — PATIENT HEALTH QUESTIONNAIRE - PHQ9: SUM OF ALL RESPONSES TO PHQ QUESTIONS 1-9: 2

## 2021-03-12 ASSESSMENT — MIFFLIN-ST. JEOR: SCORE: 1109.68

## 2021-03-12 ASSESSMENT — PAIN SCALES - GENERAL: PAINLEVEL: MILD PAIN (2)

## 2021-03-12 NOTE — TELEPHONE ENCOUNTER
"    Additional Information    Negative: Shock suspected (e.g., cold/pale/clammy skin, too weak to stand, low BP, rapid pulse)    Negative: Sounds like a life-threatening emergency to the triager    Negative: Urinary catheter, questions about    Negative: Recent back or abdominal injury    Negative: Recent genital injury    Negative: [1] Unable to urinate (or only a few drops) > 4 hours AND [2] bladder feels very full (e.g., palpable bladder or strong urge to urinate)    Negative: Passing pure blood or large blood clots (i.e., size > a dime) (Exception: ebony or small strands)    Negative: Fever > 100.5 F (38.1 C)    Negative: Patient sounds very sick or weak to the triager    Negative: Known sickle cell disease    Negative: Taking Coumadin (warfarin) or other strong blood thinner, or known bleeding disorder (e.g., thrombocytopenia)    Negative: Side (flank) or back pain present    Pain or burning with passing urine    Answer Assessment - Initial Assessment Questions  1. COLOR of URINE: \"Describe the color of the urine.\"  (e.g., tea-colored, pink, red, blood clots, bloody)      Bright red, pink  2. ONSET: \"When did the bleeding start?\"       Sunday night  3. EPISODES: \"How many times has there been blood in the urine?\" or \"How many times today?\"      intermittent  4. PAIN with URINATION: \"Is there any pain with passing your urine?\" If so, ask: \"How bad is the pain?\"  (Scale 1-10; or mild, moderate, severe)     - MILD - complains slightly about urination hurting     - MODERATE - interferes with normal activities       - SEVERE - excruciating, unwilling or unable to urinate because of the pain       moderate  5. FEVER: \"Do you have a fever?\" If so, ask: \"What is your temperature, how was it measured, and when did it start?\"      no  6. ASSOCIATED SYMPTOMS: \"Are you passing urine more frequently than usual?\"      yes  7. OTHER SYMPTOMS: \"Do you have any other symptoms?\" (e.g., back/flank pain, abdominal pain, " "vomiting)      No low back or abdominal pain  8. PREGNANCY: \"Is there any chance you are pregnant?\" \"When was your last menstrual period?\"      no    Protocols used: URINE - BLOOD IN-A-      "

## 2021-03-12 NOTE — PROGRESS NOTES
"    Assessment & Plan    1. Acute cystitis with hematuria  - nitroFURantoin macrocrystal-monohydrate (MACROBID) 100 MG capsule; Take 1 capsule (100 mg) by mouth 2 times daily for 7 days  Dispense: 14 capsule; Refill: 0    2. Urinary frequency  - Urine Microscopic    3. Gross hematuria  - *UA reflex to Microscopic and Culture - [facility]    4. Abnormal urine findings  - Urine Culture Aerobic Bacterial    No follow-ups on file.    Jenny Blake, CNP  [facility] - [facility]    Cristina Clemente is a 65 year old who presents for the following health issues     HPI       Vaginal Symptoms : UTI   Onset/Duration: 6 days ago  Description:  Vaginal Discharge: none   Itching (Pruritis): no  Burning sensation:  YES  Odor: no  Accompanying Signs & Symptoms:  Urinary symptoms: urgency/frequently - pt states it is about a \"thimble full\" of urine , incontinence  Abdominal pain: no  Fever: no  History:   Sexually active: YES  New Partner: no  Possibility of Pregnancy:  no  Recent antibiotic use: no  Previous vaginitis issues: YES  Precipitating or alleviating factors: None  Therapies tried and outcome: cranberry juice and vinegar with hot water, lemon juice and honey and took her husbands left over antibiotics- (z-jasson) two on Sunday, one on Monday, one on Tues and Wed         Review of Systems   Constitutional, HEENT, cardiovascular, pulmonary, gi and gu systems are negative, except as otherwise noted.      Objective    /74 (BP Location: Right arm, Patient Position: Chair, Cuff Size: Adult Regular)   Pulse 100   Temp 97.7  F (36.5  C) (Tympanic)   Ht 1.575 m (5' 2\")   Wt 62.1 kg (137 lb)   SpO2 96%   BMI 25.06 kg/m    Body mass index is 25.06 kg/m .  Physical Exam   GENERAL: healthy, alert and no distress  RESP: lungs clear to auscultation - no rales, rhonchi or wheezes  CV: regular rate and rhythm, normal S1 S2, no S3 or S4, no murmur, click or rub, no peripheral edema   BACK: no CVA tenderness, "     Results for orders placed or performed in visit on 03/12/21   *UA reflex to Microscopic and Culture - [facility]     Status: Abnormal    Specimen: Midstream Urine   Result Value Ref Range    Color Urine Yellow     Appearance Urine Cloudy     Glucose Urine Negative NEG^Negative mg/dL    Bilirubin Urine Negative NEG^Negative    Ketones Urine Trace (A) NEG^Negative mg/dL    Specific Gravity Urine 1.025 1.003 - 1.035    Blood Urine Large (A) NEG^Negative    pH Urine 6.0 5.0 - 7.0 pH    Protein Albumin Urine 30 (A) NEG^Negative mg/dL    Urobilinogen Urine 0.2 0.2 - 1.0 EU/dL    Nitrite Urine Negative NEG^Negative    Leukocyte Esterase Urine Moderate (A) NEG^Negative    Source Midstream Urine    Urine Microscopic     Status: Abnormal   Result Value Ref Range    WBC Urine 25-50 (A) OTO5^0 - 5 /HPF    RBC Urine 5-10 (A) OTO2^O - 2 /HPF    Squamous Epithelial /LPF Urine Few FEW^Few /LPF    Bacteria Urine Moderate (A) NEG^Negative /HPF

## 2021-03-12 NOTE — NURSING NOTE
"Chief Complaint   Patient presents with     UTI       Initial /74 (BP Location: Right arm, Patient Position: Chair, Cuff Size: Adult Regular)   Pulse 100   Temp 97.7  F (36.5  C) (Tympanic)   Ht 1.575 m (5' 2\")   Wt 62.1 kg (137 lb)   SpO2 96%   BMI 25.06 kg/m   Estimated body mass index is 25.06 kg/m  as calculated from the following:    Height as of this encounter: 1.575 m (5' 2\").    Weight as of this encounter: 62.1 kg (137 lb).  Medication Reconciliation: complete  Mana Nicole LPN  "

## 2021-03-12 NOTE — PATIENT INSTRUCTIONS

## 2021-03-13 ASSESSMENT — ANXIETY QUESTIONNAIRES: GAD7 TOTAL SCORE: 1

## 2021-03-14 LAB
BACTERIA SPEC CULT: ABNORMAL
SPECIMEN SOURCE: ABNORMAL

## 2021-03-28 ENCOUNTER — HEALTH MAINTENANCE LETTER (OUTPATIENT)
Age: 66
End: 2021-03-28

## 2021-04-07 DIAGNOSIS — R19.7 DIARRHEA DUE TO MALABSORPTION: ICD-10-CM

## 2021-04-07 DIAGNOSIS — K90.9 DIARRHEA DUE TO MALABSORPTION: ICD-10-CM

## 2021-04-07 DIAGNOSIS — Z90.49 ACQUIRED ABSENCE OF OTHER SPECIFIED PARTS OF DIGESTIVE TRACT: ICD-10-CM

## 2021-04-07 RX ORDER — DICYCLOMINE HYDROCHLORIDE 10 MG/1
CAPSULE ORAL
Qty: 180 CAPSULE | Refills: 0 | Status: SHIPPED | OUTPATIENT
Start: 2021-04-07 | End: 2021-05-11

## 2021-04-07 RX ORDER — DIPHENOXYLATE HCL/ATROPINE 2.5-.025MG
1-2 TABLET ORAL 4 TIMES DAILY PRN
Qty: 240 TABLET | Refills: 1 | Status: SHIPPED | OUTPATIENT
Start: 2021-04-07 | End: 2021-12-28

## 2021-04-07 NOTE — TELEPHONE ENCOUNTER
Ronalyl      Last Written Prescription Date:  02/25/21  Last Fill Quantity: Pt reported,   # refills: 0  Last Office Visit: 03/12/21  Future Office visit:    Next 5 appointments (look out 90 days)    Apr 29, 2021 10:00 AM  (Arrive by 9:45 AM)  SHORT with Valente Deluca DO  [facility] ([facility] ) [address]  [facility] [id number] [phone]           Routing refill request to provider for review/approval because:  Pt reported medication.

## 2021-04-07 NOTE — TELEPHONE ENCOUNTER
Lomotil 2.5-0.025 mg      Last Written Prescription Date:  7/27/20  Last Fill Quantity: 240,   # refills: 1  Last Office Visit: 3/12/21  Future Office visit:    Next 5 appointments (look out 90 days)    Apr 29, 2021 10:00 AM  (Arrive by 9:45 AM)  SHORT with Valente Deluca DO  [facility] ([facility] ) [address]  [facility] [id number] [phone]           Routing refill request to provider for review/approval because:

## 2021-04-21 DIAGNOSIS — G25.81 RESTLESS LEGS SYNDROME: ICD-10-CM

## 2021-04-22 RX ORDER — POTASSIUM CHLORIDE 1500 MG/1
TABLET, EXTENDED RELEASE ORAL
Qty: 90 TABLET | Refills: 3 | Status: SHIPPED | OUTPATIENT
Start: 2021-04-22 | End: 2021-06-02

## 2021-04-22 NOTE — TELEPHONE ENCOUNTER
potassium      Last Written Prescription Date:  10-9-2020  Last Fill Quantity: 90,   # refills: 3  Last Office Visit: 3-  Future Office visit:    Next 5 appointments (look out 90 days)    Apr 29, 2021 10:00 AM  (Arrive by 9:45 AM)  SHORT with Valente Deluca DO  [facility] ([facility] ) [address]  [facility] [id number] [phone]           Routing refill request to provider for review/approval because:

## 2021-04-23 NOTE — PROGRESS NOTES
Assessment & Plan   Problem List Items Addressed This Visit     None      Visit Diagnoses     Hyperlipidemia LDL goal <130    -  Primary    Relevant Orders    Comprehensive metabolic panel (BMP + Alb, Alk Phos, ALT, AST, Total. Bili, TP) (Completed)    Lipid Profile (Chol, Trig, HDL, LDL calc) (Completed)    Senile osteoporosis        Relevant Orders    DX Hip/Pelvis/Spine    Routine history and physical examination of adult        Relevant Orders    CBC with platelets and differential (Completed)    Idiopathic gout, unspecified chronicity, unspecified site        Relevant Orders    Uric acid (Completed)    Polymyalgia rheumatica (H)                 25 minutes spent on the date of the encounter doing chart review, review of test results, interpretation of tests, patient visit and documentation          Valente Deluca, [facility] [place] CHARIS Clemente is a 65 year old who presents for the following health issues     HPI   Chyna presents today for follow up.  She needs her cholesterol checked.  She remains on Prednisone 5 mg PO daily for PMR.  She is also on Allopurinol for presumed gout.  She questions whether she needs to remain on this medication, however we are not 100% certain she had gout versus arthritis. She also questions whether she will continue to need Reclast.  She would be due for a repeat DEXA in August 2021.        Hyperlipidemia Follow-Up      Are you regularly taking any medication or supplement to lower your cholesterol?   Yes- Lipitor    Are you having muscle aches or other side effects that you think could be caused by your cholesterol lowering medication?  No      Review of Systems   Constitutional, HEENT, cardiovascular, pulmonary, gi and gu systems are negative, except as otherwise noted.      Objective    /76 (BP Location: Left arm, Patient Position: Chair, Cuff Size: Adult Regular)   Pulse 81   Temp 97.3  F (36.3  C) (Tympanic)   Ht 1.575 m  "(5' 2\")   Wt 58.1 kg (128 lb)   SpO2 97%   BMI 23.41 kg/m    Body mass index is 23.41 kg/m .  Physical Exam   GENERAL: Alert and no distress  RESP: lungs clear to auscultation - no rales, rhonchi or wheezes  CV: regular rate and rhythm, normal S1 S2, no S3 or S4, no murmur, click or rub, no peripheral edema and peripheral pulses strong  MS: Osteoarthritis of hands  SKIN: no suspicious lesions or rashes  PSYCH: mentation appears normal, affect normal/bright    Office Visit on 03/12/2021   Component Date Value Ref Range Status     Color Urine 03/12/2021 Yellow   Final     Appearance Urine 03/12/2021 Cloudy   Final     Glucose Urine 03/12/2021 Negative  NEG^Negative mg/dL Final     Bilirubin Urine 03/12/2021 Negative  NEG^Negative Final     Ketones Urine 03/12/2021 Trace* NEG^Negative mg/dL Final     Specific Gravity Urine 03/12/2021 1.025  1.003 - 1.035 Final     Blood Urine 03/12/2021 Large* NEG^Negative Final     pH Urine 03/12/2021 6.0  5.0 - 7.0 pH Final     Protein Albumin Urine 03/12/2021 30* NEG^Negative mg/dL Final     Urobilinogen Urine 03/12/2021 0.2  0.2 - 1.0 EU/dL Final     Nitrite Urine 03/12/2021 Negative  NEG^Negative Final     Leukocyte Esterase Urine 03/12/2021 Moderate* NEG^Negative Final     Source 03/12/2021 Midstream Urine   Final     WBC Urine 03/12/2021 25-50* OTO5^0 - 5 /HPF Final     RBC Urine 03/12/2021 5-10* OTO2^O - 2 /HPF Final     Squamous Epithelial /LPF Urine 03/12/2021 Few  FEW^Few /LPF Final     Bacteria Urine 03/12/2021 Moderate* NEG^Negative /HPF Final     Specimen Description 03/12/2021 Midstream Urine   Final     Culture Micro 03/12/2021 *  Final                    Value:10,000 to 50,000 colonies/mL  Escherichia coli         No results found for any visits on 04/29/21.  No results found for this or any previous visit (from the past 24 hour(s)).            "

## 2021-04-29 ENCOUNTER — OFFICE VISIT (OUTPATIENT)
Dept: INTERNAL MEDICINE | Facility: OTHER | Age: 66
End: 2021-04-29
Attending: INTERNAL MEDICINE
Payer: COMMERCIAL

## 2021-04-29 VITALS
WEIGHT: 128 LBS | OXYGEN SATURATION: 97 % | HEART RATE: 81 BPM | DIASTOLIC BLOOD PRESSURE: 76 MMHG | HEIGHT: 62 IN | BODY MASS INDEX: 23.55 KG/M2 | TEMPERATURE: 97.3 F | SYSTOLIC BLOOD PRESSURE: 110 MMHG

## 2021-04-29 DIAGNOSIS — M81.0 SENILE OSTEOPOROSIS: ICD-10-CM

## 2021-04-29 DIAGNOSIS — E78.5 HYPERLIPIDEMIA LDL GOAL <130: Primary | ICD-10-CM

## 2021-04-29 DIAGNOSIS — M10.00 IDIOPATHIC GOUT, UNSPECIFIED CHRONICITY, UNSPECIFIED SITE: ICD-10-CM

## 2021-04-29 DIAGNOSIS — M35.3 POLYMYALGIA RHEUMATICA (H): ICD-10-CM

## 2021-04-29 DIAGNOSIS — Z00.00 ROUTINE HISTORY AND PHYSICAL EXAMINATION OF ADULT: ICD-10-CM

## 2021-04-29 LAB
ALBUMIN SERPL-MCNC: 3.6 G/DL (ref 3.4–5)
ALP SERPL-CCNC: 66 U/L (ref 40–150)
ALT SERPL W P-5'-P-CCNC: 40 U/L (ref 0–50)
ANION GAP SERPL CALCULATED.3IONS-SCNC: 7 MMOL/L (ref 3–14)
AST SERPL W P-5'-P-CCNC: 19 U/L (ref 0–45)
BASOPHILS # BLD AUTO: 0.1 10E9/L (ref 0–0.2)
BASOPHILS NFR BLD AUTO: 0.5 %
BILIRUB SERPL-MCNC: 0.3 MG/DL (ref 0.2–1.3)
BUN SERPL-MCNC: 18 MG/DL (ref 7–30)
CALCIUM SERPL-MCNC: 9.2 MG/DL (ref 8.5–10.1)
CHLORIDE SERPL-SCNC: 108 MMOL/L (ref 94–109)
CHOLEST SERPL-MCNC: 171 MG/DL
CO2 SERPL-SCNC: 24 MMOL/L (ref 20–32)
CREAT SERPL-MCNC: 0.97 MG/DL (ref 0.52–1.04)
DIFFERENTIAL METHOD BLD: NORMAL
EOSINOPHIL # BLD AUTO: 0.1 10E9/L (ref 0–0.7)
EOSINOPHIL NFR BLD AUTO: 1.3 %
ERYTHROCYTE [DISTWIDTH] IN BLOOD BY AUTOMATED COUNT: 13.5 % (ref 10–15)
GFR SERPL CREATININE-BSD FRML MDRD: 61 ML/MIN/{1.73_M2}
GLUCOSE SERPL-MCNC: 95 MG/DL (ref 70–99)
HCT VFR BLD AUTO: 40.5 % (ref 35–47)
HDLC SERPL-MCNC: 38 MG/DL
HGB BLD-MCNC: 13.7 G/DL (ref 11.7–15.7)
LDLC SERPL CALC-MCNC: 81 MG/DL
LYMPHOCYTES # BLD AUTO: 1.5 10E9/L (ref 0.8–5.3)
LYMPHOCYTES NFR BLD AUTO: 15.1 %
MCH RBC QN AUTO: 31.5 PG (ref 26.5–33)
MCHC RBC AUTO-ENTMCNC: 33.8 G/DL (ref 31.5–36.5)
MCV RBC AUTO: 93 FL (ref 78–100)
MONOCYTES # BLD AUTO: 0.9 10E9/L (ref 0–1.3)
MONOCYTES NFR BLD AUTO: 8.7 %
NEUTROPHILS # BLD AUTO: 7.4 10E9/L (ref 1.6–8.3)
NEUTROPHILS NFR BLD AUTO: 74.4 %
NONHDLC SERPL-MCNC: 133 MG/DL
PLATELET # BLD AUTO: 254 10E9/L (ref 150–450)
POTASSIUM SERPL-SCNC: 4.2 MMOL/L (ref 3.4–5.3)
PROT SERPL-MCNC: 6.9 G/DL (ref 6.8–8.8)
RBC # BLD AUTO: 4.35 10E12/L (ref 3.8–5.2)
SODIUM SERPL-SCNC: 139 MMOL/L (ref 133–144)
TRIGL SERPL-MCNC: 258 MG/DL
URATE SERPL-MCNC: 3.3 MG/DL (ref 2.6–6)
WBC # BLD AUTO: 9.9 10E9/L (ref 4–11)

## 2021-04-29 PROCEDURE — 80053 COMPREHEN METABOLIC PANEL: CPT | Mod: ZL | Performed by: INTERNAL MEDICINE

## 2021-04-29 PROCEDURE — 84550 ASSAY OF BLOOD/URIC ACID: CPT | Mod: ZL | Performed by: INTERNAL MEDICINE

## 2021-04-29 PROCEDURE — 99214 OFFICE O/P EST MOD 30 MIN: CPT | Performed by: INTERNAL MEDICINE

## 2021-04-29 PROCEDURE — 36415 COLL VENOUS BLD VENIPUNCTURE: CPT | Mod: ZL | Performed by: INTERNAL MEDICINE

## 2021-04-29 PROCEDURE — 80061 LIPID PANEL: CPT | Mod: ZL | Performed by: INTERNAL MEDICINE

## 2021-04-29 PROCEDURE — G0463 HOSPITAL OUTPT CLINIC VISIT: HCPCS

## 2021-04-29 PROCEDURE — 85025 COMPLETE CBC W/AUTO DIFF WBC: CPT | Mod: ZL | Performed by: INTERNAL MEDICINE

## 2021-04-29 ASSESSMENT — ANXIETY QUESTIONNAIRES
6. BECOMING EASILY ANNOYED OR IRRITABLE: NOT AT ALL
2. NOT BEING ABLE TO STOP OR CONTROL WORRYING: NOT AT ALL
1. FEELING NERVOUS, ANXIOUS, OR ON EDGE: SEVERAL DAYS
5. BEING SO RESTLESS THAT IT IS HARD TO SIT STILL: NOT AT ALL
7. FEELING AFRAID AS IF SOMETHING AWFUL MIGHT HAPPEN: NOT AT ALL
4. TROUBLE RELAXING: SEVERAL DAYS
3. WORRYING TOO MUCH ABOUT DIFFERENT THINGS: NOT AT ALL
GAD7 TOTAL SCORE: 2

## 2021-04-29 ASSESSMENT — PAIN SCALES - GENERAL: PAINLEVEL: NO PAIN (0)

## 2021-04-29 ASSESSMENT — MIFFLIN-ST. JEOR: SCORE: 1068.85

## 2021-04-29 ASSESSMENT — PATIENT HEALTH QUESTIONNAIRE - PHQ9: SUM OF ALL RESPONSES TO PHQ QUESTIONS 1-9: 1

## 2021-04-29 NOTE — NURSING NOTE
"Chief Complaint   Patient presents with     Lipids       Initial /76 (BP Location: Left arm, Patient Position: Chair, Cuff Size: Adult Regular)   Pulse 81   Temp 97.3  F (36.3  C) (Tympanic)   Ht 1.575 m (5' 2\")   Wt 58.1 kg (128 lb)   SpO2 97%   BMI 23.41 kg/m   Estimated body mass index is 23.41 kg/m  as calculated from the following:    Height as of this encounter: 1.575 m (5' 2\").    Weight as of this encounter: 58.1 kg (128 lb).  Medication Reconciliation: complete  YEVGENIY TRAMMELL LPN  "

## 2021-04-30 ASSESSMENT — ANXIETY QUESTIONNAIRES: GAD7 TOTAL SCORE: 2

## 2021-05-10 ENCOUNTER — ANCILLARY PROCEDURE (OUTPATIENT)
Dept: GENERAL RADIOLOGY | Facility: OTHER | Age: 66
End: 2021-05-10
Attending: INTERNAL MEDICINE
Payer: COMMERCIAL

## 2021-05-10 ENCOUNTER — OFFICE VISIT (OUTPATIENT)
Dept: INTERNAL MEDICINE | Facility: OTHER | Age: 66
End: 2021-05-10
Attending: INTERNAL MEDICINE
Payer: COMMERCIAL

## 2021-05-10 VITALS
DIASTOLIC BLOOD PRESSURE: 70 MMHG | OXYGEN SATURATION: 98 % | SYSTOLIC BLOOD PRESSURE: 122 MMHG | TEMPERATURE: 97.9 F | HEART RATE: 83 BPM

## 2021-05-10 DIAGNOSIS — M25.511 RIGHT SHOULDER PAIN, UNSPECIFIED CHRONICITY: Primary | ICD-10-CM

## 2021-05-10 DIAGNOSIS — M35.3 PMR (POLYMYALGIA RHEUMATICA) (H): ICD-10-CM

## 2021-05-10 PROCEDURE — 73030 X-RAY EXAM OF SHOULDER: CPT | Mod: TC,RT,FY

## 2021-05-10 PROCEDURE — G0463 HOSPITAL OUTPT CLINIC VISIT: HCPCS

## 2021-05-10 PROCEDURE — 99213 OFFICE O/P EST LOW 20 MIN: CPT | Performed by: INTERNAL MEDICINE

## 2021-05-10 RX ORDER — PREDNISONE 5 MG/1
5 TABLET ORAL DAILY PRN
Qty: 30 TABLET | Refills: 1 | Status: SHIPPED | OUTPATIENT
Start: 2021-05-10 | End: 2021-07-15

## 2021-05-10 ASSESSMENT — PAIN SCALES - GENERAL: PAINLEVEL: MODERATE PAIN (5)

## 2021-05-10 NOTE — PROGRESS NOTES
Assessment & Plan   Problem List Items Addressed This Visit        Nervous and Auditory    Right shoulder pain - Primary    Relevant Orders    XR SHOULDER RIGHT G/E 2 VIEWS (Clinic Performed)    PMR (polymyalgia rheumatica) (H)    Relevant Medications    predniSONE (DELTASONE) 5 MG tablet             20 minutes spent on the date of the encounter doing chart review, review of test results, interpretation of tests, patient visit and documentation            No follow-ups on file.    Valente Deluca, [facility]    Cristina Clemente is a 65 year old who presents for the following health issues     HPI   Chyna presents today for follow up.  She fell about three weeks ago down a couple stairs and her right arm was outstretched.  Initially she did not have pain but now has pain over the last week or so.  No additional trauma.  Xrays appear negative for fracture.  She is due for DEXA in August.  She also wants some extra prednisone for her breakthrough days with her PMR.    Musculoskeletal problem/pain  Onset/Duration: 3 weeks   Description  Location: shoulder - right  Joint Swelling: no  Redness: no  Pain: YES  Warmth: no  Intensity:  moderate  Progression of Symptoms:  worsening  Accompanying signs and symptoms:   Fevers: no  Numbness/tingling/weakness: YES  History  Trauma to the area: YES  Recent illness:  no  Previous similar problem: no  Previous evaluation:  no  Precipitating or alleviating factors:  Aggravating factors include: lifting  Therapies tried and outcome: heat,ibuprofen        Review of Systems   Constitutional, HEENT, cardiovascular, pulmonary, gi and gu systems are negative, except as otherwise noted.      Objective    /70 (BP Location: Left arm, Patient Position: Chair, Cuff Size: Adult Regular)   Pulse 83   Temp 97.9  F (36.6  C) (Tympanic)   SpO2 98%   There is no height or weight on file to calculate BMI.  Physical Exam   GENERAL: healthy, alert and  no distress  MS: Right shoulder pain with Abduction against resistance , but not with Adduction against resistance.  Internal and external rotation intact.    PSYCH: mentation appears normal, affect normal/bright    Office Visit on 04/29/2021   Component Date Value Ref Range Status     Sodium 04/29/2021 139  133 - 144 mmol/L Final     Potassium 04/29/2021 4.2  3.4 - 5.3 mmol/L Final     Chloride 04/29/2021 108  94 - 109 mmol/L Final     Carbon Dioxide 04/29/2021 24  20 - 32 mmol/L Final     Anion Gap 04/29/2021 7  3 - 14 mmol/L Final     Glucose 04/29/2021 95  70 - 99 mg/dL Final    Fasting specimen     Urea Nitrogen 04/29/2021 18  7 - 30 mg/dL Final     Creatinine 04/29/2021 0.97  0.52 - 1.04 mg/dL Final     GFR Estimate 04/29/2021 61  >60 mL/min/[1.73_m2] Final    Comment: Non  GFR Calc  Starting 12/18/2018, serum creatinine based estimated GFR (eGFR) will be   calculated using the Chronic Kidney Disease Epidemiology Collaboration   (CKD-EPI) equation.       GFR Estimate If Black 04/29/2021 70  >60 mL/min/[1.73_m2] Final    Comment:  GFR Calc  Starting 12/18/2018, serum creatinine based estimated GFR (eGFR) will be   calculated using the Chronic Kidney Disease Epidemiology Collaboration   (CKD-EPI) equation.       Calcium 04/29/2021 9.2  8.5 - 10.1 mg/dL Final     Bilirubin Total 04/29/2021 0.3  0.2 - 1.3 mg/dL Final     Albumin 04/29/2021 3.6  3.4 - 5.0 g/dL Final     Protein Total 04/29/2021 6.9  6.8 - 8.8 g/dL Final     Alkaline Phosphatase 04/29/2021 66  40 - 150 U/L Final     ALT 04/29/2021 40  0 - 50 U/L Final     AST 04/29/2021 19  0 - 45 U/L Final     Cholesterol 04/29/2021 171  <200 mg/dL Final     Triglycerides 04/29/2021 258* <150 mg/dL Final    Comment: Borderline high:  150-199 mg/dl  High:             200-499 mg/dl  Very high:       >499 mg/dl  Fasting specimen       HDL Cholesterol 04/29/2021 38* >49 mg/dL Final     LDL Cholesterol Calculated 04/29/2021 81  <100 mg/dL  Final    Desirable:       <100 mg/dl     Non HDL Cholesterol 04/29/2021 133* <130 mg/dL Final    Comment: Above Desirable:  130-159 mg/dl  Borderline high:  160-189 mg/dl  High:             190-219 mg/dl  Very high:       >219 mg/dl       WBC 04/29/2021 9.9  4.0 - 11.0 10e9/L Final     RBC Count 04/29/2021 4.35  3.8 - 5.2 10e12/L Final     Hemoglobin 04/29/2021 13.7  11.7 - 15.7 g/dL Final     Hematocrit 04/29/2021 40.5  35.0 - 47.0 % Final     MCV 04/29/2021 93  78 - 100 fl Final     MCH 04/29/2021 31.5  26.5 - 33.0 pg Final     MCHC 04/29/2021 33.8  31.5 - 36.5 g/dL Final     RDW 04/29/2021 13.5  10.0 - 15.0 % Final     Platelet Count 04/29/2021 254  150 - 450 10e9/L Final     % Neutrophils 04/29/2021 74.4  % Final     % Lymphocytes 04/29/2021 15.1  % Final     % Monocytes 04/29/2021 8.7  % Final     % Eosinophils 04/29/2021 1.3  % Final     % Basophils 04/29/2021 0.5  % Final     Absolute Neutrophil 04/29/2021 7.4  1.6 - 8.3 10e9/L Final     Absolute Lymphocytes 04/29/2021 1.5  0.8 - 5.3 10e9/L Final     Absolute Monocytes 04/29/2021 0.9  0.0 - 1.3 10e9/L Final     Absolute Eosinophils 04/29/2021 0.1  0.0 - 0.7 10e9/L Final     Absolute Basophils 04/29/2021 0.1  0.0 - 0.2 10e9/L Final     Diff Method 04/29/2021 Automated Method   Final     Uric Acid 04/29/2021 3.3  2.6 - 6.0 mg/dL Final     No results found for any visits on 05/10/21.  No results found for this or any previous visit (from the past 24 hour(s)).

## 2021-05-10 NOTE — NURSING NOTE
"Chief Complaint   Patient presents with     Musculoskeletal Problem       Initial /70 (BP Location: Left arm, Patient Position: Chair, Cuff Size: Adult Regular)   Pulse 83   Temp 97.9  F (36.6  C) (Tympanic)   SpO2 98%  Estimated body mass index is 23.41 kg/m  as calculated from the following:    Height as of 4/29/21: 1.575 m (5' 2\").    Weight as of 4/29/21: 58.1 kg (128 lb).  Medication Reconciliation: complete  YEVGENIY TRAMMELL LPN  "

## 2021-05-11 DIAGNOSIS — Z90.49 ACQUIRED ABSENCE OF OTHER SPECIFIED PARTS OF DIGESTIVE TRACT: ICD-10-CM

## 2021-05-11 DIAGNOSIS — E78.1 HYPERTRIGLYCERIDEMIA: ICD-10-CM

## 2021-05-11 DIAGNOSIS — G25.81 RESTLESS LEGS SYNDROME: ICD-10-CM

## 2021-05-11 RX ORDER — DICYCLOMINE HYDROCHLORIDE 10 MG/1
CAPSULE ORAL
Qty: 180 CAPSULE | Refills: 0 | Status: SHIPPED | OUTPATIENT
Start: 2021-05-11 | End: 2021-06-11

## 2021-05-11 RX ORDER — ATORVASTATIN CALCIUM 20 MG/1
20 TABLET, FILM COATED ORAL DAILY
Qty: 30 TABLET | Refills: 3 | Status: SHIPPED | OUTPATIENT
Start: 2021-05-11 | End: 2021-09-14

## 2021-05-11 RX ORDER — CHOLECALCIFEROL (VITAMIN D3) 50 MCG
TABLET ORAL
Qty: 100 TABLET | Refills: 0 | Status: SHIPPED | OUTPATIENT
Start: 2021-05-11 | End: 2021-07-01

## 2021-05-11 NOTE — TELEPHONE ENCOUNTER
atorvastatin (LIPITOR) 20 MG tablet    Last Written Prescription Date:  01/11/2021  Last Fill Quantity: 30,   # refills: 3  Last Office Visit: 05/11/2021     dicyclomine (BENTYL) 10 MG capsule     Last Written Prescription Date:  04/07/2021  Last Fill Quantity: 180,   # refills: 0      vitamin D3 (CHOLECALCIFEROL) 50 mcg (2000 units) tablet    Last Written Prescription Date:  02/25/2021  Last Fill Quantity: 100,   # refills: 0  Last Office Visit:   Future Office visit:       Routing refill request to provider for review/approval because:

## 2021-05-20 NOTE — PROGRESS NOTES
Assessment & Plan     Pain in joint of right shoulder  Agreeable to PT, MRI  - PHYSICAL THERAPY REFERRAL; Future  - MR Shoulder Right w/o Contrast; Future  - nabumetone (RELAFEN) 500 MG tablet; Take 1 tablet (500 mg) by mouth 2 times daily      15 minutes spent on the date of the encounter doing chart review, history and exam, documentation and further activities per the note     FUTURE APPOINTMENTS:       - F/U with MRI and PT    No follow-ups on file.    Valente Deluca, [facility]   Chyna is a 65 year old who presents for the following health issues         HPI   Chyna presents with continued reports of pain to her R shoulder. States the pain has persisted for the last 6 weeks. After previous visit, Chyna sat on her dog and then rolled on the bed, causing increased pain to her R shoulder.     Musculoskeletal problem/pain  Onset/Duration: follow up, re injured shoulder 3 days later   Description  Location: shoulder - right  Joint Swelling: no  Redness: no  Pain: YES  Warmth: no  Intensity:  severe  Progression of Symptoms:  worsening  Accompanying signs and symptoms:   Fevers: no  Numbness/tingling/weakness: YES  History  Trauma to the area: YES  Recent illness:  no  Previous similar problem: YES  Previous evaluation:  YES  Precipitating or alleviating factors:  Aggravating factors include: lifting, exercise and overuse  Therapies tried and outcome: acetaminophen and Ibuprofen - took husbands nabumetone which relieved the pain more so than OTC therapies tried       Review of Systems   Constitutional, HEENT, cardiovascular, pulmonary, gi and gu systems are negative, except as otherwise noted.      Objective    /79 (BP Location: Left arm, Patient Position: Chair, Cuff Size: Adult Regular)   Pulse 91   Temp 98.4  F (36.9  C) (Tympanic)   SpO2 95%   There is no height or weight on file to calculate BMI.  Physical Exam   GENERAL: healthy, alert and no  distress  RESP: lungs clear to auscultation - no rales, rhonchi or wheezes  CV: regular rate and rhythm, normal S1 S2, no S3 or S4, no murmur, click or rub, no peripheral edema and peripheral pulses strong  MS: decreased range of motion to R shoulder, RUE exam shows increased pain with abduction against resistance, R radial pulse strong   NEURO: Normal strength and tone, mentation intact and speech normal  PSYCH: mentation appears normal, affect normal/bright    MRI ordered    Monica Winkler ROBERT Student    Case and care plan discussed with student.  I attest and agree to the documentation/evaluation as noted above.      Valente Deluca, DO

## 2021-05-25 ENCOUNTER — OFFICE VISIT (OUTPATIENT)
Dept: INTERNAL MEDICINE | Facility: OTHER | Age: 66
End: 2021-05-25
Attending: INTERNAL MEDICINE
Payer: COMMERCIAL

## 2021-05-25 VITALS
HEART RATE: 91 BPM | SYSTOLIC BLOOD PRESSURE: 124 MMHG | OXYGEN SATURATION: 95 % | DIASTOLIC BLOOD PRESSURE: 79 MMHG | TEMPERATURE: 98.4 F

## 2021-05-25 DIAGNOSIS — R09.82 POST-NASAL DRIP: ICD-10-CM

## 2021-05-25 DIAGNOSIS — E87.6 HYPOKALEMIA: ICD-10-CM

## 2021-05-25 DIAGNOSIS — M25.511 PAIN IN JOINT OF RIGHT SHOULDER: Primary | ICD-10-CM

## 2021-05-25 PROCEDURE — 99213 OFFICE O/P EST LOW 20 MIN: CPT | Performed by: INTERNAL MEDICINE

## 2021-05-25 PROCEDURE — G0463 HOSPITAL OUTPT CLINIC VISIT: HCPCS

## 2021-05-25 RX ORDER — SPIRONOLACTONE 50 MG/1
50 TABLET, FILM COATED ORAL 2 TIMES DAILY
Qty: 180 TABLET | Refills: 0 | Status: SHIPPED | OUTPATIENT
Start: 2021-05-25 | End: 2021-08-25

## 2021-05-25 RX ORDER — NABUMETONE 500 MG/1
500 TABLET, FILM COATED ORAL 2 TIMES DAILY
Qty: 30 TABLET | Refills: 1 | Status: SHIPPED | OUTPATIENT
Start: 2021-05-25 | End: 2021-06-07

## 2021-05-25 RX ORDER — MONTELUKAST SODIUM 10 MG/1
10 TABLET ORAL AT BEDTIME
Qty: 90 TABLET | Refills: 1 | Status: SHIPPED | OUTPATIENT
Start: 2021-05-25 | End: 2021-11-26

## 2021-05-25 ASSESSMENT — PAIN SCALES - GENERAL: PAINLEVEL: MILD PAIN (2)

## 2021-05-25 NOTE — TELEPHONE ENCOUNTER
Spironolactone 50 mg      Last Written Prescription Date:  2/25/21  Last Fill Quantity: 180,   # refills: 0  Last Office Visit: 5/10/21  Future Office visit:    Next 5 appointments (look out 90 days)    May 25, 2021  1:00 PM  (Arrive by 12:45 PM)  SHORT with Valente Deluca DO  [facility] ([facility] ) [address]  [facility] [id number] [phone]           Routing refill request to provider for review/approval because:  Drug not on the [facility], [facility] or [facility] refill protocol or controlled substance

## 2021-05-25 NOTE — TELEPHONE ENCOUNTER
Montelukast 10 mg      Last Written Prescription Date:  10/28/20  Last Fill Quantity: 90,   # refills: 1  Last Office Visit: 5/10/21  Future Office visit:    Next 5 appointments (look out 90 days)    May 25, 2021  1:00 PM  (Arrive by 12:45 PM)  SHORT with Valente Deluca DO  [facility] ([facility] ) [address]  [place] [address] [phone]           Routing refill request to provider for review/approval because:  Drug not on the [facility], [facility] or [facility] refill protocol or controlled substance

## 2021-05-25 NOTE — NURSING NOTE
"Chief Complaint   Patient presents with     Musculoskeletal Problem       Initial /79 (BP Location: Left arm, Patient Position: Chair, Cuff Size: Adult Regular)   Pulse 91   Temp 98.4  F (36.9  C) (Tympanic)   SpO2 95%  Estimated body mass index is 23.41 kg/m  as calculated from the following:    Height as of 4/29/21: 1.575 m (5' 2\").    Weight as of 4/29/21: 58.1 kg (128 lb).  Medication Reconciliation: complete  YEVGENIY TRAMMELL LPN  "

## 2021-06-01 DIAGNOSIS — G25.81 RESTLESS LEGS SYNDROME: ICD-10-CM

## 2021-06-02 RX ORDER — POTASSIUM CHLORIDE 1500 MG/1
TABLET, EXTENDED RELEASE ORAL
Qty: 90 TABLET | Refills: 3 | Status: SHIPPED | OUTPATIENT
Start: 2021-06-02 | End: 2021-11-26

## 2021-06-05 DIAGNOSIS — M25.511 PAIN IN JOINT OF RIGHT SHOULDER: ICD-10-CM

## 2021-06-07 ENCOUNTER — HOSPITAL ENCOUNTER (OUTPATIENT)
Dept: PHYSICAL THERAPY | Facility: HOSPITAL | Age: 66
Setting detail: THERAPIES SERIES
End: 2021-06-07
Attending: INTERNAL MEDICINE
Payer: COMMERCIAL

## 2021-06-07 DIAGNOSIS — M25.511 PAIN IN JOINT OF RIGHT SHOULDER: ICD-10-CM

## 2021-06-07 PROCEDURE — 97161 PT EVAL LOW COMPLEX 20 MIN: CPT | Mod: GP

## 2021-06-07 PROCEDURE — 97110 THERAPEUTIC EXERCISES: CPT | Mod: GP

## 2021-06-07 RX ORDER — NABUMETONE 500 MG/1
500 TABLET, FILM COATED ORAL 2 TIMES DAILY
Qty: 30 TABLET | Refills: 1 | Status: SHIPPED | OUTPATIENT
Start: 2021-06-07 | End: 2021-12-21

## 2021-06-07 NOTE — PROGRESS NOTES
Initial Physical Therapy Evaluation      Name: Chyna Delgado MRN# [id number]   Age: 65 year old YOB: 1955     Date of Consultation: June 7, 2021  Primary care provider: Valente Deluca    Referring Physician: Dr. Deluca   Orders: Eval and Treat  Medical Diagnosis: R shoulder pain  Onset of Illness/Injury: 4/26/21    Reason for PT Visit: Patient is a 64 y/o female who presents with right shoulder pain. Reports she injured her shoulder during a fall on 4/26, then re-injured it again getting out of bed. Reports she isn't able to sleep or get comfortable due to right shoulder pain. Reports she has been taking pain medication and it has been feeling a little better. Motion has also been improving   Prior Level of Function: No issues   Pain: 1/10  Aching, worst 8-9 during night and when she first wakes up   Ice helps relieve pain sowat     Community Support/Living Environment/Employment History:      Patient/Family Goal: Less pain    Fall Screen:   Have you fallen 2 or more times in the last year? No  Have you fallen and had an injury in the last year? No  Timed up & go:   Is patient a fall risk?     Past Medical History:   Past Medical History:   Diagnosis Date     Abnormal mammogram, unspecified 01/08/2003    Normal pathology     Back Pain 02/10/2000    Sacroiliac pain     Benign neoplasm of colon 03/10/2000     Benign paroxysmal positional vertigo 06/07/2012     Depressive disorder, not elsewhere classified 02/10/2004     Diarrhea 12/15/2010    Secondary to colectomy for familial polyposis     Gastric polyp 12/11/2015, 12/18/2017    recurrent      History of normal mammogram 07/18/2014, 1/18/2019     Idiopathic osteoporosis 12/15/2010     Interstitial emphysema (H) 12/15/2010     menopausal or female climacteric states 08/31/1999     Mixed hyperlipidemia 12/15/2010     Other bursitis disorders 02/04/2011    Greater trochanteric     Other forms of retinal detachment(361.89) 12/15/2010      Other malaise and fatigue 01/10/2003     Personal history of tobacco use, presenting hazards to health 12/15/2010     Polymyalgia rheumatica (H)      Polyposis of colon      Restless legs syndrome (RLS) 12/15/2010     Rotator cuff tendonitis      Rotator cuff tendonitis      Sacroiliitis, not elsewhere classified (H) 12/15/2010    multiple sites     Tobacco use disorder 08/22/2012     Unspecified asthma(493.90) 12/15/2010       Past Surgical History:  Past Surgical History:   Procedure Laterality Date     benign tumors removed from back       CLOSED REDUCTION, PERCUTANEOUS PINNING HIP Right 2/24/2019    Procedure: Percutaneous Screw Fixation of Right Hip Fracture;  Surgeon: Amrik Kolb DO;  Location: [facility]     COLON SURGERY N/A     HX: Total colectomy with J-pouch reconstruction.      ENDOSCOPY UPPER, COLONOSCOPY, COMBINED N/A 9/5/2014    Procedure: COMBINED ENDOSCOPY UPPER, COLONOSCOPY;  Surgeon: Delbert Patel MD;  Location: [facility]     ENDOSCOPY UPPER, COLONOSCOPY, COMBINED N/A 5/9/2019    Procedure: UPPER ENDOSCOPY  WITH BIOPSIES AND  COLONOSCOPY WITH BIOPSIES;  Surgeon: Tai Diaz MD;  Location: [facility]     ESOPHAGOSCOPY, GASTROSCOPY, DUODENOSCOPY (EGD), COMBINED N/A 12/11/2015    Procedure: COMBINED ESOPHAGOSCOPY, GASTROSCOPY, DUODENOSCOPY (EGD);  Surgeon: Delbert Patel MD;  Location: [facility]     ESOPHAGOSCOPY, GASTROSCOPY, DUODENOSCOPY (EGD), COMBINED N/A 12/18/2017    Procedure: COMBINED ESOPHAGOSCOPY, GASTROSCOPY, DUODENOSCOPY (EGD);  UPPER ENDOSCOPY WITH BIOPSY;  Surgeon: Delbert Patel MD;  Location: [facility]     excised-benign  2002    tongue lesion     HC REPAIR OF NASAL SEPTUM  2002    Deviated nasal septum     LAPAROSCOPIC CHOLECYSTECTOMY       lumpectomy Right breast      Breast Lump     MOUTH SURGERY      removal of spot from roof of mouth     pilonidal cyst surgery  1976     removal of colon and large intestine  2000    Familial polyposis     Right etopic pregnancy      Pregnancy      TONSILLECTOMY      tonsillitus     UPPER GI ENDOSCOPY  2009    Stomach polyps       Medications:   Current Outpatient Medications   Medication Sig     allopurinol (ZYLOPRIM) 100 MG tablet Take 1 tablet (100 mg) by mouth 2 times daily     atorvastatin (LIPITOR) 20 MG tablet Take 1 tablet (20 mg) by mouth daily     calcium carbonate 750 MG CHEW Take 1 tablet (750 mg) by mouth 2 times daily     Calcium Citrate-Vitamin D (CALCIUM + D PO) 1200-1000mg unit tablet chewable; one tablet with a meal orally once a day.     cetirizine (ZYRTEC) 10 MG tablet Take 1 tablet (10 mg) by mouth At Bedtime     dicyclomine (BENTYL) 10 MG capsule Take 2 capsules (20 mg) by mouth 3 times daily (before meals)     diphenoxylate-atropine (LOMOTIL) 2.5-0.025 MG tablet Take 1-2 tablets by mouth 4 times daily as needed for diarrhea     ferrous sulfate (IRON) 325 (65 FE) MG tablet Take 325 mg by mouth 2 times daily (with meals)     fluticasone-salmeterol (ADVAIR) 250-50 MCG/DOSE inhaler Inhale 1 puff into the lungs 2 times daily     gabapentin (NEURONTIN) 400 MG capsule Take 3 capsules (1,200 mg) by mouth 3 times daily     ibuprofen (ADVIL,MOTRIN) 200 MG tablet Take 200 mg by mouth every 4 hours as needed      ketotifen (ZADITOR/REFRESH ANTI-ITCH) 0.025 % SOLN ophthalmic solution PLACE 2 DROPS INTO BOTH EYES 2 TIMES DAILY     loperamide (IMODIUM) 2 MG capsule Take 6 mg by mouth 2 times daily Pt states takes 3 tabs (6 mg) twice a day unless she needs to take more, depending on what she eats.     magnesium 100 MG CAPS Take by mouth 2 times daily     montelukast (SINGULAIR) 10 MG tablet Take 1 tablet (10 mg) by mouth At Bedtime     Multiple Vitamin (MULTIVITAMIN) per tablet Take 1 tablet by mouth daily. Every day with food     nabumetone (RELAFEN) 500 MG tablet Take 1 tablet (500 mg) by mouth 2 times daily     omeprazole (PRILOSEC) 40 MG DR capsule Take 1 capsule (40 mg) by mouth daily     potassium chloride ER (KLOR-CON M) 20 MEQ CR tablet Take 1  tablet (20 mEq) by mouth 3 times daily     pramipexole (MIRAPEX) 0.125 MG tablet Take 1 tablet (0.125 mg) by mouth At Bedtime     predniSONE (DELTASONE) 5 MG tablet Take 1 tablet (5 mg) by mouth daily as needed (PMR)     predniSONE (DELTASONE) 5 MG tablet Take 5 mg by mouth daily.     PSYLLIUM PO Take 1 Tablespoonful by mouth 2 times daily      Simethicone 125 MG CAPS Take 500 mg by mouth 2 times daily     spironolactone (ALDACTONE) 50 MG tablet Take 1 tablet (50 mg) by mouth 2 times daily     VENTOLIN  (90 Base) MCG/ACT inhaler Inhale 2 puffs into the lungs every 4 hours as needed for shortness of breath / dyspnea or wheezing     vitamin D3 (CHOLECALCIFEROL) 50 mcg (2000 units) tablet TAKE TWO TABLETS BY MOUTH EVERY DAY     vitamin E 200 UNIT capsule Take 200 Units by mouth 2 times daily     No current facility-administered medications for this encounter.        Imaging: x-ray was negative     Musculoskeletal Findings:     OBJECTIVE   Observation: Patient appears to PT in to acute distress  Palpation: mildy tender to supraspinatus tendon  Functional mobility   Independent   Posture: forward head, protracted shoulders  Sitting Posture: Fair   Standing Posture: Fair     Neurological Assessment:      Myotomes:  Right upper extremity: Intact  Left upper extremity: Intact    Dermatomes:   Right upper extremity: Intact  Left upper extremity: Intact    Range of Motion/Strength:   Cervical range of motion: within normal limits    Shoulder Range of Motion and Strength    RIGHT Shoulder ROM (Active)  Flexion: 80 degrees   Abduction: 90 degrees   Internal Rotation: WNL   Functional IR: WNL  External Rotation: Painful but WNL  Functional ER: Painful but WNL     LEFT Shoulder ROM (Active)  Flexion: WNL  Abduction: WNL  Internal Rotation: WNL   Functional IR: WNL  External Rotation: WNL  Functional ER: WNL   Bilateral elbow, wrist, and hand range of motion and strength within normal limits.  Lower and middle trapezius  strength 4/5 bilaterally.    STRENGTH                                                 Right                       Left  Flexion:                              3-/5                    5/5  Extension:                          5/5                       5/5  IR:                                     5/5                       5/5  ER:                                    3-/5                      5/5  Abduction:                         3-/5                      5/5    Special Tests:    Shoulder Special Tests:  Drop-Arm: Negative  Speeds: Negative   Empty Can: Positive   Yergason:   Crank:   External Rotation Lag Sign: Negative        Functional Outcomes:   SPADI:     Short-term goals:  To be achieved in 2-3 weeks:  Instruct in home program    1.) Patient will understand biomechanical stressors of the shoulder joint in order to make modifications to activities to reduce further risk of injury.  2.) Patient will be independent with a short-term home exercise program.  3.) Patient will report 25% improvement of overall symptoms to allow decreased shoulder pain with daily movement and activity.       Long-term goals:  To be achieved in 10 weeks:  Self management of symptoms  Pain free activities of daily living  Return to previous level of function    1.) Improve score on Shoulder Pain and Disability Index by 13 points to correlate with clinically significant change.  2.) Patient will report 50% improvement of overall symptoms to allow decreased shoulder pain with daily movement and activity    Discharge goals: Patient will be independent with a home program for self-management of symptoms.    Prognosis/Plan of Care:   Appropriate for Physical Therapy Intervention: Yes     Planned Interventions: Therapeutic exercise, manual therapy, therapeutic activity, patient education  Modalities if needed including ultrasound, heat/ice, or E-stim    Patient presents today with signs and symptoms consistent of right shoulder pain likely RTC  small tear vs. strain. Pt has MRI scheduled but doesn't know if she wants it done because she is fairly certain she would not pursue surgery even if tear was identified. Therapy today consisted of evaluation, patient education, and therapeutic exercise. Patient would benefit from continued PT sessions addressing overall pain and dysfunction with use of appropriate interventions.    Clinical Impressions:  Criteria for Skilled Therapeutic Intervention Met: Yes  PT Diagnosis: R shoulder pain  Influenced by the following impairments: Weakness, impaired ROM, pain limiting function  Functional limitations due to impairment: Decreased functional use of RUE during daily activities   Clinical presentation: Stable/Uncomplicated  Clinical presentation rationale: Clinical judgement  Clinical Decision making (complexity): Low Complexity  Predicted Duration of Therapy Intervention (days/wks): up to 12 wks  Risks and Benefits of therapy have been explained: Yes  Patient, Family & other staff in agreement with plan of care: Yes  Comments:   Frequency: 1-2 times/week  Date Range: 6/7/21 to 8/16/21    Treatment Rendered/Intervention    Therapeutic exercise: Patient instructed in and demonstrated proper performance of home exercise program consisting of:  Shoulder AAROM exercises using cane       Total Evaluation Time: 20    I certify the need for these services furnished under this plan of treatment and while under my care. (Physician co-signature of this document indicates review and certification of the therapy plan).

## 2021-06-07 NOTE — TELEPHONE ENCOUNTER
Nabumetone 500 mg      Last Written Prescription Date:  5/25/21  Last Fill Quantity: 30,   # refills: 1  Last Office Visit: 5/25/21  Future Office visit:       Routing refill request to provider for review/approval because:  Drug not on the [facility], [facility] or [facility] refill protocol or controlled substance

## 2021-06-21 ENCOUNTER — HOSPITAL ENCOUNTER (OUTPATIENT)
Dept: PHYSICAL THERAPY | Facility: HOSPITAL | Age: 66
Setting detail: THERAPIES SERIES
End: 2021-06-21
Attending: INTERNAL MEDICINE
Payer: COMMERCIAL

## 2021-06-21 PROCEDURE — 97110 THERAPEUTIC EXERCISES: CPT | Mod: GP

## 2021-06-24 NOTE — MR AVS SNAPSHOT
After Visit Summary   7/23/2018    Chyna Delgado    MRN: [id number]           Patient Information     Date Of Birth          1955        Visit Information        Provider Department      7/23/2018 2:00 PM Arie Camarillo DO [facility]        Today's Diagnoses     Moderate persistent asthma without complication    -  1    Iron deficiency        Restless legs syndrome          Care Instructions    Start taking gabapentin 600 mg twice per day for 7 days then 300 twice per day for 7 days, then stop the medication.          Follow-ups after your visit        Who to contact     If you have questions or need follow up information about today's clinic visit or your schedule please contact [facility] directly at [phone].  Normal or non-critical lab and imaging results will be communicated to you by MyChart, letter or phone within 4 business days after the clinic has received the results. If you do not hear from us within 7 days, please contact the clinic through [employer] or phone. If you have a critical or abnormal lab result, we will notify you by phone as soon as possible.  Submit refill requests through [employer] or call your pharmacy and they will forward the refill request to us. Please allow 3 business days for your refill to be completed.          Additional Information About Your Visit        MyChart Information     [employer] gives you secure access to your electronic health record. If you see a primary care provider, you can also send messages to your care team and make appointments. If you have questions, please call your primary care clinic.  If you do not have a primary care provider, please call [phone] and they will assist you.        Care EveryWhere ID     This is your Care EveryWhere ID. This could be used by other organizations to access your [facility] medical records  [id number]        Your Vitals Were     Pulse Temperature Respirations  Pulse Oximetry          78 97.9  F (36.6  C) (Tympanic) 20 94%         Blood Pressure from Last 3 Encounters:   07/23/18 110/68   07/10/18 117/81   06/21/18 112/66    Weight from Last 3 Encounters:   07/10/18 135 lb (61.2 kg)   06/21/18 135 lb (61.2 kg)   04/18/18 145 lb (65.8 kg)              We Performed the Following     Basic metabolic panel     CBC with platelets     CRP inflammation     Iron and iron binding capacity     Magnesium     Vitamin D Deficiency          Today's Medication Changes          These changes are accurate as of 7/23/18  2:45 PM.  If you have any questions, ask your nurse or doctor.               These medicines have changed or have updated prescriptions.        Dose/Directions    GAS-X PO   This may have changed:  Another medication with the same name was removed. Continue taking this medication, and follow the directions you see here.   Changed by:  Arie Camarillo DO        Take by mouth 2 times daily   Refills:  0       loperamide 2 MG capsule   Commonly known as:  IMODIUM   This may have changed:  Another medication with the same name was removed. Continue taking this medication, and follow the directions you see here.   Changed by:  Arie Camarillo DO        Dose:  2 mg   Take 2 mg by mouth   Refills:  0         Stop taking these medicines if you haven't already. Please contact your care team if you have questions.     fluticasone-salmeterol 250-50 MCG/DOSE diskus inhaler   Commonly known as:  ADVAIR   Stopped by:  Arie Camarillo DO           gabapentin 400 MG capsule   Commonly known as:  NEURONTIN   Stopped by:  Arie Camarillo DO                    Primary Care Provider Office Phone # Fax #    Arie Camarillo [phone]       [facility] [place] [address]        Equal Access to Services     AUDREY WICK AH: Kevin Gayle, herbert bonds, patience lópez  rekhajaimeneha barnesaachong ah. [facility] [phone].    ATENCIÓN: Si habla facundo, tiene a tinajero disposición servicios gratuitos de asistencia lingüística. Willy al [phone].    We comply with applicable federal civil rights laws and [place] laws. We do not discriminate on the basis of race, color, national origin, age, disability, sex, sexual orientation, or gender identity.            Thank you!     Thank you for choosing [facility]  for your care. Our goal is always to provide you with excellent care. Hearing back from our patients is one way we can continue to improve our services. Please take a few minutes to complete the written survey that you may receive in the mail after your visit with us. Thank you!             Your Updated Medication List - Protect others around you: Learn how to safely use, store and throw away your medicines at www.disposemymeds.org.          This list is accurate as of 7/23/18  2:45 PM.  Always use your most recent med list.                   Brand Name Dispense Instructions for use Diagnosis    albuterol 108 (90 Base) MCG/ACT Inhaler    PROAIR HFA/PROVENTIL HFA/VENTOLIN HFA     Inhale 2 puffs into the lungs every 4 hours as needed for shortness of breath / dyspnea or wheezing        ASPIRIN PO      Take 81 mg by mouth daily        * budesonide-formoterol 160-4.5 MCG/ACT Inhaler    SYMBICORT    3 Inhaler    Inhale 2 puffs into the lungs 2 times daily    Moderate persistent asthma without complication       * SYMBICORT 160-4.5 MCG/ACT Inhaler   Generic drug:  budesonide-formoterol     3 Inhaler    Inhale 2 puffs into the lungs 2 times daily    Moderate persistent asthma without complication       CALCIUM + D PO      1200-1000mg unit tablet chewable; one tablet with a meal orally once a day.        carBAMazepine 100 MG 12 hr capsule    CARBATROL    30 capsule    Take 1 capsule (100 mg) by mouth At Bedtime    Restless legs syndrome (RLS)       diclofenac 1 % Gel topical gel     VOLTAREN    100 g    Apply to the outside of the elbow 3 times per day.    Overuse injury       ferrous sulfate 325 (65 Fe) MG tablet    IRON    90 tablet    Take 325 mg by mouth 2 times daily (with meals)    Iron deficiency       * FLUoxetine 40 MG capsule    PROzac    90 capsule    Take one (1) capsule BY MOUTH daily    Dysthymic disorder       * FLUoxetine 20 MG capsule    PROzac    90 capsule    TAKE ONE CAPSULE BY MOUTH ONCE DAILY WITH 40 MG CAPSULE    Dysthymic disorder       GAS-X PO      Take by mouth 2 times daily        HYDROcodone-acetaminophen 5-325 MG per tablet    NORCO    18 tablet    Take 1 tablet by mouth 3 times daily as needed for pain    Sternum pain       ibuprofen 200 MG tablet    ADVIL/MOTRIN     Take 200 mg by mouth        ketotifen 0.025 % Soln ophthalmic solution    ZADITOR/REFRESH ANTI-ITCH    5 mL    PLACE 2 DROPS INTO BOTH EYES 2 TIMES DAILY        loperamide 2 MG capsule    IMODIUM     Take 2 mg by mouth        magnesium 100 MG Caps      Take by mouth 2 times daily        multivitamin per tablet      Take 1 tablet by mouth daily. Every day with food        omeprazole 20 MG CR capsule    priLOSEC    90 capsule    Take 1 capsule (20 mg) by mouth daily Take two daily while on prednisone    Gastroesophageal reflux disease, esophagitis presence not specified       predniSONE 10 MG tablet    DELTASONE    75 tablet    Take 10 mg by mouth daily for 5 weeks, then reduce to 5 mg daily.    Myalgia, PMR (polymyalgia rheumatica) (H)       PSYLLIUM PO      Take 3 tsp by mouth 2 times daily.        tacrolimus 0.1 % ointment    PROTOPIC          traMADol 50 MG tablet    ULTRAM    90 tablet    TAKE 1-2 TABLETS BY MOUTH EVERY 6 HOURS AS NEEDED    Restless leg syndrome, Myalgia       vitamin E 200 UNIT capsule      Take 200 Units by mouth 2 times daily        * Notice:  This list has 4 medication(s) that are the same as other medications prescribed for you. Read the directions carefully, and ask your  doctor or other care provider to review them with you.       Plan: Use sunscreen 30 SPF or higher to sun exposed areas. Detail Level: Zone

## 2021-06-29 ENCOUNTER — HOSPITAL ENCOUNTER (OUTPATIENT)
Dept: PHYSICAL THERAPY | Facility: HOSPITAL | Age: 66
Setting detail: THERAPIES SERIES
End: 2021-06-29
Attending: INTERNAL MEDICINE
Payer: COMMERCIAL

## 2021-06-29 PROCEDURE — 97110 THERAPEUTIC EXERCISES: CPT | Mod: GP

## 2021-06-29 PROCEDURE — 97140 MANUAL THERAPY 1/> REGIONS: CPT | Mod: GP

## 2021-07-01 DIAGNOSIS — G25.81 RESTLESS LEGS SYNDROME (RLS): ICD-10-CM

## 2021-07-01 DIAGNOSIS — G25.81 RESTLESS LEGS SYNDROME: ICD-10-CM

## 2021-07-01 RX ORDER — CHOLECALCIFEROL (VITAMIN D3) 50 MCG
TABLET ORAL
Qty: 100 TABLET | Refills: 4 | Status: SHIPPED | OUTPATIENT
Start: 2021-07-01 | End: 2022-02-22

## 2021-07-01 RX ORDER — PRAMIPEXOLE DIHYDROCHLORIDE 0.12 MG/1
TABLET ORAL
Qty: 90 TABLET | Refills: 1 | OUTPATIENT
Start: 2021-07-01

## 2021-07-06 ENCOUNTER — HOSPITAL ENCOUNTER (OUTPATIENT)
Dept: PHYSICAL THERAPY | Facility: HOSPITAL | Age: 66
Setting detail: THERAPIES SERIES
End: 2021-07-06
Attending: INTERNAL MEDICINE
Payer: COMMERCIAL

## 2021-07-06 PROCEDURE — 97110 THERAPEUTIC EXERCISES: CPT | Mod: GP

## 2021-07-13 ENCOUNTER — HOSPITAL ENCOUNTER (OUTPATIENT)
Dept: PHYSICAL THERAPY | Facility: HOSPITAL | Age: 66
Setting detail: THERAPIES SERIES
End: 2021-07-13
Attending: INTERNAL MEDICINE
Payer: COMMERCIAL

## 2021-07-13 PROCEDURE — 97110 THERAPEUTIC EXERCISES: CPT | Mod: GP

## 2021-07-14 DIAGNOSIS — M35.3 PMR (POLYMYALGIA RHEUMATICA) (H): ICD-10-CM

## 2021-07-14 DIAGNOSIS — R09.82 POST-NASAL DRIP: ICD-10-CM

## 2021-07-15 ENCOUNTER — OFFICE VISIT (OUTPATIENT)
Dept: CHIROPRACTIC MEDICINE | Facility: OTHER | Age: 66
End: 2021-07-15
Attending: CHIROPRACTOR
Payer: COMMERCIAL

## 2021-07-15 DIAGNOSIS — M99.03 SEGMENTAL AND SOMATIC DYSFUNCTION OF LUMBAR REGION: Primary | ICD-10-CM

## 2021-07-15 DIAGNOSIS — M54.50 ACUTE BILATERAL LOW BACK PAIN WITHOUT SCIATICA: ICD-10-CM

## 2021-07-15 DIAGNOSIS — M99.02 SEGMENTAL AND SOMATIC DYSFUNCTION OF THORACIC REGION: ICD-10-CM

## 2021-07-15 DIAGNOSIS — M99.01 SEGMENTAL AND SOMATIC DYSFUNCTION OF CERVICAL REGION: ICD-10-CM

## 2021-07-15 PROCEDURE — 98941 CHIROPRACT MANJ 3-4 REGIONS: CPT | Mod: AT | Performed by: CHIROPRACTOR

## 2021-07-15 RX ORDER — PREDNISONE 5 MG/1
5 TABLET ORAL DAILY PRN
Qty: 30 TABLET | Refills: 1 | Status: SHIPPED | OUTPATIENT
Start: 2021-07-15 | End: 2021-09-14

## 2021-07-15 RX ORDER — CETIRIZINE HYDROCHLORIDE 10 MG/1
10 TABLET ORAL AT BEDTIME
Qty: 90 TABLET | Refills: 1 | Status: SHIPPED | OUTPATIENT
Start: 2021-07-15 | End: 2023-01-26

## 2021-07-15 NOTE — TELEPHONE ENCOUNTER
ZYRTEC      Last Written Prescription Date:  2/3/2021  Last Fill Quantity: 90,   # refills: 1  Last Office Visit: 5/25/2021  Future Office visit:    Next 5 appointments (look out 90 days)    Jul 15, 2021  1:50 PM  Return Visit with SYDNIE Patel ([facility] [place] ) [address] [place] [address]  [place] [address]  [phone]           Routing refill request to provider for review/approval because:    DELTASONE      Last Written Prescription Date:  5/10/2021  Last Fill Quantity: 30,   # refills: 1

## 2021-07-21 ENCOUNTER — HOSPITAL ENCOUNTER (OUTPATIENT)
Dept: PHYSICAL THERAPY | Facility: HOSPITAL | Age: 66
Setting detail: THERAPIES SERIES
End: 2021-07-21
Attending: INTERNAL MEDICINE
Payer: COMMERCIAL

## 2021-07-21 PROCEDURE — 97110 THERAPEUTIC EXERCISES: CPT | Mod: GP

## 2021-07-22 NOTE — PROGRESS NOTES
Subjective Finding:    Chief compalint: Patient presents with:  Neck Pain  Back Pain  , Pain Scale: 6/10, Intensity: dull, Duration: 7 days, Change since last visit: , Radiating:  ribs    Date of injury:     Activities that the pain restricts:   Home/household activities: yes.  Work duties: no.  Hobbies/social: yes.  Sleep: no.  Makes symptoms better: ice .  Makes symptoms worse: activity, lumbar extension and lumbar flexion.  Have you seen anyone else for the symptoms? no.  Work related: no.  Automobile related injury: no.    Objective and Assessment:    Posture Analysis:   High shoulder: .  Head tilt: .  High iliac crest: .  Head carriage: .  Thoracic Kyphosis: neutral.  Lumbar Lordosis: reverse.    Lumbar Range of Motion: flexion decreased and extension decreased.  Cervical Range of Motion: .  Thoracic Range of Motion: .  Extremity Range of Motion: .    Palpation:   T spine pain    Segmental dysfunction pre-treatment: T8, T9, L4, L5 and Sacrum.     C7    Assessment post-treatment:  Cervical: .  Thoracic: .  Lumbar: ROM increased and muscle spasm decreased.    Comments: .      Complicating Factors: .    Plan / Procedure:    Expected release date: .  Treatment plan: PRN.  Instructed patient: ice 20 minutes every other hour as needed.  Short term goals: reduce pain and increase ROM.  Long term goals: increase patient functional independence.  Prognosis: very good.

## 2021-07-28 ENCOUNTER — HOSPITAL ENCOUNTER (OUTPATIENT)
Dept: PHYSICAL THERAPY | Facility: HOSPITAL | Age: 66
Setting detail: THERAPIES SERIES
End: 2021-07-28
Attending: INTERNAL MEDICINE
Payer: COMMERCIAL

## 2021-07-28 PROCEDURE — 97110 THERAPEUTIC EXERCISES: CPT | Mod: GP

## 2021-08-10 ENCOUNTER — TELEPHONE (OUTPATIENT)
Dept: INTERNAL MEDICINE | Facility: OTHER | Age: 66
End: 2021-08-10

## 2021-08-10 ENCOUNTER — HOSPITAL ENCOUNTER (OUTPATIENT)
Dept: BONE DENSITY | Facility: HOSPITAL | Age: 66
Discharge: HOME | End: 2021-08-10
Attending: INTERNAL MEDICINE | Admitting: INTERNAL MEDICINE
Payer: COMMERCIAL

## 2021-08-10 DIAGNOSIS — M81.0 SENILE OSTEOPOROSIS: Primary | ICD-10-CM

## 2021-08-10 DIAGNOSIS — M81.0 SENILE OSTEOPOROSIS: ICD-10-CM

## 2021-08-10 PROCEDURE — 77080 DXA BONE DENSITY AXIAL: CPT

## 2021-08-10 RX ORDER — ALENDRONATE SODIUM 70 MG/1
70 TABLET ORAL
Qty: 12 TABLET | Refills: 3 | Status: SHIPPED | OUTPATIENT
Start: 2021-08-10 | End: 2022-04-13

## 2021-08-23 DIAGNOSIS — M35.3 PMR (POLYMYALGIA RHEUMATICA) (H): ICD-10-CM

## 2021-08-24 DIAGNOSIS — E87.6 HYPOKALEMIA: ICD-10-CM

## 2021-08-24 DIAGNOSIS — K21.00 GASTROESOPHAGEAL REFLUX DISEASE WITH ESOPHAGITIS WITHOUT HEMORRHAGE: ICD-10-CM

## 2021-08-24 NOTE — TELEPHONE ENCOUNTER
gabapentin      Last Written Prescription Date:  12/1/2020  Last Fill Quantity: 270,   # refills: 4  Last Office Visit: 5/25/21  Future Office visit:

## 2021-08-25 RX ORDER — GABAPENTIN 400 MG/1
CAPSULE ORAL
Qty: 270 CAPSULE | Refills: 4 | Status: SHIPPED | OUTPATIENT
Start: 2021-08-25 | End: 2022-04-13

## 2021-08-25 RX ORDER — SPIRONOLACTONE 50 MG/1
50 TABLET, FILM COATED ORAL 2 TIMES DAILY
Qty: 180 TABLET | Refills: 1 | Status: SHIPPED | OUTPATIENT
Start: 2021-08-25 | End: 2022-08-12

## 2021-08-25 RX ORDER — OMEPRAZOLE 40 MG/1
40 CAPSULE, DELAYED RELEASE ORAL DAILY
Qty: 90 CAPSULE | Refills: 1 | Status: SHIPPED | OUTPATIENT
Start: 2021-08-25 | End: 2022-02-22

## 2021-08-25 NOTE — TELEPHONE ENCOUNTER
Please advise    omeprazole (PRILOSEC) 40 MG DR capsule        Last Written Prescription Date:  12/1/20  Last Fill Quantity: 90,   # refills: 1  Last Office Visit: 5/25/21  Future Office visit:       Routing refill request to provider for review/approval because:  Drug not on the [facility], [facility] or [facility] refill protocol or controlled substance

## 2021-08-31 DIAGNOSIS — J45.40 MODERATE PERSISTENT ASTHMA WITHOUT COMPLICATION: ICD-10-CM

## 2021-09-01 NOTE — TELEPHONE ENCOUNTER
Wixela      Last Written Prescription Date:  Request   Last Fill Quantity: 1,   # refills: 0  Last Office Visit: 5/25/2021  Future Office visit:       Routing refill request to provider for review/approval because:  Drug not active on patient's medication list

## 2021-09-11 ENCOUNTER — HEALTH MAINTENANCE LETTER (OUTPATIENT)
Age: 66
End: 2021-09-11

## 2021-09-13 DIAGNOSIS — M35.3 PMR (POLYMYALGIA RHEUMATICA) (H): ICD-10-CM

## 2021-09-13 DIAGNOSIS — E78.1 HYPERTRIGLYCERIDEMIA: ICD-10-CM

## 2021-09-13 DIAGNOSIS — M10.041 IDIOPATHIC GOUT OF RIGHT HAND, UNSPECIFIED CHRONICITY: ICD-10-CM

## 2021-09-14 RX ORDER — ATORVASTATIN CALCIUM 20 MG/1
20 TABLET, FILM COATED ORAL DAILY
Qty: 30 TABLET | Refills: 3 | Status: SHIPPED | OUTPATIENT
Start: 2021-09-14 | End: 2021-11-26

## 2021-09-14 RX ORDER — ALLOPURINOL 100 MG/1
100 TABLET ORAL 2 TIMES DAILY
Qty: 180 TABLET | Refills: 1 | Status: SHIPPED | OUTPATIENT
Start: 2021-09-14 | End: 2022-02-22

## 2021-09-14 RX ORDER — PREDNISONE 5 MG/1
5 TABLET ORAL DAILY PRN
Qty: 30 TABLET | Refills: 1 | Status: SHIPPED | OUTPATIENT
Start: 2021-09-14 | End: 2021-10-22

## 2021-09-28 DIAGNOSIS — Z90.49 ACQUIRED ABSENCE OF OTHER SPECIFIED PARTS OF DIGESTIVE TRACT: ICD-10-CM

## 2021-09-28 DIAGNOSIS — G25.81 RESTLESS LEGS SYNDROME (RLS): ICD-10-CM

## 2021-09-29 RX ORDER — DICYCLOMINE HYDROCHLORIDE 10 MG/1
CAPSULE ORAL
Qty: 180 CAPSULE | Refills: 3 | Status: SHIPPED | OUTPATIENT
Start: 2021-09-29 | End: 2022-03-30

## 2021-09-29 RX ORDER — PRAMIPEXOLE DIHYDROCHLORIDE 0.12 MG/1
TABLET ORAL
Qty: 90 TABLET | Refills: 1 | Status: SHIPPED | OUTPATIENT
Start: 2021-09-29 | End: 2021-12-07

## 2021-09-29 NOTE — TELEPHONE ENCOUNTER
bentyl      Last Written Prescription Date:  6/11/21  Last Fill Quantity: 180,   # refills: 3  Last Office Visit: 5/25/21  Future Office visit:         miralax      Last Written Prescription Date:  3/1/21  Last Fill Quantity: 90,   # refills: 1  Last Office Visit: 5/25/21  Future Office visit:

## 2021-10-22 DIAGNOSIS — M35.3 PMR (POLYMYALGIA RHEUMATICA) (H): ICD-10-CM

## 2021-10-22 RX ORDER — PREDNISONE 5 MG/1
5 TABLET ORAL DAILY PRN
Qty: 30 TABLET | Refills: 1 | Status: SHIPPED | OUTPATIENT
Start: 2021-10-22 | End: 2021-12-28

## 2021-10-22 NOTE — TELEPHONE ENCOUNTER
Prednisone       Last Written Prescription Date:  9/14/2021  Last Fill Quantity: 30,   # refills: 1  Last Office Visit: 5/25/2021  Future Office visit:

## 2021-11-26 DIAGNOSIS — E78.1 HYPERTRIGLYCERIDEMIA: ICD-10-CM

## 2021-11-26 DIAGNOSIS — G25.81 RESTLESS LEGS SYNDROME: ICD-10-CM

## 2021-11-26 DIAGNOSIS — R09.82 POST-NASAL DRIP: ICD-10-CM

## 2021-11-26 RX ORDER — ATORVASTATIN CALCIUM 20 MG/1
20 TABLET, FILM COATED ORAL DAILY
Qty: 30 TABLET | Refills: 3 | Status: SHIPPED | OUTPATIENT
Start: 2021-11-26 | End: 2022-04-13

## 2021-11-26 RX ORDER — POTASSIUM CHLORIDE 1500 MG/1
TABLET, EXTENDED RELEASE ORAL
Qty: 90 TABLET | Refills: 3 | Status: SHIPPED | OUTPATIENT
Start: 2021-11-26 | End: 2022-08-01

## 2021-11-26 RX ORDER — POTASSIUM CHLORIDE 1500 MG/1
TABLET, EXTENDED RELEASE ORAL
Qty: 90 TABLET | Refills: 3 | Status: SHIPPED | OUTPATIENT
Start: 2021-11-26 | End: 2021-11-26

## 2021-11-26 RX ORDER — ATORVASTATIN CALCIUM 20 MG/1
20 TABLET, FILM COATED ORAL DAILY
Qty: 30 TABLET | Refills: 3 | Status: SHIPPED | OUTPATIENT
Start: 2021-11-26 | End: 2021-11-26

## 2021-11-26 RX ORDER — MONTELUKAST SODIUM 10 MG/1
10 TABLET ORAL AT BEDTIME
Qty: 90 TABLET | Refills: 3 | Status: SHIPPED | OUTPATIENT
Start: 2021-11-26 | End: 2022-10-11

## 2021-11-29 NOTE — TELEPHONE ENCOUNTER
MONITOR response to TREATMENT. Please grab a form so that I can complete this today and she can pick this up tomorrow   Introduction Text (Please End With A Colon): Keloid Left  neck Detail Level: Zone

## 2021-12-06 DIAGNOSIS — G25.81 RESTLESS LEGS SYNDROME (RLS): ICD-10-CM

## 2021-12-07 RX ORDER — PRAMIPEXOLE DIHYDROCHLORIDE 0.12 MG/1
TABLET ORAL
Qty: 90 TABLET | Refills: 1 | Status: SHIPPED | OUTPATIENT
Start: 2021-12-07 | End: 2022-06-09

## 2021-12-21 ENCOUNTER — OFFICE VISIT (OUTPATIENT)
Dept: INTERNAL MEDICINE | Facility: OTHER | Age: 66
End: 2021-12-21
Attending: INTERNAL MEDICINE
Payer: COMMERCIAL

## 2021-12-21 VITALS
SYSTOLIC BLOOD PRESSURE: 112 MMHG | WEIGHT: 129 LBS | BODY MASS INDEX: 23.59 KG/M2 | OXYGEN SATURATION: 97 % | TEMPERATURE: 98.3 F | DIASTOLIC BLOOD PRESSURE: 76 MMHG | HEART RATE: 90 BPM

## 2021-12-21 DIAGNOSIS — Z87.891 PERSONAL HISTORY OF TOBACCO USE, PRESENTING HAZARDS TO HEALTH: ICD-10-CM

## 2021-12-21 DIAGNOSIS — Z00.00 ENCOUNTER FOR MEDICARE ANNUAL WELLNESS EXAM: Primary | ICD-10-CM

## 2021-12-21 DIAGNOSIS — M25.511 PAIN IN JOINT OF RIGHT SHOULDER: ICD-10-CM

## 2021-12-21 LAB
ALBUMIN SERPL-MCNC: 4.1 G/DL (ref 3.4–5)
ALP SERPL-CCNC: 79 U/L (ref 40–150)
ALT SERPL W P-5'-P-CCNC: 47 U/L (ref 0–50)
ANION GAP SERPL CALCULATED.3IONS-SCNC: 6 MMOL/L (ref 3–14)
AST SERPL W P-5'-P-CCNC: 17 U/L (ref 0–45)
BASOPHILS # BLD AUTO: 0.1 10E3/UL (ref 0–0.2)
BASOPHILS NFR BLD AUTO: 0 %
BILIRUB SERPL-MCNC: 0.3 MG/DL (ref 0.2–1.3)
BUN SERPL-MCNC: 17 MG/DL (ref 7–30)
CALCIUM SERPL-MCNC: 10 MG/DL (ref 8.5–10.1)
CHLORIDE BLD-SCNC: 106 MMOL/L (ref 94–109)
CO2 SERPL-SCNC: 27 MMOL/L (ref 20–32)
CREAT SERPL-MCNC: 0.9 MG/DL (ref 0.52–1.04)
EOSINOPHIL # BLD AUTO: 0.1 10E3/UL (ref 0–0.7)
EOSINOPHIL NFR BLD AUTO: 1 %
ERYTHROCYTE [DISTWIDTH] IN BLOOD BY AUTOMATED COUNT: 13.4 % (ref 10–15)
GFR SERPL CREATININE-BSD FRML MDRD: 70 ML/MIN/1.73M2
GLUCOSE BLD-MCNC: 108 MG/DL (ref 70–99)
HCT VFR BLD AUTO: 42.4 % (ref 35–47)
HGB BLD-MCNC: 14.2 G/DL (ref 11.7–15.7)
LYMPHOCYTES # BLD AUTO: 1.4 10E3/UL (ref 0.8–5.3)
LYMPHOCYTES NFR BLD AUTO: 11 %
MCH RBC QN AUTO: 31.1 PG (ref 26.5–33)
MCHC RBC AUTO-ENTMCNC: 33.5 G/DL (ref 31.5–36.5)
MCV RBC AUTO: 93 FL (ref 78–100)
MONOCYTES # BLD AUTO: 0.9 10E3/UL (ref 0–1.3)
MONOCYTES NFR BLD AUTO: 7 %
NEUTROPHILS # BLD AUTO: 10.2 10E3/UL (ref 1.6–8.3)
NEUTROPHILS NFR BLD AUTO: 81 %
PLATELET # BLD AUTO: 266 10E3/UL (ref 150–450)
POTASSIUM BLD-SCNC: 4.3 MMOL/L (ref 3.4–5.3)
PROT SERPL-MCNC: 7.5 G/DL (ref 6.8–8.8)
RBC # BLD AUTO: 4.57 10E6/UL (ref 3.8–5.2)
SODIUM SERPL-SCNC: 139 MMOL/L (ref 133–144)
WBC # BLD AUTO: 12.7 10E3/UL (ref 4–11)

## 2021-12-21 PROCEDURE — 85025 COMPLETE CBC W/AUTO DIFF WBC: CPT | Mod: ZL | Performed by: INTERNAL MEDICINE

## 2021-12-21 PROCEDURE — 36415 COLL VENOUS BLD VENIPUNCTURE: CPT | Mod: ZL | Performed by: INTERNAL MEDICINE

## 2021-12-21 PROCEDURE — G0463 HOSPITAL OUTPT CLINIC VISIT: HCPCS

## 2021-12-21 PROCEDURE — 80053 COMPREHEN METABOLIC PANEL: CPT | Mod: ZL | Performed by: INTERNAL MEDICINE

## 2021-12-21 PROCEDURE — G0438 PPPS, INITIAL VISIT: HCPCS | Performed by: INTERNAL MEDICINE

## 2021-12-21 ASSESSMENT — PATIENT HEALTH QUESTIONNAIRE - PHQ9: SUM OF ALL RESPONSES TO PHQ QUESTIONS 1-9: 2

## 2021-12-21 ASSESSMENT — PAIN SCALES - GENERAL: PAINLEVEL: NO PAIN (0)

## 2021-12-21 ASSESSMENT — ANXIETY QUESTIONNAIRES
7. FEELING AFRAID AS IF SOMETHING AWFUL MIGHT HAPPEN: NOT AT ALL
5. BEING SO RESTLESS THAT IT IS HARD TO SIT STILL: NOT AT ALL
3. WORRYING TOO MUCH ABOUT DIFFERENT THINGS: NOT AT ALL
2. NOT BEING ABLE TO STOP OR CONTROL WORRYING: NOT AT ALL
4. TROUBLE RELAXING: NOT AT ALL
6. BECOMING EASILY ANNOYED OR IRRITABLE: NOT AT ALL
GAD7 TOTAL SCORE: 0
1. FEELING NERVOUS, ANXIOUS, OR ON EDGE: NOT AT ALL

## 2021-12-21 NOTE — PROGRESS NOTES
"SUBJECTIVE:   Chyna Delgado is a 65 year old female who presents for Preventive Visit.      Patient has been advised of split billing requirements and indicates understanding: Yes  Are you in the first 12 months of your Medicare Part B coverage?  No    Physical Health:    In general, how would you rate your overall physical health? poor    Outside of work, how many days during the week do you exercise? none    Outside of work, approximately how many minutes a day do you exercise?not applicable    If you drink alcohol do you typically have >3 drinks per day or >7 drinks per week? No    Do you usually eat at least 4 servings of fruit and vegetables a day, include whole grains & fiber and avoid regularly eating high fat or \"junk\" foods? NO    Do you have any problems taking medications regularly?  No    Do you have any side effects from medications? none    Needs assistance for the following daily activities: no assistance needed    Which of the following safety concerns are present in your home?  none identified     Hearing impairment: No    In the past 6 months, have you been bothered by leaking of urine? no    Mental Health:    In general, how would you rate your overall mental or emotional health? excellent  PHQ-2 Score: 0    Do you feel safe in your environment? Yes    Have you ever done Advance Care Planning? (For example, a Health Directive, POLST, or a discussion with a medical provider or your loved ones about your wishes): Yes, advance care planning is on file.    Additional concerns to address?      Fall risk:  Fallen 2 or more times in the past year?: No  Any fall with injury in the past year?: No    Cognitive Screenin) Repeat 3 items (Leader, Season, Table)    2) Clock draw: NORMAL  3) 3 item recall: Recalls 2 objects   Results: NORMAL clock, 1-2 items recalled: COGNITIVE IMPAIRMENT LESS LIKELY        Mini-CogTM Copyright S Valeri. Licensed by the author for use in [facility]; " reprinted with permission ([email or web address]). All rights reserved.      Do you have sleep apnea, excessive snoring or daytime drowsiness?: derian Clemente presents today for routine follow up.  She states she is doing fine.  She reports getting sick in the spring of 2020 and also this fall.  She was negative for Covid both times.  She also states she may elect to proceed with a colostomy due to incontinence.  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  She continues to smoke but has cut way back.  She denies any abdominal pain.       Reviewed and updated as needed this visit by clinical staff   Allergies  Meds             Reviewed and updated as needed this visit by Provider               Social History     Tobacco Use     Smoking status: Former Smoker     Packs/day: 0.50     Years: 40.00     Pack years: 20.00     Types: Cigarettes     Smokeless tobacco: Never Used   Substance Use Topics     Alcohol use: Not Currently     Comment: Weekly 3 drinks                           Current providers sharing in care for this patient include:   Patient Care Team:  Valente Deluca DO as PCP - General (Internal Medicine)  Valente Deluca DO as Assigned PCP    The following health maintenance items are reviewed in Epic and correct as of today:  Health Maintenance   Topic Date Due     LUNG CANCER SCREENING  Never done     MEDICARE ANNUAL WELLNESS VISIT  12/23/2018     ADVANCE CARE PLANNING  05/09/2021     MARGARITA ASSESSMENT  05/29/2021     PHQ-9  01/21/2022     FALL RISK ASSESSMENT  05/25/2022     MAMMO SCREENING  02/02/2023     DTAP/TDAP/TD IMMUNIZATION (3 - Td or Tdap) 06/28/2023     Pneumococcal Vaccine: 65+ Years (2 of 2 - PPSV23) 11/02/2025     LIPID  04/29/2026     COLORECTAL CANCER SCREENING  05/09/2029     DEXA  08/10/2036     SPIROMETRY  Completed     HEPATITIS C SCREENING  Completed     COPD ACTION PLAN  Completed     DEPRESSION ACTION PLAN  Completed     INFLUENZA VACCINE   "Completed     ZOSTER IMMUNIZATION  Completed     COVID-19 Vaccine  Completed     IPV IMMUNIZATION  Aged Out     MENINGITIS IMMUNIZATION  Aged Out     HEPATITIS B IMMUNIZATION  Aged Out     Lab work is in process      ROS:  Constitutional, HEENT, cardiovascular, pulmonary, gi and gu systems are negative, except as otherwise noted.    OBJECTIVE:   There were no vitals taken for this visit. Estimated body mass index is 23.41 kg/m  as calculated from the following:    Height as of 4/29/21: 1.575 m (5' 2\").    Weight as of 4/29/21: 58.1 kg (128 lb).  EXAM:   GENERAL: alert and no distress  RESP: lungs clear to auscultation - no rales, rhonchi or wheezes  CV: regular rate and rhythm, normal S1 S2, no S3 or S4, no murmur, click or rub, no peripheral edema and peripheral pulses strong  ABDOMEN: soft, nontender, no hepatosplenomegaly, no masses and bowel sounds normal  MS: no gross musculoskeletal defects noted, no edema  SKIN: no suspicious lesions or rashes  NEURO: Normal strength and tone, mentation intact and speech normal  PSYCH: mentation appears normal, affect normal/bright    Diagnostic Test Results:  No results found for this or any previous visit (from the past 24 hour(s)).  No results found for any visits on 12/21/21.    ASSESSMENT / PLAN:   Chyna was seen today for physical.    Diagnoses and all orders for this visit:    Encounter for Medicare annual wellness exam  -     CBC with platelets and differential; Future  -     Comprehensive metabolic panel (BMP + Alb, Alk Phos, ALT, AST, Total. Bili, TP); Future    Personal history of tobacco use, presenting hazards to health  -     CT Chest Lung Cancer Scrn Low Dose wo; Future    Pain in joint of right shoulder        Patient has been advised of split billing requirements and indicates understanding: Yes    COUNSELING:  Reviewed preventive health counseling, as reflected in patient instructions       Regular exercise       Healthy diet/nutrition       Vision " "screening       Hearing screening       Dental care       Consider lung cancer screening for ages 55-80 years and 45 pack-year smoking history        Colon cancer screening    Estimated body mass index is 23.41 kg/m  as calculated from the following:    Height as of 4/29/21: 1.575 m (5' 2\").    Weight as of 4/29/21: 58.1 kg (128 lb).        She reports that she has quit smoking. Her smoking use included cigarettes. She has a 20.00 pack-year smoking history. She has never used smokeless tobacco.    Appropriate preventive services were discussed with this patient, including applicable screening as appropriate for cardiovascular disease, diabetes, osteopenia/osteoporosis, and glaucoma.  As appropriate for age/gender, discussed screening for colorectal cancer, prostate cancer, breast cancer, and cervical cancer. Checklist reviewing preventive services available has been given to the patient.    Reviewed patients plan of care and provided an AVS. The Intermediate Care Plan ( asthma action plan, low back pain action plan, and migraine action plan) for Chyna meets the Care Plan requirement. This Care Plan has been established and reviewed with the Patient.    Counseling Resources:  ATP IV Guidelines  Pooled Cohorts Equation Calculator  Breast Cancer Risk Calculator  BRCA-Related Cancer Risk Assessment: FHS-7 Tool  FRAX Risk Assessment  ICSI Preventive Guidelines  Dietary Guidelines for Americans, 2010  USDA's MyPlate  ASA Prophylaxis  Lung CA Screening    Valente Deluca,   [facility]  "

## 2021-12-21 NOTE — NURSING NOTE
"Chief Complaint   Patient presents with     Physical       Initial /76 (BP Location: Left arm, Patient Position: Chair, Cuff Size: Adult Regular)   Pulse 90   Temp 98.3  F (36.8  C) (Tympanic)   Wt 58.5 kg (129 lb)   SpO2 97%   BMI 23.59 kg/m   Estimated body mass index is 23.59 kg/m  as calculated from the following:    Height as of 4/29/21: 1.575 m (5' 2\").    Weight as of this encounter: 58.5 kg (129 lb).  Medication Reconciliation: complete  YEVGENIY TRAMMELL LPN  "

## 2021-12-21 NOTE — PATIENT INSTRUCTIONS
Patient Education   Personalized Prevention Plan  You are due for the preventive services outlined below.  Your care team is available to assist you in scheduling these services.  If you have already completed any of these items, please share that information with your care team to update in your medical record.  Health Maintenance Due   Topic Date Due     LUNG CANCER SCREENING  Never done     Anxiety Assessment  05/29/2021     Depression Assessment  01/21/2022

## 2021-12-22 ENCOUNTER — HOSPITAL ENCOUNTER (OUTPATIENT)
Dept: CT IMAGING | Facility: HOSPITAL | Age: 66
Discharge: HOME | End: 2021-12-22
Attending: INTERNAL MEDICINE | Admitting: INTERNAL MEDICINE
Payer: COMMERCIAL

## 2021-12-22 DIAGNOSIS — Z87.891 PERSONAL HISTORY OF TOBACCO USE, PRESENTING HAZARDS TO HEALTH: ICD-10-CM

## 2021-12-22 PROCEDURE — 71271 CT THORAX LUNG CANCER SCR C-: CPT

## 2021-12-22 ASSESSMENT — ANXIETY QUESTIONNAIRES: GAD7 TOTAL SCORE: 0

## 2021-12-28 DIAGNOSIS — M35.3 PMR (POLYMYALGIA RHEUMATICA) (H): ICD-10-CM

## 2021-12-28 DIAGNOSIS — K90.9 DIARRHEA DUE TO MALABSORPTION: ICD-10-CM

## 2021-12-28 DIAGNOSIS — R19.7 DIARRHEA DUE TO MALABSORPTION: ICD-10-CM

## 2021-12-28 RX ORDER — DIPHENOXYLATE HCL/ATROPINE 2.5-.025MG
1-2 TABLET ORAL 4 TIMES DAILY PRN
Qty: 120 TABLET | Refills: 0 | Status: SHIPPED | OUTPATIENT
Start: 2021-12-28 | End: 2022-03-09

## 2021-12-28 RX ORDER — PREDNISONE 5 MG/1
5 TABLET ORAL DAILY PRN
Qty: 30 TABLET | Refills: 1 | Status: SHIPPED | OUTPATIENT
Start: 2021-12-28 | End: 2022-03-08

## 2021-12-28 NOTE — TELEPHONE ENCOUNTER
PREDNISONE      Last Written Prescription Date:  10-22-21  Last Fill Quantity: 30,   # refills: 1  Last Office Visit: 12-  Future Office visit:       Routing refill request to provider for review/approval because:  Drug not on the [facility], [facility] or [facility] refill protocol or controlled substance

## 2021-12-28 NOTE — TELEPHONE ENCOUNTER
LOMOTIL      Last Written Prescription Date:  4-7-2021  Last Fill Quantity: 240,   # refills: 1  Last Office Visit: 12-  Future Office visit:       Routing refill request to provider for review/approval because:  Drug not on the [facility], [facility] or [facility] refill protocol or controlled substance

## 2022-02-21 DIAGNOSIS — K21.00 GASTROESOPHAGEAL REFLUX DISEASE WITH ESOPHAGITIS WITHOUT HEMORRHAGE: ICD-10-CM

## 2022-02-21 DIAGNOSIS — G25.81 RESTLESS LEGS SYNDROME: ICD-10-CM

## 2022-02-21 DIAGNOSIS — M10.041 IDIOPATHIC GOUT OF RIGHT HAND, UNSPECIFIED CHRONICITY: ICD-10-CM

## 2022-02-21 NOTE — TELEPHONE ENCOUNTER
Vit D       Last Written Prescription Date:  7-1-21  Last Fill Quantity: 100,   # refills: 4  Last Office Visit: 12-21-21  Future Office visit:       Omeprazole       Last Written Prescription Date:  8-25-21  Last Fill Quantity: 90,   # refills: 1  Last Office Visit: 12-21-21  Future Office visit:       Allopurinol       Last Written Prescription Date:  9-14-21  Last Fill Quantity: 180,   # refills: 1  Last Office Visit: 12-21-21  Future Office visit:

## 2022-02-22 RX ORDER — OMEPRAZOLE 40 MG/1
40 CAPSULE, DELAYED RELEASE ORAL DAILY
Qty: 90 CAPSULE | Refills: 1 | Status: SHIPPED | OUTPATIENT
Start: 2022-02-22 | End: 2022-06-09

## 2022-02-22 RX ORDER — DM/PE/ACETAMINOPHEN/CHLORPHENR 10-5-325-2
TABLET, SEQUENTIAL ORAL
Qty: 100 TABLET | Refills: 4 | Status: SHIPPED | OUTPATIENT
Start: 2022-02-22

## 2022-02-22 RX ORDER — ALLOPURINOL 100 MG/1
100 TABLET ORAL 2 TIMES DAILY
Qty: 180 TABLET | Refills: 1 | Status: SHIPPED | OUTPATIENT
Start: 2022-02-22 | End: 2022-11-02

## 2022-03-07 DIAGNOSIS — M35.3 PMR (POLYMYALGIA RHEUMATICA) (H): ICD-10-CM

## 2022-03-07 DIAGNOSIS — K90.9 DIARRHEA DUE TO MALABSORPTION: ICD-10-CM

## 2022-03-07 DIAGNOSIS — R19.7 DIARRHEA DUE TO MALABSORPTION: ICD-10-CM

## 2022-03-08 RX ORDER — PREDNISONE 5 MG/1
5 TABLET ORAL DAILY PRN
Qty: 30 TABLET | Refills: 1 | Status: SHIPPED | OUTPATIENT
Start: 2022-03-08 | End: 2022-05-12

## 2022-03-08 NOTE — TELEPHONE ENCOUNTER
prednisone 5 mg tablet    Last Written Prescription Date:  12/28/2021  Last Fill Quantity: 30,   # refills: 1  Last Office Visit: 12/21/21  Future Office visit:       Routing refill request to provider for review/approval because:

## 2022-03-08 NOTE — TELEPHONE ENCOUNTER
diphenoxylate-atropine 2.5 mg-0.025 mg tablet  Last Written Prescription Date:  12/28/2021  Last Fill Quantity: 120,   # refills: 0  Last Office Visit: 12/21/2021  Future Office visit:       Routing refill request to provider for review/approval because:

## 2022-03-09 RX ORDER — DIPHENOXYLATE HCL/ATROPINE 2.5-.025MG
1-2 TABLET ORAL 4 TIMES DAILY PRN
Qty: 120 TABLET | Refills: 0 | Status: SHIPPED | OUTPATIENT
Start: 2022-03-09 | End: 2022-05-12

## 2022-03-23 DIAGNOSIS — M10.041 IDIOPATHIC GOUT OF RIGHT HAND, UNSPECIFIED CHRONICITY: ICD-10-CM

## 2022-03-23 RX ORDER — ALLOPURINOL 100 MG/1
100 TABLET ORAL 2 TIMES DAILY
Qty: 180 TABLET | Refills: 1 | OUTPATIENT
Start: 2022-03-23

## 2022-03-30 ENCOUNTER — OFFICE VISIT (OUTPATIENT)
Dept: INTERNAL MEDICINE | Facility: OTHER | Age: 67
End: 2022-03-30
Attending: INTERNAL MEDICINE
Payer: COMMERCIAL

## 2022-03-30 VITALS
DIASTOLIC BLOOD PRESSURE: 72 MMHG | SYSTOLIC BLOOD PRESSURE: 110 MMHG | HEART RATE: 87 BPM | TEMPERATURE: 98 F | OXYGEN SATURATION: 98 %

## 2022-03-30 DIAGNOSIS — Z96.641 HISTORY OF RIGHT HIP REPLACEMENT: Primary | ICD-10-CM

## 2022-03-30 DIAGNOSIS — L82.1 SEBORRHEIC KERATOSIS: ICD-10-CM

## 2022-03-30 PROCEDURE — G0463 HOSPITAL OUTPT CLINIC VISIT: HCPCS

## 2022-03-30 PROCEDURE — 99213 OFFICE O/P EST LOW 20 MIN: CPT | Performed by: INTERNAL MEDICINE

## 2022-03-30 ASSESSMENT — PAIN SCALES - GENERAL: PAINLEVEL: SEVERE PAIN (7)

## 2022-03-30 NOTE — PROGRESS NOTES
Assessment & Plan   Problem List Items Addressed This Visit        Musculoskeletal and Integumentary    Seborrheic keratosis      Other Visit Diagnoses     History of right hip replacement    -  Primary    Relevant Orders    Physical Therapy Referral         Recommended taking a picture to compare to in 6 months.      15 minutes spent on the date of the encounter doing chart review, review of test results, interpretation of tests, patient visit and documentation            No follow-ups on file.    Valente Deluca, [facility]    Cristina Clemente is a 66 year old who presents for the following health issues     HPI     Cyhna presents today to have skin lesions looked at.  She has numerous lesions on her back.  These all appear to be consistent with seborrhoic keratosis.    She also reports difficult with her gait after her right hip replacement.  She did not complete her PT following the surgery.    Concern - Moles   Onset: years  Description: location- back, has been getting larger in size  Intensity: mild  Progression of Symptoms:  worsening  Accompanying Signs & Symptoms: none  Previous history of similar problem: none  Precipitating factors:        Worsened by: none   Alleviating factors:        Improved by: none  Therapies tried and outcome: None        Review of Systems   Constitutional, HEENT, cardiovascular, pulmonary, gi and gu systems are negative, except as otherwise noted.      Objective    /72 (BP Location: Left arm, Patient Position: Chair, Cuff Size: Adult Regular)   Pulse 87   Temp 98  F (36.7  C)   SpO2 98%   There is no height or weight on file to calculate BMI.  Physical Exam   GENERAL:alert and no distress  SKIN: Seborrhoic keratosis throughout the back.    NEURO: Normal strength and tone, mentation intact and speech normal  PSYCH: mentation appears normal, affect normal/bright    Office Visit on 12/21/2021   Component Date Value Ref Range Status      Sodium 12/21/2021 139  133 - 144 mmol/L Final     Potassium 12/21/2021 4.3  3.4 - 5.3 mmol/L Final     Chloride 12/21/2021 106  94 - 109 mmol/L Final     Carbon Dioxide (CO2) 12/21/2021 27  20 - 32 mmol/L Final     Anion Gap 12/21/2021 6  3 - 14 mmol/L Final     Urea Nitrogen 12/21/2021 17  7 - 30 mg/dL Final     Creatinine 12/21/2021 0.90  0.52 - 1.04 mg/dL Final     Calcium 12/21/2021 10.0  8.5 - 10.1 mg/dL Final     Glucose 12/21/2021 108 (A) 70 - 99 mg/dL Final     Alkaline Phosphatase 12/21/2021 79  40 - 150 U/L Final     AST 12/21/2021 17  0 - 45 U/L Final     ALT 12/21/2021 47  0 - 50 U/L Final     Protein Total 12/21/2021 7.5  6.8 - 8.8 g/dL Final     Albumin 12/21/2021 4.1  3.4 - 5.0 g/dL Final     Bilirubin Total 12/21/2021 0.3  0.2 - 1.3 mg/dL Final     GFR Estimate 12/21/2021 70  >60 mL/min/1.73m2 Final    Effective December 21, 2021 eGFRcr in adults is calculated using the 2021 CKD-EPI creatinine equation which includes age and gender (Ev et al., NE, DOI: 10.1056/HRERal1743331)     WBC Count 12/21/2021 12.7 (A) 4.0 - 11.0 10e3/uL Final     RBC Count 12/21/2021 4.57  3.80 - 5.20 10e6/uL Final     Hemoglobin 12/21/2021 14.2  11.7 - 15.7 g/dL Final     Hematocrit 12/21/2021 42.4  35.0 - 47.0 % Final     MCV 12/21/2021 93  78 - 100 fL Final     MCH 12/21/2021 31.1  26.5 - 33.0 pg Final     MCHC 12/21/2021 33.5  31.5 - 36.5 g/dL Final     RDW 12/21/2021 13.4  10.0 - 15.0 % Final     Platelet Count 12/21/2021 266  150 - 450 10e3/uL Final     % Neutrophils 12/21/2021 81  % Final     % Lymphocytes 12/21/2021 11  % Final     % Monocytes 12/21/2021 7  % Final     % Eosinophils 12/21/2021 1  % Final     % Basophils 12/21/2021 0  % Final     Absolute Neutrophils 12/21/2021 10.2 (A) 1.6 - 8.3 10e3/uL Final     Absolute Lymphocytes 12/21/2021 1.4  0.8 - 5.3 10e3/uL Final     Absolute Monocytes 12/21/2021 0.9  0.0 - 1.3 10e3/uL Final     Absolute Eosinophils 12/21/2021 0.1  0.0 - 0.7 10e3/uL Final      Absolute Basophils 12/21/2021 0.1  0.0 - 0.2 10e3/uL Final     No results found for any visits on 03/30/22.  No results found for this or any previous visit (from the past 24 hour(s)).

## 2022-03-30 NOTE — NURSING NOTE
"Chief Complaint   Patient presents with     Mole       Initial /72 (BP Location: Left arm, Patient Position: Chair, Cuff Size: Adult Regular)   Pulse 87   Temp 98  F (36.7  C)   SpO2 98%  Estimated body mass index is 23.59 kg/m  as calculated from the following:    Height as of 4/29/21: 1.575 m (5' 2\").    Weight as of 12/21/21: 58.5 kg (129 lb).  Medication Reconciliation: complete  YEVGENIY TRAMMELL LPN  "

## 2022-04-13 DIAGNOSIS — E78.1 HYPERTRIGLYCERIDEMIA: ICD-10-CM

## 2022-04-13 DIAGNOSIS — M35.3 PMR (POLYMYALGIA RHEUMATICA) (H): ICD-10-CM

## 2022-04-13 DIAGNOSIS — M81.0 SENILE OSTEOPOROSIS: ICD-10-CM

## 2022-04-13 DIAGNOSIS — Z90.49 ACQUIRED ABSENCE OF OTHER SPECIFIED PARTS OF DIGESTIVE TRACT: ICD-10-CM

## 2022-04-13 RX ORDER — DICYCLOMINE HYDROCHLORIDE 10 MG/1
CAPSULE ORAL
Qty: 180 CAPSULE | Refills: 3 | Status: SHIPPED | OUTPATIENT
Start: 2022-04-13 | End: 2023-01-26

## 2022-04-13 RX ORDER — ALENDRONATE SODIUM 70 MG/1
70 TABLET ORAL
Qty: 12 TABLET | Refills: 3 | Status: SHIPPED | OUTPATIENT
Start: 2022-04-13 | End: 2023-01-26

## 2022-04-13 RX ORDER — ATORVASTATIN CALCIUM 20 MG/1
20 TABLET, FILM COATED ORAL DAILY
Qty: 30 TABLET | Refills: 3 | Status: SHIPPED | OUTPATIENT
Start: 2022-04-13 | End: 2022-07-11

## 2022-04-13 RX ORDER — GABAPENTIN 400 MG/1
CAPSULE ORAL
Qty: 270 CAPSULE | Refills: 4 | Status: SHIPPED | OUTPATIENT
Start: 2022-04-13 | End: 2022-12-07

## 2022-04-13 NOTE — TELEPHONE ENCOUNTER
Gabapentin       Last Written Prescription Date:  8-25-21  Last Fill Quantity: 270,   # refills: 4  Last Office Visit: 3-30-22  Future Office visit:       Fosamax       Last Written Prescription Date:  8-10-21  Last Fill Quantity: 12,   # refills: 3  Last Office Visit: 3-30-22  Future Office visit:       Bentyl       Last Written Prescription Date:  4-13-22 last fill  Last Fill Quantity: 180,   # refills: 3  Last Office Visit: 3-30-22  Future Office visit:     Discontinued on 3-30-22

## 2022-04-13 NOTE — TELEPHONE ENCOUNTER
Lipitor  Last Written Prescription Date: 11/26/21  Last Fill Quantity: 30 # of Refills: 3  Last Office Visit: 3/30/22

## 2022-04-14 ENCOUNTER — TELEPHONE (OUTPATIENT)
Dept: INTERNAL MEDICINE | Facility: OTHER | Age: 67
End: 2022-04-14
Payer: COMMERCIAL

## 2022-04-14 NOTE — TELEPHONE ENCOUNTER
Received PA from [employer] for Dicyclomine HCl 10MG capsules. Submitted on CMM. Waiting on response.

## 2022-04-14 NOTE — TELEPHONE ENCOUNTER
Received APPROVAL from [facility] for Dicyclomine HCl 10MG capsules. Effective 01/01/2022-12/31/2022. Forms scanned to [facility].

## 2022-05-11 DIAGNOSIS — M35.3 PMR (POLYMYALGIA RHEUMATICA) (H): ICD-10-CM

## 2022-05-11 DIAGNOSIS — R19.7 DIARRHEA DUE TO MALABSORPTION: ICD-10-CM

## 2022-05-11 DIAGNOSIS — K90.9 DIARRHEA DUE TO MALABSORPTION: ICD-10-CM

## 2022-05-12 RX ORDER — PREDNISONE 5 MG/1
5 TABLET ORAL DAILY PRN
Qty: 30 TABLET | Refills: 1 | Status: SHIPPED | OUTPATIENT
Start: 2022-05-12 | End: 2022-06-09

## 2022-05-12 RX ORDER — DIPHENOXYLATE HCL/ATROPINE 2.5-.025MG
1-2 TABLET ORAL 4 TIMES DAILY PRN
Qty: 120 TABLET | Refills: 0 | Status: SHIPPED | OUTPATIENT
Start: 2022-05-12 | End: 2022-05-25

## 2022-05-12 NOTE — TELEPHONE ENCOUNTER
Prednisone 5 Mg      Last Written Prescription Date:  03/08/22  Last Fill Quantity: 30,   # refills: 1  Last Office Visit: 03/30/22  Future Office visit:       Routing refill request to provider for review/approval because:  Drug not on the [facility], [facility] or [facility] refill protocol or controlled substance  PCP Sandrine   no abdominal pain, no bloating, no constipation, no diarrhea, no nausea and no vomiting.

## 2022-05-12 NOTE — TELEPHONE ENCOUNTER
Pt of Dr.Bastianelli Curran   Last Written Prescription Date:  3.9.2022  Last Fill Quantity: 120,   # refills: 0  Last Office Visit: 3.30.2022  Future Office visit:       Routing refill request to provider for review/approval because:  Drug not on the [facility], [facility] or [facility] refill protocol or controlled substance    Susie Zhou RN

## 2022-05-23 ENCOUNTER — TELEPHONE (OUTPATIENT)
Dept: INTERNAL MEDICINE | Facility: OTHER | Age: 67
End: 2022-05-23
Payer: COMMERCIAL

## 2022-05-23 NOTE — PROGRESS NOTES
Assessment & Plan        Acute pain of right shoulder  - MR Shoulder Right w/o Contrast; Future  - Orthopedic  Referral; Future  - XR Shoulder Arthrogram Right; Future    Injury of tendon of long head of right biceps, initial encounter  - MR Shoulder Right w/o Contrast; Future  - Orthopedic  Referral; Future  - XR Shoulder Arthrogram Right; Future      Ice  Avoid lifting       Renee Henson, CNP  [facility] - JEFFREY Clemente is a 66 year old who presents for the following health issues         Musculoskeletal problem/pain  When did you first notice your pain? - Acute Pain   Have you seen anyone else for your pain? No  Where in your body do you have pain?   Onset/Duration: 5/21/2022  Description  Location: Shoulder - right  Joint Swelling: YES- a little   Redness: No redness but bruised around bicep area  Pain: YES- pain in shoulder  Warmth: no  Intensity:  severe  Progression of Symptoms:  worsening  Accompanying signs and symptoms:   Fevers: no  Numbness/tingling/weakness: no  History  Trauma to the area: No trauma but was spring cleaning. Doesn't recall hitting it on anything.  Recent illness:  no  Previous similar problem: Has rotor cuff tear. Bruising abnormal  Previous evaluation:  YES  Precipitating or alleviating factors:  Aggravating factors include: overuse  Therapies tried and outcome: Ibuprofen      History of shoulder injury, saw Ortho at [facility] - years ago    MRI was ordered in 2021 by her PCP, not compelted    No recent injury to shoulder but she has significant anterior bicep bruising      Patient Active Problem List   Diagnosis     Encounter to establish care     Mild persistent asthma     Sacroiliac pain     Annual physical exam     Dysthymic disorder     Encounter for routine gynecological examination     Seborrheic keratosis     Somatic dysfunction of pelvic region     Restless legs syndrome     Skin lesion     Hand swelling     Numbness and tingling in right  hand     Graves disease     Preoperative examination     Simple chronic bronchitis (H)     Arthritis     ACP (advance care planning)     Pain of toe of right foot     Moderate major depression (H)     Right shoulder pain     Abdominal muscle strain     Strain of flexor muscle of hip, unspecified laterality, initial encounter     Myalgia     Iron deficiency     Alcohol use     Functional diarrhea     Closed fracture of neck of right femur (H)     Closed right hip fracture (H)     Hip fracture requiring operative repair (H)     Osteoporosis     Androgenetic alopecia     Benign neoplasm of stomach     Colon polyposis     Family history of colonic polyps     Family history of skin cancer     Hypothyroidism     Seborrheic dermatitis, unspecified     Telogen effluvium     Idiopathic gout of right hand, unspecified chronicity     PMR (polymyalgia rheumatica) (H)     Past Surgical History:   Procedure Laterality Date     benign tumors removed from back       CLOSED REDUCTION, PERCUTANEOUS PINNING HIP Right 2/24/2019    Procedure: Percutaneous Screw Fixation of Right Hip Fracture;  Surgeon: Amrik Kolb DO;  Location: [facility]     COLON SURGERY N/A     HX: Total colectomy with J-pouch reconstruction.      ENDOSCOPY UPPER, COLONOSCOPY, COMBINED N/A 9/5/2014    Procedure: COMBINED ENDOSCOPY UPPER, COLONOSCOPY;  Surgeon: Delbert Patel MD;  Location: [facility]     ENDOSCOPY UPPER, COLONOSCOPY, COMBINED N/A 5/9/2019    Procedure: UPPER ENDOSCOPY  WITH BIOPSIES AND  COLONOSCOPY WITH BIOPSIES;  Surgeon: Tai Diaz MD;  Location: [facility]     ESOPHAGOSCOPY, GASTROSCOPY, DUODENOSCOPY (EGD), COMBINED N/A 12/11/2015    Procedure: COMBINED ESOPHAGOSCOPY, GASTROSCOPY, DUODENOSCOPY (EGD);  Surgeon: Delbert Patel MD;  Location: [facility]     ESOPHAGOSCOPY, GASTROSCOPY, DUODENOSCOPY (EGD), COMBINED N/A 12/18/2017    Procedure: COMBINED ESOPHAGOSCOPY, GASTROSCOPY, DUODENOSCOPY (EGD);  UPPER ENDOSCOPY WITH BIOPSY;  Surgeon: Delbert Patel  MD;  Location: [facility]     excised-benign  2002    tongue lesion     HC REPAIR OF NASAL SEPTUM  2002    Deviated nasal septum     LAPAROSCOPIC CHOLECYSTECTOMY       lumpectomy Right breast      Breast Lump     MOUTH SURGERY      removal of spot from roof of mouth     pilonidal cyst surgery  1976     removal of colon and large intestine  2000    Familial polyposis     Right etopic pregnancy      Pregnancy     TONSILLECTOMY      tonsillitus     UPPER GI ENDOSCOPY  2009    Stomach polyps       Social History     Tobacco Use     Smoking status: Former Smoker     Packs/day: 0.50     Years: 40.00     Pack years: 20.00     Types: Cigarettes     Smokeless tobacco: Never Used   Substance Use Topics     Alcohol use: Not Currently     Comment: Weekly 3 drinks     Family History   Problem Relation Age of Onset     Other - See Comments Father         Atrial Fibrillation     Cancer Father         bladder ca     Colon Polyps Father      Heart Disease Father         Pacemaker     Rheumatoid Arthritis Father      C.A.D. Father 75        CABG     Chronic Obstructive Pulmonary Disease Mother      Heart Disease Mother         Pacemaker     Other - See Comments Mother         dementia     C.A.D. Mother 70        CABG     C.A.D. Maternal Grandmother      Unknown/Adopted Maternal Grandfather      Unknown/Adopted Paternal Grandmother      Unknown/Adopted Paternal Grandfather      Asthma Sister      Cerebrovascular Disease Sister      Gastrointestinal Disease Sister         peptic ulcers     Hypertension Sister      Gastrointestinal Disease Sister         stomach tumors     Rheumatoid Arthritis Sister      Cancer Sister         facial cancer     Asthma Sister      Cancer Maternal Aunt         renal ca           Current Outpatient Medications   Medication Sig Dispense Refill     alendronate (FOSAMAX) 70 MG tablet Take 1 tablet (70 mg) by mouth every 7 days 12 tablet 3     allopurinol (ZYLOPRIM) 100 MG tablet Take 1 tablet (100 mg) by mouth 2  times daily 180 tablet 1     atorvastatin (LIPITOR) 20 MG tablet Take 1 tablet (20 mg) by mouth daily 30 tablet 3     calcium carbonate 750 MG CHEW Take 1 tablet (750 mg) by mouth 2 times daily 180 tablet 3     Calcium Citrate-Vitamin D (CALCIUM + D PO) 1200-1000mg unit tablet chewable; one tablet with a meal orally once a day.       cetirizine (ZYRTEC) 10 MG tablet Take 1 tablet (10 mg) by mouth At Bedtime 90 tablet 1     dicyclomine (BENTYL) 10 MG capsule Take 2 capsules (20 mg) by mouth 3 times daily (before meals) 180 capsule 3     diphenoxylate-atropine (LOMOTIL) 2.5-0.025 MG tablet Take 1-2 tablets by mouth 4 times daily as needed for diarrhea 120 tablet 0     ferrous sulfate (IRON) 325 (65 FE) MG tablet Take 325 mg by mouth 2 times daily (with meals) 90 tablet 2     gabapentin (NEURONTIN) 400 MG capsule Take 3 capsules (1,200 mg) by mouth 3 times daily 270 capsule 4     GNP VITAMIN D MAXIMUM STRENGTH 50 MCG (2000 UT) tablet TAKE TWO TABLETS BY MOUTH EVERY  tablet 4     ibuprofen (ADVIL,MOTRIN) 200 MG tablet Take 200 mg by mouth every 4 hours as needed        ketotifen (ZADITOR/REFRESH ANTI-ITCH) 0.025 % SOLN ophthalmic solution PLACE 2 DROPS INTO BOTH EYES 2 TIMES DAILY 5 mL 2     loperamide (IMODIUM) 2 MG capsule Take 6 mg by mouth 2 times daily Pt states takes 3 tabs (6 mg) twice a day unless she needs to take more, depending on what she eats.       magnesium 100 MG CAPS Take by mouth 2 times daily       montelukast (SINGULAIR) 10 MG tablet Take 1 tablet (10 mg) by mouth At Bedtime 90 tablet 3     Multiple Vitamin (MULTIVITAMIN) per tablet Take 1 tablet by mouth daily Every day with food       omeprazole (PRILOSEC) 40 MG DR capsule Take 1 capsule (40 mg) by mouth daily 90 capsule 1     potassium chloride ER (KLOR-CON M) 20 MEQ CR tablet Take 1 tablet (20 mEq) by mouth 3 times daily 90 tablet 3     pramipexole (MIRAPEX) 0.125 MG tablet Take 1 tablet (0.125 mg) by mouth At Bedtime 90 tablet 1      predniSONE (DELTASONE) 5 MG tablet Take 1 tablet (5 mg) by mouth daily as needed (PMR) 30 tablet 1     predniSONE (DELTASONE) 5 MG tablet Take 5 mg by mouth daily. 90 tablet 1     PSYLLIUM PO Take 1 Tablespoonful by mouth 2 times daily        Simethicone 125 MG CAPS Take 500 mg by mouth 2 times daily       spironolactone (ALDACTONE) 50 MG tablet Take 1 tablet (50 mg) by mouth 2 times daily 180 tablet 1     VENTOLIN  (90 Base) MCG/ACT inhaler Inhale 2 puffs into the lungs every 4 hours as needed for shortness of breath / dyspnea or wheezing 18 g 2     vitamin E 200 UNIT capsule Take 200 Units by mouth 2 times daily       WIXELA INHUB 250-50 MCG/DOSE inhaler Inhale 1 puff into the lungs 2 times daily 1 each 1     Allergies   Allergen Reactions     Codeine Swelling     Throat swelling-Patient can tolerate Hydrocodone & morphine     Nortriptyline Other (See Comments)     Crying      Recent Labs   Lab Test 12/21/21  1150 04/29/21  1003 09/09/20  1129 02/11/19  0807 02/05/19  0942 08/16/18  1053 08/08/18  1022 04/13/16  1052 08/20/15  1056 07/11/14  0817   A1C  --   --   --   --   --   --   --   --  5.6  --    LDL  --  81 106*  --   --   --   --   --   --  122   HDL  --  38* 43*  --   --   --   --   --   --  40   TRIG  --  258* 332*  --   --   --   --   --   --  146   ALT 47 40 40   < >  --    < >  --    < > 55* 43   CR 0.90 0.97 0.81   < > 0.86   < > 0.84   < > 0.76 0.89   GFRESTIMATED 70 61 75   < > 71   < > 68   < > 78 65   GFRESTBLACK  --  70 87   < > 82   < > 83   < > >90   GFR Calc   78   POTASSIUM 4.3 4.2 4.5   < > 3.9   < > 3.6   < > 4.2 4.0   TSH  --   --   --   --  0.74  --  1.11   < > <0.01*  --     < > = values in this interval not displayed.        BP Readings from Last 3 Encounters:   05/24/22 106/64   03/30/22 110/72   12/21/21 112/76    Wt Readings from Last 3 Encounters:   05/24/22 56.1 kg (123 lb 9.6 oz)   12/21/21 58.5 kg (129 lb)   04/29/21 58.1 kg (128 lb)                    Review of Systems   Constitutional, HEENT, cardiovascular, pulmonary, GI, , musculoskeletal, neuro, skin, endocrine and psych systems are negative, except as otherwise noted.          Objective    /64 (BP Location: Left arm, Patient Position: Chair, Cuff Size: Adult Regular)   Pulse 89   Temp 99.2  F (37.3  C) (Tympanic)   Wt 56.1 kg (123 lb 9.6 oz)   SpO2 96%   BMI 22.61 kg/m    Body mass index is 22.61 kg/m .         Physical Exam   GENERAL: healthy, alert and no distress  EYES: Eyes grossly normal to inspection, PERRL and conjunctivae and sclerae normal  NECK: no adenopathy, no asymmetry, masses, or scars and thyroid normal to palpation  RESP: lungs clear to auscultation - no rales, rhonchi or wheezes  CV: regular rate and rhythm, normal S1 S2, no S3 or S4, no murmur, click or rub, no peripheral edema and peripheral pulses strong  ABDOMEN: soft, nontender, no hepatosplenomegaly, no masses and bowel sounds normal  MS:  Right AC pain, significant bruising of anterior bicep area - limited ROM  PSYCH: mentation appears normal, affect normal/bright

## 2022-05-23 NOTE — TELEPHONE ENCOUNTER
8:44 AM    Reason for Call: OVERBOOK    Patient is having the following symptoms: Hurt right shoulder for about a week. Wants to be seen this week.     The patient is requesting an appointment for today or tomorrow with Dr Deluca. Patient does have an appointment today at 2:00pm she isn't available at that time but she can come in this morning or the rest of the week.     Was an appointment offered for this call? No  If yes : Appointment type              Date    Preferred method for responding to this message: Telephone Call  What is your phone number ? [phone]    If we cannot reach you directly, may we leave a detailed response at the number you provided? Yes    Can this message wait until your PCP/provider returns, if unavailable today? No,

## 2022-05-24 ENCOUNTER — ANCILLARY PROCEDURE (OUTPATIENT)
Dept: GENERAL RADIOLOGY | Facility: OTHER | Age: 67
End: 2022-05-24
Attending: NURSE PRACTITIONER
Payer: COMMERCIAL

## 2022-05-24 ENCOUNTER — OFFICE VISIT (OUTPATIENT)
Dept: FAMILY MEDICINE | Facility: OTHER | Age: 67
End: 2022-05-24
Attending: NURSE PRACTITIONER
Payer: COMMERCIAL

## 2022-05-24 VITALS
BODY MASS INDEX: 22.61 KG/M2 | DIASTOLIC BLOOD PRESSURE: 64 MMHG | WEIGHT: 123.6 LBS | TEMPERATURE: 99.2 F | SYSTOLIC BLOOD PRESSURE: 106 MMHG | HEART RATE: 89 BPM | OXYGEN SATURATION: 96 %

## 2022-05-24 DIAGNOSIS — S46.101A INJURY OF TENDON OF LONG HEAD OF RIGHT BICEPS, INITIAL ENCOUNTER: ICD-10-CM

## 2022-05-24 DIAGNOSIS — M25.511 ACUTE PAIN OF RIGHT SHOULDER: ICD-10-CM

## 2022-05-24 DIAGNOSIS — M25.511 ACUTE PAIN OF RIGHT SHOULDER: Primary | ICD-10-CM

## 2022-05-24 PROCEDURE — G0463 HOSPITAL OUTPT CLINIC VISIT: HCPCS

## 2022-05-24 PROCEDURE — 99214 OFFICE O/P EST MOD 30 MIN: CPT | Performed by: NURSE PRACTITIONER

## 2022-05-24 PROCEDURE — 73030 X-RAY EXAM OF SHOULDER: CPT | Mod: TC,RT,FY

## 2022-05-24 ASSESSMENT — ANXIETY QUESTIONNAIRES
IF YOU CHECKED OFF ANY PROBLEMS ON THIS QUESTIONNAIRE, HOW DIFFICULT HAVE THESE PROBLEMS MADE IT FOR YOU TO DO YOUR WORK, TAKE CARE OF THINGS AT HOME, OR GET ALONG WITH OTHER PEOPLE: NOT DIFFICULT AT ALL
GAD7 TOTAL SCORE: 0
GAD7 TOTAL SCORE: 0
2. NOT BEING ABLE TO STOP OR CONTROL WORRYING: NOT AT ALL
6. BECOMING EASILY ANNOYED OR IRRITABLE: NOT AT ALL
1. FEELING NERVOUS, ANXIOUS, OR ON EDGE: NOT AT ALL
3. WORRYING TOO MUCH ABOUT DIFFERENT THINGS: NOT AT ALL
5. BEING SO RESTLESS THAT IT IS HARD TO SIT STILL: NOT AT ALL
7. FEELING AFRAID AS IF SOMETHING AWFUL MIGHT HAPPEN: NOT AT ALL

## 2022-05-24 ASSESSMENT — PAIN SCALES - GENERAL: PAINLEVEL: EXTREME PAIN (9)

## 2022-05-24 ASSESSMENT — PATIENT HEALTH QUESTIONNAIRE - PHQ9
5. POOR APPETITE OR OVEREATING: NOT AT ALL
SUM OF ALL RESPONSES TO PHQ QUESTIONS 1-9: 0

## 2022-05-24 NOTE — PATIENT INSTRUCTIONS
Assessment & Plan        Acute pain of right shoulder  - MR Shoulder Right w/o Contrast; Future  - Orthopedic  Referral; Future  - XR Shoulder Arthrogram Right; Future    Injury of tendon of long head of right biceps, initial encounter  - MR Shoulder Right w/o Contrast; Future  - Orthopedic  Referral; Future  - XR Shoulder Arthrogram Right; Future      Ice  Avoid lifting       Renee Henson, CNP  [facility]

## 2022-05-24 NOTE — NURSING NOTE
"Chief Complaint   Patient presents with     Shoulder Pain     Right       Initial /64 (BP Location: Left arm, Patient Position: Chair, Cuff Size: Adult Regular)   Pulse 89   Temp 99.2  F (37.3  C) (Tympanic)   Wt 56.1 kg (123 lb 9.6 oz)   SpO2 96%   BMI 22.61 kg/m   Estimated body mass index is 22.61 kg/m  as calculated from the following:    Height as of 4/29/21: 1.575 m (5' 2\").    Weight as of this encounter: 56.1 kg (123 lb 9.6 oz).  Medication Reconciliation: complete  Svetlana Emery    "

## 2022-05-25 ENCOUNTER — OFFICE VISIT (OUTPATIENT)
Dept: ORTHOPEDICS | Facility: OTHER | Age: 67
End: 2022-05-25
Attending: NURSE PRACTITIONER
Payer: COMMERCIAL

## 2022-05-25 VITALS
WEIGHT: 123 LBS | HEART RATE: 88 BPM | OXYGEN SATURATION: 95 % | HEIGHT: 62 IN | BODY MASS INDEX: 22.63 KG/M2 | DIASTOLIC BLOOD PRESSURE: 60 MMHG | SYSTOLIC BLOOD PRESSURE: 100 MMHG

## 2022-05-25 DIAGNOSIS — S46.101A INJURY OF TENDON OF LONG HEAD OF RIGHT BICEPS, INITIAL ENCOUNTER: ICD-10-CM

## 2022-05-25 DIAGNOSIS — M25.511 ACUTE PAIN OF RIGHT SHOULDER: ICD-10-CM

## 2022-05-25 PROCEDURE — 99203 OFFICE O/P NEW LOW 30 MIN: CPT | Performed by: SPECIALIST

## 2022-05-25 PROCEDURE — G0463 HOSPITAL OUTPT CLINIC VISIT: HCPCS

## 2022-05-25 ASSESSMENT — PAIN SCALES - GENERAL: PAINLEVEL: EXTREME PAIN (8)

## 2022-05-25 NOTE — PROGRESS NOTES
Visit Date: 05/25/2022    HISTORY OF PRESENT ILLNESS:  Patient is a 66-year-old female seen today for evaluation of her right shoulder. The patient has been complaining of right shoulder pain recently.  She has recently been doing a lot of the spring, cleaning her cupboards and cabinets.  This required a lot of overhead reaching and lifting.  She has also been cleaning the ceilings in her house. She noticed with overuse that it was painful, but she pressed on and continue to do her activities to complete the task.  She noticed afterwards that she had significant pain anteriorly in her shoulder.  She also this past week noticed some bruising in her upper arm.  She does take prednisone for polymyalgia and she states that she bruises easily and she might have bumped her upper arm.  She complains of anterior shoulder pain now with any overhead reaching, lifting.  She had x-rays taken 05/24/2022, just yesterday, and the x-rays were essentially negative.  She is scheduled for an MRI coming up, but has not had it done yet.  She comes in today for orthopedic evaluation.  She complains of 8/10 pain, pain with movement and overhead reaching, etc.    PHYSICAL EXAMINATION:  On today's exam, the patient is awake, alert, oriented, no acute distress.  Overall, general mood and affect appearance are normal.  Weight 123, pulse 88, blood pressure 100/68.  On evaluation of her right upper extremity, she has some ecchymosis in the superficial anterior part of her upper arm.  She has tenderness to palpation over the anterolateral acromion and the rotator cuff.  The AC joint is not particularly tender.  She is distally neurovascularly intact.  She has good range of motion of her hand, wrist and elbow and fingers.  She has good strength with internal rotation, external rotation of her shoulder.  Good strength with abduction and forward flexion of her shoulder.  Range of motion: Forward flexion about 140, abduction about 130, external  rotation 40, internal rotation to the upper lumbar spine.  Jensen and Neer impingement tests are positive for pain.  She has what appears to be fairly good symmetry of her biceps.  She might have a little bit of a bulge in her right biceps.  It is possible she could have ruptured her long head biceps tendon, but she does not remember an incident where she felt that this happened.     I reviewed her x-rays which I felt were normal as well.     ASSESSMENT AND PLAN:  I think it would be reasonable to have her complete the MRI and review the results with her.  If she had an obvious rotator cuff tear, then we might talk about surgical intervention.  If she does not, or has biceps tendon long head rupture, I would probably treat her conservatively.  In the meantime, she will continue conservative management with local pain relief modalities, over-the-counter medications, avoidance of aggravating activities, etc.      Sergio Donato MD        D: 2022   T: 2022   MT: [id number]    Name:     DEIDRE SORENSEN  MRN:      [id number]        Account:    [id number]   :      1955           Visit Date: 2022     Document: [id number]

## 2022-05-25 NOTE — NURSING NOTE
"Chief Complaint   Patient presents with     Musculoskeletal Problem     Right shoulder pain       Initial /60 (BP Location: Left arm, Patient Position: Chair, Cuff Size: Adult Regular)   Pulse 88   Ht 1.581 m (5' 2.25\")   Wt 55.8 kg (123 lb)   SpO2 95%   BMI 22.32 kg/m   Estimated body mass index is 22.32 kg/m  as calculated from the following:    Height as of this encounter: 1.581 m (5' 2.25\").    Weight as of this encounter: 55.8 kg (123 lb).  Medication Reconciliation: complete  JODY YARBROUGH LPN    "

## 2022-06-02 ENCOUNTER — HOSPITAL ENCOUNTER (OUTPATIENT)
Dept: MRI IMAGING | Facility: HOSPITAL | Age: 67
Discharge: HOME | End: 2022-06-02
Attending: NURSE PRACTITIONER | Admitting: NURSE PRACTITIONER
Payer: COMMERCIAL

## 2022-06-02 DIAGNOSIS — M25.511 ACUTE PAIN OF RIGHT SHOULDER: ICD-10-CM

## 2022-06-02 DIAGNOSIS — S46.101A INJURY OF TENDON OF LONG HEAD OF RIGHT BICEPS, INITIAL ENCOUNTER: ICD-10-CM

## 2022-06-02 PROCEDURE — 73221 MRI JOINT UPR EXTREM W/O DYE: CPT | Mod: RT

## 2022-06-03 NOTE — RESULT ENCOUNTER NOTE
Was referred to Dr Donato - Please forward this full MRI report to him.       Impression:   1. Full-thickness tear of the supraspinatus tendon with adjacent  tendinosis. Mild narrowing of the acromiohumeral distance with some  associated muscle atrophy, more severe in the infraspinatus.  2. Mild to moderate hypertrophic change AC joint.  3. Poorly visualized intra-articular portion of biceps tendon.     CARROLL UMANZOR MD

## 2022-06-08 DIAGNOSIS — M79.10 MYALGIA: ICD-10-CM

## 2022-06-08 DIAGNOSIS — K21.00 GASTROESOPHAGEAL REFLUX DISEASE WITH ESOPHAGITIS WITHOUT HEMORRHAGE: ICD-10-CM

## 2022-06-08 DIAGNOSIS — G25.81 RESTLESS LEGS SYNDROME (RLS): ICD-10-CM

## 2022-06-08 DIAGNOSIS — M35.3 PMR (POLYMYALGIA RHEUMATICA) (H): ICD-10-CM

## 2022-06-09 ENCOUNTER — OFFICE VISIT (OUTPATIENT)
Dept: INTERNAL MEDICINE | Facility: OTHER | Age: 67
End: 2022-06-09
Attending: INTERNAL MEDICINE
Payer: COMMERCIAL

## 2022-06-09 VITALS
BODY MASS INDEX: 23.19 KG/M2 | WEIGHT: 126 LBS | HEIGHT: 62 IN | OXYGEN SATURATION: 98 % | TEMPERATURE: 97.3 F | HEART RATE: 79 BPM | DIASTOLIC BLOOD PRESSURE: 66 MMHG | SYSTOLIC BLOOD PRESSURE: 106 MMHG

## 2022-06-09 DIAGNOSIS — M75.101 TEAR OF RIGHT ROTATOR CUFF, UNSPECIFIED TEAR EXTENT, UNSPECIFIED WHETHER TRAUMATIC: ICD-10-CM

## 2022-06-09 DIAGNOSIS — Z01.818 PREOP GENERAL PHYSICAL EXAM: Primary | ICD-10-CM

## 2022-06-09 DIAGNOSIS — E05.00 GRAVES DISEASE: ICD-10-CM

## 2022-06-09 LAB
ANION GAP SERPL CALCULATED.3IONS-SCNC: 6 MMOL/L (ref 3–14)
BASOPHILS # BLD AUTO: 0 10E3/UL (ref 0–0.2)
BASOPHILS NFR BLD AUTO: 0 %
BUN SERPL-MCNC: 21 MG/DL (ref 7–30)
CALCIUM SERPL-MCNC: 9 MG/DL (ref 8.5–10.1)
CHLORIDE BLD-SCNC: 111 MMOL/L (ref 94–109)
CO2 SERPL-SCNC: 24 MMOL/L (ref 20–32)
CREAT SERPL-MCNC: 0.78 MG/DL (ref 0.52–1.04)
EOSINOPHIL # BLD AUTO: 0.1 10E3/UL (ref 0–0.7)
EOSINOPHIL NFR BLD AUTO: 1 %
ERYTHROCYTE [DISTWIDTH] IN BLOOD BY AUTOMATED COUNT: 12.7 % (ref 10–15)
GFR SERPL CREATININE-BSD FRML MDRD: 82 ML/MIN/1.73M2
GLUCOSE BLD-MCNC: 104 MG/DL (ref 70–99)
HCT VFR BLD AUTO: 39.5 % (ref 35–47)
HGB BLD-MCNC: 13.7 G/DL (ref 11.7–15.7)
LYMPHOCYTES # BLD AUTO: 1.1 10E3/UL (ref 0.8–5.3)
LYMPHOCYTES NFR BLD AUTO: 11 %
MCH RBC QN AUTO: 32.9 PG (ref 26.5–33)
MCHC RBC AUTO-ENTMCNC: 34.7 G/DL (ref 31.5–36.5)
MCV RBC AUTO: 95 FL (ref 78–100)
MONOCYTES # BLD AUTO: 0.9 10E3/UL (ref 0–1.3)
MONOCYTES NFR BLD AUTO: 9 %
NEUTROPHILS # BLD AUTO: 8 10E3/UL (ref 1.6–8.3)
NEUTROPHILS NFR BLD AUTO: 79 %
PLATELET # BLD AUTO: 230 10E3/UL (ref 150–450)
POTASSIUM BLD-SCNC: 4.3 MMOL/L (ref 3.4–5.3)
RBC # BLD AUTO: 4.16 10E6/UL (ref 3.8–5.2)
SODIUM SERPL-SCNC: 141 MMOL/L (ref 133–144)
WBC # BLD AUTO: 10.1 10E3/UL (ref 4–11)

## 2022-06-09 PROCEDURE — 85025 COMPLETE CBC W/AUTO DIFF WBC: CPT | Mod: ZL | Performed by: INTERNAL MEDICINE

## 2022-06-09 PROCEDURE — 93005 ELECTROCARDIOGRAM TRACING: CPT | Performed by: INTERNAL MEDICINE

## 2022-06-09 PROCEDURE — 99214 OFFICE O/P EST MOD 30 MIN: CPT | Mod: 25 | Performed by: INTERNAL MEDICINE

## 2022-06-09 PROCEDURE — 93010 ELECTROCARDIOGRAM REPORT: CPT | Mod: 77 | Performed by: INTERNAL MEDICINE

## 2022-06-09 PROCEDURE — G0463 HOSPITAL OUTPT CLINIC VISIT: HCPCS | Mod: 25

## 2022-06-09 PROCEDURE — 80048 BASIC METABOLIC PNL TOTAL CA: CPT | Mod: ZL | Performed by: INTERNAL MEDICINE

## 2022-06-09 PROCEDURE — G0463 HOSPITAL OUTPT CLINIC VISIT: HCPCS

## 2022-06-09 PROCEDURE — 36415 COLL VENOUS BLD VENIPUNCTURE: CPT | Mod: ZL | Performed by: INTERNAL MEDICINE

## 2022-06-09 RX ORDER — PREDNISONE 5 MG/1
5 TABLET ORAL DAILY
Qty: 90 TABLET | Refills: 1 | Status: SHIPPED | OUTPATIENT
Start: 2022-06-09 | End: 2022-07-15

## 2022-06-09 RX ORDER — PRAMIPEXOLE DIHYDROCHLORIDE 0.12 MG/1
TABLET ORAL
Qty: 90 TABLET | Refills: 0 | Status: SHIPPED | OUTPATIENT
Start: 2022-06-09 | End: 2022-11-03

## 2022-06-09 RX ORDER — PREDNISONE 5 MG/1
5 TABLET ORAL DAILY PRN
Qty: 30 TABLET | Refills: 1 | Status: SHIPPED | OUTPATIENT
Start: 2022-06-09 | End: 2022-08-12

## 2022-06-09 ASSESSMENT — PAIN SCALES - GENERAL: PAINLEVEL: MODERATE PAIN (4)

## 2022-06-09 NOTE — NURSING NOTE
"Chief Complaint   Patient presents with     Pre-Op Exam       Initial /66 (BP Location: Left arm, Patient Position: Chair, Cuff Size: Adult Regular)   Pulse 79   Temp 97.3  F (36.3  C) (Tympanic)   Ht 1.575 m (5' 2\")   Wt 57.2 kg (126 lb)   SpO2 98%   BMI 23.05 kg/m   Estimated body mass index is 23.05 kg/m  as calculated from the following:    Height as of this encounter: 1.575 m (5' 2\").    Weight as of this encounter: 57.2 kg (126 lb).  Medication Reconciliation: complete  YEVGENIY TRAMMELL LPN  "

## 2022-06-09 NOTE — PATIENT INSTRUCTIONS
Preparing for Your Surgery  Getting started  A nurse will call you to review your health history and instructions. They will give you an arrival time based on your scheduled surgery time. Please be ready to share:    Your doctor's clinic name and phone number    Your medical, surgical and anesthesia history    A list of allergies and sensitivities    A list of medicines, including herbal treatments and over-the-counter drugs    Whether the patient has a legal guardian (ask how to send us the papers in advance)  Please tell us if you're pregnant--or if there's any chance you might be pregnant. Some surgeries may injure a fetus (unborn baby), so they require a pregnancy test. Surgeries that are safe for a fetus don't always need a test, and you can choose whether to have one.   If you have a child who's having surgery, please ask for a copy of Preparing for Your Child's Surgery.    Preparing for surgery    Within 30 days of surgery: Have a pre-op exam (sometimes called an H&P, or History and Physical). This can be done at a clinic or pre-operative center.  ? If you're having a , you may not need this exam. Talk to your care team.    At your pre-op exam, talk to your care team about all medicines you take. If you need to stop any medicines before surgery, ask when to start taking them again.  ? We do this for your safety. Many medicines can make you bleed too much during surgery. Some change how well surgery (anesthesia) drugs work.    Call your insurance company to let them know you're having surgery. (If you don't have insurance, call [phone].)    Call your clinic if there's any change in your health. This includes signs of a cold or flu (sore throat, runny nose, cough, rash, fever). It also includes a scrape or scratch near the surgery site.    If you have questions on the day of surgery, call your hospital or surgery center.  COVID testing  You may need to be tested for COVID-19 before having  surgery. If so, we will give you instructions.  Eating and drinking guidelines  For your safety: Unless your surgeon tells you otherwise, follow the guidelines below.    Eat and drink as usual until 8 hours before surgery. After that, no food or milk.    Drink clear liquids until 2 hours before surgery. These are liquids you can see through, like water, Gatorade and Propel Water. You may also have black coffee and tea (no cream or milk).    Nothing by mouth within 2 hours of surgery. This includes gum, candy and breath mints.    If you drink alcohol: Stop drinking it the night before surgery.    If your care team tells you to take medicine on the morning of surgery, it's okay to take it with a sip of water.  Preventing infection    Shower or bathe the night before and morning of your surgery. Follow the instructions your clinic gave you. (If no instructions, use regular soap.)    Don't shave or clip hair near your surgery site. We'll remove the hair if needed.    Don't smoke or vape the morning of surgery. You may chew nicotine gum up to 2 hours before surgery. A nicotine patch is okay.  ? Note: Some surgeries require you to completely quit smoking and nicotine. Check with your surgeon.    Your care team will make every effort to keep you safe from infection. We will:  ? Clean our hands often with soap and water (or an alcohol-based hand rub).  ? Clean the skin at your surgery site with a special soap that kills germs.  ? Give you a special gown to keep you warm. (Cold raises the risk of infection.)  ? Wear special hair covers, masks, gowns and gloves during surgery.  ? Give antibiotic medicine, if prescribed. Not all surgeries need antibiotics.  What to bring on the day of surgery    Photo ID and insurance card    Copy of your health care directive, if you have one    Glasses and hearing aides (bring cases)  ? You can't wear contacts during surgery    Inhaler and eye drops, if you use them (tell us about these when  you arrive)    CPAP machine or breathing device, if you use them    A few personal items, if spending the night    If you have . . .  ? A pacemaker, ICD (cardiac defibrillator) or other implant: Bring the ID card.  ? An implanted stimulator: Bring the remote control.  ? A legal guardian: Bring a copy of the certified (court-stamped) guardianship papers.  Please remove any jewelry, including body piercings. Leave jewelry and other valuables at home.  If you're going home the day of surgery    You must have a responsible adult drive you home. They should stay with you overnight as well.    If you don't have someone to stay with you, and you aren't safe to go home alone, we may keep you overnight. Insurance often won't pay for this.  After surgery  If it's hard to control your pain or you need more pain medicine, please call your surgeon's office.  Questions?   If you have any questions for your care team, list them here: _________________________________________________________________________________________________________________________________________________________________________ ____________________________________ ____________________________________ ____________________________________  For informational purposes only. Not to replace the advice of your health care provider. Copyright   2003, 2019 [facility]. All rights reserved. Clinically reviewed by Ally Sadler MD. [employer] [id number] - REV 07/21.

## 2022-06-09 NOTE — PROGRESS NOTES
[facility]  [address]  [facility] [address]  Phone: [phone]  Primary Provider: Valente Tidwell  Pre-op Performing Provider: VALENTE TIDWELL      PREOPERATIVE EVALUATION:  Today's date: 6/9/2022    Chyna Delgado is a 66 year old female who presents for a preoperative evaluation.    Surgical Information:  Surgery/Procedure: Cataract Surgery R- 6/21/2022, L-7/12/2022  Surgery Location: [facility]  Surgeon: Dr. Tom  Surgery Date: see above  Time of Surgery: unknown  Where patient plans to recover: At home with family  Fax number for surgical facility: [phone] and [phone]    Type of Anesthesia Anticipated: Local    Assessment & Plan     The proposed surgical procedure is considered LOW risk.    Problem List Items Addressed This Visit        Endocrine    Graves disease      Other Visit Diagnoses     Preop general physical exam    -  Primary    Relevant Orders    EKG 12-lead complete w/read - (Clinic Performed)    Basic metabolic panel    CBC with platelets and differential    Tear of right rotator cuff, unspecified tear extent, unspecified whether traumatic        Relevant Orders    Physical Therapy Referral               Risks and Recommendations:  The patient has the following additional risks and recommendations for perioperative complications:   - No identified additional risk factors other than previously addressed    Medication Instructions:  Patient is to take all scheduled medications on the day of surgery    RECOMMENDATION:  APPROVAL GIVEN to proceed with proposed procedure, without further diagnostic evaluation.        30 minutes spent on the date of the encounter doing chart review, review of test results, interpretation of tests, patient visit and documentation         Subjective     HPI related to upcoming procedure: Cataracts      Preop Questions 6/9/2022   1. Have you ever had a heart attack or stroke? No   2.  Have you ever had surgery on your heart or blood vessels, such as a stent placement, a coronary artery bypass, or surgery on an artery in your head, neck, heart, or legs? No   3. Do you have chest pain with activity? No   4. Do you have a history of  heart failure? No   5. Do you currently have a cold, bronchitis or symptoms of other infection? No   6. Do you have a cough, shortness of breath, or wheezing? YES - asthma   7. Do you or anyone in your family have previous history of blood clots? YES - father   8. Do you or does anyone in your family have a serious bleeding problem such as prolonged bleeding following surgeries or cuts? No   9. Have you ever had problems with anemia or been told to take iron pills? No   10. Have you had any abnormal blood loss such as black, tarry or bloody stools, or abnormal vaginal bleeding? No   11. Have you ever had a blood transfusion? YES -    11a. Have you ever had a transfusion reaction? No   12. Are you willing to have a blood transfusion if it is medically needed before, during, or after your surgery? Yes   13. Have you or any of your relatives ever had problems with anesthesia? No   14. Do you have sleep apnea, excessive snoring or daytime drowsiness? No   15. Do you have any artifical heart valves or other implanted medical devices like a pacemaker, defibrillator, or continuous glucose monitor? No   16. Do you have artificial joints? No   17. Are you allergic to latex? No     Health Care Directive:  Patient has a Health Care Directive on file      Preoperative Review of :        Status of Chronic Conditions:  ASTHMA - Patient has a longstanding history of moderate-severe Asthma . Patient has been doing well overall noting SOB and CORDON and continues on medication regimen consisting of ihalers without adverse reactions or side effects.     HYPERLIPIDEMIA - Patient has a long history of significant Hyperlipidemia requiring medication for treatment with recent fair control.  Patient reports no problems or side effects with the medication.     HYPOTHYROIDISM - Patient has a longstanding history of chronic Hypothyroidism. Patient has been doing well, noting no tremor, insomnia, hair loss or changes in skin texture. Continues to take medications as directed, without adverse reactions or side effects. Last TSH   Lab Results   Component Value Date    TSH 0.74 02/05/2019   .        Review of Systems  Constitutional, neuro, ENT, endocrine, pulmonary, cardiac, gastrointestinal, genitourinary, musculoskeletal, integument and psychiatric systems are negative, except as otherwise noted.    Patient Active Problem List    Diagnosis Date Noted     Annual physical exam 06/28/2013     Priority: High     Idiopathic gout of right hand, unspecified chronicity 09/09/2020     Priority: Medium     PMR (polymyalgia rheumatica) (H) 09/09/2020     Priority: Medium     Osteoporosis 09/03/2019     Priority: Medium     Hip fracture requiring operative repair (H) 02/25/2019     Priority: Medium     Closed fracture of neck of right femur (H) 02/23/2019     Priority: Medium     Closed right hip fracture (H) 02/23/2019     Priority: Medium     Functional diarrhea 10/26/2018     Priority: Medium     Iron deficiency 04/18/2018     Priority: Medium     Alcohol use 04/18/2018     Priority: Medium     Myalgia 05/30/2017     Priority: Medium     Abdominal muscle strain 05/05/2017     Priority: Medium     Strain of flexor muscle of hip, unspecified laterality, initial encounter 05/05/2017     Priority: Medium     Right shoulder pain 11/03/2016     Priority: Medium     Moderate major depression (H) 10/12/2016     Priority: Medium     ACP (advance care planning) 05/09/2016     Priority: Medium     Advance Care Planning 5/9/2016: ACP Review of Chart / Resources Provided:  Reviewed chart for advance care plan.  Chyna Delgado has been provided information and resources to begin or update their advance care plan.  Added by  Lina PONCE Buus             Pain of toe of right foot 05/09/2016     Priority: Medium     Arthritis 02/12/2016     Priority: Medium     Preoperative examination 12/02/2015     Priority: Medium     Simple chronic bronchitis (H) 12/02/2015     Priority: Medium     Graves disease 11/05/2015     Priority: Medium     Hand swelling 04/20/2015     Priority: Medium     Numbness and tingling in right hand 04/20/2015     Priority: Medium     Skin lesion 07/07/2014     Priority: Medium     Right cheek       Restless legs syndrome 04/16/2014     Priority: Medium     Somatic dysfunction of pelvic region 10/08/2013     Priority: Medium     Encounter for routine gynecological examination 07/12/2013     Priority: Medium     Problem list name updated by automated process. 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     Priority: Medium     Overview:   IMO Update 10/11       Seborrheic dermatitis, unspecified 10/12/2011     Priority: Medium     Overview:   IMO Update 10/11       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     Priority: Medium     Hypothyroidism 07/07/2011     Priority: Medium      Past Medical History:   Diagnosis Date     Abnormal mammogram, unspecified 01/08/2003    Normal pathology     Back Pain 02/10/2000    Sacroiliac pain     Benign neoplasm of colon 03/10/2000     Benign paroxysmal positional vertigo 06/07/2012     Depressive disorder, not elsewhere classified  02/10/2004     Diarrhea 12/15/2010    Secondary to colectomy for familial polyposis     Gastric polyp 12/11/2015, 12/18/2017    recurrent      History of normal mammogram 07/18/2014, 1/18/2019     Idiopathic osteoporosis 12/15/2010     Interstitial emphysema (H) 12/15/2010     menopausal or female climacteric states 08/31/1999     Mixed hyperlipidemia 12/15/2010     Other bursitis disorders 02/04/2011    Greater trochanteric     Other forms of retinal detachment(361.89) 12/15/2010     Other malaise and fatigue 01/10/2003     Personal history of tobacco use, presenting hazards to health 12/15/2010     Polymyalgia rheumatica (H)      Polyposis of colon      Restless legs syndrome (RLS) 12/15/2010     Rotator cuff tendonitis      Rotator cuff tendonitis      Sacroiliitis, not elsewhere classified (H) 12/15/2010    multiple sites     Tobacco use disorder 08/22/2012     Unspecified asthma(493.90) 12/15/2010     Past Surgical History:   Procedure Laterality Date     benign tumors removed from back       CLOSED REDUCTION, PERCUTANEOUS PINNING HIP Right 2/24/2019    Procedure: Percutaneous Screw Fixation of Right Hip Fracture;  Surgeon: Amrik Kolb DO;  Location: [facility]     COLON SURGERY N/A     HX: Total colectomy with J-pouch reconstruction.      ENDOSCOPY UPPER, COLONOSCOPY, COMBINED N/A 9/5/2014    Procedure: COMBINED ENDOSCOPY UPPER, COLONOSCOPY;  Surgeon: Delbert Patel MD;  Location: [facility]     ENDOSCOPY UPPER, COLONOSCOPY, COMBINED N/A 5/9/2019    Procedure: UPPER ENDOSCOPY  WITH BIOPSIES AND  COLONOSCOPY WITH BIOPSIES;  Surgeon: Tai Diaz MD;  Location: [facility]     ESOPHAGOSCOPY, GASTROSCOPY, DUODENOSCOPY (EGD), COMBINED N/A 12/11/2015    Procedure: COMBINED ESOPHAGOSCOPY, GASTROSCOPY, DUODENOSCOPY (EGD);  Surgeon: Delbert Patel MD;  Location: [facility]     ESOPHAGOSCOPY, GASTROSCOPY, DUODENOSCOPY (EGD), COMBINED N/A 12/18/2017    Procedure: COMBINED ESOPHAGOSCOPY, GASTROSCOPY, DUODENOSCOPY (EGD);  UPPER  ENDOSCOPY WITH BIOPSY;  Surgeon: Delbert Patel MD;  Location: [facility]     excised-benign  2002    tongue lesion     HC REPAIR OF NASAL SEPTUM  2002    Deviated nasal septum     LAPAROSCOPIC CHOLECYSTECTOMY       lumpectomy Right breast      Breast Lump     MOUTH SURGERY      removal of spot from roof of mouth     pilonidal cyst surgery  1976     removal of colon and large intestine  2000    Familial polyposis     Right etopic pregnancy      Pregnancy     TONSILLECTOMY      tonsillitus     UPPER GI ENDOSCOPY  2009    Stomach polyps     Current Outpatient Medications   Medication Sig Dispense Refill     alendronate (FOSAMAX) 70 MG tablet Take 1 tablet (70 mg) by mouth every 7 days 12 tablet 3     allopurinol (ZYLOPRIM) 100 MG tablet Take 1 tablet (100 mg) by mouth 2 times daily 180 tablet 1     atorvastatin (LIPITOR) 20 MG tablet Take 1 tablet (20 mg) by mouth daily 30 tablet 3     calcium carbonate 750 MG CHEW Take 1 tablet (750 mg) by mouth 2 times daily 180 tablet 3     Calcium Citrate-Vitamin D (CALCIUM + D PO) 1200-1000mg unit tablet chewable; one tablet with a meal orally once a day.       cetirizine (ZYRTEC) 10 MG tablet Take 1 tablet (10 mg) by mouth At Bedtime 90 tablet 1     dicyclomine (BENTYL) 10 MG capsule Take 2 capsules (20 mg) by mouth 3 times daily (before meals) 180 capsule 3     ferrous sulfate (IRON) 325 (65 FE) MG tablet Take 325 mg by mouth 2 times daily (with meals) 90 tablet 2     gabapentin (NEURONTIN) 400 MG capsule Take 3 capsules (1,200 mg) by mouth 3 times daily 270 capsule 4     GNP VITAMIN D MAXIMUM STRENGTH 50 MCG (2000 UT) tablet TAKE TWO TABLETS BY MOUTH EVERY  tablet 4     ibuprofen (ADVIL,MOTRIN) 200 MG tablet Take 200 mg by mouth every 4 hours as needed        ketotifen (ZADITOR/REFRESH ANTI-ITCH) 0.025 % SOLN ophthalmic solution PLACE 2 DROPS INTO BOTH EYES 2 TIMES DAILY 5 mL 2     loperamide (IMODIUM) 2 MG capsule Take 6 mg by mouth 2 times daily Pt states takes 3 tabs  (6 mg) twice a day unless she needs to take more, depending on what she eats.       magnesium 100 MG CAPS Take by mouth 2 times daily       montelukast (SINGULAIR) 10 MG tablet Take 1 tablet (10 mg) by mouth At Bedtime 90 tablet 3     Multiple Vitamin (MULTIVITAMIN) per tablet Take 1 tablet by mouth daily Every day with food       omeprazole (PRILOSEC) 40 MG DR capsule Take 1 capsule (40 mg) by mouth daily 90 capsule 1     potassium chloride ER (KLOR-CON M) 20 MEQ CR tablet Take 1 tablet (20 mEq) by mouth 3 times daily 90 tablet 3     pramipexole (MIRAPEX) 0.125 MG tablet Take 1 tablet (0.125 mg) by mouth At Bedtime 90 tablet 1     predniSONE (DELTASONE) 5 MG tablet Take 1 tablet (5 mg) by mouth daily as needed (PMR) 30 tablet 1     predniSONE (DELTASONE) 5 MG tablet Take 5 mg by mouth daily. 90 tablet 1     PSYLLIUM PO Take 1 Tablespoonful by mouth 2 times daily        Simethicone 125 MG CAPS Take 500 mg by mouth 2 times daily       spironolactone (ALDACTONE) 50 MG tablet Take 1 tablet (50 mg) by mouth 2 times daily 180 tablet 1     VENTOLIN  (90 Base) MCG/ACT inhaler Inhale 2 puffs into the lungs every 4 hours as needed for shortness of breath / dyspnea or wheezing 18 g 2     vitamin E 200 UNIT capsule Take 200 Units by mouth 2 times daily       WIXELA INHUB 250-50 MCG/DOSE inhaler Inhale 1 puff into the lungs 2 times daily 1 each 1       Allergies   Allergen Reactions     Codeine Swelling     Throat swelling-Patient can tolerate Hydrocodone & morphine     Nortriptyline Other (See Comments)     Crying         Social History     Tobacco Use     Smoking status: Former Smoker     Packs/day: 0.50     Years: 40.00     Pack years: 20.00     Types: Cigarettes     Smokeless tobacco: Never Used   Substance Use Topics     Alcohol use: Not Currently     Comment: Weekly 3 drinks     Family History   Problem Relation Age of Onset     Other - See Comments Father         Atrial Fibrillation     Cancer Father          "bladder ca     Colon Polyps Father      Heart Disease Father         Pacemaker     Rheumatoid Arthritis Father      C.A.D. Father 75        CABG     Chronic Obstructive Pulmonary Disease Mother      Heart Disease Mother         Pacemaker     Other - See Comments Mother         dementia     C.A.D. Mother 70        CABG     C.A.D. Maternal Grandmother      Unknown/Adopted Maternal Grandfather      Unknown/Adopted Paternal Grandmother      Unknown/Adopted Paternal Grandfather      Asthma Sister      Cerebrovascular Disease Sister      Gastrointestinal Disease Sister         peptic ulcers     Hypertension Sister      Gastrointestinal Disease Sister         stomach tumors     Rheumatoid Arthritis Sister      Cancer Sister         facial cancer     Asthma Sister      Cancer Maternal Aunt         renal ca     History   Drug Use No         Objective     /66 (BP Location: Left arm, Patient Position: Chair, Cuff Size: Adult Regular)   Pulse 79   Temp 97.3  F (36.3  C) (Tympanic)   Ht 1.575 m (5' 2\")   Wt 57.2 kg (126 lb)   SpO2 98%   BMI 23.05 kg/m      Physical Exam    GENERAL APPEARANCE:alert and no distress     EYES: EOMI, PERRL     HENT: ear canals and TM's normal and nose and mouth without ulcers or lesions     NECK: no adenopathy, no asymmetry, masses, or scars and thyroid normal to palpation     RESP: lungs clear to auscultation - no rales, rhonchi or wheezes     CV: regular rates and rhythm, normal S1 S2, no S3 or S4 and no murmur, click or rub     MS: extremities normal- no gross deformities noted, no evidence of inflammation in joints, FROM in all extremities.     SKIN: no suspicious lesions or rashes     NEURO: Normal strength and tone, sensory exam grossly normal, mentation intact and speech normal     PSYCH: mentation appears normal. and affect normal/bright    Recent Labs   Lab Test 12/21/21  1150 04/29/21  1003   HGB 14.2 13.7    254    139   POTASSIUM 4.3 4.2   CR 0.90 0.97    "     Diagnostics:  Recent Results (from the past 24 hour(s))   Basic metabolic panel    Collection Time: 06/09/22 11:36 AM   Result Value Ref Range    Sodium 141 133 - 144 mmol/L    Potassium 4.3 3.4 - 5.3 mmol/L    Chloride 111 (H) 94 - 109 mmol/L    Carbon Dioxide (CO2) 24 20 - 32 mmol/L    Anion Gap 6 3 - 14 mmol/L    Urea Nitrogen 21 7 - 30 mg/dL    Creatinine 0.78 0.52 - 1.04 mg/dL    Calcium 9.0 8.5 - 10.1 mg/dL    Glucose 104 (H) 70 - 99 mg/dL    GFR Estimate 82 >60 mL/min/1.73m2   CBC with platelets and differential    Collection Time: 06/09/22 11:36 AM   Result Value Ref Range    WBC Count 10.1 4.0 - 11.0 10e3/uL    RBC Count 4.16 3.80 - 5.20 10e6/uL    Hemoglobin 13.7 11.7 - 15.7 g/dL    Hematocrit 39.5 35.0 - 47.0 %    MCV 95 78 - 100 fL    MCH 32.9 26.5 - 33.0 pg    MCHC 34.7 31.5 - 36.5 g/dL    RDW 12.7 10.0 - 15.0 %    Platelet Count 230 150 - 450 10e3/uL    % Neutrophils 79 %    % Lymphocytes 11 %    % Monocytes 9 %    % Eosinophils 1 %    % Basophils 0 %    Absolute Neutrophils 8.0 1.6 - 8.3 10e3/uL    Absolute Lymphocytes 1.1 0.8 - 5.3 10e3/uL    Absolute Monocytes 0.9 0.0 - 1.3 10e3/uL    Absolute Eosinophils 0.1 0.0 - 0.7 10e3/uL    Absolute Basophils 0.0 0.0 - 0.2 10e3/uL      EKG: appears normal, NSR    Revised Cardiac Risk Index (RCRI):  The patient has the following serious cardiovascular risks for perioperative complications:   - No serious cardiac risks = 0 points     RCRI Interpretation: 0 points: Class I (very low risk - 0.4% complication rate)           Signed Electronically by: Valente Deluca DO  Copy of this evaluation report is provided to requesting physician.

## 2022-06-09 NOTE — TELEPHONE ENCOUNTER
Prilosec      Last Written Prescription Date:  2.22.2022  Last Fill Quantity: 90  # refills: 1  Last Office Visit: 6.9.2022  Future Office visit:       Routing refill request to provider for review/approval because:    PPI Protocol Failed 06/08/2022 10:42 AM   Protocol Details  No diagnosis of osteoporosis on record     Susie Zhou RN

## 2022-06-10 DIAGNOSIS — H60.392 INFECTIVE OTITIS EXTERNA, LEFT: ICD-10-CM

## 2022-06-10 RX ORDER — OMEPRAZOLE 40 MG/1
40 CAPSULE, DELAYED RELEASE ORAL DAILY
Qty: 90 CAPSULE | Refills: 3 | Status: SHIPPED | OUTPATIENT
Start: 2022-06-10 | End: 2023-01-26

## 2022-06-13 DIAGNOSIS — J45.40 MODERATE PERSISTENT ASTHMA WITHOUT COMPLICATION: ICD-10-CM

## 2022-06-13 RX ORDER — CIPROFLOXACIN 0.5 MG/.25ML
0.25 SOLUTION/ DROPS AURICULAR (OTIC) 2 TIMES DAILY
Qty: 10 ML | Refills: 0 | Status: SHIPPED | OUTPATIENT
Start: 2022-06-13 | End: 2023-01-05

## 2022-06-13 NOTE — TELEPHONE ENCOUNTER
Cipro Ear Drops      Last Written Prescription Date:  12.16.19  Last Fill Quantity: #10,   # refills: 0  Last Office Visit: 6.9.22  Future Office visit:       Routing refill request to provider for review/approval because:  Drug not on the [facility], P or [facility] refill protocol or controlled substance

## 2022-06-15 RX ORDER — FLUTICASONE PROPIONATE AND SALMETEROL 250; 50 UG/1; UG/1
POWDER RESPIRATORY (INHALATION)
Qty: 1 EACH | Refills: 3 | Status: SHIPPED | OUTPATIENT
Start: 2022-06-15 | End: 2022-12-20

## 2022-06-15 NOTE — TELEPHONE ENCOUNTER
wixela       Last Written Prescription Date:  10-29-21  Last Fill Quantity: 1,   # refills: 1  Last Office Visit: 6-9-22  Future Office visit:

## 2022-06-29 ENCOUNTER — TRANSFERRED RECORDS (OUTPATIENT)
Dept: HEALTH INFORMATION MANAGEMENT | Facility: CLINIC | Age: 67
End: 2022-06-29

## 2022-07-07 ENCOUNTER — TELEPHONE (OUTPATIENT)
Dept: INTERNAL MEDICINE | Facility: OTHER | Age: 67
End: 2022-07-07

## 2022-07-07 NOTE — TELEPHONE ENCOUNTER
12:01 PM    Reason for Call: OVERBOOK    Patient is having surgery on 07/20/2022 and needs a pre-op. Please call patient to schedule.     The patient is requesting an appointment for ASAP with any provider.    Was an appointment offered for this call? Yes  If yes : Appointment type              Date 07/19/2022- TOO CLOSE TO SURGERY    Preferred method for responding to this message: Telephone Call  What is your phone number ? [phone]    If we cannot reach you directly, may we leave a detailed response at the number you provided? Yes    Can this message wait until your PCP/provider returns, if unavailable today? No, COVERING PROVIDER PLEASE ADDRESS    Mercedes Hernandez

## 2022-07-08 DIAGNOSIS — E78.1 HYPERTRIGLYCERIDEMIA: ICD-10-CM

## 2022-07-11 RX ORDER — ATORVASTATIN CALCIUM 20 MG/1
20 TABLET, FILM COATED ORAL DAILY
Qty: 30 TABLET | Refills: 3 | Status: SHIPPED | OUTPATIENT
Start: 2022-07-11 | End: 2023-01-26

## 2022-07-11 NOTE — TELEPHONE ENCOUNTER
Only opening I was able to find sooner than scheduled appt is with Manuel Franklyn on 07/14/2022 at 1:30pm. This is blocked due to Manuel already having a pre-op in the afternoon. Is there anyway to overbook this spot as recommended by Dr Deluca? Please reach out to patient to schedule if allowed.

## 2022-07-11 NOTE — TELEPHONE ENCOUNTER
Atorvastatin       Last Written Prescription Date:  4/13/22  Last Fill Quantity: 30,   # refills: 3  Last Office Visit: 6/9/22  Future Office visit:    Next 5 appointments (look out 90 days)    Jul 19, 2022  3:30 PM  (Arrive by 3:15 PM)  Pre-Op physical with Valente Deluca DO  [facility] ([facility] ) [address]  [facility] [address] [phone]           Routing refill request to provider for review/approval because:

## 2022-07-13 ENCOUNTER — MYC MEDICAL ADVICE (OUTPATIENT)
Dept: INTERNAL MEDICINE | Facility: OTHER | Age: 67
End: 2022-07-13

## 2022-07-13 ENCOUNTER — ANESTHESIA EVENT (OUTPATIENT)
Dept: SURGERY | Facility: HOSPITAL | Age: 67
End: 2022-07-13
Payer: COMMERCIAL

## 2022-07-13 NOTE — ANESTHESIA PREPROCEDURE EVALUATION
Anesthesia Pre-Procedure Evaluation    Patient: Chyna Delgado   MRN: [id number] : 1955        Procedure : Procedure(s):  Right Hip Hardware Remova(Cannulated Screws)          Past Medical History:   Diagnosis Date     Abnormal mammogram, unspecified 2003    Normal pathology     Back Pain 02/10/2000    Sacroiliac pain     Benign neoplasm of colon 03/10/2000     Benign paroxysmal positional vertigo 2012     Depressive disorder, not elsewhere classified 02/10/2004     Diarrhea 12/15/2010    Secondary to colectomy for familial polyposis     Gastric polyp 2015, 2017    recurrent      History of normal mammogram 2014, 2019     Idiopathic osteoporosis 12/15/2010     Interstitial emphysema (H) 12/15/2010     menopausal or female climacteric states 1999     Mixed hyperlipidemia 12/15/2010     Other bursitis disorders 2011    Greater trochanteric     Other forms of retinal detachment(361.89) 12/15/2010     Other malaise and fatigue 01/10/2003     Personal history of tobacco use, presenting hazards to health 12/15/2010     Polymyalgia rheumatica (H)      Polyposis of colon      Restless legs syndrome (RLS) 12/15/2010     Rotator cuff tendonitis      Rotator cuff tendonitis      Sacroiliitis, not elsewhere classified (H) 12/15/2010    multiple sites     Tobacco use disorder 2012     Unspecified asthma(493.90) 12/15/2010      Past Surgical History:   Procedure Laterality Date     benign tumors removed from back       CLOSED REDUCTION, PERCUTANEOUS PINNING HIP Right 2019    Procedure: Percutaneous Screw Fixation of Right Hip Fracture;  Surgeon: Amrik Kolb DO;  Location: [facility]     COLON SURGERY N/A     HX: Total colectomy with J-pouch reconstruction.      ENDOSCOPY UPPER, COLONOSCOPY, COMBINED N/A 2014    Procedure: COMBINED ENDOSCOPY UPPER, COLONOSCOPY;  Surgeon: Delbert Patel MD;  Location: [facility]     ENDOSCOPY UPPER, COLONOSCOPY, COMBINED N/A  5/9/2019    Procedure: UPPER ENDOSCOPY  WITH BIOPSIES AND  COLONOSCOPY WITH BIOPSIES;  Surgeon: Tai Diaz MD;  Location: [facility]     ESOPHAGOSCOPY, GASTROSCOPY, DUODENOSCOPY (EGD), COMBINED N/A 12/11/2015    Procedure: COMBINED ESOPHAGOSCOPY, GASTROSCOPY, DUODENOSCOPY (EGD);  Surgeon: Delbert Patel MD;  Location: [facility]     ESOPHAGOSCOPY, GASTROSCOPY, DUODENOSCOPY (EGD), COMBINED N/A 12/18/2017    Procedure: COMBINED ESOPHAGOSCOPY, GASTROSCOPY, DUODENOSCOPY (EGD);  UPPER ENDOSCOPY WITH BIOPSY;  Surgeon: Delbert Patel MD;  Location: [facility]     excised-benign  2002    tongue lesion     HC REPAIR OF NASAL SEPTUM  2002    Deviated nasal septum     LAPAROSCOPIC CHOLECYSTECTOMY       lumpectomy Right breast      Breast Lump     MOUTH SURGERY      removal of spot from roof of mouth     pilonidal cyst surgery  1976     removal of colon and large intestine  2000    Familial polyposis     Right etopic pregnancy      Pregnancy     TONSILLECTOMY      tonsillitus     UPPER GI ENDOSCOPY  2009    Stomach polyps      Allergies   Allergen Reactions     Codeine Swelling     Throat swelling-Patient can tolerate Hydrocodone & morphine     Nortriptyline Other (See Comments)     Crying       Social History     Tobacco Use     Smoking status: Former Smoker     Packs/day: 0.50     Years: 40.00     Pack years: 20.00     Types: Cigarettes     Smokeless tobacco: Never Used   Substance Use Topics     Alcohol use: Not Currently     Comment: Weekly 3 drinks      Wt Readings from Last 1 Encounters:   06/09/22 57.2 kg (126 lb)        Anesthesia Evaluation   Pt has had prior anesthetic. Type: General and MAC.    No history of anesthetic complications       ROS/MED HX  ENT/Pulmonary:     (+) TWYLA risk factors, snores loudly, tobacco use (1/2ppd x 50 years), Current use, Mild Persistent, asthma Treatment: Inhaler daily,      Neurologic: Comment: RLS       Cardiovascular:     (+) Dyslipidemia -----[place]. Previous cardiac testing   Echo: Date:  "Results:    Stress Test: Date: Results:    ECG Reviewed: Date: Results:    EKG: appears normal, NSR, unchanged from previous tracings  Cath: Date: Results:      METS/Exercise Tolerance: 1 - Eating, dressing    Hematologic:     (+) anemia,     Musculoskeletal: Comment: Gout  polymyalgia rheumatica  Patient right  Hip pain \"a good 10\", been bothering her for one year  (+) arthritis,     GI/Hepatic: Comment: S/p colon resection with j pouch 2000    (+) GERD, Asymptomatic on medication,     Renal/Genitourinary: Comment: Right pelvic kidney functions fine per patient      Endo:     (+) thyroid problem, hypothyroidism, Chronic steroid usage (polymyalgia) for Other. Date most recently used: took today 5 mg, everyday for many years.     Psychiatric/Substance Use:     (+) psychiatric history depression alcohol abuse     Infectious Disease:  - neg infectious disease ROS     Malignancy:   (+) Malignancy, History of GI and Other.GI CA status post Surgery.  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    Other:      (+) , H/O Chronic Pain,other significant disability Other (comment) (uses cane),          Physical Exam    Airway      Comment: Mouth narrow    Mallampati: III   TM distance: > 3 FB   Neck ROM: full   Mouth opening: > 3 cm    Respiratory Devices and Support         Dental  no notable dental history         Cardiovascular   cardiovascular exam normal       Rhythm and rate: regular and normal     Pulmonary   pulmonary exam normal        breath sounds clear to auscultation           OUTSIDE LABS:  CBC:   Lab Results   Component Value Date    WBC 10.1 06/09/2022    WBC 12.7 (H) 12/21/2021    HGB 13.7 06/09/2022    HGB 14.2 12/21/2021    HCT 39.5 06/09/2022    HCT 42.4 12/21/2021     06/09/2022     12/21/2021     BMP:   Lab Results   Component Value Date     06/09/2022     12/21/2021    POTASSIUM 4.3 06/09/2022    POTASSIUM 4.3 12/21/2021    CHLORIDE 111 (H) 06/09/2022    CHLORIDE " 106 12/21/2021    CO2 24 06/09/2022    CO2 27 12/21/2021    BUN 21 06/09/2022    BUN 17 12/21/2021    CR 0.78 06/09/2022    CR 0.90 12/21/2021     (H) 06/09/2022     (H) 12/21/2021     COAGS:   Lab Results   Component Value Date    INR 1.09 02/23/2019     POC:   Lab Results   Component Value Date     (H) 02/25/2019     HEPATIC:   Lab Results   Component Value Date    ALBUMIN 4.1 12/21/2021    PROTTOTAL 7.5 12/21/2021    ALT 47 12/21/2021    AST 17 12/21/2021    ALKPHOS 79 12/21/2021    BILITOTAL 0.3 12/21/2021     OTHER:   Lab Results   Component Value Date    A1C 5.6 08/20/2015    СВЕТЛАНА 9.0 06/09/2022    PHOS 3.4 08/01/2019    MAG 2.2 08/01/2019    TSH 0.74 02/05/2019    T4 0.96 08/08/2018    T3 95 08/08/2018    CRP 9.7 (H) 09/09/2020    SED 7 09/09/2020       Anesthesia Plan    ASA Status:  3   NPO Status:  NPO Appropriate    Anesthesia Type: Spinal.   Induction: Intravenous.   Maintenance: Balanced.        Consents    Anesthesia Plan(s) and associated risks, benefits, and realistic alternatives discussed. Questions answered and patient/representative(s) expressed understanding.     - Discussed: Risks, Benefits and Alternatives for BOTH SEDATION and the PROCEDURE were discussed     - Discussed with:  Patient         Postoperative Care       PONV prophylaxis: Ondansetron (or other 5HT-3)     Comments:    Other Comments: HP 7-15-22  Per surgeon less than one hour, 2% CHLORO spinal ok with surgeon  Discussed risks and benefits of spinal anesthetic with patient including itching, sore back, infection, hematoma, spinal headache, CV complications, nerve damage, inability to place, conversion to general anesthesia, loss of airway, and death. Pt wishes to proceed.             Will Yepez, MAIDA CRNA

## 2022-07-13 NOTE — TELEPHONE ENCOUNTER
Called pt and informed of no openings. Advised pt that we will keep an eye on schedules for any cancels and keep appt for what we have right now and hope something opens up sooner

## 2022-07-15 ENCOUNTER — OFFICE VISIT (OUTPATIENT)
Dept: FAMILY MEDICINE | Facility: OTHER | Age: 67
End: 2022-07-15
Attending: INTERNAL MEDICINE
Payer: COMMERCIAL

## 2022-07-15 VITALS
TEMPERATURE: 97.9 F | BODY MASS INDEX: 22.28 KG/M2 | HEART RATE: 88 BPM | RESPIRATION RATE: 20 BRPM | SYSTOLIC BLOOD PRESSURE: 121 MMHG | OXYGEN SATURATION: 96 % | WEIGHT: 121.8 LBS | DIASTOLIC BLOOD PRESSURE: 72 MMHG

## 2022-07-15 DIAGNOSIS — J45.30 MILD PERSISTENT ASTHMA WITHOUT COMPLICATION: ICD-10-CM

## 2022-07-15 DIAGNOSIS — Z01.818 PRE-OP EXAM: Primary | ICD-10-CM

## 2022-07-15 DIAGNOSIS — Z01.818 PREOP GENERAL PHYSICAL EXAM: ICD-10-CM

## 2022-07-15 DIAGNOSIS — M25.551 HIP PAIN, RIGHT: ICD-10-CM

## 2022-07-15 DIAGNOSIS — E03.8 OTHER SPECIFIED HYPOTHYROIDISM: ICD-10-CM

## 2022-07-15 DIAGNOSIS — E78.5 HYPERLIPIDEMIA LDL GOAL <100: ICD-10-CM

## 2022-07-15 DIAGNOSIS — Z12.31 ENCOUNTER FOR SCREENING MAMMOGRAM FOR MALIGNANT NEOPLASM OF BREAST: ICD-10-CM

## 2022-07-15 PROBLEM — S72.001A CLOSED RIGHT HIP FRACTURE (H): Status: RESOLVED | Noted: 2019-02-23 | Resolved: 2022-07-15

## 2022-07-15 PROBLEM — Z78.9 ALCOHOL USE: Status: RESOLVED | Noted: 2018-04-18 | Resolved: 2022-07-15

## 2022-07-15 PROBLEM — F10.90 ALCOHOL USE: Status: RESOLVED | Noted: 2018-04-18 | Resolved: 2022-07-15

## 2022-07-15 LAB
ALBUMIN SERPL-MCNC: 3.9 G/DL (ref 3.4–5)
ALBUMIN UR-MCNC: NEGATIVE MG/DL
ALP SERPL-CCNC: 67 U/L (ref 40–150)
ALT SERPL W P-5'-P-CCNC: 31 U/L (ref 0–50)
ANION GAP SERPL CALCULATED.3IONS-SCNC: 6 MMOL/L (ref 3–14)
APPEARANCE UR: CLEAR
AST SERPL W P-5'-P-CCNC: 21 U/L (ref 0–45)
BASOPHILS # BLD AUTO: 0 10E3/UL (ref 0–0.2)
BASOPHILS NFR BLD AUTO: 0 %
BILIRUB SERPL-MCNC: 0.4 MG/DL (ref 0.2–1.3)
BILIRUB UR QL STRIP: NEGATIVE
BUN SERPL-MCNC: 18 MG/DL (ref 7–30)
CALCIUM SERPL-MCNC: 9.2 MG/DL (ref 8.5–10.1)
CHLORIDE BLD-SCNC: 105 MMOL/L (ref 94–109)
CHOLEST SERPL-MCNC: 168 MG/DL
CO2 SERPL-SCNC: 26 MMOL/L (ref 20–32)
COLOR UR AUTO: YELLOW
CREAT SERPL-MCNC: 0.96 MG/DL (ref 0.52–1.04)
EOSINOPHIL # BLD AUTO: 0.1 10E3/UL (ref 0–0.7)
EOSINOPHIL NFR BLD AUTO: 1 %
ERYTHROCYTE [DISTWIDTH] IN BLOOD BY AUTOMATED COUNT: 12.6 % (ref 10–15)
GFR SERPL CREATININE-BSD FRML MDRD: 64 ML/MIN/1.73M2
GLUCOSE BLD-MCNC: 107 MG/DL (ref 70–99)
GLUCOSE UR STRIP-MCNC: NEGATIVE MG/DL
HCT VFR BLD AUTO: 41.7 % (ref 35–47)
HDLC SERPL-MCNC: 44 MG/DL
HGB BLD-MCNC: 14.5 G/DL (ref 11.7–15.7)
HGB UR QL STRIP: NEGATIVE
KETONES UR STRIP-MCNC: ABNORMAL MG/DL
LDLC SERPL CALC-MCNC: 77 MG/DL
LEUKOCYTE ESTERASE UR QL STRIP: NEGATIVE
LYMPHOCYTES # BLD AUTO: 1.8 10E3/UL (ref 0.8–5.3)
LYMPHOCYTES NFR BLD AUTO: 15 %
MCH RBC QN AUTO: 32.2 PG (ref 26.5–33)
MCHC RBC AUTO-ENTMCNC: 34.8 G/DL (ref 31.5–36.5)
MCV RBC AUTO: 93 FL (ref 78–100)
MONOCYTES # BLD AUTO: 1.1 10E3/UL (ref 0–1.3)
MONOCYTES NFR BLD AUTO: 9 %
NEUTROPHILS # BLD AUTO: 8.9 10E3/UL (ref 1.6–8.3)
NEUTROPHILS NFR BLD AUTO: 75 %
NITRATE UR QL: NEGATIVE
NONHDLC SERPL-MCNC: 124 MG/DL
PH UR STRIP: 5.5 [PH] (ref 5–7)
PLATELET # BLD AUTO: 219 10E3/UL (ref 150–450)
POTASSIUM BLD-SCNC: 3.9 MMOL/L (ref 3.4–5.3)
PROT SERPL-MCNC: 7.6 G/DL (ref 6.8–8.8)
RBC # BLD AUTO: 4.51 10E6/UL (ref 3.8–5.2)
SODIUM SERPL-SCNC: 137 MMOL/L (ref 133–144)
SP GR UR STRIP: 1.02 (ref 1–1.03)
TRIGL SERPL-MCNC: 233 MG/DL
TSH SERPL DL<=0.005 MIU/L-ACNC: 0.89 MU/L (ref 0.4–4)
UROBILINOGEN UR STRIP-ACNC: 0.2 E.U./DL
WBC # BLD AUTO: 11.9 10E3/UL (ref 4–11)

## 2022-07-15 PROCEDURE — 93005 ELECTROCARDIOGRAM TRACING: CPT | Performed by: NURSE PRACTITIONER

## 2022-07-15 PROCEDURE — 82040 ASSAY OF SERUM ALBUMIN: CPT | Mod: ZL | Performed by: NURSE PRACTITIONER

## 2022-07-15 PROCEDURE — 81003 URINALYSIS AUTO W/O SCOPE: CPT | Mod: ZL | Performed by: NURSE PRACTITIONER

## 2022-07-15 PROCEDURE — 93010 ELECTROCARDIOGRAM REPORT: CPT | Performed by: INTERNAL MEDICINE

## 2022-07-15 PROCEDURE — G0463 HOSPITAL OUTPT CLINIC VISIT: HCPCS

## 2022-07-15 PROCEDURE — 80053 COMPREHEN METABOLIC PANEL: CPT | Mod: ZL | Performed by: NURSE PRACTITIONER

## 2022-07-15 PROCEDURE — 99215 OFFICE O/P EST HI 40 MIN: CPT | Performed by: NURSE PRACTITIONER

## 2022-07-15 PROCEDURE — U0005 INFEC AGEN DETEC AMPLI PROBE: HCPCS | Mod: ZL | Performed by: NURSE PRACTITIONER

## 2022-07-15 PROCEDURE — 84443 ASSAY THYROID STIM HORMONE: CPT | Mod: ZL | Performed by: NURSE PRACTITIONER

## 2022-07-15 PROCEDURE — G0463 HOSPITAL OUTPT CLINIC VISIT: HCPCS | Mod: 25

## 2022-07-15 PROCEDURE — 80061 LIPID PANEL: CPT | Mod: ZL | Performed by: NURSE PRACTITIONER

## 2022-07-15 PROCEDURE — 36415 COLL VENOUS BLD VENIPUNCTURE: CPT | Mod: ZL | Performed by: NURSE PRACTITIONER

## 2022-07-15 PROCEDURE — 85025 COMPLETE CBC W/AUTO DIFF WBC: CPT | Mod: ZL | Performed by: NURSE PRACTITIONER

## 2022-07-15 ASSESSMENT — ANXIETY QUESTIONNAIRES
IF YOU CHECKED OFF ANY PROBLEMS ON THIS QUESTIONNAIRE, HOW DIFFICULT HAVE THESE PROBLEMS MADE IT FOR YOU TO DO YOUR WORK, TAKE CARE OF THINGS AT HOME, OR GET ALONG WITH OTHER PEOPLE: NOT DIFFICULT AT ALL
1. FEELING NERVOUS, ANXIOUS, OR ON EDGE: NOT AT ALL
6. BECOMING EASILY ANNOYED OR IRRITABLE: NOT AT ALL
GAD7 TOTAL SCORE: 0
5. BEING SO RESTLESS THAT IT IS HARD TO SIT STILL: NOT AT ALL
GAD7 TOTAL SCORE: 0
7. FEELING AFRAID AS IF SOMETHING AWFUL MIGHT HAPPEN: NOT AT ALL
3. WORRYING TOO MUCH ABOUT DIFFERENT THINGS: NOT AT ALL
2. NOT BEING ABLE TO STOP OR CONTROL WORRYING: NOT AT ALL

## 2022-07-15 ASSESSMENT — PATIENT HEALTH QUESTIONNAIRE - PHQ9
5. POOR APPETITE OR OVEREATING: NOT AT ALL
SUM OF ALL RESPONSES TO PHQ QUESTIONS 1-9: 0

## 2022-07-15 ASSESSMENT — ASTHMA QUESTIONNAIRES: ACT_TOTALSCORE: 22

## 2022-07-15 ASSESSMENT — PAIN SCALES - GENERAL: PAINLEVEL: MODERATE PAIN (4)

## 2022-07-15 NOTE — LETTER
My Asthma Action Plan    Name: Chyna Delgado   YOB: 1955  Date: 7/15/2022   My doctor: Renee Henson CNP   My clinic: [facility]        My Rescue Medicine:   Albuterol inhaler (Proair/Ventolin/Proventil HFA)  2-4 puffs EVERY 4 HOURS as needed. Use a spacer if recommended by your provider.   My Asthma Severity:   Intermittent / Exercise Induced  Know your asthma triggers: pollens  exercise or sports          GREEN ZONE   Good Control    I feel good    No cough or wheeze    Can work, sleep and play without asthma symptoms       Take your asthma control medicine every day.     1. If exercise triggers your asthma, take your rescue medication    15 minutes before exercise or sports, and    During exercise if you have asthma symptoms  2. Spacer to use with inhaler: If you have a spacer, make sure to use it with your inhaler             YELLOW ZONE Getting Worse  I have ANY of these:    I do not feel good    Cough or wheeze    Chest feels tight    Wake up at night   1. Keep taking your Green Zone medications  2. Start taking your rescue medicine:    every 20 minutes for up to 1 hour. Then every 4 hours for 24-48 hours.  3. If you stay in the Yellow Zone for more than 12-24 hours, contact your doctor.  4. If you do not return to the Green Zone in 12-24 hours or you get worse, start taking your oral steroid medicine if prescribed by your provider.           RED ZONE Medical Alert - Get Help  I have ANY of these:    I feel awful    Medicine is not helping    Breathing getting harder    Trouble walking or talking    Nose opens wide to breathe       1. Take your rescue medicine NOW  2. If your provider has prescribed an oral steroid medicine, start taking it NOW  3. Call your doctor NOW  4. If you are still in the Red Zone after 20 minutes and you have not reached your doctor:    Take your rescue medicine again and    Call 911 or go to the emergency room right away    See your regular  doctor within 2 weeks of an Emergency Room or Urgent Care visit for follow-up treatment.          Annual Reminders:  Meet with Asthma Educator,  Flu Shot in the Fall, consider Pneumonia Vaccination for patients with asthma (aged 19 and older).    Pharmacy: [facility]  [id number]    Electronically signed by Renee Henson CNP   Date: 07/15/22                    Asthma Triggers  How To Control Things That Make Your Asthma Worse    Triggers are things that make your asthma worse.  Look at the list below to help you find your triggers and   what you can do about them. You can help prevent asthma flare-ups by staying away from your triggers.      Trigger                                                          What you can do   Cigarette Smoke  Tobacco smoke can make asthma worse. Do not allow smoking in your home, car or around you.  Be sure no one smokes at a child s day care or school.  If you smoke, ask your health care provider for ways to help you quit.  Ask family members to quit too.  Ask your health care provider for a referral to Quit Plan to help you quit smoking, or call 0-796-686-PLAN.     Colds, Flu, Bronchitis  These are common triggers of asthma. Wash your hands often.  Don t touch your eyes, nose or mouth.  Get a flu shot every year.     Dust Mites  These are tiny bugs that live in cloth or carpet. They are too small to see. Wash sheets and blankets in hot water every week.   Encase pillows and mattress in dust mite proof covers.  Avoid having carpet if you can. If you have carpet, vacuum weekly.   Use a dust mask and HEPA vacuum.   Pollen and Outdoor Mold  Some people are allergic to trees, grass, or weed pollen, or molds. Try to keep your windows closed.  Limit time out doors when pollen count is high.   Ask you health care provider about taking medicine during allergy season.     Animal Dander  Some people are allergic to skin flakes, urine or saliva from pets with fur or  feathers. Keep pets with fur or feathers out of your home.    If you can t keep the pet outdoors, then keep the pet out of your bedroom.  Keep the bedroom door closed.  Keep pets off cloth furniture and away from stuffed toys.     Mice, Rats, and Cockroaches  Some people are allergic to the waste from these pests.   Cover food and garbage.  Clean up spills and food crumbs.  Store grease in the refrigerator.   Keep food out of the bedroom.   Indoor Mold  This can be a trigger if your home has high moisture. Fix leaking faucets, pipes, or other sources of water.   Clean moldy surfaces.  Dehumidify basement if it is damp and smelly.   Smoke, Strong Odors, and Sprays  These can reduce air quality. Stay away from strong odors and sprays, such as perfume, powder, hair spray, paints, smoke incense, paint, cleaning products, candles and new carpet.   Exercise or Sports  Some people with asthma have this trigger. Be active!  Ask your doctor about taking medicine before sports or exercise to prevent symptoms.    Warm up for 5-10 minutes before and after sports or exercise.     Other Triggers of Asthma  Cold air:  Cover your nose and mouth with a scarf.  Sometimes laughing or crying can be a trigger.  Some medicines and food can trigger asthma.

## 2022-07-15 NOTE — NURSING NOTE
"Chief Complaint   Patient presents with     Pre-Op Exam       Initial /72 (BP Location: Left arm, Patient Position: Sitting, Cuff Size: Adult Regular)   Pulse 88   Temp 97.9  F (36.6  C) (Tympanic)   Resp 20   Wt 55.2 kg (121 lb 12.8 oz)   SpO2 96%   BMI 22.28 kg/m   Estimated body mass index is 22.28 kg/m  as calculated from the following:    Height as of 6/9/22: 1.575 m (5' 2\").    Weight as of this encounter: 55.2 kg (121 lb 12.8 oz).  Medication Reconciliation: complete  Gracia Beck LPN  "

## 2022-07-15 NOTE — H&P (VIEW-ONLY)
[facility]  [address]  [facility] [address]  Phone: [phone]  Primary Provider: Valente Deluca  Pre-op Performing Provider: CARROLL BISWAS      PREOPERATIVE EVALUATION:  Today's date: 7/15/2022    Chyna Delgado is a 66 year old female who presents for a preoperative evaluation.    Surgical Information:  Surgery/Procedure: Right hip hardware removal  Surgery Location: [facility]  Surgeon: Dr. Marcial  Surgery Date: 07/20/2022  Time of Surgery: TBD  Where patient plans to recover: At home with family  Fax number for surgical facility: Note does not need to be faxed, will be available electronically in Epic.    Type of Anesthesia Anticipated: to be determined          HPI related to upcoming procedure:     Right Hip Fracture 2/22/19  Hardware placed  Due for hardware remove      Preop Questions 7/15/2022   1. Have you ever had a heart attack or stroke? No   2. Have you ever had surgery on your heart or blood vessels, such as a stent placement, a coronary artery bypass, or surgery on an artery in your head, neck, heart, or legs? No   3. Do you have chest pain with activity? No   4. Do you have a history of  heart failure? No   5. Do you currently have a cold, bronchitis or symptoms of other infection? No   6. Do you have a cough, shortness of breath, or wheezing? YES - chronic asthma   7. Do you or anyone in your family have previous history of blood clots? No   8. Do you or does anyone in your family have a serious bleeding problem such as prolonged bleeding following surgeries or cuts? No   9. Have you ever had problems with anemia or been told to take iron pills? YES - stable   10. Have you had any abnormal blood loss such as black, tarry or bloody stools, or abnormal vaginal bleeding? No   11. Have you ever had a blood transfusion? YES    11a. Have you ever had a transfusion reaction? No   12. Are you willing to have a blood transfusion if it is medically  needed before, during, or after your surgery? Yes   13. Have you or any of your relatives ever had problems with anesthesia? No   14. Do you have sleep apnea, excessive snoring or daytime drowsiness? No   15. Do you have any artifical heart valves or other implanted medical devices like a pacemaker, defibrillator, or continuous glucose monitor? No   16. Do you have artificial joints? No   17. Are you allergic to latex? No         Health Care Directive:  Patient has a Health Care Directive on file        Status of Chronic Conditions:  See problem list for active medical problems.  Problems all longstanding and stable, except as noted/documented.  See ROS for pertinent symptoms related to these conditions.        Asthma Follow-Up    Was ACT completed today?    Yes    ACT Total Scores 7/15/2022   ACT TOTAL SCORE -   ASTHMA ER VISITS -   ASTHMA HOSPITALIZATIONS -   ACT TOTAL SCORE (Goal Greater than or Equal to 20) 22   In the past 12 months, how many times did you visit the emergency room for your asthma without being admitted to the hospital? 0   In the past 12 months, how many times were you hospitalized overnight because of your asthma? 0          How many days per week do you miss taking your asthma controller medication?  1    Please describe any recent triggers for your asthma: pollens    Have you had any Emergency Room Visits, Urgent Care Visits, or Hospital Admissions since your last office visit?  No        Hyperlipidemia Follow-Up    Are you regularly taking any medication or supplement to lower your cholesterol?   Yes- Lipitor    Are you having muscle aches or other side effects that you think could be caused by your cholesterol lowering medication?  No        Hypothyroidism Follow-up    Since last visit, patient describes the following symptoms: Weight stable, no hair loss, no skin changes, no constipation, no loose stools         Review of Systems  CONSTITUTIONAL: NEGATIVE for fever, chills, change in  weight  INTEGUMENTARY/SKIN: NEGATIVE for worrisome rashes, moles or lesions  EYES: NEGATIVE for vision changes or irritation  ENT/MOUTH: NEGATIVE for ear, mouth and throat problems  RESP: NEGATIVE for significant cough or SOB  CV: NEGATIVE for chest pain, palpitations or peripheral edema  GI: NEGATIVE for nausea, abdominal pain, heartburn, or change in bowel habits  : NEGATIVE for frequency, dysuria, or hematuria  MUSCULOSKELETAL:Right hip pain  NEURO: NEGATIVE for weakness, dizziness or paresthesias  ENDOCRINE: NEGATIVE for temperature intolerance, skin/hair changes  HEME: NEGATIVE for bleeding problems  PSYCHIATRIC: NEGATIVE for changes in mood or affect        Patient Active Problem List    Diagnosis Date Noted     Idiopathic gout of right hand, unspecified chronicity 09/09/2020     Priority: Medium     PMR (polymyalgia rheumatica) (H) 09/09/2020     Priority: Medium     Osteoporosis 09/03/2019     Priority: Medium     Hip fracture requiring operative repair (H) 02/25/2019     Priority: Medium     Closed fracture of neck of right femur (H) 02/23/2019     Priority: Medium     Functional diarrhea 10/26/2018     Priority: Medium     Iron deficiency 04/18/2018     Priority: Medium     Myalgia 05/30/2017     Priority: Medium     Abdominal muscle strain 05/05/2017     Priority: Medium     Strain of flexor muscle of hip, unspecified laterality, initial encounter 05/05/2017     Priority: Medium     Right shoulder pain 11/03/2016     Priority: Medium     ACP (advance care planning) 05/09/2016     Priority: Medium     Advance Care Planning 5/9/2016: ACP Review of Chart / Resources Provided:  Reviewed chart for advance care plan.  Chyna Delgado has been provided information and resources to begin or update their advance care plan.  Added by Lina PONCE Buus             Pain of toe of right foot 05/09/2016     Priority: Medium     Arthritis 02/12/2016     Priority: Medium     Graves disease 11/05/2015     Priority:  Medium     Numbness and tingling in right hand 04/20/2015     Priority: Medium     Restless legs syndrome 04/16/2014     Priority: Medium     Somatic dysfunction of pelvic region 10/08/2013     Priority: Medium     Seborrheic keratosis 07/12/2013     Priority: Medium     Imo Update utility       Mild persistent asthma 06/28/2013     Priority: Medium     Sacroiliac pain 06/28/2013     Priority: Medium     Benign neoplasm of stomach 08/16/2012     Priority: Medium     Colon polyposis 08/16/2012     Priority: Medium     Family history of colonic polyps 08/16/2012     Priority: Medium     Family history of skin cancer 08/16/2012     Priority: Medium     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     Priority: Medium     Overview:   IMO Update 10/11       Seborrheic dermatitis, unspecified 10/12/2011     Priority: Medium     Overview:   IMO Update 10/11       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     Priority: Medium     Hypothyroidism 07/07/2011     Priority: Medium      Past Medical History:   Diagnosis Date     Abnormal mammogram, unspecified 01/08/2003    Normal pathology     Back Pain 02/10/2000    Sacroiliac pain     Benign neoplasm of colon 03/10/2000     Benign paroxysmal positional vertigo 06/07/2012     Depressive disorder, not elsewhere classified 02/10/2004     Diarrhea 12/15/2010    Secondary to colectomy for familial polyposis     Gastric polyp 12/11/2015, 12/18/2017    recurrent      History of normal mammogram 07/18/2014, 1/18/2019     Idiopathic osteoporosis 12/15/2010     Interstitial emphysema (H) 12/15/2010     menopausal or female climacteric states 08/31/1999     Mixed hyperlipidemia 12/15/2010     Other bursitis disorders 02/04/2011    Greater trochanteric     Other forms of retinal detachment(361.89) 12/15/2010     Other malaise and fatigue 01/10/2003     Personal history of tobacco use, presenting hazards to health 12/15/2010     Polymyalgia rheumatica (H)      Polyposis of colon      Restless legs syndrome (RLS)  12/15/2010     Rotator cuff tendonitis      Rotator cuff tendonitis      Sacroiliitis, not elsewhere classified (H) 12/15/2010    multiple sites     Tobacco use disorder 08/22/2012     Unspecified asthma(493.90) 12/15/2010     Past Surgical History:   Procedure Laterality Date     benign tumors removed from back       CLOSED REDUCTION, PERCUTANEOUS PINNING HIP Right 2/24/2019    Procedure: Percutaneous Screw Fixation of Right Hip Fracture;  Surgeon: Amrik Kolb DO;  Location: [facility]     COLON SURGERY N/A     HX: Total colectomy with J-pouch reconstruction.      ENDOSCOPY UPPER, COLONOSCOPY, COMBINED N/A 9/5/2014    Procedure: COMBINED ENDOSCOPY UPPER, COLONOSCOPY;  Surgeon: Delbert Patel MD;  Location: [facility]     ENDOSCOPY UPPER, COLONOSCOPY, COMBINED N/A 5/9/2019    Procedure: UPPER ENDOSCOPY  WITH BIOPSIES AND  COLONOSCOPY WITH BIOPSIES;  Surgeon: Tai Diaz MD;  Location: [facility]     ESOPHAGOSCOPY, GASTROSCOPY, DUODENOSCOPY (EGD), COMBINED N/A 12/11/2015    Procedure: COMBINED ESOPHAGOSCOPY, GASTROSCOPY, DUODENOSCOPY (EGD);  Surgeon: Delbert Patel MD;  Location: [facility]     ESOPHAGOSCOPY, GASTROSCOPY, DUODENOSCOPY (EGD), COMBINED N/A 12/18/2017    Procedure: COMBINED ESOPHAGOSCOPY, GASTROSCOPY, DUODENOSCOPY (EGD);  UPPER ENDOSCOPY WITH BIOPSY;  Surgeon: Delbert Patel MD;  Location: [facility]     excised-benign  2002    tongue lesion     HC REPAIR OF NASAL SEPTUM  2002    Deviated nasal septum     LAPAROSCOPIC CHOLECYSTECTOMY       lumpectomy Right breast      Breast Lump     MOUTH SURGERY      removal of spot from roof of mouth     pilonidal cyst surgery  1976     removal of colon and large intestine  2000    Familial polyposis     Right etopic pregnancy      Pregnancy     TONSILLECTOMY      tonsillitus     UPPER GI ENDOSCOPY  2009    Stomach polyps     Current Outpatient Medications   Medication Sig Dispense Refill     alendronate (FOSAMAX) 70 MG tablet Take 1 tablet (70 mg) by mouth every 7 days 12  tablet 3     allopurinol (ZYLOPRIM) 100 MG tablet Take 1 tablet (100 mg) by mouth 2 times daily 180 tablet 1     atorvastatin (LIPITOR) 20 MG tablet Take 1 tablet (20 mg) by mouth daily 30 tablet 3     calcium carbonate 750 MG CHEW Take 1 tablet (750 mg) by mouth 2 times daily 180 tablet 3     Calcium Citrate-Vitamin D (CALCIUM + D PO) 1200-1000mg unit tablet chewable; one tablet with a meal orally once a day.       cetirizine (ZYRTEC) 10 MG tablet Take 1 tablet (10 mg) by mouth At Bedtime 90 tablet 1     ciprofloxacin (CETRAXAL) 0.2 % otic solution Place 0.25 mLs into the right ear 2 times daily 10 mL 0     dicyclomine (BENTYL) 10 MG capsule Take 2 capsules (20 mg) by mouth 3 times daily (before meals) 180 capsule 3     ferrous sulfate (IRON) 325 (65 FE) MG tablet Take 325 mg by mouth 2 times daily (with meals) 90 tablet 2     gabapentin (NEURONTIN) 400 MG capsule Take 3 capsules (1,200 mg) by mouth 3 times daily 270 capsule 4     GNP VITAMIN D MAXIMUM STRENGTH 50 MCG (2000 UT) tablet TAKE TWO TABLETS BY MOUTH EVERY  tablet 4     ibuprofen (ADVIL,MOTRIN) 200 MG tablet Take 200 mg by mouth every 4 hours as needed        ketotifen (ZADITOR/REFRESH ANTI-ITCH) 0.025 % SOLN ophthalmic solution PLACE 2 DROPS INTO BOTH EYES 2 TIMES DAILY 5 mL 2     loperamide (IMODIUM) 2 MG capsule Take 6 mg by mouth 2 times daily Pt states takes 3 tabs (6 mg) twice a day unless she needs to take more, depending on what she eats.       magnesium 100 MG CAPS Take by mouth 2 times daily       montelukast (SINGULAIR) 10 MG tablet Take 1 tablet (10 mg) by mouth At Bedtime 90 tablet 3     Multiple Vitamin (MULTIVITAMIN) per tablet Take 1 tablet by mouth daily Every day with food       omeprazole (PRILOSEC) 40 MG DR capsule Take 1 capsule (40 mg) by mouth daily 90 capsule 3     potassium chloride ER (KLOR-CON M) 20 MEQ CR tablet Take 1 tablet (20 mEq) by mouth 3 times daily 90 tablet 3     pramipexole (MIRAPEX) 0.125 MG tablet Take 1  tablet (0.125 mg) by mouth At Bedtime 90 tablet 0     predniSONE (DELTASONE) 5 MG tablet Take 1 tablet (5 mg) by mouth daily as needed (PMR) 30 tablet 1     PSYLLIUM PO Take 1 Tablespoonful by mouth 2 times daily        Simethicone 125 MG CAPS Take 500 mg by mouth 2 times daily       spironolactone (ALDACTONE) 50 MG tablet Take 1 tablet (50 mg) by mouth 2 times daily 180 tablet 1     VENTOLIN  (90 Base) MCG/ACT inhaler Inhale 2 puffs into the lungs every 4 hours as needed for shortness of breath / dyspnea or wheezing 18 g 2     vitamin E 200 UNIT capsule Take 200 Units by mouth 2 times daily       WIXELA INHUB 250-50 MCG/ACT inhaler Inhale 1 puff into the lungs 2 times daily 1 each 3       Allergies   Allergen Reactions     Codeine Swelling     Throat swelling-Patient can tolerate Hydrocodone & morphine     Nortriptyline Other (See Comments)     Crying         Social History     Tobacco Use     Smoking status: Former Smoker     Packs/day: 0.50     Years: 40.00     Pack years: 20.00     Types: Cigarettes     Smokeless tobacco: Never Used   Substance Use Topics     Alcohol use: Not Currently     Comment: Weekly 3 drinks     Family History   Problem Relation Age of Onset     Other - See Comments Father         Atrial Fibrillation     Cancer Father         bladder ca     Colon Polyps Father      Heart Disease Father         Pacemaker     Rheumatoid Arthritis Father      C.A.D. Father 75        CABG     Chronic Obstructive Pulmonary Disease Mother      Heart Disease Mother         Pacemaker     Other - See Comments Mother         dementia     C.A.D. Mother 70        CABG     C.A.D. Maternal Grandmother      Unknown/Adopted Maternal Grandfather      Unknown/Adopted Paternal Grandmother      Unknown/Adopted Paternal Grandfather      Asthma Sister      Cerebrovascular Disease Sister      Gastrointestinal Disease Sister         peptic ulcers     Hypertension Sister      Gastrointestinal Disease Sister         stomach  tumors     Rheumatoid Arthritis Sister      Cancer Sister         facial cancer     Asthma Sister      Cancer Maternal Aunt         renal ca     History   Drug Use No           Objective     /72 (BP Location: Left arm, Patient Position: Sitting, Cuff Size: Adult Regular)   Pulse 88   Temp 97.9  F (36.6  C) (Tympanic)   Resp 20   Wt 55.2 kg (121 lb 12.8 oz)   SpO2 96%   BMI 22.28 kg/m           Physical Exam    GENERAL APPEARANCE: healthy, alert and no distress     EYES: EOMI, PERRL     HENT: ear canals and TM's normal and nose and mouth without ulcers or lesions     NECK: no adenopathy, no asymmetry, masses, or scars and thyroid normal to palpation     RESP: lungs clear to auscultation - no rales, rhonchi or wheezes     CV: regular rates and rhythm, normal S1 S2, no S3 or S4 and no murmur, click or rub     ABDOMEN:  soft, nontender, no HSM or masses and bowel sounds normal     MS: Right hip pain     SKIN: no suspicious lesions or rashes     NEURO: Normal strength and tone, sensory exam grossly normal, mentation intact and speech normal     PSYCH: mentation appears normal. and affect normal/bright     LYMPHATICS: No cervical adenopathy      Recent Labs   Lab Test 06/09/22  1136 12/21/21  1150   HGB 13.7 14.2    266    139   POTASSIUM 4.3 4.3   CR 0.78 0.90          Diagnostics:  Recent Results (from the past 24 hour(s))   *UA reflex to Microscopic and Culture - [facility]    Collection Time: 07/15/22 10:00 AM    Specimen: Urine, Midstream   Result Value Ref Range    Color Urine Yellow Colorless, Straw, Light Yellow, Yellow    Appearance Urine Clear Clear    Glucose Urine Negative Negative mg/dL    Bilirubin Urine Negative Negative    Ketones Urine Trace (A) Negative mg/dL    Specific Gravity Urine 1.025 1.003 - 1.035    Blood Urine Negative Negative    pH Urine 5.5 5.0 - 7.0    Protein Albumin Urine Negative Negative mg/dL    Urobilinogen Urine 0.2 0.2, 1.0 E.U./dL    Nitrite Urine  Negative Negative    Leukocyte Esterase Urine Negative Negative   Comprehensive metabolic panel    Collection Time: 07/15/22 10:00 AM   Result Value Ref Range    Sodium 137 133 - 144 mmol/L    Potassium 3.9 3.4 - 5.3 mmol/L    Chloride 105 94 - 109 mmol/L    Carbon Dioxide (CO2) 26 20 - 32 mmol/L    Anion Gap 6 3 - 14 mmol/L    Urea Nitrogen 18 7 - 30 mg/dL    Creatinine 0.96 0.52 - 1.04 mg/dL    Calcium 9.2 8.5 - 10.1 mg/dL    Glucose 107 (H) 70 - 99 mg/dL    Alkaline Phosphatase 67 40 - 150 U/L    AST 21 0 - 45 U/L    ALT 31 0 - 50 U/L    Protein Total 7.6 6.8 - 8.8 g/dL    Albumin 3.9 3.4 - 5.0 g/dL    Bilirubin Total 0.4 0.2 - 1.3 mg/dL    GFR Estimate 64 >60 mL/min/1.73m2   CBC with platelets and differential    Collection Time: 07/15/22 10:00 AM   Result Value Ref Range    WBC Count 11.9 (H) 4.0 - 11.0 10e3/uL    RBC Count 4.51 3.80 - 5.20 10e6/uL    Hemoglobin 14.5 11.7 - 15.7 g/dL    Hematocrit 41.7 35.0 - 47.0 %    MCV 93 78 - 100 fL    MCH 32.2 26.5 - 33.0 pg    MCHC 34.8 31.5 - 36.5 g/dL    RDW 12.6 10.0 - 15.0 %    Platelet Count 219 150 - 450 10e3/uL    % Neutrophils 75 %    % Lymphocytes 15 %    % Monocytes 9 %    % Eosinophils 1 %    % Basophils 0 %    Absolute Neutrophils 8.9 (H) 1.6 - 8.3 10e3/uL    Absolute Lymphocytes 1.8 0.8 - 5.3 10e3/uL    Absolute Monocytes 1.1 0.0 - 1.3 10e3/uL    Absolute Eosinophils 0.1 0.0 - 0.7 10e3/uL    Absolute Basophils 0.0 0.0 - 0.2 10e3/uL   TSH with free T4 reflex    Collection Time: 07/15/22 10:00 AM   Result Value Ref Range    TSH 0.89 0.40 - 4.00 mU/L   Lipid Profile (Chol, Trig, HDL, LDL calc)    Collection Time: 07/15/22 10:00 AM   Result Value Ref Range    Cholesterol 168 <200 mg/dL    Triglycerides 233 (H) <150 mg/dL    Direct Measure HDL 44 (L) >=50 mg/dL    LDL Cholesterol Calculated 77 <=100 mg/dL    Non HDL Cholesterol 124 <130 mg/dL          EKG: appears normal, NSR, unchanged from previous tracings    Revised Cardiac Risk Index (RCRI):  The patient has  the following serious cardiovascular risks for perioperative complications:     - No serious cardiac risks = 0 points     RCRI Interpretation: 0 points: Class I (very low risk - 0.4% complication rate)        Assessment & Plan     The proposed surgical procedure is considered LOW risk.    Pre-op exam  - UA reflex to Microscopic and Culture - [facility]  - CBC with platelets and differential  - Comprehensive metabolic panel  - Asymptomatic COVID-19 Virus (Coronavirus) by PCR; Future    Hip pain, right  - See above    Hyperlipidemia LDL goal <100  - Lipid Profile (Chol, Trig, HDL, LDL calc); Future    Other specified hypothyroidism  - TSH with free T4 reflex; Future    Mild persistent asthma without complication  - Continue plan of care    Encounter for screening mammogram for malignant neoplasm of breast  - MA SCREENING DIGITAL BILATERAL ([place]); Future         Risks and Recommendations:  The patient has the following additional risks and recommendations for perioperative complications:   - No identified additional risk factors other than previously addressed        RECOMMENDATION:  APPROVAL GIVEN to proceed with proposed procedure, without further diagnostic evaluation.          45  minutes spent on the date of the encounter doing chart review, review of outside records, review of test results, interpretation of tests, patient visit and documentation          Signed Electronically by: Renee Henson CNP  Copy of this evaluation report is provided to requesting physician.

## 2022-07-15 NOTE — PATIENT INSTRUCTIONS
Assessment & Plan       The proposed surgical procedure is considered LOW risk.    Pre-op exam  - UA reflex to Microscopic and Culture - [facility]  - CBC with platelets and differential  - Comprehensive metabolic panel  - Asymptomatic COVID-19 Virus (Coronavirus) by PCR; Future    Hip pain, right  - See above    Hyperlipidemia LDL goal <100  - Lipid Profile (Chol, Trig, HDL, LDL calc); Future    Other specified hypothyroidism  - TSH with free T4 reflex; Future    Mild persistent asthma without complication  - Continue plan of care    Encounter for screening mammogram for malignant neoplasm of breast  - MA SCREENING DIGITAL BILATERAL ([place]); Future         Risks and Recommendations:  The patient has the following additional risks and recommendations for perioperative complications:   - No identified additional risk factors other than previously addressed        RECOMMENDATION:  APPROVAL GIVEN to proceed with proposed procedure, without further diagnostic evaluation.          Preparing for Your Surgery  Getting started  A nurse will call you to review your health history and instructions. They will give you an arrival time based on your scheduled surgery time. Please be ready to share:  Your doctor's clinic name and phone number  Your medical, surgical and anesthesia history  A list of allergies and sensitivities  A list of medicines, including herbal treatments and over-the-counter drugs  Whether the patient has a legal guardian (ask how to send us the papers in advance)  Please tell us if you're pregnant--or if there's any chance you might be pregnant. Some surgeries may injure a fetus (unborn baby), so they require a pregnancy test. Surgeries that are safe for a fetus don't always need a test, and you can choose whether to have one.   If you have a child who's having surgery, please ask for a copy of Preparing for Your Child's Surgery.    Preparing for surgery  Within 30 days of surgery: Have a pre-op  exam (sometimes called an H&P, or History and Physical). This can be done at a clinic or pre-operative center.  If you're having a , you may not need this exam. Talk to your care team.  At your pre-op exam, talk to your care team about all medicines you take. If you need to stop any medicines before surgery, ask when to start taking them again.  We do this for your safety. Many medicines can make you bleed too much during surgery. Some change how well surgery (anesthesia) drugs work.  Call your insurance company to let them know you're having surgery. (If you don't have insurance, call [phone].)  Call your clinic if there's any change in your health. This includes signs of a cold or flu (sore throat, runny nose, cough, rash, fever). It also includes a scrape or scratch near the surgery site.  If you have questions on the day of surgery, call your hospital or surgery center.  COVID testing  You may need to be tested for COVID-19 before having surgery. If so, we will give you instructions.  Eating and drinking guidelines  For your safety: Unless your surgeon tells you otherwise, follow the guidelines below.  Eat and drink as usual until 8 hours before surgery. After that, no food or milk.  Drink clear liquids until 2 hours before surgery. These are liquids you can see through, like water, Gatorade and Propel Water. You may also have black coffee and tea (no cream or milk).  Nothing by mouth within 2 hours of surgery. This includes gum, candy and breath mints.  If you drink alcohol: Stop drinking it the night before surgery.  If your care team tells you to take medicine on the morning of surgery, it's okay to take it with a sip of water.  Preventing infection  Shower or bathe the night before and morning of your surgery. Follow the instructions your clinic gave you. (If no instructions, use regular soap.)  Don't shave or clip hair near your surgery site. We'll remove the hair if needed.  Don't smoke or  vape the morning of surgery. You may chew nicotine gum up to 2 hours before surgery. A nicotine patch is okay.  Note: Some surgeries require you to completely quit smoking and nicotine. Check with your surgeon.  Your care team will make every effort to keep you safe from infection. We will:  Clean our hands often with soap and water (or an alcohol-based hand rub).  Clean the skin at your surgery site with a special soap that kills germs.  Give you a special gown to keep you warm. (Cold raises the risk of infection.)  Wear special hair covers, masks, gowns and gloves during surgery.  Give antibiotic medicine, if prescribed. Not all surgeries need antibiotics.  What to bring on the day of surgery  Photo ID and insurance card  Copy of your health care directive, if you have one  Glasses and hearing aides (bring cases)  You can't wear contacts during surgery  Inhaler and eye drops, if you use them (tell us about these when you arrive)  CPAP machine or breathing device, if you use them  A few personal items, if spending the night  If you have . . .  A pacemaker, ICD (cardiac defibrillator) or other implant: Bring the ID card.  An implanted stimulator: Bring the remote control.  A legal guardian: Bring a copy of the certified (court-stamped) guardianship papers.  Please remove any jewelry, including body piercings. Leave jewelry and other valuables at home.  If you're going home the day of surgery  You must have a responsible adult drive you home. They should stay with you overnight as well.  If you don't have someone to stay with you, and you aren't safe to go home alone, we may keep you overnight. Insurance often won't pay for this.  After surgery  If it's hard to control your pain or you need more pain medicine, please call your surgeon's office.  Questions?   If you have any questions for your care team, list them here:  _________________________________________________________________________________________________________________________________________________________________________ ____________________________________ ____________________________________ ____________________________________  For informational purposes only. Not to replace the advice of your health care provider. Copyright   2003, 2019 [facility]. All rights reserved. Clinically reviewed by Ally Sadler MD. [employer] 455349 - REV 07/21.

## 2022-07-15 NOTE — PROGRESS NOTES
[facility]  [address]  [facility] [address]  Phone: [phone]  Primary Provider: Valente Deluca  Pre-op Performing Provider: CARROLL BISWAS      PREOPERATIVE EVALUATION:  Today's date: 7/15/2022    Chyna Delgado is a 66 year old female who presents for a preoperative evaluation.    Surgical Information:  Surgery/Procedure: Right hip hardware removal  Surgery Location: [facility]  Surgeon: Dr. Marcial  Surgery Date: 07/20/2022  Time of Surgery: TBD  Where patient plans to recover: At home with family  Fax number for surgical facility: Note does not need to be faxed, will be available electronically in Epic.    Type of Anesthesia Anticipated: to be determined          HPI related to upcoming procedure:     Right Hip Fracture 2/22/19  Hardware placed  Due for hardware remove      Preop Questions 7/15/2022   1. Have you ever had a heart attack or stroke? No   2. Have you ever had surgery on your heart or blood vessels, such as a stent placement, a coronary artery bypass, or surgery on an artery in your head, neck, heart, or legs? No   3. Do you have chest pain with activity? No   4. Do you have a history of  heart failure? No   5. Do you currently have a cold, bronchitis or symptoms of other infection? No   6. Do you have a cough, shortness of breath, or wheezing? YES - chronic asthma   7. Do you or anyone in your family have previous history of blood clots? No   8. Do you or does anyone in your family have a serious bleeding problem such as prolonged bleeding following surgeries or cuts? No   9. Have you ever had problems with anemia or been told to take iron pills? YES - stable   10. Have you had any abnormal blood loss such as black, tarry or bloody stools, or abnormal vaginal bleeding? No   11. Have you ever had a blood transfusion? YES    11a. Have you ever had a transfusion reaction? No   12. Are you willing to have a blood transfusion if it is medically  needed before, during, or after your surgery? Yes   13. Have you or any of your relatives ever had problems with anesthesia? No   14. Do you have sleep apnea, excessive snoring or daytime drowsiness? No   15. Do you have any artifical heart valves or other implanted medical devices like a pacemaker, defibrillator, or continuous glucose monitor? No   16. Do you have artificial joints? No   17. Are you allergic to latex? No         Health Care Directive:  Patient has a Health Care Directive on file        Status of Chronic Conditions:  See problem list for active medical problems.  Problems all longstanding and stable, except as noted/documented.  See ROS for pertinent symptoms related to these conditions.        Asthma Follow-Up    Was ACT completed today?    Yes    ACT Total Scores 7/15/2022   ACT TOTAL SCORE -   ASTHMA ER VISITS -   ASTHMA HOSPITALIZATIONS -   ACT TOTAL SCORE (Goal Greater than or Equal to 20) 22   In the past 12 months, how many times did you visit the emergency room for your asthma without being admitted to the hospital? 0   In the past 12 months, how many times were you hospitalized overnight because of your asthma? 0          How many days per week do you miss taking your asthma controller medication?  1    Please describe any recent triggers for your asthma: pollens    Have you had any Emergency Room Visits, Urgent Care Visits, or Hospital Admissions since your last office visit?  No        Hyperlipidemia Follow-Up    Are you regularly taking any medication or supplement to lower your cholesterol?   Yes- Lipitor    Are you having muscle aches or other side effects that you think could be caused by your cholesterol lowering medication?  No        Hypothyroidism Follow-up    Since last visit, patient describes the following symptoms: Weight stable, no hair loss, no skin changes, no constipation, no loose stools         Review of Systems  CONSTITUTIONAL: NEGATIVE for fever, chills, change in  weight  INTEGUMENTARY/SKIN: NEGATIVE for worrisome rashes, moles or lesions  EYES: NEGATIVE for vision changes or irritation  ENT/MOUTH: NEGATIVE for ear, mouth and throat problems  RESP: NEGATIVE for significant cough or SOB  CV: NEGATIVE for chest pain, palpitations or peripheral edema  GI: NEGATIVE for nausea, abdominal pain, heartburn, or change in bowel habits  : NEGATIVE for frequency, dysuria, or hematuria  MUSCULOSKELETAL:Right hip pain  NEURO: NEGATIVE for weakness, dizziness or paresthesias  ENDOCRINE: NEGATIVE for temperature intolerance, skin/hair changes  HEME: NEGATIVE for bleeding problems  PSYCHIATRIC: NEGATIVE for changes in mood or affect        Patient Active Problem List    Diagnosis Date Noted     Idiopathic gout of right hand, unspecified chronicity 09/09/2020     Priority: Medium     PMR (polymyalgia rheumatica) (H) 09/09/2020     Priority: Medium     Osteoporosis 09/03/2019     Priority: Medium     Hip fracture requiring operative repair (H) 02/25/2019     Priority: Medium     Closed fracture of neck of right femur (H) 02/23/2019     Priority: Medium     Functional diarrhea 10/26/2018     Priority: Medium     Iron deficiency 04/18/2018     Priority: Medium     Myalgia 05/30/2017     Priority: Medium     Abdominal muscle strain 05/05/2017     Priority: Medium     Strain of flexor muscle of hip, unspecified laterality, initial encounter 05/05/2017     Priority: Medium     Right shoulder pain 11/03/2016     Priority: Medium     ACP (advance care planning) 05/09/2016     Priority: Medium     Advance Care Planning 5/9/2016: ACP Review of Chart / Resources Provided:  Reviewed chart for advance care plan.  Chyna Delgado has been provided information and resources to begin or update their advance care plan.  Added by Lina PONCE Buus             Pain of toe of right foot 05/09/2016     Priority: Medium     Arthritis 02/12/2016     Priority: Medium     Graves disease 11/05/2015     Priority:  Medium     Numbness and tingling in right hand 04/20/2015     Priority: Medium     Restless legs syndrome 04/16/2014     Priority: Medium     Somatic dysfunction of pelvic region 10/08/2013     Priority: Medium     Seborrheic keratosis 07/12/2013     Priority: Medium     Imo Update utility       Mild persistent asthma 06/28/2013     Priority: Medium     Sacroiliac pain 06/28/2013     Priority: Medium     Benign neoplasm of stomach 08/16/2012     Priority: Medium     Colon polyposis 08/16/2012     Priority: Medium     Family history of colonic polyps 08/16/2012     Priority: Medium     Family history of skin cancer 08/16/2012     Priority: Medium     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     Priority: Medium     Overview:   IMO Update 10/11       Seborrheic dermatitis, unspecified 10/12/2011     Priority: Medium     Overview:   IMO Update 10/11       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     Priority: Medium     Hypothyroidism 07/07/2011     Priority: Medium      Past Medical History:   Diagnosis Date     Abnormal mammogram, unspecified 01/08/2003    Normal pathology     Back Pain 02/10/2000    Sacroiliac pain     Benign neoplasm of colon 03/10/2000     Benign paroxysmal positional vertigo 06/07/2012     Depressive disorder, not elsewhere classified 02/10/2004     Diarrhea 12/15/2010    Secondary to colectomy for familial polyposis     Gastric polyp 12/11/2015, 12/18/2017    recurrent      History of normal mammogram 07/18/2014, 1/18/2019     Idiopathic osteoporosis 12/15/2010     Interstitial emphysema (H) 12/15/2010     menopausal or female climacteric states 08/31/1999     Mixed hyperlipidemia 12/15/2010     Other bursitis disorders 02/04/2011    Greater trochanteric     Other forms of retinal detachment(361.89) 12/15/2010     Other malaise and fatigue 01/10/2003     Personal history of tobacco use, presenting hazards to health 12/15/2010     Polymyalgia rheumatica (H)      Polyposis of colon      Restless legs syndrome (RLS)  12/15/2010     Rotator cuff tendonitis      Rotator cuff tendonitis      Sacroiliitis, not elsewhere classified (H) 12/15/2010    multiple sites     Tobacco use disorder 08/22/2012     Unspecified asthma(493.90) 12/15/2010     Past Surgical History:   Procedure Laterality Date     benign tumors removed from back       CLOSED REDUCTION, PERCUTANEOUS PINNING HIP Right 2/24/2019    Procedure: Percutaneous Screw Fixation of Right Hip Fracture;  Surgeon: Amrik Kolb DO;  Location: [facility]     COLON SURGERY N/A     HX: Total colectomy with J-pouch reconstruction.      ENDOSCOPY UPPER, COLONOSCOPY, COMBINED N/A 9/5/2014    Procedure: COMBINED ENDOSCOPY UPPER, COLONOSCOPY;  Surgeon: Delbert Patel MD;  Location: [facility]     ENDOSCOPY UPPER, COLONOSCOPY, COMBINED N/A 5/9/2019    Procedure: UPPER ENDOSCOPY  WITH BIOPSIES AND  COLONOSCOPY WITH BIOPSIES;  Surgeon: Tai Diaz MD;  Location: [facility]     ESOPHAGOSCOPY, GASTROSCOPY, DUODENOSCOPY (EGD), COMBINED N/A 12/11/2015    Procedure: COMBINED ESOPHAGOSCOPY, GASTROSCOPY, DUODENOSCOPY (EGD);  Surgeon: Delbert Patel MD;  Location: [facility]     ESOPHAGOSCOPY, GASTROSCOPY, DUODENOSCOPY (EGD), COMBINED N/A 12/18/2017    Procedure: COMBINED ESOPHAGOSCOPY, GASTROSCOPY, DUODENOSCOPY (EGD);  UPPER ENDOSCOPY WITH BIOPSY;  Surgeon: Delbert Patel MD;  Location: [facility]     excised-benign  2002    tongue lesion     HC REPAIR OF NASAL SEPTUM  2002    Deviated nasal septum     LAPAROSCOPIC CHOLECYSTECTOMY       lumpectomy Right breast      Breast Lump     MOUTH SURGERY      removal of spot from roof of mouth     pilonidal cyst surgery  1976     removal of colon and large intestine  2000    Familial polyposis     Right etopic pregnancy      Pregnancy     TONSILLECTOMY      tonsillitus     UPPER GI ENDOSCOPY  2009    Stomach polyps     Current Outpatient Medications   Medication Sig Dispense Refill     alendronate (FOSAMAX) 70 MG tablet Take 1 tablet (70 mg) by mouth every 7 days 12  tablet 3     allopurinol (ZYLOPRIM) 100 MG tablet Take 1 tablet (100 mg) by mouth 2 times daily 180 tablet 1     atorvastatin (LIPITOR) 20 MG tablet Take 1 tablet (20 mg) by mouth daily 30 tablet 3     calcium carbonate 750 MG CHEW Take 1 tablet (750 mg) by mouth 2 times daily 180 tablet 3     Calcium Citrate-Vitamin D (CALCIUM + D PO) 1200-1000mg unit tablet chewable; one tablet with a meal orally once a day.       cetirizine (ZYRTEC) 10 MG tablet Take 1 tablet (10 mg) by mouth At Bedtime 90 tablet 1     ciprofloxacin (CETRAXAL) 0.2 % otic solution Place 0.25 mLs into the right ear 2 times daily 10 mL 0     dicyclomine (BENTYL) 10 MG capsule Take 2 capsules (20 mg) by mouth 3 times daily (before meals) 180 capsule 3     ferrous sulfate (IRON) 325 (65 FE) MG tablet Take 325 mg by mouth 2 times daily (with meals) 90 tablet 2     gabapentin (NEURONTIN) 400 MG capsule Take 3 capsules (1,200 mg) by mouth 3 times daily 270 capsule 4     GNP VITAMIN D MAXIMUM STRENGTH 50 MCG (2000 UT) tablet TAKE TWO TABLETS BY MOUTH EVERY  tablet 4     ibuprofen (ADVIL,MOTRIN) 200 MG tablet Take 200 mg by mouth every 4 hours as needed        ketotifen (ZADITOR/REFRESH ANTI-ITCH) 0.025 % SOLN ophthalmic solution PLACE 2 DROPS INTO BOTH EYES 2 TIMES DAILY 5 mL 2     loperamide (IMODIUM) 2 MG capsule Take 6 mg by mouth 2 times daily Pt states takes 3 tabs (6 mg) twice a day unless she needs to take more, depending on what she eats.       magnesium 100 MG CAPS Take by mouth 2 times daily       montelukast (SINGULAIR) 10 MG tablet Take 1 tablet (10 mg) by mouth At Bedtime 90 tablet 3     Multiple Vitamin (MULTIVITAMIN) per tablet Take 1 tablet by mouth daily Every day with food       omeprazole (PRILOSEC) 40 MG DR capsule Take 1 capsule (40 mg) by mouth daily 90 capsule 3     potassium chloride ER (KLOR-CON M) 20 MEQ CR tablet Take 1 tablet (20 mEq) by mouth 3 times daily 90 tablet 3     pramipexole (MIRAPEX) 0.125 MG tablet Take 1  tablet (0.125 mg) by mouth At Bedtime 90 tablet 0     predniSONE (DELTASONE) 5 MG tablet Take 1 tablet (5 mg) by mouth daily as needed (PMR) 30 tablet 1     PSYLLIUM PO Take 1 Tablespoonful by mouth 2 times daily        Simethicone 125 MG CAPS Take 500 mg by mouth 2 times daily       spironolactone (ALDACTONE) 50 MG tablet Take 1 tablet (50 mg) by mouth 2 times daily 180 tablet 1     VENTOLIN  (90 Base) MCG/ACT inhaler Inhale 2 puffs into the lungs every 4 hours as needed for shortness of breath / dyspnea or wheezing 18 g 2     vitamin E 200 UNIT capsule Take 200 Units by mouth 2 times daily       WIXELA INHUB 250-50 MCG/ACT inhaler Inhale 1 puff into the lungs 2 times daily 1 each 3       Allergies   Allergen Reactions     Codeine Swelling     Throat swelling-Patient can tolerate Hydrocodone & morphine     Nortriptyline Other (See Comments)     Crying         Social History     Tobacco Use     Smoking status: Former Smoker     Packs/day: 0.50     Years: 40.00     Pack years: 20.00     Types: Cigarettes     Smokeless tobacco: Never Used   Substance Use Topics     Alcohol use: Not Currently     Comment: Weekly 3 drinks     Family History   Problem Relation Age of Onset     Other - See Comments Father         Atrial Fibrillation     Cancer Father         bladder ca     Colon Polyps Father      Heart Disease Father         Pacemaker     Rheumatoid Arthritis Father      C.A.D. Father 75        CABG     Chronic Obstructive Pulmonary Disease Mother      Heart Disease Mother         Pacemaker     Other - See Comments Mother         dementia     C.A.D. Mother 70        CABG     C.A.D. Maternal Grandmother      Unknown/Adopted Maternal Grandfather      Unknown/Adopted Paternal Grandmother      Unknown/Adopted Paternal Grandfather      Asthma Sister      Cerebrovascular Disease Sister      Gastrointestinal Disease Sister         peptic ulcers     Hypertension Sister      Gastrointestinal Disease Sister         stomach  tumors     Rheumatoid Arthritis Sister      Cancer Sister         facial cancer     Asthma Sister      Cancer Maternal Aunt         renal ca     History   Drug Use No           Objective     /72 (BP Location: Left arm, Patient Position: Sitting, Cuff Size: Adult Regular)   Pulse 88   Temp 97.9  F (36.6  C) (Tympanic)   Resp 20   Wt 55.2 kg (121 lb 12.8 oz)   SpO2 96%   BMI 22.28 kg/m           Physical Exam    GENERAL APPEARANCE: healthy, alert and no distress     EYES: EOMI, PERRL     HENT: ear canals and TM's normal and nose and mouth without ulcers or lesions     NECK: no adenopathy, no asymmetry, masses, or scars and thyroid normal to palpation     RESP: lungs clear to auscultation - no rales, rhonchi or wheezes     CV: regular rates and rhythm, normal S1 S2, no S3 or S4 and no murmur, click or rub     ABDOMEN:  soft, nontender, no HSM or masses and bowel sounds normal     MS: Right hip pain     SKIN: no suspicious lesions or rashes     NEURO: Normal strength and tone, sensory exam grossly normal, mentation intact and speech normal     PSYCH: mentation appears normal. and affect normal/bright     LYMPHATICS: No cervical adenopathy      Recent Labs   Lab Test 06/09/22  1136 12/21/21  1150   HGB 13.7 14.2    266    139   POTASSIUM 4.3 4.3   CR 0.78 0.90          Diagnostics:  Recent Results (from the past 24 hour(s))   *UA reflex to Microscopic and Culture - [facility]    Collection Time: 07/15/22 10:00 AM    Specimen: Urine, Midstream   Result Value Ref Range    Color Urine Yellow Colorless, Straw, Light Yellow, Yellow    Appearance Urine Clear Clear    Glucose Urine Negative Negative mg/dL    Bilirubin Urine Negative Negative    Ketones Urine Trace (A) Negative mg/dL    Specific Gravity Urine 1.025 1.003 - 1.035    Blood Urine Negative Negative    pH Urine 5.5 5.0 - 7.0    Protein Albumin Urine Negative Negative mg/dL    Urobilinogen Urine 0.2 0.2, 1.0 E.U./dL    Nitrite Urine  Negative Negative    Leukocyte Esterase Urine Negative Negative   Comprehensive metabolic panel    Collection Time: 07/15/22 10:00 AM   Result Value Ref Range    Sodium 137 133 - 144 mmol/L    Potassium 3.9 3.4 - 5.3 mmol/L    Chloride 105 94 - 109 mmol/L    Carbon Dioxide (CO2) 26 20 - 32 mmol/L    Anion Gap 6 3 - 14 mmol/L    Urea Nitrogen 18 7 - 30 mg/dL    Creatinine 0.96 0.52 - 1.04 mg/dL    Calcium 9.2 8.5 - 10.1 mg/dL    Glucose 107 (H) 70 - 99 mg/dL    Alkaline Phosphatase 67 40 - 150 U/L    AST 21 0 - 45 U/L    ALT 31 0 - 50 U/L    Protein Total 7.6 6.8 - 8.8 g/dL    Albumin 3.9 3.4 - 5.0 g/dL    Bilirubin Total 0.4 0.2 - 1.3 mg/dL    GFR Estimate 64 >60 mL/min/1.73m2   CBC with platelets and differential    Collection Time: 07/15/22 10:00 AM   Result Value Ref Range    WBC Count 11.9 (H) 4.0 - 11.0 10e3/uL    RBC Count 4.51 3.80 - 5.20 10e6/uL    Hemoglobin 14.5 11.7 - 15.7 g/dL    Hematocrit 41.7 35.0 - 47.0 %    MCV 93 78 - 100 fL    MCH 32.2 26.5 - 33.0 pg    MCHC 34.8 31.5 - 36.5 g/dL    RDW 12.6 10.0 - 15.0 %    Platelet Count 219 150 - 450 10e3/uL    % Neutrophils 75 %    % Lymphocytes 15 %    % Monocytes 9 %    % Eosinophils 1 %    % Basophils 0 %    Absolute Neutrophils 8.9 (H) 1.6 - 8.3 10e3/uL    Absolute Lymphocytes 1.8 0.8 - 5.3 10e3/uL    Absolute Monocytes 1.1 0.0 - 1.3 10e3/uL    Absolute Eosinophils 0.1 0.0 - 0.7 10e3/uL    Absolute Basophils 0.0 0.0 - 0.2 10e3/uL   TSH with free T4 reflex    Collection Time: 07/15/22 10:00 AM   Result Value Ref Range    TSH 0.89 0.40 - 4.00 mU/L   Lipid Profile (Chol, Trig, HDL, LDL calc)    Collection Time: 07/15/22 10:00 AM   Result Value Ref Range    Cholesterol 168 <200 mg/dL    Triglycerides 233 (H) <150 mg/dL    Direct Measure HDL 44 (L) >=50 mg/dL    LDL Cholesterol Calculated 77 <=100 mg/dL    Non HDL Cholesterol 124 <130 mg/dL          EKG: appears normal, NSR, unchanged from previous tracings    Revised Cardiac Risk Index (RCRI):  The patient has  the following serious cardiovascular risks for perioperative complications:     - No serious cardiac risks = 0 points     RCRI Interpretation: 0 points: Class I (very low risk - 0.4% complication rate)        Assessment & Plan     The proposed surgical procedure is considered LOW risk.    Pre-op exam  - UA reflex to Microscopic and Culture - [facility]  - CBC with platelets and differential  - Comprehensive metabolic panel  - Asymptomatic COVID-19 Virus (Coronavirus) by PCR; Future    Hip pain, right  - See above    Hyperlipidemia LDL goal <100  - Lipid Profile (Chol, Trig, HDL, LDL calc); Future    Other specified hypothyroidism  - TSH with free T4 reflex; Future    Mild persistent asthma without complication  - Continue plan of care    Encounter for screening mammogram for malignant neoplasm of breast  - MA SCREENING DIGITAL BILATERAL ([place]); Future         Risks and Recommendations:  The patient has the following additional risks and recommendations for perioperative complications:   - No identified additional risk factors other than previously addressed        RECOMMENDATION:  APPROVAL GIVEN to proceed with proposed procedure, without further diagnostic evaluation.          45  minutes spent on the date of the encounter doing chart review, review of outside records, review of test results, interpretation of tests, patient visit and documentation          Signed Electronically by: Renee Henson CNP  Copy of this evaluation report is provided to requesting physician.

## 2022-07-16 LAB — SARS-COV-2 RNA RESP QL NAA+PROBE: NEGATIVE

## 2022-07-20 ENCOUNTER — APPOINTMENT (OUTPATIENT)
Dept: GENERAL RADIOLOGY | Facility: HOSPITAL | Age: 67
End: 2022-07-20
Attending: ORTHOPAEDIC SURGERY
Payer: COMMERCIAL

## 2022-07-20 ENCOUNTER — ANESTHESIA (OUTPATIENT)
Dept: SURGERY | Facility: HOSPITAL | Age: 67
End: 2022-07-20
Payer: COMMERCIAL

## 2022-07-20 ENCOUNTER — HOSPITAL ENCOUNTER (OUTPATIENT)
Facility: HOSPITAL | Age: 67
Discharge: HOME | End: 2022-07-20
Attending: ORTHOPAEDIC SURGERY | Admitting: ORTHOPAEDIC SURGERY
Payer: COMMERCIAL

## 2022-07-20 VITALS
BODY MASS INDEX: 23.16 KG/M2 | DIASTOLIC BLOOD PRESSURE: 88 MMHG | HEIGHT: 60 IN | HEART RATE: 78 BPM | WEIGHT: 118 LBS | SYSTOLIC BLOOD PRESSURE: 123 MMHG | TEMPERATURE: 97.8 F | OXYGEN SATURATION: 100 % | RESPIRATION RATE: 16 BRPM

## 2022-07-20 LAB
GLUCOSE BLDC GLUCOMTR-MCNC: 84 MG/DL (ref 70–99)
GLUCOSE BLDC GLUCOMTR-MCNC: 95 MG/DL (ref 70–99)

## 2022-07-20 PROCEDURE — 20670 REMOVAL IMPLANT SUPERFICIAL: CPT | Performed by: NURSE ANESTHETIST, CERTIFIED REGISTERED

## 2022-07-20 PROCEDURE — 370N000017 HC ANESTHESIA TECHNICAL FEE, PER MIN: Performed by: ORTHOPAEDIC SURGERY

## 2022-07-20 PROCEDURE — C1769 GUIDE WIRE: HCPCS | Performed by: ORTHOPAEDIC SURGERY

## 2022-07-20 PROCEDURE — 250N000011 HC RX IP 250 OP 636: Performed by: NURSE ANESTHETIST, CERTIFIED REGISTERED

## 2022-07-20 PROCEDURE — 272N000001 HC OR GENERAL SUPPLY STERILE: Performed by: ORTHOPAEDIC SURGERY

## 2022-07-20 PROCEDURE — 20680 REMOVAL OF IMPLANT DEEP: CPT | Mod: RT | Performed by: ORTHOPAEDIC SURGERY

## 2022-07-20 PROCEDURE — 360N000078 HC SURGERY LEVEL 5, PER MIN: Performed by: ORTHOPAEDIC SURGERY

## 2022-07-20 PROCEDURE — 20680 REMOVAL OF IMPLANT DEEP: CPT | Mod: AS | Performed by: PHYSICIAN ASSISTANT

## 2022-07-20 PROCEDURE — 82962 GLUCOSE BLOOD TEST: CPT | Mod: GZ,91

## 2022-07-20 PROCEDURE — 250N000011 HC RX IP 250 OP 636: Performed by: ORTHOPAEDIC SURGERY

## 2022-07-20 PROCEDURE — 999N000179 XR SURGERY CARM FLUORO LESS THAN 5 MIN W STILLS: Mod: TC

## 2022-07-20 PROCEDURE — 999N000141 HC STATISTIC PRE-PROCEDURE NURSING ASSESSMENT: Performed by: ORTHOPAEDIC SURGERY

## 2022-07-20 PROCEDURE — 710N000012 HC RECOVERY PHASE 2, PER MINUTE: Performed by: ORTHOPAEDIC SURGERY

## 2022-07-20 PROCEDURE — 710N000010 HC RECOVERY PHASE 1, LEVEL 2, PER MIN: Performed by: ORTHOPAEDIC SURGERY

## 2022-07-20 PROCEDURE — 258N000003 HC RX IP 258 OP 636: Performed by: NURSE ANESTHETIST, CERTIFIED REGISTERED

## 2022-07-20 PROCEDURE — 250N000009 HC RX 250: Performed by: ORTHOPAEDIC SURGERY

## 2022-07-20 PROCEDURE — 250N000013 HC RX MED GY IP 250 OP 250 PS 637: Performed by: ORTHOPAEDIC SURGERY

## 2022-07-20 RX ORDER — ONDANSETRON 4 MG/1
4 TABLET, ORALLY DISINTEGRATING ORAL EVERY 30 MIN PRN
Status: DISCONTINUED | OUTPATIENT
Start: 2022-07-20 | End: 2022-07-20 | Stop reason: HOSPADM

## 2022-07-20 RX ORDER — NALOXONE HYDROCHLORIDE 0.4 MG/ML
0.2 INJECTION, SOLUTION INTRAMUSCULAR; INTRAVENOUS; SUBCUTANEOUS
Status: DISCONTINUED | OUTPATIENT
Start: 2022-07-20 | End: 2022-07-20 | Stop reason: HOSPADM

## 2022-07-20 RX ORDER — ONDANSETRON 2 MG/ML
INJECTION INTRAMUSCULAR; INTRAVENOUS PRN
Status: DISCONTINUED | OUTPATIENT
Start: 2022-07-20 | End: 2022-07-20

## 2022-07-20 RX ORDER — FENTANYL CITRATE 50 UG/ML
INJECTION, SOLUTION INTRAMUSCULAR; INTRAVENOUS PRN
Status: DISCONTINUED | OUTPATIENT
Start: 2022-07-20 | End: 2022-07-20

## 2022-07-20 RX ORDER — NALOXONE HYDROCHLORIDE 0.4 MG/ML
0.4 INJECTION, SOLUTION INTRAMUSCULAR; INTRAVENOUS; SUBCUTANEOUS
Status: DISCONTINUED | OUTPATIENT
Start: 2022-07-20 | End: 2022-07-20 | Stop reason: HOSPADM

## 2022-07-20 RX ORDER — PHENYLEPHRINE HYDROCHLORIDE 10 MG/ML
INJECTION INTRAVENOUS PRN
Status: DISCONTINUED | OUTPATIENT
Start: 2022-07-20 | End: 2022-07-20

## 2022-07-20 RX ORDER — SODIUM CHLORIDE, SODIUM LACTATE, POTASSIUM CHLORIDE, CALCIUM CHLORIDE 600; 310; 30; 20 MG/100ML; MG/100ML; MG/100ML; MG/100ML
INJECTION, SOLUTION INTRAVENOUS CONTINUOUS
Status: DISCONTINUED | OUTPATIENT
Start: 2022-07-20 | End: 2022-07-20 | Stop reason: HOSPADM

## 2022-07-20 RX ORDER — HYDRALAZINE HYDROCHLORIDE 20 MG/ML
2.5-5 INJECTION INTRAMUSCULAR; INTRAVENOUS EVERY 10 MIN PRN
Status: DISCONTINUED | OUTPATIENT
Start: 2022-07-20 | End: 2022-07-20 | Stop reason: HOSPADM

## 2022-07-20 RX ORDER — PROPOFOL 10 MG/ML
INJECTION, EMULSION INTRAVENOUS PRN
Status: DISCONTINUED | OUTPATIENT
Start: 2022-07-20 | End: 2022-07-20

## 2022-07-20 RX ORDER — CEFAZOLIN SODIUM/WATER 2 G/20 ML
2 SYRINGE (ML) INTRAVENOUS
Status: COMPLETED | OUTPATIENT
Start: 2022-07-20 | End: 2022-07-20

## 2022-07-20 RX ORDER — MEPERIDINE HYDROCHLORIDE 25 MG/ML
12.5 INJECTION INTRAMUSCULAR; INTRAVENOUS; SUBCUTANEOUS
Status: DISCONTINUED | OUTPATIENT
Start: 2022-07-20 | End: 2022-07-20 | Stop reason: HOSPADM

## 2022-07-20 RX ORDER — DEXAMETHASONE SODIUM PHOSPHATE 10 MG/ML
INJECTION, SOLUTION INTRAMUSCULAR; INTRAVENOUS PRN
Status: DISCONTINUED | OUTPATIENT
Start: 2022-07-20 | End: 2022-07-20

## 2022-07-20 RX ORDER — CHLOROPROCAINE HYDROCHLORIDE 20 MG/ML
INJECTION, SOLUTION EPIDURAL; INFILTRATION; INTRACAUDAL; PERINEURAL
Status: COMPLETED | OUTPATIENT
Start: 2022-07-20 | End: 2022-07-20

## 2022-07-20 RX ORDER — FENTANYL CITRATE 50 UG/ML
25 INJECTION, SOLUTION INTRAMUSCULAR; INTRAVENOUS
Status: DISCONTINUED | OUTPATIENT
Start: 2022-07-20 | End: 2022-07-20 | Stop reason: HOSPADM

## 2022-07-20 RX ORDER — LIDOCAINE 40 MG/G
CREAM TOPICAL
Status: DISCONTINUED | OUTPATIENT
Start: 2022-07-20 | End: 2022-07-20 | Stop reason: HOSPADM

## 2022-07-20 RX ORDER — ONDANSETRON 2 MG/ML
4 INJECTION INTRAMUSCULAR; INTRAVENOUS EVERY 30 MIN PRN
Status: DISCONTINUED | OUTPATIENT
Start: 2022-07-20 | End: 2022-07-20 | Stop reason: HOSPADM

## 2022-07-20 RX ORDER — BUPIVACAINE HYDROCHLORIDE AND EPINEPHRINE 5; 5 MG/ML; UG/ML
INJECTION, SOLUTION EPIDURAL; INTRACAUDAL; PERINEURAL PRN
Status: DISCONTINUED | OUTPATIENT
Start: 2022-07-20 | End: 2022-07-20 | Stop reason: HOSPADM

## 2022-07-20 RX ORDER — FENTANYL CITRATE 50 UG/ML
50 INJECTION, SOLUTION INTRAMUSCULAR; INTRAVENOUS EVERY 5 MIN PRN
Status: DISCONTINUED | OUTPATIENT
Start: 2022-07-20 | End: 2022-07-20 | Stop reason: HOSPADM

## 2022-07-20 RX ORDER — IBUPROFEN 200 MG
600 TABLET ORAL
Status: COMPLETED | OUTPATIENT
Start: 2022-07-20 | End: 2022-07-20

## 2022-07-20 RX ADMIN — ONDANSETRON 4 MG: 2 INJECTION INTRAMUSCULAR; INTRAVENOUS at 11:34

## 2022-07-20 RX ADMIN — SODIUM CHLORIDE, POTASSIUM CHLORIDE, SODIUM LACTATE AND CALCIUM CHLORIDE: 600; 310; 30; 20 INJECTION, SOLUTION INTRAVENOUS at 09:37

## 2022-07-20 RX ADMIN — MIDAZOLAM 2 MG: 1 INJECTION INTRAMUSCULAR; INTRAVENOUS at 10:55

## 2022-07-20 RX ADMIN — PROPOFOL 20 MG: 10 INJECTION, EMULSION INTRAVENOUS at 11:23

## 2022-07-20 RX ADMIN — DEXAMETHASONE SODIUM PHOSPHATE 4 MG: 10 INJECTION, SOLUTION INTRAMUSCULAR; INTRAVENOUS at 11:25

## 2022-07-20 RX ADMIN — PROPOFOL 20 MG: 10 INJECTION, EMULSION INTRAVENOUS at 11:17

## 2022-07-20 RX ADMIN — PHENYLEPHRINE HYDROCHLORIDE 100 MCG: 10 INJECTION INTRAVENOUS at 11:20

## 2022-07-20 RX ADMIN — PHENYLEPHRINE HYDROCHLORIDE 100 MCG: 10 INJECTION INTRAVENOUS at 11:31

## 2022-07-20 RX ADMIN — CHLOROPROCAINE HYDROCHLORIDE 50 MG: 20 INJECTION, SOLUTION EPIDURAL; INFILTRATION; INTRACAUDAL; PERINEURAL at 11:06

## 2022-07-20 RX ADMIN — PHENYLEPHRINE HYDROCHLORIDE 100 MCG: 10 INJECTION INTRAVENOUS at 10:04

## 2022-07-20 RX ADMIN — FENTANYL CITRATE 25 MCG: 50 INJECTION, SOLUTION INTRAMUSCULAR; INTRAVENOUS at 11:09

## 2022-07-20 RX ADMIN — IBUPROFEN 600 MG: 200 TABLET, FILM COATED ORAL at 13:20

## 2022-07-20 RX ADMIN — PROPOFOL 30 MG: 10 INJECTION, EMULSION INTRAVENOUS at 11:08

## 2022-07-20 RX ADMIN — FENTANYL CITRATE 50 MCG: 50 INJECTION, SOLUTION INTRAMUSCULAR; INTRAVENOUS at 10:58

## 2022-07-20 RX ADMIN — PHENYLEPHRINE HYDROCHLORIDE 100 MCG: 10 INJECTION INTRAVENOUS at 11:11

## 2022-07-20 RX ADMIN — FENTANYL CITRATE 25 MCG: 50 INJECTION, SOLUTION INTRAMUSCULAR; INTRAVENOUS at 11:23

## 2022-07-20 RX ADMIN — Medication 2 G: at 10:56

## 2022-07-20 ASSESSMENT — LIFESTYLE VARIABLES: TOBACCO_USE: 1

## 2022-07-20 NOTE — ANESTHESIA CARE TRANSFER NOTE
Patient: Chyna Delgado    Procedure: Procedure(s):  Right Hip Hardware Removal (Cannulated Screws)       Diagnosis: Pain from implanted hardware [T85.848A]  Diagnosis Additional Information: No value filed.    Anesthesia Type:   Spinal     Note:    Oropharynx: oropharynx clear of all foreign objects  Level of Consciousness: drowsy  Oxygen Supplementation: nasal cannula  Level of Supplemental Oxygen (L/min / FiO2): 2  Independent Airway: airway patency satisfactory and stable  Dentition: dentition unchanged  Vital Signs Stable: post-procedure vital signs reviewed and stable  Report to RN Given: handoff report given  Patient transferred to: Phase II    Handoff Report: Identifed the Patient, Identified the Reponsible Provider, Reviewed the pertinent medical history, Discussed the surgical course, Reviewed Intra-OP anesthesia mangement and issues during anesthesia, Set expectations for post-procedure period and Allowed opportunity for questions and acknowledgement of understanding      Vitals:  Vitals Value Taken Time   BP     Temp     Pulse 72 07/20/22 1145   Resp 18 07/20/22 1145   SpO2 98 % 07/20/22 1145   Vitals shown include unvalidated device data.    Electronically Signed By: MAIDA Santiago CRNA  July 20, 2022  11:46 AM

## 2022-07-20 NOTE — DISCHARGE INSTRUCTIONS
"*FOLLOW UP APPT. ON AUG. 3RD @ 10:45 AT [facility] WITH DR. CAMARGO.*        IMPORTANT OBSERVATIONS  Check C-M-S of operative extremity every 4 hours while you are awake for the first 48 hours:    * C: color - should appear normal/pink   * M: motion - able to move fingers and toes.   * S: sensation - able to \"feel\": no numbness, tingling    If you have any questions or concerns, please call the [facility] Triage Line at [phone].         Post-Anesthesia Patient Instructions    IMMEDIATELY FOLLOWING SURGERY:  Do not drive or operate machinery for the first twenty four hours after surgery.  Do not make any important decisions for twenty four hours after surgery or while taking narcotic pain medications or sedatives.  If you develop intractable nausea and vomiting or a severe headache please notify your doctor immediately.    FOLLOW-UP:  Please make an appointment with your surgeon as instructed. You do not need to follow up with anesthesia unless specifically instructed to do so.    WOUND CARE INSTRUCTIONS (if applicable):  Keep a dry clean dressing on the anesthesia/puncture wound site if there is drainage.  Once the wound has quit draining you may leave it open to air.  Generally you should leave the bandage intact for twenty four hours unless there is drainage.  If the epidural site drains for more than 36-48 hours please call the anesthesia department.    QUESTIONS?:  Please feel free to call your physician or the hospital  if you have any questions, and they will be happy to assist you.     "

## 2022-07-20 NOTE — OR NURSING
Pt's , Aydin, called to report bleeding on pt's right hip dressing. States wound is bleeding through the dressing.  instructed to apply additional dressing over original dressing and hold pressure for 5 minutes. Dr. Marcial was notified. Pt was called back per Dr. Marcial's request and instructed to hold pressure for 5-10 min after applying additional dressing. Pt was also told that Dr. Marcial expects there to be a hematoma there. Pt instructed to call [facility] triage line (number was given on pt's AVS) if any additional concerns.

## 2022-07-20 NOTE — ANESTHESIA POSTPROCEDURE EVALUATION
Patient: Chyna Delgado    Procedure: Procedure(s):  Right Hip Hardware Removal (Cannulated Screws)       Anesthesia Type:  Spinal    Note:  Disposition: Outpatient   Postop Pain Control: Uneventful            Sign Out: Well controlled pain   PONV: No   Neuro/Psych: Uneventful            Sign Out: Acceptable/Baseline neuro status   Airway/Respiratory: Uneventful            Sign Out: Acceptable/Baseline resp. status   CV/Hemodynamics: Uneventful            Sign Out: Acceptable CV status; No obvious hypovolemia; No obvious fluid overload   Other NRE: NONE   DID A NON-ROUTINE EVENT OCCUR? No           Last vitals:  Vitals Value Taken Time   BP 95/61 07/20/22 1220   Temp 97.4  F (36.3  C) 07/20/22 1220   Pulse 67 07/20/22 1222   Resp 19 07/20/22 1223   SpO2 95 % 07/20/22 1223   Vitals shown include unvalidated device data.    Electronically Signed By: MAIDA Maier CRNA  July 20, 2022  1:31 PM

## 2022-07-20 NOTE — OR NURSING
Patient and responsible adult given discharge instructions with no questions regarding instructions. Ag score 19. Pain level 4/10.  Discharged from unit via w/c. Patient discharged to home with .

## 2022-07-20 NOTE — OP NOTE
Preop Dx: Retained orthopedic hardware right hip    Postop Dx: Same    Procedure: Removal of retained orthopedic hardware right hip    Surgeon: Luis Fernando Marcial MD    Assistant: CASEY Leon/RN    Anesthesia: Regional with MAC    Indications: Patient is a 66-year-old female who sustained a valgus impacted femoral neck fracture in the past.  3 screws were used to fix the fracture and she healed this without difficulty.  The screws were giving her a little bit of problem and she wished to have these removed.  All the risks and benefits of surgical intervention were discussed with her and she wished to proceed.    Description: Patient was taken to the operating after adequate duction anesthesia was positioned prepped and draped in the usual sterile fashion in the operative table.  A universal protocol home was initiated and once all in the room were in agreement an incision was made overlying the screws.  This is carried the incision was then carried down through the subcutaneous fat and down to the fascia.  The fascia was incised in line with the the screws.  And these were localized utilizing the pin that was used to place them as well as fluoroscopy.  These were removed one of the time utilizing a T-handle screwdriver.  Once full removal of all of the screws was confirmed on x-ray the wound was copiously irrigated.  The fascia was closed with 0 Vicryl in a buried figure-of-eight fashion.  Simple stitch was placed in the subcutaneous fat 2-0 Vicryl was used in subcutaneous skin and 4-0 nylon was used to close the skin.  The patient tolerated procedure well and all counts were correct the end the procedure and she was taken stable to the postanesthesia care unit.

## 2022-07-20 NOTE — OR NURSING
Pt sat at bedside without difficulty. Assisted to stand with help of 2 and transfer belt. Also assisted to ambulate with cane. Tolerated well.

## 2022-07-20 NOTE — BRIEF OP NOTE
[facility]    Brief Operative Note    Pre-operative diagnosis: Pain from implanted hardware [T85.235Z]  Post-operative diagnosis Same as pre-operative diagnosis    Procedure: Procedure(s):  Right Hip Hardware Removal (Cannulated Screws)  Surgeon: Surgeon(s) and Role:     * Luis Fernando Marcial MD - Primary     * Bennie Lo PA - Assisting  Anesthesia: Choice   Estimated Blood Loss: Minimal    Drains: None  Specimens: * No specimens in log *  Findings:   None.  Complications: None.  Implants:   Implant Name Type Inv. Item Serial No.  Lot No. LRB No. Used Action   [id number] 6.5 partially threaded cannulated screw      Right 1 Explanted   SCREW-ASNIS III 6.5MM/90MM SHORT THREAD  Screw-ASNIS III 6.5mm/90mm Short Thread  SHANNAN  Right 1 Explanted   SCREW-ASNIS III 6.5MM/95MM SHORT THREAD  Screw-ASNIS III 6.5mm/95mm Short Thread  SHANNAN  Right 1 Explanted

## 2022-07-29 DIAGNOSIS — G25.81 RESTLESS LEGS SYNDROME: ICD-10-CM

## 2022-08-01 RX ORDER — POTASSIUM CHLORIDE 1500 MG/1
TABLET, EXTENDED RELEASE ORAL
Qty: 90 TABLET | Refills: 3 | Status: SHIPPED | OUTPATIENT
Start: 2022-08-01 | End: 2023-01-26

## 2022-08-03 ENCOUNTER — TRANSFERRED RECORDS (OUTPATIENT)
Dept: HEALTH INFORMATION MANAGEMENT | Facility: CLINIC | Age: 67
End: 2022-08-03

## 2022-08-03 DIAGNOSIS — R19.7 DIARRHEA DUE TO MALABSORPTION: ICD-10-CM

## 2022-08-03 DIAGNOSIS — K90.9 DIARRHEA DUE TO MALABSORPTION: ICD-10-CM

## 2022-08-04 RX ORDER — DIPHENOXYLATE HCL/ATROPINE 2.5-.025MG
1-2 TABLET ORAL 4 TIMES DAILY PRN
Qty: 120 TABLET | Refills: 0 | Status: SHIPPED | OUTPATIENT
Start: 2022-08-04 | End: 2022-11-03

## 2022-08-04 NOTE — TELEPHONE ENCOUNTER
"Pharmacy sent request for Lomotil again.  diphenoxylate-atropine (LOMOTIL) 2.5-0.025 MG tablet       Last Written Prescription Date:  5/12/22 END: 5/25/22  Last Fill Quantity: 120,   # refills: 0  Last Office Visit: 7/15/22  Future Office visit:     Routing refill request to provider for review/approval because:  Drug not on the [facility], P or [facility] refill protocol or controlled substance and  05/25/22 1233 Discontinue Loraine Smith, JULIÁNN Reason:Therapy completed   Renee Henson Refused on 8/1/22    PCP returns on 8/9/22 8/4/2022 3:16 PM  Writer called pt to verify RX request. Pt reports she does want the lomotil filled, pt reports \"I am on this med for long term I had colon surgery and have chronic diarrhea\". Pt denies any other concerns or questions. Hard stop, didn't associate dx.   "

## 2022-08-04 NOTE — TELEPHONE ENCOUNTER
Per PDMP, last refill for this medication was 5/12/2022, #120.  Further review shows that she receives #120 about every 2 months or so.  Medication refilled.

## 2022-08-12 DIAGNOSIS — E87.6 HYPOKALEMIA: ICD-10-CM

## 2022-08-12 DIAGNOSIS — M35.3 PMR (POLYMYALGIA RHEUMATICA) (H): ICD-10-CM

## 2022-08-12 RX ORDER — SPIRONOLACTONE 50 MG/1
50 TABLET, FILM COATED ORAL 2 TIMES DAILY
Qty: 180 TABLET | Refills: 1 | Status: SHIPPED | OUTPATIENT
Start: 2022-08-12 | End: 2023-01-26

## 2022-08-12 RX ORDER — PREDNISONE 5 MG/1
5 TABLET ORAL DAILY PRN
Qty: 30 TABLET | Refills: 1 | Status: SHIPPED | OUTPATIENT
Start: 2022-08-12 | End: 2022-09-26

## 2022-08-12 NOTE — TELEPHONE ENCOUNTER
Prednisone       Last Written Prescription Date:  6/9/22  Last Fill Quantity: 30,   # refills: 1    Aldactone       Last Written Prescription Date:  8/25/21  Last Fill Quantity: 180,   # refills: 1  Last Office Visit: 7/15/22  Future Office visit:

## 2022-08-30 ENCOUNTER — ANCILLARY PROCEDURE (OUTPATIENT)
Dept: MAMMOGRAPHY | Facility: OTHER | Age: 67
End: 2022-08-30
Attending: NURSE PRACTITIONER
Payer: COMMERCIAL

## 2022-08-30 ENCOUNTER — TELEPHONE (OUTPATIENT)
Dept: MAMMOGRAPHY | Facility: OTHER | Age: 67
End: 2022-08-30

## 2022-08-30 DIAGNOSIS — Z12.31 ENCOUNTER FOR SCREENING MAMMOGRAM FOR MALIGNANT NEOPLASM OF BREAST: ICD-10-CM

## 2022-08-30 PROCEDURE — 77067 SCR MAMMO BI INCL CAD: CPT | Mod: TC

## 2022-08-31 ENCOUNTER — TELEPHONE (OUTPATIENT)
Dept: FAMILY MEDICINE | Facility: OTHER | Age: 67
End: 2022-08-31

## 2022-08-31 NOTE — TELEPHONE ENCOUNTER
8:00 AM    Reason for Call: OVERBOOK    Patient is having the following symptoms: Severe knee pain  /  She is establishing with [facility] in October and was hoping he would see her. Her  is a [facility] patient already    The patient is requesting an appointment for  Today with  Dr. Edward    Was an appointment offered for this call? No  If yes : Appointment type              Date    Preferred method for responding to this message: Telephone Call  What is your phone number ?   [phone]    If we cannot reach you directly, may we leave a detailed response at the number you provided? Yes    Can this message wait until your PCP/provider returns, if unavailable today? Not applicable,     Estrella Wyatt

## 2022-08-31 NOTE — PROGRESS NOTES
Assessment & Plan     Right knee pain, unspecified chronicity  X-ray showing some mild joint space narrowing.  Since she feels she is improving, will continue rest, NSAIDs.  She'll continue Motrin 600mg tid.  If not continuing to improving in a couple weeks, could consider trial steroid injection.  Discussed quadriceps strengthening exercises.  - XR Knee Right 3 Views (Clinic Performed); Future      DAYSI LUCIANO, DO  [facility] - [place] is a 66 year old, presenting for the following health issues:  Knee Pain      HPI     66-year-old female here for concern of right knee pain beginning the end of May or beginning of June with extensive kneeling weeding and planting her garden.  No injury.  She feels like there is hot poker's all around her kneecap, no warmth or swelling.  Her  tells her she overdoes it and does not rest.  Now for the past 2 weeks she has been resting, elevating, ibuprofen 600 mg 2 or 3 times daily and she is noticing a definite slow improvement in her knee pain.  Not giving out on her.  Of note, she does have PMR and she is on prednisone chronically.  Remote knee injury maybe 20 yrs ago.      Pain History:  When did you first notice your pain? - Acute Pain   Have you seen anyone else for your pain? No  Where in your body do you have pain? Right knee   Concern - Right knee pain  Onset: Noticed in June    Description:   Pain around knee cap. Throbbing. Constant pain. No redness, swelling, or warmth    Intensity: mild    Progression of Symptoms:  improving    Accompanying Signs & Symptoms:  no    Previous history of similar problem:   Had a fall years ago and blew her bursa    Precipitating factors:   Worsened by: walking up and down stairs, kneeling    Alleviating factors:  Improved by: ice        Review of Systems   Constitutional: Negative for fever.   Cardiovascular: Negative for peripheral edema.            Objective    /70 (BP Location: Left arm,  Patient Position: Chair, Cuff Size: Adult Regular)   Pulse 86   Temp 97.7  F (36.5  C) (Tympanic)   Resp 18   Wt 54.4 kg (120 lb)   SpO2 97%   BMI 23.44 kg/m    Body mass index is 23.44 kg/m .  Physical Exam  Constitutional:       General: She is not in acute distress.     Appearance: Normal appearance.   Cardiovascular:      Pulses: Normal pulses.   Pulmonary:      Effort: Pulmonary effort is normal.   Musculoskeletal:      Right lower leg: No edema.      Left lower leg: No edema.      Comments: Crepitus with range of motion of the right knee not present on the left.  Negative Lachman and posterior drawer, negative varus and valgus testing, negative Thaddeus's, negative patellar grind, no warmth or joint effusion, he does seem to be some mild quadricep atrophy when compared to the left.   Neurological:      Mental Status: She is oriented to person, place, and time.                    .  ..

## 2022-09-09 ENCOUNTER — ANCILLARY PROCEDURE (OUTPATIENT)
Dept: GENERAL RADIOLOGY | Facility: OTHER | Age: 67
End: 2022-09-09
Attending: FAMILY MEDICINE
Payer: COMMERCIAL

## 2022-09-09 ENCOUNTER — OFFICE VISIT (OUTPATIENT)
Dept: FAMILY MEDICINE | Facility: OTHER | Age: 67
End: 2022-09-09
Attending: FAMILY MEDICINE
Payer: COMMERCIAL

## 2022-09-09 VITALS
TEMPERATURE: 97.7 F | DIASTOLIC BLOOD PRESSURE: 70 MMHG | SYSTOLIC BLOOD PRESSURE: 112 MMHG | WEIGHT: 120 LBS | RESPIRATION RATE: 18 BRPM | OXYGEN SATURATION: 97 % | HEART RATE: 86 BPM | BODY MASS INDEX: 23.44 KG/M2

## 2022-09-09 DIAGNOSIS — M25.561 RIGHT KNEE PAIN, UNSPECIFIED CHRONICITY: ICD-10-CM

## 2022-09-09 DIAGNOSIS — M25.561 RIGHT KNEE PAIN, UNSPECIFIED CHRONICITY: Primary | ICD-10-CM

## 2022-09-09 PROCEDURE — 73562 X-RAY EXAM OF KNEE 3: CPT | Mod: TC,RT

## 2022-09-09 PROCEDURE — 99213 OFFICE O/P EST LOW 20 MIN: CPT | Performed by: FAMILY MEDICINE

## 2022-09-09 PROCEDURE — G0463 HOSPITAL OUTPT CLINIC VISIT: HCPCS

## 2022-09-09 ASSESSMENT — PAIN SCALES - GENERAL: PAINLEVEL: NO PAIN (1)

## 2022-09-09 ASSESSMENT — ENCOUNTER SYMPTOMS: FEVER: 0

## 2022-09-09 NOTE — NURSING NOTE
Chief Complaint   Patient presents with     Knee Pain       Initial /70 (BP Location: Left arm, Patient Position: Chair, Cuff Size: Adult Regular)   Pulse 86   Temp 97.7  F (36.5  C) (Tympanic)   Resp 18   Wt 54.4 kg (120 lb)   SpO2 97%   BMI 23.44 kg/m   Estimated body mass index is 23.44 kg/m  as calculated from the following:    Height as of 7/20/22: 1.524 m (5').    Weight as of this encounter: 54.4 kg (120 lb).  Medication Reconciliation: complete  Svetlana Emery

## 2022-09-21 ENCOUNTER — TELEPHONE (OUTPATIENT)
Dept: INTERNAL MEDICINE | Facility: OTHER | Age: 67
End: 2022-09-21

## 2022-09-21 DIAGNOSIS — M25.561 RIGHT KNEE PAIN, UNSPECIFIED CHRONICITY: Primary | ICD-10-CM

## 2022-09-21 NOTE — TELEPHONE ENCOUNTER
Pt seen  on 9.9.2022 for the right knee and she would like a PT referral to [facility] and with Malini Daily.    Call back [phone]    Pended referral-please review if appropriate.    Susie Zhou RN

## 2022-09-28 ENCOUNTER — TRANSFERRED RECORDS (OUTPATIENT)
Dept: HEALTH INFORMATION MANAGEMENT | Facility: CLINIC | Age: 67
End: 2022-09-28

## 2022-09-29 ENCOUNTER — OFFICE VISIT (OUTPATIENT)
Dept: FAMILY MEDICINE | Facility: OTHER | Age: 67
End: 2022-09-29
Attending: FAMILY MEDICINE
Payer: COMMERCIAL

## 2022-09-29 VITALS
SYSTOLIC BLOOD PRESSURE: 118 MMHG | DIASTOLIC BLOOD PRESSURE: 68 MMHG | BODY MASS INDEX: 22.66 KG/M2 | HEART RATE: 92 BPM | HEIGHT: 61 IN | WEIGHT: 120 LBS | OXYGEN SATURATION: 98 % | RESPIRATION RATE: 16 BRPM | TEMPERATURE: 97.6 F

## 2022-09-29 DIAGNOSIS — L05.91 PILONIDAL CYST: Primary | ICD-10-CM

## 2022-09-29 DIAGNOSIS — J45.40 MODERATE PERSISTENT ASTHMA, UNSPECIFIED WHETHER COMPLICATED: ICD-10-CM

## 2022-09-29 DIAGNOSIS — M25.561 RIGHT KNEE PAIN, UNSPECIFIED CHRONICITY: ICD-10-CM

## 2022-09-29 PROCEDURE — G0463 HOSPITAL OUTPT CLINIC VISIT: HCPCS

## 2022-09-29 PROCEDURE — 99213 OFFICE O/P EST LOW 20 MIN: CPT | Mod: 25 | Performed by: FAMILY MEDICINE

## 2022-09-29 PROCEDURE — 20610 DRAIN/INJ JOINT/BURSA W/O US: CPT | Mod: RT | Performed by: FAMILY MEDICINE

## 2022-09-29 PROCEDURE — G0463 HOSPITAL OUTPT CLINIC VISIT: HCPCS | Mod: 25

## 2022-09-29 RX ORDER — METHYLPREDNISOLONE ACETATE 80 MG/ML
80 INJECTION, SUSPENSION INTRA-ARTICULAR; INTRALESIONAL; INTRAMUSCULAR; SOFT TISSUE ONCE
Status: COMPLETED | OUTPATIENT
Start: 2022-09-29 | End: 2022-09-29

## 2022-09-29 RX ADMIN — METHYLPREDNISOLONE ACETATE 80 MG: 80 INJECTION, SUSPENSION INTRA-ARTICULAR; INTRALESIONAL; INTRAMUSCULAR; SOFT TISSUE at 12:33

## 2022-09-29 ASSESSMENT — PAIN SCALES - GENERAL: PAINLEVEL: MODERATE PAIN (5)

## 2022-09-29 NOTE — PROGRESS NOTES
"  Assessment & Plan     Pilonidal cyst  - amoxicillin-clavulanate (AUGMENTIN) 875-125 MG tablet; Take 1 tablet by mouth 2 times daily  - Adult General Surg Referral    Right knee pain, unspecified chronicity  - methylPREDNISolone (DEPO-MEDROL) injection 80 mg  - lidocaine 1 % 5 mL      DAYSI LUCIANO DO  [facility] - [place] is a 66 year old presenting for the following health issues:  Derm Problem      HPI     Here for concern of a draining pilonidal cyst for a couple weeks now, had 1 excised in the distant past.  Not painful.  No fever or chills.  Interested in definitive management.    Also would like to move forward with a right knee steroid injection as long she is here today, previously evaluated on 9/9/2022.        Review of Systems   Constitutional: Negative for chills and fever.   Respiratory: Negative for shortness of breath.    Gastrointestinal: Negative for abdominal pain.            Objective    /68 (BP Location: Right arm, Patient Position: Chair)   Pulse 92   Temp 97.6  F (36.4  C)   Resp 16   Ht 1.549 m (5' 1\")   Wt 54.4 kg (120 lb)   SpO2 98%   BMI 22.67 kg/m    Body mass index is 22.67 kg/m .  Physical Exam  Constitutional:       General: She is not in acute distress.     Appearance: Normal appearance.   Cardiovascular:      Rate and Rhythm: Normal rate and regular rhythm.      Pulses: Normal pulses.      Heart sounds: Normal heart sounds. No murmur heard.  Pulmonary:      Effort: Pulmonary effort is normal.      Breath sounds: Normal breath sounds. No wheezing.   Musculoskeletal:      Right lower leg: No edema.      Left lower leg: No edema.      Comments: Right knee crepitus with range of motion, no effusion, no laxity, no knee area skin lesions   Skin:     Comments: Small draining pilonidal cyst without milvia cellulitis/erythema, appears to be located slightly left of midline of the superior gluteal cleft   Neurological:      Mental Status: She is " alert and oriented to person, place, and time.              Procedure: Steroid injection of the right knee.  Indication: pain relief, arthritis.  Written consent obtained.  Discussion of risks, benefits, alternatives.  Specifically we discussed risk of bleeding, infection/septic joint, nerve damage, skin discoloration, tendon rupture, treatment failure, pain recurrence, other unforeseen complications.  Patient wishes to proceed.  Timeout performed including patient identification, allergies, site identification.  The anterolateral aspect of the knee is prepped in usual sterile fashion using alcohol swabs and Betadine.  Topical anesthesia with ethyl chloride, once again wiped with alcohol swabs.  Using a 22-gauge needle, after negative aspiration, Depo-Medrol 80 mg/mL and lidocaine 1% 5 mL are injected in usual fashion (total volume 6 mL).  Patient's knee is put through range of motion.  Betadine is wiped off with alcohol swabs and bandage applied.  Patient tolerated well without any complications, reporting pain improvement after the injection.  We discussed that her pain will likely return as the lidocaine wears off and until the steroid starts working.  She will monitor for infection or any side effects and call if needed.

## 2022-09-29 NOTE — NURSING NOTE
"Chief Complaint   Patient presents with     Derm Problem       Initial /68 (BP Location: Right arm, Patient Position: Chair)   Pulse 92   Temp 97.6  F (36.4  C)   Resp 16   Ht 1.549 m (5' 1\")   Wt 54.4 kg (120 lb)   SpO2 98%   BMI 22.67 kg/m   Estimated body mass index is 22.67 kg/m  as calculated from the following:    Height as of this encounter: 1.549 m (5' 1\").    Weight as of this encounter: 54.4 kg (120 lb).  Medication Reconciliation: complete  Veronica Mike LPN      "

## 2022-10-04 RX ORDER — ALBUTEROL SULFATE 90 UG/1
AEROSOL, METERED RESPIRATORY (INHALATION)
Qty: 18 G | Refills: 2 | Status: SHIPPED | OUTPATIENT
Start: 2022-10-04 | End: 2022-12-20

## 2022-10-04 NOTE — TELEPHONE ENCOUNTER
albuterol sulfate HFA 90 mcg/actuation aerosol inhaler       Last Written Prescription Date:  3/16/20  Last Fill Quantity: 18 g,   # refills: 2  Last Office Visit: 9/29/22  Future Office visit:       Routing refill request to provider for review/approval because:    Asthma Maintenance Inhalers - Anticholinergics Failed 09/29/2022 12:57 PM   Protocol Details  Recent (6 mo) or future (30 days) visit within the authorizing provider's specialty

## 2022-10-05 ASSESSMENT — ENCOUNTER SYMPTOMS
CHILLS: 0
ABDOMINAL PAIN: 0
SHORTNESS OF BREATH: 0
FEVER: 0

## 2022-10-06 ENCOUNTER — OFFICE VISIT (OUTPATIENT)
Dept: SURGERY | Facility: OTHER | Age: 67
End: 2022-10-06
Attending: FAMILY MEDICINE
Payer: COMMERCIAL

## 2022-10-06 VITALS
HEART RATE: 92 BPM | SYSTOLIC BLOOD PRESSURE: 106 MMHG | TEMPERATURE: 98.5 F | OXYGEN SATURATION: 98 % | BODY MASS INDEX: 22.66 KG/M2 | DIASTOLIC BLOOD PRESSURE: 68 MMHG | HEIGHT: 61 IN | WEIGHT: 120 LBS

## 2022-10-06 DIAGNOSIS — L05.91 PILONIDAL CYST: ICD-10-CM

## 2022-10-06 PROCEDURE — 99203 OFFICE O/P NEW LOW 30 MIN: CPT | Mod: 24 | Performed by: SURGERY

## 2022-10-06 PROCEDURE — G0463 HOSPITAL OUTPT CLINIC VISIT: HCPCS

## 2022-10-06 ASSESSMENT — PAIN SCALES - GENERAL: PAINLEVEL: WORST PAIN (10)

## 2022-10-06 NOTE — NURSING NOTE
"Chief Complaint   Patient presents with     Consult     Pilonidal cyst        Initial /68   Pulse 92   Temp 98.5  F (36.9  C)   Ht 1.549 m (5' 1\")   Wt 54.4 kg (120 lb)   SpO2 98%   BMI 22.67 kg/m   Estimated body mass index is 22.67 kg/m  as calculated from the following:    Height as of this encounter: 1.549 m (5' 1\").    Weight as of this encounter: 54.4 kg (120 lb).  Medication Reconciliation: complete  Genna Chavira LPN  "

## 2022-10-06 NOTE — PATIENT INSTRUCTIONS
Thank you for allowing Dr. Howe and our surgical team to participate in your care. Please call our health unit coordinator at [phone] with scheduling questions or the nurse at [phone] with any other questions or concerns.

## 2022-10-07 ENCOUNTER — OFFICE VISIT (OUTPATIENT)
Dept: FAMILY MEDICINE | Facility: OTHER | Age: 67
End: 2022-10-07
Attending: INTERNAL MEDICINE
Payer: COMMERCIAL

## 2022-10-07 ENCOUNTER — ANCILLARY PROCEDURE (OUTPATIENT)
Dept: GENERAL RADIOLOGY | Facility: OTHER | Age: 67
End: 2022-10-07
Attending: FAMILY MEDICINE
Payer: COMMERCIAL

## 2022-10-07 ENCOUNTER — TELEPHONE (OUTPATIENT)
Dept: FAMILY MEDICINE | Facility: OTHER | Age: 67
End: 2022-10-07

## 2022-10-07 VITALS
OXYGEN SATURATION: 92 % | RESPIRATION RATE: 20 BRPM | SYSTOLIC BLOOD PRESSURE: 116 MMHG | BODY MASS INDEX: 21.92 KG/M2 | TEMPERATURE: 97.6 F | DIASTOLIC BLOOD PRESSURE: 68 MMHG | HEART RATE: 72 BPM | WEIGHT: 116 LBS

## 2022-10-07 DIAGNOSIS — M25.551 RIGHT HIP PAIN: ICD-10-CM

## 2022-10-07 DIAGNOSIS — M70.61 TROCHANTERIC BURSITIS OF RIGHT HIP: ICD-10-CM

## 2022-10-07 DIAGNOSIS — M25.551 RIGHT HIP PAIN: Primary | ICD-10-CM

## 2022-10-07 DIAGNOSIS — M16.11 ARTHRITIS OF RIGHT HIP: ICD-10-CM

## 2022-10-07 PROCEDURE — 73502 X-RAY EXAM HIP UNI 2-3 VIEWS: CPT | Mod: TC

## 2022-10-07 PROCEDURE — 99213 OFFICE O/P EST LOW 20 MIN: CPT | Performed by: FAMILY MEDICINE

## 2022-10-07 PROCEDURE — G0463 HOSPITAL OUTPT CLINIC VISIT: HCPCS

## 2022-10-07 RX ORDER — DICLOFENAC SODIUM 75 MG/1
75 TABLET, DELAYED RELEASE ORAL 2 TIMES DAILY PRN
Qty: 60 TABLET | Refills: 0 | Status: ON HOLD | OUTPATIENT
Start: 2022-10-07 | End: 2023-05-15

## 2022-10-07 ASSESSMENT — PAIN SCALES - GENERAL: PAINLEVEL: MILD PAIN (2)

## 2022-10-07 ASSESSMENT — ENCOUNTER SYMPTOMS
CHILLS: 0
SHORTNESS OF BREATH: 0
FEVER: 0
DIAPHORESIS: 0

## 2022-10-07 NOTE — PROGRESS NOTES
Assessment & Plan     Right hip pain  Trochanteric bursitis of right hip  Right hip arthritis  - diclofenac (VOLTAREN) 75 MG EC tablet; Take 1 tablet (75 mg) by mouth 2 times daily as needed for moderate pain  - XR Hip Right 2-3 Views (Clinic Performed); Future    History/exam consistent with Trochanteric bursitis, X-ray showing hip arthritis.  Continue PT.  Change NSAID.  GI precautions discussed.  If not improving, consider troch bursa steroid injection vs Ortho referral for her hip.      DAYSI LUCIANO,   [facility] - [place] is a 66 year old, presenting for the following health issues:  Musculoskeletal Problem (Right hip)      HPI     Knee feeling much better after injection last week.  Now concern of lateral right hip pain over troch bursa x2 weeks.  Began after doing stretching, it swelled and bruised, but now normalized except for pain/tenderness.  In PT, they are working on this area reportedly doing fascial release techniques.  No injury.  Has been trying Motrin.    Concern - Pain in Right Hip  Onset: 9/23/22  Description: Patient had right hip hardware removed 7/20/22, fine for awhile, started hurting and hard to walk in August, was working out and started to get more pain.   Intensity: severe, 9/10 at its worst.   Progression of Symptoms:  Worsening, constant   Accompanying Signs & Symptoms: 2 weeks ago pain down whole leg, about 1 week ago just knee, with some discoloration in right hip area.  Previous history of similar problem: no  Precipitating factors:        Worsened by: Steps, walking, bending  Alleviating factors:        Improved by: Lying down , Ultram, ice  Therapies tried and outcome: PT at [facility]      Review of Systems   Constitutional: Negative for chills, diaphoresis and fever.   Respiratory: Negative for shortness of breath.    Cardiovascular: Negative for peripheral edema.            Objective    /68   Pulse 72   Temp 97.6  F (36.4  C)  (Tympanic)   Resp 20   Wt 52.6 kg (116 lb)   SpO2 92%   BMI 21.92 kg/m    Body mass index is 21.92 kg/m .  Physical Exam  Constitutional:       General: She is not in acute distress.     Appearance: Normal appearance.   Cardiovascular:      Pulses: Normal pulses.   Musculoskeletal:      Right lower leg: No edema.      Left lower leg: No edema.      Comments: Tenderness directly over right troch bursa, no warmth/swelling/bruising/skin lesion.  Minimal anterior hip pain with full/forced Flex/add/int rot, no pain with ANN.      No discomfort with knee PROM, crepitus present.   Neurological:      Mental Status: She is alert and oriented to person, place, and time.

## 2022-10-07 NOTE — NURSING NOTE
"Chief Complaint   Patient presents with     Musculoskeletal Problem     Right hip       Initial /68   Pulse 72   Temp 97.6  F (36.4  C) (Tympanic)   Resp 20   Wt 52.6 kg (116 lb)   SpO2 92%   BMI 21.92 kg/m   Estimated body mass index is 21.92 kg/m  as calculated from the following:    Height as of 10/6/22: 1.549 m (5' 1\").    Weight as of this encounter: 52.6 kg (116 lb).  Medication Reconciliation: grayson Betancourt  "

## 2022-10-07 NOTE — TELEPHONE ENCOUNTER
10:46 AM    Reason for Call: Phone Call    Description: Patient called and states that she would like to have Dr Lowe as her PCP. Unsure if patient needs an [facility] care appt as she has seen him numerous times. Please call patient if needing to schedule.     Was an appointment offered for this call? No  If yes : Appointment type              Date    Preferred method for responding to this message: Telephone Call  What is your phone number ? [phone]    If we cannot reach you directly, may we leave a detailed response at the number you provided? Yes    Can this message wait until your PCP/provider returns, if available today? Not applicable, provider is in today     Mercedes Hernandez

## 2022-10-11 DIAGNOSIS — R09.82 POST-NASAL DRIP: ICD-10-CM

## 2022-10-11 RX ORDER — MONTELUKAST SODIUM 10 MG/1
10 TABLET ORAL AT BEDTIME
Qty: 90 TABLET | Refills: 3 | Status: SHIPPED | OUTPATIENT
Start: 2022-10-11 | End: 2023-01-26

## 2022-10-11 NOTE — TELEPHONE ENCOUNTER
montelukast      Last Written Prescription Date:  11/26/21  Last Fill Quantity: 90,   # refills: 3  Last Office Visit: 10/7/22  Future Office visit:       Routing refill request to provider for review/approval because:

## 2022-10-11 NOTE — PROGRESS NOTES
[facility] Surgery Consultation    CC:  Tail bone lesion     HPI:  This 66 year old year old female is seen at the request of Dr. Lowe for evaluation of coccyx lesion. She does have a history of pilonidal cyst excision when she was much younger. She does have history of AFP and has had a total procto-colectomy with J pouch. She was noted to pain and drainage from a lesion in her gluteal cleft. She was placed on antibiotics. She did note that it did drain and pain has now improved. She denies pain in her gluteal cleft today. She has no issues sitting.     Past Medical History:   Diagnosis Date     Abnormal mammogram, unspecified 01/08/2003    Normal pathology     Back Pain 02/10/2000    Sacroiliac pain     Benign neoplasm of colon 03/10/2000     Benign paroxysmal positional vertigo 06/07/2012     Depressive disorder, not elsewhere classified 02/10/2004     Diarrhea 12/15/2010    Secondary to colectomy for familial polyposis     Gastric polyp 12/11/2015, 12/18/2017    recurrent      History of normal mammogram 07/18/2014, 1/18/2019     Idiopathic osteoporosis 12/15/2010     Interstitial emphysema (H) 12/15/2010     menopausal or female climacteric states 08/31/1999     Mixed hyperlipidemia 12/15/2010     Other bursitis disorders 02/04/2011    Greater trochanteric     Other forms of retinal detachment(361.89) 12/15/2010     Other malaise and fatigue 01/10/2003     Personal history of tobacco use, presenting hazards to health 12/15/2010     Polymyalgia rheumatica (H)      Polyposis of colon      Restless legs syndrome (RLS) 12/15/2010     Rotator cuff tendonitis      Rotator cuff tendonitis      Sacroiliitis, not elsewhere classified (H) 12/15/2010    multiple sites     Tobacco use disorder 08/22/2012     Unspecified asthma(493.90) 12/15/2010       Past Surgical History:   Procedure Laterality Date     benign tumors removed from back       CATARACT EXTRACTION Right 06/21/2022     CATARACT EXTRACTION Left 07/12/2022      CLOSED REDUCTION, PERCUTANEOUS PINNING HIP Right 02/24/2019    Procedure: Percutaneous Screw Fixation of Right Hip Fracture;  Surgeon: Amrik Kolb DO;  Location: [facility]     COLON SURGERY N/A     HX: Total colectomy with J-pouch reconstruction.      ENDOSCOPY UPPER, COLONOSCOPY, COMBINED N/A 09/05/2014    Procedure: COMBINED ENDOSCOPY UPPER, COLONOSCOPY;  Surgeon: Delbert Patel MD;  Location: [facility]     ENDOSCOPY UPPER, COLONOSCOPY, COMBINED N/A 05/09/2019    Procedure: UPPER ENDOSCOPY  WITH BIOPSIES AND  COLONOSCOPY WITH BIOPSIES;  Surgeon: Tai Diaz MD;  Location: [facility]     ESOPHAGOSCOPY, GASTROSCOPY, DUODENOSCOPY (EGD), COMBINED N/A 12/11/2015    Procedure: COMBINED ESOPHAGOSCOPY, GASTROSCOPY, DUODENOSCOPY (EGD);  Surgeon: Delbert Patel MD;  Location: [facility]     ESOPHAGOSCOPY, GASTROSCOPY, DUODENOSCOPY (EGD), COMBINED N/A 12/18/2017    Procedure: COMBINED ESOPHAGOSCOPY, GASTROSCOPY, DUODENOSCOPY (EGD);  UPPER ENDOSCOPY WITH BIOPSY;  Surgeon: Delbert Patel MD;  Location: [facility]     excised-benign  2002    tongue lesion     HC REPAIR OF NASAL SEPTUM  2002    Deviated nasal septum     LAPAROSCOPIC CHOLECYSTECTOMY       lumpectomy Right breast      Breast Lump     MOUTH SURGERY      removal of spot from roof of mouth     pilonidal cyst surgery  1976     removal of colon and large intestine  2000    Familial polyposis     REMOVE HARDWARE ARTHROPLASTY HIP Right 07/20/2022    Procedure: Right Hip Hardware Removal (Cannulated Screws);  Surgeon: Luis Fernando Marcial MD;  Location: [facility]     Right etopic pregnancy      Pregnancy     TONSILLECTOMY      tonsillitus     UPPER GI ENDOSCOPY  2009    Stomach polyps       Allergies   Allergen Reactions     Codeine Swelling     Throat swelling-Patient can tolerate Hydrocodone & morphine     Nortriptyline Other (See Comments)     Crying        Current Outpatient Medications   Medication     albuterol (PROAIR HFA/PROVENTIL HFA/VENTOLIN HFA) 108 (90 Base) MCG/ACT  inhaler     alendronate (FOSAMAX) 70 MG tablet     allopurinol (ZYLOPRIM) 100 MG tablet     amoxicillin-clavulanate (AUGMENTIN) 875-125 MG tablet     atorvastatin (LIPITOR) 20 MG tablet     calcium carbonate 750 MG CHEW     Calcium Citrate-Vitamin D (CALCIUM + D PO)     cetirizine (ZYRTEC) 10 MG tablet     ciprofloxacin (CETRAXAL) 0.2 % otic solution     dicyclomine (BENTYL) 10 MG capsule     diphenoxylate-atropine (LOMOTIL) 2.5-0.025 MG tablet     ferrous sulfate (IRON) 325 (65 FE) MG tablet     gabapentin (NEURONTIN) 400 MG capsule     GNP VITAMIN D MAXIMUM STRENGTH 50 MCG (2000 UT) tablet     ibuprofen (ADVIL,MOTRIN) 200 MG tablet     ketotifen (ZADITOR/REFRESH ANTI-ITCH) 0.025 % SOLN ophthalmic solution     loperamide (IMODIUM) 2 MG capsule     magnesium 100 MG CAPS     montelukast (SINGULAIR) 10 MG tablet     Multiple Vitamin (MULTIVITAMIN) per tablet     omeprazole (PRILOSEC) 40 MG DR capsule     potassium chloride ER (KLOR-CON M) 20 MEQ CR tablet     pramipexole (MIRAPEX) 0.125 MG tablet     predniSONE (DELTASONE) 5 MG tablet     PSYLLIUM PO     Simethicone 125 MG CAPS     spironolactone (ALDACTONE) 50 MG tablet     vitamin E 200 UNIT capsule     WIXELA INHUB 250-50 MCG/ACT inhaler     diclofenac (VOLTAREN) 75 MG EC tablet     No current facility-administered medications for this visit.       HABITS:    Social History     Tobacco Use     Smoking status: Former     Packs/day: 0.50     Years: 40.00     Pack years: 20.00     Types: Cigarettes     Smokeless tobacco: Never   Substance Use Topics     Alcohol use: Not Currently     Comment: Weekly 3 drinks     Drug use: No       Family History   Problem Relation Age of Onset     Other - See Comments Father         Atrial Fibrillation     Cancer Father         bladder ca     Colon Polyps Father      Heart Disease Father         Pacemaker     Rheumatoid Arthritis Father      C.A.D. Father 75        CABG     Chronic Obstructive Pulmonary Disease Mother      Heart  "Disease Mother         Pacemaker     Other - See Comments Mother         dementia     C.A.D. Mother 70        CABG     C.A.D. Maternal Grandmother      Unknown/Adopted Maternal Grandfather      Unknown/Adopted Paternal Grandmother      Unknown/Adopted Paternal Grandfather      Asthma Sister      Cerebrovascular Disease Sister      Gastrointestinal Disease Sister         peptic ulcers     Hypertension Sister      Gastrointestinal Disease Sister         stomach tumors     Rheumatoid Arthritis Sister      Cancer Sister         facial cancer     Asthma Sister      Cancer Maternal Aunt         renal ca       REVIEW OF SYSTEMS:  Ten point review of systems negative except those mentioned in the HPI.     OBJECTIVE:    /68   Pulse 92   Temp 98.5  F (36.9  C)   Ht 1.549 m (5' 1\")   Wt 54.4 kg (120 lb)   SpO2 98%   BMI 22.67 kg/m      GENERAL: Generally appears well, in no distress with appropriate affect.  HEENT:   Sclerae anicteric - normocephalic atraumatic   Respiratory:  No acute distress, no splinting   Cardiovascular:  Regular Rate and Rhythm  Abdomen: nodular lesion right at superior aspect of prior scar, there is no pit noted along midline.   :  deferred  Extremities:  Extremities normal. No deformities, edema, or skin discoloration.  Skin:  no suspicious lesions or rashes  Neurological: grossly intact  Psych:  Alert, oriented, affect appropriate with normal decision making ability.    IMPRESSION:    Possible recurrent pilonidal disease. There is no obvious pit on exam, possible foreign body reaction from prior surgery if any perminent suture was used. She has very little subcutaneous tissue. No signs of active infection. Would advise holding on any operative intervention at this point. She is actually relieved to hear this. If recurrence of more symptomatic will plan to proceed with open debridement.      PLAN:    See above   Follow up PRN.     Al Howe MD,     10/11/2022  8:33 AM      "

## 2022-10-30 ENCOUNTER — HEALTH MAINTENANCE LETTER (OUTPATIENT)
Age: 67
End: 2022-10-30

## 2022-10-31 DIAGNOSIS — E87.6 HYPOKALEMIA: ICD-10-CM

## 2022-10-31 DIAGNOSIS — M10.041 IDIOPATHIC GOUT OF RIGHT HAND, UNSPECIFIED CHRONICITY: ICD-10-CM

## 2022-11-01 NOTE — TELEPHONE ENCOUNTER
allopurinol (ZYLOPRIM) 100 MG tablet      Last Written Prescription Date:  2-22-22  Last Fill Quantity: 180,   # refills: 1  Last Office Visit: 6-9-22  Future Office visit:    Next 5 appointments (look out 90 days)    Jan 16, 2023 11:30 AM  (Arrive by 11:15 AM)  SHORT with Valente Deluca DO  [facility] ([facility] ) [address]  [facility] [id number] [phone]           Routing refill request to provider for review/approval because:   Has Uric Acid on file in past 12 months and value is less than 6

## 2022-11-02 RX ORDER — ALLOPURINOL 100 MG/1
100 TABLET ORAL 2 TIMES DAILY
Qty: 180 TABLET | Refills: 1 | Status: SHIPPED | OUTPATIENT
Start: 2022-11-02 | End: 2023-01-26

## 2022-11-02 RX ORDER — SPIRONOLACTONE 50 MG/1
50 TABLET, FILM COATED ORAL 2 TIMES DAILY
Qty: 180 TABLET | Refills: 1 | OUTPATIENT
Start: 2022-11-02

## 2022-11-03 DIAGNOSIS — K90.9 DIARRHEA DUE TO MALABSORPTION: ICD-10-CM

## 2022-11-03 DIAGNOSIS — G25.81 RESTLESS LEGS SYNDROME (RLS): ICD-10-CM

## 2022-11-03 DIAGNOSIS — R19.7 DIARRHEA DUE TO MALABSORPTION: ICD-10-CM

## 2022-11-03 RX ORDER — PRAMIPEXOLE DIHYDROCHLORIDE 0.12 MG/1
TABLET ORAL
Qty: 90 TABLET | Refills: 0 | Status: SHIPPED | OUTPATIENT
Start: 2022-11-03 | End: 2023-01-26

## 2022-11-03 RX ORDER — DIPHENOXYLATE HCL/ATROPINE 2.5-.025MG
1-2 TABLET ORAL 4 TIMES DAILY PRN
Qty: 120 TABLET | Refills: 0 | Status: SHIPPED | OUTPATIENT
Start: 2022-11-03 | End: 2023-01-05

## 2022-11-03 NOTE — TELEPHONE ENCOUNTER
diphenoxylate-atropine (LOMOTIL) 2.5-0.025 MG tablet      Last Written Prescription Date:  8-4-22  Last Fill Quantity: 120,   # refills: 0  Last Office Visit: 6-9-22  Future Office visit:    Next 5 appointments (look out 90 days)    Jan 16, 2023 11:30 AM  (Arrive by 11:15 AM)  SHORT with Valente Deluca DO  [facility] ([facility] ) [address]  [facility] [id number] [phone]           Routing refill request to provider for review/approval because:  Drug not on the [facility], P or [facility] refill protocol or controlled substance

## 2022-11-03 NOTE — TELEPHONE ENCOUNTER
pramipexole 0.125 mg tablet       Last Written Prescription Date:  6/9/22  Last Fill Quantity: 90,   # refills: 0  Last Office Visit: 10/7/22 - Debbiemi   Future Office visit:    Next 5 appointments (look out 90 days)    Jan 16, 2023 11:30 AM  (Arrive by 11:15 AM)  SHORT with Valente Deluca DO  [facility] ([facility] ) [address]  [facility] [phone]           Routing refill request to provider for review/approval because:    Antiparkinson's Agents Protocol Failed 11/03/2022 11:39 AM   Protocol Details  Recent (6 mo) or future (30 days) visit within the authorizing provider's specialty

## 2022-12-06 DIAGNOSIS — M35.3 PMR (POLYMYALGIA RHEUMATICA) (H): ICD-10-CM

## 2022-12-07 RX ORDER — GABAPENTIN 400 MG/1
CAPSULE ORAL
Qty: 270 CAPSULE | Refills: 4 | Status: SHIPPED | OUTPATIENT
Start: 2022-12-07 | End: 2023-01-26

## 2022-12-07 NOTE — TELEPHONE ENCOUNTER
gabapentin 400 mg capsule        Last Written Prescription Date:  4/13/22  Last Fill Quantity: 270,   # refills: 4  Last Office Visit: 6/9/22  Future Office visit:    Next 5 appointments (look out 90 days)    Jan 16, 2023 11:30 AM  (Arrive by 11:15 AM)  SHORT with Valente Deluca DO  [facility] ([facility] ) [address]  [facility] [id number] [phone]           Routing refill request to provider for review/approval because:    Drug not on the [facility], [facility] or [facility] refill protocol or controlled substance

## 2022-12-19 DIAGNOSIS — J45.40 MODERATE PERSISTENT ASTHMA, UNSPECIFIED WHETHER COMPLICATED: ICD-10-CM

## 2022-12-19 DIAGNOSIS — J45.40 MODERATE PERSISTENT ASTHMA WITHOUT COMPLICATION: ICD-10-CM

## 2022-12-20 RX ORDER — ALBUTEROL SULFATE 90 UG/1
AEROSOL, METERED RESPIRATORY (INHALATION)
Qty: 18 G | Refills: 2 | Status: SHIPPED | OUTPATIENT
Start: 2022-12-20 | End: 2023-03-28

## 2022-12-20 RX ORDER — FLUTICASONE PROPIONATE AND SALMETEROL 250; 50 UG/1; UG/1
POWDER RESPIRATORY (INHALATION)
Qty: 60 EACH | Refills: 1 | Status: SHIPPED | OUTPATIENT
Start: 2022-12-20 | End: 2023-03-28

## 2022-12-20 NOTE — TELEPHONE ENCOUNTER
Albuterol (ProAir HFA/Proventil HFA/Ventolin HFA) 108 (90 Base) MCG/ACT inhaler   Last Written Prescription Date:  10/4/22  Last Fill Quantity: 18,   # refills: 2  Last Office Visit: 10/7/22  Future Office visit:    Next 5 appointments (look out 90 days)    Jan 16, 2023 11:30 AM  (Arrive by 11:15 AM)  SHORT with Valente Deluca DO  [facility] ([facility] ) [address]  [place] [phone]

## 2023-01-03 DIAGNOSIS — K90.9 DIARRHEA DUE TO MALABSORPTION: ICD-10-CM

## 2023-01-03 DIAGNOSIS — R19.7 DIARRHEA DUE TO MALABSORPTION: ICD-10-CM

## 2023-01-05 ENCOUNTER — OFFICE VISIT (OUTPATIENT)
Dept: FAMILY MEDICINE | Facility: OTHER | Age: 68
End: 2023-01-05
Attending: FAMILY MEDICINE
Payer: COMMERCIAL

## 2023-01-05 VITALS
WEIGHT: 115 LBS | TEMPERATURE: 97.6 F | DIASTOLIC BLOOD PRESSURE: 62 MMHG | BODY MASS INDEX: 21.71 KG/M2 | OXYGEN SATURATION: 98 % | HEIGHT: 61 IN | HEART RATE: 85 BPM | SYSTOLIC BLOOD PRESSURE: 108 MMHG

## 2023-01-05 DIAGNOSIS — M10.9 GOUT INVOLVING TOE OF RIGHT FOOT, UNSPECIFIED CAUSE, UNSPECIFIED CHRONICITY: ICD-10-CM

## 2023-01-05 DIAGNOSIS — M79.89 OTHER SPECIFIED SOFT TISSUE DISORDERS: ICD-10-CM

## 2023-01-05 DIAGNOSIS — R73.9 BLOOD GLUCOSE ELEVATED: ICD-10-CM

## 2023-01-05 DIAGNOSIS — I99.8 VASCULAR INSUFFICIENCY OF EXTREMITY: ICD-10-CM

## 2023-01-05 DIAGNOSIS — M81.0 OSTEOPOROSIS WITHOUT CURRENT PATHOLOGICAL FRACTURE, UNSPECIFIED OSTEOPOROSIS TYPE: ICD-10-CM

## 2023-01-05 DIAGNOSIS — M35.3 PMR (POLYMYALGIA RHEUMATICA) (H): Primary | ICD-10-CM

## 2023-01-05 DIAGNOSIS — G25.81 RESTLESS LEGS SYNDROME: ICD-10-CM

## 2023-01-05 LAB
ANION GAP SERPL CALCULATED.3IONS-SCNC: 11 MMOL/L (ref 7–15)
BUN SERPL-MCNC: 25 MG/DL (ref 8–23)
CALCIUM SERPL-MCNC: 9.7 MG/DL (ref 8.8–10.2)
CHLORIDE SERPL-SCNC: 101 MMOL/L (ref 98–107)
CREAT SERPL-MCNC: 1.06 MG/DL (ref 0.51–0.95)
CRP SERPL-MCNC: 5.35 MG/L
DEPRECATED HCO3 PLAS-SCNC: 25 MMOL/L (ref 22–29)
ERYTHROCYTE [SEDIMENTATION RATE] IN BLOOD BY WESTERGREN METHOD: 7 MM/HR (ref 0–30)
EST. AVERAGE GLUCOSE BLD GHB EST-MCNC: 108 MG/DL
GFR SERPL CREATININE-BSD FRML MDRD: 57 ML/MIN/1.73M2
GLUCOSE SERPL-MCNC: 109 MG/DL (ref 70–99)
HBA1C MFR BLD: 5.4 %
MAGNESIUM SERPL-MCNC: 1.9 MG/DL (ref 1.7–2.3)
POTASSIUM SERPL-SCNC: 4.8 MMOL/L (ref 3.4–5.3)
SODIUM SERPL-SCNC: 137 MMOL/L (ref 136–145)
URATE SERPL-MCNC: 4.3 MG/DL (ref 2.4–5.7)

## 2023-01-05 PROCEDURE — 99214 OFFICE O/P EST MOD 30 MIN: CPT | Performed by: FAMILY MEDICINE

## 2023-01-05 PROCEDURE — 82306 VITAMIN D 25 HYDROXY: CPT | Mod: ZL | Performed by: FAMILY MEDICINE

## 2023-01-05 PROCEDURE — 36415 COLL VENOUS BLD VENIPUNCTURE: CPT | Mod: ZL | Performed by: FAMILY MEDICINE

## 2023-01-05 PROCEDURE — 80048 BASIC METABOLIC PNL TOTAL CA: CPT | Mod: ZL | Performed by: FAMILY MEDICINE

## 2023-01-05 PROCEDURE — 85652 RBC SED RATE AUTOMATED: CPT | Mod: ZL | Performed by: FAMILY MEDICINE

## 2023-01-05 PROCEDURE — 83735 ASSAY OF MAGNESIUM: CPT | Mod: ZL | Performed by: FAMILY MEDICINE

## 2023-01-05 PROCEDURE — 86140 C-REACTIVE PROTEIN: CPT | Mod: ZL | Performed by: FAMILY MEDICINE

## 2023-01-05 PROCEDURE — G0463 HOSPITAL OUTPT CLINIC VISIT: HCPCS

## 2023-01-05 PROCEDURE — 83036 HEMOGLOBIN GLYCOSYLATED A1C: CPT | Mod: ZL | Performed by: FAMILY MEDICINE

## 2023-01-05 PROCEDURE — 84550 ASSAY OF BLOOD/URIC ACID: CPT | Mod: ZL | Performed by: FAMILY MEDICINE

## 2023-01-05 RX ORDER — DIPHENOXYLATE HCL/ATROPINE 2.5-.025MG
1-2 TABLET ORAL 4 TIMES DAILY PRN
Qty: 120 TABLET | Refills: 0 | Status: SHIPPED | OUTPATIENT
Start: 2023-01-05 | End: 2023-01-26

## 2023-01-05 ASSESSMENT — ASTHMA QUESTIONNAIRES
QUESTION_4 LAST FOUR WEEKS HOW OFTEN HAVE YOU USED YOUR RESCUE INHALER OR NEBULIZER MEDICATION (SUCH AS ALBUTEROL): TWO OR THREE TIMES PER WEEK
QUESTION_2 LAST FOUR WEEKS HOW OFTEN HAVE YOU HAD SHORTNESS OF BREATH: ONCE A DAY
QUESTION_5 LAST FOUR WEEKS HOW WOULD YOU RATE YOUR ASTHMA CONTROL: SOMEWHAT CONTROLLED
QUESTION_1 LAST FOUR WEEKS HOW MUCH OF THE TIME DID YOUR ASTHMA KEEP YOU FROM GETTING AS MUCH DONE AT WORK, SCHOOL OR AT HOME: NONE OF THE TIME
QUESTION_3 LAST FOUR WEEKS HOW OFTEN DID YOUR ASTHMA SYMPTOMS (WHEEZING, COUGHING, SHORTNESS OF BREATH, CHEST TIGHTNESS OR PAIN) WAKE YOU UP AT NIGHT OR EARLIER THAN USUAL IN THE MORNING: ONCE OR TWICE
ACT_TOTALSCORE: 17
ACT_TOTALSCORE: 17

## 2023-01-05 ASSESSMENT — PATIENT HEALTH QUESTIONNAIRE - PHQ9: SUM OF ALL RESPONSES TO PHQ QUESTIONS 1-9: 3

## 2023-01-05 ASSESSMENT — ANXIETY QUESTIONNAIRES
5. BEING SO RESTLESS THAT IT IS HARD TO SIT STILL: NOT AT ALL
1. FEELING NERVOUS, ANXIOUS, OR ON EDGE: NOT AT ALL
6. BECOMING EASILY ANNOYED OR IRRITABLE: NOT AT ALL
GAD7 TOTAL SCORE: 3
4. TROUBLE RELAXING: NOT AT ALL
7. FEELING AFRAID AS IF SOMETHING AWFUL MIGHT HAPPEN: NOT AT ALL
IF YOU CHECKED OFF ANY PROBLEMS ON THIS QUESTIONNAIRE, HOW DIFFICULT HAVE THESE PROBLEMS MADE IT FOR YOU TO DO YOUR WORK, TAKE CARE OF THINGS AT HOME, OR GET ALONG WITH OTHER PEOPLE: NOT DIFFICULT AT ALL
2. NOT BEING ABLE TO STOP OR CONTROL WORRYING: MORE THAN HALF THE DAYS
GAD7 TOTAL SCORE: 3
3. WORRYING TOO MUCH ABOUT DIFFERENT THINGS: SEVERAL DAYS

## 2023-01-05 ASSESSMENT — PAIN SCALES - GENERAL: PAINLEVEL: MILD PAIN (2)

## 2023-01-05 NOTE — TELEPHONE ENCOUNTER
lomotil      Last Written Prescription Date:  11/3/22  Last Fill Quantity: 120,   # refills: -  Last Office Visit: 10/7/22  Future Office visit:    Next 5 appointments (look out 90 days)    Jan 05, 2023  1:00 PM  (Arrive by 12:45 PM)  Office Visit with Jose Guadalupe Lowe DO  [facility] [place] ([facility] [place] ) [address]  [place] [address]  [phone]           Routing refill request to provider for review/approval because:  Drug not on the [facility], [facility] or [facility] refill protocol or controlled substance

## 2023-01-05 NOTE — PROGRESS NOTES
Assessment & Plan     PMR (polymyalgia rheumatica) (H)  - ESR: Erythrocyte sedimentation rate  - CRP, inflammation    Osteoporosis without current pathological fracture, unspecified osteoporosis type  - Basic metabolic panel  - Magnesium  - Vitamin D Deficiency    Blood glucose elevated  - Hemoglobin A1c    Restless legs syndrome    Gout involving toe of right foot, unspecified cause, unspecified chronicity  - Uric acid    Other specified soft tissue disorders / Vascular insufficiency of extremity    - US CHANEL Doppler No Exercise; Future    Labs.  Check CHANEL.  Start taking meds regularly.  Follow-up 1 month.    DAYSI LUCIANO, DO  [facility] [place] is a 67 year old, presenting for the following health issues:  Establish Care      HPI     Couple weeks ago outside smoking right great toe likely got frostbite - getting better - but slower than expected - distal tip still has a sore that hasn't healed.  Distal feet always get cold easy chronically.    Years ago nose surgery at [facility], year or two later hole in nose, gets infected occasionally.  Also infected tooth.  Xmas sore on nose down onto lip.  Also getting pimples on butt, some big boils.    Gets occasional right AOE if showers wrong and uses cipro drops.    Iron for RLS, bothers her in waves.  Only takes bid instead of tid.    Asthma - not taking her inhaler regularly, then uses her rescue inhaler more.      PMR - 5mg, hasn't been able to get lower.    Gout right great toe, last flare about 5 yrs ago.    Asthma Follow-Up    Was ACT completed today?    Yes    ACT Total Scores 1/5/2023   ACT TOTAL SCORE -   ASTHMA ER VISITS -   ASTHMA HOSPITALIZATIONS -   ACT TOTAL SCORE (Goal Greater than or Equal to 20) 17   In the past 12 months, how many times did you visit the emergency room for your asthma without being admitted to the hospital? 0   In the past 12 months, how many times were you hospitalized overnight because of  "your asthma? 0       How many days per week do you miss taking your asthma controller medication?  2    Please describe any recent triggers for your asthma: exercise or sports and cold air    Have you had any Emergency Room Visits, Urgent Care Visits, or Hospital Admissions since your last office visit?  No    Hypothyroidism Follow-up      Since last visit, patient describes the following symptoms: Weight stable, no hair loss, no skin changes, no constipation, no loose stools and anxiety        Concern - cold feet- has red spot on right great toe, years ago had nasal surgery and where stitches were there is a whole, infected tooth, pimples on butt keep occurring, and a sore on nose  Onset: years  Description: as above  Intensity: moderate  Progression of Symptoms:  worsening  Accompanying Signs & Symptoms: none  Previous history of similar problem: has seen ENT and gave her cream to apply and did not heal  Precipitating factors:        Worsened by: nothing  Alleviating factors:        Improved by: nothing  Therapies tried and outcome:  none         Review of Systems   Constitutional: Negative for fever.   Respiratory: Positive for shortness of breath.    Cardiovascular: Negative for peripheral edema.   Gastrointestinal: Negative for abdominal pain.   Neurological: Negative for light-headedness.            Objective    /62   Pulse 85   Temp 97.6  F (36.4  C)   Ht 1.549 m (5' 1\")   Wt 52.2 kg (115 lb)   SpO2 98%   BMI 21.73 kg/m    Body mass index is 21.73 kg/m .  Physical Exam  Constitutional:       General: She is not in acute distress.     Appearance: Normal appearance.   HENT:      Head: Normocephalic and atraumatic.      Right Ear: Tympanic membrane normal.      Left Ear: Tympanic membrane normal.      Mouth/Throat:      Mouth: Mucous membranes are moist.      Pharynx: Oropharynx is clear.   Eyes:      Conjunctiva/sclera: Conjunctivae normal.      Pupils: Pupils are equal, round, and reactive to " light.   Neck:      Vascular: No carotid bruit.   Cardiovascular:      Rate and Rhythm: Normal rate and regular rhythm.      Heart sounds: Normal heart sounds. No murmur heard.  Pulmonary:      Effort: Pulmonary effort is normal.      Breath sounds: Normal breath sounds. No wheezing.   Musculoskeletal:      Right lower leg: No edema.      Left lower leg: No edema.   Lymphadenopathy:      Cervical: No cervical adenopathy.   Skin:     Comments: Tip of right great toe is discolored, slightly scabbed, appears to be a healing wound/blister.  Cap refill in other toes 3 seconds, DP pulses palpable when seem somewhat weak.   Neurological:      Mental Status: She is alert and oriented to person, place, and time.

## 2023-01-07 LAB — DEPRECATED CALCIDIOL+CALCIFEROL SERPL-MC: 47 UG/L (ref 20–75)

## 2023-01-16 ASSESSMENT — ENCOUNTER SYMPTOMS
ABDOMINAL PAIN: 0
SHORTNESS OF BREATH: 1
LIGHT-HEADEDNESS: 0
FEVER: 0

## 2023-01-18 ENCOUNTER — HOSPITAL ENCOUNTER (OUTPATIENT)
Dept: ULTRASOUND IMAGING | Facility: HOSPITAL | Age: 68
Discharge: HOME | End: 2023-01-18
Attending: FAMILY MEDICINE | Admitting: FAMILY MEDICINE
Payer: COMMERCIAL

## 2023-01-18 DIAGNOSIS — I99.8 VASCULAR INSUFFICIENCY OF EXTREMITY: ICD-10-CM

## 2023-01-18 DIAGNOSIS — M79.89 OTHER SPECIFIED SOFT TISSUE DISORDERS: ICD-10-CM

## 2023-01-18 PROCEDURE — 93922 UPR/L XTREMITY ART 2 LEVELS: CPT

## 2023-01-26 ENCOUNTER — OFFICE VISIT (OUTPATIENT)
Dept: FAMILY MEDICINE | Facility: OTHER | Age: 68
End: 2023-01-26
Attending: FAMILY MEDICINE
Payer: COMMERCIAL

## 2023-01-26 VITALS
SYSTOLIC BLOOD PRESSURE: 96 MMHG | OXYGEN SATURATION: 97 % | HEIGHT: 61 IN | RESPIRATION RATE: 18 BRPM | TEMPERATURE: 98.6 F | HEART RATE: 78 BPM | DIASTOLIC BLOOD PRESSURE: 66 MMHG | WEIGHT: 120.9 LBS | BODY MASS INDEX: 22.83 KG/M2

## 2023-01-26 DIAGNOSIS — M10.041 IDIOPATHIC GOUT OF RIGHT HAND, UNSPECIFIED CHRONICITY: ICD-10-CM

## 2023-01-26 DIAGNOSIS — J45.40 MODERATE PERSISTENT ASTHMA WITHOUT COMPLICATION: ICD-10-CM

## 2023-01-26 DIAGNOSIS — R19.7 DIARRHEA DUE TO MALABSORPTION: ICD-10-CM

## 2023-01-26 DIAGNOSIS — Z90.49 ACQUIRED ABSENCE OF OTHER SPECIFIED PARTS OF DIGESTIVE TRACT: ICD-10-CM

## 2023-01-26 DIAGNOSIS — M81.0 SENILE OSTEOPOROSIS: ICD-10-CM

## 2023-01-26 DIAGNOSIS — G25.81 RESTLESS LEGS SYNDROME: ICD-10-CM

## 2023-01-26 DIAGNOSIS — M35.3 PMR (POLYMYALGIA RHEUMATICA) (H): ICD-10-CM

## 2023-01-26 DIAGNOSIS — E78.1 HYPERTRIGLYCERIDEMIA: ICD-10-CM

## 2023-01-26 DIAGNOSIS — K21.00 GASTROESOPHAGEAL REFLUX DISEASE WITH ESOPHAGITIS WITHOUT HEMORRHAGE: ICD-10-CM

## 2023-01-26 DIAGNOSIS — R09.82 POST-NASAL DRIP: ICD-10-CM

## 2023-01-26 DIAGNOSIS — K90.9 DIARRHEA DUE TO MALABSORPTION: ICD-10-CM

## 2023-01-26 DIAGNOSIS — E87.6 HYPOKALEMIA: ICD-10-CM

## 2023-01-26 PROCEDURE — 99214 OFFICE O/P EST MOD 30 MIN: CPT | Performed by: FAMILY MEDICINE

## 2023-01-26 PROCEDURE — G0463 HOSPITAL OUTPT CLINIC VISIT: HCPCS

## 2023-01-26 RX ORDER — ALENDRONATE SODIUM 70 MG/1
70 TABLET ORAL
Qty: 12 TABLET | Refills: 3 | Status: SHIPPED | OUTPATIENT
Start: 2023-01-26 | End: 2023-12-29

## 2023-01-26 RX ORDER — GABAPENTIN 400 MG/1
1200 CAPSULE ORAL 2 TIMES DAILY
Qty: 540 CAPSULE | Refills: 1 | Status: SHIPPED | OUTPATIENT
Start: 2023-01-26 | End: 2023-06-09

## 2023-01-26 RX ORDER — DIPHENOXYLATE HCL/ATROPINE 2.5-.025MG
1-2 TABLET ORAL 4 TIMES DAILY PRN
Qty: 120 TABLET | Refills: 0 | Status: SHIPPED | OUTPATIENT
Start: 2023-01-26 | End: 2023-06-21

## 2023-01-26 RX ORDER — CHOLECALCIFEROL (VITAMIN D3) 50 MCG
1 TABLET ORAL DAILY
Status: ON HOLD | COMMUNITY
Start: 2023-01-26 | End: 2023-02-20

## 2023-01-26 RX ORDER — OMEPRAZOLE 40 MG/1
40 CAPSULE, DELAYED RELEASE ORAL DAILY
Qty: 90 CAPSULE | Refills: 1 | Status: SHIPPED | OUTPATIENT
Start: 2023-01-26 | End: 2023-07-20

## 2023-01-26 RX ORDER — ATORVASTATIN CALCIUM 20 MG/1
20 TABLET, FILM COATED ORAL DAILY
Qty: 90 TABLET | Refills: 1 | Status: SHIPPED | OUTPATIENT
Start: 2023-01-26 | End: 2023-07-20

## 2023-01-26 RX ORDER — PRAMIPEXOLE DIHYDROCHLORIDE 0.12 MG/1
0.12 TABLET ORAL AT BEDTIME
Qty: 90 TABLET | Refills: 1 | Status: SHIPPED | OUTPATIENT
Start: 2023-01-26 | End: 2023-03-28

## 2023-01-26 RX ORDER — DICYCLOMINE HYDROCHLORIDE 10 MG/1
20 CAPSULE ORAL
Qty: 540 CAPSULE | Refills: 1 | Status: SHIPPED | OUTPATIENT
Start: 2023-01-26 | End: 2023-10-02

## 2023-01-26 RX ORDER — SPIRONOLACTONE 50 MG/1
50 TABLET, FILM COATED ORAL 2 TIMES DAILY
Qty: 180 TABLET | Refills: 1 | Status: SHIPPED | OUTPATIENT
Start: 2023-01-26 | End: 2023-07-20

## 2023-01-26 RX ORDER — POTASSIUM CHLORIDE 1500 MG/1
20 TABLET, EXTENDED RELEASE ORAL 3 TIMES DAILY
Qty: 90 TABLET | Refills: 3 | Status: SHIPPED | OUTPATIENT
Start: 2023-01-26 | End: 2023-07-20

## 2023-01-26 RX ORDER — MONTELUKAST SODIUM 10 MG/1
10 TABLET ORAL AT BEDTIME
Qty: 90 TABLET | Refills: 1 | Status: SHIPPED | OUTPATIENT
Start: 2023-01-26 | End: 2023-08-22

## 2023-01-26 RX ORDER — ALLOPURINOL 100 MG/1
100 TABLET ORAL 2 TIMES DAILY
Qty: 180 TABLET | Refills: 1 | Status: SHIPPED | OUTPATIENT
Start: 2023-01-26 | End: 2023-07-20

## 2023-01-26 RX ORDER — PREDNISONE 1 MG/1
4.5 TABLET ORAL DAILY PRN
Qty: 135 TABLET | Refills: 1 | Status: SHIPPED | OUTPATIENT
Start: 2023-01-26 | End: 2023-03-28

## 2023-01-26 ASSESSMENT — PAIN SCALES - GENERAL: PAINLEVEL: NO PAIN (1)

## 2023-01-26 NOTE — PROGRESS NOTES
Assessment & Plan     PMR (polymyalgia rheumatica) (H)  - predniSONE (DELTASONE) 1 MG tablet; Take 4.5 tablets (4.5 mg) by mouth daily as needed (PMR)  - gabapentin (NEURONTIN) 400 MG capsule; Take 3 capsules (1,200 mg) by mouth 2 times daily    Restless legs syndrome  - pramipexole (MIRAPEX) 0.125 MG tablet; Take 1 tablet (0.125 mg) by mouth At Bedtime  - potassium chloride ER (KLOR-CON M) 20 MEQ CR tablet; Take 1 tablet (20 mEq) by mouth 3 times daily    Hypokalemia  - spironolactone (ALDACTONE) 50 MG tablet; Take 1 tablet (50 mg) by mouth 2 times daily    Gastroesophageal reflux disease with esophagitis without hemorrhage  - omeprazole (PRILOSEC) 40 MG DR capsule; Take 1 capsule (40 mg) by mouth daily    Post-nasal drip  - montelukast (SINGULAIR) 10 MG tablet; Take 1 tablet (10 mg) by mouth At Bedtime    Moderate persistent asthma without complication    Acquired absence of other specified parts of digestive tract  - dicyclomine (BENTYL) 10 MG capsule; Take 2 capsules (20 mg) by mouth 3 times daily (before meals)    Hypertriglyceridemia  - atorvastatin (LIPITOR) 20 MG tablet; Take 1 tablet (20 mg) by mouth daily    Idiopathic gout of right hand, unspecified chronicity  - allopurinol (ZYLOPRIM) 100 MG tablet; Take 1 tablet (100 mg) by mouth 2 times daily    Senile osteoporosis  - alendronate (FOSAMAX) 70 MG tablet; Take 1 tablet (70 mg) by mouth every 7 days    Diarrhea due to malabsorption  - diphenoxylate-atropine (LOMOTIL) 2.5-0.025 MG tablet; Take 1-2 tablets by mouth 4 times daily as needed for diarrhea      Currently on Prednisone 5mg for her PMR, states past wean attempts no help, but wean was fairly aggressive by her report.  We'll try to decrease to 4.5 mg and see her back in 2 months.      >30  minutes spent on date of service F2F updating meds, discussed labs, discuss plan for her meds, etc.    DAYSI LUCIANO, DO  [facility] [place]   Chyna is a 67 year old, presenting for  "the following health issues:  RECHECK and Recheck Medication      HPI     Here for follow-up visit.  Brings in pill bottles, so med list updated, refills sent in, but didn't bring in inhalers so not confirmed correct yet.    Taking meds regularly as prescribed, doesn't notice any improvement in her RLS taking her gabapentin tid instead of bid, so going back to bid.    Discussed labs from last visit.    Concern - PMR  Onset: March 2017  Description: Shoulders, hip, groin, very painful could not move muscles and limbs.  Intensity: At times can be severe.  Progression of Symptoms:  same  Accompanying Signs & Symptoms: Pain, unable to move  Previous history of similar problem: yes for a few years  Precipitating factors:        Worsened by: no  Alleviating factors:        Improved by: Prednisone  Therapies tried and outcome:none    Patient present with her medications from home to be checked by Doctor.      Review of Systems       Objective    BP 96/66   Pulse 78   Temp 98.6  F (37  C) (Tympanic)   Resp 18   Ht 1.549 m (5' 1\")   Wt 54.8 kg (120 lb 14.4 oz)   SpO2 97%   BMI 22.84 kg/m    Body mass index is 22.84 kg/m .  Physical Exam  Constitutional:       General: She is not in acute distress.     Appearance: Normal appearance.   Pulmonary:      Effort: Pulmonary effort is normal.   Musculoskeletal:      Right lower leg: No edema.      Left lower leg: No edema.   Neurological:      Mental Status: She is alert and oriented to person, place, and time.                  "

## 2023-02-01 ENCOUNTER — OFFICE VISIT (OUTPATIENT)
Dept: FAMILY MEDICINE | Facility: OTHER | Age: 68
End: 2023-02-01
Attending: FAMILY MEDICINE
Payer: COMMERCIAL

## 2023-02-01 VITALS
RESPIRATION RATE: 18 BRPM | WEIGHT: 122.7 LBS | BODY MASS INDEX: 23.18 KG/M2 | OXYGEN SATURATION: 98 % | SYSTOLIC BLOOD PRESSURE: 104 MMHG | TEMPERATURE: 98.3 F | HEART RATE: 86 BPM | DIASTOLIC BLOOD PRESSURE: 74 MMHG

## 2023-02-01 DIAGNOSIS — S46.209A INJURY OF TENDON OF BICEPS: Primary | ICD-10-CM

## 2023-02-01 PROCEDURE — 99213 OFFICE O/P EST LOW 20 MIN: CPT | Performed by: FAMILY MEDICINE

## 2023-02-01 PROCEDURE — G0463 HOSPITAL OUTPT CLINIC VISIT: HCPCS

## 2023-02-01 ASSESSMENT — PAIN SCALES - GENERAL: PAINLEVEL: MILD PAIN (2)

## 2023-02-01 ASSESSMENT — ENCOUNTER SYMPTOMS
NUMBNESS: 0
PARESTHESIAS: 0

## 2023-02-01 NOTE — PROGRESS NOTES
Assessment & Plan     Injury of tendon of biceps  - MR Shoulder Right w/o Contrast; Future  - MR Elbow Right w/o Contrast; Future    Appears to have a biceps tendon injury, unclear if proximal vs distal.  Higher suspicion for proximal based on palpation, although does have bruising (minimal) elbow area.  Will obtain MRI as soft issue ultrasound not available at this facility.      DAYSI LUCIANO DO  [facility] [place] is a 67 year old presenting for the following health issues:  Musculoskeletal Problem (Right Bicep)      HPI     68yo female.  Two days ago on Monday she was pulling open a stuck door and and she felt a pain mid Right biceps area.  No pain since, no decreased ROM, no pain resisted ROM.  She had a biceps muscle bulge now that isn't present on the other side.    Concern - Right Bicep Swollen  Onset: 01/30/2023  Description: Stuck door, pulled open with right arm and felt like pulled muscle in arm, bicep very swollen.  Intensity: moderate  Progression of Symptoms:  same and constant  Accompanying Signs & Symptoms: Swelling, difficulty lifting arm  Previous history of similar problem: no  Precipitating factors:        Worsened by: lifting  Alleviating factors:        Improved by: ice  Therapies tried and outcome: None      Review of Systems   Neurological: Negative for numbness and paresthesias.        No  wakness            Objective    /74   Pulse 86   Temp 98.3  F (36.8  C) (Tympanic)   Resp 18   Wt 55.7 kg (122 lb 11.2 oz)   SpO2 98%   BMI 23.18 kg/m    Body mass index is 23.18 kg/m .  Physical Exam  Constitutional:       General: She is not in acute distress.     Appearance: Normal appearance.   Pulmonary:      Effort: Pulmonary effort is normal.   Musculoskeletal:      Comments: Bulge distal right biceps area.  Decreased muscle fullness proximally.  Small 2x4 cm very light bruise proximal lateral forearm.  No tenderness.  Normal AROM.    Neurological:      Mental Status: She is alert.      Sensory: No sensory deficit.

## 2023-02-16 ENCOUNTER — HOSPITAL ENCOUNTER (OUTPATIENT)
Dept: MRI IMAGING | Facility: HOSPITAL | Age: 68
Discharge: HOME | DRG: 536 | End: 2023-02-16
Attending: FAMILY MEDICINE
Payer: COMMERCIAL

## 2023-02-16 DIAGNOSIS — S46.209A INJURY OF TENDON OF BICEPS: ICD-10-CM

## 2023-02-16 PROCEDURE — 73221 MRI JOINT UPR EXTREM W/O DYE: CPT | Mod: RT

## 2023-02-16 PROCEDURE — 73221 MRI JOINT UPR EXTREM W/O DYE: CPT | Mod: RT,XS

## 2023-02-17 ENCOUNTER — HOSPITAL ENCOUNTER (INPATIENT)
Facility: HOSPITAL | Age: 68
LOS: 1 days | Discharge: HOME | DRG: 536 | End: 2023-02-20
Attending: FAMILY MEDICINE | Admitting: INTERNAL MEDICINE
Payer: COMMERCIAL

## 2023-02-17 ENCOUNTER — APPOINTMENT (OUTPATIENT)
Dept: GENERAL RADIOLOGY | Facility: HOSPITAL | Age: 68
DRG: 536 | End: 2023-02-17
Attending: FAMILY MEDICINE
Payer: COMMERCIAL

## 2023-02-17 DIAGNOSIS — K90.9 DIARRHEA DUE TO MALABSORPTION: ICD-10-CM

## 2023-02-17 DIAGNOSIS — R19.7 DIARRHEA DUE TO MALABSORPTION: ICD-10-CM

## 2023-02-17 DIAGNOSIS — S72.002A CLOSED FRACTURE OF LEFT HIP, INITIAL ENCOUNTER (H): ICD-10-CM

## 2023-02-17 PROCEDURE — 99284 EMERGENCY DEPT VISIT MOD MDM: CPT | Performed by: FAMILY MEDICINE

## 2023-02-17 PROCEDURE — 250N000011 HC RX IP 250 OP 636: Performed by: FAMILY MEDICINE

## 2023-02-17 PROCEDURE — G0378 HOSPITAL OBSERVATION PER HR: HCPCS

## 2023-02-17 PROCEDURE — 99222 1ST HOSP IP/OBS MODERATE 55: CPT | Mod: AI | Performed by: INTERNAL MEDICINE

## 2023-02-17 PROCEDURE — 250N000013 HC RX MED GY IP 250 OP 250 PS 637: Performed by: INTERNAL MEDICINE

## 2023-02-17 PROCEDURE — 96374 THER/PROPH/DIAG INJ IV PUSH: CPT

## 2023-02-17 PROCEDURE — 96372 THER/PROPH/DIAG INJ SC/IM: CPT | Performed by: INTERNAL MEDICINE

## 2023-02-17 PROCEDURE — 99285 EMERGENCY DEPT VISIT HI MDM: CPT | Mod: 25

## 2023-02-17 PROCEDURE — 96376 TX/PRO/DX INJ SAME DRUG ADON: CPT

## 2023-02-17 PROCEDURE — 73502 X-RAY EXAM HIP UNI 2-3 VIEWS: CPT

## 2023-02-17 PROCEDURE — 250N000011 HC RX IP 250 OP 636: Performed by: INTERNAL MEDICINE

## 2023-02-17 PROCEDURE — 250N000013 HC RX MED GY IP 250 OP 250 PS 637: Performed by: HOSPITALIST

## 2023-02-17 RX ORDER — LOPERAMIDE HCL 2 MG
6 CAPSULE ORAL 2 TIMES DAILY
Status: DISCONTINUED | OUTPATIENT
Start: 2023-02-17 | End: 2023-02-17

## 2023-02-17 RX ORDER — ALLOPURINOL 100 MG/1
100 TABLET ORAL 2 TIMES DAILY
Status: DISCONTINUED | OUTPATIENT
Start: 2023-02-17 | End: 2023-02-20 | Stop reason: HOSPADM

## 2023-02-17 RX ORDER — HEPARIN SODIUM 5000 [USP'U]/.5ML
5000 INJECTION, SOLUTION INTRAVENOUS; SUBCUTANEOUS EVERY 12 HOURS
Status: DISCONTINUED | OUTPATIENT
Start: 2023-02-17 | End: 2023-02-20 | Stop reason: HOSPADM

## 2023-02-17 RX ORDER — FLUTICASONE PROPIONATE AND SALMETEROL 250; 50 UG/1; UG/1
1 POWDER RESPIRATORY (INHALATION)
Status: DISCONTINUED | OUTPATIENT
Start: 2023-02-17 | End: 2023-02-20 | Stop reason: HOSPADM

## 2023-02-17 RX ORDER — SPIRONOLACTONE 25 MG/1
50 TABLET ORAL 2 TIMES DAILY
Status: DISCONTINUED | OUTPATIENT
Start: 2023-02-17 | End: 2023-02-20 | Stop reason: HOSPADM

## 2023-02-17 RX ORDER — SIMETHICONE 80 MG
80 TABLET,CHEWABLE ORAL 4 TIMES DAILY PRN
Status: DISCONTINUED | OUTPATIENT
Start: 2023-02-17 | End: 2023-02-17

## 2023-02-17 RX ORDER — NALOXONE HYDROCHLORIDE 0.4 MG/ML
0.4 INJECTION, SOLUTION INTRAMUSCULAR; INTRAVENOUS; SUBCUTANEOUS
Status: DISCONTINUED | OUTPATIENT
Start: 2023-02-17 | End: 2023-02-20 | Stop reason: HOSPADM

## 2023-02-17 RX ORDER — ONDANSETRON 4 MG/1
4 TABLET, ORALLY DISINTEGRATING ORAL EVERY 6 HOURS PRN
Status: DISCONTINUED | OUTPATIENT
Start: 2023-02-17 | End: 2023-02-20 | Stop reason: HOSPADM

## 2023-02-17 RX ORDER — DIPHENOXYLATE HCL/ATROPINE 2.5-.025MG
1-2 TABLET ORAL 4 TIMES DAILY PRN
Status: DISCONTINUED | OUTPATIENT
Start: 2023-02-17 | End: 2023-02-20 | Stop reason: HOSPADM

## 2023-02-17 RX ORDER — PANTOPRAZOLE SODIUM 40 MG/1
40 TABLET, DELAYED RELEASE ORAL
Status: DISCONTINUED | OUTPATIENT
Start: 2023-02-18 | End: 2023-02-20 | Stop reason: HOSPADM

## 2023-02-17 RX ORDER — PRAMIPEXOLE DIHYDROCHLORIDE 0.12 MG/1
0.12 TABLET ORAL AT BEDTIME
Status: DISCONTINUED | OUTPATIENT
Start: 2023-02-17 | End: 2023-02-20 | Stop reason: HOSPADM

## 2023-02-17 RX ORDER — MORPHINE SULFATE 4 MG/ML
4 INJECTION, SOLUTION INTRAMUSCULAR; INTRAVENOUS ONCE
Status: COMPLETED | OUTPATIENT
Start: 2023-02-17 | End: 2023-02-17

## 2023-02-17 RX ORDER — MONTELUKAST SODIUM 10 MG/1
10 TABLET ORAL AT BEDTIME
Status: DISCONTINUED | OUTPATIENT
Start: 2023-02-17 | End: 2023-02-20 | Stop reason: HOSPADM

## 2023-02-17 RX ORDER — NALOXONE HYDROCHLORIDE 0.4 MG/ML
0.2 INJECTION, SOLUTION INTRAMUSCULAR; INTRAVENOUS; SUBCUTANEOUS
Status: DISCONTINUED | OUTPATIENT
Start: 2023-02-17 | End: 2023-02-20 | Stop reason: HOSPADM

## 2023-02-17 RX ORDER — ATORVASTATIN CALCIUM 20 MG/1
20 TABLET, FILM COATED ORAL DAILY
Status: DISCONTINUED | OUTPATIENT
Start: 2023-02-18 | End: 2023-02-20 | Stop reason: HOSPADM

## 2023-02-17 RX ORDER — ONDANSETRON 2 MG/ML
4 INJECTION INTRAMUSCULAR; INTRAVENOUS EVERY 6 HOURS PRN
Status: DISCONTINUED | OUTPATIENT
Start: 2023-02-17 | End: 2023-02-20 | Stop reason: HOSPADM

## 2023-02-17 RX ORDER — GABAPENTIN 400 MG/1
1200 CAPSULE ORAL 2 TIMES DAILY
Status: DISCONTINUED | OUTPATIENT
Start: 2023-02-17 | End: 2023-02-20 | Stop reason: HOSPADM

## 2023-02-17 RX ORDER — ALBUTEROL SULFATE 90 UG/1
2 AEROSOL, METERED RESPIRATORY (INHALATION) EVERY 4 HOURS PRN
Status: DISCONTINUED | OUTPATIENT
Start: 2023-02-17 | End: 2023-02-20 | Stop reason: HOSPADM

## 2023-02-17 RX ORDER — SIMETHICONE 80 MG
240 TABLET,CHEWABLE ORAL EVERY 12 HOURS PRN
Status: DISCONTINUED | OUTPATIENT
Start: 2023-02-17 | End: 2023-02-20 | Stop reason: HOSPADM

## 2023-02-17 RX ORDER — FENTANYL CITRATE 50 UG/ML
50 INJECTION, SOLUTION INTRAMUSCULAR; INTRAVENOUS ONCE
Status: COMPLETED | OUTPATIENT
Start: 2023-02-17 | End: 2023-02-17

## 2023-02-17 RX ORDER — HYDROMORPHONE HYDROCHLORIDE 1 MG/ML
0.5 INJECTION, SOLUTION INTRAMUSCULAR; INTRAVENOUS; SUBCUTANEOUS
Status: DISCONTINUED | OUTPATIENT
Start: 2023-02-17 | End: 2023-02-19

## 2023-02-17 RX ORDER — LOPERAMIDE HCL 2 MG
2 CAPSULE ORAL 4 TIMES DAILY PRN
Status: DISCONTINUED | OUTPATIENT
Start: 2023-02-17 | End: 2023-02-20 | Stop reason: HOSPADM

## 2023-02-17 RX ORDER — POTASSIUM CHLORIDE 1500 MG/1
20 TABLET, EXTENDED RELEASE ORAL 3 TIMES DAILY
Status: DISCONTINUED | OUTPATIENT
Start: 2023-02-17 | End: 2023-02-20 | Stop reason: HOSPADM

## 2023-02-17 RX ORDER — NICOTINE 21 MG/24HR
1 PATCH, TRANSDERMAL 24 HOURS TRANSDERMAL DAILY PRN
Status: DISCONTINUED | OUTPATIENT
Start: 2023-02-17 | End: 2023-02-20 | Stop reason: HOSPADM

## 2023-02-17 RX ORDER — MORPHINE SULFATE 4 MG/ML
4 INJECTION, SOLUTION INTRAMUSCULAR; INTRAVENOUS
Status: COMPLETED | OUTPATIENT
Start: 2023-02-17 | End: 2023-02-17

## 2023-02-17 RX ADMIN — SIMETHICONE 80 MG: 80 TABLET, CHEWABLE ORAL at 20:54

## 2023-02-17 RX ADMIN — SPIRONOLACTONE 50 MG: 25 TABLET, FILM COATED ORAL at 20:57

## 2023-02-17 RX ADMIN — HEPARIN SODIUM 5000 UNITS: 5000 INJECTION, SOLUTION INTRAVENOUS; SUBCUTANEOUS at 16:57

## 2023-02-17 RX ADMIN — FENTANYL CITRATE 50 MCG: 50 INJECTION, SOLUTION INTRAMUSCULAR; INTRAVENOUS at 14:54

## 2023-02-17 RX ADMIN — ALLOPURINOL 100 MG: 100 TABLET ORAL at 19:46

## 2023-02-17 RX ADMIN — POTASSIUM CHLORIDE 20 MEQ: 1500 TABLET, EXTENDED RELEASE ORAL at 20:54

## 2023-02-17 RX ADMIN — GABAPENTIN 1200 MG: 400 CAPSULE ORAL at 20:54

## 2023-02-17 RX ADMIN — MORPHINE SULFATE 4 MG: 4 INJECTION, SOLUTION INTRAMUSCULAR; INTRAVENOUS at 14:17

## 2023-02-17 RX ADMIN — PSYLLIUM HUSK 1 PACKET: 3.4 POWDER ORAL at 20:56

## 2023-02-17 RX ADMIN — LOPERAMIDE HYDROCHLORIDE 2 MG: 2 CAPSULE ORAL at 20:56

## 2023-02-17 RX ADMIN — PRAMIPEXOLE DIHYDROCHLORIDE 0.12 MG: 0.12 TABLET ORAL at 21:02

## 2023-02-17 RX ADMIN — HYDROMORPHONE HYDROCHLORIDE 0.5 MG: 1 INJECTION, SOLUTION INTRAMUSCULAR; INTRAVENOUS; SUBCUTANEOUS at 17:05

## 2023-02-17 RX ADMIN — HYDROMORPHONE HYDROCHLORIDE 0.5 MG: 1 INJECTION, SOLUTION INTRAMUSCULAR; INTRAVENOUS; SUBCUTANEOUS at 22:57

## 2023-02-17 RX ADMIN — MORPHINE SULFATE 4 MG: 4 INJECTION, SOLUTION INTRAMUSCULAR; INTRAVENOUS at 11:55

## 2023-02-17 RX ADMIN — HYDROMORPHONE HYDROCHLORIDE 0.5 MG: 1 INJECTION, SOLUTION INTRAMUSCULAR; INTRAVENOUS; SUBCUTANEOUS at 19:59

## 2023-02-17 ASSESSMENT — ACTIVITIES OF DAILY LIVING (ADL)
ADLS_ACUITY_SCORE: 35

## 2023-02-17 NOTE — PLAN OF CARE
Pt repositioned, tilted to right side, pillow between knees and behind back. Pt tolerated well.

## 2023-02-17 NOTE — H&P
[facility]    History and Physical  Hospitalist       Date of Admission:  2/17/2023  Date of Service (when I saw the patient): 02/17/23    Assessment & Plan   Chyna Delgado is a 67 year old female who presents with left hip pain.    Left-sided valgus impacted femoral neck fracture: Dr. Jordan consulted over the phone.  Conservative management for the time being.  Patient is weightbearing as tolerated per orthopedics.  -Pain control  -PT/OT eval    COPD/asthma: Documented as interstitial emphysema.  Respiratory status stable.  Continues to smoke  -Continue home inhalers including Advair    History of PMR: Stable.  Patient takes low-dose prednisone as needed.  -Continue as able  -Monitor for signs of adrenal insufficiency, low threshold for stress dose steroids    Hyperlipidemia: Continue home statin    Restless leg syndrome: Continue home Mirapex    Tobacco dependence: Nicotine replacement as needed    FEN: oral diet as tolerated.  Electrolytes are good.      Clinically Significant Risk Factors Present on Admission                             DVT Prophylaxis: Heparin SQ    Code Status: Full Code    Disposition: Expected discharge once placement can be found.    Nroris May MD, MD        Primary Care Physician   DAYSI LUCIANO    Chief Complaint   Left-sided hip pain after mechanical fall    History is obtained from the patient    History of Present Illness   Chyna Delgado is a 67 year old female with history of PMR on chronic low-dose steroids as needed, asthma/emphysema, hyperlipidemia who presents with left-sided hip pain after mechanical fall and slipped on ice.  Bystanders helped her back into her car, she was able to drive home.  However, patient cannot get out of the car, so she called EMS at that point.  EMS brings her into the emergency department where x-ray reveals a left sided valgus impacted femoral neck fracture.  She has intractable pain, unable to ambulate.  Orthopedics was  consulted, Dr. Jordan over the phone.  Recommendation was for conservative therapy for the time being, weightbearing as tolerated.  As mentioned, patient is nonambulatory due to pain, so admitted for possible placement.      Past Medical History    I have reviewed this patient's medical history and updated it with pertinent information if needed.   Past Medical History:   Diagnosis Date     Abnormal mammogram, unspecified 01/08/2003    Normal pathology     Back Pain 02/10/2000    Sacroiliac pain     Benign neoplasm of colon 03/10/2000     Benign paroxysmal positional vertigo 06/07/2012     Depressive disorder, not elsewhere classified 02/10/2004     Diarrhea 12/15/2010    Secondary to colectomy for familial polyposis     Gastric polyp 12/11/2015, 12/18/2017    recurrent      History of normal mammogram 07/18/2014, 1/18/2019     Idiopathic osteoporosis 12/15/2010     Interstitial emphysema (H) 12/15/2010     menopausal or female climacteric states 08/31/1999     Mixed hyperlipidemia 12/15/2010     Other bursitis disorders 02/04/2011    Greater trochanteric     Other forms of retinal detachment(361.89) 12/15/2010     Other malaise and fatigue 01/10/2003     Personal history of tobacco use, presenting hazards to health 12/15/2010     Polymyalgia rheumatica (H)      Polyposis of colon      Restless legs syndrome (RLS) 12/15/2010     Rotator cuff tendonitis      Rotator cuff tendonitis      Sacroiliitis, not elsewhere classified (H) 12/15/2010    multiple sites     Tobacco use disorder 08/22/2012     Unspecified asthma(493.90) 12/15/2010       Past Surgical History   I have reviewed this patient's surgical history and updated it with pertinent information if needed.  Past Surgical History:   Procedure Laterality Date     benign tumors removed from back       CATARACT EXTRACTION Right 06/21/2022     CATARACT EXTRACTION Left 07/12/2022     CLOSED REDUCTION, PERCUTANEOUS PINNING HIP Right 02/24/2019    Procedure:  Percutaneous Screw Fixation of Right Hip Fracture;  Surgeon: Amrik Kolb DO;  Location: [facility]     COLON SURGERY N/A     HX: Total colectomy with J-pouch reconstruction.      ENDOSCOPY UPPER, COLONOSCOPY, COMBINED N/A 09/05/2014    Procedure: COMBINED ENDOSCOPY UPPER, COLONOSCOPY;  Surgeon: Delbert Patel MD;  Location: [facility]     ENDOSCOPY UPPER, COLONOSCOPY, COMBINED N/A 05/09/2019    Procedure: UPPER ENDOSCOPY  WITH BIOPSIES AND  COLONOSCOPY WITH BIOPSIES;  Surgeon: Tai Diaz MD;  Location: [facility]     ESOPHAGOSCOPY, GASTROSCOPY, DUODENOSCOPY (EGD), COMBINED N/A 12/11/2015    Procedure: COMBINED ESOPHAGOSCOPY, GASTROSCOPY, DUODENOSCOPY (EGD);  Surgeon: Delbert Patel MD;  Location: [facility]     ESOPHAGOSCOPY, GASTROSCOPY, DUODENOSCOPY (EGD), COMBINED N/A 12/18/2017    Procedure: COMBINED ESOPHAGOSCOPY, GASTROSCOPY, DUODENOSCOPY (EGD);  UPPER ENDOSCOPY WITH BIOPSY;  Surgeon: Delbert Patel MD;  Location: [facility]     excised-benign  2002    tongue lesion     HC REPAIR OF NASAL SEPTUM  2002    Deviated nasal septum     LAPAROSCOPIC CHOLECYSTECTOMY       lumpectomy Right breast      Breast Lump     MOUTH SURGERY      removal of spot from roof of mouth     pilonidal cyst surgery  1976     removal of colon and large intestine  2000    Familial polyposis     REMOVE HARDWARE ARTHROPLASTY HIP Right 07/20/2022    Procedure: Right Hip Hardware Removal (Cannulated Screws);  Surgeon: Luis Fernando Marcial MD;  Location: [facility]     Right etopic pregnancy      Pregnancy     TONSILLECTOMY      tonsillitus     UPPER GI ENDOSCOPY  2009    Stomach polyps       Prior to Admission Medications   Prior to Admission Medications   Prescriptions Last Dose Informant Patient Reported? Taking?   GNP VITAMIN D MAXIMUM STRENGTH 50 MCG (2000 UT) tablet 2/17/2023 Self No Yes   Sig: TAKE TWO TABLETS BY MOUTH EVERY DAY   Multiple Vitamin (MULTIVITAMIN) per tablet 2/17/2023 Self Yes Yes   Sig: Take 1 tablet by mouth daily Every day with food    PSYLLIUM PO 2/17/2023 Self Yes Yes   Sig: Take 1 Tablespoonful by mouth 2 times daily    Simethicone 125 MG CAPS  Self Yes No   Sig: Take 500 mg by mouth 2 times daily   albuterol (PROAIR HFA/PROVENTIL HFA/VENTOLIN HFA) 108 (90 Base) MCG/ACT inhaler Past Month  No Yes   Sig: Inhale 2 puffs into the lungs every 4 hours as needed for shortness of breath / dyspnea or wheezing   alendronate (FOSAMAX) 70 MG tablet Past Week  No Yes   Sig: Take 1 tablet (70 mg) by mouth every 7 days   allopurinol (ZYLOPRIM) 100 MG tablet 2/17/2023  No Yes   Sig: Take 1 tablet (100 mg) by mouth 2 times daily   atorvastatin (LIPITOR) 20 MG tablet 2/17/2023  No Yes   Sig: Take 1 tablet (20 mg) by mouth daily   calcium carbonate 750 MG CHEW 2/16/2023 Self No Yes   Sig: Take 1 tablet (750 mg) by mouth 2 times daily   diclofenac (VOLTAREN) 75 MG EC tablet More than a month  No Yes   Sig: Take 1 tablet (75 mg) by mouth 2 times daily as needed for moderate pain   dicyclomine (BENTYL) 10 MG capsule 2/17/2023  No Yes   Sig: Take 2 capsules (20 mg) by mouth 3 times daily (before meals)   diphenoxylate-atropine (LOMOTIL) 2.5-0.025 MG tablet 2/17/2023  No Yes   Sig: Take 1-2 tablets by mouth 4 times daily as needed for diarrhea   ferrous sulfate (IRON) 325 (65 FE) MG tablet 2/17/2023 Self Yes Yes   Sig: Take 325 mg by mouth 2 times daily (with meals)   fluticasone-salmeterol (ADVAIR) 250-50 MCG/ACT inhaler 2/17/2023  No Yes   Sig: Inhale 1 puff into the lungs 2 times daily   gabapentin (NEURONTIN) 400 MG capsule 2/17/2023  No Yes   Sig: Take 3 capsules (1,200 mg) by mouth 2 times daily   ibuprofen (ADVIL,MOTRIN) 200 MG tablet 2/16/2023 Self Yes Yes   Sig: Take 200 mg by mouth every 4 hours as needed    ketotifen (ZADITOR/REFRESH ANTI-ITCH) 0.025 % SOLN ophthalmic solution More than a month Self No Yes   Sig: PLACE 2 DROPS INTO BOTH EYES 2 TIMES DAILY   loperamide (IMODIUM) 2 MG capsule 2/17/2023 Self Yes Yes   Sig: Take 6 mg by mouth 2 times daily  Pt states takes 3 tabs (6 mg) twice a day unless she needs to take more, depending on what she eats.   montelukast (SINGULAIR) 10 MG tablet 2/16/2023  No Yes   Sig: Take 1 tablet (10 mg) by mouth At Bedtime   omeprazole (PRILOSEC) 40 MG DR capsule 2/17/2023  No Yes   Sig: Take 1 capsule (40 mg) by mouth daily   potassium chloride ER (KLOR-CON M) 20 MEQ CR tablet 2/17/2023  No Yes   Sig: Take 1 tablet (20 mEq) by mouth 3 times daily   pramipexole (MIRAPEX) 0.125 MG tablet 2/16/2023  No Yes   Sig: Take 1 tablet (0.125 mg) by mouth At Bedtime   predniSONE (DELTASONE) 1 MG tablet 2/17/2023  No Yes   Sig: Take 4.5 tablets (4.5 mg) by mouth daily as needed (PMR)   spironolactone (ALDACTONE) 50 MG tablet 2/17/2023  No Yes   Sig: Take 1 tablet (50 mg) by mouth 2 times daily   vitamin D3 (CHOLECALCIFEROL) 50 mcg (2000 units) tablet 2/17/2023  Yes Yes   Sig: Take 1 tablet (50 mcg) by mouth daily      Facility-Administered Medications: None     Allergies   Allergies   Allergen Reactions     Codeine Swelling     Throat swelling-Patient can tolerate Hydrocodone & morphine     Nortriptyline Other (See Comments)     Crying        Social History   I have reviewed this patient's social history and updated it with pertinent information if needed. Chyna VALVERDE Danny  reports that she has been smoking cigarettes. She has a 20.00 pack-year smoking history. She has never used smokeless tobacco. She reports current alcohol use. She reports that she does not use drugs.    Family History   I have reviewed this patient's family history and updated it with pertinent information if needed.   Family History   Problem Relation Age of Onset     Other - See Comments Father         Atrial Fibrillation     Cancer Father         bladder ca     Colon Polyps Father      Heart Disease Father         Pacemaker     Rheumatoid Arthritis Father      C.A.D. Father 75        CABG     Chronic Obstructive Pulmonary Disease Mother      Heart Disease Mother          Pacemaker     Other - See Comments Mother         dementia     C.A.D. Mother 70        CABG     C.A.D. Maternal Grandmother      Unknown/Adopted Maternal Grandfather      Unknown/Adopted Paternal Grandmother      Unknown/Adopted Paternal Grandfather      Asthma Sister      Cerebrovascular Disease Sister      Gastrointestinal Disease Sister         peptic ulcers     Hypertension Sister      Gastrointestinal Disease Sister         stomach tumors     Rheumatoid Arthritis Sister      Cancer Sister         facial cancer     Asthma Sister      Cancer Maternal Aunt         renal ca       Review of Systems   The 10 point Review of Systems is negative other than noted in the HPI or here.    Physical Exam   Temp: 98.3  F (36.8  C) Temp src: Tympanic BP: 122/88 Pulse: 90   Resp: 20 SpO2: 97 % O2 Device: None (Room air)    Vital Signs with Ranges  Temp:  [98.3  F (36.8  C)] 98.3  F (36.8  C)  Pulse:  [79-90] 90  Resp:  [20] 20  BP: (108-137)/(68-88) 122/88  SpO2:  [90 %-99 %] 97 %  0 lbs 0 oz    Constitutional: AA, NAD  Eyes: PERRLA, no injection, no icterus  HEENT: atraumatic, normocephalic  Respiratory: CTA b/l  Cardiovascular: S1 S2 RRR  GI: soft, NT, ND, + bowel sounds  Lymph/Hematologic: no palpable lymphadenopathy  Skin: no rashes, no lesions  Musculoskeletal: No edema, good tone, no deformities  Neurologic: oriented x 3, no focal deficits  Psychiatric: appropriate affect      Data   Data reviewed today:  I personally reviewed imaging reports.    No lab results found in last 7 days.       Recent Results (from the past 24 hour(s))   XR Pelvis including Hip Left 2-3 Views    Narrative    XR PELVIS AND HIP LEFT 2 VIEWS    HISTORY: 67 years Female fall, left hip injury    COMPARISON: None    TECHNIQUE: Pelvis and left hip reveals total    FINDINGS: There is a fracture of the left femoral neck. There is  slight impaction and coxa valgus alignment. The joint spaces are  congruent.    Question similar fracture deformity of the  right hip, possibly old.    There is osteopenia.      Impression    IMPRESSION: Impacted left femoral neck fracture with coxa valga  alignment.    CASSIDY SZYMANSKI MD         SYSTEM ID:  [id number]

## 2023-02-17 NOTE — ED PROVIDER NOTES
History     Chief Complaint   Patient presents with     Hip Pain     left     HPI  Chyna Delgado is a 67 year old female patient presented to the ER by ambulance with a chief complaint of mechanical fall and left hip injury.  Patient stated that she slipped on the ice earlier this morning, landed on her left hip area.  Was not able to get up.  4 people helped her to her car and she drove home and was unable to get out of the car and she called the ambulance to bring her back.  Denies hitting her head.  No loss of consciousness.  Denies any headache or visual changes.  Denies any neck pain or back pain.  Denies any chest pain or shortness of breath.  Denies any abdominal pain.  Denies any loss of control of urine or stool.  Denies any focal weakness or numbness anywhere.  Denies any pain or injury anywhere else.  Pain in left hip.  Worse with any movement.  Better when she stays still.    Allergies:  Allergies   Allergen Reactions     Codeine Swelling     Throat swelling-Patient can tolerate Hydrocodone & morphine     Nortriptyline Other (See Comments)     Crying        Problem List:    Patient Active Problem List    Diagnosis Date Noted     Idiopathic gout of right hand, unspecified chronicity 09/09/2020     Priority: Medium     PMR (polymyalgia rheumatica) (H) 09/09/2020     Priority: Medium     Osteoporosis 09/03/2019     Priority: Medium     Hip fracture requiring operative repair (H) 02/25/2019     Priority: Medium     Closed fracture of neck of right femur (H) 02/23/2019     Priority: Medium     Functional diarrhea 10/26/2018     Priority: Medium     Iron deficiency 04/18/2018     Priority: Medium     Myalgia 05/30/2017     Priority: Medium     Abdominal muscle strain 05/05/2017     Priority: Medium     Strain of flexor muscle of hip, unspecified laterality, initial encounter 05/05/2017     Priority: Medium     Right shoulder pain 11/03/2016     Priority: Medium     ACP (advance care planning) 05/09/2016      Priority: Medium     Advance Care Planning 5/9/2016: ACP Review of Chart / Resources Provided:  Reviewed chart for advance care plan.  Chyna Delgado has been provided information and resources to begin or update their advance care plan.  Added by Lina PONCE Buus             Pain of toe of right foot 05/09/2016     Priority: Medium     Arthritis 02/12/2016     Priority: Medium     Graves disease 11/05/2015     Priority: Medium     Numbness and tingling in right hand 04/20/2015     Priority: Medium     Restless legs syndrome 04/16/2014     Priority: Medium     Somatic dysfunction of pelvic region 10/08/2013     Priority: Medium     Seborrheic keratosis 07/12/2013     Priority: Medium     Imo Update utility       Mild persistent asthma 06/28/2013     Priority: Medium     Sacroiliac pain 06/28/2013     Priority: Medium     Benign neoplasm of stomach 08/16/2012     Priority: Medium     Colon polyposis 08/16/2012     Priority: Medium     Family history of colonic polyps 08/16/2012     Priority: Medium     Family history of skin cancer 08/16/2012     Priority: Medium     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     Priority: Medium     Overview:   IMO Update 10/11       Seborrheic dermatitis, unspecified 10/12/2011     Priority: Medium     Overview:   IMO Update 10/11       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     Priority: Medium     Hypothyroidism 07/07/2011     Priority: Medium        Past Medical History:    Past Medical History:   Diagnosis Date     Abnormal mammogram, unspecified 01/08/2003     Back Pain 02/10/2000     Benign neoplasm of colon 03/10/2000     Benign paroxysmal positional vertigo 06/07/2012     Depressive disorder, not elsewhere classified 02/10/2004     Diarrhea 12/15/2010     Gastric polyp 12/11/2015, 12/18/2017     History of normal mammogram 07/18/2014, 1/18/2019     Idiopathic osteoporosis 12/15/2010     Interstitial emphysema (H) 12/15/2010     menopausal or female climacteric states 08/31/1999     Mixed  hyperlipidemia 12/15/2010     Other bursitis disorders 02/04/2011     Other forms of retinal detachment(361.89) 12/15/2010     Other malaise and fatigue 01/10/2003     Personal history of tobacco use, presenting hazards to health 12/15/2010     Polymyalgia rheumatica (H)      Polyposis of colon      Restless legs syndrome (RLS) 12/15/2010     Rotator cuff tendonitis      Rotator cuff tendonitis      Sacroiliitis, not elsewhere classified (H) 12/15/2010     Tobacco use disorder 08/22/2012     Unspecified asthma(493.90) 12/15/2010       Past Surgical History:    Past Surgical History:   Procedure Laterality Date     benign tumors removed from back       CATARACT EXTRACTION Right 06/21/2022     CATARACT EXTRACTION Left 07/12/2022     CLOSED REDUCTION, PERCUTANEOUS PINNING HIP Right 02/24/2019    Procedure: Percutaneous Screw Fixation of Right Hip Fracture;  Surgeon: Amrik Kolb DO;  Location: [facility]     COLON SURGERY N/A     HX: Total colectomy with J-pouch reconstruction.      ENDOSCOPY UPPER, COLONOSCOPY, COMBINED N/A 09/05/2014    Procedure: COMBINED ENDOSCOPY UPPER, COLONOSCOPY;  Surgeon: Delbert Patel MD;  Location: [facility]     ENDOSCOPY UPPER, COLONOSCOPY, COMBINED N/A 05/09/2019    Procedure: UPPER ENDOSCOPY  WITH BIOPSIES AND  COLONOSCOPY WITH BIOPSIES;  Surgeon: Tai Diaz MD;  Location: [facility]     ESOPHAGOSCOPY, GASTROSCOPY, DUODENOSCOPY (EGD), COMBINED N/A 12/11/2015    Procedure: COMBINED ESOPHAGOSCOPY, GASTROSCOPY, DUODENOSCOPY (EGD);  Surgeon: Delbert Patel MD;  Location: [facility]     ESOPHAGOSCOPY, GASTROSCOPY, DUODENOSCOPY (EGD), COMBINED N/A 12/18/2017    Procedure: COMBINED ESOPHAGOSCOPY, GASTROSCOPY, DUODENOSCOPY (EGD);  UPPER ENDOSCOPY WITH BIOPSY;  Surgeon: Delbert Patel MD;  Location: [facility]     excised-benign  2002    tongue lesion     HC REPAIR OF NASAL SEPTUM  2002    Deviated nasal septum     LAPAROSCOPIC CHOLECYSTECTOMY       lumpectomy Right breast      Breast Lump     MOUTH  SURGERY      removal of spot from roof of mouth     pilonidal cyst surgery  1976     removal of colon and large intestine  2000    Familial polyposis     REMOVE HARDWARE ARTHROPLASTY HIP Right 07/20/2022    Procedure: Right Hip Hardware Removal (Cannulated Screws);  Surgeon: Luis Fernando Marcial MD;  Location: [facility]     Right etopic pregnancy      Pregnancy     TONSILLECTOMY      tonsillitus     UPPER GI ENDOSCOPY  2009    Stomach polyps       Family History:    Family History   Problem Relation Age of Onset     Other - See Comments Father         Atrial Fibrillation     Cancer Father         bladder ca     Colon Polyps Father      Heart Disease Father         Pacemaker     Rheumatoid Arthritis Father      C.A.D. Father 75        CABG     Chronic Obstructive Pulmonary Disease Mother      Heart Disease Mother         Pacemaker     Other - See Comments Mother         dementia     C.A.D. Mother 70        CABG     C.A.D. Maternal Grandmother      Unknown/Adopted Maternal Grandfather      Unknown/Adopted Paternal Grandmother      Unknown/Adopted Paternal Grandfather      Asthma Sister      Cerebrovascular Disease Sister      Gastrointestinal Disease Sister         peptic ulcers     Hypertension Sister      Gastrointestinal Disease Sister         stomach tumors     Rheumatoid Arthritis Sister      Cancer Sister         facial cancer     Asthma Sister      Cancer Maternal Aunt         renal ca       Social History:  Marital Status:   [2]  Social History     Tobacco Use     Smoking status: Every Day     Packs/day: 0.50     Years: 40.00     Pack years: 20.00     Types: Cigarettes     Smokeless tobacco: Never   Substance Use Topics     Alcohol use: Yes     Comment: Weekly 3 drinks     Drug use: Never        Medications:    albuterol (PROAIR HFA/PROVENTIL HFA/VENTOLIN HFA) 108 (90 Base) MCG/ACT inhaler  alendronate (FOSAMAX) 70 MG tablet  allopurinol (ZYLOPRIM) 100 MG tablet  atorvastatin (LIPITOR) 20 MG tablet  calcium  carbonate 750 MG CHEW  diclofenac (VOLTAREN) 75 MG EC tablet  dicyclomine (BENTYL) 10 MG capsule  diphenoxylate-atropine (LOMOTIL) 2.5-0.025 MG tablet  ferrous sulfate (IRON) 325 (65 FE) MG tablet  fluticasone-salmeterol (ADVAIR) 250-50 MCG/ACT inhaler  gabapentin (NEURONTIN) 400 MG capsule  GNP VITAMIN D MAXIMUM STRENGTH 50 MCG (2000 UT) tablet  ibuprofen (ADVIL,MOTRIN) 200 MG tablet  ketotifen (ZADITOR/REFRESH ANTI-ITCH) 0.025 % SOLN ophthalmic solution  loperamide (IMODIUM) 2 MG capsule  montelukast (SINGULAIR) 10 MG tablet  Multiple Vitamin (MULTIVITAMIN) per tablet  omeprazole (PRILOSEC) 40 MG DR capsule  potassium chloride ER (KLOR-CON M) 20 MEQ CR tablet  pramipexole (MIRAPEX) 0.125 MG tablet  predniSONE (DELTASONE) 1 MG tablet  PSYLLIUM PO  spironolactone (ALDACTONE) 50 MG tablet  vitamin D3 (CHOLECALCIFEROL) 50 mcg (2000 units) tablet  Simethicone 125 MG CAPS          Review of Systems   All other systems reviewed and are negative.      Physical Exam   BP: 137/81  Pulse: 86  Temp: 98.3  F (36.8  C)  Resp: 20  SpO2: 95 %      Physical Exam  Constitutional:       General: She is not in acute distress.     Appearance: Normal appearance. She is well-developed. She is not ill-appearing, toxic-appearing or diaphoretic.   HENT:      Head: Normocephalic and atraumatic.      Nose: Nose normal. No congestion or rhinorrhea.      Mouth/Throat:      Pharynx: No oropharyngeal exudate or posterior oropharyngeal erythema.   Eyes:      General: No scleral icterus.        Right eye: No discharge.         Left eye: No discharge.      Extraocular Movements: Extraocular movements intact.      Conjunctiva/sclera: Conjunctivae normal.      Pupils: Pupils are equal, round, and reactive to light.   Neck:      Vascular: No carotid bruit.   Cardiovascular:      Rate and Rhythm: Normal rate and regular rhythm.      Heart sounds: Normal heart sounds.   Pulmonary:      Effort: No respiratory distress.      Breath sounds: Normal breath  sounds. No stridor. No wheezing, rhonchi or rales.   Chest:      Chest wall: No tenderness.   Abdominal:      General: Bowel sounds are normal. There is no distension.      Palpations: Abdomen is soft. There is no mass.      Tenderness: There is no abdominal tenderness. There is no right CVA tenderness, left CVA tenderness, guarding or rebound.      Hernia: No hernia is present.   Musculoskeletal:         General: Tenderness and signs of injury present. No swelling or deformity.      Cervical back: Normal range of motion and neck supple. No rigidity or tenderness.      Right lower leg: No edema.      Left lower leg: No edema.      Comments:  tenderness on palpation of the left hip.  No erythema or swelling or deformity.  Pain limitation of the range of motion.  CMS intact.   Lymphadenopathy:      Cervical: No cervical adenopathy.   Skin:     General: Skin is warm and dry.      Coloration: Skin is not pale.      Findings: No erythema or rash.   Neurological:      General: No focal deficit present.      Mental Status: She is alert and oriented to person, place, and time. Mental status is at baseline.      Cranial Nerves: No cranial nerve deficit.      Sensory: No sensory deficit.      Motor: No weakness.      Coordination: Coordination normal.      Gait: Gait normal.      Deep Tendon Reflexes: Reflexes normal.         ED Course          Patient was seen and examined shortly after arrival.  Stable.  Imaging reviewed.  Impacted fracture left femoral neck.  Case was discussed with orthopedic on-call Dr. Jordan and he recommended pain control and conservative management for now.  Patient was given 4 mg IV morphine x2 and she is still in significant amount of pain.  Given 50 mcg IV fentanyl.  I did consulted with  hospitalist on-call and he accepted admission for further management.  Patient agrees with the plan.  Stable for admission.     Procedures              Critical Care time:  none               Results for  orders placed or performed during the hospital encounter of 02/17/23 (from the past 24 hour(s))   XR Pelvis including Hip Left 2-3 Views    Narrative    XR PELVIS AND HIP LEFT 2 VIEWS    HISTORY: 67 years Female fall, left hip injury    COMPARISON: None    TECHNIQUE: Pelvis and left hip reveals total    FINDINGS: There is a fracture of the left femoral neck. There is  slight impaction and coxa valgus alignment. The joint spaces are  congruent.    Question similar fracture deformity of the right hip, possibly old.    There is osteopenia.      Impression    IMPRESSION: Impacted left femoral neck fracture with coxa valga  alignment.    CASSIDY SZYMANSKI MD         SYSTEM ID:  [id number]       Medications   fentaNYL (PF) (SUBLIMAZE) injection 50 mcg (has no administration in time range)   morphine (PF) injection 4 mg (4 mg Intravenous Given 2/17/23 1155)   morphine (PF) injection 4 mg (4 mg Intravenous Given 2/17/23 1417)       Assessments & Plan (with Medical Decision Making)     I have reviewed the nursing notes.    I have reviewed the findings, diagnosis, plan and need for follow up with the patient.                   New Prescriptions    No medications on file       Final diagnoses:   Closed fracture of left hip, initial encounter (H)       2/17/2023   [facility]     Darwin Richards MD  02/17/23 [id number]

## 2023-02-17 NOTE — PLAN OF CARE
Received pt from ED via cart.  Pt transferred to bed via slide board. Pt a&o x3. Vitals as charted. Skin intact and WDL. No bruising or discoloration noted to left hip. Pt cooperative with cares, but cries out in pain during transfer to bed. Pt oriented to room and call light system. Will continue to monitor.

## 2023-02-17 NOTE — ED TRIAGE NOTES
Pt presents with [facility]. Pt fell from standing approximately 1 hr prior to arrival. Pt fell in a parking lot and landed on her left hip. Pt reports that she needed assistance to standing and into her car. Pt then drove home and was unable to get out of her vehicle when she got home due to the pain. Pt states pain 10/10 and sharp when trying to move hip. At rest in a comfortable position pain is a 1/10. Pt is AAOx4. Pt into gown, all clothing removed. Pts LLE appears slightly longer than RLE. CMS intact. No rotation noted. Pt has hx of right hip fx, states pins were removed a year ago. Pt also on chronic daily steroids and hx of osteoporosis. Pt in position of comfort with pillow placed below her knees FRANK.

## 2023-02-18 ENCOUNTER — APPOINTMENT (OUTPATIENT)
Dept: PHYSICAL THERAPY | Facility: HOSPITAL | Age: 68
DRG: 536 | End: 2023-02-18
Attending: INTERNAL MEDICINE
Payer: COMMERCIAL

## 2023-02-18 LAB
CREAT SERPL-MCNC: 0.86 MG/DL (ref 0.51–0.95)
GFR SERPL CREATININE-BSD FRML MDRD: 73 ML/MIN/1.73M2
PLATELET # BLD AUTO: 195 10E3/UL (ref 150–450)

## 2023-02-18 PROCEDURE — 36415 COLL VENOUS BLD VENIPUNCTURE: CPT | Performed by: INTERNAL MEDICINE

## 2023-02-18 PROCEDURE — 97161 PT EVAL LOW COMPLEX 20 MIN: CPT | Mod: GP | Performed by: PHYSICAL THERAPIST

## 2023-02-18 PROCEDURE — 99232 SBSQ HOSP IP/OBS MODERATE 35: CPT | Performed by: INTERNAL MEDICINE

## 2023-02-18 PROCEDURE — 250N000013 HC RX MED GY IP 250 OP 250 PS 637: Performed by: HOSPITALIST

## 2023-02-18 PROCEDURE — 96372 THER/PROPH/DIAG INJ SC/IM: CPT | Performed by: INTERNAL MEDICINE

## 2023-02-18 PROCEDURE — 250N000011 HC RX IP 250 OP 636: Performed by: INTERNAL MEDICINE

## 2023-02-18 PROCEDURE — 97116 GAIT TRAINING THERAPY: CPT | Mod: GP | Performed by: PHYSICAL THERAPIST

## 2023-02-18 PROCEDURE — G0378 HOSPITAL OBSERVATION PER HR: HCPCS

## 2023-02-18 PROCEDURE — 250N000013 HC RX MED GY IP 250 OP 250 PS 637: Performed by: INTERNAL MEDICINE

## 2023-02-18 PROCEDURE — 82565 ASSAY OF CREATININE: CPT | Performed by: INTERNAL MEDICINE

## 2023-02-18 PROCEDURE — 250N000012 HC RX MED GY IP 250 OP 636 PS 637: Performed by: INTERNAL MEDICINE

## 2023-02-18 PROCEDURE — 85049 AUTOMATED PLATELET COUNT: CPT | Performed by: INTERNAL MEDICINE

## 2023-02-18 RX ORDER — CYCLOBENZAPRINE HCL 5 MG
10 TABLET ORAL 3 TIMES DAILY
Status: DISCONTINUED | OUTPATIENT
Start: 2023-02-18 | End: 2023-02-18

## 2023-02-18 RX ORDER — KETOROLAC TROMETHAMINE 15 MG/ML
15 INJECTION, SOLUTION INTRAMUSCULAR; INTRAVENOUS EVERY 6 HOURS PRN
Status: DISCONTINUED | OUTPATIENT
Start: 2023-02-18 | End: 2023-02-20 | Stop reason: HOSPADM

## 2023-02-18 RX ADMIN — HYDROMORPHONE HYDROCHLORIDE 0.5 MG: 1 INJECTION, SOLUTION INTRAMUSCULAR; INTRAVENOUS; SUBCUTANEOUS at 20:33

## 2023-02-18 RX ADMIN — GABAPENTIN 1200 MG: 400 CAPSULE ORAL at 07:43

## 2023-02-18 RX ADMIN — PREDNISONE 4.5 MG: 2.5 TABLET ORAL at 07:44

## 2023-02-18 RX ADMIN — PRAMIPEXOLE DIHYDROCHLORIDE 0.12 MG: 0.12 TABLET ORAL at 21:29

## 2023-02-18 RX ADMIN — HEPARIN SODIUM 5000 UNITS: 5000 INJECTION, SOLUTION INTRAVENOUS; SUBCUTANEOUS at 17:48

## 2023-02-18 RX ADMIN — HYDROMORPHONE HYDROCHLORIDE 0.5 MG: 1 INJECTION, SOLUTION INTRAMUSCULAR; INTRAVENOUS; SUBCUTANEOUS at 10:51

## 2023-02-18 RX ADMIN — HYDROMORPHONE HYDROCHLORIDE 0.5 MG: 1 INJECTION, SOLUTION INTRAMUSCULAR; INTRAVENOUS; SUBCUTANEOUS at 07:19

## 2023-02-18 RX ADMIN — HEPARIN SODIUM 5000 UNITS: 5000 INJECTION, SOLUTION INTRAVENOUS; SUBCUTANEOUS at 05:12

## 2023-02-18 RX ADMIN — PANTOPRAZOLE SODIUM 40 MG: 40 TABLET, DELAYED RELEASE ORAL at 07:23

## 2023-02-18 RX ADMIN — KETOROLAC TROMETHAMINE 15 MG: 15 INJECTION, SOLUTION INTRAMUSCULAR; INTRAVENOUS at 22:17

## 2023-02-18 RX ADMIN — SPIRONOLACTONE 50 MG: 25 TABLET, FILM COATED ORAL at 20:32

## 2023-02-18 RX ADMIN — ATORVASTATIN CALCIUM 20 MG: 20 TABLET, FILM COATED ORAL at 07:43

## 2023-02-18 RX ADMIN — POTASSIUM CHLORIDE 20 MEQ: 1500 TABLET, EXTENDED RELEASE ORAL at 15:37

## 2023-02-18 RX ADMIN — MONTELUKAST 10 MG: 10 TABLET, FILM COATED ORAL at 21:29

## 2023-02-18 RX ADMIN — POTASSIUM CHLORIDE 20 MEQ: 1500 TABLET, EXTENDED RELEASE ORAL at 07:43

## 2023-02-18 RX ADMIN — KETOROLAC TROMETHAMINE 15 MG: 15 INJECTION, SOLUTION INTRAMUSCULAR; INTRAVENOUS at 15:32

## 2023-02-18 RX ADMIN — KETOROLAC TROMETHAMINE 15 MG: 15 INJECTION, SOLUTION INTRAMUSCULAR; INTRAVENOUS at 09:28

## 2023-02-18 RX ADMIN — HYDROMORPHONE HYDROCHLORIDE 0.5 MG: 1 INJECTION, SOLUTION INTRAMUSCULAR; INTRAVENOUS; SUBCUTANEOUS at 14:12

## 2023-02-18 RX ADMIN — SPIRONOLACTONE 50 MG: 25 TABLET, FILM COATED ORAL at 07:44

## 2023-02-18 RX ADMIN — PSYLLIUM HUSK 1 PACKET: 3.4 POWDER ORAL at 20:33

## 2023-02-18 RX ADMIN — POTASSIUM CHLORIDE 20 MEQ: 1500 TABLET, EXTENDED RELEASE ORAL at 20:33

## 2023-02-18 RX ADMIN — ALLOPURINOL 100 MG: 100 TABLET ORAL at 20:33

## 2023-02-18 RX ADMIN — HYDROMORPHONE HYDROCHLORIDE 0.5 MG: 1 INJECTION, SOLUTION INTRAMUSCULAR; INTRAVENOUS; SUBCUTANEOUS at 03:01

## 2023-02-18 RX ADMIN — PSYLLIUM HUSK 1 PACKET: 3.4 POWDER ORAL at 07:52

## 2023-02-18 RX ADMIN — GABAPENTIN 1200 MG: 400 CAPSULE ORAL at 20:32

## 2023-02-18 RX ADMIN — ALLOPURINOL 100 MG: 100 TABLET ORAL at 07:42

## 2023-02-18 ASSESSMENT — ACTIVITIES OF DAILY LIVING (ADL)
ADLS_ACUITY_SCORE: 36
ADLS_ACUITY_SCORE: 39
ADLS_ACUITY_SCORE: 36
ADLS_ACUITY_SCORE: 39
ADLS_ACUITY_SCORE: 36
ADLS_ACUITY_SCORE: 39
ADLS_ACUITY_SCORE: 39
ADLS_ACUITY_SCORE: 37
ADLS_ACUITY_SCORE: 39
ADLS_ACUITY_SCORE: 36

## 2023-02-18 NOTE — PROGRESS NOTES
[facility]    Hospitalist Progress Note    Date of Service (when I saw the patient): 02/18/2023    Assessment & Plan   Chyna Delgado is a 67 year old female who was admitted on 2/17/2023.    Left-sided valgus impacted femoral neck fracture: Dr. Jordan consulted over the phone.  Conservative management for the time being.  Patient is weightbearing as tolerated per orthopedics.  -Pain control  -PT/OT eval     COPD/asthma: Documented as interstitial emphysema.  Respiratory status stable.  Continues to smoke  -Continue home inhalers including Advair     History of PMR: Stable.  Patient takes low-dose prednisone as needed.  -Continue as able  -Monitor for signs of adrenal insufficiency, low threshold for stress dose steroids     Hyperlipidemia: Continue home statin     Restless leg syndrome: Continue home Mirapex     Tobacco dependence: Nicotine replacement as needed     FEN: oral diet as tolerated.  Electrolytes are good.    Clinically Significant Risk Factors Present on Admission                             DVT Prophylaxis: Heparin SQ    Code Status: Full Code    Disposition: Expected discharge once clinically improved, placement found.    Norris May MD, MD        Interval History   Patient seen in room at bedside.  Awake, alert, pleasant but in pain.  No acute events overnight, no new symptoms.    -Data reviewed today: I reviewed all new labs and imaging results over the last 24 hours. I personally reviewed imaging reports.    Physical Exam   Temp: 98.1  F (36.7  C) Temp src: Oral BP: 115/67 Pulse: 85   Resp: 18 SpO2: 95 % O2 Device: None (Room air)    Vitals:    02/17/23 1643   Weight: 53.1 kg (117 lb)     Vital Signs with Ranges  Temp:  [98  F (36.7  C)-98.3  F (36.8  C)] 98.1  F (36.7  C)  Pulse:  [68-90] 85  Resp:  [18-20] 18  BP: (106-137)/(66-88) 115/67  SpO2:  [86 %-99 %] 95 %    Intake/Output Summary (Last 24 hours) at 2/18/2023 0847  Last data filed at 2/18/2023 0315  Gross per 24 hour    Intake --   Output 170 ml   Net -170 ml       Peripheral IV 02/17/23 Anterior;Right Upper forearm (Active)   Site Assessment WDL 02/17/23 2100   Line Status Saline locked 02/17/23 2100   Phlebitis Scale 0-->no symptoms 02/17/23 2100   Infiltration Scale 0 02/17/23 1630   Number of days: 1       Incision/Surgical Site 07/20/22 Right Hip (Active)   Number of days: 213     No line/device    Constitutional - AA, NAD  HEENT - atraumatic, normocephalic  Neck - supple, no masses, no JVD  CVS - S1 S2 RRR, no murmurs, rubs, gallops  Respiratory - CTA b/l  GI - soft, NT, ND, + bowel sounds, no organomegaly  Musculoskeletal - no LE edema, no lesions  Neuro - oriented x 3, no gross focal deficits    Medications       allopurinol  100 mg Oral BID     atorvastatin  20 mg Oral Daily     fluticasone-salmeterol  1 puff Inhalation BID BMT     gabapentin  1,200 mg Oral BID     heparin ANTICOAGULANT  5,000 Units Subcutaneous Q12H     montelukast  10 mg Oral At Bedtime     nicotine   Transdermal Q8H     pantoprazole  40 mg Oral QAM AC     potassium chloride ER  20 mEq Oral TID     pramipexole  0.125 mg Oral At Bedtime     psyllium  1 packet Oral BID     spironolactone  50 mg Oral BID       Data   No lab results found in last 7 days.       Recent Results (from the past 24 hour(s))   XR Pelvis including Hip Left 2-3 Views    Narrative    XR PELVIS AND HIP LEFT 2 VIEWS    HISTORY: 67 years Female fall, left hip injury    COMPARISON: None    TECHNIQUE: Pelvis and left hip reveals total    FINDINGS: There is a fracture of the left femoral neck. There is  slight impaction and coxa valgus alignment. The joint spaces are  congruent.    Question similar fracture deformity of the right hip, possibly old.    There is osteopenia.      Impression    IMPRESSION: Impacted left femoral neck fracture with coxa valga  alignment.    CASSIDY SZYMANSKI MD         SYSTEM ID:  [id number]       Norris May MD

## 2023-02-18 NOTE — CARE PLAN
Face to face report given with opportunity to observe patient.    Report given to Lulú TUCKER.    Chrystal Rodriguez RN   2/18/2023  7:11 AM

## 2023-02-18 NOTE — PLAN OF CARE
Goal Outcome Evaluation:      Plan of Care Reviewed With: patient     Pt transferred to room 3104 at approx 1440.  She is running a low grade temp, VS as charted.  Her O2 sats are in the low to mid 90's on RA, lung sounds are clear.  She does rate her pain/discomfort to left hip 8/10 with activity and 1/10 at rest.  She did receive IV Toradol with adequate pain control, she also has an ice pack to affected area.  She is saline locked, good oral intake/output - did have a stool this afternoon.  She was able to ambulate to and from the bathroom with standby assist with gait belt/walker - tolerated well.  She has remained free of falls, call light within reach - does make her needs known, frequent rounding done to assess needs.  Her daughter was here for a bit this afternoon - very attentive to mother.          Face to face report given with opportunity to observe patient.    Report given to  Iris Santamaria RN   2/18/2023  7:12 PM

## 2023-02-18 NOTE — PLAN OF CARE
Face to face report given with opportunity to observe patient.    Report given to Chrystal Kramer RN   2/17/2023  7:14 PM

## 2023-02-18 NOTE — PROGRESS NOTES
02/18/23 1300   Appointment Info   Signing Clinician's Name / Credentials (PT) Teri Sosaafson DPT, OCS, Cert DN   Living Environment   People in Home spouse   Current Living Arrangements house   Home Accessibility stairs to enter home   Number of Stairs, Main Entrance 3   Stair Railings, Main Entrance railings on both sides of stairs   Transportation Anticipated family or friend will provide   Self-Care   Usual Activity Tolerance good   Current Activity Tolerance fair   Equipment Currently Used at Home   (Has a FWW at home and a SPC as needed)   Fall history within last six months yes   Number of times patient has fallen within last six months 1   General Information   Onset of Illness/Injury or Date of Surgery 02/17/23   Referring Physician Dr May   Patient/Family Therapy Goals Statement (PT) return home safely with decreased pain   Pertinent History of Current Problem (include personal factors and/or comorbidities that impact the POC) Patient slipped and fell in [place] and was able to get into her car with assistance, but then couldn't get out of her vehicle and was brought to the ED. She was found to have a L femur fracture. Ortho was consulted and she is WBAT with no surgical intervention expected. She is limited by pain. She reports having a new variety of pain meds today and states this is helping   Existing Precautions/Restrictions no known precautions/restrictions   Weight-Bearing Status - LLE weight-bearing as tolerated   Cognition   Affect/Mental Status (Cognition) WFL   Orientation Status (Cognition) oriented x 4   Pain Assessment   Patient Currently in Pain   (L LE - none at rest, 2/10 with ambulation)   Posture    Posture Comments slight forward flexion with ambulation due to pain   Range of Motion (ROM)   ROM Comment WNL on R LE for mobility, NT on L. Patient also has a previous R bicep tear with myron deformity that is no longer painful, but notes mild weakness   Strength (Manual Muscle  Testing)   Strength Comments Grossly 4+/5 on R LE, L LE NT   Bed Mobility   Comment, (Bed Mobility) Transferred supine to sit EOB IND with encouragement and increased time   Transfers   Transfers sit-stand transfer   Maintains Weight-bearing Status (Transfers) able to maintain   Transfer Safety Concerns Noted decreased step length   Sit-Stand Transfer   Sit-Stand [place] (Transfers) contact guard   Assistive Device (Sit-Stand Transfers) walker, front-wheeled   Comment, (Sit-Stand Transfer) increased time, but patient surprised herself with how well this went   Gait/Stairs (Locomotion)   [place] Level (Gait) contact guard   Assistive Device (Gait) walker, front-wheeled   Distance in Feet 80ft   Distance in Feet (Gait) 80   Pattern (Gait) step-to   Deviations/Abnormal Patterns (Gait) gait speed decreased;stride length decreased;weight shifting decreased   Maintains Weight-bearing Status (Gait) able to maintain   Comment, (Gait/Stairs) NT today, but will need to do to go home   Clinical Impression   Criteria for Skilled Therapeutic Intervention Yes, treatment indicated   PT Diagnosis (PT) decreased mobility and abnormal gait due to L femur fracture   Influenced by the following impairments pain and weakness   Functional limitations due to impairments difficulty performing ambulation and transfers   Clinical Presentation (PT Evaluation Complexity) Stable/Uncomplicated   Clinical Presentation Rationale due to lack of additional comorbidities that may influence anticipated discharge disposition   Clinical Decision Making (Complexity) low complexity   Planned Therapy Interventions (PT) balance training;bed mobility training;home exercise program;stair training;strengthening;transfer training;progressive activity/exercise   Anticipated Equipment Needs at Discharge (PT)   (FWW if  can't confirm they have one at home as she stated)   Risk & Benefits of therapy have been explained evaluation/treatment results  reviewed;care plan/treatment goals reviewed;risks/benefits reviewed   Clinical Impression Comments Presentation is consistent with impaired gait and mobility following L femur fracture. She is expected to return home with her  with outpatient skilled PT pending she continues to have adequate pain control and her mobility improves including her performance of stairs.   PT Total Evaluation Time   PT Eval, Low Complexity Minutes (49382) 14   Physical Therapy Goals   PT Frequency 6x/week   PT Predicted Duration/Target Date for Goal Attainment 02/24/23   PT Goals Gait;Stairs   PT: Gait Modified independent;Assistive device;100 feet   PT: Stairs Supervision/stand-by assist;3 stairs;Rail on both sides   Interventions   Interventions Quick Adds Gait Training;Neuromuscular Re-ed;Therapeutic Activity;Therapeutic Procedure   Gait Training   Gait Training Minutes (05937) 15   Symptoms Noted During/After Treatment (Gait Training) increased pain   Treatment Detail/Skilled Intervention Instructed patient on step to progressing to step-through pattern and cues on posture for ambulation following evaluation. During treatmentm patient ambulated 80 ft with CGA for reassurance and gait improved to step-through with encouragement and cues to put as much weight through leg as she could tolerate and if it didn't hurt, she didn't need to shift all of her weight off of it. Patient did well and was impressed with her tolerance and mobility with progressing distance   [place] Level (Gait Training) contact guard   Physical Assistance Level (Gait Training) verbal cues   Weight Bearing (Gait Training) weight-bearing as tolerated   Assistive Device (Gait Training) standard walker   Pattern Analysis (Gait Training) swing-to gait  (progressed to swing through)   PT Discharge Planning   PT Plan Progess mobility as able   PT Discharge Recommendation (DC Rec) home with outpatient physical therapy   PT Rationale for DC Rec due to current  functional status   PT Brief overview of current status IND supine to sit, CGA for sit to stand, amb CGA with FWW   Total Session Time   Timed Code Treatment Minutes 15   Total Session Time (sum of timed and untimed services) 29

## 2023-02-18 NOTE — CARE PLAN
Pt A&ox4. VSS. Lungs clear bilaterally skin intact and no bruising or discolorations noted on hip area.Reports pain 6/10 in left hip. Pt assisted to bed pan, silvia cares done. Repositioned on right side with ice pack and pilllows. Medications given per order. Pt very teary this shift and states she is frustrated and upset for falling. Pt cooperative and call light within reach. Will continue to monitor.

## 2023-02-18 NOTE — PLAN OF CARE
VSS. Assessment as charted. No bruises noted to left hip. Skin intact and WDL. Pt pleasant and cooperative with cares. Pain to left hip adequately covered by MD meds. See MAR. Continue to monitor.

## 2023-02-19 PROBLEM — M35.3 PMR (POLYMYALGIA RHEUMATICA) (H): Status: ACTIVE | Noted: 2020-09-09

## 2023-02-19 PROBLEM — S72.002A CLOSED FRACTURE OF LEFT HIP, INITIAL ENCOUNTER (H): Status: ACTIVE | Noted: 2023-02-19

## 2023-02-19 PROBLEM — S72.002A CLOSED FRACTURE OF LEFT HIP (H): Status: ACTIVE | Noted: 2023-02-19

## 2023-02-19 PROCEDURE — 250N000013 HC RX MED GY IP 250 OP 250 PS 637: Performed by: NURSE PRACTITIONER

## 2023-02-19 PROCEDURE — 250N000013 HC RX MED GY IP 250 OP 250 PS 637: Performed by: INTERNAL MEDICINE

## 2023-02-19 PROCEDURE — 250N000013 HC RX MED GY IP 250 OP 250 PS 637: Performed by: HOSPITALIST

## 2023-02-19 PROCEDURE — 96372 THER/PROPH/DIAG INJ SC/IM: CPT | Performed by: INTERNAL MEDICINE

## 2023-02-19 PROCEDURE — G0378 HOSPITAL OBSERVATION PER HR: HCPCS

## 2023-02-19 PROCEDURE — 250N000011 HC RX IP 250 OP 636: Performed by: NURSE PRACTITIONER

## 2023-02-19 PROCEDURE — 99232 SBSQ HOSP IP/OBS MODERATE 35: CPT | Performed by: NURSE PRACTITIONER

## 2023-02-19 PROCEDURE — 250N000011 HC RX IP 250 OP 636: Performed by: INTERNAL MEDICINE

## 2023-02-19 PROCEDURE — 120N000001 HC R&B MED SURG/OB

## 2023-02-19 RX ORDER — OXYCODONE HYDROCHLORIDE 5 MG/1
5 TABLET ORAL EVERY 4 HOURS PRN
Status: DISCONTINUED | OUTPATIENT
Start: 2023-02-19 | End: 2023-02-20 | Stop reason: HOSPADM

## 2023-02-19 RX ORDER — HYDROMORPHONE HYDROCHLORIDE 1 MG/ML
0.5 INJECTION, SOLUTION INTRAMUSCULAR; INTRAVENOUS; SUBCUTANEOUS
Status: DISCONTINUED | OUTPATIENT
Start: 2023-02-19 | End: 2023-02-20 | Stop reason: HOSPADM

## 2023-02-19 RX ADMIN — POTASSIUM CHLORIDE 20 MEQ: 1500 TABLET, EXTENDED RELEASE ORAL at 13:20

## 2023-02-19 RX ADMIN — HYDROMORPHONE HYDROCHLORIDE 0.5 MG: 1 INJECTION, SOLUTION INTRAMUSCULAR; INTRAVENOUS; SUBCUTANEOUS at 17:32

## 2023-02-19 RX ADMIN — PANTOPRAZOLE SODIUM 40 MG: 40 TABLET, DELAYED RELEASE ORAL at 06:33

## 2023-02-19 RX ADMIN — ATORVASTATIN CALCIUM 20 MG: 20 TABLET, FILM COATED ORAL at 07:59

## 2023-02-19 RX ADMIN — HYDROMORPHONE HYDROCHLORIDE 0.5 MG: 1 INJECTION, SOLUTION INTRAMUSCULAR; INTRAVENOUS; SUBCUTANEOUS at 22:54

## 2023-02-19 RX ADMIN — HYDROMORPHONE HYDROCHLORIDE 0.5 MG: 1 INJECTION, SOLUTION INTRAMUSCULAR; INTRAVENOUS; SUBCUTANEOUS at 04:05

## 2023-02-19 RX ADMIN — HEPARIN SODIUM 5000 UNITS: 5000 INJECTION, SOLUTION INTRAVENOUS; SUBCUTANEOUS at 16:01

## 2023-02-19 RX ADMIN — ALLOPURINOL 100 MG: 100 TABLET ORAL at 21:12

## 2023-02-19 RX ADMIN — MONTELUKAST 10 MG: 10 TABLET, FILM COATED ORAL at 21:10

## 2023-02-19 RX ADMIN — GABAPENTIN 1200 MG: 400 CAPSULE ORAL at 07:58

## 2023-02-19 RX ADMIN — POTASSIUM CHLORIDE 20 MEQ: 1500 TABLET, EXTENDED RELEASE ORAL at 21:11

## 2023-02-19 RX ADMIN — PSYLLIUM HUSK 1 PACKET: 3.4 POWDER ORAL at 21:10

## 2023-02-19 RX ADMIN — SPIRONOLACTONE 50 MG: 25 TABLET, FILM COATED ORAL at 07:58

## 2023-02-19 RX ADMIN — KETOROLAC TROMETHAMINE 15 MG: 15 INJECTION, SOLUTION INTRAMUSCULAR; INTRAVENOUS at 11:24

## 2023-02-19 RX ADMIN — ALLOPURINOL 100 MG: 100 TABLET ORAL at 07:59

## 2023-02-19 RX ADMIN — KETOROLAC TROMETHAMINE 15 MG: 15 INJECTION, SOLUTION INTRAMUSCULAR; INTRAVENOUS at 22:54

## 2023-02-19 RX ADMIN — PRAMIPEXOLE DIHYDROCHLORIDE 0.12 MG: 0.12 TABLET ORAL at 21:12

## 2023-02-19 RX ADMIN — PSYLLIUM HUSK 1 PACKET: 3.4 POWDER ORAL at 07:56

## 2023-02-19 RX ADMIN — GABAPENTIN 1200 MG: 400 CAPSULE ORAL at 21:12

## 2023-02-19 RX ADMIN — OXYCODONE HYDROCHLORIDE 5 MG: 5 TABLET ORAL at 07:58

## 2023-02-19 RX ADMIN — SPIRONOLACTONE 50 MG: 25 TABLET, FILM COATED ORAL at 21:13

## 2023-02-19 RX ADMIN — HYDROMORPHONE HYDROCHLORIDE 0.5 MG: 1 INJECTION, SOLUTION INTRAMUSCULAR; INTRAVENOUS; SUBCUTANEOUS at 13:21

## 2023-02-19 RX ADMIN — HEPARIN SODIUM 5000 UNITS: 5000 INJECTION, SOLUTION INTRAVENOUS; SUBCUTANEOUS at 04:05

## 2023-02-19 RX ADMIN — POTASSIUM CHLORIDE 20 MEQ: 1500 TABLET, EXTENDED RELEASE ORAL at 07:58

## 2023-02-19 RX ADMIN — OXYCODONE HYDROCHLORIDE 5 MG: 5 TABLET ORAL at 19:48

## 2023-02-19 ASSESSMENT — ACTIVITIES OF DAILY LIVING (ADL)
ADLS_ACUITY_SCORE: 27
ADLS_ACUITY_SCORE: 37
ADLS_ACUITY_SCORE: 27
ADLS_ACUITY_SCORE: 36
ADLS_ACUITY_SCORE: 37
ADLS_ACUITY_SCORE: 27
ADLS_ACUITY_SCORE: 37
ADLS_ACUITY_SCORE: 36
ADLS_ACUITY_SCORE: 27
ADLS_ACUITY_SCORE: 27

## 2023-02-19 NOTE — PLAN OF CARE
"BP 96/66 (BP Location: Left arm, Patient Position: Supine, Cuff Size: Adult Regular)   Pulse 85   Temp 99.6  F (37.6  C) (Tympanic)   Resp 18   Ht 1.549 m (5' 1\")   Wt 53.1 kg (117 lb)   SpO2 92%   BMI 22.11 kg/m      Pt A&O, temps in the 99s at times, sba with gb and walker, free of falls or injury this shift. Has been getting up and walking to the bathroom. Pain does increase with activity. Has received prn dilaudid and toradal - see MAR. Ice to L hip as well. IV SL. BPs softer this AM and overnight when awoken from sleep - 90s/60s.    Face to face report given with opportunity to observe patient.    Report given to GARRETT Hannah RN   2/19/2023  7:15 AM      "

## 2023-02-19 NOTE — PLAN OF CARE
Goal Outcome Evaluation:      Plan of Care Reviewed With: patient    Pt running a low grade temp through the day, VS as charted.  Her O2 sats are in the low 90's on RA, lung sounds are clear.  She does rate her left hip pain/discomfort 4-8/10 and has received PO and IV pain medications, along with ice packs - states adequate pain control.  She is saline locked, good oral intake/output.  She will be on a clear liquid diet after midnight for potential surgery tomorrow.  She has been ambulating to and from the bathroom with gait belt/walker - even a short walk in the hallway - tolerated well.  She has remained free of falls, call light within reach, frequent rounding done to assess needs - family here on and off through the day - very attentive with patient.            Face to face report given with opportunity to observe patient.    Report given to Iris Santamaria RN   2/19/2023  7:05 PM

## 2023-02-19 NOTE — UTILIZATION REVIEW
Admission Status; Secondary Review Determination       Under the authority of the Utilization Management Committee, the utilization review process indicated a secondary review on the above patient. The review outcome is based on review of the medical records, discussions with staff, and applying clinical experience noted on the date of the review.     (x) Inpatient Status Appropriate - This patient's medical care is consistent with medical management for inpatient care and reasonable inpatient medical practice.     RATIONALE FOR DETERMINATION     Patient requires inpatient admission versus short stay observation or outpatient treatment for the following reasons:  67 f with PMR, asthma who had a fall and suffered a impaction fx of her left hip. Ortho was consulted over the phone and recommended conservative management. She has not been able to ambulate due to severe pain initially but this is improving on HD 3. She is requiring multiple dosages of IV narcotics and IV toradol for pain control in addition to oral oxycodone, tylenol and gabapentin (5 dosages of IV dilaudid in the last 24 hours). Given she has already needed 2 midnights and continuation of working on her pain needs that required IV narcotics, would advance to inpatient status at this time. Orthopedics is consulted and will be evaluated the patient tomorrow per the records.    The expected length of stay at the time of admission was more than 2 nights because of the severity of illness, intensity of service provided, and risk for adverse outcome. Inpatient admission is appropriate.         This document was produced using voice recognition software       The information on this document is developed by the utilization review team in order for the business office to ensure compliance. This only denotes the appropriateness of proper admission status and does not reflect the quality of care rendered.   The definitions of Inpatient Status and Observation  Status used in making the determination above are those provided in the CMS Coverage Manual, Chapter 1 and Chapter 6, section 70.4.   Sincerely,   Bartolo Horan DO  Physician Advisor  [facility].

## 2023-02-19 NOTE — PROGRESS NOTES
[facility]    Hospitalist Progress Note    Date of Service (when I saw the patient): 02/19/2023    Assessment & Plan       Closed fracture of left hip (H): Pain improved since admission but still significant, orthopedics to evaluate tomorrow. Will add oxycodone for moderate pain, dilaudid for sever banks, toradol first line per patient request. WBAT per ortho.       Mild persistent asthma: No signs of acute exacerbation, continue advair as at home.       Restless legs syndrome: Continue mirapex/neurontin as at home.       PMR (polymyalgia rheumatica) (H): Increased risk of fractures as she is on frequent doses of prednisone at baseline for treatment.     DVT Prophylaxis: Heparin SQ  Code Status: Full Code    Disposition: Expected discharge in 1-3 days once pain stable, cleared by orthopedics.    Annette Rasmussen CNP    Interval History   Pain overall improved but still significant. Denies chest pain or dyspnea. Having some dizziness after pain medication administration.    -Data reviewed today: I reviewed all new labs and imaging results over the last 24 hours.     Physical Exam   Temp: 99.6  F (37.6  C) Temp src: Tympanic BP: 113/69 Pulse: 89   Resp: 18 SpO2: 92 % O2 Device: None (Room air)    Vitals:    02/17/23 1643   Weight: 53.1 kg (117 lb)     Vital Signs with Ranges  Temp:  [98.1  F (36.7  C)-99.8  F (37.7  C)] 99.6  F (37.6  C)  Pulse:  [74-89] 89  Resp:  [18] 18  BP: ()/(60-73) 113/69  SpO2:  [92 %-95 %] 92 %  I/O last 3 completed shifts:  In: 240 [P.O.:240]  Out: -     Peripheral IV 02/17/23 Anterior;Right Upper forearm (Active)   Site Assessment WDL 02/19/23 0745   Line Status Saline locked 02/19/23 0745   Phlebitis Scale 0-->no symptoms 02/19/23 0745   Infiltration Scale 0 02/19/23 0745   Number of days: 2       Incision/Surgical Site 07/20/22 Right Hip (Active)   Number of days: 214     Line/device assessment(s) completed for medical necessity    Constitutional: Awake,alert, no acute  distress  Respiratory: Clear bilaterally, no crackles, wheezes or rhonchi.   Cardiovascular: HRR, no murmurs, rubs, thrills.  GI: Soft, nontender, bowel sound are positive.   Skin/Integumen: No unusual bruising, open areas or rashes.   Other:CMS intact to distal left leg      Medications       allopurinol  100 mg Oral BID     atorvastatin  20 mg Oral Daily     fluticasone-salmeterol  1 puff Inhalation BID BMT     gabapentin  1,200 mg Oral BID     heparin ANTICOAGULANT  5,000 Units Subcutaneous Q12H     montelukast  10 mg Oral At Bedtime     nicotine   Transdermal Q8H     pantoprazole  40 mg Oral QAM AC     potassium chloride ER  20 mEq Oral TID     pramipexole  0.125 mg Oral At Bedtime     psyllium  1 packet Oral BID     spironolactone  50 mg Oral BID       Data   Recent Labs   Lab 02/18/23  1652      CR 0.86       No results found for this or any previous visit (from the past 24 hour(s)).

## 2023-02-20 ENCOUNTER — HOSPITAL ENCOUNTER (INPATIENT)
Facility: HOSPITAL | Age: 68
End: 2023-02-20
Payer: COMMERCIAL

## 2023-02-20 ENCOUNTER — APPOINTMENT (OUTPATIENT)
Dept: PHYSICAL THERAPY | Facility: HOSPITAL | Age: 68
DRG: 536 | End: 2023-02-20
Payer: COMMERCIAL

## 2023-02-20 VITALS
SYSTOLIC BLOOD PRESSURE: 115 MMHG | DIASTOLIC BLOOD PRESSURE: 68 MMHG | BODY MASS INDEX: 22.09 KG/M2 | WEIGHT: 117 LBS | TEMPERATURE: 98.5 F | RESPIRATION RATE: 18 BRPM | HEART RATE: 77 BPM | OXYGEN SATURATION: 95 % | HEIGHT: 61 IN

## 2023-02-20 LAB
ALBUMIN SERPL BCG-MCNC: 3.7 G/DL (ref 3.5–5.2)
ALP SERPL-CCNC: 57 U/L (ref 35–104)
ALT SERPL W P-5'-P-CCNC: 21 U/L (ref 10–35)
ANION GAP SERPL CALCULATED.3IONS-SCNC: 11 MMOL/L (ref 7–15)
AST SERPL W P-5'-P-CCNC: 24 U/L (ref 10–35)
BASOPHILS # BLD AUTO: 0.1 10E3/UL (ref 0–0.2)
BASOPHILS NFR BLD AUTO: 1 %
BILIRUB SERPL-MCNC: 0.4 MG/DL
BUN SERPL-MCNC: 16.7 MG/DL (ref 8–23)
CALCIUM SERPL-MCNC: 9.4 MG/DL (ref 8.8–10.2)
CHLORIDE SERPL-SCNC: 101 MMOL/L (ref 98–107)
CREAT SERPL-MCNC: 0.87 MG/DL (ref 0.51–0.95)
DEPRECATED HCO3 PLAS-SCNC: 23 MMOL/L (ref 22–29)
EOSINOPHIL # BLD AUTO: 0.3 10E3/UL (ref 0–0.7)
EOSINOPHIL NFR BLD AUTO: 4 %
ERYTHROCYTE [DISTWIDTH] IN BLOOD BY AUTOMATED COUNT: 12.4 % (ref 10–15)
GFR SERPL CREATININE-BSD FRML MDRD: 72 ML/MIN/1.73M2
GLUCOSE SERPL-MCNC: 95 MG/DL (ref 70–99)
HCT VFR BLD AUTO: 38.6 % (ref 35–47)
HGB BLD-MCNC: 13.3 G/DL (ref 11.7–15.7)
IMM GRANULOCYTES # BLD: 0 10E3/UL
IMM GRANULOCYTES NFR BLD: 1 %
LYMPHOCYTES # BLD AUTO: 1.5 10E3/UL (ref 0.8–5.3)
LYMPHOCYTES NFR BLD AUTO: 24 %
MCH RBC QN AUTO: 31.7 PG (ref 26.5–33)
MCHC RBC AUTO-ENTMCNC: 34.5 G/DL (ref 31.5–36.5)
MCV RBC AUTO: 92 FL (ref 78–100)
MONOCYTES # BLD AUTO: 1 10E3/UL (ref 0–1.3)
MONOCYTES NFR BLD AUTO: 15 %
NEUTROPHILS # BLD AUTO: 3.7 10E3/UL (ref 1.6–8.3)
NEUTROPHILS NFR BLD AUTO: 55 %
NRBC # BLD AUTO: 0 10E3/UL
NRBC BLD AUTO-RTO: 0 /100
PLATELET # BLD AUTO: 179 10E3/UL (ref 150–450)
POTASSIUM SERPL-SCNC: 4.5 MMOL/L (ref 3.4–5.3)
PROT SERPL-MCNC: 6.2 G/DL (ref 6.4–8.3)
RBC # BLD AUTO: 4.19 10E6/UL (ref 3.8–5.2)
SODIUM SERPL-SCNC: 135 MMOL/L (ref 136–145)
WBC # BLD AUTO: 6.5 10E3/UL (ref 4–11)

## 2023-02-20 PROCEDURE — 250N000011 HC RX IP 250 OP 636: Performed by: INTERNAL MEDICINE

## 2023-02-20 PROCEDURE — 250N000013 HC RX MED GY IP 250 OP 250 PS 637: Performed by: HOSPITALIST

## 2023-02-20 PROCEDURE — 97110 THERAPEUTIC EXERCISES: CPT | Mod: GP

## 2023-02-20 PROCEDURE — 36415 COLL VENOUS BLD VENIPUNCTURE: CPT | Performed by: NURSE PRACTITIONER

## 2023-02-20 PROCEDURE — 250N000013 HC RX MED GY IP 250 OP 250 PS 637: Performed by: NURSE PRACTITIONER

## 2023-02-20 PROCEDURE — 80053 COMPREHEN METABOLIC PANEL: CPT | Performed by: NURSE PRACTITIONER

## 2023-02-20 PROCEDURE — 250N000011 HC RX IP 250 OP 636: Performed by: NURSE PRACTITIONER

## 2023-02-20 PROCEDURE — 85025 COMPLETE CBC W/AUTO DIFF WBC: CPT | Performed by: NURSE PRACTITIONER

## 2023-02-20 PROCEDURE — 250N000013 HC RX MED GY IP 250 OP 250 PS 637: Performed by: INTERNAL MEDICINE

## 2023-02-20 PROCEDURE — 999N000157 HC STATISTIC RCP TIME EA 10 MIN

## 2023-02-20 PROCEDURE — 99239 HOSP IP/OBS DSCHRG MGMT >30: CPT | Performed by: NURSE PRACTITIONER

## 2023-02-20 PROCEDURE — 94799 UNLISTED PULMONARY SVC/PX: CPT

## 2023-02-20 RX ORDER — OXYCODONE HYDROCHLORIDE 5 MG/1
5 TABLET ORAL EVERY 4 HOURS PRN
Qty: 30 TABLET | Refills: 0 | Status: SHIPPED | OUTPATIENT
Start: 2023-02-20 | End: 2023-02-22

## 2023-02-20 RX ADMIN — SPIRONOLACTONE 50 MG: 25 TABLET, FILM COATED ORAL at 08:17

## 2023-02-20 RX ADMIN — POTASSIUM CHLORIDE 20 MEQ: 1500 TABLET, EXTENDED RELEASE ORAL at 08:17

## 2023-02-20 RX ADMIN — OXYCODONE HYDROCHLORIDE 5 MG: 5 TABLET ORAL at 10:33

## 2023-02-20 RX ADMIN — HEPARIN SODIUM 5000 UNITS: 5000 INJECTION, SOLUTION INTRAVENOUS; SUBCUTANEOUS at 05:13

## 2023-02-20 RX ADMIN — FLUTICASONE PROPIONATE AND SALMETEROL 1 PUFF: 250; 50 POWDER RESPIRATORY (INHALATION) at 08:28

## 2023-02-20 RX ADMIN — HYDROMORPHONE HYDROCHLORIDE 0.5 MG: 1 INJECTION, SOLUTION INTRAMUSCULAR; INTRAVENOUS; SUBCUTANEOUS at 08:25

## 2023-02-20 RX ADMIN — GABAPENTIN 1200 MG: 400 CAPSULE ORAL at 08:18

## 2023-02-20 RX ADMIN — KETOROLAC TROMETHAMINE 15 MG: 15 INJECTION, SOLUTION INTRAMUSCULAR; INTRAVENOUS at 08:20

## 2023-02-20 RX ADMIN — PSYLLIUM HUSK 1 PACKET: 3.4 POWDER ORAL at 08:44

## 2023-02-20 RX ADMIN — ALLOPURINOL 100 MG: 100 TABLET ORAL at 08:19

## 2023-02-20 RX ADMIN — SIMETHICONE 240 MG: 80 TABLET, CHEWABLE ORAL at 08:45

## 2023-02-20 RX ADMIN — LOPERAMIDE HYDROCHLORIDE 2 MG: 2 CAPSULE ORAL at 08:44

## 2023-02-20 RX ADMIN — ATORVASTATIN CALCIUM 20 MG: 20 TABLET, FILM COATED ORAL at 08:18

## 2023-02-20 RX ADMIN — OXYCODONE HYDROCHLORIDE 5 MG: 5 TABLET ORAL at 05:13

## 2023-02-20 ASSESSMENT — ACTIVITIES OF DAILY LIVING (ADL)
ADLS_ACUITY_SCORE: 27

## 2023-02-20 NOTE — DISCHARGE SUMMARY
[facility]    Discharge Summary  Hospitalist    Date of Admission:  2/17/2023  Date of Discharge:  2/20/2023  1:52 PM  Discharging Provider: Annette Rasmussen CNP  Date of Service (when I saw the patient): 02/20/23    Discharge Diagnoses     Principal Problem:    Closed fracture of left hip (H)  Active Problems:    Mild persistent asthma    Restless legs syndrome    PMR (polymyalgia rheumatica) (H)    Closed fracture of left hip, initial encounter (H)      History of Present Illness   From admission:Chyna Delgado is a 67 year old female with history of PMR on chronic low-dose steroids as needed, asthma/emphysema, hyperlipidemia who presents with left-sided hip pain after mechanical fall and slipped on ice.  Bystanders helped her back into her car, she was able to drive home.  However, patient cannot get out of the car, so she called EMS at that point.  EMS brings her into the emergency department where x-ray reveals a left sided valgus impacted femoral neck fracture.  She has intractable pain, unable to ambulate.  Orthopedics was consulted, Dr. Jordan over the phone.  Recommendation was for conservative therapy for the time being, weightbearing as tolerated.  As mentioned, patient is nonambulatory due to pain, so admitted for possible placement.    Hospital Course     Closed fracture of left hip (H): Pain improved since admission but still significant, orthopedics to evaluate tomorrow. Will add oxycodone for moderate pain, dilaudid for severe pain, toradol first line per patient request. WBAT per ortho.   -2/20:Pain more severe this morning. PT to evaluate for stairs today  -2/21: Tolerated stairs and ambulation very well. Follow-up with ortho in 1 week per their recommendations.        Mild persistent asthma: No signs of acute exacerbation, continue advair as at home.        Restless legs syndrome: Continue mirapex/neurontin as at home.        PMR (polymyalgia rheumatica) (H): Increased risk of  fractures as she is on frequent doses of prednisone at baseline for treatment.          Annette Rasmussen CNP      Pending Results     Unresulted Labs Ordered in the Past 30 Days of this Admission     No orders found from 1/18/2023 to 2/18/2023.          Code Status   Full Code       Primary Care Physician   Jose Guadalupe Lowe       Discharge Disposition   Discharged to home  Condition at discharge: Stable    Consultations This Hospital Stay   CARE MANAGEMENT / SOCIAL WORK IP CONSULT  PHYSICAL THERAPY ADULT IP CONSULT  OCCUPATIONAL THERAPY ADULT IP CONSULT    Time Spent on this Encounter   I, Annette Rasmussen NP, personally saw the patient today and spent greater than 30 minutes discharging this patient.    Discharge Orders      Reason for your hospital stay    Fall with left hip fracture     Follow-up and recommended labs and tests     Follow up with Dr. Jordan , at [facility] as scheduled next week.     Activity    Your activity upon discharge: activity as tolerated     Flutter Valve    Continue to use flutter valve 8 times a day Until return to normal activity     Diet    Follow this diet upon discharge: Orders Placed This Encounter      Regular Diet Adult     Discharge Medications   Current Discharge Medication List      START taking these medications    Details   oxyCODONE (ROXICODONE) 5 MG tablet Take 1 tablet (5 mg) by mouth every 4 hours as needed for moderate to severe pain  Qty: 30 tablet, Refills: 0    Associated Diagnoses: Closed fracture of left hip, initial encounter (H)         CONTINUE these medications which have NOT CHANGED    Details   albuterol (PROAIR HFA/PROVENTIL HFA/VENTOLIN HFA) 108 (90 Base) MCG/ACT inhaler Inhale 2 puffs into the lungs every 4 hours as needed for shortness of breath / dyspnea or wheezing  Qty: 18 g, Refills: 2    Comments: This prescription was filled on 12/19/2022. Any refills authorized will be placed on file.  Associated Diagnoses: Moderate persistent asthma,  unspecified whether complicated      alendronate (FOSAMAX) 70 MG tablet Take 1 tablet (70 mg) by mouth every 7 days  Qty: 12 tablet, Refills: 3    Associated Diagnoses: Senile osteoporosis      allopurinol (ZYLOPRIM) 100 MG tablet Take 1 tablet (100 mg) by mouth 2 times daily  Qty: 180 tablet, Refills: 1    Associated Diagnoses: Idiopathic gout of right hand, unspecified chronicity      atorvastatin (LIPITOR) 20 MG tablet Take 1 tablet (20 mg) by mouth daily  Qty: 90 tablet, Refills: 1    Associated Diagnoses: Hypertriglyceridemia      calcium carbonate 750 MG CHEW Take 1 tablet (750 mg) by mouth 2 times daily  Qty: 180 tablet, Refills: 3    Associated Diagnoses: Age-related osteoporosis without current pathological fracture      diclofenac (VOLTAREN) 75 MG EC tablet Take 1 tablet (75 mg) by mouth 2 times daily as needed for moderate pain  Qty: 60 tablet, Refills: 0    Associated Diagnoses: Trochanteric bursitis of right hip      dicyclomine (BENTYL) 10 MG capsule Take 2 capsules (20 mg) by mouth 3 times daily (before meals)  Qty: 540 capsule, Refills: 1    Associated Diagnoses: Acquired absence of other specified parts of digestive tract      diphenoxylate-atropine (LOMOTIL) 2.5-0.025 MG tablet Take 1-2 tablets by mouth 4 times daily as needed for diarrhea  Qty: 120 tablet, Refills: 0    Associated Diagnoses: Diarrhea due to malabsorption      ferrous sulfate (IRON) 325 (65 FE) MG tablet Take 325 mg by mouth 2 times daily (with meals)  Qty: 90 tablet, Refills: 2    Associated Diagnoses: Iron deficiency      fluticasone-salmeterol (ADVAIR) 250-50 MCG/ACT inhaler Inhale 1 puff into the lungs 2 times daily  Qty: 60 each, Refills: 1    Comments: This prescription was filled on 12/19/2022. Any refills authorized will be placed on file.  Associated Diagnoses: Moderate persistent asthma without complication      gabapentin (NEURONTIN) 400 MG capsule Take 3 capsules (1,200 mg) by mouth 2 times daily  Qty: 540 capsule,  Refills: 1    Associated Diagnoses: PMR (polymyalgia rheumatica) (H)      GNP VITAMIN D MAXIMUM STRENGTH 50 MCG (2000 UT) tablet TAKE TWO TABLETS BY MOUTH EVERY DAY  Qty: 100 tablet, Refills: 4    Comments: This prescription was filled on 2/21/2022. Any refills authorized will be placed on file.  Associated Diagnoses: Restless legs syndrome      ibuprofen (ADVIL,MOTRIN) 200 MG tablet Take 200 mg by mouth every 4 hours as needed       ketotifen (ZADITOR/REFRESH ANTI-ITCH) 0.025 % SOLN ophthalmic solution PLACE 2 DROPS INTO BOTH EYES 2 TIMES DAILY  Qty: 5 mL, Refills: 2      loperamide (IMODIUM) 2 MG capsule Take 6 mg by mouth 2 times daily plus additional dosing as needed.      montelukast (SINGULAIR) 10 MG tablet Take 1 tablet (10 mg) by mouth At Bedtime  Qty: 90 tablet, Refills: 1    Associated Diagnoses: Post-nasal drip      Multiple Vitamin (MULTIVITAMIN) per tablet Take 1 tablet by mouth daily Every day with food      omeprazole (PRILOSEC) 40 MG DR capsule Take 1 capsule (40 mg) by mouth daily  Qty: 90 capsule, Refills: 1    Associated Diagnoses: Gastroesophageal reflux disease with esophagitis without hemorrhage      potassium chloride ER (KLOR-CON M) 20 MEQ CR tablet Take 1 tablet (20 mEq) by mouth 3 times daily  Qty: 90 tablet, Refills: 3    Associated Diagnoses: Restless legs syndrome      pramipexole (MIRAPEX) 0.125 MG tablet Take 1 tablet (0.125 mg) by mouth At Bedtime  Qty: 90 tablet, Refills: 1    Associated Diagnoses: Restless legs syndrome      predniSONE (DELTASONE) 1 MG tablet Take 4.5 tablets (4.5 mg) by mouth daily as needed (PMR)  Qty: 135 tablet, Refills: 1    Associated Diagnoses: PMR (polymyalgia rheumatica) (H)      PSYLLIUM PO Take 1 Tablespoonful by mouth 2 times daily       spironolactone (ALDACTONE) 50 MG tablet Take 1 tablet (50 mg) by mouth 2 times daily  Qty: 180 tablet, Refills: 1    Associated Diagnoses: Hypokalemia      Simethicone 125 MG CAPS Take 500 mg by mouth 2 times daily            Allergies   Allergies   Allergen Reactions     Codeine Swelling     Throat swelling-Patient can tolerate Hydrocodone & morphine     Nortriptyline Other (See Comments)     Crying      Data   Most Recent 3 CBC's:Recent Labs   Lab Test 02/20/23 0527 02/18/23  1652 07/15/22  1000 06/09/22  1136   WBC 6.5  --  11.9* 10.1   HGB 13.3  --  14.5 13.7   MCV 92  --  93 95    195 219 230      Most Recent 3 BMP's:  Recent Labs   Lab Test 02/20/23 0527 02/18/23  1652 01/05/23  1349 07/20/22  0911 07/20/22  0858 07/15/22  1000   *  --  137  --   --  137   POTASSIUM 4.5  --  4.8  --   --  3.9   CHLORIDE 101  --  101  --   --  105   CO2 23  --  25  --   --  26   BUN 16.7  --  25.0*  --   --  18   CR 0.87 0.86 1.06*  --   --  0.96   ANIONGAP 11  --  11  --   --  6   СВЕТЛАНА 9.4  --  9.7  --   --  9.2   GLC 95  --  109* 84   < > 107*    < > = values in this interval not displayed.     Most Recent 2 LFT's:  Recent Labs   Lab Test 02/20/23  0527 07/15/22  1000   AST 24 21   ALT 21 31   ALKPHOS 57 67   BILITOTAL 0.4 0.4     Most Recent INR's and Anticoagulation Dosing History:  Anticoagulation Dose History     Recent Dosing and Labs Latest Ref Rng & Units 2/23/2019    INR 0.80 - 1.20 1.09        Most Recent 3 Troponin's:  Recent Labs   Lab Test 03/06/20  1822   TROPI <0.015     Most Recent Cholesterol Panel:  Recent Labs   Lab Test 07/15/22  1000   CHOL 168   LDL 77   HDL 44*   TRIG 233*     Most Recent 6 Bacteria Isolates From Any Culture (See EPIC Reports for Culture Details):  Recent Labs   Lab Test 03/12/21  0910   CULT 10,000 to 50,000 colonies/mL  Escherichia coli  *     Most Recent TSH, T4 and A1c Labs:  Recent Labs   Lab Test 01/05/23  1349 07/15/22  1000 02/05/19  0942 08/08/18  1022   TSH  --  0.89   < > 1.11   T4  --   --   --  0.96   A1C 5.4  --   --   --     < > = values in this interval not displayed.     Results for orders placed or performed during the hospital encounter of 02/17/23   XR Pelvis including  Hip Left 2-3 Views    Narrative    XR PELVIS AND HIP LEFT 2 VIEWS    HISTORY: 67 years Female fall, left hip injury    COMPARISON: None    TECHNIQUE: Pelvis and left hip reveals total    FINDINGS: There is a fracture of the left femoral neck. There is  slight impaction and coxa valgus alignment. The joint spaces are  congruent.    Question similar fracture deformity of the right hip, possibly old.    There is osteopenia.      Impression    IMPRESSION: Impacted left femoral neck fracture with coxa valga  alignment.    CASSIDY SZYMANSKI MD         SYSTEM ID:  [id number]

## 2023-02-20 NOTE — PROGRESS NOTES
[facility]    Hospitalist Progress Note    Date of Service (when I saw the patient): 02/20/2023    Assessment & Plan       Closed fracture of left hip (H): Pain improved since admission but still significant, orthopedics to evaluate tomorrow. Will add oxycodone for moderate pain, dilaudid for severe pain, toradol first line per patient request. WBAT per ortho.   -2/20:Pain more severe this morning. PT to evaluate for stairs today      Mild persistent asthma: No signs of acute exacerbation, continue advair as at home.       Restless legs syndrome: Continue mirapex/neurontin as at home.       PMR (polymyalgia rheumatica) (H): Increased risk of fractures as she is on frequent doses of prednisone at baseline for treatment.     DVT Prophylaxis: Heparin SQ  Code Status: Full Code    Disposition: Expected discharge in 1-3 days once pain stable, cleared by PT.    Annette Rasmussen, CNP    Interval History   Pain worse first thing this morning.  Denies chest pain or dyspnea. Having some dizziness after pain medication administration. She is tearful at the news that the plan is to attempt non operative approach stating she is sick of being in pain.     -Data reviewed today: I reviewed all new labs and imaging results over the last 24 hours.     Physical Exam   Temp: 98.5  F (36.9  C) Temp src: Tympanic BP: 115/68 Pulse: 77   Resp: 18 SpO2: 95 % O2 Device: None (Room air)    Vitals:    02/17/23 1643   Weight: 53.1 kg (117 lb)     Vital Signs with Ranges  Temp:  [98.5  F (36.9  C)-100.2  F (37.9  C)] 98.5  F (36.9  C)  Pulse:  [77-91] 77  Resp:  [18] 18  BP: ()/(53-68) 115/68  SpO2:  [92 %-98 %] 95 %  I/O last 3 completed shifts:  In: 120 [P.O.:120]  Out: -     Peripheral IV 02/17/23 Anterior;Right Upper forearm (Active)   Site Assessment WDL 02/1955   Line Status Saline locked 02/1953   Phlebitis Scale 0-->no symptoms 02/1953   Infiltration Scale 0 02/1953   Number of days: 3        Incision/Surgical Site 07/20/22 Right Hip (Active)   Number of days: 215     Line/device assessment(s) completed for medical necessity    Constitutional: Awake,alert, no acute distress  Respiratory: Clear bilaterally, no crackles, wheezes or rhonchi.   Cardiovascular: HRR, no murmurs, rubs, thrills.  GI: Soft, nontender, bowel sound are positive.   Skin/Integumen: No unusual bruising, open areas or rashes.   Other:CMS intact to distal left leg      Medications       allopurinol  100 mg Oral BID     atorvastatin  20 mg Oral Daily     fluticasone-salmeterol  1 puff Inhalation BID BMT     gabapentin  1,200 mg Oral BID     heparin ANTICOAGULANT  5,000 Units Subcutaneous Q12H     montelukast  10 mg Oral At Bedtime     nicotine   Transdermal Q8H     pantoprazole  40 mg Oral QAM AC     potassium chloride ER  20 mEq Oral TID     pramipexole  0.125 mg Oral At Bedtime     psyllium  1 packet Oral BID     spironolactone  50 mg Oral BID       Data   Recent Labs   Lab 02/20/23  0527 02/18/23  1652   WBC 6.5  --    HGB 13.3  --    MCV 92  --     195   *  --    POTASSIUM 4.5  --    CHLORIDE 101  --    CO2 23  --    BUN 16.7  --    CR 0.87 0.86   ANIONGAP 11  --    СВЕТЛАНА 9.4  --    GLC 95  --    ALBUMIN 3.7  --    PROTTOTAL 6.2*  --    BILITOTAL 0.4  --    ALKPHOS 57  --    ALT 21  --    AST 24  --        No results found for this or any previous visit (from the past 24 hour(s)).

## 2023-02-20 NOTE — CARE PLAN
Prior to Admission Medication Reconciliation:     Medications added:   [x] None  [] As listed below:    Medications deleted:   [] None  [x] As listed below:    Duplicate VIT D    Medications marked for review/removal by attending:  [x] None  [] As listed below:    Changes made to existing medications:   [x] None  [] Updated time of day, strengths and frequencies to most current.     Pertinent notes/medications patient takes different than prescribed:     Lomotil- takes 1-2 tabs BID scheduled    Last times/dates taken verified with patient:  [] Yes- completed myself   [x] Nurse completed, no changes made (double checked entries)  [] Unable to review with patient at this time:    Allergy review:    []Did not review: reviewed by nursing  [x]Allergies reviewed and updated if needed.     Medication reconciliation sources:   [x]Patient  []Patient family member/emergency contact: **  [x][facility] Report Review  [x]Epic Chart Review  [x]Care Everywhere review  []Pharmacy med list/phone call: **  []Fill dates reflect compliancy. No concerns.  []Fill dates do not reflect compliancy on the following medications:   [x]Outside meds dispense report:   [x]Fill dates reflect compliancy. No concerns.  []Behavioral Health Provider:      []Other: **    Pharmacy desired at discharge: [facility]    Is patient on coumadin?   [x]No  []Yes      Fill dates and reported compliancy:  [x] Compliant: fill dates reflect reported compliancy.   [] Not applicable. Patient is not taking any prescribed maintenance medications at this time.   [] Fill dates do not coincide with compliancy, but reports compliancy.    [] Fill dates reflect compliancy, but reports non-compliancy.   [] Cannot assess at this time.     Historian accuracy:  [x] Excellent- alert and oriented, understands why meds were prescribed and how to take, able to answer specifics  [] Good- alert and oriented, understands why meds were prescribed and how to take, some confusion   []  Fair- alert and oriented, doesn't know medications without list, cannot answer specifics about medications, but has a decent process for which to take at home  [] Poor- does not know medications, may not have a process to take at home, may be cognitively unable to review at this time  []Medication management done by family member or facility, no concerns about historian accuracy.   [] Cannot assess at this time.     Medication Management:  [x] Manages meds independently: spouse helps remind  [] Family member/ other party manages meds/assists:  [] Meds managed by staff at facility  [] Meds set up by home care, family/other party helps administer  [] Meds set up by home care, self administers  [] Cannot assess at this time.     Other medications aside from PTA:  [x] Denies taking any other medications aside from those listed in PTA meds, this includes over-the-counter vitamins, supplements and analgesics.   [] Unable to confirm at this time.     Kerline Worthington on 2/20/2023 at 9:07 AM     Notifying appropriate party of changes/additions/discrepancies:  [x]No pertinent changes made, notification not necessary.   [] Notified attending provider via text page/phone call/sticky note or other:  [] Notified other:  [] Medications have not been reconciled by a provider yet, notification not necessary  [] Pt is not admitted to floor yet or patient is boarding, PTA meds completed before admission.     Medications Prior to Admission   Medication Sig Dispense Refill Last Dose     albuterol (PROAIR HFA/PROVENTIL HFA/VENTOLIN HFA) 108 (90 Base) MCG/ACT inhaler Inhale 2 puffs into the lungs every 4 hours as needed for shortness of breath / dyspnea or wheezing 18 g 2 Past Month     alendronate (FOSAMAX) 70 MG tablet Take 1 tablet (70 mg) by mouth every 7 days 12 tablet 3 Past Week     allopurinol (ZYLOPRIM) 100 MG tablet Take 1 tablet (100 mg) by mouth 2 times daily 180 tablet 1 2/17/2023     atorvastatin (LIPITOR) 20 MG tablet Take 1  tablet (20 mg) by mouth daily 90 tablet 1 2/17/2023     calcium carbonate 750 MG CHEW Take 1 tablet (750 mg) by mouth 2 times daily 180 tablet 3 2/16/2023     diclofenac (VOLTAREN) 75 MG EC tablet Take 1 tablet (75 mg) by mouth 2 times daily as needed for moderate pain 60 tablet 0 More than a month     dicyclomine (BENTYL) 10 MG capsule Take 2 capsules (20 mg) by mouth 3 times daily (before meals) 540 capsule 1 2/17/2023     diphenoxylate-atropine (LOMOTIL) 2.5-0.025 MG tablet Take 1-2 tablets by mouth 4 times daily as needed for diarrhea (Patient taking differently: Take 1-2 tablets by mouth 2 times daily) 120 tablet 0 2/17/2023     ferrous sulfate (IRON) 325 (65 FE) MG tablet Take 325 mg by mouth 2 times daily (with meals) 90 tablet 2 2/17/2023     fluticasone-salmeterol (ADVAIR) 250-50 MCG/ACT inhaler Inhale 1 puff into the lungs 2 times daily 60 each 1 2/17/2023     gabapentin (NEURONTIN) 400 MG capsule Take 3 capsules (1,200 mg) by mouth 2 times daily 540 capsule 1 2/17/2023     GNP VITAMIN D MAXIMUM STRENGTH 50 MCG (2000 UT) tablet TAKE TWO TABLETS BY MOUTH EVERY DAY (Patient taking differently: Take 1 tablet by mouth daily) 100 tablet 4 2/17/2023     ibuprofen (ADVIL,MOTRIN) 200 MG tablet Take 200 mg by mouth every 4 hours as needed    2/16/2023     ketotifen (ZADITOR/REFRESH ANTI-ITCH) 0.025 % SOLN ophthalmic solution PLACE 2 DROPS INTO BOTH EYES 2 TIMES DAILY 5 mL 2 More than a month     loperamide (IMODIUM) 2 MG capsule Take 6 mg by mouth 2 times daily Pt states takes 3 tabs (6 mg) twice a day unless she needs to take more, depending on what she eats.   2/17/2023     montelukast (SINGULAIR) 10 MG tablet Take 1 tablet (10 mg) by mouth At Bedtime 90 tablet 1 2/16/2023     Multiple Vitamin (MULTIVITAMIN) per tablet Take 1 tablet by mouth daily Every day with food   2/17/2023     omeprazole (PRILOSEC) 40 MG DR capsule Take 1 capsule (40 mg) by mouth daily 90 capsule 1 2/17/2023     potassium chloride ER  (KLOR-CON M) 20 MEQ CR tablet Take 1 tablet (20 mEq) by mouth 3 times daily 90 tablet 3 2/17/2023     pramipexole (MIRAPEX) 0.125 MG tablet Take 1 tablet (0.125 mg) by mouth At Bedtime 90 tablet 1 2/16/2023     predniSONE (DELTASONE) 1 MG tablet Take 4.5 tablets (4.5 mg) by mouth daily as needed (PMR) 135 tablet 1 2/17/2023     PSYLLIUM PO Take 1 Tablespoonful by mouth 2 times daily    2/17/2023     spironolactone (ALDACTONE) 50 MG tablet Take 1 tablet (50 mg) by mouth 2 times daily 180 tablet 1 2/17/2023     Simethicone 125 MG CAPS Take 500 mg by mouth 2 times daily

## 2023-02-20 NOTE — PLAN OF CARE
Goal Outcome Evaluation:Patient discharged at 1:50 PM via wheel chair accompanied by staff. Prescriptions sent to patients preferred pharmacy. All belongings sent with patient.     Discharge instructions reviewed with patient. Listed belongings gathered and returned to patient. yes    Patient discharged to home.   Report called to N/A    Surgical Patient   Surgical Procedures during stay: N/A  Did patient receive discharge instruction on wound care and recognition of infection symptoms? N/A    MISC  Follow up appointment made:  Yes  Home medications returned to patient: N/A  Patient reports pain was well managed at discharge: Yes

## 2023-02-20 NOTE — PROGRESS NOTES
Assessment completed with Chyna.    LOC: alert     Dx: FX of left hip  Chronic Disease Management: COPD, hyperlipidemia, RLS, tobacco dependence    Lives with: spouse and dog  Living at:  Home in [place]  Transportation: YES Drives self    Primary PCP: Jose Guadalupe Lowe  Insurance:  [employer] Medicare  Medicare IMM letter reviewed with Chyna.    Support System:  family  Homecare/PCA: no  /County Services:   no  [place]: NO      How was the [facility] notification completed: n/a    Health Care Directive: on file  Guardian: no  POA: no    Pharmacy: [facility]  Meds management: manages own    Adequate Resources for needs (housing, utilities, food/med): YES  Household chores: shared  Work/community/social activity: YES     ADLs: Independent  Ambulation:independent  Falls: Friday Feb 17 2023  Nutrition: no concern  Sleep: RLS keeps her awake but she naps during the day    Equipment used: none      Oxygen supplier: no      Does the supplier have valid oxygen orders: n/a    Mental health: denies  Substance abuse: denies  Exposure to violence/abuse: denies  Stressors: is angry at self for falling    Able to Return to Prior Living Arrangements: YES    Choice of Vendor: n/a    Barriers: none identified    BRENT: low    Plan: home with spouse.    Katie Mckeon CM

## 2023-02-20 NOTE — PLAN OF CARE
"/68 (BP Location: Left arm, Patient Position: Supine, Cuff Size: Adult Regular)   Pulse 82   Temp 99  F (37.2  C) (Tympanic)   Resp 18   Ht 1.549 m (5' 1\")   Wt 53.1 kg (117 lb)   SpO2 92%   BMI 22.11 kg/m      Pt A&O, low grade temps, sba with gb and walker, free of falls or injury this shift. Has been getting up and walking to the bathroom. Pain does increase with activity. Has received prn oxy x2 and dilaudid and toradal x1 - see MAR. Ice to L hip as well. IV SL. Clear liquid diet started at midnight. NPO this AM.     Face to face report given with opportunity to observe patient.    Report given to GARRETT Webb RN   2/20/2023  7:14 AM      "

## 2023-02-20 NOTE — PROGRESS NOTES
02/20/23 1100   Appointment Info   Signing Clinician's Name / Credentials (PT) Prudence Sandoval DPT   Interventions   Interventions Quick Adds Therapeutic Activity   Therapeutic Procedure/Exercise   Ther. Procedure: strength, endurance, ROM, flexibillity Minutes (00628) 23   Symptoms Noted During/After Treatment fatigue   Treatment Detail/Skilled Intervention Pt resting in bed at start of session and agreeable to PT. Reporting that pain is well controlled. Completed supine to sit transfer independently. From edge of bed completed sit to stand mod I c FWW. Pt ambulated 100', 75' c FWW and SBA. Pt tolerating WB through LLE during ambulation. No major gait impairments, no losses of balance. Pt also practiced ascending/descending 4 stairs c CGA , step-to gait pattern c cues for ascending c RLE leading and descending leading c LLE. Pt used bilateral rails. Once back in room pt transferred into bed independently. Hopes to discharge home with  today   PT Discharge Planning   PT Discharge Recommendation (DC Rec) home   PT Rationale for DC Rec Pt tolerated ambulation and stairs today c no safety concerns. May need OP PT but following up with [facility] next week and will wait for their recommendation   PT Brief overview of current status I bed mobility, SBA ambulation c ', 75', ascended/descended 4 stairs c SBA   Total Session Time   Timed Code Treatment Minutes 23   Total Session Time (sum of timed and untimed services) 23

## 2023-02-20 NOTE — DISCHARGE INSTRUCTIONS
Appointments: * You have a Hospital Follow-up appointment scheduled for Monday February 27th at 1:00 PM with Dr. Jordan at [facility] If you need to reschedule please call [phone]

## 2023-02-20 NOTE — PROGRESS NOTES
Reviewed images.  Chyna has a valgus impacted femoral neck fracture on Left.  Recommend an attempt at nonoperative management.  WBAT an ROM as tolerated to left hip/lower extremity.  F/u in 1 week [facility] for repeat x-rays.

## 2023-02-22 ENCOUNTER — PATIENT OUTREACH (OUTPATIENT)
Dept: CARE COORDINATION | Facility: OTHER | Age: 68
End: 2023-02-22

## 2023-02-22 DIAGNOSIS — S72.002A CLOSED FRACTURE OF LEFT HIP, INITIAL ENCOUNTER (H): ICD-10-CM

## 2023-02-22 NOTE — PROGRESS NOTES
Clinic Care Coordination Contact  [facility]: Post-Discharge Note  SITUATION                                                      Admission:    Admission Date: 02/17/23   Reason for Admission: From admission:Chyna Delgado is a 67 year old female with history of PMR on chronic low-dose steroids as needed, asthma/emphysema, hyperlipidemia who presents with left-sided hip pain after mechanical fall and slipped on ice.  Bystanders helped her back into her car, she was able to drive home.  However, patient cannot get out of the car, so she called EMS at that point.  EMS brings her into the emergency department where x-ray reveals a left sided valgus impacted femoral neck fracture.  She has intractable pain, unable to ambulate.  Orthopedics was consulted, Dr. Jordan over the phone.  Discharge:   Discharge Date: 02/20/23  Discharge Diagnosis: Principal Problem:    Closed fracture of left hip (H)  Active Problems:    Mild persistent asthma    Restless legs syndrome    PMR (polymyalgia rheumatica) (H)    Closed fracture of left hip, initial encounter (H)    BACKGROUND                                                      Per hospital discharge summary and inpatient provider notes:  From admission:Chyna Delgado is a 67 year old female with history of PMR on chronic low-dose steroids as needed, asthma/emphysema, hyperlipidemia who presents with left-sided hip pain after mechanical fall and slipped on ice.  Bystanders helped her back into her car, she was able to drive home.  However, patient cannot get out of the car, so she called EMS at that point.  EMS brings her into the emergency department where x-ray reveals a left sided valgus impacted femoral neck fracture.  She has intractable pain, unable to ambulate.  Orthopedics was consulted, Dr. Jordan over the phone.    ASSESSMENT           Discharge Assessment  How are you doing now that you are home?: Pt states she is doing OK pain is controlled and is trying to  cut back on the amount of oxycodone is now taking this q. 5 hours bridging with tylenol , tried to push out to 6 hours but the pain is too much. Pt states she has a follow up apt monday with ortho however will run out of pain medication friday or saturday. Is requesting a refil to get through the weekend or if there is something else she can take as she is scared of taking current medication because she does not want to become addicted. Will route to PCP to determine  How are your symptoms? (Red Flag symptoms escalate to triage hotline per guidelines): Improved  Do you feel your condition is stable enough to be safe at home until your provider visit?: Yes  Does the patient have their discharge instructions? : Yes  Does the patient have questions regarding their discharge instructions? : No  Were you started on any new medications or were there changes to any of your previous medications? : Yes  Does the patient have all of their medications?: Yes  Do you have questions regarding any of your medications? : No  Do you have all of your needed medical supplies or equipment (DME)?  (i.e. oxygen tank, CPAP, cane, etc.): Yes  Discharge follow-up appointment scheduled within 14 calendar days? : Yes  Discharge Follow Up Appointment Date: 02/27/23  Discharge Follow Up Appointment Scheduled with?: Specialty Care Provider (Dr. Jordan)                PLAN                                                      Outpatient Plan:  Will follow up with OA Dr. Jordan Monday 2/27 - is unable to see PCP as she has a very difficult time walking/transferring at this time.     Future Appointments   Date Time Provider Department Center   3/28/2023 11:00 AM Jose Guadalupe Lowe, DO [facility]         For any urgent concerns, please contact our 24 hour nurse triage line: [phone] ([phone])         Norah Estes RN

## 2023-02-23 RX ORDER — OXYCODONE HYDROCHLORIDE 5 MG/1
5 TABLET ORAL EVERY 4 HOURS PRN
Qty: 30 TABLET | Refills: 0 | Status: ON HOLD | OUTPATIENT
Start: 2023-02-23 | End: 2023-05-15

## 2023-02-23 NOTE — PROGRESS NOTES
CC attempted to contact pt to notify of filled medication by PCP. No answer LVM with notification.

## 2023-02-27 ENCOUNTER — TRANSFERRED RECORDS (OUTPATIENT)
Dept: HEALTH INFORMATION MANAGEMENT | Facility: CLINIC | Age: 68
End: 2023-02-27

## 2023-03-21 ENCOUNTER — TRANSFERRED RECORDS (OUTPATIENT)
Dept: HEALTH INFORMATION MANAGEMENT | Facility: CLINIC | Age: 68
End: 2023-03-21

## 2023-03-23 NOTE — PROGRESS NOTES
Assessment & Plan     Restless legs syndrome  - pramipexole (MIRAPEX) 0.125 MG tablet; Take 1-2 tablets (0.125-0.25 mg) by mouth At Bedtime    PMR (polymyalgia rheumatica) (H)  Slowly weaning, decreasing to 4mg today (previous 4.5mg, past wean failures so going very slow).  - predniSONE (DELTASONE) 1 MG tablet; Take 4 tablets (4 mg) by mouth daily  - ESR: Erythrocyte sedimentation rate    Moderate persistent asthma, unspecified whether complicated  - albuterol (PROAIR HFA/PROVENTIL HFA/VENTOLIN HFA) 108 (90 Base) MCG/ACT inhaler; Inhale 2 puffs into the lungs every 4 hours as needed for shortness of breath  - fluticasone-salmeterol (ADVAIR) 250-50 MCG/ACT inhaler; Inhale 1 puff into the lungs 2 times daily    Injury of tendon of biceps  She states she saw Ortho and due to age and activity level, no surgical repair in planned.      Follow-up in 2 months, plan to continue weaning prednisone as able.  Next DEXA will be due in 8/2023.      DAYSI LUCIANO,   [facility] - LAUREL Clemente is a 67 year old, presenting for the following health issues:  Musculoskeletal Problem (Bicep)  No flowsheet data found.  HPI     Since I last saw her, she fell and broke her left hip.  She states she is being managed medically/non-operatively for this by Ortho.  She states ortho also looked at her biceps tendon, and no surgery is planned given her age and activity level.  She does have normal ROM.    Asthma, stable.    PMR.  Doing okay with slight decrease from 5mg to 4.5mg.  Does have past taper failures, so going very slow.    Having some more RLS issues occasionally, intermittently needs a second dose of Mirapex which helps.  She is also on her gabapentin.        Review of Systems   Constitutional: Negative for fever.   Respiratory: Negative for cough, shortness of breath and wheezing.    Cardiovascular: Negative for chest pain, palpitations and peripheral edema.   Neurological: Negative for  "light-headedness.            Objective    BP 96/66   Pulse 89   Temp 96.9  F (36.1  C) (Tympanic)   Resp 18   Ht 1.549 m (5' 1\")   Wt 52.3 kg (115 lb 4.8 oz)   SpO2 97%   BMI 21.79 kg/m    Body mass index is 21.79 kg/m .  Physical Exam  Constitutional:       General: She is not in acute distress.     Appearance: Normal appearance.   HENT:      Head: Normocephalic and atraumatic.   Cardiovascular:      Rate and Rhythm: Normal rate and regular rhythm.      Heart sounds: Normal heart sounds. No murmur heard.  Pulmonary:      Effort: Pulmonary effort is normal.      Breath sounds: Normal breath sounds. No wheezing, rhonchi or rales.   Chest:      Chest wall: No tenderness.   Musculoskeletal:      Right lower leg: No edema.      Left lower leg: No edema.   Neurological:      Mental Status: She is alert and oriented to person, place, and time.                "

## 2023-03-28 ENCOUNTER — OFFICE VISIT (OUTPATIENT)
Dept: FAMILY MEDICINE | Facility: OTHER | Age: 68
End: 2023-03-28
Attending: FAMILY MEDICINE
Payer: COMMERCIAL

## 2023-03-28 VITALS
WEIGHT: 115.3 LBS | HEIGHT: 61 IN | HEART RATE: 89 BPM | DIASTOLIC BLOOD PRESSURE: 66 MMHG | RESPIRATION RATE: 18 BRPM | SYSTOLIC BLOOD PRESSURE: 96 MMHG | TEMPERATURE: 96.9 F | OXYGEN SATURATION: 97 % | BODY MASS INDEX: 21.77 KG/M2

## 2023-03-28 DIAGNOSIS — J45.40 MODERATE PERSISTENT ASTHMA, UNSPECIFIED WHETHER COMPLICATED: ICD-10-CM

## 2023-03-28 DIAGNOSIS — S46.209A INJURY OF TENDON OF BICEPS: ICD-10-CM

## 2023-03-28 DIAGNOSIS — M35.3 PMR (POLYMYALGIA RHEUMATICA) (H): ICD-10-CM

## 2023-03-28 DIAGNOSIS — G25.81 RESTLESS LEGS SYNDROME: Primary | ICD-10-CM

## 2023-03-28 LAB — ERYTHROCYTE [SEDIMENTATION RATE] IN BLOOD BY WESTERGREN METHOD: 6 MM/HR (ref 0–30)

## 2023-03-28 PROCEDURE — 36415 COLL VENOUS BLD VENIPUNCTURE: CPT | Mod: ZL | Performed by: FAMILY MEDICINE

## 2023-03-28 PROCEDURE — 99214 OFFICE O/P EST MOD 30 MIN: CPT | Performed by: FAMILY MEDICINE

## 2023-03-28 PROCEDURE — G0463 HOSPITAL OUTPT CLINIC VISIT: HCPCS

## 2023-03-28 PROCEDURE — 85652 RBC SED RATE AUTOMATED: CPT | Mod: ZL | Performed by: FAMILY MEDICINE

## 2023-03-28 RX ORDER — PREDNISONE 1 MG/1
4 TABLET ORAL DAILY
Qty: 120 TABLET | Refills: 1 | Status: SHIPPED | OUTPATIENT
Start: 2023-03-28 | End: 2023-06-09

## 2023-03-28 RX ORDER — FLUTICASONE PROPIONATE AND SALMETEROL 250; 50 UG/1; UG/1
1 POWDER RESPIRATORY (INHALATION) 2 TIMES DAILY
Qty: 60 EACH | Refills: 6 | Status: SHIPPED | OUTPATIENT
Start: 2023-03-28 | End: 2024-06-05

## 2023-03-28 RX ORDER — PRAMIPEXOLE DIHYDROCHLORIDE 0.12 MG/1
.125-.25 TABLET ORAL AT BEDTIME
Qty: 180 TABLET | Refills: 1 | Status: SHIPPED | OUTPATIENT
Start: 2023-03-28 | End: 2023-08-22

## 2023-03-28 RX ORDER — ALBUTEROL SULFATE 90 UG/1
2 AEROSOL, METERED RESPIRATORY (INHALATION) EVERY 4 HOURS PRN
Qty: 18 G | Refills: 11 | Status: SHIPPED | OUTPATIENT
Start: 2023-03-28

## 2023-03-28 ASSESSMENT — PAIN SCALES - GENERAL: PAINLEVEL: NO PAIN (1)

## 2023-04-04 ASSESSMENT — ENCOUNTER SYMPTOMS
PALPITATIONS: 0
WHEEZING: 0
FEVER: 0
COUGH: 0
LIGHT-HEADEDNESS: 0
SHORTNESS OF BREATH: 0

## 2023-04-18 ENCOUNTER — TRANSFERRED RECORDS (OUTPATIENT)
Dept: HEALTH INFORMATION MANAGEMENT | Facility: CLINIC | Age: 68
End: 2023-04-18

## 2023-05-08 ENCOUNTER — MEDICAL CORRESPONDENCE (OUTPATIENT)
Dept: HEALTH INFORMATION MANAGEMENT | Facility: HOSPITAL | Age: 68
End: 2023-05-08

## 2023-05-08 ENCOUNTER — PREP FOR PROCEDURE (OUTPATIENT)
Dept: SURGERY | Facility: CLINIC | Age: 68
End: 2023-05-08

## 2023-05-08 DIAGNOSIS — M16.11 PRIMARY OSTEOARTHRITIS OF RIGHT HIP: Primary | ICD-10-CM

## 2023-05-08 RX ORDER — TRANEXAMIC ACID 10 MG/ML
1 INJECTION, SOLUTION INTRAVENOUS ONCE
Status: CANCELLED | OUTPATIENT
Start: 2023-05-15 | End: 2023-05-08

## 2023-05-08 NOTE — PROGRESS NOTES
[facility] [place]  [address]  [facility] [place] [address]  Phone: [phone]  Primary Provider: Jose Guadalupe Luciano  Pre-op Performing Provider: JOSE GUADALUPE LUCIANO      PREOPERATIVE EVALUATION:  Today's date: 5/9/2023    Chyna Delgado is a 67 year old female who presents for a preoperative evaluation.    Surgical Information:  Surgery/Procedure: Right Direct Anterior Total Hip Arthroplasty  Surgery Location: [place] [address]  Surgeon: Nikki Hicks MD  Surgery Date: 05/15/2023  Time of Surgery: 1010 am  Where patient plans to recover: At home with family  Fax number for surgical facility: Note does not need to be faxed, will be available electronically in Epic.    Assessment & Plan     The proposed surgical procedure is considered INTERMEDIATE risk.    Preoperative examination  - EKG 12-lead complete w/read - Clinics  - Magnesium  - Comprehensive metabolic panel  - CBC with Platelets & Differential    Arthritis of right hip    PMR (polymyalgia rheumatica) (H)  - ESR: Erythrocyte sedimentation rate    Mild persistent asthma without complication    Senile osteoporosis          - No identified additional risk factors other than previously addressed    Antiplatelet or Anticoagulation Medication Instructions:   - Patient is on no antiplatelet or anticoagulation medications.    Additional Medication Instructions:  NSAIDs held per surgeon's instructions.  Hold spironolactone the morning of surgery.    RECOMMENDATION:  APPROVAL GIVEN to proceed with proposed procedure, without further diagnostic evaluation.        Subjective     HPI related to upcoming procedure: preop right hip replacement          5/9/2023    10:54 AM   Preop Questions   1. Have you ever had a heart attack or stroke? No   2. Have you ever had surgery on your heart or blood vessels, such as a stent placement, a coronary artery bypass, or surgery on an artery in your head, neck, heart, or legs? No   3. Do you have chest pain with activity? No   4. Do  you have a history of  heart failure? No   5. Do you currently have a cold, bronchitis or symptoms of other infection? No   6. Do you have a cough, shortness of breath, or wheezing? No   7. Do you or anyone in your family have previous history of blood clots? No   8. Do you or does anyone in your family have a serious bleeding problem such as prolonged bleeding following surgeries or cuts? No   9. Have you ever had problems with anemia or been told to take iron pills? No   10. Have you had any abnormal blood loss such as black, tarry or bloody stools, or abnormal vaginal bleeding? No   11. Have you ever had a blood transfusion? YES - in the 70s  - tubal pregnancy   11a. Have you ever had a transfusion reaction? No   12. Are you willing to have a blood transfusion if it is medically needed before, during, or after your surgery? Yes   13. Have you or any of your relatives ever had problems with anesthesia? No   14. Do you have sleep apnea, excessive snoring or daytime drowsiness? No   15. Do you have any artifical heart valves or other implanted medical devices like a pacemaker, defibrillator, or continuous glucose monitor? No   16. Do you have artificial joints? No   17. Are you allergic to latex? No     Health Care Directive:  Patient has a Health Care Directive on file      Preoperative Review of :   reviewed - controlled substances reflected in medication list.      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   Respiratory: Negative for cough, chest tightness, shortness of breath and wheezing.    Cardiovascular: Negative for chest pain,  palpitations and peripheral edema.   Gastrointestinal: Negative for abdominal pain.   Neurological: Negative for headaches.         Patient Active Problem List    Diagnosis Date Noted     Closed fracture of left hip (H) 02/19/2023     Priority: Medium     Closed fracture of left hip, initial encounter (H) 02/19/2023     Priority: Medium     Idiopathic gout of right hand, unspecified chronicity 09/09/2020     Priority: Medium     PMR (polymyalgia rheumatica) (H) 09/09/2020     Priority: Medium     Osteoporosis 09/03/2019     Priority: Medium     Hip fracture requiring operative repair (H) 02/25/2019     Priority: Medium     Closed fracture of neck of right femur (H) 02/23/2019     Priority: Medium     Functional diarrhea 10/26/2018     Priority: Medium     Iron deficiency 04/18/2018     Priority: Medium     Myalgia 05/30/2017     Priority: Medium     Abdominal muscle strain 05/05/2017     Priority: Medium     Strain of flexor muscle of hip, unspecified laterality, initial encounter 05/05/2017     Priority: Medium     Right shoulder pain 11/03/2016     Priority: Medium     ACP (advance care planning) 05/09/2016     Priority: Medium     Advance Care Planning 5/9/2016: ACP Review of Chart / Resources Provided:  Reviewed chart for advance care plan.  Chyna ABRAM Delgado has been provided information and resources to begin or update their advance care plan.  Added by Lina PONCE Buus             Pain of toe of right foot 05/09/2016     Priority: Medium     Arthritis 02/12/2016     Priority: Medium     Graves disease 11/05/2015     Priority: Medium     Numbness and tingling in right hand 04/20/2015     Priority: Medium     Restless legs syndrome 04/16/2014     Priority: Medium     Somatic dysfunction of pelvic region 10/08/2013     Priority: Medium     Seborrheic keratosis 07/12/2013     Priority: Medium     Imo Update utility       Mild persistent asthma 06/28/2013     Priority: Medium     Sacroiliac pain 06/28/2013      Priority: Medium     Benign neoplasm of stomach 08/16/2012     Priority: Medium     Colon polyposis 08/16/2012     Priority: Medium     Family history of colonic polyps 08/16/2012     Priority: Medium     Family history of skin cancer 08/16/2012     Priority: Medium     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     Priority: Medium     Overview:   IMO Update 10/11       Seborrheic dermatitis, unspecified 10/12/2011     Priority: Medium     Overview:   IMO Update 10/11       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     Priority: Medium     Hypothyroidism 07/07/2011     Priority: Medium      Past Medical History:   Diagnosis Date     Abnormal mammogram, unspecified 01/08/2003    Normal pathology     Back Pain 02/10/2000    Sacroiliac pain     Benign neoplasm of colon 03/10/2000     Benign paroxysmal positional vertigo 06/07/2012     Depressive disorder, not elsewhere classified 02/10/2004     Diarrhea 12/15/2010    Secondary to colectomy for familial polyposis     Gastric polyp 12/11/2015, 12/18/2017    recurrent      History of normal mammogram 07/18/2014, 1/18/2019     Idiopathic osteoporosis 12/15/2010     Interstitial emphysema (H) 12/15/2010     menopausal or female climacteric states 08/31/1999     Mixed hyperlipidemia 12/15/2010     Other bursitis disorders 02/04/2011    Greater trochanteric     Other forms of retinal detachment(361.89) 12/15/2010     Other malaise and fatigue 01/10/2003     Personal history of tobacco use, presenting hazards to health 12/15/2010     Polymyalgia rheumatica (H)      Polyposis of colon      Restless legs syndrome (RLS) 12/15/2010     Rotator cuff tendonitis      Rotator cuff tendonitis      Sacroiliitis, not elsewhere classified (H) 12/15/2010    multiple sites     Tobacco use disorder 08/22/2012     Unspecified asthma(493.90) 12/15/2010     Past Surgical History:   Procedure Laterality Date     benign tumors removed from back       CATARACT EXTRACTION Right 06/21/2022     CATARACT EXTRACTION Left  07/12/2022     CLOSED REDUCTION, PERCUTANEOUS PINNING HIP Right 02/24/2019    Procedure: Percutaneous Screw Fixation of Right Hip Fracture;  Surgeon: Amrik Kolb DO;  Location: [facility]     COLON SURGERY N/A     HX: Total colectomy with J-pouch reconstruction.      ENDOSCOPY UPPER, COLONOSCOPY, COMBINED N/A 09/05/2014    Procedure: COMBINED ENDOSCOPY UPPER, COLONOSCOPY;  Surgeon: Delbert Patel MD;  Location: [facility]     ENDOSCOPY UPPER, COLONOSCOPY, COMBINED N/A 05/09/2019    Procedure: UPPER ENDOSCOPY  WITH BIOPSIES AND  COLONOSCOPY WITH BIOPSIES;  Surgeon: Tai Diaz MD;  Location: [facility]     ESOPHAGOSCOPY, GASTROSCOPY, DUODENOSCOPY (EGD), COMBINED N/A 12/11/2015    Procedure: COMBINED ESOPHAGOSCOPY, GASTROSCOPY, DUODENOSCOPY (EGD);  Surgeon: Delbert Patel MD;  Location: [facility]     ESOPHAGOSCOPY, GASTROSCOPY, DUODENOSCOPY (EGD), COMBINED N/A 12/18/2017    Procedure: COMBINED ESOPHAGOSCOPY, GASTROSCOPY, DUODENOSCOPY (EGD);  UPPER ENDOSCOPY WITH BIOPSY;  Surgeon: Delbert Patel MD;  Location: [facility]     excised-benign  2002    tongue lesion     HC REPAIR OF NASAL SEPTUM  2002    Deviated nasal septum     LAPAROSCOPIC CHOLECYSTECTOMY       lumpectomy Right breast      Breast Lump     MOUTH SURGERY      removal of spot from roof of mouth     pilonidal cyst surgery  1976     removal of colon and large intestine  2000    Familial polyposis     REMOVE HARDWARE ARTHROPLASTY HIP Right 07/20/2022    Procedure: Right Hip Hardware Removal (Cannulated Screws);  Surgeon: Luis Fernando Marcial MD;  Location: [facility]     Right etopic pregnancy      Pregnancy     TONSILLECTOMY      tonsillitus     UPPER GI ENDOSCOPY  2009    Stomach polyps     Current Outpatient Medications   Medication Sig Dispense Refill     albuterol (PROAIR HFA/PROVENTIL HFA/VENTOLIN HFA) 108 (90 Base) MCG/ACT inhaler Inhale 2 puffs into the lungs every 4 hours as needed for shortness of breath 18 g 11     alendronate (FOSAMAX) 70 MG tablet Take 1 tablet  (70 mg) by mouth every 7 days 12 tablet 3     allopurinol (ZYLOPRIM) 100 MG tablet Take 1 tablet (100 mg) by mouth 2 times daily 180 tablet 1     atorvastatin (LIPITOR) 20 MG tablet Take 1 tablet (20 mg) by mouth daily 90 tablet 1     calcium carbonate 750 MG CHEW Take 1 tablet (750 mg) by mouth 2 times daily 180 tablet 3     diclofenac (VOLTAREN) 75 MG EC tablet Take 1 tablet (75 mg) by mouth 2 times daily as needed for moderate pain 60 tablet 0     dicyclomine (BENTYL) 10 MG capsule Take 2 capsules (20 mg) by mouth 3 times daily (before meals) 540 capsule 1     diphenoxylate-atropine (LOMOTIL) 2.5-0.025 MG tablet Take 1-2 tablets by mouth 4 times daily as needed for diarrhea (Patient taking differently: Take 1-2 tablets by mouth 2 times daily scheduled) 120 tablet 0     ferrous sulfate (IRON) 325 (65 FE) MG tablet Take 325 mg by mouth 2 times daily (with meals) 90 tablet 2     fluticasone-salmeterol (ADVAIR) 250-50 MCG/ACT inhaler Inhale 1 puff into the lungs 2 times daily 60 each 6     gabapentin (NEURONTIN) 400 MG capsule Take 3 capsules (1,200 mg) by mouth 2 times daily 540 capsule 1     GNP VITAMIN D MAXIMUM STRENGTH 50 MCG (2000 UT) tablet TAKE TWO TABLETS BY MOUTH EVERY DAY (Patient taking differently: Take 1 tablet by mouth daily) 100 tablet 4     ibuprofen (ADVIL,MOTRIN) 200 MG tablet Take 200 mg by mouth every 4 hours as needed        ketotifen (ZADITOR/REFRESH ANTI-ITCH) 0.025 % SOLN ophthalmic solution PLACE 2 DROPS INTO BOTH EYES 2 TIMES DAILY (Patient taking differently: PLACE 2 DROPS INTO BOTH EYES 2 TIMES DAILY AS NEEDED) 5 mL 2     loperamide (IMODIUM) 2 MG capsule Take 6 mg by mouth 2 times daily plus additional dosing as needed.       montelukast (SINGULAIR) 10 MG tablet Take 1 tablet (10 mg) by mouth At Bedtime 90 tablet 1     Multiple Vitamin (MULTIVITAMIN) per tablet Take 1 tablet by mouth daily Every day with food       omeprazole (PRILOSEC) 40 MG DR capsule Take 1 capsule (40 mg) by mouth  "daily 90 capsule 1     oxyCODONE (ROXICODONE) 5 MG tablet Take 1 tablet (5 mg) by mouth every 4 hours as needed for moderate to severe pain 30 tablet 0     potassium chloride ER (KLOR-CON M) 20 MEQ CR tablet Take 1 tablet (20 mEq) by mouth 3 times daily 90 tablet 3     pramipexole (MIRAPEX) 0.125 MG tablet Take 1-2 tablets (0.125-0.25 mg) by mouth At Bedtime 180 tablet 1     predniSONE (DELTASONE) 1 MG tablet Take 4 tablets (4 mg) by mouth daily 120 tablet 1     PSYLLIUM PO Take 1 Tablespoonful by mouth 2 times daily        Simethicone 125 MG CAPS Take 500 mg by mouth 2 times daily       spironolactone (ALDACTONE) 50 MG tablet Take 1 tablet (50 mg) by mouth 2 times daily 180 tablet 1       Allergies   Allergen Reactions     Codeine Swelling     Throat swelling-Patient can tolerate Hydrocodone & morphine     Nortriptyline Other (See Comments)     Crying         Social History     Tobacco Use     Smoking status: Every Day     Packs/day: 0.50     Years: 40.00     Pack years: 20.00     Types: Cigarettes     Passive exposure: Current     Smokeless tobacco: Never   Vaping Use     Vaping status: Not on file   Substance Use Topics     Alcohol use: Yes     Comment: Weekly 3 drinks     History   Drug Use Unknown         Objective     /79 (BP Location: Left arm, Patient Position: Sitting, Cuff Size: Adult Regular)   Pulse 79   Temp 98.2  F (36.8  C) (Tympanic)   Resp 20   Ht 1.549 m (5' 1\")   Wt 52.2 kg (115 lb)   SpO2 98%   BMI 21.73 kg/m      Physical Exam  Constitutional:       General: She is not in acute distress.     Appearance: Normal appearance.   HENT:      Head: Normocephalic and atraumatic.      Right Ear: Tympanic membrane normal.      Left Ear: Tympanic membrane normal.      Mouth/Throat:      Mouth: Mucous membranes are moist.      Pharynx: Oropharynx is clear.   Eyes:      Conjunctiva/sclera: Conjunctivae normal.      Pupils: Pupils are equal, round, and reactive to light.   Neck:      Vascular: No " carotid bruit.   Cardiovascular:      Rate and Rhythm: Normal rate and regular rhythm.      Heart sounds: Normal heart sounds. No murmur heard.  Pulmonary:      Effort: Pulmonary effort is normal.      Breath sounds: Normal breath sounds. No wheezing, rhonchi or rales.   Abdominal:      General: Bowel sounds are normal.      Palpations: Abdomen is soft.      Tenderness: There is no abdominal tenderness.   Musculoskeletal:      Cervical back: Normal range of motion.      Right lower leg: No edema.      Left lower leg: No edema.   Lymphadenopathy:      Cervical: No cervical adenopathy.   Neurological:      Mental Status: She is alert and oriented to person, place, and time.           Recent Labs   Lab Test 02/20/23  0527 02/18/23  1652 01/05/23  1349 07/15/22  1000   HGB 13.3  --   --  14.5    195  --  219   *  --  137 137   POTASSIUM 4.5  --  4.8 3.9   CR 0.87 0.86 1.06* 0.96   A1C  --   --  5.4  --         Diagnostics:  Recent Results (from the past 168 hour(s))   ESR: Erythrocyte sedimentation rate    Collection Time: 05/09/23 12:10 PM   Result Value Ref Range    Erythrocyte Sedimentation Rate 8 0 - 30 mm/hr   Magnesium    Collection Time: 05/09/23 12:10 PM   Result Value Ref Range    Magnesium 1.7 1.7 - 2.3 mg/dL   Comprehensive metabolic panel    Collection Time: 05/09/23 12:10 PM   Result Value Ref Range    Sodium 139 136 - 145 mmol/L    Potassium 4.4 3.4 - 5.3 mmol/L    Chloride 102 98 - 107 mmol/L    Carbon Dioxide (CO2) 24 22 - 29 mmol/L    Anion Gap 13 7 - 15 mmol/L    Urea Nitrogen 19.1 8.0 - 23.0 mg/dL    Creatinine 0.85 0.51 - 0.95 mg/dL    Calcium 9.9 8.8 - 10.2 mg/dL    Glucose 93 70 - 99 mg/dL    Alkaline Phosphatase 72 35 - 104 U/L    AST 22 10 - 35 U/L    ALT 29 10 - 35 U/L    Protein Total 6.9 6.4 - 8.3 g/dL    Albumin 4.1 3.5 - 5.2 g/dL    Bilirubin Total 0.2 <=1.2 mg/dL    GFR Estimate 74 >60 mL/min/1.73m2   CBC with platelets and differential    Collection Time: 05/09/23 12:10 PM    Result Value Ref Range    WBC Count 10.4 4.0 - 11.0 10e3/uL    RBC Count 4.32 3.80 - 5.20 10e6/uL    Hemoglobin 14.1 11.7 - 15.7 g/dL    Hematocrit 40.7 35.0 - 47.0 %    MCV 94 78 - 100 fL    MCH 32.6 26.5 - 33.0 pg    MCHC 34.6 31.5 - 36.5 g/dL    RDW 12.8 10.0 - 15.0 %    Platelet Count 250 150 - 450 10e3/uL    % Neutrophils 82 %    % Lymphocytes 10 %    % Monocytes 6 %    % Eosinophils 0 %    % Basophils 1 %    % Immature Granulocytes 1 %    NRBCs per 100 WBC 0 <1 /100    Absolute Neutrophils 8.6 (H) 1.6 - 8.3 10e3/uL    Absolute Lymphocytes 1.1 0.8 - 5.3 10e3/uL    Absolute Monocytes 0.6 0.0 - 1.3 10e3/uL    Absolute Eosinophils 0.0 0.0 - 0.7 10e3/uL    Absolute Basophils 0.1 0.0 - 0.2 10e3/uL    Absolute Immature Granulocytes 0.1 <=0.4 10e3/uL    Absolute NRBCs 0.0 10e3/uL          EKG: NSR rate 67 QTc 395    Revised Cardiac Risk Index (RCRI):  The patient has the following serious cardiovascular risks for perioperative complications:   - No serious cardiac risks = 0 points     RCRI Interpretation: 0 points: Class I (very low risk - 0.4% complication rate)           Signed Electronically by: DAYSI LUCIANO DO  Copy of this evaluation report is provided to requesting physician.

## 2023-05-08 NOTE — H&P (VIEW-ONLY)
[facility] - [place]  [address]  [facility] [place] [address]  Phone: [phone]  Primary Provider: Jose Guadalupe Luciano  Pre-op Performing Provider: JOSE GUADALUPE LUCIANO      PREOPERATIVE EVALUATION:  Today's date: 5/9/2023    Chyna Delgado is a 67 year old female who presents for a preoperative evaluation.    Surgical Information:  Surgery/Procedure: Right Direct Anterior Total Hip Arthroplasty  Surgery Location: [place] [address]  Surgeon: Nikki Hicks MD  Surgery Date: 05/15/2023  Time of Surgery: 1010 am  Where patient plans to recover: At home with family  Fax number for surgical facility: Note does not need to be faxed, will be available electronically in Epic.    Assessment & Plan     The proposed surgical procedure is considered INTERMEDIATE risk.    Preoperative examination  - EKG 12-lead complete w/read - Clinics  - Magnesium  - Comprehensive metabolic panel  - CBC with Platelets & Differential    Arthritis of right hip    PMR (polymyalgia rheumatica) (H)  - ESR: Erythrocyte sedimentation rate    Mild persistent asthma without complication    Senile osteoporosis          - No identified additional risk factors other than previously addressed    Antiplatelet or Anticoagulation Medication Instructions:   - Patient is on no antiplatelet or anticoagulation medications.    Additional Medication Instructions:  NSAIDs held per surgeon's instructions.  Hold spironolactone the morning of surgery.    RECOMMENDATION:  APPROVAL GIVEN to proceed with proposed procedure, without further diagnostic evaluation.        Subjective     HPI related to upcoming procedure: preop right hip replacement          5/9/2023    10:54 AM   Preop Questions   1. Have you ever had a heart attack or stroke? No   2. Have you ever had surgery on your heart or blood vessels, such as a stent placement, a coronary artery bypass, or surgery on an artery in your head, neck, heart, or legs? No   3. Do you have chest pain with activity? No   4. Do  you have a history of  heart failure? No   5. Do you currently have a cold, bronchitis or symptoms of other infection? No   6. Do you have a cough, shortness of breath, or wheezing? No   7. Do you or anyone in your family have previous history of blood clots? No   8. Do you or does anyone in your family have a serious bleeding problem such as prolonged bleeding following surgeries or cuts? No   9. Have you ever had problems with anemia or been told to take iron pills? No   10. Have you had any abnormal blood loss such as black, tarry or bloody stools, or abnormal vaginal bleeding? No   11. Have you ever had a blood transfusion? YES - in the 70s  - tubal pregnancy   11a. Have you ever had a transfusion reaction? No   12. Are you willing to have a blood transfusion if it is medically needed before, during, or after your surgery? Yes   13. Have you or any of your relatives ever had problems with anesthesia? No   14. Do you have sleep apnea, excessive snoring or daytime drowsiness? No   15. Do you have any artifical heart valves or other implanted medical devices like a pacemaker, defibrillator, or continuous glucose monitor? No   16. Do you have artificial joints? No   17. Are you allergic to latex? No     Health Care Directive:  Patient has a Health Care Directive on file      Preoperative Review of :   reviewed - controlled substances reflected in medication list.      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   Respiratory: Negative for cough, chest tightness, shortness of breath and wheezing.    Cardiovascular: Negative for chest pain,  palpitations and peripheral edema.   Gastrointestinal: Negative for abdominal pain.   Neurological: Negative for headaches.         Patient Active Problem List    Diagnosis Date Noted     Closed fracture of left hip (H) 02/19/2023     Priority: Medium     Closed fracture of left hip, initial encounter (H) 02/19/2023     Priority: Medium     Idiopathic gout of right hand, unspecified chronicity 09/09/2020     Priority: Medium     PMR (polymyalgia rheumatica) (H) 09/09/2020     Priority: Medium     Osteoporosis 09/03/2019     Priority: Medium     Hip fracture requiring operative repair (H) 02/25/2019     Priority: Medium     Closed fracture of neck of right femur (H) 02/23/2019     Priority: Medium     Functional diarrhea 10/26/2018     Priority: Medium     Iron deficiency 04/18/2018     Priority: Medium     Myalgia 05/30/2017     Priority: Medium     Abdominal muscle strain 05/05/2017     Priority: Medium     Strain of flexor muscle of hip, unspecified laterality, initial encounter 05/05/2017     Priority: Medium     Right shoulder pain 11/03/2016     Priority: Medium     ACP (advance care planning) 05/09/2016     Priority: Medium     Advance Care Planning 5/9/2016: ACP Review of Chart / Resources Provided:  Reviewed chart for advance care plan.  Chyna ABRAM Delgado has been provided information and resources to begin or update their advance care plan.  Added by Lina PONCE Buus             Pain of toe of right foot 05/09/2016     Priority: Medium     Arthritis 02/12/2016     Priority: Medium     Graves disease 11/05/2015     Priority: Medium     Numbness and tingling in right hand 04/20/2015     Priority: Medium     Restless legs syndrome 04/16/2014     Priority: Medium     Somatic dysfunction of pelvic region 10/08/2013     Priority: Medium     Seborrheic keratosis 07/12/2013     Priority: Medium     Imo Update utility       Mild persistent asthma 06/28/2013     Priority: Medium     Sacroiliac pain 06/28/2013      Priority: Medium     Benign neoplasm of stomach 08/16/2012     Priority: Medium     Colon polyposis 08/16/2012     Priority: Medium     Family history of colonic polyps 08/16/2012     Priority: Medium     Family history of skin cancer 08/16/2012     Priority: Medium     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     Priority: Medium     Overview:   IMO Update 10/11       Seborrheic dermatitis, unspecified 10/12/2011     Priority: Medium     Overview:   IMO Update 10/11       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     Priority: Medium     Hypothyroidism 07/07/2011     Priority: Medium      Past Medical History:   Diagnosis Date     Abnormal mammogram, unspecified 01/08/2003    Normal pathology     Back Pain 02/10/2000    Sacroiliac pain     Benign neoplasm of colon 03/10/2000     Benign paroxysmal positional vertigo 06/07/2012     Depressive disorder, not elsewhere classified 02/10/2004     Diarrhea 12/15/2010    Secondary to colectomy for familial polyposis     Gastric polyp 12/11/2015, 12/18/2017    recurrent      History of normal mammogram 07/18/2014, 1/18/2019     Idiopathic osteoporosis 12/15/2010     Interstitial emphysema (H) 12/15/2010     menopausal or female climacteric states 08/31/1999     Mixed hyperlipidemia 12/15/2010     Other bursitis disorders 02/04/2011    Greater trochanteric     Other forms of retinal detachment(361.89) 12/15/2010     Other malaise and fatigue 01/10/2003     Personal history of tobacco use, presenting hazards to health 12/15/2010     Polymyalgia rheumatica (H)      Polyposis of colon      Restless legs syndrome (RLS) 12/15/2010     Rotator cuff tendonitis      Rotator cuff tendonitis      Sacroiliitis, not elsewhere classified (H) 12/15/2010    multiple sites     Tobacco use disorder 08/22/2012     Unspecified asthma(493.90) 12/15/2010     Past Surgical History:   Procedure Laterality Date     benign tumors removed from back       CATARACT EXTRACTION Right 06/21/2022     CATARACT EXTRACTION Left  07/12/2022     CLOSED REDUCTION, PERCUTANEOUS PINNING HIP Right 02/24/2019    Procedure: Percutaneous Screw Fixation of Right Hip Fracture;  Surgeon: Amrik Kolb DO;  Location: [facility]     COLON SURGERY N/A     HX: Total colectomy with J-pouch reconstruction.      ENDOSCOPY UPPER, COLONOSCOPY, COMBINED N/A 09/05/2014    Procedure: COMBINED ENDOSCOPY UPPER, COLONOSCOPY;  Surgeon: Delbert Patel MD;  Location: [facility]     ENDOSCOPY UPPER, COLONOSCOPY, COMBINED N/A 05/09/2019    Procedure: UPPER ENDOSCOPY  WITH BIOPSIES AND  COLONOSCOPY WITH BIOPSIES;  Surgeon: Tai Diaz MD;  Location: [facility]     ESOPHAGOSCOPY, GASTROSCOPY, DUODENOSCOPY (EGD), COMBINED N/A 12/11/2015    Procedure: COMBINED ESOPHAGOSCOPY, GASTROSCOPY, DUODENOSCOPY (EGD);  Surgeon: Delbert Patel MD;  Location: [facility]     ESOPHAGOSCOPY, GASTROSCOPY, DUODENOSCOPY (EGD), COMBINED N/A 12/18/2017    Procedure: COMBINED ESOPHAGOSCOPY, GASTROSCOPY, DUODENOSCOPY (EGD);  UPPER ENDOSCOPY WITH BIOPSY;  Surgeon: Delbert Patel MD;  Location: [facility]     excised-benign  2002    tongue lesion     HC REPAIR OF NASAL SEPTUM  2002    Deviated nasal septum     LAPAROSCOPIC CHOLECYSTECTOMY       lumpectomy Right breast      Breast Lump     MOUTH SURGERY      removal of spot from roof of mouth     pilonidal cyst surgery  1976     removal of colon and large intestine  2000    Familial polyposis     REMOVE HARDWARE ARTHROPLASTY HIP Right 07/20/2022    Procedure: Right Hip Hardware Removal (Cannulated Screws);  Surgeon: Luis Fernando Marcial MD;  Location: [facility]     Right etopic pregnancy      Pregnancy     TONSILLECTOMY      tonsillitus     UPPER GI ENDOSCOPY  2009    Stomach polyps     Current Outpatient Medications   Medication Sig Dispense Refill     albuterol (PROAIR HFA/PROVENTIL HFA/VENTOLIN HFA) 108 (90 Base) MCG/ACT inhaler Inhale 2 puffs into the lungs every 4 hours as needed for shortness of breath 18 g 11     alendronate (FOSAMAX) 70 MG tablet Take 1 tablet  (70 mg) by mouth every 7 days 12 tablet 3     allopurinol (ZYLOPRIM) 100 MG tablet Take 1 tablet (100 mg) by mouth 2 times daily 180 tablet 1     atorvastatin (LIPITOR) 20 MG tablet Take 1 tablet (20 mg) by mouth daily 90 tablet 1     calcium carbonate 750 MG CHEW Take 1 tablet (750 mg) by mouth 2 times daily 180 tablet 3     diclofenac (VOLTAREN) 75 MG EC tablet Take 1 tablet (75 mg) by mouth 2 times daily as needed for moderate pain 60 tablet 0     dicyclomine (BENTYL) 10 MG capsule Take 2 capsules (20 mg) by mouth 3 times daily (before meals) 540 capsule 1     diphenoxylate-atropine (LOMOTIL) 2.5-0.025 MG tablet Take 1-2 tablets by mouth 4 times daily as needed for diarrhea (Patient taking differently: Take 1-2 tablets by mouth 2 times daily scheduled) 120 tablet 0     ferrous sulfate (IRON) 325 (65 FE) MG tablet Take 325 mg by mouth 2 times daily (with meals) 90 tablet 2     fluticasone-salmeterol (ADVAIR) 250-50 MCG/ACT inhaler Inhale 1 puff into the lungs 2 times daily 60 each 6     gabapentin (NEURONTIN) 400 MG capsule Take 3 capsules (1,200 mg) by mouth 2 times daily 540 capsule 1     GNP VITAMIN D MAXIMUM STRENGTH 50 MCG (2000 UT) tablet TAKE TWO TABLETS BY MOUTH EVERY DAY (Patient taking differently: Take 1 tablet by mouth daily) 100 tablet 4     ibuprofen (ADVIL,MOTRIN) 200 MG tablet Take 200 mg by mouth every 4 hours as needed        ketotifen (ZADITOR/REFRESH ANTI-ITCH) 0.025 % SOLN ophthalmic solution PLACE 2 DROPS INTO BOTH EYES 2 TIMES DAILY (Patient taking differently: PLACE 2 DROPS INTO BOTH EYES 2 TIMES DAILY AS NEEDED) 5 mL 2     loperamide (IMODIUM) 2 MG capsule Take 6 mg by mouth 2 times daily plus additional dosing as needed.       montelukast (SINGULAIR) 10 MG tablet Take 1 tablet (10 mg) by mouth At Bedtime 90 tablet 1     Multiple Vitamin (MULTIVITAMIN) per tablet Take 1 tablet by mouth daily Every day with food       omeprazole (PRILOSEC) 40 MG DR capsule Take 1 capsule (40 mg) by mouth  "daily 90 capsule 1     oxyCODONE (ROXICODONE) 5 MG tablet Take 1 tablet (5 mg) by mouth every 4 hours as needed for moderate to severe pain 30 tablet 0     potassium chloride ER (KLOR-CON M) 20 MEQ CR tablet Take 1 tablet (20 mEq) by mouth 3 times daily 90 tablet 3     pramipexole (MIRAPEX) 0.125 MG tablet Take 1-2 tablets (0.125-0.25 mg) by mouth At Bedtime 180 tablet 1     predniSONE (DELTASONE) 1 MG tablet Take 4 tablets (4 mg) by mouth daily 120 tablet 1     PSYLLIUM PO Take 1 Tablespoonful by mouth 2 times daily        Simethicone 125 MG CAPS Take 500 mg by mouth 2 times daily       spironolactone (ALDACTONE) 50 MG tablet Take 1 tablet (50 mg) by mouth 2 times daily 180 tablet 1       Allergies   Allergen Reactions     Codeine Swelling     Throat swelling-Patient can tolerate Hydrocodone & morphine     Nortriptyline Other (See Comments)     Crying         Social History     Tobacco Use     Smoking status: Every Day     Packs/day: 0.50     Years: 40.00     Pack years: 20.00     Types: Cigarettes     Passive exposure: Current     Smokeless tobacco: Never   Vaping Use     Vaping status: Not on file   Substance Use Topics     Alcohol use: Yes     Comment: Weekly 3 drinks     History   Drug Use Unknown         Objective     /79 (BP Location: Left arm, Patient Position: Sitting, Cuff Size: Adult Regular)   Pulse 79   Temp 98.2  F (36.8  C) (Tympanic)   Resp 20   Ht 1.549 m (5' 1\")   Wt 52.2 kg (115 lb)   SpO2 98%   BMI 21.73 kg/m      Physical Exam  Constitutional:       General: She is not in acute distress.     Appearance: Normal appearance.   HENT:      Head: Normocephalic and atraumatic.      Right Ear: Tympanic membrane normal.      Left Ear: Tympanic membrane normal.      Mouth/Throat:      Mouth: Mucous membranes are moist.      Pharynx: Oropharynx is clear.   Eyes:      Conjunctiva/sclera: Conjunctivae normal.      Pupils: Pupils are equal, round, and reactive to light.   Neck:      Vascular: No " carotid bruit.   Cardiovascular:      Rate and Rhythm: Normal rate and regular rhythm.      Heart sounds: Normal heart sounds. No murmur heard.  Pulmonary:      Effort: Pulmonary effort is normal.      Breath sounds: Normal breath sounds. No wheezing, rhonchi or rales.   Abdominal:      General: Bowel sounds are normal.      Palpations: Abdomen is soft.      Tenderness: There is no abdominal tenderness.   Musculoskeletal:      Cervical back: Normal range of motion.      Right lower leg: No edema.      Left lower leg: No edema.   Lymphadenopathy:      Cervical: No cervical adenopathy.   Neurological:      Mental Status: She is alert and oriented to person, place, and time.           Recent Labs   Lab Test 02/20/23  0527 02/18/23  1652 01/05/23  1349 07/15/22  1000   HGB 13.3  --   --  14.5    195  --  219   *  --  137 137   POTASSIUM 4.5  --  4.8 3.9   CR 0.87 0.86 1.06* 0.96   A1C  --   --  5.4  --         Diagnostics:  Recent Results (from the past 168 hour(s))   ESR: Erythrocyte sedimentation rate    Collection Time: 05/09/23 12:10 PM   Result Value Ref Range    Erythrocyte Sedimentation Rate 8 0 - 30 mm/hr   Magnesium    Collection Time: 05/09/23 12:10 PM   Result Value Ref Range    Magnesium 1.7 1.7 - 2.3 mg/dL   Comprehensive metabolic panel    Collection Time: 05/09/23 12:10 PM   Result Value Ref Range    Sodium 139 136 - 145 mmol/L    Potassium 4.4 3.4 - 5.3 mmol/L    Chloride 102 98 - 107 mmol/L    Carbon Dioxide (CO2) 24 22 - 29 mmol/L    Anion Gap 13 7 - 15 mmol/L    Urea Nitrogen 19.1 8.0 - 23.0 mg/dL    Creatinine 0.85 0.51 - 0.95 mg/dL    Calcium 9.9 8.8 - 10.2 mg/dL    Glucose 93 70 - 99 mg/dL    Alkaline Phosphatase 72 35 - 104 U/L    AST 22 10 - 35 U/L    ALT 29 10 - 35 U/L    Protein Total 6.9 6.4 - 8.3 g/dL    Albumin 4.1 3.5 - 5.2 g/dL    Bilirubin Total 0.2 <=1.2 mg/dL    GFR Estimate 74 >60 mL/min/1.73m2   CBC with platelets and differential    Collection Time: 05/09/23 12:10 PM    Result Value Ref Range    WBC Count 10.4 4.0 - 11.0 10e3/uL    RBC Count 4.32 3.80 - 5.20 10e6/uL    Hemoglobin 14.1 11.7 - 15.7 g/dL    Hematocrit 40.7 35.0 - 47.0 %    MCV 94 78 - 100 fL    MCH 32.6 26.5 - 33.0 pg    MCHC 34.6 31.5 - 36.5 g/dL    RDW 12.8 10.0 - 15.0 %    Platelet Count 250 150 - 450 10e3/uL    % Neutrophils 82 %    % Lymphocytes 10 %    % Monocytes 6 %    % Eosinophils 0 %    % Basophils 1 %    % Immature Granulocytes 1 %    NRBCs per 100 WBC 0 <1 /100    Absolute Neutrophils 8.6 (H) 1.6 - 8.3 10e3/uL    Absolute Lymphocytes 1.1 0.8 - 5.3 10e3/uL    Absolute Monocytes 0.6 0.0 - 1.3 10e3/uL    Absolute Eosinophils 0.0 0.0 - 0.7 10e3/uL    Absolute Basophils 0.1 0.0 - 0.2 10e3/uL    Absolute Immature Granulocytes 0.1 <=0.4 10e3/uL    Absolute NRBCs 0.0 10e3/uL          EKG: NSR rate 67 QTc 395    Revised Cardiac Risk Index (RCRI):  The patient has the following serious cardiovascular risks for perioperative complications:   - No serious cardiac risks = 0 points     RCRI Interpretation: 0 points: Class I (very low risk - 0.4% complication rate)           Signed Electronically by: DAYSI LUCIANO DO  Copy of this evaluation report is provided to requesting physician.

## 2023-05-09 ENCOUNTER — OFFICE VISIT (OUTPATIENT)
Dept: FAMILY MEDICINE | Facility: OTHER | Age: 68
End: 2023-05-09
Attending: FAMILY MEDICINE
Payer: COMMERCIAL

## 2023-05-09 ENCOUNTER — TRANSFERRED RECORDS (OUTPATIENT)
Dept: HEALTH INFORMATION MANAGEMENT | Facility: CLINIC | Age: 68
End: 2023-05-09

## 2023-05-09 VITALS
WEIGHT: 115 LBS | RESPIRATION RATE: 20 BRPM | TEMPERATURE: 98.2 F | HEART RATE: 79 BPM | HEIGHT: 61 IN | OXYGEN SATURATION: 98 % | DIASTOLIC BLOOD PRESSURE: 79 MMHG | SYSTOLIC BLOOD PRESSURE: 121 MMHG | BODY MASS INDEX: 21.71 KG/M2

## 2023-05-09 DIAGNOSIS — J45.30 MILD PERSISTENT ASTHMA WITHOUT COMPLICATION: ICD-10-CM

## 2023-05-09 DIAGNOSIS — M35.3 PMR (POLYMYALGIA RHEUMATICA) (H): ICD-10-CM

## 2023-05-09 DIAGNOSIS — Z01.818 PREOPERATIVE EXAMINATION: ICD-10-CM

## 2023-05-09 DIAGNOSIS — M81.0 SENILE OSTEOPOROSIS: ICD-10-CM

## 2023-05-09 DIAGNOSIS — M16.11 ARTHRITIS OF RIGHT HIP: Primary | ICD-10-CM

## 2023-05-09 LAB
ALBUMIN SERPL BCG-MCNC: 4.1 G/DL (ref 3.5–5.2)
ALP SERPL-CCNC: 72 U/L (ref 35–104)
ALT SERPL W P-5'-P-CCNC: 29 U/L (ref 10–35)
ANION GAP SERPL CALCULATED.3IONS-SCNC: 13 MMOL/L (ref 7–15)
AST SERPL W P-5'-P-CCNC: 22 U/L (ref 10–35)
BASOPHILS # BLD AUTO: 0.1 10E3/UL (ref 0–0.2)
BASOPHILS NFR BLD AUTO: 1 %
BILIRUB SERPL-MCNC: 0.2 MG/DL
BUN SERPL-MCNC: 19.1 MG/DL (ref 8–23)
CALCIUM SERPL-MCNC: 9.9 MG/DL (ref 8.8–10.2)
CHLORIDE SERPL-SCNC: 102 MMOL/L (ref 98–107)
CREAT SERPL-MCNC: 0.85 MG/DL (ref 0.51–0.95)
DEPRECATED HCO3 PLAS-SCNC: 24 MMOL/L (ref 22–29)
EOSINOPHIL # BLD AUTO: 0 10E3/UL (ref 0–0.7)
EOSINOPHIL NFR BLD AUTO: 0 %
ERYTHROCYTE [DISTWIDTH] IN BLOOD BY AUTOMATED COUNT: 12.8 % (ref 10–15)
ERYTHROCYTE [SEDIMENTATION RATE] IN BLOOD BY WESTERGREN METHOD: 8 MM/HR (ref 0–30)
GFR SERPL CREATININE-BSD FRML MDRD: 74 ML/MIN/1.73M2
GLUCOSE SERPL-MCNC: 93 MG/DL (ref 70–99)
HCT VFR BLD AUTO: 40.7 % (ref 35–47)
HGB BLD-MCNC: 14.1 G/DL (ref 11.7–15.7)
IMM GRANULOCYTES # BLD: 0.1 10E3/UL
IMM GRANULOCYTES NFR BLD: 1 %
LYMPHOCYTES # BLD AUTO: 1.1 10E3/UL (ref 0.8–5.3)
LYMPHOCYTES NFR BLD AUTO: 10 %
MAGNESIUM SERPL-MCNC: 1.7 MG/DL (ref 1.7–2.3)
MCH RBC QN AUTO: 32.6 PG (ref 26.5–33)
MCHC RBC AUTO-ENTMCNC: 34.6 G/DL (ref 31.5–36.5)
MCV RBC AUTO: 94 FL (ref 78–100)
MONOCYTES # BLD AUTO: 0.6 10E3/UL (ref 0–1.3)
MONOCYTES NFR BLD AUTO: 6 %
NEUTROPHILS # BLD AUTO: 8.6 10E3/UL (ref 1.6–8.3)
NEUTROPHILS NFR BLD AUTO: 82 %
NRBC # BLD AUTO: 0 10E3/UL
NRBC BLD AUTO-RTO: 0 /100
PLATELET # BLD AUTO: 250 10E3/UL (ref 150–450)
POTASSIUM SERPL-SCNC: 4.4 MMOL/L (ref 3.4–5.3)
PROT SERPL-MCNC: 6.9 G/DL (ref 6.4–8.3)
RBC # BLD AUTO: 4.32 10E6/UL (ref 3.8–5.2)
SODIUM SERPL-SCNC: 139 MMOL/L (ref 136–145)
WBC # BLD AUTO: 10.4 10E3/UL (ref 4–11)

## 2023-05-09 PROCEDURE — 36415 COLL VENOUS BLD VENIPUNCTURE: CPT | Mod: ZL | Performed by: FAMILY MEDICINE

## 2023-05-09 PROCEDURE — 80053 COMPREHEN METABOLIC PANEL: CPT | Mod: ZL | Performed by: FAMILY MEDICINE

## 2023-05-09 PROCEDURE — 99214 OFFICE O/P EST MOD 30 MIN: CPT | Performed by: FAMILY MEDICINE

## 2023-05-09 PROCEDURE — 93005 ELECTROCARDIOGRAM TRACING: CPT | Performed by: FAMILY MEDICINE

## 2023-05-09 PROCEDURE — 85652 RBC SED RATE AUTOMATED: CPT | Mod: ZL | Performed by: FAMILY MEDICINE

## 2023-05-09 PROCEDURE — G0463 HOSPITAL OUTPT CLINIC VISIT: HCPCS | Mod: 25

## 2023-05-09 PROCEDURE — 93010 ELECTROCARDIOGRAM REPORT: CPT | Mod: 77 | Performed by: INTERNAL MEDICINE

## 2023-05-09 PROCEDURE — 85004 AUTOMATED DIFF WBC COUNT: CPT | Mod: ZL | Performed by: FAMILY MEDICINE

## 2023-05-09 PROCEDURE — 83735 ASSAY OF MAGNESIUM: CPT | Mod: ZL | Performed by: FAMILY MEDICINE

## 2023-05-09 ASSESSMENT — PAIN SCALES - GENERAL: PAINLEVEL: MILD PAIN (2)

## 2023-05-09 NOTE — OR NURSING
email sent to total joint replacement team as follows;    Chyna Delgado : 51  MRN [id number]    She is scheduled 05/15 for RTHA with Dr. Jordan, PCP Dr. Lowe.  Chyna did receive the total joint replacement education packet and will perform the two surgical showers as instructed with hibiclens soap provided.  She plans on going home the same day of surgery with assistance from her .  She has had previous fracture repair done to right hip and pins have been removed.  She has a walker from this previous hip surgery and bring it in the day of.   She has a tub/shower combo.      Reviewed the following topics with Chyna;  Call don t Fall , Visitor policy, Incentive spirometer, Using Active ice system, Possible constipation with pain medication use, Wound and dressing care and signs & symptoms of infection to watch for and report right away.  She has very good recall from previous surgery and is familiar with much of what we discussed.

## 2023-05-11 ENCOUNTER — ANESTHESIA EVENT (OUTPATIENT)
Dept: SURGERY | Facility: HOSPITAL | Age: 68
End: 2023-05-11
Payer: COMMERCIAL

## 2023-05-11 ASSESSMENT — LIFESTYLE VARIABLES: TOBACCO_USE: 1

## 2023-05-11 NOTE — ANESTHESIA PREPROCEDURE EVALUATION
Anesthesia Pre-Procedure Evaluation    Patient: Chyna Delgado   MRN: [id number] : 1955        Procedure : Procedure(s):  Right Direct Anterior Total Hip Arthroplasty          Past Medical History:   Diagnosis Date     Abnormal mammogram, unspecified 2003    Normal pathology     Back Pain 02/10/2000    Sacroiliac pain     Benign neoplasm of colon 03/10/2000     Benign paroxysmal positional vertigo 2012     Depressive disorder, not elsewhere classified 02/10/2004     Diarrhea 12/15/2010    Secondary to colectomy for familial polyposis     Gastric polyp 2015, 2017    recurrent      History of normal mammogram 2014, 2019     Idiopathic osteoporosis 12/15/2010     Interstitial emphysema (H) 12/15/2010     menopausal or female climacteric states 1999     Mixed hyperlipidemia 12/15/2010     Other bursitis disorders 2011    Greater trochanteric     Other forms of retinal detachment(361.89) 12/15/2010     Other malaise and fatigue 01/10/2003     Personal history of tobacco use, presenting hazards to health 12/15/2010     Polymyalgia rheumatica (H)      Polyposis of colon      Restless legs syndrome (RLS) 12/15/2010     Rotator cuff tendonitis      Rotator cuff tendonitis      Sacroiliitis, not elsewhere classified (H) 12/15/2010    multiple sites     Tobacco use disorder 2012     Unspecified asthma(493.90) 12/15/2010      Past Surgical History:   Procedure Laterality Date     benign tumors removed from back       CATARACT EXTRACTION Right 2022     CATARACT EXTRACTION Left 2022     CLOSED REDUCTION, PERCUTANEOUS PINNING HIP Right 2019    Procedure: Percutaneous Screw Fixation of Right Hip Fracture;  Surgeon: Amrik Kolb DO;  Location: [facility]     COLON SURGERY N/A     HX: Total colectomy with J-pouch reconstruction.      ENDOSCOPY UPPER, COLONOSCOPY, COMBINED N/A 2014    Procedure: COMBINED ENDOSCOPY UPPER, COLONOSCOPY;  Surgeon:  Delbert Patel MD;  Location: [facility]     ENDOSCOPY UPPER, COLONOSCOPY, COMBINED N/A 05/09/2019    Procedure: UPPER ENDOSCOPY  WITH BIOPSIES AND  COLONOSCOPY WITH BIOPSIES;  Surgeon: Tai Diaz MD;  Location: [facility]     ESOPHAGOSCOPY, GASTROSCOPY, DUODENOSCOPY (EGD), COMBINED N/A 12/11/2015    Procedure: COMBINED ESOPHAGOSCOPY, GASTROSCOPY, DUODENOSCOPY (EGD);  Surgeon: Delbert Patel MD;  Location: [facility]     ESOPHAGOSCOPY, GASTROSCOPY, DUODENOSCOPY (EGD), COMBINED N/A 12/18/2017    Procedure: COMBINED ESOPHAGOSCOPY, GASTROSCOPY, DUODENOSCOPY (EGD);  UPPER ENDOSCOPY WITH BIOPSY;  Surgeon: Delbert Patel MD;  Location: [facility]     excised-benign  2002    tongue lesion     HC REPAIR OF NASAL SEPTUM  2002    Deviated nasal septum     LAPAROSCOPIC CHOLECYSTECTOMY       lumpectomy Right breast      Breast Lump     MOUTH SURGERY      removal of spot from roof of mouth     pilonidal cyst surgery  1976     removal of colon and large intestine  2000    Familial polyposis     REMOVE HARDWARE ARTHROPLASTY HIP Right 07/20/2022    Procedure: Right Hip Hardware Removal (Cannulated Screws);  Surgeon: Luis Fernando Marcial MD;  Location: [facility]     Right etopic pregnancy      Pregnancy     TONSILLECTOMY      tonsillitus     UPPER GI ENDOSCOPY  2009    Stomach polyps      Allergies   Allergen Reactions     Codeine Swelling     Throat swelling-Patient can tolerate Hydrocodone & morphine     Nortriptyline Other (See Comments)     Crying       Social History     Tobacco Use     Smoking status: Every Day     Packs/day: 0.50     Years: 40.00     Pack years: 20.00     Types: Cigarettes     Passive exposure: Current     Smokeless tobacco: Never   Vaping Use     Vaping status: Not on file   Substance Use Topics     Alcohol use: Yes     Comment: Weekly 3 drinks      Wt Readings from Last 1 Encounters:   05/09/23 52.2 kg (115 lb)        Anesthesia Evaluation   Pt has had prior anesthetic. Type: General and MAC.    No history of anesthetic  "complications       ROS/MED HX  ENT/Pulmonary:     (+) TWYLA risk factors, snores loudly, tobacco use (1/2ppd x 50 years), Current use, 40  Pack-Year Hx,  Mild Persistent, asthma Last exacerbation: used last month albuterol,  Treatment: Inhaled steroids, Inhaler daily and Inhaler prn,  moderate,  COPD,     Neurologic: Comment: RLS       Cardiovascular:     (+) Dyslipidemia -----[place]. Previous cardiac testing   Echo: Date: Results:    Stress Test: Date: Results:    ECG Reviewed: Date: Results:    EKG:EKG: NSR rate 67 QTc 395  Cath: Date: Results:      METS/Exercise Tolerance: 1 - Eating, dressing    Hematologic:     (+) anemia,     Musculoskeletal: Comment: Gout  polymyalgia rheumatica  Patient right  Hip pain \"a good 10\", been bothering her for one year  (+) arthritis,     GI/Hepatic: Comment: S/p colon resection with j pouch 2000    (+) GERD, Asymptomatic on medication,     Renal/Genitourinary: Comment: Right pelvic kidney functions fine per patient      Endo:     (+) thyroid problem, hypothyroidism, Chronic steroid usage (polymyalgia) for Other and Arthritis. Date most recently used: took today 4 mg, everyday for many years for PMR.     Psychiatric/Substance Use:     (+) psychiatric history depression alcohol abuse     Infectious Disease:  - neg infectious disease ROS     Malignancy: Comment: Bowel resection for precancerous cells   (-) malignancy   Other:  - neg other ROS    (+) , H/O Chronic Pain,other significant disability Other (comment) (uses cane),          Physical Exam    Airway        Mallampati: II   TM distance: < 3 FB   Neck ROM: full   Mouth opening: > 3 cm    Respiratory Devices and Support         Dental       (+) Modest Abnormalities - crowns, retainers, 1 or 2 missing teeth      Cardiovascular   cardiovascular exam normal       Rhythm and rate: regular and normal     Pulmonary           (+) rales           OUTSIDE LABS:  CBC:   Lab Results   Component Value Date    WBC 10.4 05/09/2023    WBC 6.5 " 02/20/2023    HGB 14.1 05/09/2023    HGB 13.3 02/20/2023    HCT 40.7 05/09/2023    HCT 38.6 02/20/2023     05/09/2023     02/20/2023     BMP:   Lab Results   Component Value Date     05/09/2023     (L) 02/20/2023    POTASSIUM 4.4 05/09/2023    POTASSIUM 4.5 02/20/2023    CHLORIDE 102 05/09/2023    CHLORIDE 101 02/20/2023    CO2 24 05/09/2023    CO2 23 02/20/2023    BUN 19.1 05/09/2023    BUN 16.7 02/20/2023    CR 0.85 05/09/2023    CR 0.87 02/20/2023    GLC 93 05/09/2023    GLC 95 02/20/2023     COAGS:   Lab Results   Component Value Date    INR 1.09 02/23/2019     POC:   Lab Results   Component Value Date     (H) 02/25/2019     HEPATIC:   Lab Results   Component Value Date    ALBUMIN 4.1 05/09/2023    PROTTOTAL 6.9 05/09/2023    ALT 29 05/09/2023    AST 22 05/09/2023    ALKPHOS 72 05/09/2023    BILITOTAL 0.2 05/09/2023     OTHER:   Lab Results   Component Value Date    A1C 5.4 01/05/2023    СВЕТЛАНА 9.9 05/09/2023    PHOS 3.4 08/01/2019    MAG 1.7 05/09/2023    TSH 0.89 07/15/2022    T4 0.96 08/08/2018    T3 95 08/08/2018    CRP 9.7 (H) 09/09/2020    SED 8 05/09/2023       Anesthesia Plan    ASA Status:  3   NPO Status:  NPO Appropriate    Anesthesia Type: Spinal.   Induction: Intravenous, Propofol.   Maintenance: Balanced.        Consents    Anesthesia Plan(s) and associated risks, benefits, and realistic alternatives discussed. Questions answered and patient/representative(s) expressed understanding.     - Discussed: Risks, Benefits and Alternatives for BOTH SEDATION and the PROCEDURE were discussed     - Discussed with:  Patient      - Extended Intubation/Ventilatory Support Discussed: No.      - Patient is DNR/DNI Status: No    Use of blood products discussed: No .     Postoperative Care    Pain management: Peripheral nerve block (Single Shot).   PONV prophylaxis: Ondansetron (or other 5HT-3)     Comments:    Other Comments:  5-9-23            MAIDA Dodd CRNA

## 2023-05-12 ASSESSMENT — ENCOUNTER SYMPTOMS
SHORTNESS OF BREATH: 0
FEVER: 0
COUGH: 0
CHEST TIGHTNESS: 0
HEADACHES: 0
PALPITATIONS: 0
ABDOMINAL PAIN: 0
WHEEZING: 0

## 2023-05-15 ENCOUNTER — APPOINTMENT (OUTPATIENT)
Dept: ULTRASOUND IMAGING | Facility: HOSPITAL | Age: 68
End: 2023-05-15
Attending: NURSE ANESTHETIST, CERTIFIED REGISTERED
Payer: COMMERCIAL

## 2023-05-15 ENCOUNTER — APPOINTMENT (OUTPATIENT)
Dept: GENERAL RADIOLOGY | Facility: HOSPITAL | Age: 68
End: 2023-05-15
Attending: ORTHOPAEDIC SURGERY
Payer: COMMERCIAL

## 2023-05-15 ENCOUNTER — APPOINTMENT (OUTPATIENT)
Dept: GENERAL RADIOLOGY | Facility: HOSPITAL | Age: 68
End: 2023-05-15
Payer: COMMERCIAL

## 2023-05-15 ENCOUNTER — ANESTHESIA (OUTPATIENT)
Dept: SURGERY | Facility: HOSPITAL | Age: 68
End: 2023-05-15
Payer: COMMERCIAL

## 2023-05-15 ENCOUNTER — HOSPITAL ENCOUNTER (OUTPATIENT)
Facility: HOSPITAL | Age: 68
Discharge: HOME | End: 2023-05-16
Attending: ORTHOPAEDIC SURGERY | Admitting: ORTHOPAEDIC SURGERY
Payer: COMMERCIAL

## 2023-05-15 DIAGNOSIS — Z96.641 S/P TOTAL RIGHT HIP ARTHROPLASTY: Primary | ICD-10-CM

## 2023-05-15 DIAGNOSIS — K90.9 DIARRHEA DUE TO MALABSORPTION: ICD-10-CM

## 2023-05-15 DIAGNOSIS — R19.7 DIARRHEA DUE TO MALABSORPTION: ICD-10-CM

## 2023-05-15 DIAGNOSIS — M70.61 TROCHANTERIC BURSITIS OF RIGHT HIP: ICD-10-CM

## 2023-05-15 PROCEDURE — 250N000011 HC RX IP 250 OP 636: Performed by: NURSE ANESTHETIST, CERTIFIED REGISTERED

## 2023-05-15 PROCEDURE — C1776 JOINT DEVICE (IMPLANTABLE): HCPCS | Performed by: ORTHOPAEDIC SURGERY

## 2023-05-15 PROCEDURE — 272N000001 HC OR GENERAL SUPPLY STERILE: Performed by: ORTHOPAEDIC SURGERY

## 2023-05-15 PROCEDURE — 999N000141 HC STATISTIC PRE-PROCEDURE NURSING ASSESSMENT: Performed by: ORTHOPAEDIC SURGERY

## 2023-05-15 PROCEDURE — 258N000003 HC RX IP 258 OP 636: Performed by: NURSE ANESTHETIST, CERTIFIED REGISTERED

## 2023-05-15 PROCEDURE — 250N000009 HC RX 250: Performed by: NURSE ANESTHETIST, CERTIFIED REGISTERED

## 2023-05-15 PROCEDURE — 94640 AIRWAY INHALATION TREATMENT: CPT

## 2023-05-15 PROCEDURE — 999N000065 XR PELVIS PORT 1/2 VIEWS

## 2023-05-15 PROCEDURE — 360N000084 HC SURGERY LEVEL 4 W/ FLUORO, PER MIN: Performed by: ORTHOPAEDIC SURGERY

## 2023-05-15 PROCEDURE — 258N000003 HC RX IP 258 OP 636

## 2023-05-15 PROCEDURE — 27130 TOTAL HIP ARTHROPLASTY: CPT | Mod: RT | Performed by: ORTHOPAEDIC SURGERY

## 2023-05-15 PROCEDURE — 370N000017 HC ANESTHESIA TECHNICAL FEE, PER MIN: Performed by: ORTHOPAEDIC SURGERY

## 2023-05-15 PROCEDURE — 27130 TOTAL HIP ARTHROPLASTY: CPT | Performed by: NURSE ANESTHETIST, CERTIFIED REGISTERED

## 2023-05-15 PROCEDURE — 999N000157 HC STATISTIC RCP TIME EA 10 MIN

## 2023-05-15 PROCEDURE — 250N000009 HC RX 250

## 2023-05-15 PROCEDURE — 250N000009 HC RX 250: Performed by: ORTHOPAEDIC SURGERY

## 2023-05-15 PROCEDURE — 250N000013 HC RX MED GY IP 250 OP 250 PS 637

## 2023-05-15 PROCEDURE — 99231 SBSQ HOSP IP/OBS SF/LOW 25: CPT | Mod: 24 | Performed by: INTERNAL MEDICINE

## 2023-05-15 PROCEDURE — 250N000013 HC RX MED GY IP 250 OP 250 PS 637: Performed by: INTERNAL MEDICINE

## 2023-05-15 PROCEDURE — 999N000179 XR SURGERY CARM FLUORO LESS THAN 5 MIN W STILLS: Mod: TC

## 2023-05-15 PROCEDURE — 64447 NJX AA&/STRD FEMORAL NRV IMG: CPT | Mod: XU | Performed by: NURSE ANESTHETIST, CERTIFIED REGISTERED

## 2023-05-15 PROCEDURE — 250N000011 HC RX IP 250 OP 636

## 2023-05-15 PROCEDURE — 250N000012 HC RX MED GY IP 250 OP 636 PS 637: Performed by: INTERNAL MEDICINE

## 2023-05-15 PROCEDURE — C1713 ANCHOR/SCREW BN/BN,TIS/BN: HCPCS | Performed by: ORTHOPAEDIC SURGERY

## 2023-05-15 PROCEDURE — 710N000010 HC RECOVERY PHASE 1, LEVEL 2, PER MIN: Performed by: ORTHOPAEDIC SURGERY

## 2023-05-15 DEVICE — IMP SCR ACET SNN SPHERICAL HEAD 6.5X25MM 71332525: Type: IMPLANTABLE DEVICE | Site: HIP | Status: FUNCTIONAL

## 2023-05-15 DEVICE — IMPLANTABLE DEVICE: Type: IMPLANTABLE DEVICE | Site: HIP | Status: FUNCTIONAL

## 2023-05-15 DEVICE — IMP SCR ACET SNN SPHERICAL HEAD 6.5X30MM 71332530: Type: IMPLANTABLE DEVICE | Site: HIP | Status: FUNCTIONAL

## 2023-05-15 DEVICE — IMP HEAD FEMORAL SNR OXINIUM 12/14 36MM +0 71343600: Type: IMPLANTABLE DEVICE | Site: HIP | Status: FUNCTIONAL

## 2023-05-15 DEVICE — IMP SHELL SNR ACET R3 3H 52MM 71335552: Type: IMPLANTABLE DEVICE | Site: HIP | Status: FUNCTIONAL

## 2023-05-15 DEVICE — IMP LINER SNR ACET R3 XLPE 0DEG 36X52MM 71332752: Type: IMPLANTABLE DEVICE | Site: HIP | Status: FUNCTIONAL

## 2023-05-15 DEVICE — IMP HOLE COVER SNN ACETAB CUP REFLEC INTERFIT 71336500: Type: IMPLANTABLE DEVICE | Site: HIP | Status: FUNCTIONAL

## 2023-05-15 RX ORDER — PROPOFOL 10 MG/ML
INJECTION, EMULSION INTRAVENOUS CONTINUOUS PRN
Status: DISCONTINUED | OUTPATIENT
Start: 2023-05-15 | End: 2023-05-15

## 2023-05-15 RX ORDER — ONDANSETRON 2 MG/ML
4 INJECTION INTRAMUSCULAR; INTRAVENOUS EVERY 30 MIN PRN
Status: DISCONTINUED | OUTPATIENT
Start: 2023-05-15 | End: 2023-05-15 | Stop reason: HOSPADM

## 2023-05-15 RX ORDER — GABAPENTIN 400 MG/1
1200 CAPSULE ORAL 2 TIMES DAILY
Status: DISCONTINUED | OUTPATIENT
Start: 2023-05-15 | End: 2023-05-16 | Stop reason: HOSPADM

## 2023-05-15 RX ORDER — TRANEXAMIC ACID 10 MG/ML
1 INJECTION, SOLUTION INTRAVENOUS ONCE
Status: DISCONTINUED | OUTPATIENT
Start: 2023-05-15 | End: 2023-05-15 | Stop reason: HOSPADM

## 2023-05-15 RX ORDER — FENTANYL CITRATE 50 UG/ML
25 INJECTION, SOLUTION INTRAMUSCULAR; INTRAVENOUS EVERY 5 MIN PRN
Status: DISCONTINUED | OUTPATIENT
Start: 2023-05-15 | End: 2023-05-15 | Stop reason: HOSPADM

## 2023-05-15 RX ORDER — CEFAZOLIN SODIUM/WATER 2 G/20 ML
2 SYRINGE (ML) INTRAVENOUS
Status: COMPLETED | OUTPATIENT
Start: 2023-05-15 | End: 2023-05-15

## 2023-05-15 RX ORDER — ALLOPURINOL 100 MG/1
100 TABLET ORAL 2 TIMES DAILY
Status: DISCONTINUED | OUTPATIENT
Start: 2023-05-15 | End: 2023-05-16 | Stop reason: HOSPADM

## 2023-05-15 RX ORDER — NALOXONE HYDROCHLORIDE 0.4 MG/ML
0.2 INJECTION, SOLUTION INTRAMUSCULAR; INTRAVENOUS; SUBCUTANEOUS
Status: DISCONTINUED | OUTPATIENT
Start: 2023-05-15 | End: 2023-05-16 | Stop reason: HOSPADM

## 2023-05-15 RX ORDER — DICYCLOMINE HYDROCHLORIDE 10 MG/1
20 CAPSULE ORAL
Status: DISCONTINUED | OUTPATIENT
Start: 2023-05-15 | End: 2023-05-16 | Stop reason: HOSPADM

## 2023-05-15 RX ORDER — ONDANSETRON 4 MG/1
4 TABLET, ORALLY DISINTEGRATING ORAL EVERY 30 MIN PRN
Status: DISCONTINUED | OUTPATIENT
Start: 2023-05-15 | End: 2023-05-15 | Stop reason: HOSPADM

## 2023-05-15 RX ORDER — PROCHLORPERAZINE MALEATE 5 MG
5 TABLET ORAL EVERY 6 HOURS PRN
Status: DISCONTINUED | OUTPATIENT
Start: 2023-05-15 | End: 2023-05-16 | Stop reason: HOSPADM

## 2023-05-15 RX ORDER — BUPIVACAINE HYDROCHLORIDE AND EPINEPHRINE 2.5; 5 MG/ML; UG/ML
INJECTION, SOLUTION INFILTRATION; PERINEURAL PRN
Status: DISCONTINUED | OUTPATIENT
Start: 2023-05-15 | End: 2023-05-15 | Stop reason: HOSPADM

## 2023-05-15 RX ORDER — ATORVASTATIN CALCIUM 10 MG/1
20 TABLET, FILM COATED ORAL AT BEDTIME
Status: DISCONTINUED | OUTPATIENT
Start: 2023-05-15 | End: 2023-05-16 | Stop reason: HOSPADM

## 2023-05-15 RX ORDER — HYDROMORPHONE HCL IN WATER/PF 6 MG/30 ML
0.4 PATIENT CONTROLLED ANALGESIA SYRINGE INTRAVENOUS
Status: DISCONTINUED | OUTPATIENT
Start: 2023-05-15 | End: 2023-05-16 | Stop reason: HOSPADM

## 2023-05-15 RX ORDER — LIDOCAINE 40 MG/G
CREAM TOPICAL
Status: DISCONTINUED | OUTPATIENT
Start: 2023-05-15 | End: 2023-05-16 | Stop reason: HOSPADM

## 2023-05-15 RX ORDER — OXYCODONE HYDROCHLORIDE 5 MG/1
10 TABLET ORAL EVERY 4 HOURS PRN
Status: DISCONTINUED | OUTPATIENT
Start: 2023-05-15 | End: 2023-05-16 | Stop reason: HOSPADM

## 2023-05-15 RX ORDER — CEFAZOLIN SODIUM 2 G/100ML
2 INJECTION, SOLUTION INTRAVENOUS EVERY 8 HOURS
Status: COMPLETED | OUTPATIENT
Start: 2023-05-15 | End: 2023-05-16

## 2023-05-15 RX ORDER — ACETAMINOPHEN 325 MG/1
650 TABLET ORAL EVERY 4 HOURS PRN
Qty: 100 TABLET | Refills: 0 | Status: SHIPPED | OUTPATIENT
Start: 2023-05-15

## 2023-05-15 RX ORDER — HYDROMORPHONE HCL IN WATER/PF 6 MG/30 ML
0.2 PATIENT CONTROLLED ANALGESIA SYRINGE INTRAVENOUS
Status: DISCONTINUED | OUTPATIENT
Start: 2023-05-15 | End: 2023-05-16 | Stop reason: HOSPADM

## 2023-05-15 RX ORDER — BUPIVACAINE HYDROCHLORIDE 2.5 MG/ML
INJECTION, SOLUTION EPIDURAL; INFILTRATION; INTRACAUDAL
Status: COMPLETED | OUTPATIENT
Start: 2023-05-15 | End: 2023-05-15

## 2023-05-15 RX ORDER — KETOROLAC TROMETHAMINE 30 MG/ML
INJECTION, SOLUTION INTRAMUSCULAR; INTRAVENOUS PRN
Status: DISCONTINUED | OUTPATIENT
Start: 2023-05-15 | End: 2023-05-15

## 2023-05-15 RX ORDER — FLUTICASONE FUROATE AND VILANTEROL 100; 25 UG/1; UG/1
1 POWDER RESPIRATORY (INHALATION) DAILY
Status: DISCONTINUED | OUTPATIENT
Start: 2023-05-15 | End: 2023-05-16 | Stop reason: HOSPADM

## 2023-05-15 RX ORDER — ASPIRIN 81 MG/1
81 TABLET ORAL 2 TIMES DAILY
Qty: 60 TABLET | Refills: 0 | Status: SHIPPED | OUTPATIENT
Start: 2023-05-15

## 2023-05-15 RX ORDER — SPIRONOLACTONE 25 MG/1
50 TABLET ORAL
Status: DISCONTINUED | OUTPATIENT
Start: 2023-05-15 | End: 2023-05-16 | Stop reason: HOSPADM

## 2023-05-15 RX ORDER — POLYETHYLENE GLYCOL 3350 17 G/17G
17 POWDER, FOR SOLUTION ORAL DAILY
Status: DISCONTINUED | OUTPATIENT
Start: 2023-05-16 | End: 2023-05-16 | Stop reason: HOSPADM

## 2023-05-15 RX ORDER — CEFAZOLIN SODIUM/WATER 2 G/20 ML
2 SYRINGE (ML) INTRAVENOUS SEE ADMIN INSTRUCTIONS
Status: DISCONTINUED | OUTPATIENT
Start: 2023-05-15 | End: 2023-05-15 | Stop reason: HOSPADM

## 2023-05-15 RX ORDER — DEXAMETHASONE SODIUM PHOSPHATE 4 MG/ML
INJECTION, SOLUTION INTRA-ARTICULAR; INTRALESIONAL; INTRAMUSCULAR; INTRAVENOUS; SOFT TISSUE PRN
Status: DISCONTINUED | OUTPATIENT
Start: 2023-05-15 | End: 2023-05-15

## 2023-05-15 RX ORDER — HYDROXYZINE HYDROCHLORIDE 50 MG/ML
25 INJECTION, SOLUTION INTRAMUSCULAR
Status: DISPENSED | OUTPATIENT
Start: 2023-05-15 | End: 2023-05-16

## 2023-05-15 RX ORDER — SODIUM CHLORIDE, SODIUM LACTATE, POTASSIUM CHLORIDE, CALCIUM CHLORIDE 600; 310; 30; 20 MG/100ML; MG/100ML; MG/100ML; MG/100ML
INJECTION, SOLUTION INTRAVENOUS CONTINUOUS
Status: DISCONTINUED | OUTPATIENT
Start: 2023-05-15 | End: 2023-05-15 | Stop reason: HOSPADM

## 2023-05-15 RX ORDER — NALOXONE HYDROCHLORIDE 0.4 MG/ML
0.4 INJECTION, SOLUTION INTRAMUSCULAR; INTRAVENOUS; SUBCUTANEOUS
Status: DISCONTINUED | OUTPATIENT
Start: 2023-05-15 | End: 2023-05-16 | Stop reason: HOSPADM

## 2023-05-15 RX ORDER — EPHEDRINE SULFATE 50 MG/ML
INJECTION, SOLUTION INTRAMUSCULAR; INTRAVENOUS; SUBCUTANEOUS PRN
Status: DISCONTINUED | OUTPATIENT
Start: 2023-05-15 | End: 2023-05-15

## 2023-05-15 RX ORDER — ONDANSETRON 2 MG/ML
4 INJECTION INTRAMUSCULAR; INTRAVENOUS EVERY 6 HOURS PRN
Status: DISCONTINUED | OUTPATIENT
Start: 2023-05-15 | End: 2023-05-16 | Stop reason: HOSPADM

## 2023-05-15 RX ORDER — ONDANSETRON 4 MG/1
4 TABLET, ORALLY DISINTEGRATING ORAL EVERY 6 HOURS PRN
Status: DISCONTINUED | OUTPATIENT
Start: 2023-05-15 | End: 2023-05-16 | Stop reason: HOSPADM

## 2023-05-15 RX ORDER — CEFADROXIL 500 MG/1
500 CAPSULE ORAL 2 TIMES DAILY
Qty: 10 CAPSULE | Refills: 0 | Status: SHIPPED | OUTPATIENT
Start: 2023-05-15 | End: 2023-05-20

## 2023-05-15 RX ORDER — ACETAMINOPHEN 10 MG/ML
1000 INJECTION, SOLUTION INTRAVENOUS ONCE
Status: COMPLETED | OUTPATIENT
Start: 2023-05-15 | End: 2023-05-15

## 2023-05-15 RX ORDER — ASPIRIN 81 MG/1
81 TABLET ORAL 2 TIMES DAILY
Status: DISCONTINUED | OUTPATIENT
Start: 2023-05-16 | End: 2023-05-16 | Stop reason: HOSPADM

## 2023-05-15 RX ORDER — TRANEXAMIC ACID 10 MG/ML
1 INJECTION, SOLUTION INTRAVENOUS ONCE
Status: COMPLETED | OUTPATIENT
Start: 2023-05-15 | End: 2023-05-15

## 2023-05-15 RX ORDER — ACETAMINOPHEN 325 MG/1
650 TABLET ORAL EVERY 4 HOURS PRN
Status: DISCONTINUED | OUTPATIENT
Start: 2023-05-18 | End: 2023-05-16 | Stop reason: HOSPADM

## 2023-05-15 RX ORDER — IPRATROPIUM BROMIDE AND ALBUTEROL SULFATE 2.5; .5 MG/3ML; MG/3ML
3 SOLUTION RESPIRATORY (INHALATION) ONCE
Status: COMPLETED | OUTPATIENT
Start: 2023-05-15 | End: 2023-05-15

## 2023-05-15 RX ORDER — AMOXICILLIN 250 MG
1 CAPSULE ORAL 2 TIMES DAILY
Status: DISCONTINUED | OUTPATIENT
Start: 2023-05-15 | End: 2023-05-16 | Stop reason: HOSPADM

## 2023-05-15 RX ORDER — MONTELUKAST SODIUM 10 MG/1
10 TABLET ORAL AT BEDTIME
Status: DISCONTINUED | OUTPATIENT
Start: 2023-05-15 | End: 2023-05-16 | Stop reason: HOSPADM

## 2023-05-15 RX ORDER — PREDNISONE 1 MG/1
4 TABLET ORAL DAILY
Status: DISCONTINUED | OUTPATIENT
Start: 2023-05-15 | End: 2023-05-16 | Stop reason: HOSPADM

## 2023-05-15 RX ORDER — ALBUTEROL SULFATE 90 UG/1
2 AEROSOL, METERED RESPIRATORY (INHALATION) EVERY 4 HOURS PRN
Status: DISCONTINUED | OUTPATIENT
Start: 2023-05-15 | End: 2023-05-16 | Stop reason: HOSPADM

## 2023-05-15 RX ORDER — HYDROMORPHONE HYDROCHLORIDE 1 MG/ML
0.2 INJECTION, SOLUTION INTRAMUSCULAR; INTRAVENOUS; SUBCUTANEOUS EVERY 5 MIN PRN
Status: DISCONTINUED | OUTPATIENT
Start: 2023-05-15 | End: 2023-05-15 | Stop reason: HOSPADM

## 2023-05-15 RX ORDER — CHLOROPROCAINE HYDROCHLORIDE 20 MG/ML
INJECTION, SOLUTION EPIDURAL; INFILTRATION; INTRACAUDAL; PERINEURAL PRN
Status: DISCONTINUED | OUTPATIENT
Start: 2023-05-15 | End: 2023-05-15

## 2023-05-15 RX ORDER — FENTANYL CITRATE 50 UG/ML
50 INJECTION, SOLUTION INTRAMUSCULAR; INTRAVENOUS EVERY 5 MIN PRN
Status: DISCONTINUED | OUTPATIENT
Start: 2023-05-15 | End: 2023-05-15 | Stop reason: HOSPADM

## 2023-05-15 RX ORDER — LIDOCAINE 40 MG/G
CREAM TOPICAL
Status: DISCONTINUED | OUTPATIENT
Start: 2023-05-15 | End: 2023-05-15 | Stop reason: HOSPADM

## 2023-05-15 RX ORDER — SODIUM CHLORIDE 9 MG/ML
100 INJECTION, SOLUTION INTRAVENOUS CONTINUOUS
Status: DISCONTINUED | OUTPATIENT
Start: 2023-05-15 | End: 2023-05-16 | Stop reason: HOSPADM

## 2023-05-15 RX ORDER — FENTANYL CITRATE 50 UG/ML
INJECTION, SOLUTION INTRAMUSCULAR; INTRAVENOUS PRN
Status: DISCONTINUED | OUTPATIENT
Start: 2023-05-15 | End: 2023-05-15

## 2023-05-15 RX ORDER — HYDROMORPHONE HYDROCHLORIDE 1 MG/ML
0.4 INJECTION, SOLUTION INTRAMUSCULAR; INTRAVENOUS; SUBCUTANEOUS EVERY 5 MIN PRN
Status: DISCONTINUED | OUTPATIENT
Start: 2023-05-15 | End: 2023-05-15 | Stop reason: HOSPADM

## 2023-05-15 RX ORDER — OXYCODONE HYDROCHLORIDE 5 MG/1
5 TABLET ORAL EVERY 4 HOURS PRN
Status: DISCONTINUED | OUTPATIENT
Start: 2023-05-15 | End: 2023-05-16 | Stop reason: HOSPADM

## 2023-05-15 RX ORDER — BISACODYL 10 MG
10 SUPPOSITORY, RECTAL RECTAL DAILY PRN
Status: DISCONTINUED | OUTPATIENT
Start: 2023-05-15 | End: 2023-05-16 | Stop reason: HOSPADM

## 2023-05-15 RX ORDER — ACETAMINOPHEN 325 MG/1
975 TABLET ORAL EVERY 8 HOURS
Status: DISCONTINUED | OUTPATIENT
Start: 2023-05-15 | End: 2023-05-16 | Stop reason: HOSPADM

## 2023-05-15 RX ORDER — PROPOFOL 10 MG/ML
INJECTION, EMULSION INTRAVENOUS PRN
Status: DISCONTINUED | OUTPATIENT
Start: 2023-05-15 | End: 2023-05-15

## 2023-05-15 RX ORDER — PANTOPRAZOLE SODIUM 40 MG/1
40 TABLET, DELAYED RELEASE ORAL 2 TIMES DAILY
Status: DISCONTINUED | OUTPATIENT
Start: 2023-05-15 | End: 2023-05-16 | Stop reason: HOSPADM

## 2023-05-15 RX ORDER — ONDANSETRON 2 MG/ML
INJECTION INTRAMUSCULAR; INTRAVENOUS PRN
Status: DISCONTINUED | OUTPATIENT
Start: 2023-05-15 | End: 2023-05-15

## 2023-05-15 RX ORDER — DEXMEDETOMIDINE HYDROCHLORIDE 4 UG/ML
INJECTION, SOLUTION INTRAVENOUS PRN
Status: DISCONTINUED | OUTPATIENT
Start: 2023-05-15 | End: 2023-05-15

## 2023-05-15 RX ORDER — OXYCODONE HYDROCHLORIDE 5 MG/1
5-10 TABLET ORAL EVERY 4 HOURS PRN
Qty: 30 TABLET | Refills: 0 | Status: SHIPPED | OUTPATIENT
Start: 2023-05-15 | End: 2023-06-21

## 2023-05-15 RX ADMIN — ONDANSETRON 4 MG: 2 INJECTION INTRAMUSCULAR; INTRAVENOUS at 08:20

## 2023-05-15 RX ADMIN — DEXAMETHASONE SODIUM PHOSPHATE 10 MG: 4 INJECTION, SOLUTION INTRA-ARTICULAR; INTRALESIONAL; INTRAMUSCULAR; INTRAVENOUS; SOFT TISSUE at 08:20

## 2023-05-15 RX ADMIN — BUPIVACAINE HYDROCHLORIDE 15 ML: 2.5 INJECTION, SOLUTION EPIDURAL; INFILTRATION; INTRACAUDAL at 07:24

## 2023-05-15 RX ADMIN — PROPOFOL 75 MCG/KG/MIN: 10 INJECTION, EMULSION INTRAVENOUS at 08:07

## 2023-05-15 RX ADMIN — TRANEXAMIC ACID 1 G: 10 INJECTION, SOLUTION INTRAVENOUS at 07:34

## 2023-05-15 RX ADMIN — Medication 10 MG: at 09:05

## 2023-05-15 RX ADMIN — MONTELUKAST 10 MG: 10 TABLET, FILM COATED ORAL at 20:47

## 2023-05-15 RX ADMIN — PHENYLEPHRINE HYDROCHLORIDE 100 MCG: 10 INJECTION INTRAVENOUS at 09:22

## 2023-05-15 RX ADMIN — CEFAZOLIN SODIUM 2 G: 2 INJECTION, SOLUTION INTRAVENOUS at 14:30

## 2023-05-15 RX ADMIN — KETOROLAC TROMETHAMINE 30 MG: 30 INJECTION, SOLUTION INTRAMUSCULAR at 09:41

## 2023-05-15 RX ADMIN — DEXMEDETOMIDINE HYDROCHLORIDE 4 MCG: 100 INJECTION, SOLUTION INTRAVENOUS at 08:04

## 2023-05-15 RX ADMIN — FENTANYL CITRATE 25 MCG: 50 INJECTION, SOLUTION INTRAMUSCULAR; INTRAVENOUS at 11:25

## 2023-05-15 RX ADMIN — ATORVASTATIN CALCIUM 20 MG: 10 TABLET, FILM COATED ORAL at 20:47

## 2023-05-15 RX ADMIN — PHENYLEPHRINE HYDROCHLORIDE 100 MCG: 10 INJECTION INTRAVENOUS at 08:58

## 2023-05-15 RX ADMIN — TRANEXAMIC ACID 1 G: 10 INJECTION, SOLUTION INTRAVENOUS at 08:57

## 2023-05-15 RX ADMIN — ACETAMINOPHEN 325MG 975 MG: 325 TABLET ORAL at 19:48

## 2023-05-15 RX ADMIN — PHENYLEPHRINE HYDROCHLORIDE 50 MCG: 10 INJECTION INTRAVENOUS at 08:42

## 2023-05-15 RX ADMIN — SODIUM CHLORIDE 100 ML/HR: 9 INJECTION, SOLUTION INTRAVENOUS at 14:30

## 2023-05-15 RX ADMIN — Medication 10 MG: at 08:05

## 2023-05-15 RX ADMIN — PROPOFOL 10 MG: 10 INJECTION, EMULSION INTRAVENOUS at 08:07

## 2023-05-15 RX ADMIN — IPRATROPIUM BROMIDE AND ALBUTEROL SULFATE 3 ML: 2.5; .5 SOLUTION RESPIRATORY (INHALATION) at 07:47

## 2023-05-15 RX ADMIN — PHENYLEPHRINE HYDROCHLORIDE 50 MCG: 10 INJECTION INTRAVENOUS at 08:27

## 2023-05-15 RX ADMIN — ALLOPURINOL 100 MG: 100 TABLET ORAL at 20:47

## 2023-05-15 RX ADMIN — CHLOROPROCAINE HYDROCHLORIDE 2.5 ML: 20 INJECTION, SOLUTION EPIDURAL; INFILTRATION; INTRACAUDAL; PERINEURAL at 08:04

## 2023-05-15 RX ADMIN — OXYCODONE HYDROCHLORIDE 5 MG: 5 TABLET ORAL at 15:46

## 2023-05-15 RX ADMIN — HYDROXYZINE HYDROCHLORIDE 25 MG: 50 INJECTION, SOLUTION INTRAMUSCULAR at 10:55

## 2023-05-15 RX ADMIN — HYDROMORPHONE HYDROCHLORIDE 0.4 MG: 0.2 INJECTION, SOLUTION INTRAMUSCULAR; INTRAVENOUS; SUBCUTANEOUS at 18:10

## 2023-05-15 RX ADMIN — OXYCODONE HYDROCHLORIDE 10 MG: 5 TABLET ORAL at 19:44

## 2023-05-15 RX ADMIN — PHENYLEPHRINE HYDROCHLORIDE 100 MCG: 10 INJECTION INTRAVENOUS at 09:33

## 2023-05-15 RX ADMIN — Medication 5 MG: at 08:20

## 2023-05-15 RX ADMIN — PHENYLEPHRINE HYDROCHLORIDE 100 MCG: 10 INJECTION INTRAVENOUS at 08:47

## 2023-05-15 RX ADMIN — HYDROMORPHONE HYDROCHLORIDE 0.2 MG: 0.2 INJECTION, SOLUTION INTRAMUSCULAR; INTRAVENOUS; SUBCUTANEOUS at 15:17

## 2023-05-15 RX ADMIN — FENTANYL CITRATE 25 MCG: 50 INJECTION, SOLUTION INTRAMUSCULAR; INTRAVENOUS at 10:48

## 2023-05-15 RX ADMIN — PROPOFOL 15 MG: 10 INJECTION, EMULSION INTRAVENOUS at 08:24

## 2023-05-15 RX ADMIN — PHENYLEPHRINE HYDROCHLORIDE 100 MCG: 10 INJECTION INTRAVENOUS at 09:03

## 2023-05-15 RX ADMIN — BUPIVACAINE HYDROCHLORIDE 5 ML: 2.5 INJECTION, SOLUTION EPIDURAL; INFILTRATION; INTRACAUDAL at 07:30

## 2023-05-15 RX ADMIN — HYDROMORPHONE HYDROCHLORIDE 0.2 MG: 1 INJECTION, SOLUTION INTRAMUSCULAR; INTRAVENOUS; SUBCUTANEOUS at 11:42

## 2023-05-15 RX ADMIN — PHENYLEPHRINE HYDROCHLORIDE 100 MCG: 10 INJECTION INTRAVENOUS at 08:38

## 2023-05-15 RX ADMIN — FENTANYL CITRATE 50 MCG: 50 INJECTION, SOLUTION INTRAMUSCULAR; INTRAVENOUS at 07:15

## 2023-05-15 RX ADMIN — SODIUM CHLORIDE, POTASSIUM CHLORIDE, SODIUM LACTATE AND CALCIUM CHLORIDE: 600; 310; 30; 20 INJECTION, SOLUTION INTRAVENOUS at 08:42

## 2023-05-15 RX ADMIN — PREDNISONE 4 MG: 1 TABLET ORAL at 15:22

## 2023-05-15 RX ADMIN — Medication 5 MG: at 09:19

## 2023-05-15 RX ADMIN — PHENYLEPHRINE HYDROCHLORIDE 100 MCG: 10 INJECTION INTRAVENOUS at 08:55

## 2023-05-15 RX ADMIN — Medication 2 G: at 07:37

## 2023-05-15 RX ADMIN — HYDROXYZINE HYDROCHLORIDE 25 MG: 50 INJECTION, SOLUTION INTRAMUSCULAR at 11:44

## 2023-05-15 RX ADMIN — FENTANYL CITRATE 25 MCG: 50 INJECTION, SOLUTION INTRAMUSCULAR; INTRAVENOUS at 09:34

## 2023-05-15 RX ADMIN — PHENYLEPHRINE HYDROCHLORIDE 100 MCG: 10 INJECTION INTRAVENOUS at 09:12

## 2023-05-15 RX ADMIN — GABAPENTIN 1200 MG: 400 CAPSULE ORAL at 20:47

## 2023-05-15 RX ADMIN — FENTANYL CITRATE 25 MCG: 50 INJECTION, SOLUTION INTRAMUSCULAR; INTRAVENOUS at 10:55

## 2023-05-15 RX ADMIN — SODIUM CHLORIDE, POTASSIUM CHLORIDE, SODIUM LACTATE AND CALCIUM CHLORIDE: 600; 310; 30; 20 INJECTION, SOLUTION INTRAVENOUS at 06:47

## 2023-05-15 RX ADMIN — FENTANYL CITRATE 25 MCG: 50 INJECTION, SOLUTION INTRAMUSCULAR; INTRAVENOUS at 11:14

## 2023-05-15 RX ADMIN — MIDAZOLAM 2 MG: 1 INJECTION INTRAMUSCULAR; INTRAVENOUS at 07:56

## 2023-05-15 RX ADMIN — PHENYLEPHRINE HYDROCHLORIDE 100 MCG: 10 INJECTION INTRAVENOUS at 09:07

## 2023-05-15 RX ADMIN — Medication 10 MG: at 08:15

## 2023-05-15 RX ADMIN — OXYCODONE HYDROCHLORIDE 10 MG: 5 TABLET ORAL at 23:53

## 2023-05-15 RX ADMIN — FENTANYL CITRATE 25 MCG: 50 INJECTION, SOLUTION INTRAMUSCULAR; INTRAVENOUS at 09:43

## 2023-05-15 RX ADMIN — ACETAMINOPHEN 1000 MG: 10 INJECTION INTRAVENOUS at 12:31

## 2023-05-15 RX ADMIN — HYDROMORPHONE HYDROCHLORIDE 0.2 MG: 1 INJECTION, SOLUTION INTRAMUSCULAR; INTRAVENOUS; SUBCUTANEOUS at 12:08

## 2023-05-15 RX ADMIN — Medication 10 MG: at 09:11

## 2023-05-15 RX ADMIN — SODIUM CHLORIDE, POTASSIUM CHLORIDE, SODIUM LACTATE AND CALCIUM CHLORIDE: 600; 310; 30; 20 INJECTION, SOLUTION INTRAVENOUS at 10:17

## 2023-05-15 RX ADMIN — CEFAZOLIN SODIUM 2 G: 2 INJECTION, SOLUTION INTRAVENOUS at 23:53

## 2023-05-15 RX ADMIN — PANTOPRAZOLE SODIUM 40 MG: 40 TABLET, DELAYED RELEASE ORAL at 20:47

## 2023-05-15 RX ADMIN — PHENYLEPHRINE HYDROCHLORIDE 100 MCG: 10 INJECTION INTRAVENOUS at 08:51

## 2023-05-15 ASSESSMENT — ACTIVITIES OF DAILY LIVING (ADL)
ADLS_ACUITY_SCORE: 37
ADLS_ACUITY_SCORE: 37
ADLS_ACUITY_SCORE: 36
ADLS_ACUITY_SCORE: 37
ADLS_ACUITY_SCORE: 36
ADLS_ACUITY_SCORE: 37

## 2023-05-15 ASSESSMENT — COPD QUESTIONNAIRES
CAT_SEVERITY: MODERATE
COPD: 1

## 2023-05-15 NOTE — PROGRESS NOTES
05/15/23 1451   Appointment Info   Signing Clinician's Name / Credentials (PT) Yamel Wyatt DPT   Appointment Canceled Reason (PT) Pain   Appointment Cancel Comments (PT) Pt with poor pain control at this time.  Will try to mobilize with nursing.  Did talk to GARRETT Ruiz to inform that WB was 25% at this time.  Will evaluate in AM.

## 2023-05-15 NOTE — MEDICATION SCRIBE - ADMISSION MEDICATION HISTORY
Medication Scribe Admission Medication History    Admission medication history is complete. The information provided in this note is only as accurate as the sources available at the time of the update.    Medication reconciliation/reorder completed by provider prior to medication history? Yes    Information Source(s): Patient and CareEverywhere/SureScripts via in-person    Pertinent Information:     Patient reports she only took gas-x, her puffer and lomotil this morning prior to surgery, has been holding spironolactone for a week prior to surgery and took all of her remaining maintenance medications yesterday.    Changes made to PTA medication list:    Added: None    Deleted: None    Changed: indicated meds pt takes different than prescribed: lomotil (1 tab BID), ketotifen (PRN), mirapex (usually only takes 1 tab) , potassium (BID)    Medication Affordability: no concerns     Allergies reviewed with patient and updates made in EHR: no- reviewed by nursing    Medication History Completed By: Kerline Worthington 5/15/2023 2:25 PM    Prior to Admission medications    Medication Sig Last Dose Taking? Auth Provider Long Term End Date   acetaminophen (TYLENOL) 325 MG tablet Take 2 tablets (650 mg) by mouth every 4 hours as needed for other (mild pain)  Yes Marleny Gabriel APRN CNP     albuterol (PROAIR HFA/PROVENTIL HFA/VENTOLIN HFA) 108 (90 Base) MCG/ACT inhaler Inhale 2 puffs into the lungs every 4 hours as needed for shortness of breath 5/15/2023 Yes Jose Guadalupe Lowe, DO Yes    alendronate (FOSAMAX) 70 MG tablet Take 1 tablet (70 mg) by mouth every 7 days Past Week Yes Jose Guadalupe Lowe DO Yes    allopurinol (ZYLOPRIM) 100 MG tablet Take 1 tablet (100 mg) by mouth 2 times daily 5/14/2023 Yes Jose Guadalupe Lowe DO     aspirin 81 MG EC tablet Take 1 tablet (81 mg) by mouth 2 times daily  Yes Marleny Gabriel APRN CNP     atorvastatin (LIPITOR) 20 MG tablet Take 1 tablet (20 mg) by mouth daily 5/14/2023 Yes Jose Guadalupe Lowe DO Yes     calcium carbonate 750 MG CHEW Take 1 tablet (750 mg) by mouth 2 times daily 5/14/2023 Yes Arie Camarillo DO     cefadroxil (DURICEF) 500 MG capsule Take 1 capsule (500 mg) by mouth 2 times daily for 5 days  Yes Marleny Gabriel APRN CNP  5/20/23   dicyclomine (BENTYL) 10 MG capsule Take 2 capsules (20 mg) by mouth 3 times daily (before meals) 5/15/2023 at 0500 Yes Jose Guadalupe Lowe DO     diphenoxylate-atropine (LOMOTIL) 2.5-0.025 MG tablet Take 1-2 tablets by mouth 4 times daily as needed for diarrhea  Patient taking differently: Take 1 tablet by mouth 2 times daily scheduled 5/15/2023 at 0500 Yes Jose Guadalupe Lowe DO     ferrous sulfate (IRON) 325 (65 FE) MG tablet Take 325 mg by mouth 2 times daily (with meals) 5/14/2023 Yes Arie Camarillo DO     fluticasone-salmeterol (ADVAIR) 250-50 MCG/ACT inhaler Inhale 1 puff into the lungs 2 times daily 5/14/2023 Yes Jose Guadalupe Lowe DO Yes    gabapentin (NEURONTIN) 400 MG capsule Take 3 capsules (1,200 mg) by mouth 2 times daily 5/14/2023 Yes Jose Guadalupe Lowe, DO Yes    GNP VITAMIN D MAXIMUM STRENGTH 50 MCG (2000 UT) tablet TAKE TWO TABLETS BY MOUTH EVERY DAY  Patient taking differently: Take 1 tablet by mouth daily 5/14/2023 Yes Renee Henson, ANDREZ     ketotifen (ZADITOR/REFRESH ANTI-ITCH) 0.025 % SOLN ophthalmic solution PLACE 2 DROPS INTO BOTH EYES 2 TIMES DAILY  Patient taking differently: PLACE 2 DROPS INTO BOTH EYES 2 TIMES DAILY AS NEEDED More than a month Yes Arie Camarillo DO Yes    loperamide (IMODIUM) 2 MG capsule Take 6 mg by mouth 2 times daily plus additional dosing as needed. 5/14/2023 Yes Reported, Patient     montelukast (SINGULAIR) 10 MG tablet Take 1 tablet (10 mg) by mouth At Bedtime 5/14/2023 Yes Jose Guadalupe Lowe, DO Yes    Multiple Vitamin (MULTIVITAMIN) per tablet Take 1 tablet by mouth daily Every day with food 5/14/2023 Yes Reported, Patient     omeprazole (PRILOSEC) 40 MG DR capsule Take 1 capsule (40 mg) by mouth daily 5/14/2023  Yes Jose Guadalupe Lowe DO     oxyCODONE (ROXICODONE) 5 MG tablet Take 1-2 tablets (5-10 mg) by mouth every 4 hours as needed for moderate to severe pain  Yes Marleny Gabriel APRN CNP No    potassium chloride ER (KLOR-CON M) 20 MEQ CR tablet Take 1 tablet (20 mEq) by mouth 3 times daily 5/14/2023 Yes Jose Guadalupe Lowe DO     pramipexole (MIRAPEX) 0.125 MG tablet Take 1-2 tablets (0.125-0.25 mg) by mouth At Bedtime  Patient taking differently: Take 0.125 mg by mouth At Bedtime 5/14/2023 Yes Jose Guadalupe Lowe DO Yes    predniSONE (DELTASONE) 1 MG tablet Take 4 tablets (4 mg) by mouth daily 5/14/2023 Yes Jose Guadalupe Lowe DO     PSYLLIUM PO Take 1 Tablespoonful by mouth 2 times daily  5/14/2023 Yes Reported, Patient     Simethicone 125 MG CAPS Take 500 mg by mouth 2 times daily 5/15/2023 Yes Reported, Patient     spironolactone (ALDACTONE) 50 MG tablet Take 1 tablet (50 mg) by mouth 2 times daily Past Week Yes Jose Guadalupe Lowe DO Yes

## 2023-05-15 NOTE — OR NURSING
Sharon PORTILLO came to evaluate patient and stated to lower HOB and finish current bag of fluids.   Patient without dizziness or lightheadedness and able to answer questions appropriately.

## 2023-05-15 NOTE — ANESTHESIA CARE TRANSFER NOTE
Patient: Chyna Delgado    Procedure: Procedure(s):  Right Direct Anterior Total Hip Arthroplasty       Diagnosis: Primary osteoarthritis of right hip [M16.11]  Diagnosis Additional Information: No value filed.    Anesthesia Type:   Spinal     Note:    Oropharynx: oropharynx clear of all foreign objects and spontaneously breathing  Level of Consciousness: awake  Oxygen Supplementation: nasal cannula    Independent Airway: airway patency satisfactory and stable  Dentition: dentition unchanged  Vital Signs Stable: post-procedure vital signs reviewed and stable  Report to RN Given: handoff report given  Patient transferred to: PACU  Comments: Pt mentation clear; A&Ox3, talking about flower gardening on way to PACU  Handoff Report: Identifed the Patient, Identified the Reponsible Provider, Reviewed the pertinent medical history, Discussed the surgical course, Reviewed Intra-OP anesthesia mangement and issues during anesthesia, Set expectations for post-procedure period and Allowed opportunity for questions and acknowledgement of understanding      Vitals:  Vitals Value Taken Time   BP 87/57 05/15/23 0949   Temp     Pulse 88 05/15/23 0952   Resp 11 05/15/23 0952   SpO2 100 % 05/15/23 0952   Vitals shown include unvalidated device data.    Electronically Signed By: MAIDA CHEN CRNA  May 15, 2023  9:54 AM

## 2023-05-15 NOTE — PLAN OF CARE
Pt A&O, VSS, afebrile. Pain rated 8/10 to RLE. Ice pack currently applied to incision site. Dressing is CDI. SCD's in place. Not oob. Bladder scanned at 284 mL.  is at bedside. NS infusing, Ancef infused. Call light within reach, free from falls. Face to face report given with opportunity to observe patient.    Report given to Ashlyn Basilio RN   5/15/2023  2:58 PM

## 2023-05-15 NOTE — OR NURSING
PACU Respiratory Event Documentation     1) Episodes of Apnea greater than or equal to 10 seconds: 0    2) Bradypnea - less than 8 breaths per minute: 0    3) Pain score on 0 to 10 scale: 7.5/10    4) Pain-sedation mismatch (yes or no): no    5) Repeated 02 desaturation less than 90% (yes or no): yes    Anesthesia notified? (yes or no): yes BANDAR Herrera    Any of the above events occuring repeatedly in separate 30 minute intervals may be considered recurrent PACU respiratory events.

## 2023-05-15 NOTE — DISCHARGE INSTRUCTIONS
"    Post-Anesthesia Patient Instructions    IMMEDIATELY FOLLOWING SURGERY:  Do not drive or operate machinery for the first twenty four hours after surgery.  Do not make any important decisions for twenty four hours after surgery or while taking narcotic pain medications or sedatives.  If you develop intractable nausea and vomiting or a severe headache please notify your doctor immediately.    FOLLOW-UP:  Please make an appointment with your surgeon as instructed. You do not need to follow up with anesthesia unless specifically instructed to do so.    WOUND CARE INSTRUCTIONS (if applicable):  Keep a dry clean dressing on the anesthesia/puncture wound site if there is drainage.  Once the wound has quit draining you may leave it open to air.  Generally you should leave the bandage intact for twenty four hours unless there is drainage.  If the epidural site drains for more than 36-48 hours please call the anesthesia department.    QUESTIONS?:  Please feel free to call your physician or the hospital  if you have any questions, and they will be happy to assist you.           Nerve Block    Today you received a block to numb the nerves near your surgery site.    This is a block using local anesthetic or \"numbing\" medication injected around the nerves to anesthetize or \"numb\" the area supplied by those nerves. This block is injected into the muscle layer near your surgical site.      Diet: There are no diet restrictions, but you should drink plenty of fluids, unless you are on a fluid-restricted diet.     Activity: If your surgical site is an arm or leg you should be careful with your affected limb, since it is possible to injure your limb without being aware of it due to the numbing. Until full feeling returns, you should guard against bumping or hitting your limb, and avoid extreme hot or cold temperatures on the skin.    After you go home: As the block wears off, the feeling will return as a tingling or prickly " "sensation near your surgical site. This may also feels like pins and needles. This is a normal sensation. You will experience more discomfort from your incisions as the feeling returns. You may want to take a pain pill (a narcotic or Tylenol if this was prescribed by your surgeon) when you start to experience mild pain, before the pain becomes more severe. It is important to stay on top of your pain, so when the block wears off, your pain will be easier to control. This may mean needing to schedule medication throughout the night. If your pain medications do not control your pain, you should notify your surgeon. If you are taking narcotics for pain management, do not drink alcohol, drive a car, or perform hazardous activities.  If you have questions or concerns you may call your surgeon at the number provided with your discharge instructions.           Call if you have any of these signs of infection:  Fever of 101 F (38 C) or higher, or as directed  Bleeding or swelling that increases  A red, hard, hot, or painful area around the injection site.  Shortness of breath  Chest pain    Call your surgeon if you experience blurry vision, ringing in the ears or metallic taste in your mouth.       Chyna Delgado was instructed on the use of the Incentive Spirometer (IS) today.  The patient achieved *** mLs out of the predicted ***mLs based on age and height.  The patient will use the IS, 10 breaths every hour while awake followed by a strong cough to keep lungs open and clear.  The patient was also instructed to splint their incision if indicated. The patient will continue to use IS until able to resume normal daily activities.       IMPORTANT OBSERVATIONS  Check C-M-S of operative extremity every 4 hours while you are awake for the first 48 hours:    * C: color - should appear normal/pink   * M: motion - able to move fingers and toes.   * S: sensation - able to \"feel\": no numbness, tingling  If you experience changes " in C-M-S and/or in severe pain, you may remove the elastic bandages and reapply ot - tight enough to provide support for the gauze dressing but loose enough to improve C-M-S. The gauze dressing should NOT be removed when rewrapping the elastic bandage.        If you have any questions or concerns, please call the [facility] Triage Line at [phone].    Follow up May 30th @ 11:15 @ [facility] in [place] .     Do not  your cefadroxil   25 % weigh bearing on right leg.

## 2023-05-15 NOTE — PROGRESS NOTES
[facility]    Hospitalist Progress Note    Date of Service (when I saw the patient): 05/15/2023    Assessment & Plan   Chyna Delgado is a 67 year old female who was admitted on 5/15/2023.    Status post right direct anterior total hip arthroplasty postoperative day #0: Postoperative cares and protocol per orthopedics.    History of polymyalgia rheumatica: It appears patient is on chronic low-dose prednisone 4 mg daily.  -Continue, monitor for signs of adrenal insufficiency    History of mild intermittent asthma: Continue home inhalers including Advair, Singulair.      Clinically Significant Risk Factors Present on Admission                                DVT Prophylaxis: Defer to primary service    Code Status: Prior    Disposition: Expected discharge tomorrow per orthopedics.    Norris May MD, MD      Interval History   Patient seen in room.  Patient still has quite a bit of operative pain.  She denies chest pain, shortness of breath, abdominal pain, nausea and vomiting.    -Data reviewed today: I reviewed all new labs and imaging results over the last 24 hours. I personally reviewed imaging reports.    Physical Exam   Temp: 97.2  F (36.2  C) Temp src: Temporal BP: 94/61 Pulse: 71   Resp: 14 SpO2: 100 % O2 Device: Nasal cannula Oxygen Delivery: 1 LPM  Vitals:    05/15/23 0618   Weight: 52 kg (114 lb 9.6 oz)     Vital Signs with Ranges  Temp:  [97.1  F (36.2  C)-98.5  F (36.9  C)] 97.2  F (36.2  C)  Pulse:  [66-89] 71  Resp:  [12-20] 14  BP: ()/(49-74) 94/61  SpO2:  [86 %-100 %] 100 %    Intake/Output Summary (Last 24 hours) at 5/15/2023 1221  Last data filed at 5/15/2023 0937  Gross per 24 hour   Intake 1800 ml   Output 300 ml   Net 1500 ml       Peripheral IV 05/15/23 Anterior;Right Lower forearm (Active)   Site Assessment WDL 05/15/23 0948   Line Status Infusing 05/15/23 0948   Dressing Transparent 05/15/23 0644   Dressing Status clean;dry;intact 05/15/23 0948   Dressing Intervention  New dressing  05/15/23 0644   Phlebitis Scale 0-->no symptoms 05/15/23 0948   Infiltration? no 05/15/23 0948   Number of days: 0       Incision/Surgical Site 05/15/23 Anterior;Right Hip (Active)   Incision Assessment UTV 05/15/23 1200   Closure Liquid bandage;Sutures 05/15/23 1200   Incision Drainage Amount None 05/15/23 1200   Dressing Intervention Clean, dry, intact 05/15/23 1200   Number of days: 0     No line/device    Constitutional - AA, NAD  HEENT - atraumatic, normocephalic  Neck - supple, no masses, no JVD  CVS - S1 S2 RRR, no murmurs, rubs, gallops  Respiratory - CTA b/l  GI - soft, NT, ND, + bowel sounds, no organomegaly  Musculoskeletal - no LE edema, no lesions  Neuro - oriented x 3, no gross focal deficits    Medications     lactated ringers 100 mL/hr at 05/15/23 1017         Data   Recent Labs   Lab 05/09/23  1210   WBC 10.4   HGB 14.1   MCV 94         POTASSIUM 4.4   CHLORIDE 102   CO2 24   BUN 19.1   CR 0.85   ANIONGAP 13   СВЕТЛАНА 9.9   GLC 93   ALBUMIN 4.1   PROTTOTAL 6.9   BILITOTAL 0.2   ALKPHOS 72   ALT 29   AST 22          Recent Results (from the past 24 hour(s))   XR Surgery STEFANIA Fluoro L/T 5 Min w Stills    Narrative    This exam was marked as non-reportable because it will not be read by a   radiologist or a [place] non-radiologist provider.         XR Pelvis Port 1/2 Views    Narrative    XR PELVIS PORT 1/2 VIEWS, 5/15/2023 10:32 AM    History: Female, age 67 years; Status post Hip surgery    Comparison: No relevant prior imaging.    Technique: Single view the pelvis was obtained.    FINDINGS: The bones are normally mineralized. The bony pelvis and  visualized portions of the hips demonstrate the right hip arthroplasty  which is a new finding compared to the earlier studies. No evidence of  acute fracture or acute dislocation.      Impression    IMPRESSION:   1.  Metallic right hip arthroplasty without evidence of acute  complication.   2.  Mild left hip degenerative change.  3.   Surgical staple line along midline in the lower pelvis also  unchanged.    YEIMI HIGHTOWER MD         SYSTEM ID:  [id number]       Norris May MD

## 2023-05-15 NOTE — ANESTHESIA PROCEDURE NOTES
"Intrathecal injection Procedure Note    Pre-Procedure   Staff -        CRNA: Monica Beach APRN CRNA       Performed By: CRNA       Location: OR       Procedure Start/Stop Times: 5/15/2023 8:02 AM and 5/15/2023 8:05 AM       Pre-Anesthestic Checklist: patient identified, IV checked, risks and benefits discussed, informed consent, monitors and equipment checked, pre-op evaluation, at physician/surgeon's request and post-op pain management  Timeout:       Correct Patient: Yes        Correct Procedure: Yes        Correct Site: Yes        Correct Position: Yes   Procedure Documentation  Procedure: intrathecal injection       Diagnosis: right CTAY       Patient Position: sitting       Patient Prep/Sterile Barriers: sterile gloves, mask, patient draped       Skin prep: Betadine and Chloraprep       Insertion Site: L3-4. (midline approach).       Needle Gauge: 25.        Needle Length (Inches): 3.5        Spinal Needle Type: Eddi tip       Introducer used       # of attempts: 1 and  # of redirects:  0    Assessment/Narrative         Paresthesias: No.       CSF fluid: clear.    Medication(s) Administered   Medication Administration Time: 5/15/2023 8:02 AM      FOR [facility] ([facility] ONLY:   Pain Team Contact information: please page the Pain Team Via [employer]. Search \"Pain\". During daytime hours, please page the attending first. At night please page the resident first.      "

## 2023-05-15 NOTE — OR NURSING
SBP above 90 pain 8-9/10.   Patient medicated with 25mcg of fentanyl as BP not high enough to support larger dose.

## 2023-05-15 NOTE — OR NURSING
Contacted Sharon PORTILLO regarding pain still 8.5/10 despite medications given, see MAR.     See orders for IV tylenol.

## 2023-05-15 NOTE — ANESTHESIA POSTPROCEDURE EVALUATION
Patient: Chyna Delgado    Procedure: Procedure(s):  Right Direct Anterior Total Hip Arthroplasty       Anesthesia Type:  Spinal    Note:  Disposition: Admission   Postop Pain Control: Challenging            Sign Out: ONGOING pain issues   PONV: No   Neuro/Psych: Uneventful            Sign Out: Acceptable/Baseline neuro status   Airway/Respiratory: Uneventful            Sign Out: Acceptable/Baseline resp. status   CV/Hemodynamics: Uneventful            Sign Out: Acceptable CV status; No obvious hypovolemia; No obvious fluid overload   Other NRE: NONE   DID A NON-ROUTINE EVENT OCCUR? No           Last vitals:  Vitals Value Taken Time   BP 98/52 05/15/23 1255   Temp 97.8  F (36.6  C) 05/15/23 1255   Pulse 80 05/15/23 1255   Resp 26 05/15/23 1255   SpO2 99 % 05/15/23 1307   Vitals shown include unvalidated device data.    Electronically Signed By: MAIDA Santiago CRNA  May 15, 2023  1:43 PM

## 2023-05-15 NOTE — OP NOTE
Preoperative Diagnosis: Right Hip Osteoarthritis    Postoperative Diagnosis: Right Hip Osteoarthritis    Procedure: Right Direct Anterior Total Hip Arthroplasty    Surgeon: Kurt Jordan MD    Assistants: Marleny Gabriel DNP    A skilled first assistant was required for patient positioning, retracting, and carrying out the procedure in a safe and effective manner    Anesthesia:   1) Spinal with Sedation  2) Local Injection around surgical site    Findings:    1) Satisfactory CATY with near equal restoration of leg length and offset postoperatively    2) Adequate hemostasis     Implants:    1) [employer] Polar Femoral Stem Size 5 ValgusOffset   2) Size 52 mm R3 Acetabular Shell   3) Size 36 mm standard poly acetabular insert   4) Size 36 mm 0 Oxinium Femoral Head      Estimated Blood Loss: 300 ml    Specimens: None    Drains: None    Complications: None    Indications:   This is a 67 year old patient with long standing right hip pain and severe osteoarthritis.  They have failed nonoperative treatment including use of NSAIDs; Tylenol; injections and exercise.  Given the continued symptoms they wished to proceed with a hip replacement.  The risks including pain, bleeding, infection, deep vein thrombosis, pulmonary embolism, myocardial infarction, component failure, need for revision operation was discussed with the patient.  The surgical consent was signed and surgical site was marked.  All questions regarding postoperative disposition were answered.      Description of Procedure:   The patient was taken to the operating room and placed under spinal anesthesia.  They were then moved to the Lester bed and positioned in standard fashion.  The C-arm was used to make a templating radiograph for post reconstruction comparison.  The Right hip was prepped with a Chloraprep wipe and sponge then draped in sterile fashion.  A timeout was called to identify the correct patient and surgical site.  An anterolateral incision and  direct anterior approach to the hip was utilized.  Care was taken to cauterize the circumflex vessels.  A capsulotomy was completed and tag sutures were placed.  The femoral neck was exposed and an osteotomy was completed.  A secondary cut was made to make removing the femoral head/neck easier.  The hip was externally rotated and the labrum excised.  Further release on the proximal femur was completed to improve visualization of the proximal femur later in the case.  The C-arm was used during reaming the acetabulum to check alignment and depth of reaming.  The acetabulum was reamed to a size 52 mm and the acetabular shell was placed.  A 6.5 mm bone screws of lengths 25 and 30 mm were placed along with a hole eliminator.  The final 36 mm poly insert was placed and checked for a secure fit.  The traction was released and standard capsular releases were completed on the proximal femur.  The leg was externally rotated 120 degrees and brought into adduction and extension.  Standard retractor placement was utilized.  The femur was prepared with a box osteotome, canal finder and broaching up to a size 5.  The trial implants were placed and the hip was reduced.  C-arm was used to compare to preoperative leg length and offset.  The final size 5 Valgus Femoral stem was placed with a size 36 0 Oxinium femoral head and the hip was reduced.  Final C-arm images were used to confirm positioning.  No fractures were identified.  Pulse lavage and betadine irrigation was used.  The capsule was closed with #1 Vicryl sutures.  The tensor fascia was closed with a #1 Vicryl suture in running fashion.  The subcutaneous tissue was closed with a 2-0 Vicryl and Monocryl. An Aquacel dressing was applied.  The patient was awakened and transferred to PACU in stable condition.  A post operative x-ray was taken in PACU.        Postoperative Plan:   25% WB Right Lower Extremity, No hip ROM precautions, PT, Pain control, DVT prophylaxis with  Aspirin).  Anticipate discharge today.  Follow-up in  2 weeks in [facility].  X-rays at follow-up AP and Cross Table Lateral Right Hip

## 2023-05-15 NOTE — ANESTHESIA PROCEDURE NOTES
PENG Procedure Note    Pre-Procedure   Staff -        CRNA: Monica Beach APRN CRNA       Performed By: CRNA       Location: pre-op       Procedure Start/Stop Times: 5/15/2023 7:16 AM and 5/15/2023 7:25 AM       Pre-Anesthestic Checklist: patient identified, IV checked, site marked, risks and benefits discussed, informed consent, monitors and equipment checked, pre-op evaluation, at physician/surgeon's request and post-op pain management  Timeout:       Correct Patient: Yes        Correct Procedure: Yes        Correct Site: Yes        Correct Position: Yes        Correct Laterality: Yes        Site Marked: Yes  Procedure Documentation  Procedure: PENG       Diagnosis: CATY       Laterality: right       Patient Position: supine       Skin prep: Chloraprep       Needle Type: insulated and short bevel       Needle Gauge: 20.        Needle Length (Inches): 4        Ultrasound guided       1. Ultrasound was used to identify targeted nerve, plexus, vascular marker, or fascial plane and place a needle adjacent to it in real-time.       2. Ultrasound was used to visualize the spread of anesthetic in close proximity to the above referenced structure.       3. A permanent image is entered into the patient's record.       4. The visualized anatomic structures appeared normal.       5. There were no apparent abnormal pathologic findings.    Assessment/Narrative         The placement was negative for: blood aspirated, painful injection and site bleeding       Paresthesias: No.       Bolus given via needle..        Secured via.        Insertion/Infusion Method: Single Shot       Complications: none    Medication(s) Administered   Bupivacaine 0.25% PF (Infiltration) - Infiltration   15 mL - 5/15/2023 7:24:00 AM  Medication Administration Time: 5/15/2023 7:16 AM     Comments:  15ml PF bupivicaine with 5 ml PF 0.9 NS for volume      FOR [facility] ([facility]) ONLY:   Pain Team Contact information: please page the Pain Team Via [employer].  "Search \"Pain\". During daytime hours, please page the attending first. At night please page the resident first.      "

## 2023-05-15 NOTE — PLAN OF CARE
"BP 92/62 (BP Location: Left arm)   Pulse 89   Temp 98.6  F (37  C) (Tympanic)   Resp 18   Ht 1.549 m (5' 1\")   Wt 52 kg (114 lb 9.6 oz)   SpO2 98%   BMI 21.65 kg/m      Pt alert and oriented upon initial assessment. C/O R hip pain 6-8/10. Merary 5mg and Diludid 0.4mg given each 1x. Pt states this helped pain along with ice packs however is more mobile and expects pain to be somewhat increased. Denies nausea. Ambulated approx 50 ft in hallway with walker, tolerated well. Aquacell to R hip C/D/I. CMS BLE intact. SCDs in place. HR regular. Lungs clear, sating 94% RA. Upto bathroom to void and BM. Pt feels like she fully emptied bladder w/o issue.     Face to face report given with opportunity to observe patient.    Report given to Charu Nogueira RN   5/15/2023  7:09 PM      "

## 2023-05-15 NOTE — PLAN OF CARE
[facility] Inpatient Admission Note:    Patient admitted to 3214/3214-1 at approximately 1325 via cart accompanied by transport tech from surgery . Report received from Roshni HOLMAN RN in SBAR format at 1325 via face to face in room. Patient transferred to bed via slide board.. Patient is alert and oriented X 3, reports pain; rates at 8 on 0-10 scale.  Patient oriented to room, unit, hourly rounding, and plan of care. Explained admission packet and patient handbook with patient bill of rights brochure. Will continue to monitor and document as needed.     Inpatient Nursing criteria listed below was met:    Health care directives status obtained and documented: Yes    Patient identifies a surrogate decision maker: Yes If yes, who:Aydin,  Contact Information:See Facesheet     If initial lactic acid greater than 2.0, repeat lactic acid drawn within one hour of arrival to unit: NA. If no, state reason: NA    Clergy visit ordered if patient requests: N/A    Skin issues/needs documented: Yes    Isolation Patient: no Education given, correct sign in place and documentation row added to PCS:  Yes    Fall Prevention Yes: Care plan updated, education given and documented, sticker and magnet in place: Yes    Care Plan initiated: Yes    Education Documented (including assessment): Yes    Patient has discharge needs : No If yes, please explain:

## 2023-05-15 NOTE — ANESTHESIA PROCEDURE NOTES
Other (lateral femoral cutaneous) Procedure Note    Pre-Procedure   Staff -        CRNA: Monica Beach APRN CRNA       Performed By: CRNA       Location: pre-op       Procedure Start/Stop Times: 5/15/2023 7:26 AM and 5/15/2023 7:30 AM       Pre-Anesthestic Checklist: patient identified, IV checked, site marked, risks and benefits discussed, informed consent, monitors and equipment checked, pre-op evaluation, at physician/surgeon's request and post-op pain management  Timeout:       Correct Patient: Yes        Correct Procedure: Yes        Correct Site: Yes        Correct Position: Yes        Correct Laterality: Yes        Site Marked: Yes  Procedure Documentation  Procedure: Other (lateral femoral cutaneous)       Diagnosis: RIGHT HIP CATY       Laterality: right       Patient Position: supine       Skin prep: Chloraprep       Needle Type: insulated and short bevel       Needle Gauge: 20.        Needle Length (Inches): 4        Ultrasound guided       1. Ultrasound was used to identify targeted nerve, plexus, vascular marker, or fascial plane and place a needle adjacent to it in real-time.       2. Ultrasound was used to visualize the spread of anesthetic in close proximity to the above referenced structure.       3. A permanent image is entered into the patient's record.       4. The visualized anatomic structures appeared normal.       5. There were no apparent abnormal pathologic findings.    Assessment/Narrative         The placement was negative for: blood aspirated, painful injection and site bleeding       Paresthesias: No.       Bolus given via needle..        Secured via.        Insertion/Infusion Method: Single Shot       Complications: none    Medication(s) Administered   Bupivacaine 0.25% PF (Infiltration) - Infiltration   5 mL - 5/15/2023 7:30:00 AM  Medication Administration Time: 5/15/2023 7:26 AM      FOR [facility] ([facility]) ONLY:   Pain Team Contact information: please page the Pain Team Via [employer].  "Search \"Pain\". During daytime hours, please page the attending first. At night please page the resident first.      "

## 2023-05-15 NOTE — OR NURSING
Chyna Delgado was instructed on the use of the Incentive Spirometer (IS) today.  The patient achieved 1500 mLs out of the predicted 1700mLs based on age and height.  The patient will use the IS, 10 breaths every hour while awake followed by a strong cough to keep lungs open and clear.  The patient was also instructed to splint their incision if indicated. The patient will continue to use IS until able to resume normal daily activities.

## 2023-05-15 NOTE — OR NURSING
Contacted BANDAR Herrera regarding pain of 7-8/10 in right hip/groin now but blood pressures remaining 70's-80's systolic.   Sharon on her way to evaluate patient.

## 2023-05-15 NOTE — OR NURSING
Contacted BANDAR Herrera as patient remains 9-10/10.     Per Sharon give other dose of vistaril and try dilaudid 0.2mg.

## 2023-05-15 NOTE — OR NURSING
Face to face report given with opportunity to observe patient.    Report given to Svetlana Teague RN   5/15/2023  1:23 PM

## 2023-05-16 ENCOUNTER — APPOINTMENT (OUTPATIENT)
Dept: PHYSICAL THERAPY | Facility: HOSPITAL | Age: 68
End: 2023-05-16
Payer: COMMERCIAL

## 2023-05-16 VITALS
RESPIRATION RATE: 18 BRPM | WEIGHT: 114.6 LBS | BODY MASS INDEX: 21.64 KG/M2 | DIASTOLIC BLOOD PRESSURE: 65 MMHG | TEMPERATURE: 97.5 F | OXYGEN SATURATION: 99 % | HEART RATE: 89 BPM | HEIGHT: 61 IN | SYSTOLIC BLOOD PRESSURE: 126 MMHG

## 2023-05-16 LAB
HGB BLD-MCNC: 10.1 G/DL (ref 11.7–15.7)
HOLD SPECIMEN: NORMAL

## 2023-05-16 PROCEDURE — 250N000013 HC RX MED GY IP 250 OP 250 PS 637: Performed by: INTERNAL MEDICINE

## 2023-05-16 PROCEDURE — 85018 HEMOGLOBIN: CPT

## 2023-05-16 PROCEDURE — 250N000013 HC RX MED GY IP 250 OP 250 PS 637

## 2023-05-16 PROCEDURE — 97161 PT EVAL LOW COMPLEX 20 MIN: CPT | Mod: GP

## 2023-05-16 PROCEDURE — 250N000012 HC RX MED GY IP 250 OP 636 PS 637: Performed by: INTERNAL MEDICINE

## 2023-05-16 PROCEDURE — 36415 COLL VENOUS BLD VENIPUNCTURE: CPT

## 2023-05-16 PROCEDURE — 99231 SBSQ HOSP IP/OBS SF/LOW 25: CPT | Mod: 24 | Performed by: INTERNAL MEDICINE

## 2023-05-16 PROCEDURE — 97530 THERAPEUTIC ACTIVITIES: CPT | Mod: GP

## 2023-05-16 RX ADMIN — SPIRONOLACTONE 50 MG: 25 TABLET, FILM COATED ORAL at 07:59

## 2023-05-16 RX ADMIN — OXYCODONE HYDROCHLORIDE 10 MG: 5 TABLET ORAL at 03:54

## 2023-05-16 RX ADMIN — PANTOPRAZOLE SODIUM 40 MG: 40 TABLET, DELAYED RELEASE ORAL at 06:32

## 2023-05-16 RX ADMIN — ALLOPURINOL 100 MG: 100 TABLET ORAL at 08:00

## 2023-05-16 RX ADMIN — GABAPENTIN 1200 MG: 400 CAPSULE ORAL at 08:00

## 2023-05-16 RX ADMIN — OXYCODONE HYDROCHLORIDE 10 MG: 5 TABLET ORAL at 10:47

## 2023-05-16 RX ADMIN — OXYCODONE HYDROCHLORIDE 10 MG: 5 TABLET ORAL at 07:59

## 2023-05-16 RX ADMIN — ACETAMINOPHEN 325MG 975 MG: 325 TABLET ORAL at 03:54

## 2023-05-16 RX ADMIN — PREDNISONE 4 MG: 1 TABLET ORAL at 08:00

## 2023-05-16 RX ADMIN — ASPIRIN 81 MG: 81 TABLET, COATED ORAL at 08:00

## 2023-05-16 RX ADMIN — DICYCLOMINE HYDROCHLORIDE 20 MG: 10 CAPSULE ORAL at 07:59

## 2023-05-16 RX ADMIN — FLUTICASONE FUROATE AND VILANTEROL TRIFENATATE 1 PUFF: 100; 25 POWDER RESPIRATORY (INHALATION) at 08:07

## 2023-05-16 ASSESSMENT — ACTIVITIES OF DAILY LIVING (ADL)
ADLS_ACUITY_SCORE: 36
ADLS_ACUITY_SCORE: 33
ADLS_ACUITY_SCORE: 36
ADLS_ACUITY_SCORE: 36
ADLS_ACUITY_SCORE: 32
ADLS_ACUITY_SCORE: 36

## 2023-05-16 NOTE — PLAN OF CARE
Goal Outcome Evaluation: Progressing     Upon entry into room pt was resting comfortably. A&O x 4. Room air. No S/S of pain. Swallowed PO medication well. Face to face report given with opportunity to observe patient.    Report given to Tiara MCKEON RN.     Mirela Sanchez RN   5/16/2023  7:27 AM

## 2023-05-16 NOTE — PROGRESS NOTES
Patient discharged at 11:15 AM via wheel chair accompanied by spouse and staff. Prescriptions sent to patients preferred pharmacy. All belongings sent with patient.     Discharge instructions reviewed with patient and spouse. Listed belongings gathered and returned to patient. Clothing, cane, glasses, purse, and phone.    Patient discharged to home.     Surgical Patient   Surgical Procedures during stay: R hip replacement.  Did patient receive discharge instruction on wound care and recognition of infection symptoms? Yes    MISC  Follow up appointment made:  Yes  Home medications returned to patient: N/A  Patient reports pain was well managed at discharge: Yes

## 2023-05-16 NOTE — PROGRESS NOTES
05/16/23 0840   Appointment Info   Signing Clinician's Name / Credentials (PT) Zacarias Sandoval DPAILYN   Rehab Comments (PT) Pt was resting in bed upon PT approach.  Surgeon Dr. Jordan came in and chatted briefly with pt reminding he of her 25% WB restriction on RLE d/t her bones being a little soft.  He checked her R hip incision (covered by dressing) and stated it looked good).   Living Environment   People in Home spouse   Current Living Arrangements house   Home Accessibility stairs to enter home   Number of Stairs, Main Entrance 3   Stair Railings, Main Entrance railing on right side (ascending)   Number of Stairs, Within Home, Primary other (see comments)   Transportation Anticipated family or friend will provide   Living Environment Comments Pt lives in a 1 story home with her  Aydin and laundry is in the basement, but Aydin manages the laundry.  Pt stated her Aydin is in good physical condition and would be able to assist as needed.  Bathroom has a tub/shower combo with grab bar on outside and has a shower chair.   Self-Care   Usual Activity Tolerance good   Current Activity Tolerance fair   Regular Exercise No   Equipment Currently Used at Home cane, quad   Fall history within last six months yes   Number of times patient has fallen within last six months 1   Activity/Exercise/Self-Care Comment Pt reported was completing mobility mostly without a device until she suffered a L hip fx in Feb 2023 and even since recovering from that she has continued to need to use her quad cane d/t her R hip pain, but she was Mariah at least for shorter community distances.  Pt was Mariah with all basic ADLs.   completes most home management tasks.  Pt does also own a FWW which she used initially after the aforementioned L hip fx.   General Information   Onset of Illness/Injury or Date of Surgery 05/15/23   Referring Physician Dr. Jordan   Patient/Family Therapy Goals Statement (PT) Pt would like to return home, but feels  maybe would benefit from staying 1 more night before returning home.   Pertinent History of Current Problem (include personal factors and/or comorbidities that impact the POC) Pt underwent R direct anterior R CATY with Dr. Jordan d/t chronic pain/arthritis.   Existing Precautions/Restrictions   (No RLE ROM precautions.)   Weight-Bearing Status - RLE other (see comments)  (25% WBing)   Cognition   Affect/Mental Status (Cognition) WFL   Orientation Status (Cognition) oriented x 4   Follows Commands (Cognition) WFL   Pain Assessment   Patient Currently in Pain Yes, see Vital Sign flowsheet  (Pt noted 6-7/10 R hip pain at rest.)   Integumentary/Edema   Integumentary/Edema Comments Mod edema about pt's R hip, but no bruising or redness was visible around incision site which was covered with a post-op Aquacel style dressing.   Posture    Posture Forward head position   Posture Comments Tends to hold R hip in mild internally rotated position at times.  Cues to try to keep in neutral rotation.   Range of Motion (ROM)   ROM Comment LLE AROM was WFL. R ankle/knee AROM was WFL; pt did have active R hip AROM in all planes, but mod/much limited d/t pain.   Strength (Manual Muscle Testing)   Strength (Manual Muscle Testing) Deficits observed during functional mobility   Bed Mobility   Bed Mobility rolling left;supine-sit   Rolling Left [place] (Bed Mobility) modified independence   Supine-Sit [place] (Bed Mobility) minimum assist (75% patient effort)   Bed Mobility Limitations decreased ability to use legs for bridging/pushing   Impairments Contributing to Impaired Bed Mobility decreased strength;pain   Assistive Device (Bed Mobility) bed rails   Comment, (Bed Mobility) Pt needed brief Katie to help lift RLE off the bed d/t pain/weakness of R hip.   Transfers   Transfers bed-chair transfer;sit-stand transfer   Maintains Weight-bearing Status (Transfers) able to maintain   Transfer Safety Concerns Noted decreased step  length   Impairments Contributing to Impaired Transfers pain;decreased strength   Bed-Chair Transfer   Assistive Device (Bed-Chair Transfers) walker, front-wheeled   Bed-Chair Dundy (Transfers) contact guard   Comment, (Bed-Chair Transfer) Pt completed transport chair->recliner transfer CGA with FWW while appearing to do maintain 25% WB status on RLE.   Sit-Stand Transfer   Sit-Stand Dundy (Transfers) contact guard   Assistive Device (Sit-Stand Transfers) walker, front-wheeled   Comment, (Sit-Stand Transfer) Pt did well maintaining WB status during sit->stand from bed, sit<->stand from transport chair and stand->sit to recliner with mild cues to move R foot forward first to help decrease WB.   Gait/Stairs (Locomotion)   Dundy Level (Gait) contact guard   Assistive Device (Gait) walker, front-wheeled   Distance in Feet 30   Pattern (Gait) step-to   Deviations/Abnormal Patterns (Gait) antalgic   Maintains Weight-bearing Status (Gait) able to maintain   Negotiation (Stairs) stairs independence;stairs assistive device;handrail location;number of steps;ascending technique;descending technique;maintains weight-bearing status   Dundy Level (Stairs) moderate assist (50% patient effort);maximum assist (25% patient effort)   Handrail Location (Stairs) right side (ascending)   Number of Steps (Stairs) 3   Ascending Technique (Stairs) step-to-step   Descending Technique (Stairs) step-to-step   Maintains Weight-bearing Status (Stairs) physical assist to maintain;verbal cues to maintain   Comment, (Gait/Stairs) Pt amb approx 30 ft CGA with FWW exhibiting step-to pattern leading with RLE and mod antalgic on RLE d/t pain and to maintain 25% WB on RLE.  Pt was stable overall.  After initial instruction/demonstration pt ascended 3 steps with RUE support on railing and full LUE support from PT - challenging for pt needing maxA to help maintain WB status.  Pt had less difficult descending while using a more  side-stepping technique with BUEs on the 1 railing and still modA from PT to help maintain WB status.  Pt stated her  Aydin would definitely be able to provide the required amount of assist and she had no plans to leave her home until her 1st post-op visit.   Balance   Balance Comments Impaired standing balance d/t R hip pain and 25% WB status on RLE, but stable with FWW support.   Clinical Impression   Criteria for Skilled Therapeutic Intervention Yes, treatment indicated   PT Diagnosis (PT) Gait difficulty   Influenced by the following impairments R hip pain/RLE weakness, 25% WB status on RLE   Functional limitations due to impairments Gait, transfers, stairs, bed mobility.   Clinical Presentation (PT Evaluation Complexity) Stable/Uncomplicated   Clinical Presentation Rationale Clinical judgment   Clinical Decision Making (Complexity) low complexity   Planned Therapy Interventions (PT) transfer training;gait training   Anticipated Equipment Needs at Discharge (PT)   (None needed, pt already owns a FWW and pt declined need for a wheelchair rental.)   Risk & Benefits of therapy have been explained evaluation/treatment results reviewed;care plan/treatment goals reviewed;risks/benefits reviewed;current/potential barriers reviewed;participants voiced agreement with care plan;participants included;patient   Clinical Impression Comments Pt is a 66 y/o female s/p R CATY whom demonstrates expected R hip pain, RLE weakness and difficulty wth all basic mobility especially given her 25% WB status on RLE.  She would benefit from skilled PT services for eval/1 treatment (primarily to work on maintaining WB status) only as plan is for her to D/C home today.   PT Total Evaluation Time   PT Eval, Low Complexity Minutes (33985) 17   Physical Therapy Goals   PT Frequency One time eval and treatment only   PT Goals Transfers   PT: Transfers Bed to/from chair;Assistive device;Within precautions;Goal Met;Completed  (CGA)    Interventions   Interventions Quick Adds Therapeutic Activity   Therapeutic Activity   Therapeutic Activities: dynamic activities to improve functional performance Minutes (97764) 8   Symptoms Noted During/After Treatment Increased pain  (Mild increase in pain)   Treatment Detail/Skilled Intervention Worked with pt on accurately maintaining WB status by having pt place R foot on large stand-on scale and see how much weight it registered.  Pt limited to 25% WB on RLE and her stated weight is 115# so pt should limit WB to no more than 28#.  As the scale had to process for several seconds before giving a weight versus chaning in real time, had to attempt multiple times with pt, but pt eventually placing approx as much weight as she felt she did during gait and that was only 12.5#.  Pt informed she could WB twice as much as that and still be within her precautions.   PT Discharge Planning   PT Plan Discharge acute care PT as plan is for pt to D/C home later today.   PT Discharge Recommendation (DC Rec) home with assist   PT Rationale for DC Rec Pt able to maintain WB status during transfers and gait with FWW with just CGA and pt able to provie necessary assist for bed mob and stairs.  Per discussion with pt, surgeon stated she will not be completing any formal therapy at this time, but will be completing the exercises from her total joint book.  Pt verbalized familiarity with these.  PT instructed pt to get up and walk with  at least a short distance 3-4x/day.   PT Brief overview of current status Sup->sit = Katie; transfers and gait = CGA with FWW; stairs x 3 = mod/maxA x 1 with 1 railing.   Total Session Time   Timed Code Treatment Minutes 8   Total Session Time (sum of timed and untimed services) 25

## 2023-05-16 NOTE — PROGRESS NOTES
[facility]    Hospitalist Progress Note    Date of Service (when I saw the patient): 05/16/2023    Assessment & Plan   Chyna Delgado is a 67 year old female who was admitted on 5/15/2023.    Status post right direct anterior total hip arthroplasty postoperative day #1: Postoperative cares and protocol per orthopedics.  -Pain control has been variable    History of polymyalgia rheumatica: It appears patient is on chronic low-dose prednisone 4 mg daily.  -Continue, monitor for signs of adrenal insufficiency    History of mild intermittent asthma: Continue home inhalers including Advair, Singulair.      Clinically Significant Risk Factors Present on Admission                                DVT Prophylaxis: Defer to primary service    Code Status: Full Code    Disposition: Expected discharge today per orthopedics.    Norris May MD, MD      Interval History   Patient seen in room.  Operative pain still not optimally controlled, although has improved.  She denies chest pain, shortness of breath, abdominal pain, nausea and vomiting.    -Data reviewed today: I reviewed all new labs and imaging results over the last 24 hours. I personally reviewed imaging reports.    Physical Exam   Temp: 97.4  F (36.3  C) Temp src: Tympanic BP: 125/62 Pulse: 79   Resp: 18 SpO2: 99 % O2 Device: None (Room air) Oxygen Delivery: 1 LPM  Vitals:    05/15/23 0618   Weight: 52 kg (114 lb 9.6 oz)     Vital Signs with Ranges  Temp:  [97  F (36.1  C)-98.7  F (37.1  C)] 97.4  F (36.3  C)  Pulse:  [66-91] 79  Resp:  [12-26] 18  BP: ()/(49-67) 125/62  SpO2:  [86 %-100 %] 99 %      Intake/Output Summary (Last 24 hours) at 5/16/2023 0819  Last data filed at 5/16/2023 0633  Gross per 24 hour   Intake 2983 ml   Output 700 ml   Net 2283 ml         Peripheral IV 05/15/23 Anterior;Right Lower forearm (Active)   Site Assessment WDL 05/16/23 0000   Line Status Infusing 05/16/23 0000   Dressing Transparent 05/16/23 0000   Dressing  Status clean;dry;intact 05/16/23 0000   Dressing Intervention New dressing  05/15/23 0644   Phlebitis Scale 0-->no symptoms 05/16/23 0000   Infiltration? no 05/15/23 1255   Number of days: 1       Incision/Surgical Site 05/15/23 Anterior;Right Hip (Active)   Incision Assessment UTV 05/16/23 0000   Hillary-Incision Assessment Erythema;Edema 05/16/23 0000   Closure JESSIKA 05/16/23 0000   Incision Drainage Amount None 05/16/23 0000   Drainage Description UT 05/15/23 1330   Incision Care Ice applied 05/15/23 1330   Dressing Intervention Clean, dry, intact 05/16/23 0000   Number of days: 1     No line/device    Constitutional - AA, NAD  HEENT - atraumatic, normocephalic  Neck - supple, no masses, no JVD  CVS - S1 S2 RRR, no murmurs, rubs, gallops  Respiratory - CTA b/l  GI - soft, NT, ND, + bowel sounds, no organomegaly  Musculoskeletal - no LE edema, no lesions  Neuro - oriented x 3, no gross focal deficits      Medications     sodium chloride 100 mL/hr (05/15/23 1430)       acetaminophen  975 mg Oral Q8H     allopurinol  100 mg Oral BID     aspirin  81 mg Oral BID     atorvastatin  20 mg Oral At Bedtime     dicyclomine  20 mg Oral TID AC     fluticasone-vilanterol  1 puff Inhalation Daily     gabapentin  1,200 mg Oral BID     montelukast  10 mg Oral At Bedtime     pantoprazole  40 mg Oral BID     polyethylene glycol  17 g Oral Daily     predniSONE  4 mg Oral Daily     senna-docusate  1 tablet Oral BID     sodium chloride (PF)  3 mL Intracatheter Q8H     spironolactone  50 mg Oral BID       Data   Recent Labs   Lab 05/16/23  0536 05/09/23  1210   WBC  --  10.4   HGB 10.1* 14.1   MCV  --  94   PLT  --  250   NA  --  139   POTASSIUM  --  4.4   CHLORIDE  --  102   CO2  --  24   BUN  --  19.1   CR  --  0.85   ANIONGAP  --  13   СВЕТЛАНА  --  9.9   GLC  --  93   ALBUMIN  --  4.1   PROTTOTAL  --  6.9   BILITOTAL  --  0.2   ALKPHOS  --  72   ALT  --  29   AST  --  22          Recent Results (from the past 24 hour(s))   XR Surgery STEFANIA  Fluoro L/T 5 Min w Stills    Narrative    This exam was marked as non-reportable because it will not be read by a   radiologist or a [facility] non-radiologist provider.         XR Pelvis Port 1/2 Views    Narrative    XR PELVIS PORT 1/2 VIEWS, 5/15/2023 10:32 AM    History: Female, age 67 years; Status post Hip surgery    Comparison: No relevant prior imaging.    Technique: Single view the pelvis was obtained.    FINDINGS: The bones are normally mineralized. The bony pelvis and  visualized portions of the hips demonstrate the right hip arthroplasty  which is a new finding compared to the earlier studies. No evidence of  acute fracture or acute dislocation.      Impression    IMPRESSION:   1.  Metallic right hip arthroplasty without evidence of acute  complication.   2.  Mild left hip degenerative change.  3.  Surgical staple line along midline in the lower pelvis also  unchanged.    YEIMI HIGHTOWER MD         SYSTEM ID:  [id number]       Norris May MD

## 2023-05-16 NOTE — PLAN OF CARE
Occupational Therapy: Orders received. Chart reviewed and discussed with care team.? Occupational Therapy not indicated per Physical Therapy.? Defer discharge recommendations to Physical Therapy.? Will complete orders.

## 2023-05-16 NOTE — PLAN OF CARE
Face to face report given with opportunity to observe patient.    Report given to Mirela Solomon RN   5/16/2023  5:29 AM

## 2023-05-16 NOTE — PLAN OF CARE
The patient's pain was initially uncontrolled and rated 9/10. After scheduled tylenol, 10 mg oxycodone, and ice packs pain became more manageable and tolerable throughout the shift. The patient was medicated q4h with oxycodone to keep up on pain control. Is able to ambulate to the bathroom with SBA and walker. CMS in RLE intact; right lateral thigh with some ongoing numbness. Aquacel CDI. PCDs on majority of shift. Vitally stable. LS clear t/o. Ancef completed. Voiding without issue. Oral intake adequate.

## 2023-06-09 DIAGNOSIS — M35.3 PMR (POLYMYALGIA RHEUMATICA) (H): ICD-10-CM

## 2023-06-09 RX ORDER — GABAPENTIN 400 MG/1
1200 CAPSULE ORAL 2 TIMES DAILY
Qty: 540 CAPSULE | Refills: 1 | Status: SHIPPED | OUTPATIENT
Start: 2023-06-09 | End: 2024-02-06

## 2023-06-09 RX ORDER — PREDNISONE 1 MG/1
4 TABLET ORAL DAILY
Qty: 120 TABLET | Refills: 1 | Status: SHIPPED | OUTPATIENT
Start: 2023-06-09 | End: 2023-06-21

## 2023-06-21 ENCOUNTER — OFFICE VISIT (OUTPATIENT)
Dept: FAMILY MEDICINE | Facility: OTHER | Age: 68
End: 2023-06-21
Attending: FAMILY MEDICINE
Payer: COMMERCIAL

## 2023-06-21 VITALS
HEART RATE: 81 BPM | WEIGHT: 108.5 LBS | SYSTOLIC BLOOD PRESSURE: 98 MMHG | TEMPERATURE: 98.1 F | HEIGHT: 61 IN | OXYGEN SATURATION: 95 % | DIASTOLIC BLOOD PRESSURE: 64 MMHG | RESPIRATION RATE: 18 BRPM | BODY MASS INDEX: 20.48 KG/M2

## 2023-06-21 DIAGNOSIS — K90.9 DIARRHEA DUE TO MALABSORPTION: ICD-10-CM

## 2023-06-21 DIAGNOSIS — R19.7 DIARRHEA DUE TO MALABSORPTION: ICD-10-CM

## 2023-06-21 DIAGNOSIS — M81.0 OSTEOPOROSIS WITHOUT CURRENT PATHOLOGICAL FRACTURE, UNSPECIFIED OSTEOPOROSIS TYPE: Primary | ICD-10-CM

## 2023-06-21 DIAGNOSIS — G89.18 POSTOPERATIVE PAIN: ICD-10-CM

## 2023-06-21 DIAGNOSIS — M35.3 PMR (POLYMYALGIA RHEUMATICA) (H): ICD-10-CM

## 2023-06-21 PROCEDURE — G0463 HOSPITAL OUTPT CLINIC VISIT: HCPCS

## 2023-06-21 PROCEDURE — 99214 OFFICE O/P EST MOD 30 MIN: CPT | Performed by: FAMILY MEDICINE

## 2023-06-21 RX ORDER — TRAMADOL HYDROCHLORIDE 50 MG/1
50 TABLET ORAL 3 TIMES DAILY PRN
Qty: 30 TABLET | Refills: 0 | Status: SHIPPED | OUTPATIENT
Start: 2023-06-21 | End: 2024-02-26

## 2023-06-21 RX ORDER — PREDNISONE 1 MG/1
3 TABLET ORAL DAILY
Qty: 90 TABLET | Refills: 1 | Status: SHIPPED | OUTPATIENT
Start: 2023-06-21 | End: 2023-07-20

## 2023-06-21 RX ORDER — DIPHENOXYLATE HCL/ATROPINE 2.5-.025MG
1-2 TABLET ORAL 4 TIMES DAILY PRN
Qty: 120 TABLET | Refills: 0 | Status: SHIPPED | OUTPATIENT
Start: 2023-06-21 | End: 2023-08-22

## 2023-06-21 ASSESSMENT — ANXIETY QUESTIONNAIRES
7. FEELING AFRAID AS IF SOMETHING AWFUL MIGHT HAPPEN: NOT AT ALL
5. BEING SO RESTLESS THAT IT IS HARD TO SIT STILL: NOT AT ALL
3. WORRYING TOO MUCH ABOUT DIFFERENT THINGS: SEVERAL DAYS
1. FEELING NERVOUS, ANXIOUS, OR ON EDGE: NOT AT ALL
GAD7 TOTAL SCORE: 2
8. IF YOU CHECKED OFF ANY PROBLEMS, HOW DIFFICULT HAVE THESE MADE IT FOR YOU TO DO YOUR WORK, TAKE CARE OF THINGS AT HOME, OR GET ALONG WITH OTHER PEOPLE?: NOT DIFFICULT AT ALL
GAD7 TOTAL SCORE: 2
4. TROUBLE RELAXING: NOT AT ALL
6. BECOMING EASILY ANNOYED OR IRRITABLE: SEVERAL DAYS
7. FEELING AFRAID AS IF SOMETHING AWFUL MIGHT HAPPEN: NOT AT ALL
2. NOT BEING ABLE TO STOP OR CONTROL WORRYING: NOT AT ALL
IF YOU CHECKED OFF ANY PROBLEMS ON THIS QUESTIONNAIRE, HOW DIFFICULT HAVE THESE PROBLEMS MADE IT FOR YOU TO DO YOUR WORK, TAKE CARE OF THINGS AT HOME, OR GET ALONG WITH OTHER PEOPLE: NOT DIFFICULT AT ALL
GAD7 TOTAL SCORE: 2

## 2023-06-21 ASSESSMENT — PATIENT HEALTH QUESTIONNAIRE - PHQ9
10. IF YOU CHECKED OFF ANY PROBLEMS, HOW DIFFICULT HAVE THESE PROBLEMS MADE IT FOR YOU TO DO YOUR WORK, TAKE CARE OF THINGS AT HOME, OR GET ALONG WITH OTHER PEOPLE: SOMEWHAT DIFFICULT
SUM OF ALL RESPONSES TO PHQ QUESTIONS 1-9: 5
SUM OF ALL RESPONSES TO PHQ QUESTIONS 1-9: 5

## 2023-06-21 ASSESSMENT — ASTHMA QUESTIONNAIRES
QUESTION_3 LAST FOUR WEEKS HOW OFTEN DID YOUR ASTHMA SYMPTOMS (WHEEZING, COUGHING, SHORTNESS OF BREATH, CHEST TIGHTNESS OR PAIN) WAKE YOU UP AT NIGHT OR EARLIER THAN USUAL IN THE MORNING: NOT AT ALL
ACT_TOTALSCORE: 24
QUESTION_4 LAST FOUR WEEKS HOW OFTEN HAVE YOU USED YOUR RESCUE INHALER OR NEBULIZER MEDICATION (SUCH AS ALBUTEROL): NOT AT ALL
QUESTION_5 LAST FOUR WEEKS HOW WOULD YOU RATE YOUR ASTHMA CONTROL: WELL CONTROLLED
QUESTION_2 LAST FOUR WEEKS HOW OFTEN HAVE YOU HAD SHORTNESS OF BREATH: NOT AT ALL
ACT_TOTALSCORE: 24
QUESTION_1 LAST FOUR WEEKS HOW MUCH OF THE TIME DID YOUR ASTHMA KEEP YOU FROM GETTING AS MUCH DONE AT WORK, SCHOOL OR AT HOME: NONE OF THE TIME

## 2023-06-21 ASSESSMENT — PAIN SCALES - GENERAL: PAINLEVEL: NO PAIN (1)

## 2023-06-27 ASSESSMENT — ENCOUNTER SYMPTOMS
FEVER: 0
SHORTNESS OF BREATH: 0

## 2023-07-18 DIAGNOSIS — M10.041 IDIOPATHIC GOUT OF RIGHT HAND, UNSPECIFIED CHRONICITY: ICD-10-CM

## 2023-07-18 DIAGNOSIS — E78.1 HYPERTRIGLYCERIDEMIA: ICD-10-CM

## 2023-07-18 DIAGNOSIS — K21.00 GASTROESOPHAGEAL REFLUX DISEASE WITH ESOPHAGITIS WITHOUT HEMORRHAGE: ICD-10-CM

## 2023-07-18 DIAGNOSIS — E87.6 HYPOKALEMIA: ICD-10-CM

## 2023-07-18 DIAGNOSIS — G25.81 RESTLESS LEGS SYNDROME: ICD-10-CM

## 2023-07-19 NOTE — PROGRESS NOTES
Assessment & Plan     Weight loss  - HIV Antigen Antibody Combo Cascade  - ESR: Erythrocyte sedimentation rate  - TSH with free T4 reflex  - CBC with Platelets & Differential  - Comprehensive metabolic panel    PMR (polymyalgia rheumatica) (H)  Decrease from 3mg to 2mg.  - predniSONE (DELTASONE) 1 MG tablet; Take 2 tablets (2 mg) by mouth daily    Diarrhea due to malabsorption  - Vitamin B12    Personal history of tobacco use  - Prof fee: Shared Decision Making for Lung Cancer Screening  - CT Chest Lung Cancer Scrn Low Dose wo; Future    Visit for screening mammogram  - MA Screen Bilateral w/Truman; Future    Family history of colonic polyps  Encouraged her to call her GI to see about getting new scopes or finding out when they want her to be scoped.      Weight loss likely from decreasing her Prednisone and not eating enough.  Update cancer screenings, check basic labs, she is going to start a food journal, see back 1 month for review.        DAYSI LUCIANO, DO  [facility] - LAUREL Clemente is a 67 year old, presenting for the following health issues:  Recheck Medication      HPI     67 year old female.  History of colon resection for FAP.  Last scopes 5/2019 - she believes she was supposed to have repeat scopes after 1 year, but never did this.    Concern of weight decline from 120 to 103 lbs on home scale over past few months after her surgery.  Unintentional.  She cut back on dairy products, this is helping her loose stools.  Also just has decreased appetite, not eating as much meat.  Continuing to see ortho, told her hip replacement isn't healing as fast as it should.      She is requesting a TSH today, states the last time she last weight she was having thyroid issues.    Smoking 40-50 yrs, 1/2 ppd current, used to be 1.5 ppd.  No lung concerns.  Lung screening 12/2021 and past due.  Mammo due end of next month, no concerns on self exams.      Her PMR continues to do well as we wean  "down her prednisone, she states her buffalo hump is getting smaller.    DEXA coming up next month.      Review of Systems   Constitutional: Negative for fever.   Respiratory: Negative for cough, choking, chest tightness, shortness of breath and wheezing.    Cardiovascular: Negative for chest pain, palpitations and peripheral edema.   Gastrointestinal: Negative for abdominal pain and hematochezia.            Objective    /68   Pulse 73   Temp 97.9  F (36.6  C) (Tympanic)   Resp 18   Ht 1.549 m (5' 1\")   Wt 48.6 kg (107 lb 3.2 oz)   SpO2 96%   BMI 20.26 kg/m    Body mass index is 20.26 kg/m .  Physical Exam  Constitutional:       General: She is not in acute distress.     Appearance: Normal appearance.      Comments: Walking with push wheeled/chair walker   HENT:      Head: Normocephalic and atraumatic.   Eyes:      Conjunctiva/sclera: Conjunctivae normal.   Neck:      Thyroid: No thyroid mass, thyromegaly or thyroid tenderness.   Cardiovascular:      Rate and Rhythm: Normal rate and regular rhythm.      Heart sounds: Normal heart sounds. No murmur heard.  Pulmonary:      Effort: Pulmonary effort is normal.      Breath sounds: Normal breath sounds. No wheezing.   Abdominal:      General: Bowel sounds are normal. There is no distension.      Palpations: Abdomen is soft. There is no mass.      Tenderness: There is no abdominal tenderness. There is no guarding.   Musculoskeletal:      Right lower leg: No edema.      Left lower leg: No edema.   Lymphadenopathy:      Cervical: No cervical adenopathy.   Neurological:      Mental Status: She is alert and oriented to person, place, and time.                        Lung Cancer Screening Shared Decision Making Visit     Chyna Delgado, a 67 year old female, is eligible for lung cancer screening    History   Smoking Status     Every Day     Packs/day: 0.50     Years: 40.00     Types: Cigarettes   Smokeless Tobacco     Never       I have discussed with patient " the risks and benefits of screening for lung cancer with low-dose CT.     The risks include:    radiation exposure: one low dose chest CT has as much ionizing radiation as about 15 chest x-rays, or 6 months of background radiation living in [place]      false positives: most findings/nodules are NOT cancer, but some might still require additional diagnostic evaluation, including biopsy    over-diagnosis: some slow growing cancers that might never have been clinically significant will be detected and treated unnecessarily     The benefit of early detection of lung cancer is contingent upon adherence to annual screening or more frequent follow up if indicated.     Furthermore, to benefit from screening, Chyna must be willing and able to undergo diagnostic procedures, if indicated. Although no specific guide is available for determining severity of comorbidities, it is reasonable to withhold screening in patients who have greater mortality risk from other diseases.     We did discuss that the best way to prevent lung cancer is to not smoke.    Some patients may value a numeric estimation of lung cancer risk when evaluating if lung cancer screening is right for them, here is one calculator:    ShouldIScreen

## 2023-07-20 ENCOUNTER — OFFICE VISIT (OUTPATIENT)
Dept: FAMILY MEDICINE | Facility: OTHER | Age: 68
End: 2023-07-20
Attending: FAMILY MEDICINE
Payer: COMMERCIAL

## 2023-07-20 VITALS
BODY MASS INDEX: 20.24 KG/M2 | WEIGHT: 107.2 LBS | RESPIRATION RATE: 18 BRPM | OXYGEN SATURATION: 96 % | SYSTOLIC BLOOD PRESSURE: 106 MMHG | HEART RATE: 73 BPM | TEMPERATURE: 97.9 F | DIASTOLIC BLOOD PRESSURE: 68 MMHG | HEIGHT: 61 IN

## 2023-07-20 DIAGNOSIS — Z12.31 VISIT FOR SCREENING MAMMOGRAM: ICD-10-CM

## 2023-07-20 DIAGNOSIS — R63.4 WEIGHT LOSS: Primary | ICD-10-CM

## 2023-07-20 DIAGNOSIS — Z83.719 FAMILY HISTORY OF COLONIC POLYPS: ICD-10-CM

## 2023-07-20 DIAGNOSIS — R19.7 DIARRHEA DUE TO MALABSORPTION: ICD-10-CM

## 2023-07-20 DIAGNOSIS — K90.9 DIARRHEA DUE TO MALABSORPTION: ICD-10-CM

## 2023-07-20 DIAGNOSIS — Z87.891 PERSONAL HISTORY OF TOBACCO USE: ICD-10-CM

## 2023-07-20 DIAGNOSIS — M35.3 PMR (POLYMYALGIA RHEUMATICA) (H): ICD-10-CM

## 2023-07-20 LAB
ALBUMIN SERPL BCG-MCNC: 4.4 G/DL (ref 3.5–5.2)
ALP SERPL-CCNC: 78 U/L (ref 35–104)
ALT SERPL W P-5'-P-CCNC: 35 U/L (ref 0–50)
ANION GAP SERPL CALCULATED.3IONS-SCNC: 13 MMOL/L (ref 7–15)
AST SERPL W P-5'-P-CCNC: 28 U/L (ref 0–45)
BASOPHILS # BLD AUTO: 0.1 10E3/UL (ref 0–0.2)
BASOPHILS NFR BLD AUTO: 1 %
BILIRUB SERPL-MCNC: 0.2 MG/DL
BUN SERPL-MCNC: 25.8 MG/DL (ref 8–23)
CALCIUM SERPL-MCNC: 10.3 MG/DL (ref 8.8–10.2)
CHLORIDE SERPL-SCNC: 101 MMOL/L (ref 98–107)
CREAT SERPL-MCNC: 0.83 MG/DL (ref 0.51–0.95)
DEPRECATED HCO3 PLAS-SCNC: 25 MMOL/L (ref 22–29)
EOSINOPHIL # BLD AUTO: 0.1 10E3/UL (ref 0–0.7)
EOSINOPHIL NFR BLD AUTO: 0 %
ERYTHROCYTE [DISTWIDTH] IN BLOOD BY AUTOMATED COUNT: 12.7 % (ref 10–15)
ERYTHROCYTE [SEDIMENTATION RATE] IN BLOOD BY WESTERGREN METHOD: 7 MM/HR (ref 0–30)
GFR SERPL CREATININE-BSD FRML MDRD: 76 ML/MIN/1.73M2
GLUCOSE SERPL-MCNC: 105 MG/DL (ref 70–99)
HCT VFR BLD AUTO: 46.1 % (ref 35–47)
HGB BLD-MCNC: 15.2 G/DL (ref 11.7–15.7)
IMM GRANULOCYTES # BLD: 0.1 10E3/UL
IMM GRANULOCYTES NFR BLD: 0 %
LYMPHOCYTES # BLD AUTO: 1.5 10E3/UL (ref 0.8–5.3)
LYMPHOCYTES NFR BLD AUTO: 12 %
MCH RBC QN AUTO: 30.5 PG (ref 26.5–33)
MCHC RBC AUTO-ENTMCNC: 33 G/DL (ref 31.5–36.5)
MCV RBC AUTO: 92 FL (ref 78–100)
MONOCYTES # BLD AUTO: 0.9 10E3/UL (ref 0–1.3)
MONOCYTES NFR BLD AUTO: 7 %
NEUTROPHILS # BLD AUTO: 9.4 10E3/UL (ref 1.6–8.3)
NEUTROPHILS NFR BLD AUTO: 80 %
NRBC # BLD AUTO: 0 10E3/UL
NRBC BLD AUTO-RTO: 0 /100
PLATELET # BLD AUTO: 257 10E3/UL (ref 150–450)
POTASSIUM SERPL-SCNC: 4.8 MMOL/L (ref 3.4–5.3)
PROT SERPL-MCNC: 7.1 G/DL (ref 6.4–8.3)
RBC # BLD AUTO: 4.99 10E6/UL (ref 3.8–5.2)
SODIUM SERPL-SCNC: 139 MMOL/L (ref 136–145)
TSH SERPL DL<=0.005 MIU/L-ACNC: 1 UIU/ML (ref 0.3–4.2)
WBC # BLD AUTO: 11.9 10E3/UL (ref 4–11)

## 2023-07-20 PROCEDURE — 87389 HIV-1 AG W/HIV-1&-2 AB AG IA: CPT | Mod: ZL | Performed by: FAMILY MEDICINE

## 2023-07-20 PROCEDURE — 85025 COMPLETE CBC W/AUTO DIFF WBC: CPT | Mod: ZL | Performed by: FAMILY MEDICINE

## 2023-07-20 PROCEDURE — G0296 VISIT TO DETERM LDCT ELIG: HCPCS | Performed by: FAMILY MEDICINE

## 2023-07-20 PROCEDURE — 84443 ASSAY THYROID STIM HORMONE: CPT | Mod: ZL | Performed by: FAMILY MEDICINE

## 2023-07-20 PROCEDURE — 36415 COLL VENOUS BLD VENIPUNCTURE: CPT | Mod: ZL | Performed by: FAMILY MEDICINE

## 2023-07-20 PROCEDURE — 82607 VITAMIN B-12: CPT | Mod: ZL | Performed by: FAMILY MEDICINE

## 2023-07-20 PROCEDURE — 99214 OFFICE O/P EST MOD 30 MIN: CPT | Performed by: FAMILY MEDICINE

## 2023-07-20 PROCEDURE — 80053 COMPREHEN METABOLIC PANEL: CPT | Mod: ZL | Performed by: FAMILY MEDICINE

## 2023-07-20 PROCEDURE — G0463 HOSPITAL OUTPT CLINIC VISIT: HCPCS | Mod: 25

## 2023-07-20 PROCEDURE — 85652 RBC SED RATE AUTOMATED: CPT | Mod: ZL | Performed by: FAMILY MEDICINE

## 2023-07-20 RX ORDER — OMEPRAZOLE 40 MG/1
40 CAPSULE, DELAYED RELEASE ORAL DAILY
Qty: 90 CAPSULE | Refills: 3 | Status: SHIPPED | OUTPATIENT
Start: 2023-07-20 | End: 2024-07-10

## 2023-07-20 RX ORDER — ALLOPURINOL 100 MG/1
100 TABLET ORAL 2 TIMES DAILY
Qty: 180 TABLET | Refills: 1 | Status: SHIPPED | OUTPATIENT
Start: 2023-07-20 | End: 2024-01-16

## 2023-07-20 RX ORDER — PREDNISONE 1 MG/1
2 TABLET ORAL DAILY
Qty: 60 TABLET | Refills: 1 | Status: SHIPPED | OUTPATIENT
Start: 2023-07-20 | End: 2023-09-28

## 2023-07-20 RX ORDER — POTASSIUM CHLORIDE 1500 MG/1
20 TABLET, EXTENDED RELEASE ORAL 3 TIMES DAILY
Qty: 90 TABLET | Refills: 9 | Status: SHIPPED | OUTPATIENT
Start: 2023-07-20 | End: 2024-08-05

## 2023-07-20 RX ORDER — ATORVASTATIN CALCIUM 20 MG/1
20 TABLET, FILM COATED ORAL DAILY
Qty: 90 TABLET | Refills: 0 | Status: SHIPPED | OUTPATIENT
Start: 2023-07-20 | End: 2023-08-22

## 2023-07-20 RX ORDER — SPIRONOLACTONE 50 MG/1
50 TABLET, FILM COATED ORAL 2 TIMES DAILY
Qty: 180 TABLET | Refills: 2 | Status: SHIPPED | OUTPATIENT
Start: 2023-07-20 | End: 2024-02-26

## 2023-07-20 ASSESSMENT — ENCOUNTER SYMPTOMS
CHEST TIGHTNESS: 0
ABDOMINAL PAIN: 0
COUGH: 0
FEVER: 0
CHOKING: 0
WHEEZING: 0
SHORTNESS OF BREATH: 0
PALPITATIONS: 0
HEMATOCHEZIA: 0

## 2023-07-20 ASSESSMENT — PAIN SCALES - GENERAL: PAINLEVEL: NO PAIN (0)

## 2023-07-20 NOTE — TELEPHONE ENCOUNTER
Lipitor, Prilosec      Last Written Prescription Date:  4.8.23, 4.19.23  Last Fill Quantity: #90, #90,   # refills: 0  Last Office Visit: 6.21.23  Future Office visit:    Next 5 appointments (look out 90 days)    Jul 20, 2023 10:30 AM  (Arrive by 10:15 AM)  SHORT with Jose Guadalupe Lowe DO  [facility] [place] ([facility] [place] ) [address]  [place] [address]  [phone]           Routing refill request to provider for review/approval because:

## 2023-07-20 NOTE — PATIENT INSTRUCTIONS
Lung Cancer Screening   Frequently Asked Questions  If you are at high-risk for lung cancer, getting screened with low-dose computed tomography (LDCT) every year can help save your life. This handout offers answers to some of the most common questions about lung cancer screening. If you have other questions, please call [phone] ([phone]).     What is it?  Lung cancer screening uses special X-ray technology to create an image of your lung tissue. The exam is quick and easy and takes less than 10 seconds. We don t give you any medicine or use any needles. You can eat before and after the exam. You don t need to change your clothes as long as the clothing on your chest doesn t contain metal. But, you do need to be able to hold your breath for at least 6 seconds during the exam.    What is the goal of lung cancer screening?  The goal of lung cancer screening is to save lives. Many times, lung cancer is not found until a person starts having physical symptoms. Lung cancer screening can help detect lung cancer in the earliest stages when it may be easier to treat.    Who should be screened for lung cancer?  We suggest lung cancer screening for anyone who is at high-risk for lung cancer. You are in the high-risk group if you:      are between the ages of 55 and 79, and    have smoked at least 1 pack of cigarettes a day for 20 or more years, and    still smoke or have quit within the past 15 years.    However, if you have a new cough or shortness of breath, you should talk to your doctor before being screened.    Why does it matter if I have symptoms?  Certain symptoms can be a sign that you have a condition in your lungs that should be checked and treated by your doctor. These symptoms include fever, chest pain, a new or changing cough, shortness of breath that you have never felt before, coughing up blood or unexplained weight loss. Having any of these symptoms can greatly affect the results of lung  cancer screening.       Should all smokers get an LDCT lung cancer screening exam?  It depends. Lung cancer screening is for a very specific group of men and women who have a history of heavy smoking over a long period of time (see  Who should be screened for lung cancer  above).  I am in the high-risk group, but have been diagnosed with cancer in the past. Is LDCT lung cancer screening right for me?  In some cases, you should not have LDCT lung screening, such as when your doctor is already following your cancer with CT scan studies. Your doctor will help you decide if LDCT lung screening is right for you.  Do I need to have a screening exam every year?  Yes. If you are in the high-risk group described earlier, you should get an LDCT lung cancer screening exam every year until you are 79, or are no longer willing or able to undergo screening and possible procedures to diagnose and treat lung cancer.  How effective is LDCT at preventing death from lung cancer?  Studies have shown that LDCT lung cancer screening can lower the risk of death from lung cancer by 20 percent in people who are at high-risk.  What are the risks?  There are some risks and limitations of LDCT lung cancer screening. We want to make sure you understand the risks and benefits, so please let us know if you have any questions. Your doctor may want to talk with you more about these risks.    Radiation exposure: As with any exam that uses radiation, there is a very small increased risk of cancer. The amount of radiation in LDCT is small--about the same amount a person would get from a mammogram. Your doctor orders the exam when he or she feels the potential benefits outweigh the risks.    False negatives: No test is perfect, including LDCT. It is possible that you may have a medical condition, including lung cancer, that is not found during your exam. This is called a false negative result.    False positives and more testing: LDCT very often finds  something in the lung that could be cancer, but in fact is not. This is called a false positive result. False positive tests often cause anxiety. To make sure these findings are not cancer, you may need to have more tests. These tests will be done only if you give us permission. Sometimes patients need a treatment that can have side effects, such as a biopsy. For more information on false positives, see  What can I expect from the results?     Findings not related to lung cancer: Your LDCT exam also takes pictures of areas of your body next to your lungs. In a very small number of cases, the CT scan will show an abnormal finding in one of these areas, such as your kidneys, adrenal glands, liver or thyroid. This finding may not be serious, but you may need more tests. Your doctor can help you decide what other tests you may need, if any.  What can I expect from the results?  About 1 out of 4 LDCT exams will find something that may need more tests. Most of the time, these findings are lung nodules. Lung nodules are very small collections of tissue in the lung. These nodules are very common, and the vast majority--more than 97 percent--are not cancer (benign). Most are normal lymph nodes or small areas of scarring from past infections.  But, if a small lung nodule is found to be cancer, the cancer can be cured more than 90 percent of the time. To know if the nodule is cancer, we may need to get more images before your next yearly screening exam. If the nodule has suspicious features (for example, it is large, has an odd shape or grows over time), we will refer you to a specialist for further testing.  Will my doctor also get the results?  Yes. Your doctor will get a copy of your results.  Is it okay to keep smoking now that there s a cancer screening exam?  No. Tobacco is one of the strongest cancer-causing agents. It causes not only lung cancer, but other cancers and cardiovascular (heart) diseases as well. The damage  caused by smoking builds over time. This means that the longer you smoke, the higher your risk of disease. While it is never too late to quit, the sooner you quit, the better.  Where can I find help to quit smoking?  The best way to prevent lung cancer is to stop smoking. If you have already quit smoking, congratulations and keep it up! For help on quitting smoking, please call [employer] at 3-672-QUITNOW (1-499.236.5923) or the [employer] at 1-297.174.8549 to find local resources near you.  One-on-one health coaching:  If you d prefer to work individually with a health care provider on tobacco cessation, we offer:      Medication Therapy Management:  Our specially trained pharmacists work closely with you and your doctor to help you quit smoking.  Call [phone] or [phone] (toll free).

## 2023-07-21 LAB
HIV 1+2 AB+HIV1 P24 AG SERPL QL IA: NONREACTIVE
VIT B12 SERPL-MCNC: 917 PG/ML (ref 232–1245)

## 2023-07-31 ENCOUNTER — TELEPHONE (OUTPATIENT)
Dept: FAMILY MEDICINE | Facility: OTHER | Age: 68
End: 2023-07-31

## 2023-07-31 DIAGNOSIS — K63.5 COLON POLYPOSIS: Primary | ICD-10-CM

## 2023-07-31 NOTE — TELEPHONE ENCOUNTER
Patient would like to get a referral for Dr. Howe for a repeat endoscopy. She has had this done in the past with Dr. Patel and Dr. Diaz and is now thinking she would like to get this done here.  She also doesn't want to schedule out until atleast Decemeber, If you could send me a referral so I can get her consult scheduled for her.    Thank You    Katherine Ordaz

## 2023-08-14 ENCOUNTER — HOSPITAL ENCOUNTER (OUTPATIENT)
Dept: BONE DENSITY | Facility: HOSPITAL | Age: 68
Discharge: HOME | End: 2023-08-14
Attending: FAMILY MEDICINE | Admitting: FAMILY MEDICINE
Payer: COMMERCIAL

## 2023-08-14 DIAGNOSIS — M81.0 OSTEOPOROSIS WITHOUT CURRENT PATHOLOGICAL FRACTURE, UNSPECIFIED OSTEOPOROSIS TYPE: ICD-10-CM

## 2023-08-14 PROCEDURE — 77080 DXA BONE DENSITY AXIAL: CPT

## 2023-08-15 ENCOUNTER — TRANSFERRED RECORDS (OUTPATIENT)
Dept: HEALTH INFORMATION MANAGEMENT | Facility: CLINIC | Age: 68
End: 2023-08-15

## 2023-08-16 ENCOUNTER — OFFICE VISIT (OUTPATIENT)
Dept: SURGERY | Facility: OTHER | Age: 68
End: 2023-08-16
Attending: SURGERY
Payer: COMMERCIAL

## 2023-08-16 ENCOUNTER — PREP FOR PROCEDURE (OUTPATIENT)
Dept: SURGERY | Facility: OTHER | Age: 68
End: 2023-08-16

## 2023-08-16 VITALS
TEMPERATURE: 97.5 F | BODY MASS INDEX: 20.2 KG/M2 | SYSTOLIC BLOOD PRESSURE: 110 MMHG | WEIGHT: 107 LBS | OXYGEN SATURATION: 98 % | HEIGHT: 61 IN | HEART RATE: 83 BPM | DIASTOLIC BLOOD PRESSURE: 60 MMHG

## 2023-08-16 DIAGNOSIS — D13.91 FAP (FAMILIAL ADENOMATOUS POLYPOSIS): Primary | ICD-10-CM

## 2023-08-16 DIAGNOSIS — Z83.72 FAMILY HISTORY OF FAP (FAMILIAL ADENOMATOUS POLYPOSIS): Primary | ICD-10-CM

## 2023-08-16 PROCEDURE — G0463 HOSPITAL OUTPT CLINIC VISIT: HCPCS

## 2023-08-16 PROCEDURE — 99213 OFFICE O/P EST LOW 20 MIN: CPT | Performed by: SURGERY

## 2023-08-16 ASSESSMENT — PAIN SCALES - GENERAL: PAINLEVEL: NO PAIN (0)

## 2023-08-17 NOTE — PROGRESS NOTES
[facility] Surgery Consultation    CC:  History of FAP     HPI:  This 67 year old year old female is seen at the request of Dr. Lowe for evaluation of cancer screening. 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 She has had evaluation of her pouch and an upper endoscopy in 2019 with Dr. 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 She admits to frequent loose stools per her normal. She is here today to schedule repeat screening. Denies blood in stool.     Past Medical History:   Diagnosis Date    Abnormal mammogram, unspecified 01/08/2003    Normal pathology    Back Pain 02/10/2000    Sacroiliac pain    Benign neoplasm of colon 03/10/2000    Benign paroxysmal positional vertigo 06/07/2012    Depressive disorder, not elsewhere classified 02/10/2004    Diarrhea 12/15/2010    Secondary to colectomy for familial polyposis    Gastric polyp 12/11/2015, 12/18/2017    recurrent     History of normal mammogram 07/18/2014, 1/18/2019    Idiopathic osteoporosis 12/15/2010    Interstitial emphysema (H) 12/15/2010    menopausal or female climacteric states 08/31/1999    Mixed hyperlipidemia 12/15/2010    Other bursitis disorders 02/04/2011    Greater trochanteric    Other forms of retinal detachment(361.89) 12/15/2010    Other malaise and fatigue 01/10/2003    Personal history of tobacco use, presenting hazards to health 12/15/2010    Polymyalgia rheumatica (H)     Polyposis of colon     Restless legs syndrome (RLS) 12/15/2010    Rotator cuff tendonitis     Rotator cuff tendonitis     Sacroiliitis, not elsewhere classified (H) 12/15/2010    multiple sites    Tobacco use disorder 08/22/2012    Unspecified asthma(493.90) 12/15/2010       Past Surgical History:   Procedure Laterality Date    ARTHROPLASTY HIP ANTERIOR Right 5/15/2023    Procedure: Right Direct Anterior Total Hip Arthroplasty;  Surgeon: Kurt Jordan MD;  Location: [facility]    benign tumors removed from  back      CATARACT EXTRACTION Right 06/21/2022    CATARACT EXTRACTION Left 07/12/2022    CLOSED REDUCTION, PERCUTANEOUS PINNING HIP Right 02/24/2019    Procedure: Percutaneous Screw Fixation of Right Hip Fracture;  Surgeon: Amrik Kolb DO;  Location: [facility]    COLON SURGERY N/A     HX: Total colectomy with J-pouch reconstruction.     ENDOSCOPY UPPER, COLONOSCOPY, COMBINED N/A 09/05/2014    Procedure: COMBINED ENDOSCOPY UPPER, COLONOSCOPY;  Surgeon: Delbert Patel MD;  Location: [facility]    ENDOSCOPY UPPER, COLONOSCOPY, COMBINED N/A 05/09/2019    Procedure: UPPER ENDOSCOPY  WITH BIOPSIES AND  COLONOSCOPY WITH BIOPSIES;  Surgeon: Tai Diaz MD;  Location: [facility]    ESOPHAGOSCOPY, GASTROSCOPY, DUODENOSCOPY (EGD), COMBINED N/A 12/11/2015    Procedure: COMBINED ESOPHAGOSCOPY, GASTROSCOPY, DUODENOSCOPY (EGD);  Surgeon: Delbert Patel MD;  Location: [facility]    ESOPHAGOSCOPY, GASTROSCOPY, DUODENOSCOPY (EGD), COMBINED N/A 12/18/2017    Procedure: COMBINED ESOPHAGOSCOPY, GASTROSCOPY, DUODENOSCOPY (EGD);  UPPER ENDOSCOPY WITH BIOPSY;  Surgeon: Delbert Patel MD;  Location: [facility]    excised-benign  2002    tongue lesion    HC REPAIR OF NASAL SEPTUM  2002    Deviated nasal septum    LAPAROSCOPIC CHOLECYSTECTOMY      lumpectomy Right breast      Breast Lump    MOUTH SURGERY      removal of spot from roof of mouth    pilonidal cyst surgery  1976    removal of colon and large intestine  2000    Familial polyposis    REMOVE HARDWARE ARTHROPLASTY HIP Right 07/20/2022    Procedure: Right Hip Hardware Removal (Cannulated Screws);  Surgeon: Luis Fernando Marcial MD;  Location: [facility]    Right etopic pregnancy      Pregnancy    TONSILLECTOMY      tonsillitus    UPPER GI ENDOSCOPY  2009    Stomach polyps       Allergies   Allergen Reactions    Codeine Swelling     Throat swelling-Patient can tolerate Hydrocodone & morphine    Nortriptyline Other (See Comments)     Crying        Current Outpatient Medications   Medication     acetaminophen (TYLENOL) 325 MG tablet    albuterol (PROAIR HFA/PROVENTIL HFA/VENTOLIN HFA) 108 (90 Base) MCG/ACT inhaler    alendronate (FOSAMAX) 70 MG tablet    allopurinol (ZYLOPRIM) 100 MG tablet    aspirin 81 MG EC tablet    atorvastatin (LIPITOR) 20 MG tablet    calcium carbonate 750 MG CHEW    dicyclomine (BENTYL) 10 MG capsule    diphenoxylate-atropine (LOMOTIL) 2.5-0.025 MG tablet    ferrous sulfate (IRON) 325 (65 FE) MG tablet    fluticasone-salmeterol (ADVAIR) 250-50 MCG/ACT inhaler    gabapentin (NEURONTIN) 400 MG capsule    GNP VITAMIN D MAXIMUM STRENGTH 50 MCG (2000 UT) tablet    ketotifen (ZADITOR/REFRESH ANTI-ITCH) 0.025 % SOLN ophthalmic solution    loperamide (IMODIUM) 2 MG capsule    montelukast (SINGULAIR) 10 MG tablet    Multiple Vitamin (MULTIVITAMIN) per tablet    omeprazole (PRILOSEC) 40 MG DR capsule    potassium chloride ER (KLOR-CON M) 20 MEQ CR tablet    pramipexole (MIRAPEX) 0.125 MG tablet    predniSONE (DELTASONE) 1 MG tablet    PSYLLIUM PO    Simethicone 125 MG CAPS    spironolactone (ALDACTONE) 50 MG tablet    traMADol (ULTRAM) 50 MG tablet     No current facility-administered medications for this visit.       HABITS:    Social History     Tobacco Use    Smoking status: Every Day     Packs/day: 0.50     Years: 40.00     Pack years: 20.00     Types: Cigarettes     Passive exposure: Current    Smokeless tobacco: Never   Vaping Use    Vaping Use: Never used   Substance Use Topics    Alcohol use: Yes     Comment: Weekly 3 drinks    Drug use: Never       Family History   Problem Relation Age of Onset    Other - See Comments Father         Atrial Fibrillation    Cancer Father         bladder ca    Colon Polyps Father     Heart Disease Father         Pacemaker    Rheumatoid Arthritis Father     C.A.D. Father 75        CABG    Chronic Obstructive Pulmonary Disease Mother     Heart Disease Mother         Pacemaker    Other - See Comments Mother         dementia    C.A.D. Mother 70        CABG  "   C.A.D. Maternal Grandmother     Unknown/Adopted Maternal Grandfather     Unknown/Adopted Paternal Grandmother     Unknown/Adopted Paternal Grandfather     Asthma Sister     Cerebrovascular Disease Sister     Gastrointestinal Disease Sister         peptic ulcers    Hypertension Sister     Gastrointestinal Disease Sister         stomach tumors    Rheumatoid Arthritis Sister     Cancer Sister         facial cancer    Asthma Sister     Cancer Maternal Aunt         renal ca       REVIEW OF SYSTEMS:  Ten point review of systems negative except those mentioned in the HPI.     OBJECTIVE:    /60 (BP Location: Right arm, Cuff Size: Adult Regular)   Pulse 83   Temp 97.5  F (36.4  C) (Tympanic)   Ht 1.549 m (5' 1\")   Wt 48.5 kg (107 lb)   SpO2 98%   BMI 20.22 kg/m      GENERAL: Generally appears well, in no distress with appropriate affect.  HEENT:   Sclerae anicteric - normocephalic atraumatic   Respiratory:  No acute distress, no splinting   Cardiovascular:  Regular Rate and Rhythm  Abdomen: non-distended   :  deferred  Extremities:  Extremities normal. No deformities, edema, or skin discoloration.  Skin:  no suspicious lesions or rashes  Neurological: grossly intact  Psych:  Alert, oriented, affect appropriate with normal decision making ability.    IMPRESSION:    67 y.o. female with FAP she is overdue for her screening tests, will plan for upper endoscopy as well as ileoscopy. She will not require bowel prep. Will ask that she follows the pre-procedure diet for colonoscopy.     PLAN:    Upper and lower endoscopy.     Al Howe MD,     8/17/2023  1:56 PM        "

## 2023-08-21 NOTE — PROGRESS NOTES
Assessment & Plan     Diarrhea due to malabsorption  Working well, Refill, PDMP reviewed  - diphenoxylate-atropine (LOMOTIL) 2.5-0.025 MG tablet; Take 1-2 tablets by mouth 4 times daily as needed for diarrhea    Hypertriglyceridemia  Tolerating, continue  - atorvastatin (LIPITOR) 20 MG tablet; Take 1 tablet (20 mg) by mouth daily    Restless legs syndrome  Works, tolerating, refill  - pramipexole (MIRAPEX) 0.125 MG tablet; Take 1-2 tablets (0.125-0.25 mg) by mouth At Bedtime    Post-nasal drip  Refill  - montelukast (SINGULAIR) 10 MG tablet; Take 1 tablet (10 mg) by mouth At Bedtime    PMR (polymyalgia rheumatica) (H)  Check ESR, decrease to 1mg.  She is doing well with continued taper.  - ESR: Erythrocyte sedimentation rate    Age-related osteoporosis without current pathological fracture  Previous DEXA discussed.  Not comparing same body regions on repeat DEXAs.  She is on Alendronate, past Reclast but she wanted to stop as daughter told her it was bad for her.        DAYSI LUCIANO, DO  [facility] - LAUREL Clemente is a 67 year old, presenting for the following health issues:  Follow Up (Diarrhea/Weight loss)      HPI     Continuing to do well on slow Prednisone reduction for her PMR.  Weight has stabilized - forget to bring calorie log.  Discussed dexa from a week ago  Upcoming scopes      Review of Systems   Constitutional:  Negative for fever.   Gastrointestinal:  Negative for abdominal pain.            Objective    /62 (BP Location: Left arm, Patient Position: Sitting, Cuff Size: Adult Regular)   Pulse 75   Temp 97.5  F (36.4  C) (Tympanic)   Resp 16   Wt 49.9 kg (109 lb 14.4 oz)   SpO2 96%   BMI 20.77 kg/m    Body mass index is 20.77 kg/m .  Physical Exam  Constitutional:       Appearance: Normal appearance.   Pulmonary:      Effort: Pulmonary effort is normal.   Neurological:      Mental Status: She is alert and oriented to person, place, and time.

## 2023-08-22 ENCOUNTER — OFFICE VISIT (OUTPATIENT)
Dept: FAMILY MEDICINE | Facility: OTHER | Age: 68
End: 2023-08-22
Attending: FAMILY MEDICINE
Payer: COMMERCIAL

## 2023-08-22 VITALS
OXYGEN SATURATION: 96 % | DIASTOLIC BLOOD PRESSURE: 62 MMHG | RESPIRATION RATE: 16 BRPM | SYSTOLIC BLOOD PRESSURE: 100 MMHG | TEMPERATURE: 97.5 F | BODY MASS INDEX: 20.77 KG/M2 | HEART RATE: 75 BPM | WEIGHT: 109.9 LBS

## 2023-08-22 DIAGNOSIS — R09.82 POST-NASAL DRIP: ICD-10-CM

## 2023-08-22 DIAGNOSIS — E78.1 HYPERTRIGLYCERIDEMIA: ICD-10-CM

## 2023-08-22 DIAGNOSIS — K90.9 DIARRHEA DUE TO MALABSORPTION: ICD-10-CM

## 2023-08-22 DIAGNOSIS — G25.81 RESTLESS LEGS SYNDROME: ICD-10-CM

## 2023-08-22 DIAGNOSIS — M81.0 AGE-RELATED OSTEOPOROSIS WITHOUT CURRENT PATHOLOGICAL FRACTURE: ICD-10-CM

## 2023-08-22 DIAGNOSIS — M35.3 PMR (POLYMYALGIA RHEUMATICA) (H): Primary | ICD-10-CM

## 2023-08-22 DIAGNOSIS — R19.7 DIARRHEA DUE TO MALABSORPTION: ICD-10-CM

## 2023-08-22 LAB — ERYTHROCYTE [SEDIMENTATION RATE] IN BLOOD BY WESTERGREN METHOD: 11 MM/HR (ref 0–30)

## 2023-08-22 PROCEDURE — 99213 OFFICE O/P EST LOW 20 MIN: CPT | Performed by: FAMILY MEDICINE

## 2023-08-22 PROCEDURE — 85652 RBC SED RATE AUTOMATED: CPT | Mod: ZL | Performed by: FAMILY MEDICINE

## 2023-08-22 PROCEDURE — 36415 COLL VENOUS BLD VENIPUNCTURE: CPT | Mod: ZL | Performed by: FAMILY MEDICINE

## 2023-08-22 PROCEDURE — G0463 HOSPITAL OUTPT CLINIC VISIT: HCPCS

## 2023-08-22 RX ORDER — PRAMIPEXOLE DIHYDROCHLORIDE 0.12 MG/1
.125-.25 TABLET ORAL AT BEDTIME
Qty: 180 TABLET | Refills: 1 | Status: SHIPPED | OUTPATIENT
Start: 2023-08-22 | End: 2023-09-28

## 2023-08-22 RX ORDER — ATORVASTATIN CALCIUM 20 MG/1
20 TABLET, FILM COATED ORAL DAILY
Qty: 90 TABLET | Refills: 0 | Status: CANCELLED | OUTPATIENT
Start: 2023-08-22

## 2023-08-22 RX ORDER — DIPHENOXYLATE HCL/ATROPINE 2.5-.025MG
1-2 TABLET ORAL 4 TIMES DAILY PRN
Qty: 120 TABLET | Refills: 0 | Status: CANCELLED | OUTPATIENT
Start: 2023-08-22

## 2023-08-22 RX ORDER — PRAMIPEXOLE DIHYDROCHLORIDE 0.12 MG/1
.125-.25 TABLET ORAL AT BEDTIME
Qty: 180 TABLET | Refills: 1 | Status: CANCELLED | OUTPATIENT
Start: 2023-08-22

## 2023-08-22 RX ORDER — MONTELUKAST SODIUM 10 MG/1
10 TABLET ORAL AT BEDTIME
Qty: 90 TABLET | Refills: 1 | Status: CANCELLED | OUTPATIENT
Start: 2023-08-22

## 2023-08-22 RX ORDER — DIPHENOXYLATE HCL/ATROPINE 2.5-.025MG
1-2 TABLET ORAL 4 TIMES DAILY PRN
Qty: 120 TABLET | Refills: 1 | Status: SHIPPED | OUTPATIENT
Start: 2023-08-22 | End: 2023-10-02

## 2023-08-22 RX ORDER — MONTELUKAST SODIUM 10 MG/1
10 TABLET ORAL AT BEDTIME
Qty: 90 TABLET | Refills: 1 | Status: SHIPPED | OUTPATIENT
Start: 2023-08-22 | End: 2023-09-28

## 2023-08-22 RX ORDER — ATORVASTATIN CALCIUM 20 MG/1
20 TABLET, FILM COATED ORAL DAILY
Qty: 90 TABLET | Refills: 0 | Status: SHIPPED | OUTPATIENT
Start: 2023-08-22 | End: 2023-09-28

## 2023-08-22 ASSESSMENT — PAIN SCALES - GENERAL: PAINLEVEL: NO PAIN (0)

## 2023-08-23 ENCOUNTER — OFFICE VISIT (OUTPATIENT)
Dept: CHIROPRACTIC MEDICINE | Facility: OTHER | Age: 68
End: 2023-08-23
Attending: CHIROPRACTOR
Payer: COMMERCIAL

## 2023-08-23 DIAGNOSIS — M54.2 CERVICALGIA: ICD-10-CM

## 2023-08-23 DIAGNOSIS — M99.01 SEGMENTAL AND SOMATIC DYSFUNCTION OF CERVICAL REGION: Primary | ICD-10-CM

## 2023-08-23 DIAGNOSIS — M99.03 SEGMENTAL AND SOMATIC DYSFUNCTION OF LUMBAR REGION: ICD-10-CM

## 2023-08-23 DIAGNOSIS — M99.02 SEGMENTAL AND SOMATIC DYSFUNCTION OF THORACIC REGION: ICD-10-CM

## 2023-08-23 PROCEDURE — 98941 CHIROPRACT MANJ 3-4 REGIONS: CPT | Mod: AT | Performed by: CHIROPRACTOR

## 2023-08-28 ASSESSMENT — ENCOUNTER SYMPTOMS
ABDOMINAL PAIN: 0
FEVER: 0

## 2023-08-29 NOTE — PROGRESS NOTES
Subjective Finding:    Chief compalint: Patient presents with:  Neck Pain: Upper back and neck pain   , Pain Scale: 9/10, Intensity: sharp, Duration: 1 years, Radiating: no.    Date of injury:     Activities that the pain restricts:   Home/household/hobbies/social activities: Yes.  Work duties: Yes.  Sleep: No.  Makes symptoms better: rest.  Makes symptoms worse: activity.  Have you seen anyone else for the symptoms? Yes: MD.  Work related: No.  Automobile related injury: No.    Objective and Assessment:    Posture Analysis:   High shoulder: .  Head tilt: .  High iliac crest: .  Head carriage: forward.  Thoracic Kyphosis: neutral.  Lumbar Lordosis: neutral.    Lumbar Range of Motion: extension decreased.  Cervical Range of Motion: extension decreased.  Thoracic Range of Motion: .  Extremity Range of Motion: .    Palpation:   Traps: sharp pain, referred pain: no    Segmental dysfunction pre-treatment and treatment area: C6, C7, T4, and L5.    Assessment post-treatment:  Cervical: ROM increased.  Thoracic: ROM increased.  Lumbar: ROM increased.    Comments: .      Complicating Factors: .    Procedure(s):  CMT:  30890 Chiropractic manipulative treatment 3-4 regions performed   Cervical: Diversified, See above for level, Supine, Thoracic: Diversified, See above for level, Prone, and Lumbar: Diversified, See above for level, Side posture    Modalities:  None performed this visit    Therapeutic procedures:  None    Plan:  Treatment plan:  2 times per week for 2 weeks.  Instructed patient: stretch as instructed at visit.  Short term goals: increase ROM.  Long term goals: restore normal function.  Prognosis: very good.

## 2023-09-07 ENCOUNTER — OFFICE VISIT (OUTPATIENT)
Dept: CHIROPRACTIC MEDICINE | Facility: OTHER | Age: 68
End: 2023-09-07
Attending: CHIROPRACTOR
Payer: COMMERCIAL

## 2023-09-07 DIAGNOSIS — M99.01 SEGMENTAL AND SOMATIC DYSFUNCTION OF CERVICAL REGION: ICD-10-CM

## 2023-09-07 DIAGNOSIS — M54.50 ACUTE BILATERAL LOW BACK PAIN WITHOUT SCIATICA: ICD-10-CM

## 2023-09-07 DIAGNOSIS — M99.02 SEGMENTAL AND SOMATIC DYSFUNCTION OF THORACIC REGION: Primary | ICD-10-CM

## 2023-09-07 DIAGNOSIS — M99.03 SEGMENTAL AND SOMATIC DYSFUNCTION OF LUMBAR REGION: ICD-10-CM

## 2023-09-07 PROCEDURE — 98941 CHIROPRACT MANJ 3-4 REGIONS: CPT | Mod: AT | Performed by: CHIROPRACTOR

## 2023-09-11 ENCOUNTER — TELEPHONE (OUTPATIENT)
Dept: MAMMOGRAPHY | Facility: OTHER | Age: 68
End: 2023-09-11

## 2023-09-11 ENCOUNTER — ANCILLARY PROCEDURE (OUTPATIENT)
Dept: MAMMOGRAPHY | Facility: OTHER | Age: 68
End: 2023-09-11
Attending: FAMILY MEDICINE
Payer: COMMERCIAL

## 2023-09-11 ENCOUNTER — HOSPITAL ENCOUNTER (OUTPATIENT)
Dept: CT IMAGING | Facility: HOSPITAL | Age: 68
Discharge: HOME | End: 2023-09-11
Attending: FAMILY MEDICINE
Payer: COMMERCIAL

## 2023-09-11 DIAGNOSIS — Z87.891 PERSONAL HISTORY OF TOBACCO USE: ICD-10-CM

## 2023-09-11 DIAGNOSIS — Z12.31 VISIT FOR SCREENING MAMMOGRAM: ICD-10-CM

## 2023-09-11 PROCEDURE — 71271 CT THORAX LUNG CANCER SCR C-: CPT

## 2023-09-11 PROCEDURE — 77067 SCR MAMMO BI INCL CAD: CPT | Mod: TC

## 2023-09-12 NOTE — PROGRESS NOTES
Subjective Finding:    Chief compalint: Patient presents with:  Back Pain  , Pain Scale: 6/10, Intensity: sharp, Duration: 1 years, Radiating: no.    Date of injury:     Activities that the pain restricts:   Home/household/hobbies/social activities: Yes.  Work duties: Yes.  Sleep: No.  Makes symptoms better: rest.  Makes symptoms worse: activity.  Have you seen anyone else for the symptoms? Yes: MD.  Work related: No.  Automobile related injury: No.    Objective and Assessment:    Posture Analysis:   High shoulder: .  Head tilt: .  High iliac crest: .  Head carriage: forward.  Thoracic Kyphosis: neutral.  Lumbar Lordosis: neutral.    Lumbar Range of Motion: extension decreased.  Cervical Range of Motion: extension decreased.  Thoracic Range of Motion: .  Extremity Range of Motion: .    Palpation:   Traps: sharp pain, referred pain: no    Segmental dysfunction pre-treatment and treatment area: C6, C7, T4, and L5.    Assessment post-treatment:  Cervical: ROM increased.  Thoracic: ROM increased.  Lumbar: ROM increased.    Comments: .      Complicating Factors: .    Procedure(s):  CMT:  97788 Chiropractic manipulative treatment 3-4 regions performed   Cervical: Diversified, See above for level, Supine, Thoracic: Diversified, See above for level, Prone, and Lumbar: Diversified, See above for level, Side posture    Modalities:  None performed this visit    Therapeutic procedures:  None    Plan:  Treatment plan:  2 times per week for 2 weeks.  Instructed patient: stretch as instructed at visit.  Short term goals: increase ROM.  Long term goals: restore normal function.  Prognosis: very good.

## 2023-09-26 ENCOUNTER — TRANSFERRED RECORDS (OUTPATIENT)
Dept: HEALTH INFORMATION MANAGEMENT | Facility: CLINIC | Age: 68
End: 2023-09-26

## 2023-09-28 ENCOUNTER — MEDICAL CORRESPONDENCE (OUTPATIENT)
Dept: HEALTH INFORMATION MANAGEMENT | Facility: HOSPITAL | Age: 68
End: 2023-09-28

## 2023-09-28 ENCOUNTER — OFFICE VISIT (OUTPATIENT)
Dept: FAMILY MEDICINE | Facility: OTHER | Age: 68
End: 2023-09-28
Attending: FAMILY MEDICINE
Payer: COMMERCIAL

## 2023-09-28 ENCOUNTER — ANCILLARY PROCEDURE (OUTPATIENT)
Dept: GENERAL RADIOLOGY | Facility: OTHER | Age: 68
End: 2023-09-28
Attending: FAMILY MEDICINE
Payer: COMMERCIAL

## 2023-09-28 VITALS
DIASTOLIC BLOOD PRESSURE: 62 MMHG | HEART RATE: 86 BPM | BODY MASS INDEX: 20.6 KG/M2 | WEIGHT: 109 LBS | OXYGEN SATURATION: 98 % | TEMPERATURE: 98.1 F | SYSTOLIC BLOOD PRESSURE: 98 MMHG

## 2023-09-28 DIAGNOSIS — R30.0 DYSURIA: ICD-10-CM

## 2023-09-28 DIAGNOSIS — M35.3 PMR (POLYMYALGIA RHEUMATICA) (H): ICD-10-CM

## 2023-09-28 DIAGNOSIS — G89.29 CHRONIC THORACIC BACK PAIN, UNSPECIFIED BACK PAIN LATERALITY: Primary | ICD-10-CM

## 2023-09-28 DIAGNOSIS — E78.1 HYPERTRIGLYCERIDEMIA: ICD-10-CM

## 2023-09-28 DIAGNOSIS — G89.29 CHRONIC THORACIC BACK PAIN, UNSPECIFIED BACK PAIN LATERALITY: ICD-10-CM

## 2023-09-28 DIAGNOSIS — R09.82 POST-NASAL DRIP: ICD-10-CM

## 2023-09-28 DIAGNOSIS — M54.6 CHRONIC THORACIC BACK PAIN, UNSPECIFIED BACK PAIN LATERALITY: ICD-10-CM

## 2023-09-28 DIAGNOSIS — Z90.49 ACQUIRED ABSENCE OF OTHER SPECIFIED PARTS OF DIGESTIVE TRACT: ICD-10-CM

## 2023-09-28 DIAGNOSIS — M54.6 CHRONIC THORACIC BACK PAIN, UNSPECIFIED BACK PAIN LATERALITY: Primary | ICD-10-CM

## 2023-09-28 DIAGNOSIS — K90.9 DIARRHEA DUE TO MALABSORPTION: ICD-10-CM

## 2023-09-28 DIAGNOSIS — S46.209A INJURY OF TENDON OF BICEPS: ICD-10-CM

## 2023-09-28 DIAGNOSIS — R19.7 DIARRHEA DUE TO MALABSORPTION: ICD-10-CM

## 2023-09-28 DIAGNOSIS — G25.81 RESTLESS LEGS SYNDROME: ICD-10-CM

## 2023-09-28 PROCEDURE — 87186 SC STD MICRODIL/AGAR DIL: CPT | Mod: ZL | Performed by: FAMILY MEDICINE

## 2023-09-28 PROCEDURE — 99214 OFFICE O/P EST MOD 30 MIN: CPT | Performed by: FAMILY MEDICINE

## 2023-09-28 PROCEDURE — 72070 X-RAY EXAM THORAC SPINE 2VWS: CPT | Mod: TC

## 2023-09-28 PROCEDURE — G0463 HOSPITAL OUTPT CLINIC VISIT: HCPCS

## 2023-09-28 RX ORDER — MONTELUKAST SODIUM 10 MG/1
10 TABLET ORAL AT BEDTIME
Qty: 90 TABLET | Refills: 1 | Status: SHIPPED | OUTPATIENT
Start: 2023-09-28 | End: 2024-01-16

## 2023-09-28 RX ORDER — PRAMIPEXOLE DIHYDROCHLORIDE 0.12 MG/1
.125-.25 TABLET ORAL AT BEDTIME
Qty: 180 TABLET | Refills: 1 | Status: SHIPPED | OUTPATIENT
Start: 2023-09-28 | End: 2024-02-05

## 2023-09-28 RX ORDER — NITROFURANTOIN 25; 75 MG/1; MG/1
100 CAPSULE ORAL 2 TIMES DAILY
Qty: 10 CAPSULE | Refills: 0 | Status: SHIPPED | OUTPATIENT
Start: 2023-09-28 | End: 2023-10-03

## 2023-09-28 RX ORDER — ATORVASTATIN CALCIUM 20 MG/1
20 TABLET, FILM COATED ORAL DAILY
Qty: 90 TABLET | Refills: 1 | Status: SHIPPED | OUTPATIENT
Start: 2023-09-28 | End: 2024-02-05

## 2023-09-28 RX ORDER — PREDNISONE 1 MG/1
2 TABLET ORAL DAILY
Qty: 60 TABLET | Refills: 1 | Status: SHIPPED | OUTPATIENT
Start: 2023-09-28 | End: 2023-10-24

## 2023-09-28 ASSESSMENT — PAIN SCALES - GENERAL: PAINLEVEL: EXTREME PAIN (8)

## 2023-09-28 NOTE — PROGRESS NOTES
Assessment & Plan     Dysuria  Can't do UA due to Azo.  Will start Macrobid while awaiting culture.  - nitroFURantoin macrocrystal-monohydrate (MACROBID) 100 MG capsule; Take 1 capsule (100 mg) by mouth 2 times daily for 5 days  - Urine Culture Aerobic Bacterial    Chronic thoracic back pain, unspecified back pain laterality  Old compression fracture, unchanged. Will try PT.  - Physical Therapy Referral; Future  - XR Thoracic Spine 2 Views (Clinic Performed); Future    PMR (polymyalgia rheumatica) (H)  Will continue at 2mg dose for a few months, re-attempt taper in the future.  - predniSONE (DELTASONE) 1 MG tablet; Take 2 tablets (2 mg) by mouth daily    Injury of tendon of biceps  Left sided rupture, she declines eval/referral as not willing to do surgery, still normal function for her ADLs.    Post-nasal drip  Renew  - montelukast (SINGULAIR) 10 MG tablet; Take 1 tablet (10 mg) by mouth At Bedtime    Hypertriglyceridemia  Renew  - atorvastatin (LIPITOR) 20 MG tablet; Take 1 tablet (20 mg) by mouth daily    Restless legs syndrome  Renew.  Nurse note states mirapex not working anymore, patient tells me things are going well still.  - pramipexole (MIRAPEX) 0.125 MG tablet; Take 1-2 tablets (0.125-0.25 mg) by mouth At Bedtime    Encounter for vaccination  Vaccine ordered, but not given.        DAYSI LUCIANO, [facility] - LAUREL Clemente is a 67 year old, presenting for the following health issues:  Recheck Medication and Hypertension        9/28/2023     2:43 PM   Additional Questions   Roomed by Daly Cotton   Accompanied by none         9/28/2023     2:43 PM   Patient Reported Additional Medications   Patient reports taking the following new medications none       HPI     Woke this morning with a UTI  Taking Azo OTC  Urgency,  burningg, feels like past uti    Took prednisone 1mg x3 days, shoulder pains came back, went back up to 2mg, back under control    Tore left bicep on 8/23 -  pulling a branch down to hand humming bird feeder.  Still normal ROM  Declines imaging  or referral as not going to have surgery    Uppper back from bra up aches.  Can be up for 4 hours and needs to lay down for 20 minutes.  Good for another 3 hours and needs to lay down again.  Motrin not helping.feels different than her PMR.  Going on for at least a year or more.  Exercising used to help, no longer helping. Unclear if worsening as taper prednisone.    Food journal brought in - not eating very much - explains why she was losing weight before.    Needs some refills, as above    Hypertension Follow-up    Do you check your blood pressure regularly outside of the clinic? No   Are you following a low salt diet? Yes  Are your blood pressures ever more than 140 on the top number (systolic) OR more   than 90 on the bottom number (diastolic), for example 140/90? Yes  Medication Followup of Prednisone taper, Mirapex/Restless legs.Lomitil/loose stools  Taking Medication as prescribed: yes  Side Effects:  None  Medication Helping Symptoms:  yes for prednisone and lomitil; pt states for mirapex she is now taking 2 and now it feels like that isnt working          Review of Systems   Constitutional:  Negative for fever.   HENT:  Negative for sore throat.    Cardiovascular:  Negative for peripheral edema.   Gastrointestinal:  Negative for abdominal pain.            Objective    BP 98/62 (BP Location: Left arm, Patient Position: Sitting, Cuff Size: Adult Small)   Pulse 86   Temp 98.1  F (36.7  C) (Tympanic)   Wt 49.4 kg (109 lb)   SpO2 98%   BMI 20.60 kg/m    Body mass index is 20.6 kg/m .  Physical Exam  Constitutional:       General: She is not in acute distress.     Appearance: Normal appearance.   HENT:      Head: Normocephalic and atraumatic.   Eyes:      Conjunctiva/sclera: Conjunctivae normal.   Cardiovascular:      Rate and Rhythm: Regular rhythm.      Heart sounds: Normal heart sounds. No murmur heard.  Pulmonary:       Breath sounds: Normal breath sounds. No wheezing.   Abdominal:      General: Bowel sounds are normal.      Palpations: Abdomen is soft.      Tenderness: There is no abdominal tenderness.   Musculoskeletal:      Comments: Mid thoracic midline tenderness without palpable deformity  Distal biceps palpable lump consistent with tear - now basically symetic with right side   Lymphadenopathy:      Cervical: No cervical adenopathy.   Neurological:      Mental Status: She is alert and oriented to person, place, and time.

## 2023-09-29 RX ORDER — PREDNISONE 1 MG/1
2 TABLET ORAL DAILY
Qty: 60 TABLET | Refills: 1 | OUTPATIENT
Start: 2023-09-29

## 2023-09-29 NOTE — TELEPHONE ENCOUNTER
lomotil     Last Written Prescription Date:  8.22.2023  Last Fill Quantity: 120,   # refills: 1  Last Office Visit: 9.28.2023  Future Office visit:    Next 5 appointments (look out 90 days)      Nov 29, 2023 10:00 AM  (Arrive by 9:45 AM)  Pre-Op physical with Jose Guadalupe Lowe DO  [facility] [place] ([facility] [place] ) [address]  [place] [address]  [phone]             Routing refill request to provider for review/approval because:  Drug not on the [facility], [facility] or [facility] refill protocol or controlled substance    Bentyl      Last Written Prescription Date:  1.26.2023  Last Fill Quantity: 540,   # refills: 1  Last Office Visit:   Future Office visit:    Next 5 appointments (look out 90 days)      Nov 29, 2023 10:00 AM  (Arrive by 9:45 AM)  Pre-Op physical with Jose Guadalupe Lowe DO  [facility] [place] ([facility] [place] ) [address]  [place] [address]  [phone]             Routing refill request to provider for review/approval because:  Drug not on the [facility], [facility] or [facility] refill protocol or controlled substance    Susie WALTON RN

## 2023-10-01 LAB — BACTERIA UR CULT: ABNORMAL

## 2023-10-02 RX ORDER — DICYCLOMINE HYDROCHLORIDE 10 MG/1
20 CAPSULE ORAL
Qty: 540 CAPSULE | Refills: 0 | Status: SHIPPED | OUTPATIENT
Start: 2023-10-02 | End: 2024-03-01

## 2023-10-02 RX ORDER — DIPHENOXYLATE HCL/ATROPINE 2.5-.025MG
1-2 TABLET ORAL 4 TIMES DAILY PRN
Qty: 120 TABLET | Refills: 0 | Status: SHIPPED | OUTPATIENT
Start: 2023-10-02 | End: 2024-03-01

## 2023-10-04 ASSESSMENT — ENCOUNTER SYMPTOMS
SORE THROAT: 0
ABDOMINAL PAIN: 0
FEVER: 0

## 2023-10-13 ENCOUNTER — THERAPY VISIT (OUTPATIENT)
Dept: PHYSICAL THERAPY | Facility: HOSPITAL | Age: 68
End: 2023-10-13
Attending: FAMILY MEDICINE
Payer: COMMERCIAL

## 2023-10-13 DIAGNOSIS — G89.29 CHRONIC THORACIC BACK PAIN, UNSPECIFIED BACK PAIN LATERALITY: ICD-10-CM

## 2023-10-13 DIAGNOSIS — M54.6 CHRONIC THORACIC BACK PAIN, UNSPECIFIED BACK PAIN LATERALITY: ICD-10-CM

## 2023-10-13 PROCEDURE — 97161 PT EVAL LOW COMPLEX 20 MIN: CPT | Mod: GP

## 2023-10-13 PROCEDURE — 97140 MANUAL THERAPY 1/> REGIONS: CPT | Mod: GP

## 2023-10-13 NOTE — PROGRESS NOTES
"PHYSICAL THERAPY EVALUATION  Type of Visit: Evaluation    See electronic medical record for Abuse and Falls Screening details.    Subjective       Presenting condition or subjective complaint: \"Upper Back Pain\" Pt. Presents to therapy today on referral of Dr. Lowe for debilitating thoracic pain. She reports 10/10 pain following a period of 4 hour activity. This requires her to lay on her back for 20 minutes before returning to activity, Pain is in region from bra strap to cervical spine. Denies symptoms into either UE of neurologic nature. She does have an old compression fx. Which is considered stable per neurology. Goals for therapy are to decrease frequency of episodes while increasing activity tolerance.    Date of onset: 09/28/23 (Condition chronic in nature)    Relevant medical history: Arthritis; Asthma; Fibromyalgia; Implanted device; Menopause; Osteoporosis; Pain at night or rest; Significant weakness; Smoking; Thyroid problems; Unexplained weight loss   Dates & types of surgery: -- (Extensive history: See intake form)    Prior diagnostic imaging/testing results: X-ray     Prior therapy history for the same diagnosis, illness or injury: Yes 6/2023 Exercises    Prior Level of Function  Transfers: Independent  Ambulation: Assistive equipment  ADL: Assistive equipment    Living Environment  Social support: With a significant other or spouse   Type of home: 1 level; Basement   Stairs to enter the home: Yes 3     Ramp: No   Stairs inside the home: Yes 0 (3 up 10 down) Is there a railing: Yes   Help at home: None  Equipment owned: Straight Cane; Walker; Walker with wheels; Grab bars; Bath bench     Employment: No    Hobbies/Interests: sewing, crafts    Patient goals for therapy: \"A day without having to lay flat after 4 hours up\"    Pain assessment: Pain present  See objective evaluation for additional pain details     Objective   CERVICAL SPINE EVALUATION  PAIN: Pain Level at Rest: 5/10  Pain Level with Use: " 10/10  Pain Location: thoracic spine and shoulder  Pain Quality: Aching and Dull  Pain Frequency: constant  Pain is Worst: daytime  Pain is Exacerbated By: Any period of activity   Pain is Relieved By: NSAIDs, otc medications, and rest  Pain Progression: Worsened  INTEGUMENTARY (edema, incisions): WNL  POSTURE: Sitting Posture: Rounded shoulders, Forward head, Thoracic kyphosis increased  ROM:   (Degrees) Left AROM Right AROM    Cervical Flexion WNL end range stretch in scapular musculature     Cervical Extension WNL    Cervical Side bend WNL tension contralateral trapezius  WNL tension contralateral trapezius     Cervical Rotation WNL tension contralateral trapezius  WNL tension contralateral trapezius     Thoracic Flexion WNL painful stretch contralateral     Thoracic Extension Pain limited     Thoracic Rotation WNL painful stretch contralateral  WNL painful stretch contralateral      Left AROM Left PROM Right AROM Right PROM   Shoulder Flexion WNL WNL WNL WNL   Shoulder Extension WNL WNL WNL WNL   Shoulder Abduction WNL WNL WNL WNL   Shoulder Adduction WNL WNL WNL WNL   Shoulder IR WNL WNL WNL WNL   Shoulder ER WNL WNL WNL WNL   Shoulder Horiz Abduction WNL WNL WNL WNL   Shoulder Horiz Adduction WNL WNL WNL WNL   Pain:   End Feel:     MYOTOMES:    Left Right   C1-2 (Neck Flexion) 5 5   C3 (Neck Side Bend)  5, + (mild) 5, + (mild)   C4 (Shrug) 5 5   C5 (Deltoid) 5 5   C6 (Biceps) 4 4   C7 (Triceps) 5 5   C8 (Thumb Ext) 5 5   T1 (Intrinsics) 5 5     DERMATOMES:    Left Right   C1 Normal (light touch) Normal (light touch)   C2 Normal (light touch) Normal (light touch)   C3 Normal (light touch) Normal (light touch)   C4 Normal (light touch) Normal (light touch)   C5 Normal (light touch) Normal (light touch)   C6 Normal (light touch) Normal (light touch)   C7 Normal (light touch) Normal (light touch)   C8 Normal (light touch) Normal (light touch)   T1 Normal (light touch) Normal (light touch)     NEURAL TENSION:  Cervical WNL  FLEXIBILITY: Decreased rhomboids L, Decreased upper trap L, Decreased levator L, Decreased rhomboids R, Decreased upper trap R, Decreased levator R   SPECIAL TESTS: Unremarkable   PALPATION:   + Tenderness At Location Left Right   Rhomboids + +   Upper Trap + +   Levator + +   Erector Spinae + +     Strength: Pt. Demonstrated strength grossly 3/5 in scapular stabilizers today.     SPINAL SEGMENTAL CONCLUSIONS:  Gross hypomobility with pain throughout. Pain did improved with continued mobilization.     Assessment & Plan   CLINICAL IMPRESSIONS  Medical Diagnosis: Chronic thoracic back pain, unspecified back pain laterality (M54.6, G89.29    Treatment Diagnosis: Thoracic back pain due to decreased flexibility and impaired muscle performance of upper quarter   Impression/Assessment: Patient is a 67 year old female with  complaints of severe thoracic back pain. Clinical testing consistent with irritation of thoracic musculature due to decreased flexibility and strength. Prognosis is good as pt. Was pain adaptive within session today. The following significant findings have been identified: Pain, Decreased ROM/flexibility, Decreased joint mobility, Decreased strength, Impaired muscle performance, Decreased activity tolerance, and Impaired posture. These impairments interfere with their ability to perform self care tasks, recreational activities, household chores, household mobility, and community mobility as compared to previous level of function.     Clinical Decision Making (Complexity):  Clinical Presentation: Stable/Uncomplicated  Clinical Presentation Rationale: based on medical and personal factors listed in PT evaluation  Clinical Decision Making (Complexity): Low complexity    PLAN OF CARE  Treatment Interventions:  Modalities: Cryotherapy, Dry Needling, Hot Pack, Ultrasound  Interventions: Manual Therapy, Neuromuscular Re-education, Therapeutic Activity, Therapeutic Exercise, Self-Care/Home  Management    Long Term Goals     PT Goal 1  Goal Identifier: LTG #1  Goal Description: Pt. to be independent with correct performance of HEP to progress to self management of current condition.  Target Date: 12/15/23  PT Goal 2  Goal Identifier: LTG #2  Goal Description: Pt. to demonstrate improved activity tolerance with sewing and other home tasks by no longer requiring laying down for pain management following 4 hours of activity.  Target Date: 12/15/23  PT Goal 3  Goal Identifier: LTG #3  Goal Description: Pt. to improve strength in scapular stabilizers to 4/5 or better to demonstrate improved recruitment of postural muscles.  Target Date: 12/15/23  PT Goal 4  Goal Identifier: STG #1  Goal Description: Pt. to report decrease in level of pain at worst to 5/10 or better to improve tolerance for ADL's and recreational activities.  Target Date: 11/12/23      Frequency of Treatment: 1x week  Duration of Treatment: 9 weeks    Education Assessment:   Learner/Method: Patient  Education Comments: Several topics covered with pt. including postural and activity modifications, Anatomy of treatment region/diagnosis and rehabilitation plan.    Risks and benefits of evaluation/treatment have been explained.   Patient/Family/caregiver agrees with Plan of Care.     Evaluation Time:     PT Eval, Low Complexity Minutes (23306): 25       Signing Clinician: Marc Oconnor, PT      [facility]                                                                                   OUTPATIENT PHYSICAL THERAPY      PLAN OF TREATMENT FOR OUTPATIENT REHABILITATION   Patient's Last Name, First Name, Chyna East YOB: 1955   Provider's Name   [facility]   Medical Record No.  [id number]     Onset Date: 09/28/23 (Condition chronic in nature)  Start of Care Date: 10/13/23     Medical Diagnosis:  Chronic thoracic back pain, unspecified back pain laterality  (M54.6, G89.29      PT Treatment Diagnosis:  Thoracic back pain due to decreased flexibility and impaired muscle performance of upper quarter Plan of Treatment  Frequency/Duration: 1x week/ 9 weeks    Certification date from 10/13/23 to 12/15/23         See note for plan of treatment details and functional goals     Marc Oconnor, PT                         I CERTIFY THE NEED FOR THESE SERVICES FURNISHED UNDER        THIS PLAN OF TREATMENT AND WHILE UNDER MY CARE     (Physician attestation of this document indicates review and certification of the therapy plan).                Referring Provider:  Jose Guadalupe Lowe      Initial Assessment  See Epic Evaluation- Start of Care Date: 10/13/23

## 2023-10-19 DIAGNOSIS — M35.3 PMR (POLYMYALGIA RHEUMATICA) (H): ICD-10-CM

## 2023-10-20 ENCOUNTER — THERAPY VISIT (OUTPATIENT)
Dept: PHYSICAL THERAPY | Facility: HOSPITAL | Age: 68
End: 2023-10-20
Attending: FAMILY MEDICINE
Payer: COMMERCIAL

## 2023-10-20 DIAGNOSIS — G89.29 CHRONIC THORACIC BACK PAIN, UNSPECIFIED BACK PAIN LATERALITY: Primary | ICD-10-CM

## 2023-10-20 DIAGNOSIS — M54.6 CHRONIC THORACIC BACK PAIN, UNSPECIFIED BACK PAIN LATERALITY: Primary | ICD-10-CM

## 2023-10-20 PROCEDURE — 97140 MANUAL THERAPY 1/> REGIONS: CPT | Mod: GP

## 2023-10-20 PROCEDURE — 97110 THERAPEUTIC EXERCISES: CPT | Mod: GP

## 2023-10-23 DIAGNOSIS — M10.041 IDIOPATHIC GOUT OF RIGHT HAND, UNSPECIFIED CHRONICITY: ICD-10-CM

## 2023-10-23 RX ORDER — ALLOPURINOL 100 MG/1
100 TABLET ORAL 2 TIMES DAILY
Qty: 180 TABLET | Refills: 1 | OUTPATIENT
Start: 2023-10-23

## 2023-10-23 NOTE — TELEPHONE ENCOUNTER
Allopurinol      Last Written Prescription Date:  7/20/23  Last Fill Quantity: 180,   # refills: 1  Last Office Visit: 9/28/23  Future Office visit:    Next 5 appointments (look out 90 days)      Nov 29, 2023 10:00 AM  (Arrive by 9:45 AM)  Pre-Op physical with Jose Guadalupe Lowe DO  [facility] [place] ([facility] [place] ) [address]  [place] [address]  [phone]     Manuel 15, 2024  1:00 PM  (Arrive by 12:45 PM)  SHORT with Jose Guadalupe Lowe DO  [facility] [place] ([facility] [place] ) [address]  [place] [address]  [phone]             Routing refill request to provider for review/approval because:

## 2023-10-23 NOTE — TELEPHONE ENCOUNTER
Prednisone      Last Written Prescription Date:  9/28/23  Last Fill Quantity: 60,   # refills: 1  Last Office Visit: 9/28/23  Future Office visit:    Next 5 appointments (look out 90 days)      Nov 29, 2023 10:00 AM  (Arrive by 9:45 AM)  Pre-Op physical with Jose Guadalupe Lowe DO  [facility] [place] ([facility] [place] ) [address]  [place] [address]  [phone]     Manuel 15, 2024  1:00 PM  (Arrive by 12:45 PM)  SHORT with Jose Guadalupe Lowe DO  [facility] [place] ([facility] [place] ) [address]  [place] [address]  [phone]             Routing refill request to provider for review/approval because:

## 2023-10-24 RX ORDER — PREDNISONE 1 MG/1
2 TABLET ORAL DAILY
Qty: 60 TABLET | Refills: 1 | Status: SHIPPED | OUTPATIENT
Start: 2023-10-24 | End: 2024-03-01

## 2023-11-03 ENCOUNTER — THERAPY VISIT (OUTPATIENT)
Dept: PHYSICAL THERAPY | Facility: HOSPITAL | Age: 68
End: 2023-11-03
Attending: FAMILY MEDICINE
Payer: COMMERCIAL

## 2023-11-03 DIAGNOSIS — G89.29 CHRONIC THORACIC BACK PAIN, UNSPECIFIED BACK PAIN LATERALITY: Primary | ICD-10-CM

## 2023-11-03 DIAGNOSIS — M54.6 CHRONIC THORACIC BACK PAIN, UNSPECIFIED BACK PAIN LATERALITY: Primary | ICD-10-CM

## 2023-11-03 PROCEDURE — 97035 APP MDLTY 1+ULTRASOUND EA 15: CPT | Mod: GP,CQ

## 2023-11-03 PROCEDURE — 97140 MANUAL THERAPY 1/> REGIONS: CPT | Mod: GP,CQ

## 2023-11-10 ENCOUNTER — THERAPY VISIT (OUTPATIENT)
Dept: PHYSICAL THERAPY | Facility: HOSPITAL | Age: 68
End: 2023-11-10
Attending: FAMILY MEDICINE
Payer: COMMERCIAL

## 2023-11-10 DIAGNOSIS — M54.6 CHRONIC THORACIC BACK PAIN, UNSPECIFIED BACK PAIN LATERALITY: Primary | ICD-10-CM

## 2023-11-10 DIAGNOSIS — G89.29 CHRONIC THORACIC BACK PAIN, UNSPECIFIED BACK PAIN LATERALITY: Primary | ICD-10-CM

## 2023-11-10 PROCEDURE — 97010 HOT OR COLD PACKS THERAPY: CPT | Mod: GP,CQ

## 2023-11-10 PROCEDURE — 97035 APP MDLTY 1+ULTRASOUND EA 15: CPT | Mod: GP,CQ

## 2023-11-12 ENCOUNTER — HEALTH MAINTENANCE LETTER (OUTPATIENT)
Age: 68
End: 2023-11-12

## 2023-11-14 DIAGNOSIS — H60.392 INFECTIVE OTITIS EXTERNA, LEFT: ICD-10-CM

## 2023-11-15 NOTE — TELEPHONE ENCOUNTER
Cipro      Last Written Prescription Date:  6.13.22  Last Fill Quantity: #14,   # refills: 0  Last Office Visit: 9.28.23  Future Office visit:    Next 5 appointments (look out 90 days)      Nov 29, 2023 10:00 AM  (Arrive by 9:45 AM)  Pre-Op physical with Jose Guadalupe Lowe DO  [facility] [place] ([facility] [place] ) [address]  [place] [address]  [phone]     Manuel 15, 2024  1:00 PM  (Arrive by 12:45 PM)  SHORT with Jose Guadalupe Lowe DO  [facility] [place] ([facility] [place] ) [address]  [place] [address]  [phone]             Routing refill request to provider for review/approval because:  Drug not on the [facility], [facility] or [facility] refill protocol or controlled substance

## 2023-11-17 ENCOUNTER — THERAPY VISIT (OUTPATIENT)
Dept: PHYSICAL THERAPY | Facility: HOSPITAL | Age: 68
End: 2023-11-17
Attending: FAMILY MEDICINE
Payer: COMMERCIAL

## 2023-11-17 DIAGNOSIS — G89.29 CHRONIC THORACIC BACK PAIN, UNSPECIFIED BACK PAIN LATERALITY: Primary | ICD-10-CM

## 2023-11-17 DIAGNOSIS — M54.6 CHRONIC THORACIC BACK PAIN, UNSPECIFIED BACK PAIN LATERALITY: Primary | ICD-10-CM

## 2023-11-17 PROCEDURE — 97110 THERAPEUTIC EXERCISES: CPT | Mod: GP,CQ

## 2023-11-17 PROCEDURE — 97140 MANUAL THERAPY 1/> REGIONS: CPT | Mod: GP,CQ

## 2023-11-17 RX ORDER — CIPROFLOXACIN 0.5 MG/.25ML
0.25 SOLUTION/ DROPS AURICULAR (OTIC) 2 TIMES DAILY
Qty: 14 EACH | Refills: 0 | Status: SHIPPED | OUTPATIENT
Start: 2023-11-17

## 2023-11-17 NOTE — TELEPHONE ENCOUNTER
I signed the order, but please get her on my schedule asap so we can see what's going on and make sure we are treating the right thing.  It's not good medicine to be sending in antibiotics unless we know what's going on.

## 2023-11-28 NOTE — PROGRESS NOTES
[facility] [place]  [address]  [place] [address]  Phone: [phone]  Primary Provider: Jose Guadalupe Luciano  Pre-op Performing Provider: JOSE GUADALUPE LUCIANO      PREOPERATIVE EVALUATION:  Today's date: 11/29/2023    Chyna is a 67 year old, presenting for the following:  Pre-Op Exam        Surgical Information:  Surgery/Procedure: EGD/Ileoscopy  Surgery Location: [facility] [place]  Surgeon:   Surgery Date: 12/8/2023  Time of Surgery: TBD  Where patient plans to recover: At home with family  Fax number for surgical facility: Note does not need to be faxed, will be available electronically in Epic.    Assessment & Plan     The proposed surgical procedure is considered LOW risk.    Preoperative examination  - Basic metabolic panel  - CBC with Platelets & Differential    FAP (familial adenomatous polyposis)    PMR (polymyalgia rheumatica) (H24)    Diarrhea due to malabsorption    Moderate persistent asthma without complication            - No identified additional risk factors other than previously addressed    Antiplatelet or Anticoagulation Medication Instructions:   - aspirin: She is going to verify with surgeon whether he prefers she hold/continue her baby aspirin    Additional Medication Instructions:  Hold meds morning of procedure      RECOMMENDATION:  APPROVAL GIVEN to proceed with proposed procedure, without further diagnostic evaluation.        Subjective       HPI related to upcoming procedure: preop upper/lower scopes          11/29/2023     9:40 AM   Preop Questions   1. Have you ever had a heart attack or stroke? No   2. Have you ever had surgery on your heart or blood vessels, such as a stent placement, a coronary artery bypass, or surgery on an artery in your head, neck, heart, or legs? No   3. Do you have chest pain with activity? No   4. Do you have a history of  heart failure? No   5. Do you currently have a cold, bronchitis or symptoms of other infection? No   6. Do you have a cough, shortness  of breath, or wheezing? No   7. Do you or anyone in your family have previous history of blood clots? No   8. Do you or does anyone in your family have a serious bleeding problem such as prolonged bleeding following surgeries or cuts? No   9. Have you ever had problems with anemia or been told to take iron pills? No   10. Have you had any abnormal blood loss such as black, tarry or bloody stools, or abnormal vaginal bleeding? No   11. Have you ever had a blood transfusion? YES - after tubal pregnancy in late 1970's/early 1980's   11a. Have you ever had a transfusion reaction? No   12. Are you willing to have a blood transfusion if it is medically needed before, during, or after your surgery? Yes   13. Have you or any of your relatives ever had problems with anesthesia? No   14. Do you have sleep apnea, excessive snoring or daytime drowsiness? No   15. Do you have any artifical heart valves or other implanted medical devices like a pacemaker, defibrillator, or continuous glucose monitor? No   16. Do you have artificial joints? YES - Right Hip   17. Are you allergic to latex? No     Health Care Directive:  Patient has a Health Care Directive on file      Preoperative Review of :   reviewed - controlled substances reflected in medication list.      Status of Chronic Conditions:  PMR - stable on 2 mg daily, didn't tolerate 1 mg dose due to symptom return  Restless Legs  Hypertriglyceridemia  Post-nasal drip  Chronic thoracic back pain.  Diarrhea due to malabsorption  Osteoporosis  Smoking, currently 1/2 ppd.  Asthma, controlled      Review of Systems   Constitutional:  Negative for fever.   Respiratory:  Negative for cough and shortness of breath.    Cardiovascular:  Negative for chest pain, palpitations and peripheral edema.   Gastrointestinal:  Negative for abdominal pain.   Neurological:  Negative for light-headedness and headaches.         Patient Active Problem List    Diagnosis Date Noted    Chronic thoracic  back pain, unspecified back pain laterality 10/13/2023     Priority: Medium    Trochanteric bursitis of right hip 05/15/2023     Priority: Medium    Diarrhea due to malabsorption 05/15/2023     Priority: Medium    Closed fracture of left hip (H) 02/19/2023     Priority: Medium    Closed fracture of left hip, initial encounter (H) 02/19/2023     Priority: Medium    Idiopathic gout of right hand, unspecified chronicity 09/09/2020     Priority: Medium    PMR (polymyalgia rheumatica) (H24) 09/09/2020     Priority: Medium    Osteoporosis 09/03/2019     Priority: Medium    Hip fracture requiring operative repair (H) 02/25/2019     Priority: Medium    Closed fracture of neck of right femur (H) 02/23/2019     Priority: Medium    Functional diarrhea 10/26/2018     Priority: Medium    Iron deficiency 04/18/2018     Priority: Medium    Myalgia 05/30/2017     Priority: Medium    Abdominal muscle strain 05/05/2017     Priority: Medium    Strain of flexor muscle of hip, unspecified laterality, initial encounter 05/05/2017     Priority: Medium    Right shoulder pain 11/03/2016     Priority: Medium    ACP (advance care planning) 05/09/2016     Priority: Medium     Advance Care Planning 5/9/2016: ACP Review of Chart / Resources Provided:  Reviewed chart for advance care plan.  Chyna Delgado has been provided information and resources to begin or update their advance care plan.  Added by Lina PONCE Bu            Pain of toe of right foot 05/09/2016     Priority: Medium    Arthritis 02/12/2016     Priority: Medium    Graves disease 11/05/2015     Priority: Medium    Numbness and tingling in right hand 04/20/2015     Priority: Medium    Restless legs syndrome 04/16/2014     Priority: Medium    Somatic dysfunction of pelvic region 10/08/2013     Priority: Medium    Seborrheic keratosis 07/12/2013     Priority: Medium     Imo Update utility      Mild persistent asthma 06/28/2013     Priority: Medium    Sacroiliac pain 06/28/2013      Priority: Medium    Benign neoplasm of stomach 08/16/2012     Priority: Medium    Colon polyposis 08/16/2012     Priority: Medium    Family history of colonic polyps 08/16/2012     Priority: Medium    Family history of skin cancer 08/16/2012     Priority: Medium    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     Priority: Medium     Overview:   IMO Update 10/11      Seborrheic dermatitis, unspecified 10/12/2011     Priority: Medium     Overview:   IMO Update 10/11      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     Priority: Medium    Hypothyroidism 07/07/2011     Priority: Medium      Past Medical History:   Diagnosis Date    Abnormal mammogram, unspecified 01/08/2003    Normal pathology    Back Pain 02/10/2000    Sacroiliac pain    Benign neoplasm of colon 03/10/2000    Benign paroxysmal positional vertigo 06/07/2012    Depressive disorder, not elsewhere classified 02/10/2004    Diarrhea 12/15/2010    Secondary to colectomy for familial polyposis    Gastric polyp 12/11/2015, 12/18/2017    recurrent     History of normal mammogram 07/18/2014, 1/18/2019    Idiopathic osteoporosis 12/15/2010    Interstitial emphysema (H) 12/15/2010    menopausal or female climacteric states 08/31/1999    Mixed hyperlipidemia 12/15/2010    Other bursitis disorders 02/04/2011    Greater trochanteric    Other forms of retinal detachment(361.89) 12/15/2010    Other malaise and fatigue 01/10/2003    Personal history of tobacco use, presenting hazards to health 12/15/2010    Polymyalgia rheumatica (H24)     Polyposis of colon     Restless legs syndrome (RLS) 12/15/2010    Rotator cuff tendonitis     Rotator cuff tendonitis     Sacroiliitis, not elsewhere classified (H24) 12/15/2010    multiple sites    Tobacco use disorder 08/22/2012    Unspecified asthma(493.90) 12/15/2010     Past Surgical History:   Procedure Laterality Date    ARTHROPLASTY HIP ANTERIOR Right 05/15/2023    Procedure: Right Direct Anterior Total Hip Arthroplasty;  Surgeon: Kurt Jordan MD;   Location: [facility]    benign tumors removed from back      CATARACT EXTRACTION Right 06/21/2022    CATARACT EXTRACTION Left 07/12/2022    CLOSED REDUCTION, PERCUTANEOUS PINNING HIP Right 02/24/2019    Procedure: Percutaneous Screw Fixation of Right Hip Fracture;  Surgeon: Amrik Kolb DO;  Location: [facility]    COLECTOMY TOTAL      COLON SURGERY N/A     HX: Total colectomy with J-pouch reconstruction.     ENDOSCOPY UPPER, COLONOSCOPY, COMBINED N/A 09/05/2014    Procedure: COMBINED ENDOSCOPY UPPER, COLONOSCOPY;  Surgeon: Delbert Patel MD;  Location: [facility]    ENDOSCOPY UPPER, COLONOSCOPY, COMBINED N/A 05/09/2019    Procedure: UPPER ENDOSCOPY  WITH BIOPSIES AND  COLONOSCOPY WITH BIOPSIES;  Surgeon: Tai Diaz MD;  Location: [facility]    ESOPHAGOSCOPY, GASTROSCOPY, DUODENOSCOPY (EGD), COMBINED N/A 12/11/2015    Procedure: COMBINED ESOPHAGOSCOPY, GASTROSCOPY, DUODENOSCOPY (EGD);  Surgeon: Delbert Patel MD;  Location: [facility]    ESOPHAGOSCOPY, GASTROSCOPY, DUODENOSCOPY (EGD), COMBINED N/A 12/18/2017    Procedure: COMBINED ESOPHAGOSCOPY, GASTROSCOPY, DUODENOSCOPY (EGD);  UPPER ENDOSCOPY WITH BIOPSY;  Surgeon: Delbert Patel MD;  Location: [facility]    excised-benign  2002    tongue lesion    HC REPAIR OF NASAL SEPTUM  2002    Deviated nasal septum    LAPAROSCOPIC CHOLECYSTECTOMY      lumpectomy Right breast      Breast Lump    MOUTH SURGERY      removal of spot from roof of mouth    pilonidal cyst surgery  1976    removal of colon and large intestine  2000    Familial polyposis    REMOVE HARDWARE ARTHROPLASTY HIP Right 07/20/2022    Procedure: Right Hip Hardware Removal (Cannulated Screws);  Surgeon: Luis Fernando Marcial MD;  Location: [facility]    Right etopic pregnancy      Pregnancy    TONSILLECTOMY      tonsillitus    UPPER GI ENDOSCOPY  2009    Stomach polyps     Current Outpatient Medications   Medication Sig Dispense Refill    acetaminophen (TYLENOL) 325 MG tablet Take 2 tablets (650 mg) by mouth every 4 hours as needed  for other (mild pain) 100 tablet 0    albuterol (PROAIR HFA/PROVENTIL HFA/VENTOLIN HFA) 108 (90 Base) MCG/ACT inhaler Inhale 2 puffs into the lungs every 4 hours as needed for shortness of breath 18 g 11    alendronate (FOSAMAX) 70 MG tablet Take 1 tablet (70 mg) by mouth every 7 days 12 tablet 3    allopurinol (ZYLOPRIM) 100 MG tablet Take 1 tablet (100 mg) by mouth 2 times daily 180 tablet 1    amoxicillin (AMOXIL) 500 MG capsule TAKE FOUR CAPSULES BY MOUTH ONE HOUR BEFORE dental appointment      aspirin 81 MG EC tablet Take 1 tablet (81 mg) by mouth 2 times daily 60 tablet 0    atorvastatin (LIPITOR) 20 MG tablet Take 1 tablet (20 mg) by mouth daily 90 tablet 1    calcium carbonate 750 MG CHEW Take 1 tablet (750 mg) by mouth 2 times daily 180 tablet 3    ciprofloxacin (CETRAXAL) 0.2 % otic solution Place 0.25 mLs into the right ear 2 times daily (Patient taking differently: Place 0.25 mLs into the right ear 2 times daily as needed) 14 each 0    dicyclomine (BENTYL) 10 MG capsule Take 2 capsules (20 mg) by mouth 3 times daily (before meals) 540 capsule 0    diphenoxylate-atropine (LOMOTIL) 2.5-0.025 MG tablet Take 1-2 tablets by mouth 4 times daily as needed for diarrhea 120 tablet 0    ferrous sulfate (IRON) 325 (65 FE) MG tablet Take 325 mg by mouth 2 times daily (with meals) 90 tablet 2    fluticasone-salmeterol (ADVAIR) 250-50 MCG/ACT inhaler Inhale 1 puff into the lungs 2 times daily 60 each 6    gabapentin (NEURONTIN) 400 MG capsule Take 3 capsules (1,200 mg) by mouth 2 times daily 540 capsule 1    GNP VITAMIN D MAXIMUM STRENGTH 50 MCG (2000 UT) tablet TAKE TWO TABLETS BY MOUTH EVERY DAY (Patient taking differently: Take 1 tablet by mouth daily) 100 tablet 4    ketotifen (ZADITOR/REFRESH ANTI-ITCH) 0.025 % SOLN ophthalmic solution PLACE 2 DROPS INTO BOTH EYES 2 TIMES DAILY 5 mL 2    loperamide (IMODIUM) 2 MG capsule Take 6 mg by mouth 2 times daily plus additional dosing as needed.      montelukast  "(SINGULAIR) 10 MG tablet Take 1 tablet (10 mg) by mouth At Bedtime 90 tablet 1    Multiple Vitamin (MULTIVITAMIN) per tablet Take 1 tablet by mouth daily Every day with food      omeprazole (PRILOSEC) 40 MG DR capsule Take 1 capsule (40 mg) by mouth daily 90 capsule 3    potassium chloride ER (KLOR-CON M) 20 MEQ CR tablet Take 1 tablet (20 mEq) by mouth 3 times daily 90 tablet 9    pramipexole (MIRAPEX) 0.125 MG tablet Take 1-2 tablets (0.125-0.25 mg) by mouth At Bedtime 180 tablet 1    predniSONE (DELTASONE) 1 MG tablet Take 2 tablets (2 mg) by mouth daily 60 tablet 1    PSYLLIUM PO Take 1 Tablespoonful by mouth 2 times daily       Simethicone 125 MG CAPS Take 500 mg by mouth 2 times daily      spironolactone (ALDACTONE) 50 MG tablet Take 1 tablet (50 mg) by mouth 2 times daily 180 tablet 2    traMADol (ULTRAM) 50 MG tablet Take 1 tablet (50 mg) by mouth 3 times daily as needed for severe pain 30 tablet 0       Allergies   Allergen Reactions    Codeine Swelling     Throat swelling-Patient can tolerate Hydrocodone & morphine    Nortriptyline Other (See Comments)     Crying         Social History     Tobacco Use    Smoking status: Every Day     Packs/day: 0.50     Years: 40.00     Additional pack years: 0.00     Total pack years: 20.00     Types: Cigarettes     Passive exposure: Current    Smokeless tobacco: Never   Substance Use Topics    Alcohol use: Yes     Comment: Weekly 3 drinks       History   Drug Use Unknown         Objective     /70   Pulse 68   Temp 97.9  F (36.6  C) (Tympanic)   Resp 18   Ht 1.549 m (5' 1\")   Wt 45.4 kg (100 lb 1.6 oz)   SpO2 98%   BMI 18.91 kg/m      Physical Exam  Constitutional:       General: She is not in acute distress.     Appearance: Normal appearance.   HENT:      Head: Normocephalic and atraumatic.      Right Ear: Tympanic membrane normal.      Left Ear: Tympanic membrane normal.      Mouth/Throat:      Mouth: Mucous membranes are moist.      Pharynx: Oropharynx is " clear.   Eyes:      Conjunctiva/sclera: Conjunctivae normal.      Pupils: Pupils are equal, round, and reactive to light.   Neck:      Vascular: No carotid bruit.   Cardiovascular:      Rate and Rhythm: Normal rate and regular rhythm.      Heart sounds: Normal heart sounds. No murmur heard.  Pulmonary:      Effort: Pulmonary effort is normal.      Breath sounds: Normal breath sounds.   Abdominal:      General: Bowel sounds are normal.      Palpations: Abdomen is soft.      Tenderness: There is no abdominal tenderness.   Musculoskeletal:      Cervical back: Normal range of motion.      Right lower leg: No edema.      Left lower leg: No edema.   Lymphadenopathy:      Cervical: No cervical adenopathy.   Neurological:      Mental Status: She is alert and oriented to person, place, and time.           Recent Labs   Lab Test 07/20/23  1051 05/16/23  0536 05/09/23  1210 02/18/23  1652 01/05/23  1349   HGB 15.2 10.1* 14.1   < >  --      --  250   < >  --      --  139   < > 137   POTASSIUM 4.8  --  4.4   < > 4.8   CR 0.83  --  0.85   < > 1.06*   A1C  --   --   --   --  5.4    < > = values in this interval not displayed.        Diagnostics:  Recent Results (from the past 168 hour(s))   Basic metabolic panel    Collection Time: 11/29/23 10:20 AM   Result Value Ref Range    Sodium 133 (L) 135 - 145 mmol/L    Potassium 4.8 3.4 - 5.3 mmol/L    Chloride 98 98 - 107 mmol/L    Carbon Dioxide (CO2) 25 22 - 29 mmol/L    Anion Gap 10 7 - 15 mmol/L    Urea Nitrogen 24.6 (H) 8.0 - 23.0 mg/dL    Creatinine 0.82 0.51 - 0.95 mg/dL    GFR Estimate 77 >60 mL/min/1.73m2    Calcium 10.2 8.8 - 10.2 mg/dL    Glucose 100 (H) 70 - 99 mg/dL   CBC with platelets and differential    Collection Time: 11/29/23 10:20 AM   Result Value Ref Range    WBC Count 11.5 (H) 4.0 - 11.0 10e3/uL    RBC Count 4.64 3.80 - 5.20 10e6/uL    Hemoglobin 14.7 11.7 - 15.7 g/dL    Hematocrit 43.5 35.0 - 47.0 %    MCV 94 78 - 100 fL    MCH 31.7 26.5 - 33.0 pg     MCHC 33.8 31.5 - 36.5 g/dL    RDW 13.3 10.0 - 15.0 %    Platelet Count 267 150 - 450 10e3/uL    % Neutrophils 72 %    % Lymphocytes 16 %    % Monocytes 10 %    % Eosinophils 1 %    % Basophils 1 %    % Immature Granulocytes 0 %    NRBCs per 100 WBC 0 <1 /100    Absolute Neutrophils 8.3 1.6 - 8.3 10e3/uL    Absolute Lymphocytes 1.8 0.8 - 5.3 10e3/uL    Absolute Monocytes 1.2 0.0 - 1.3 10e3/uL    Absolute Eosinophils 0.1 0.0 - 0.7 10e3/uL    Absolute Basophils 0.1 0.0 - 0.2 10e3/uL    Absolute Immature Granulocytes 0.0 <=0.4 10e3/uL    Absolute NRBCs 0.0 10e3/uL          EKG dated 5/15/23:  NSR rate 65       Revised Cardiac Risk Index (RCRI):  The patient has the following serious cardiovascular risks for perioperative complications:   - No serious cardiac risks = 0 points     RCRI Interpretation: 0 points: Class I (very low risk - 0.4% complication rate)         Signed Electronically by: DAYSI LUCIANO DO  Copy of this evaluation report is provided to requesting physician.

## 2023-11-28 NOTE — H&P (VIEW-ONLY)
[facility] [place]  [address]  [place] [address]  Phone: [phone]  Primary Provider: Jose Guadalupe Luciano  Pre-op Performing Provider: JOSE GUADALUPE LUCIANO      PREOPERATIVE EVALUATION:  Today's date: 11/29/2023    Chyna is a 67 year old, presenting for the following:  Pre-Op Exam        Surgical Information:  Surgery/Procedure: EGD/Ileoscopy  [place] [facility] [place]:   Surgery Date: 12/8/2023  Time of Surgery: TBD  Where patient plans to recover: At home with family  Fax number for surgical facility: Note does not need to be faxed, will be available electronically in Epic.    Assessment & Plan     The proposed surgical procedure is considered LOW risk.    Preoperative examination  - Basic metabolic panel  - CBC with Platelets & Differential    FAP (familial adenomatous polyposis)    PMR (polymyalgia rheumatica) (H24)    Diarrhea due to malabsorption    Moderate persistent asthma without complication            - No identified additional risk factors other than previously addressed    Antiplatelet or Anticoagulation Medication Instructions:   - aspirin: She is going to verify with surgeon whether he prefers she hold/continue her baby aspirin    Additional Medication Instructions:  Hold meds morning of procedure      RECOMMENDATION:  APPROVAL GIVEN to proceed with proposed procedure, without further diagnostic evaluation.        Subjective       HPI related to upcoming procedure: preop upper/lower scopes          11/29/2023     9:40 AM   Preop Questions   1. Have you ever had a heart attack or stroke? No   2. Have you ever had surgery on your heart or blood vessels, such as a stent placement, a coronary artery bypass, or surgery on an artery in your head, neck, heart, or legs? No   3. Do you have chest pain with activity? No   4. Do you have a history of  heart failure? No   5. Do you currently have a cold, bronchitis or symptoms of other infection? No   6. Do you have a cough, shortness  of breath, or wheezing? No   7. Do you or anyone in your family have previous history of blood clots? No   8. Do you or does anyone in your family have a serious bleeding problem such as prolonged bleeding following surgeries or cuts? No   9. Have you ever had problems with anemia or been told to take iron pills? No   10. Have you had any abnormal blood loss such as black, tarry or bloody stools, or abnormal vaginal bleeding? No   11. Have you ever had a blood transfusion? YES - after tubal pregnancy in late 1970's/early 1980's   11a. Have you ever had a transfusion reaction? No   12. Are you willing to have a blood transfusion if it is medically needed before, during, or after your surgery? Yes   13. Have you or any of your relatives ever had problems with anesthesia? No   14. Do you have sleep apnea, excessive snoring or daytime drowsiness? No   15. Do you have any artifical heart valves or other implanted medical devices like a pacemaker, defibrillator, or continuous glucose monitor? No   16. Do you have artificial joints? YES - Right Hip   17. Are you allergic to latex? No     Health Care Directive:  Patient has a Health Care Directive on file      Preoperative Review of :   reviewed - controlled substances reflected in medication list.      Status of Chronic Conditions:  PMR - stable on 2 mg daily, didn't tolerate 1 mg dose due to symptom return  Restless Legs  Hypertriglyceridemia  Post-nasal drip  Chronic thoracic back pain.  Diarrhea due to malabsorption  Osteoporosis  Smoking, currently 1/2 ppd.  Asthma, controlled      Review of Systems   Constitutional:  Negative for fever.   Respiratory:  Negative for cough and shortness of breath.    Cardiovascular:  Negative for chest pain, palpitations and peripheral edema.   Gastrointestinal:  Negative for abdominal pain.   Neurological:  Negative for light-headedness and headaches.         Patient Active Problem List    Diagnosis Date Noted    Chronic thoracic  back pain, unspecified back pain laterality 10/13/2023     Priority: Medium    Trochanteric bursitis of right hip 05/15/2023     Priority: Medium    Diarrhea due to malabsorption 05/15/2023     Priority: Medium    Closed fracture of left hip (H) 02/19/2023     Priority: Medium    Closed fracture of left hip, initial encounter (H) 02/19/2023     Priority: Medium    Idiopathic gout of right hand, unspecified chronicity 09/09/2020     Priority: Medium    PMR (polymyalgia rheumatica) (H24) 09/09/2020     Priority: Medium    Osteoporosis 09/03/2019     Priority: Medium    Hip fracture requiring operative repair (H) 02/25/2019     Priority: Medium    Closed fracture of neck of right femur (H) 02/23/2019     Priority: Medium    Functional diarrhea 10/26/2018     Priority: Medium    Iron deficiency 04/18/2018     Priority: Medium    Myalgia 05/30/2017     Priority: Medium    Abdominal muscle strain 05/05/2017     Priority: Medium    Strain of flexor muscle of hip, unspecified laterality, initial encounter 05/05/2017     Priority: Medium    Right shoulder pain 11/03/2016     Priority: Medium    ACP (advance care planning) 05/09/2016     Priority: Medium     Advance Care Planning 5/9/2016: ACP Review of Chart / Resources Provided:  Reviewed chart for advance care plan.  Chyna Delgado has been provided information and resources to begin or update their advance care plan.  Added by Lina PONCE Bu            Pain of toe of right foot 05/09/2016     Priority: Medium    Arthritis 02/12/2016     Priority: Medium    Graves disease 11/05/2015     Priority: Medium    Numbness and tingling in right hand 04/20/2015     Priority: Medium    Restless legs syndrome 04/16/2014     Priority: Medium    Somatic dysfunction of pelvic region 10/08/2013     Priority: Medium    Seborrheic keratosis 07/12/2013     Priority: Medium     Imo Update utility      Mild persistent asthma 06/28/2013     Priority: Medium    Sacroiliac pain 06/28/2013      Priority: Medium    Benign neoplasm of stomach 08/16/2012     Priority: Medium    Colon polyposis 08/16/2012     Priority: Medium    Family history of colonic polyps 08/16/2012     Priority: Medium    Family history of skin cancer 08/16/2012     Priority: Medium    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     Priority: Medium     Overview:   IMO Update 10/11      Seborrheic dermatitis, unspecified 10/12/2011     Priority: Medium     Overview:   IMO Update 10/11      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     Priority: Medium    Hypothyroidism 07/07/2011     Priority: Medium      Past Medical History:   Diagnosis Date    Abnormal mammogram, unspecified 01/08/2003    Normal pathology    Back Pain 02/10/2000    Sacroiliac pain    Benign neoplasm of colon 03/10/2000    Benign paroxysmal positional vertigo 06/07/2012    Depressive disorder, not elsewhere classified 02/10/2004    Diarrhea 12/15/2010    Secondary to colectomy for familial polyposis    Gastric polyp 12/11/2015, 12/18/2017    recurrent     History of normal mammogram 07/18/2014, 1/18/2019    Idiopathic osteoporosis 12/15/2010    Interstitial emphysema (H) 12/15/2010    menopausal or female climacteric states 08/31/1999    Mixed hyperlipidemia 12/15/2010    Other bursitis disorders 02/04/2011    Greater trochanteric    Other forms of retinal detachment(361.89) 12/15/2010    Other malaise and fatigue 01/10/2003    Personal history of tobacco use, presenting hazards to health 12/15/2010    Polymyalgia rheumatica (H24)     Polyposis of colon     Restless legs syndrome (RLS) 12/15/2010    Rotator cuff tendonitis     Rotator cuff tendonitis     Sacroiliitis, not elsewhere classified (H24) 12/15/2010    multiple sites    Tobacco use disorder 08/22/2012    Unspecified asthma(493.90) 12/15/2010     Past Surgical History:   Procedure Laterality Date    ARTHROPLASTY HIP ANTERIOR Right 05/15/2023    Procedure: Right Direct Anterior Total Hip Arthroplasty;  Surgeon: Kurt Jordan MD;   Location: [facility]    benign tumors removed from back      CATARACT EXTRACTION Right 06/21/2022    CATARACT EXTRACTION Left 07/12/2022    CLOSED REDUCTION, PERCUTANEOUS PINNING HIP Right 02/24/2019    Procedure: Percutaneous Screw Fixation of Right Hip Fracture;  Surgeon: Amrik Kolb DO;  Location: [facility]    COLECTOMY TOTAL      COLON SURGERY N/A     HX: Total colectomy with J-pouch reconstruction.     ENDOSCOPY UPPER, COLONOSCOPY, COMBINED N/A 09/05/2014    Procedure: COMBINED ENDOSCOPY UPPER, COLONOSCOPY;  Surgeon: Delbert Patel MD;  Location: [facility]    ENDOSCOPY UPPER, COLONOSCOPY, COMBINED N/A 05/09/2019    Procedure: UPPER ENDOSCOPY  WITH BIOPSIES AND  COLONOSCOPY WITH BIOPSIES;  Surgeon: Tai Diaz MD;  Location: [facility]    ESOPHAGOSCOPY, GASTROSCOPY, DUODENOSCOPY (EGD), COMBINED N/A 12/11/2015    Procedure: COMBINED ESOPHAGOSCOPY, GASTROSCOPY, DUODENOSCOPY (EGD);  Surgeon: Delbert Patel MD;  [facility]    ESOPHAGOSCOPY, GASTROSCOPY, DUODENOSCOPY (EGD), COMBINED N/A 12/18/2017    Procedure: COMBINED ESOPHAGOSCOPY, GASTROSCOPY, DUODENOSCOPY (EGD);  UPPER ENDOSCOPY WITH BIOPSY;  Surgeon: Delbert Patel MD;  Location: [facility]    excised-benign  2002    tongue lesion    HC REPAIR OF NASAL SEPTUM  2002    Deviated nasal septum    LAPAROSCOPIC CHOLECYSTECTOMY      lumpectomy Right breast      Breast Lump    MOUTH SURGERY      removal of spot from roof of mouth    pilonidal cyst surgery  1976    removal of colon and large intestine  2000    Familial polyposis    REMOVE HARDWARE ARTHROPLASTY HIP Right 07/20/2022    Procedure: Right Hip Hardware Removal (Cannulated Screws);  Surgeon: Luis Fernando Marcial MD;  Location: [facility]    Right etopic pregnancy      Pregnancy    TONSILLECTOMY      tonsillitus    UPPER GI ENDOSCOPY  2009    Stomach polyps     Current Outpatient Medications   Medication Sig Dispense Refill    acetaminophen (TYLENOL) 325 MG tablet Take 2 tablets (650 mg) by mouth every 4 hours as needed  for other (mild pain) 100 tablet 0    albuterol (PROAIR HFA/PROVENTIL HFA/VENTOLIN HFA) 108 (90 Base) MCG/ACT inhaler Inhale 2 puffs into the lungs every 4 hours as needed for shortness of breath 18 g 11    alendronate (FOSAMAX) 70 MG tablet Take 1 tablet (70 mg) by mouth every 7 days 12 tablet 3    allopurinol (ZYLOPRIM) 100 MG tablet Take 1 tablet (100 mg) by mouth 2 times daily 180 tablet 1    amoxicillin (AMOXIL) 500 MG capsule TAKE FOUR CAPSULES BY MOUTH ONE HOUR BEFORE dental appointment      aspirin 81 MG EC tablet Take 1 tablet (81 mg) by mouth 2 times daily 60 tablet 0    atorvastatin (LIPITOR) 20 MG tablet Take 1 tablet (20 mg) by mouth daily 90 tablet 1    calcium carbonate 750 MG CHEW Take 1 tablet (750 mg) by mouth 2 times daily 180 tablet 3    ciprofloxacin (CETRAXAL) 0.2 % otic solution Place 0.25 mLs into the right ear 2 times daily (Patient taking differently: Place 0.25 mLs into the right ear 2 times daily as needed) 14 each 0    dicyclomine (BENTYL) 10 MG capsule Take 2 capsules (20 mg) by mouth 3 times daily (before meals) 540 capsule 0    diphenoxylate-atropine (LOMOTIL) 2.5-0.025 MG tablet Take 1-2 tablets by mouth 4 times daily as needed for diarrhea 120 tablet 0    ferrous sulfate (IRON) 325 (65 FE) MG tablet Take 325 mg by mouth 2 times daily (with meals) 90 tablet 2    fluticasone-salmeterol (ADVAIR) 250-50 MCG/ACT inhaler Inhale 1 puff into the lungs 2 times daily 60 each 6    gabapentin (NEURONTIN) 400 MG capsule Take 3 capsules (1,200 mg) by mouth 2 times daily 540 capsule 1    GNP VITAMIN D MAXIMUM STRENGTH 50 MCG (2000 UT) tablet TAKE TWO TABLETS BY MOUTH EVERY DAY (Patient taking differently: Take 1 tablet by mouth daily) 100 tablet 4    ketotifen (ZADITOR/REFRESH ANTI-ITCH) 0.025 % SOLN ophthalmic solution PLACE 2 DROPS INTO BOTH EYES 2 TIMES DAILY 5 mL 2    loperamide (IMODIUM) 2 MG capsule Take 6 mg by mouth 2 times daily plus additional dosing as needed.      montelukast  "(SINGULAIR) 10 MG tablet Take 1 tablet (10 mg) by mouth At Bedtime 90 tablet 1    Multiple Vitamin (MULTIVITAMIN) per tablet Take 1 tablet by mouth daily Every day with food      omeprazole (PRILOSEC) 40 MG DR capsule Take 1 capsule (40 mg) by mouth daily 90 capsule 3    potassium chloride ER (KLOR-CON M) 20 MEQ CR tablet Take 1 tablet (20 mEq) by mouth 3 times daily 90 tablet 9    pramipexole (MIRAPEX) 0.125 MG tablet Take 1-2 tablets (0.125-0.25 mg) by mouth At Bedtime 180 tablet 1    predniSONE (DELTASONE) 1 MG tablet Take 2 tablets (2 mg) by mouth daily 60 tablet 1    PSYLLIUM PO Take 1 Tablespoonful by mouth 2 times daily       Simethicone 125 MG CAPS Take 500 mg by mouth 2 times daily      spironolactone (ALDACTONE) 50 MG tablet Take 1 tablet (50 mg) by mouth 2 times daily 180 tablet 2    traMADol (ULTRAM) 50 MG tablet Take 1 tablet (50 mg) by mouth 3 times daily as needed for severe pain 30 tablet 0       Allergies   Allergen Reactions    Codeine Swelling     Throat swelling-Patient can tolerate Hydrocodone & morphine    Nortriptyline Other (See Comments)     Crying         Social History     Tobacco Use    Smoking status: Every Day     Packs/day: 0.50     Years: 40.00     Additional pack years: 0.00     Total pack years: 20.00     Types: Cigarettes     Passive exposure: Current    Smokeless tobacco: Never   Substance Use Topics    Alcohol use: Yes     Comment: Weekly 3 drinks       History   Drug Use Unknown         Objective     /70   Pulse 68   Temp 97.9  F (36.6  C) (Tympanic)   Resp 18   Ht 1.549 m (5' 1\")   Wt 45.4 kg (100 lb 1.6 oz)   SpO2 98%   BMI 18.91 kg/m      Physical Exam  Constitutional:       General: She is not in acute distress.     Appearance: Normal appearance.   HENT:      Head: Normocephalic and atraumatic.      Right Ear: Tympanic membrane normal.      Left Ear: Tympanic membrane normal.      Mouth/Throat:      Mouth: Mucous membranes are moist.      Pharynx: Oropharynx is " clear.   Eyes:      Conjunctiva/sclera: Conjunctivae normal.      Pupils: Pupils are equal, round, and reactive to light.   Neck:      Vascular: No carotid bruit.   Cardiovascular:      Rate and Rhythm: Normal rate and regular rhythm.      Heart sounds: Normal heart sounds. No murmur heard.  Pulmonary:      Effort: Pulmonary effort is normal.      Breath sounds: Normal breath sounds.   Abdominal:      General: Bowel sounds are normal.      Palpations: Abdomen is soft.      Tenderness: There is no abdominal tenderness.   Musculoskeletal:      Cervical back: Normal range of motion.      Right lower leg: No edema.      Left lower leg: No edema.   Lymphadenopathy:      Cervical: No cervical adenopathy.   Neurological:      Mental Status: She is alert and oriented to person, place, and time.           Recent Labs   Lab Test 07/20/23  1051 05/16/23  0536 05/09/23  1210 02/18/23  1652 01/05/23  1349   HGB 15.2 10.1* 14.1   < >  --      --  250   < >  --      --  139   < > 137   POTASSIUM 4.8  --  4.4   < > 4.8   CR 0.83  --  0.85   < > 1.06*   A1C  --   --   --   --  5.4    < > = values in this interval not displayed.        Diagnostics:  Recent Results (from the past 168 hour(s))   Basic metabolic panel    Collection Time: 11/29/23 10:20 AM   Result Value Ref Range    Sodium 133 (L) 135 - 145 mmol/L    Potassium 4.8 3.4 - 5.3 mmol/L    Chloride 98 98 - 107 mmol/L    Carbon Dioxide (CO2) 25 22 - 29 mmol/L    Anion Gap 10 7 - 15 mmol/L    Urea Nitrogen 24.6 (H) 8.0 - 23.0 mg/dL    Creatinine 0.82 0.51 - 0.95 mg/dL    GFR Estimate 77 >60 mL/min/1.73m2    Calcium 10.2 8.8 - 10.2 mg/dL    Glucose 100 (H) 70 - 99 mg/dL   CBC with platelets and differential    Collection Time: 11/29/23 10:20 AM   Result Value Ref Range    WBC Count 11.5 (H) 4.0 - 11.0 10e3/uL    RBC Count 4.64 3.80 - 5.20 10e6/uL    Hemoglobin 14.7 11.7 - 15.7 g/dL    Hematocrit 43.5 35.0 - 47.0 %    MCV 94 78 - 100 fL    MCH 31.7 26.5 - 33.0 pg     MCHC 33.8 31.5 - 36.5 g/dL    RDW 13.3 10.0 - 15.0 %    Platelet Count 267 150 - 450 10e3/uL    % Neutrophils 72 %    % Lymphocytes 16 %    % Monocytes 10 %    % Eosinophils 1 %    % Basophils 1 %    % Immature Granulocytes 0 %    NRBCs per 100 WBC 0 <1 /100    Absolute Neutrophils 8.3 1.6 - 8.3 10e3/uL    Absolute Lymphocytes 1.8 0.8 - 5.3 10e3/uL    Absolute Monocytes 1.2 0.0 - 1.3 10e3/uL    Absolute Eosinophils 0.1 0.0 - 0.7 10e3/uL    Absolute Basophils 0.1 0.0 - 0.2 10e3/uL    Absolute Immature Granulocytes 0.0 <=0.4 10e3/uL    Absolute NRBCs 0.0 10e3/uL          EKG dated 5/15/23:  NSR rate 65       Revised Cardiac Risk Index (RCRI):  The patient has the following serious cardiovascular risks for perioperative complications:   - No serious cardiac risks = 0 points     RCRI Interpretation: 0 points: Class I (very low risk - 0.4% complication rate)         Signed Electronically by: DAYSI LUCIANO DO  Copy of this evaluation report is provided to requesting physician.

## 2023-11-29 ENCOUNTER — OFFICE VISIT (OUTPATIENT)
Dept: FAMILY MEDICINE | Facility: OTHER | Age: 68
End: 2023-11-29
Attending: FAMILY MEDICINE
Payer: COMMERCIAL

## 2023-11-29 VITALS
SYSTOLIC BLOOD PRESSURE: 100 MMHG | TEMPERATURE: 97.9 F | BODY MASS INDEX: 18.9 KG/M2 | RESPIRATION RATE: 18 BRPM | OXYGEN SATURATION: 98 % | WEIGHT: 100.1 LBS | HEIGHT: 61 IN | DIASTOLIC BLOOD PRESSURE: 70 MMHG | HEART RATE: 68 BPM

## 2023-11-29 DIAGNOSIS — Z01.818 PREOPERATIVE EXAMINATION: Primary | ICD-10-CM

## 2023-11-29 DIAGNOSIS — R19.7 DIARRHEA DUE TO MALABSORPTION: ICD-10-CM

## 2023-11-29 DIAGNOSIS — D13.91 FAP (FAMILIAL ADENOMATOUS POLYPOSIS): ICD-10-CM

## 2023-11-29 DIAGNOSIS — J45.30 MILD PERSISTENT ASTHMA WITHOUT COMPLICATION: ICD-10-CM

## 2023-11-29 DIAGNOSIS — M35.3 PMR (POLYMYALGIA RHEUMATICA) (H): ICD-10-CM

## 2023-11-29 DIAGNOSIS — K90.9 DIARRHEA DUE TO MALABSORPTION: ICD-10-CM

## 2023-11-29 LAB
ANION GAP SERPL CALCULATED.3IONS-SCNC: 10 MMOL/L (ref 7–15)
BASOPHILS # BLD AUTO: 0.1 10E3/UL (ref 0–0.2)
BASOPHILS NFR BLD AUTO: 1 %
BUN SERPL-MCNC: 24.6 MG/DL (ref 8–23)
CALCIUM SERPL-MCNC: 10.2 MG/DL (ref 8.8–10.2)
CHLORIDE SERPL-SCNC: 98 MMOL/L (ref 98–107)
CREAT SERPL-MCNC: 0.82 MG/DL (ref 0.51–0.95)
DEPRECATED HCO3 PLAS-SCNC: 25 MMOL/L (ref 22–29)
EGFRCR SERPLBLD CKD-EPI 2021: 77 ML/MIN/1.73M2
EOSINOPHIL # BLD AUTO: 0.1 10E3/UL (ref 0–0.7)
EOSINOPHIL NFR BLD AUTO: 1 %
ERYTHROCYTE [DISTWIDTH] IN BLOOD BY AUTOMATED COUNT: 13.3 % (ref 10–15)
GLUCOSE SERPL-MCNC: 100 MG/DL (ref 70–99)
HCT VFR BLD AUTO: 43.5 % (ref 35–47)
HGB BLD-MCNC: 14.7 G/DL (ref 11.7–15.7)
IMM GRANULOCYTES # BLD: 0 10E3/UL
IMM GRANULOCYTES NFR BLD: 0 %
LYMPHOCYTES # BLD AUTO: 1.8 10E3/UL (ref 0.8–5.3)
LYMPHOCYTES NFR BLD AUTO: 16 %
MCH RBC QN AUTO: 31.7 PG (ref 26.5–33)
MCHC RBC AUTO-ENTMCNC: 33.8 G/DL (ref 31.5–36.5)
MCV RBC AUTO: 94 FL (ref 78–100)
MONOCYTES # BLD AUTO: 1.2 10E3/UL (ref 0–1.3)
MONOCYTES NFR BLD AUTO: 10 %
NEUTROPHILS # BLD AUTO: 8.3 10E3/UL (ref 1.6–8.3)
NEUTROPHILS NFR BLD AUTO: 72 %
NRBC # BLD AUTO: 0 10E3/UL
NRBC BLD AUTO-RTO: 0 /100
PLATELET # BLD AUTO: 267 10E3/UL (ref 150–450)
POTASSIUM SERPL-SCNC: 4.8 MMOL/L (ref 3.4–5.3)
RBC # BLD AUTO: 4.64 10E6/UL (ref 3.8–5.2)
SODIUM SERPL-SCNC: 133 MMOL/L (ref 135–145)
WBC # BLD AUTO: 11.5 10E3/UL (ref 4–11)

## 2023-11-29 PROCEDURE — G0463 HOSPITAL OUTPT CLINIC VISIT: HCPCS

## 2023-11-29 PROCEDURE — 80048 BASIC METABOLIC PNL TOTAL CA: CPT | Mod: ZL | Performed by: FAMILY MEDICINE

## 2023-11-29 PROCEDURE — 36415 COLL VENOUS BLD VENIPUNCTURE: CPT | Mod: ZL | Performed by: FAMILY MEDICINE

## 2023-11-29 PROCEDURE — 99213 OFFICE O/P EST LOW 20 MIN: CPT | Performed by: FAMILY MEDICINE

## 2023-11-29 PROCEDURE — 85025 COMPLETE CBC W/AUTO DIFF WBC: CPT | Mod: ZL | Performed by: FAMILY MEDICINE

## 2023-11-29 PROCEDURE — G0463 HOSPITAL OUTPT CLINIC VISIT: HCPCS | Mod: 25

## 2023-11-29 RX ORDER — AMOXICILLIN 500 MG/1
CAPSULE ORAL
COMMUNITY
Start: 2023-10-03

## 2023-11-29 ASSESSMENT — PAIN SCALES - GENERAL: PAINLEVEL: NO PAIN (0)

## 2023-11-29 NOTE — COMMUNITY RESOURCES LIST (ENGLISH)
11/29/2023   [facility]  N/A  For questions about this resource list or additional care needs, please contact your primary care clinic or care manager.  Phone: [phone]   Email: N/A   Address: [address] [place] [id number]   Hours: N/A        Housing       Coordinated Entry access point  1  [facility] Administrative Office Distance: 15.59 miles      In-Person   [address] [place] [address]  Language: English  Hours: Mon - Fri 8:00 AM - 4:30 PM  Fees: Free   Phone: (738) 482-6279 Email: [email or web address] Website: [email or web address]     Housing search assistance  2  [facility] Shelter Distance: 7.07 miles      In-Person   [address] [place], [place] [id number]  Language: English  Hours: Mon - Sun Open 24 Hours  Fees: Free   Phone: (952) 830-6745 Website: [email or web address]     3  [facility] Administrative Office Distance: 15.59 miles      Phone/Virtual   [address] [place] [address]  Language: English  Hours: Mon - Fri 8:00 AM - 4:30 PM  Fees: Free   Phone: (982) 721-5524 Email: [email or web address] Website: [email or web address]     Shelter for families  4  [facility] Shelter Distance: 7.07 miles      In-Person   [address] [place] [address]  Language: English  Hours: Mon - Sun Open 24 Hours  Fees: Free   Phone: (107) 404-9157 Website: [email or web address]     5  [facility] Distance: 15.69 miles      In-Person   [address] [place] [address]  Language: English  Hours: Mon - Sun Open 24 Hours  Fees: Free   Phone: (172) 746-8198 Website: http://[email or web address]     Shelter for individuals  6  [facility] Shelter Distance: 7.07 miles      In-Person   [address] [place] [address]  Language: English  Hours: Mon - Sun  Open 24 Hours  Fees: Free   Phone: (161) 584-1863 Website: http://www.[employer]     7  [employer] [facility] Distance: 15.69 miles      In-Person   [address] [place] [address]  Language: English  Hours: Mon - Sun Open 24 Hours  Fees: Free   Phone: (449) 529-9988 Website: http://[employer].me          Important Numbers & Websites       Emergency Services   911  City Services   311  Poison Control   (823) 293-5583  Suicide Prevention Lifeline   (949) 470-6959 (TALK)  Child Abuse Hotline   (691) 143-9525 (4-A-Child)  Sexual Assault Hotline   (958) 125-5124 (HOPE)  National Runaway Safeline   (652) 811-1720 (RUNAWAY)  All-Options Talkline   (319) 519-3564  Substance Abuse Referral   (655) 138-2823 (HELP)

## 2023-12-01 ENCOUNTER — THERAPY VISIT (OUTPATIENT)
Dept: PHYSICAL THERAPY | Facility: HOSPITAL | Age: 68
End: 2023-12-01
Attending: FAMILY MEDICINE
Payer: COMMERCIAL

## 2023-12-01 DIAGNOSIS — M54.6 CHRONIC THORACIC BACK PAIN, UNSPECIFIED BACK PAIN LATERALITY: Primary | ICD-10-CM

## 2023-12-01 DIAGNOSIS — G89.29 CHRONIC THORACIC BACK PAIN, UNSPECIFIED BACK PAIN LATERALITY: Primary | ICD-10-CM

## 2023-12-01 PROCEDURE — 999N000104 HC STATISTIC NO CHARGE

## 2023-12-01 NOTE — OR NURSING
Instructed to hold NSAIDs, ASA, vitamins & supplements starting today 12/1, Continue other medications as prescribed. Hold spironolactone & simethicone day of procedure. Bring albuterol inhaler day of procedure.

## 2023-12-05 ENCOUNTER — ANESTHESIA EVENT (OUTPATIENT)
Dept: SURGERY | Facility: HOSPITAL | Age: 68
End: 2023-12-05
Payer: COMMERCIAL

## 2023-12-05 ASSESSMENT — ENCOUNTER SYMPTOMS
HEADACHES: 0
FEVER: 0
COUGH: 0
PALPITATIONS: 0
SHORTNESS OF BREATH: 0
ABDOMINAL PAIN: 0
LIGHT-HEADEDNESS: 0

## 2023-12-05 ASSESSMENT — COPD QUESTIONNAIRES
CAT_SEVERITY: MODERATE
COPD: 1

## 2023-12-05 ASSESSMENT — LIFESTYLE VARIABLES: TOBACCO_USE: 1

## 2023-12-05 NOTE — ANESTHESIA PREPROCEDURE EVALUATION
Anesthesia Pre-Procedure Evaluation    Patient: Chyna Delgado   MRN: [id number] : 1955        Procedure : Procedure(s):  Upper Endoscopy and Ileoscopy          Past Medical History:   Diagnosis Date     Abnormal mammogram, unspecified 2003    Normal pathology     Back Pain 02/10/2000    Sacroiliac pain     Benign neoplasm of colon 03/10/2000     Benign paroxysmal positional vertigo 2012     Depressive disorder, not elsewhere classified 02/10/2004     Diarrhea 12/15/2010    Secondary to colectomy for familial polyposis     Gastric polyp 2015, 2017    recurrent      History of normal mammogram 2014, 2019     Idiopathic osteoporosis 12/15/2010     Interstitial emphysema (H) 12/15/2010     menopausal or female climacteric states 1999     Mixed hyperlipidemia 12/15/2010     Other bursitis disorders 2011    Greater trochanteric     Other forms of retinal detachment(361.89) 12/15/2010     Other malaise and fatigue 01/10/2003     Personal history of tobacco use, presenting hazards to health 12/15/2010     Polymyalgia rheumatica (H24)      Polyposis of colon      Restless legs syndrome (RLS) 12/15/2010     Rotator cuff tendonitis      Rotator cuff tendonitis      Sacroiliitis, not elsewhere classified (H24) 12/15/2010    multiple sites     Tobacco use disorder 2012     Unspecified asthma(493.90) 12/15/2010      Past Surgical History:   Procedure Laterality Date     ARTHROPLASTY HIP ANTERIOR Right 05/15/2023    Procedure: Right Direct Anterior Total Hip Arthroplasty;  Surgeon: Kurt Jordan MD;  Location: [facility]     benign tumors removed from back       CATARACT EXTRACTION Right 2022     CATARACT EXTRACTION Left 2022     CLOSED REDUCTION, PERCUTANEOUS PINNING HIP Right 2019    Procedure: Percutaneous Screw Fixation of Right Hip Fracture;  Surgeon: Amrik Kolb DO;  Location: [facility]     COLECTOMY TOTAL       COLON SURGERY N/A     HX: Total  colectomy with J-pouch reconstruction.      ENDOSCOPY UPPER, COLONOSCOPY, COMBINED N/A 09/05/2014    Procedure: COMBINED ENDOSCOPY UPPER, COLONOSCOPY;  Surgeon: Delbert Patel MD;  Location: [facility]     ENDOSCOPY UPPER, COLONOSCOPY, COMBINED N/A 05/09/2019    Procedure: UPPER ENDOSCOPY  WITH BIOPSIES AND  COLONOSCOPY WITH BIOPSIES;  Surgeon: Tai Diaz MD;  Location: [facility]     ESOPHAGOSCOPY, GASTROSCOPY, DUODENOSCOPY (EGD), COMBINED N/A 12/11/2015    Procedure: COMBINED ESOPHAGOSCOPY, GASTROSCOPY, DUODENOSCOPY (EGD);  Surgeon: Delbert Patel MD;  Location: [facility]     ESOPHAGOSCOPY, GASTROSCOPY, DUODENOSCOPY (EGD), COMBINED N/A 12/18/2017    Procedure: COMBINED ESOPHAGOSCOPY, GASTROSCOPY, DUODENOSCOPY (EGD);  UPPER ENDOSCOPY WITH BIOPSY;  Surgeon: Delbert Patel MD;  Location: [facility]     excised-benign  2002    tongue lesion     HC REPAIR OF NASAL SEPTUM  2002    Deviated nasal septum     LAPAROSCOPIC CHOLECYSTECTOMY       lumpectomy Right breast      Breast Lump     MOUTH SURGERY      removal of spot from roof of mouth     pilonidal cyst surgery  1976     removal of colon and large intestine  2000    Familial polyposis     REMOVE HARDWARE ARTHROPLASTY HIP Right 07/20/2022    Procedure: Right Hip Hardware Removal (Cannulated Screws);  Surgeon: Luis Fernando Marcial MD;  Location: [facility]     Right etopic pregnancy      Pregnancy     TONSILLECTOMY      tonsillitus     UPPER GI ENDOSCOPY  2009    Stomach polyps      Allergies   Allergen Reactions     Codeine Swelling     Throat swelling-Patient can tolerate Hydrocodone & morphine     Nortriptyline Other (See Comments)     Crying       Social History     Tobacco Use     Smoking status: Every Day     Packs/day: 0.50     Years: 40.00     Additional pack years: 0.00     Total pack years: 20.00     Types: Cigarettes     Passive exposure: Current     Smokeless tobacco: Never   Substance Use Topics     Alcohol use: Yes     Comment: Weekly 3 drinks      Wt Readings from Last  "1 Encounters:   11/29/23 45.4 kg (100 lb 1.6 oz)        Anesthesia Evaluation   Pt has had prior anesthetic. Type: General and MAC.    No history of anesthetic complications       ROS/MED HX  ENT/Pulmonary:     (+)     TWYLA risk factors, snores loudly,          tobacco use (1/2ppd x 50 years), Current use,  40  Pack-Year Hx,  Mild Persistent, asthma Last exacerbation: used her inhaler last night,  Treatment: Inhaled steroids, Inhaler daily and Inhaler prn,  moderate,  COPD,              Neurologic: Comment: RLS   BPPV      Cardiovascular:     (+) Dyslipidemia - -   -  - -   Taking blood thinners (aspirin 81 mg po BID)  Instructions Given to patient: ASA taken last week.      CORDON.                      Previous cardiac testing   Echo: Date: Results:    Stress Test:  Date: Results:    ECG Reviewed:  Date: 5/9/23 Results:    5/9/23 EKG: NSR rate 67 QTc 395  Cath:  Date: Results:      METS/Exercise Tolerance: 1 - Eating, dressing    Hematologic:     (+)      anemia, history of blood transfusion, no previous transfusion reaction, Known PRBC Anitbodies:No - after tubal pregnancy in late 1970's/early 1980's,      Musculoskeletal: Comment: Gout  polymyalgia rheumatica  Patient right  Hip pain \"a good 10\", been bothering her for one year  Hx right hip replacement  RLS  (+)  arthritis,             GI/Hepatic: Comment: S/p colon resection with j pouch 2000    (+) GERD, Asymptomatic on medication,                  Renal/Genitourinary: Comment: Right pelvic kidney functions fine per patient      Endo:     (+)          thyroid problem, hypothyroidism, Chronic steroid usage (polymyalgia) for Other and Arthritis. Date most recently used: current 2mg per day.        Psychiatric/Substance Use:     (+) psychiatric history depression alcohol abuse      Infectious Disease:  - neg infectious disease ROS     Malignancy: Comment: Bowel resection for precancerous cells   (-) malignancy   Other:  - neg other ROS    (+)  , H/O Chronic Pain,, " other significant disability Other (comment) (uses cane)         Physical Exam    Airway        Mallampati: III   TM distance: > 3 FB   Neck ROM: full   Mouth opening: > 3 cm    Respiratory Devices and Support         Dental       (+) Modest Abnormalities - crowns, retainers, 1 or 2 missing teeth      Cardiovascular   cardiovascular exam normal       Rhythm and rate: regular and normal     Pulmonary           (+) decreased breath sounds       OUTSIDE LABS:  CBC:   Lab Results   Component Value Date    WBC 11.5 (H) 11/29/2023    WBC 11.9 (H) 07/20/2023    HGB 14.7 11/29/2023    HGB 15.2 07/20/2023    HCT 43.5 11/29/2023    HCT 46.1 07/20/2023     11/29/2023     07/20/2023     BMP:   Lab Results   Component Value Date     (L) 11/29/2023     07/20/2023    POTASSIUM 4.8 11/29/2023    POTASSIUM 4.8 07/20/2023    CHLORIDE 98 11/29/2023    CHLORIDE 101 07/20/2023    CO2 25 11/29/2023    CO2 25 07/20/2023    BUN 24.6 (H) 11/29/2023    BUN 25.8 (H) 07/20/2023    CR 0.82 11/29/2023    CR 0.83 07/20/2023     (H) 11/29/2023     (H) 07/20/2023     COAGS:   Lab Results   Component Value Date    INR 1.09 02/23/2019     POC:   Lab Results   Component Value Date     (H) 02/25/2019     HEPATIC:   Lab Results   Component Value Date    ALBUMIN 4.4 07/20/2023    PROTTOTAL 7.1 07/20/2023    ALT 35 07/20/2023    AST 28 07/20/2023    ALKPHOS 78 07/20/2023    BILITOTAL 0.2 07/20/2023     OTHER:   Lab Results   Component Value Date    A1C 5.4 01/05/2023    СВЕТЛАНА 10.2 11/29/2023    PHOS 3.4 08/01/2019    MAG 1.7 05/09/2023    TSH 1.00 07/20/2023    T4 0.96 08/08/2018    T3 95 08/08/2018    CRP 9.7 (H) 09/09/2020    SED 11 08/22/2023       Anesthesia Plan    ASA Status:  3    NPO Status:  NPO Appropriate    Anesthesia Type: MAC.     - Reason for MAC: straight local not clinically adequate   Induction: Intravenous, Propofol.   Maintenance: Balanced.        Consents    Anesthesia Plan(s) and  associated risks, benefits, and realistic alternatives discussed. Questions answered and patient/representative(s) expressed understanding.     - Discussed: Risks, Benefits and Alternatives for BOTH SEDATION and the PROCEDURE were discussed     - Discussed with:  Patient      - Extended Intubation/Ventilatory Support Discussed: No.      - Patient is DNR/DNI Status: No     Use of blood products discussed: No .     Postoperative Care            Comments:    Other Comments: Pre-op with Nurmi 11/29/23            MAIDA Causey CNP    I have reviewed the pertinent notes and labs in the chart from the past 30 days and (re)examined the patient.  Any updates or changes from those notes are reflected in this note.     # Hyponatremia: Lowest Na = 133 mmol/L in last 30 days, will monitor as appropriate  # Hypercalcemia: Highest Ca = 10.2 mg/dL in last 30 days, will monitor as appropriate

## 2023-12-08 ENCOUNTER — ANESTHESIA (OUTPATIENT)
Dept: SURGERY | Facility: HOSPITAL | Age: 68
End: 2023-12-08
Payer: COMMERCIAL

## 2023-12-08 ENCOUNTER — HOSPITAL ENCOUNTER (OUTPATIENT)
Facility: HOSPITAL | Age: 68
Discharge: HOME | End: 2023-12-08
Attending: SURGERY | Admitting: SURGERY
Payer: COMMERCIAL

## 2023-12-08 VITALS
RESPIRATION RATE: 16 BRPM | HEART RATE: 74 BPM | OXYGEN SATURATION: 98 % | HEIGHT: 60 IN | TEMPERATURE: 97.1 F | BODY MASS INDEX: 20.42 KG/M2 | SYSTOLIC BLOOD PRESSURE: 111 MMHG | WEIGHT: 104 LBS | DIASTOLIC BLOOD PRESSURE: 75 MMHG

## 2023-12-08 PROCEDURE — 43193 ESOPHAGOSCP RIG TRNSO BIOPSY: CPT | Performed by: SURGERY

## 2023-12-08 PROCEDURE — 250N000009 HC RX 250: Performed by: SURGERY

## 2023-12-08 PROCEDURE — 88305 TISSUE EXAM BY PATHOLOGIST: CPT | Mod: 26 | Performed by: PATHOLOGY

## 2023-12-08 PROCEDURE — 43193 ESOPHAGOSCP RIG TRNSO BIOPSY: CPT | Performed by: NURSE ANESTHETIST, CERTIFIED REGISTERED

## 2023-12-08 PROCEDURE — 250N000009 HC RX 250: Performed by: NURSE ANESTHETIST, CERTIFIED REGISTERED

## 2023-12-08 PROCEDURE — 272N000001 HC OR GENERAL SUPPLY STERILE: Performed by: SURGERY

## 2023-12-08 PROCEDURE — 250N000011 HC RX IP 250 OP 636: Performed by: NURSE ANESTHETIST, CERTIFIED REGISTERED

## 2023-12-08 PROCEDURE — 44380 SMALL BOWEL ENDOSCOPY BR/WA: CPT | Performed by: SURGERY

## 2023-12-08 PROCEDURE — 370N000017 HC ANESTHESIA TECHNICAL FEE, PER MIN: Performed by: SURGERY

## 2023-12-08 PROCEDURE — 360N000075 HC SURGERY LEVEL 2, PER MIN: Performed by: SURGERY

## 2023-12-08 PROCEDURE — 999N000141 HC STATISTIC PRE-PROCEDURE NURSING ASSESSMENT: Performed by: SURGERY

## 2023-12-08 PROCEDURE — 258N000003 HC RX IP 258 OP 636: Performed by: NURSE ANESTHETIST, CERTIFIED REGISTERED

## 2023-12-08 PROCEDURE — 88305 TISSUE EXAM BY PATHOLOGIST: CPT | Mod: TC | Performed by: SURGERY

## 2023-12-08 PROCEDURE — 710N000012 HC RECOVERY PHASE 2, PER MINUTE: Performed by: SURGERY

## 2023-12-08 RX ORDER — ONDANSETRON 4 MG/1
4 TABLET, ORALLY DISINTEGRATING ORAL EVERY 30 MIN PRN
Status: DISCONTINUED | OUTPATIENT
Start: 2023-12-08 | End: 2023-12-08 | Stop reason: HOSPADM

## 2023-12-08 RX ORDER — FLUMAZENIL 0.1 MG/ML
0.2 INJECTION, SOLUTION INTRAVENOUS
Status: DISCONTINUED | OUTPATIENT
Start: 2023-12-08 | End: 2023-12-08 | Stop reason: HOSPADM

## 2023-12-08 RX ORDER — PROPOFOL 10 MG/ML
INJECTION, EMULSION INTRAVENOUS PRN
Status: DISCONTINUED | OUTPATIENT
Start: 2023-12-08 | End: 2023-12-08

## 2023-12-08 RX ORDER — NALOXONE HYDROCHLORIDE 0.4 MG/ML
0.2 INJECTION, SOLUTION INTRAMUSCULAR; INTRAVENOUS; SUBCUTANEOUS
Status: DISCONTINUED | OUTPATIENT
Start: 2023-12-08 | End: 2023-12-08 | Stop reason: HOSPADM

## 2023-12-08 RX ORDER — NALOXONE HYDROCHLORIDE 0.4 MG/ML
0.4 INJECTION, SOLUTION INTRAMUSCULAR; INTRAVENOUS; SUBCUTANEOUS
Status: DISCONTINUED | OUTPATIENT
Start: 2023-12-08 | End: 2023-12-08 | Stop reason: HOSPADM

## 2023-12-08 RX ORDER — ONDANSETRON 2 MG/ML
4 INJECTION INTRAMUSCULAR; INTRAVENOUS EVERY 30 MIN PRN
Status: DISCONTINUED | OUTPATIENT
Start: 2023-12-08 | End: 2023-12-08 | Stop reason: HOSPADM

## 2023-12-08 RX ORDER — LIDOCAINE HYDROCHLORIDE 20 MG/ML
INJECTION, SOLUTION INFILTRATION; PERINEURAL PRN
Status: DISCONTINUED | OUTPATIENT
Start: 2023-12-08 | End: 2023-12-08

## 2023-12-08 RX ORDER — LIDOCAINE 40 MG/G
CREAM TOPICAL
Status: DISCONTINUED | OUTPATIENT
Start: 2023-12-08 | End: 2023-12-08 | Stop reason: HOSPADM

## 2023-12-08 RX ORDER — SODIUM CHLORIDE, SODIUM LACTATE, POTASSIUM CHLORIDE, CALCIUM CHLORIDE 600; 310; 30; 20 MG/100ML; MG/100ML; MG/100ML; MG/100ML
INJECTION, SOLUTION INTRAVENOUS CONTINUOUS
Status: DISCONTINUED | OUTPATIENT
Start: 2023-12-08 | End: 2023-12-08 | Stop reason: HOSPADM

## 2023-12-08 RX ADMIN — PROPOFOL 60 MG: 10 INJECTION, EMULSION INTRAVENOUS at 07:34

## 2023-12-08 RX ADMIN — PROPOFOL 50 MG: 10 INJECTION, EMULSION INTRAVENOUS at 07:46

## 2023-12-08 RX ADMIN — PROPOFOL 50 MG: 10 INJECTION, EMULSION INTRAVENOUS at 07:55

## 2023-12-08 RX ADMIN — PROPOFOL 10 MG: 10 INJECTION, EMULSION INTRAVENOUS at 07:39

## 2023-12-08 RX ADMIN — PROPOFOL 20 MG: 10 INJECTION, EMULSION INTRAVENOUS at 07:35

## 2023-12-08 RX ADMIN — PROPOFOL 50 MG: 10 INJECTION, EMULSION INTRAVENOUS at 07:52

## 2023-12-08 RX ADMIN — PROPOFOL 50 MG: 10 INJECTION, EMULSION INTRAVENOUS at 07:49

## 2023-12-08 RX ADMIN — SODIUM CHLORIDE, POTASSIUM CHLORIDE, SODIUM LACTATE AND CALCIUM CHLORIDE: 600; 310; 30; 20 INJECTION, SOLUTION INTRAVENOUS at 07:10

## 2023-12-08 RX ADMIN — LIDOCAINE HYDROCHLORIDE 80 MG: 20 INJECTION, SOLUTION INFILTRATION; PERINEURAL at 07:34

## 2023-12-08 RX ADMIN — PROPOFOL 10 MG: 10 INJECTION, EMULSION INTRAVENOUS at 07:37

## 2023-12-08 RX ADMIN — PROPOFOL 50 MG: 10 INJECTION, EMULSION INTRAVENOUS at 07:42

## 2023-12-08 ASSESSMENT — ACTIVITIES OF DAILY LIVING (ADL): ADLS_ACUITY_SCORE: 35

## 2023-12-08 NOTE — DISCHARGE INSTRUCTIONS
You had biopsies removed today.  Dr. Howe's office will contact you with the pathology results when they become available.     Post-Anesthesia Patient Instructions    IMMEDIATELY FOLLOWING SURGERY:  Do not drive or operate machinery for the first twenty four hours after surgery.  Do not make any important decisions for twenty four hours after surgery or while taking narcotic pain medications or sedatives.  If you develop intractable nausea and vomiting or a severe headache please notify your doctor immediately.    FOLLOW-UP:  Please make an appointment with your surgeon as instructed. You do not need to follow up with anesthesia unless specifically instructed to do so.    WOUND CARE INSTRUCTIONS (if applicable):  Keep a dry clean dressing on the anesthesia/puncture wound site if there is drainage.  Once the wound has quit draining you may leave it open to air.  Generally you should leave the bandage intact for twenty four hours unless there is drainage.  If the epidural site drains for more than 36-48 hours please call the anesthesia department.    QUESTIONS?:  Please feel free to call your physician or the hospital  if you have any questions, and they will be happy to assist you.

## 2023-12-08 NOTE — OR NURSING
Patient and responsible adult given discharge instructions with no questions regarding instructions. Ag score 20/20. Pain level 0/10.  Discharged from unit via ambulation. Patient discharged to home with spouse Yojana

## 2023-12-08 NOTE — ANESTHESIA POSTPROCEDURE EVALUATION
Patient: Chyna Delgado    Procedure: Procedure(s):  Upper Endoscopy with biopsy and polypectomy  and Ileoscopy       Anesthesia Type:  MAC    Note:  Disposition: Outpatient   Postop Pain Control: Uneventful            Sign Out: Well controlled pain   PONV: No   Neuro/Psych: Uneventful            Sign Out: Acceptable/Baseline neuro status   Airway/Respiratory: Uneventful            Sign Out: Acceptable/Baseline resp. status   CV/Hemodynamics: Uneventful            Sign Out: Acceptable CV status; No obvious hypovolemia; No obvious fluid overload   Other NRE: NONE   DID A NON-ROUTINE EVENT OCCUR? No         Last vitals:  Vitals Value Taken Time   /75 12/08/23 0830   Temp 97.1  F (36.2  C) 12/08/23 0802   Pulse 74 12/08/23 0830   Resp 16 12/08/23 0830   SpO2 98 % 12/08/23 0831   Vitals shown include unfiled device data.    Electronically Signed By: MAIDA Santiago CRNA  December 8, 2023  8:52 AM

## 2023-12-08 NOTE — ANESTHESIA CARE TRANSFER NOTE
Patient: Chyna Delgado    Procedure: Procedure(s):  Upper Endoscopy with biopsy and polypectomy  and Ileoscopy       Diagnosis: Family history of FAP (familial adenomatous polyposis) [Z83.71]  Diagnosis Additional Information: No value filed.    Anesthesia Type:   MAC     Note:    Oropharynx: oropharynx clear of all foreign objects and spontaneously breathing  Level of Consciousness: awake  Oxygen Supplementation: room air    Independent Airway: airway patency satisfactory and stable  Dentition: dentition unchanged  Vital Signs Stable: post-procedure vital signs reviewed and stable  Report to RN Given: handoff report given  Patient transferred to: Phase II    Handoff Report: Identifed the Patient, Identified the Reponsible Provider, Reviewed the pertinent medical history, Discussed the surgical course, Reviewed Intra-OP anesthesia mangement and issues during anesthesia, Set expectations for post-procedure period and Allowed opportunity for questions and acknowledgement of understanding      Vitals:  Vitals Value Taken Time   /75 12/08/23 0830   Temp 97.1  F (36.2  C) 12/08/23 0802   Pulse 74 12/08/23 0830   Resp 16 12/08/23 0830   SpO2 98 % 12/08/23 0831   Vitals shown include unfiled device data.    Electronically Signed By: MAIDA PARK CRNA  December 8, 2023  8:52 AM

## 2023-12-08 NOTE — OP NOTE
Chyna Delgado MRN# [id number]   YOB: 1955 Age: 67 year old      Date of Admission:  12/8/2023  Date of Service:   12/08/23    Primary care provider: Jose Guadalupe Lowe    PREOPERATIVE DIAGNOSIS:  History of FAP        POSTOPERATIVE DIAGNOSIS:  Stomach polyps, mild inflammation in J pouch         PROCEDURE:  Ileoscopy, upper endoscopy with polypectomy and biopsy           INDICATIONS:  Surveillance.     Specimen:   ID Type Source Tests Collected by Time Destination   1 :  Biopsy Small Intestine, Duodenum SURGICAL PATHOLOGY EXAM Al Howe MD 12/8/2023  7:38 AM    2 :  Biopsy Stomach, Antrum SURGICAL PATHOLOGY EXAM Al Howe MD 12/8/2023  7:39 AM    3 : stomach polyps Polyp Stomach SURGICAL PATHOLOGY EXAM Al Howe MD 12/8/2023  7:48 AM        SURGEON: Al Howe MD      PROCEDURE:  With the patient in the supine position on the transport cart, IV sedation was administered by the nurse anesthetist.  The patient's correct identity and planned procedure were confirmed during a requisite timeout pause.  A bite block was placed and the fiberoptic endoscope was introduced and negotiated through the cricopharyngeus without difficulty.  The length of the esophagus was examined without findings.  The gastroesophageal junction was noted 36 cm from the incisors; the Z line was regular without ulceration, bleeding source or stricture.  There was no  evidence of Jewell's change.  The stomach was entered and the pylorus was traversed easily.  The gastric mucosa was noted ot have numerous polyps in the fundus and corpus of the stomach, antrum was relatively spared. There was one possible small polyp in the duodenal bulb that was biopsied as well as  in the stomach antrum. There was a sampling of polyps taken in the above mentioned area with a cold snare and retrieved.      The endoscope was returned to the GE junction. A second circumferential examination of the esophagus was performed on  slow withdrawal of the endoscope without additional finding.      Our attention then turned to the lower GI tract  the external anus was inspected and was noted to have multiple skin tags. Digital rectal exam was slightly tight. The colonoscope was inserted and advanced under direct visualization through a narrowing in the J pouch inlet into the ileum, there were no polyps noted within small bowel. Within the pouch there was some mild inflammation but no polyps.  The patient tolerated the procedure well and was taken to postanesthesia care unit.     We invite the patient to return in 1-3 years pending pathology reports.     Al Howe MD

## 2023-12-08 NOTE — INTERVAL H&P NOTE
I have reviewed the surgical (or preoperative) H&P that is linked to this encounter, and examined the patient. There are no significant changes    Clinical Conditions Present on Arrival:  Clinically Significant Risk Factors Present on Admission         # Hyponatremia: Lowest Na = 133 mmol/L in last 30 days, will monitor as appropriate  # Hypercalcemia: Highest Ca = 10.2 mg/dL in last 30 days, will monitor as appropriate        # Drug Induced Platelet Defect: home medication list includes an antiplatelet medication

## 2023-12-13 LAB
PATH REPORT.COMMENTS IMP SPEC: NORMAL
PATH REPORT.FINAL DX SPEC: NORMAL
PATH REPORT.GROSS SPEC: NORMAL
PATH REPORT.MICROSCOPIC SPEC OTHER STN: NORMAL
PATH REPORT.RELEVANT HX SPEC: NORMAL
PHOTO IMAGE: NORMAL

## 2023-12-27 DIAGNOSIS — M81.0 SENILE OSTEOPOROSIS: ICD-10-CM

## 2023-12-27 NOTE — TELEPHONE ENCOUNTER
Fosamax  Last Written Prescription Date: 1/26/23  Last Fill Quantity: 12 # of Refills: 3  Last Office Visit: 11/29/23

## 2023-12-29 RX ORDER — ALENDRONATE SODIUM 70 MG/1
70 TABLET ORAL
Qty: 12 TABLET | Refills: 3 | Status: SHIPPED | OUTPATIENT
Start: 2023-12-29

## 2024-01-13 DIAGNOSIS — M10.041 IDIOPATHIC GOUT OF RIGHT HAND, UNSPECIFIED CHRONICITY: ICD-10-CM

## 2024-01-13 DIAGNOSIS — R09.82 POST-NASAL DRIP: ICD-10-CM

## 2024-01-15 NOTE — TELEPHONE ENCOUNTER
Allopurinol      Last Written Prescription Date:  07/20/23  Last Fill Quantity: 180,   # refills: 1  Last Office Visit: 11/29/23  Future Office visit:    Next 5 appointments (look out 90 days)      Jan 31, 2024 10:15 AM  (Arrive by 10:00 AM)  [facility] with Ellis Edward MD  [facility] [address]  [facility] [address]  [phone]             Singulair      Last Written Prescription Date:  09/28/23  Last Fill Quantity: 90,   # refills: 1  Last Office Visit: 11/29/23  Future Office visit:    Next 5 appointments (look out 90 days)      Jan 31, 2024 10:15 AM  (Arrive by 10:00 AM)  SHORT with Ellis Edward MD  [facility] [address]  [facility] [address]  [phone]

## 2024-01-16 RX ORDER — ALLOPURINOL 100 MG/1
100 TABLET ORAL 2 TIMES DAILY
Qty: 180 TABLET | Refills: 0 | Status: SHIPPED | OUTPATIENT
Start: 2024-01-16 | End: 2024-04-15

## 2024-01-16 RX ORDER — MONTELUKAST SODIUM 10 MG/1
10 TABLET ORAL AT BEDTIME
Qty: 90 TABLET | Refills: 0 | Status: SHIPPED | OUTPATIENT
Start: 2024-01-16 | End: 2024-07-11

## 2024-01-30 NOTE — PROGRESS NOTES
Assessment & Plan     Hypertriglyceridemia  Update.   - Comprehensive metabolic panel; Future  - Lipid Profile; Future  - Comprehensive metabolic panel  - Lipid Profile    PMR (polymyalgia rheumatica) (H24)  Ongoing severe pain.  Upper back.  Prednisone burst and then back down to her chronic 2 mg daily.  ESR pending.    - predniSONE (DELTASONE) 20 MG tablet; Take 1 tablet (20 mg) by mouth daily  - ESR: Erythrocyte sedimentation rate; Future  - ESR: Erythrocyte sedimentation rate    Chronic bilateral thoracic back pain  As above.  I spent some time showing posture training, which I think is the problem.  She has failed PT and chiropractic.  Steroid burst and back down and follow next month in clinic.    - predniSONE (DELTASONE) 20 MG tablet; Take 1 tablet (20 mg) by mouth daily  - ESR: Erythrocyte sedimentation rate; Future  - ESR: Erythrocyte sedimentation rate    Graves disease  Update tsh with her weight loss which has stabilized but still checking.      Restless legs syndrome (RLS)  Reviewed meds.  She can go up to 3 pills of the mirapex as a trial warned about side effects, etc.      Hypothyroidism, unspecified type  Update.   - TSH with free T4 reflex; Future  - TSH with free T4 reflex      45 minutes spent by me on the date of the encounter doing chart review, patient visit, documentation, discussion with family, and introductory visit overall.                No follow-ups on file.    Cristina Clemente is a 68 year old, presenting for the following health issues:  Musculoskeletal Problem    HPI     New Patient/Transfer of Care from Dr Lowe       Musculoskeletal problem/pain    Duration: September   Description  Location: upper back   Intensity:  moderate  Accompanying signs and symptoms: none  History  Previous similar problem: YES  Previous evaluation:  none  Precipitating or alleviating factors:  Trauma or overuse: no   Aggravating factors include: overuse, prednisone was decreased   Therapies tried  and outcome: acetaminophen, Ibuprofen, chiropractor, physical therapy, and tramadol      Weight loss     Duration: 1 year   Description (location/character/radiation): weight loss  Intensity:  moderate  Accompanying signs and symptoms: decreased appetite, lost about 20 lbs since February   History (similar episodes/previous evaluation): None  Precipitating or alleviating factors: None  Therapies tried and outcome: None            Review of Systems  Constitutional, HEENT, cardiovascular, pulmonary, gi and gu systems are negative, except as otherwise noted.      Objective    /58   Pulse 73   Temp 97  F (36.1  C) (Tympanic)   Wt 46.7 kg (103 lb)   SpO2 98%   BMI 20.12 kg/m    Body mass index is 20.12 kg/m .  Physical Exam   GENERAL: alert and no distress  NECK: no adenopathy, no asymmetry, masses, or scars  RESP: lungs clear to auscultation - no rales, rhonchi or wheezes  CV: regular rate and rhythm, normal S1 S2, no S3 or S4, no murmur, click or rub, no peripheral edema  ABDOMEN: soft, nontender, no hepatosplenomegaly, no masses and bowel sounds normal  MS: no gross musculoskeletal defects noted, no edema    Full labs pending.      Followup arranged.          Signed Electronically by: Ellis Edward MD

## 2024-01-31 ENCOUNTER — OFFICE VISIT (OUTPATIENT)
Dept: FAMILY MEDICINE | Facility: OTHER | Age: 69
End: 2024-01-31
Attending: FAMILY MEDICINE
Payer: COMMERCIAL

## 2024-01-31 VITALS
WEIGHT: 103 LBS | BODY MASS INDEX: 20.12 KG/M2 | HEART RATE: 73 BPM | OXYGEN SATURATION: 98 % | DIASTOLIC BLOOD PRESSURE: 58 MMHG | TEMPERATURE: 97 F | SYSTOLIC BLOOD PRESSURE: 106 MMHG

## 2024-01-31 DIAGNOSIS — E78.1 HYPERTRIGLYCERIDEMIA: Primary | ICD-10-CM

## 2024-01-31 DIAGNOSIS — G89.29 CHRONIC BILATERAL THORACIC BACK PAIN: ICD-10-CM

## 2024-01-31 DIAGNOSIS — G25.81 RESTLESS LEGS SYNDROME (RLS): ICD-10-CM

## 2024-01-31 DIAGNOSIS — E03.9 HYPOTHYROIDISM, UNSPECIFIED TYPE: ICD-10-CM

## 2024-01-31 DIAGNOSIS — M35.3 PMR (POLYMYALGIA RHEUMATICA) (H): ICD-10-CM

## 2024-01-31 DIAGNOSIS — M54.6 CHRONIC BILATERAL THORACIC BACK PAIN: ICD-10-CM

## 2024-01-31 DIAGNOSIS — E05.00 GRAVES DISEASE: ICD-10-CM

## 2024-01-31 LAB
ALBUMIN SERPL BCG-MCNC: 4.2 G/DL (ref 3.5–5.2)
ALP SERPL-CCNC: 80 U/L (ref 40–150)
ALT SERPL W P-5'-P-CCNC: 30 U/L (ref 0–50)
ANION GAP SERPL CALCULATED.3IONS-SCNC: 14 MMOL/L (ref 7–15)
AST SERPL W P-5'-P-CCNC: 27 U/L (ref 0–45)
BILIRUB SERPL-MCNC: 0.2 MG/DL
BUN SERPL-MCNC: 23.3 MG/DL (ref 8–23)
CALCIUM SERPL-MCNC: 9.7 MG/DL (ref 8.8–10.2)
CHLORIDE SERPL-SCNC: 99 MMOL/L (ref 98–107)
CHOLEST SERPL-MCNC: 147 MG/DL
CREAT SERPL-MCNC: 0.8 MG/DL (ref 0.51–0.95)
DEPRECATED HCO3 PLAS-SCNC: 23 MMOL/L (ref 22–29)
EGFRCR SERPLBLD CKD-EPI 2021: 79 ML/MIN/1.73M2
ERYTHROCYTE [SEDIMENTATION RATE] IN BLOOD BY WESTERGREN METHOD: 14 MM/HR (ref 0–30)
FASTING STATUS PATIENT QL REPORTED: NO
GLUCOSE SERPL-MCNC: 104 MG/DL (ref 70–99)
HDLC SERPL-MCNC: 41 MG/DL
LDLC SERPL CALC-MCNC: 62 MG/DL
NONHDLC SERPL-MCNC: 106 MG/DL
POTASSIUM SERPL-SCNC: 4.8 MMOL/L (ref 3.4–5.3)
PROT SERPL-MCNC: 6.8 G/DL (ref 6.4–8.3)
SODIUM SERPL-SCNC: 136 MMOL/L (ref 135–145)
TRIGL SERPL-MCNC: 221 MG/DL
TSH SERPL DL<=0.005 MIU/L-ACNC: 0.86 UIU/ML (ref 0.3–4.2)

## 2024-01-31 PROCEDURE — 99215 OFFICE O/P EST HI 40 MIN: CPT | Performed by: FAMILY MEDICINE

## 2024-01-31 PROCEDURE — G0463 HOSPITAL OUTPT CLINIC VISIT: HCPCS

## 2024-01-31 PROCEDURE — 84443 ASSAY THYROID STIM HORMONE: CPT | Mod: ZL | Performed by: FAMILY MEDICINE

## 2024-01-31 PROCEDURE — 36415 COLL VENOUS BLD VENIPUNCTURE: CPT | Mod: ZL | Performed by: FAMILY MEDICINE

## 2024-01-31 PROCEDURE — 80061 LIPID PANEL: CPT | Mod: ZL | Performed by: FAMILY MEDICINE

## 2024-01-31 PROCEDURE — 85652 RBC SED RATE AUTOMATED: CPT | Mod: ZL | Performed by: FAMILY MEDICINE

## 2024-01-31 PROCEDURE — 80053 COMPREHEN METABOLIC PANEL: CPT | Mod: ZL | Performed by: FAMILY MEDICINE

## 2024-01-31 RX ORDER — PREDNISONE 20 MG/1
20 TABLET ORAL DAILY
Qty: 5 TABLET | Refills: 0 | Status: SHIPPED | OUTPATIENT
Start: 2024-01-31 | End: 2024-02-26

## 2024-01-31 ASSESSMENT — ANXIETY QUESTIONNAIRES
8. IF YOU CHECKED OFF ANY PROBLEMS, HOW DIFFICULT HAVE THESE MADE IT FOR YOU TO DO YOUR WORK, TAKE CARE OF THINGS AT HOME, OR GET ALONG WITH OTHER PEOPLE?: NOT DIFFICULT AT ALL
4. TROUBLE RELAXING: NOT AT ALL
GAD7 TOTAL SCORE: 0
6. BECOMING EASILY ANNOYED OR IRRITABLE: NOT AT ALL
GAD7 TOTAL SCORE: 0
5. BEING SO RESTLESS THAT IT IS HARD TO SIT STILL: NOT AT ALL
7. FEELING AFRAID AS IF SOMETHING AWFUL MIGHT HAPPEN: NOT AT ALL
1. FEELING NERVOUS, ANXIOUS, OR ON EDGE: NOT AT ALL
IF YOU CHECKED OFF ANY PROBLEMS ON THIS QUESTIONNAIRE, HOW DIFFICULT HAVE THESE PROBLEMS MADE IT FOR YOU TO DO YOUR WORK, TAKE CARE OF THINGS AT HOME, OR GET ALONG WITH OTHER PEOPLE: NOT DIFFICULT AT ALL
2. NOT BEING ABLE TO STOP OR CONTROL WORRYING: NOT AT ALL
3. WORRYING TOO MUCH ABOUT DIFFERENT THINGS: NOT AT ALL
7. FEELING AFRAID AS IF SOMETHING AWFUL MIGHT HAPPEN: NOT AT ALL

## 2024-01-31 ASSESSMENT — ASTHMA QUESTIONNAIRES
ACT_TOTALSCORE: 23
QUESTION_2 LAST FOUR WEEKS HOW OFTEN HAVE YOU HAD SHORTNESS OF BREATH: NOT AT ALL
QUESTION_4 LAST FOUR WEEKS HOW OFTEN HAVE YOU USED YOUR RESCUE INHALER OR NEBULIZER MEDICATION (SUCH AS ALBUTEROL): NOT AT ALL
QUESTION_1 LAST FOUR WEEKS HOW MUCH OF THE TIME DID YOUR ASTHMA KEEP YOU FROM GETTING AS MUCH DONE AT WORK, SCHOOL OR AT HOME: NONE OF THE TIME
ACT_TOTALSCORE: 23
QUESTION_3 LAST FOUR WEEKS HOW OFTEN DID YOUR ASTHMA SYMPTOMS (WHEEZING, COUGHING, SHORTNESS OF BREATH, CHEST TIGHTNESS OR PAIN) WAKE YOU UP AT NIGHT OR EARLIER THAN USUAL IN THE MORNING: ONCE OR TWICE
QUESTION_5 LAST FOUR WEEKS HOW WOULD YOU RATE YOUR ASTHMA CONTROL: WELL CONTROLLED

## 2024-01-31 ASSESSMENT — PAIN SCALES - GENERAL: PAINLEVEL: NO PAIN (0)

## 2024-01-31 NOTE — LETTER
My Asthma Action Plan    Name: Chyna Delgado   YOB: 1955  Date: 1/31/2024   My doctor: Ellis Edward MD   My clinic: [facility]        My Rescue Medicine:   Albuterol inhaler (Proair/Ventolin/Proventil HFA)  2-4 puffs EVERY 4 HOURS as needed. Use a spacer if recommended by your provider.   My Asthma Severity:   Intermittent / Exercise Induced  Know your asthma triggers:   exercise or sports  cold air          GREEN ZONE   Good Control  I feel good  No cough or wheeze  Can work, sleep and play without asthma symptoms       Take your asthma control medicine every day.     If exercise triggers your asthma, take your rescue medication  15 minutes before exercise or sports, and  During exercise if you have asthma symptoms  Spacer to use with inhaler: If you have a spacer, make sure to use it with your inhaler             YELLOW ZONE Getting Worse  I have ANY of these:  I do not feel good  Cough or wheeze  Chest feels tight  Wake up at night   Keep taking your Green Zone medications  Start taking your rescue medicine:  every 20 minutes for up to 1 hour. Then every 4 hours for 24-48 hours.  If you stay in the Yellow Zone for more than 12-24 hours, contact your doctor.  If you do not return to the Green Zone in 12-24 hours or you get worse, start taking your oral steroid medicine if prescribed by your provider.           RED ZONE Medical Alert - Get Help  I have ANY of these:  I feel awful  Medicine is not helping  Breathing getting harder  Trouble walking or talking  Nose opens wide to breathe       Take your rescue medicine NOW  If your provider has prescribed an oral steroid medicine, start taking it NOW  Call your doctor NOW  If you are still in the Red Zone after 20 minutes and you have not reached your doctor:  Take your rescue medicine again and  Call 911 or go to the emergency room right away    See your regular doctor within 2 weeks of an Emergency Room or Urgent  Care visit for follow-up treatment.          Annual Reminders:  Meet with Asthma Educator,  Flu Shot in the Fall, consider Pneumonia Vaccination for patients with asthma (aged 19 and older).    Pharmacy: [id number]    Electronically signed by Ellis Edward MD   Date: 01/31/24                    Asthma Triggers  How To Control Things That Make Your Asthma Worse    Triggers are things that make your asthma worse.  Look at the list below to help you find your triggers and   what you can do about them. You can help prevent asthma flare-ups by staying away from your triggers.      Trigger                                                          What you can do   Cigarette Smoke  Tobacco smoke can make asthma worse. Do not allow smoking in your home, car or around you.  Be sure no one smokes at a child s day care or school.  If you smoke, ask your health care provider for ways to help you quit.  Ask family members to quit too.  Ask your health care provider for a referral to Quit Plan to help you quit smoking, or call 7-102-807-PLAN.     Colds, Flu, Bronchitis  These are common triggers of asthma. Wash your hands often.  Don t touch your eyes, nose or mouth.  Get a flu shot every year.     Dust Mites  These are tiny bugs that live in cloth or carpet. They are too small to see. Wash sheets and blankets in hot water every week.   Encase pillows and mattress in dust mite proof covers.  Avoid having carpet if you can. If you have carpet, vacuum weekly.   Use a dust mask and HEPA vacuum.   Pollen and Outdoor Mold  Some people are allergic to trees, grass, or weed pollen, or molds. Try to keep your windows closed.  Limit time out doors when pollen count is high.   Ask you health care provider about taking medicine during allergy season.     Animal Dander  Some people are allergic to skin flakes, urine or saliva from pets with fur or feathers. Keep pets with fur or feathers out of your  home.    If you can t keep the pet outdoors, then keep the pet out of your bedroom.  Keep the bedroom door closed.  Keep pets off cloth furniture and away from stuffed toys.     Mice, Rats, and Cockroaches  Some people are allergic to the waste from these pests.   Cover food and garbage.  Clean up spills and food crumbs.  Store grease in the refrigerator.   Keep food out of the bedroom.   Indoor Mold  This can be a trigger if your home has high moisture. Fix leaking faucets, pipes, or other sources of water.   Clean moldy surfaces.  Dehumidify basement if it is damp and smelly.   Smoke, Strong Odors, and Sprays  These can reduce air quality. Stay away from strong odors and sprays, such as perfume, powder, hair spray, paints, smoke incense, paint, cleaning products, candles and new carpet.   Exercise or Sports  Some people with asthma have this trigger. Be active!  Ask your doctor about taking medicine before sports or exercise to prevent symptoms.    Warm up for 5-10 minutes before and after sports or exercise.     Other Triggers of Asthma  Cold air:  Cover your nose and mouth with a scarf.  Sometimes laughing or crying can be a trigger.  Some medicines and food can trigger asthma.

## 2024-02-05 DIAGNOSIS — E78.1 HYPERTRIGLYCERIDEMIA: ICD-10-CM

## 2024-02-05 DIAGNOSIS — M35.3 PMR (POLYMYALGIA RHEUMATICA) (H): ICD-10-CM

## 2024-02-05 DIAGNOSIS — G25.81 RESTLESS LEGS SYNDROME: ICD-10-CM

## 2024-02-05 RX ORDER — ATORVASTATIN CALCIUM 20 MG/1
20 TABLET, FILM COATED ORAL DAILY
Qty: 90 TABLET | Refills: 3 | Status: SHIPPED | OUTPATIENT
Start: 2024-02-05

## 2024-02-05 RX ORDER — PRAMIPEXOLE DIHYDROCHLORIDE 0.12 MG/1
.125-.25 TABLET ORAL AT BEDTIME
Qty: 180 TABLET | Refills: 1 | Status: SHIPPED | OUTPATIENT
Start: 2024-02-05

## 2024-02-05 NOTE — TELEPHONE ENCOUNTER
Lipitor       Last Written Prescription Date:  9/28/2023  Last Fill Quantity: 90,   # refills: 1  Last Office Visit: 1/31/2024  Future Office visit:      Gabapentin       Last Written Prescription Date:  6/09/2023  Last Fill Quantity: 540,   # refills: 1  Last Office Visit: 1/31/2024  Future Office visit:      Mirapex       Last Written Prescription Date:  9/28/2023  Last Fill Quantity: 180,   # refills: 1  Last Office Visit: 1/31/2024  Future Office visit:    Next 5 appointments (look out 90 days)      Mar 28, 2024 10:15 AM  (Arrive by 10:00 AM)  [facility] with Ellis Edward MD  [facility] [address] [place] [address] AVE [facility] [address]  [phone]

## 2024-02-06 RX ORDER — GABAPENTIN 400 MG/1
1200 CAPSULE ORAL 2 TIMES DAILY
Qty: 540 CAPSULE | Refills: 1 | Status: SHIPPED | OUTPATIENT
Start: 2024-02-06 | End: 2024-07-11

## 2024-02-22 NOTE — PROGRESS NOTES
Assessment & Plan     Chronic bilateral thoracic back pain  Severe and ongoing.  Talked about plan and the known osteoporosis and the old compression and the failures of most strategies thus far.  Continue minimal tramadol.  Caution on addiction, etc.  Update xray and get MRI to try and open shots or any surgical considerations.  Follow with ongoing concerns.    - indomethacin (INDOCIN) 25 MG capsule; Take 1 capsule (25 mg) by mouth 2 times daily (with meals)  - XR Thoracic Spine 2 Views (Clinic Performed); Future  - MR Thoracic Spine w/o Contrast; Future    Postoperative pain  Tramadol as above.  Not really a chronic opioid situation with overall minimal use but needs on hand.  I did send refill and update the imaging.    - traMADol (ULTRAM) 50 MG tablet; Take 1 tablet (50 mg) by mouth 3 times daily as needed for severe pain      Xray reviewed showing no new compressions but quite osteopenic looking to me.      Nicotine/Tobacco Cessation  She reports that she has been smoking cigarettes. She started smoking about 54 years ago. She has a 27 pack-year smoking history. She has been exposed to tobacco smoke. She has never used smokeless tobacco.  Nicotine/Tobacco Cessation Plan  Information offered: Patient not interested at this time            No follow-ups on file.    Cristina Clemente is a 68 year old, presenting for the following health issues:  Back Pain    HPI       Medication Followup of prednisone   Taking Medication as prescribed: yes  Side Effects:  None  Medication Helping Symptoms:  NO-did not help      Review of Systems  Constitutional, HEENT, cardiovascular, pulmonary, gi and gu systems are negative, except as otherwise noted.      Objective    /56 (BP Location: Left arm, Patient Position: Sitting, Cuff Size: Adult Regular)   Pulse 69   Temp 96.8  F (36  C) (Tympanic)   Ht 1.524 m (5')   Wt 48.3 kg (106 lb 6.4 oz)   SpO2 97%   BMI 20.78 kg/m    Body mass index is 20.78 kg/m .  Physical  Exam   GENERAL: alert and no distress  NECK: no adenopathy, no asymmetry, masses, or scars  RESP: lungs clear to auscultation - no rales, rhonchi or wheezes  CV: regular rate and rhythm, normal S1 S2, no S3 or S4, no murmur, click or rub, no peripheral edema  ABDOMEN: soft, nontender, no hepatosplenomegaly, no masses and bowel sounds normal  MS: no gross musculoskeletal defects noted, no edema    Xray reviewed.  Thoracic MRI pending.        Signed Electronically by: Ellis Edward MD

## 2024-02-26 ENCOUNTER — ANCILLARY PROCEDURE (OUTPATIENT)
Dept: GENERAL RADIOLOGY | Facility: OTHER | Age: 69
End: 2024-02-26
Attending: FAMILY MEDICINE
Payer: COMMERCIAL

## 2024-02-26 ENCOUNTER — OFFICE VISIT (OUTPATIENT)
Dept: FAMILY MEDICINE | Facility: OTHER | Age: 69
End: 2024-02-26
Attending: FAMILY MEDICINE
Payer: COMMERCIAL

## 2024-02-26 VITALS
TEMPERATURE: 96.8 F | BODY MASS INDEX: 20.89 KG/M2 | HEART RATE: 69 BPM | HEIGHT: 60 IN | WEIGHT: 106.4 LBS | DIASTOLIC BLOOD PRESSURE: 56 MMHG | SYSTOLIC BLOOD PRESSURE: 104 MMHG | OXYGEN SATURATION: 97 %

## 2024-02-26 DIAGNOSIS — M54.6 CHRONIC BILATERAL THORACIC BACK PAIN: ICD-10-CM

## 2024-02-26 DIAGNOSIS — M54.6 CHRONIC BILATERAL THORACIC BACK PAIN: Primary | ICD-10-CM

## 2024-02-26 DIAGNOSIS — G89.29 CHRONIC BILATERAL THORACIC BACK PAIN: Primary | ICD-10-CM

## 2024-02-26 DIAGNOSIS — G89.29 CHRONIC BILATERAL THORACIC BACK PAIN: ICD-10-CM

## 2024-02-26 DIAGNOSIS — M10.041 IDIOPATHIC GOUT OF RIGHT HAND, UNSPECIFIED CHRONICITY: ICD-10-CM

## 2024-02-26 DIAGNOSIS — E87.6 HYPOKALEMIA: ICD-10-CM

## 2024-02-26 PROCEDURE — 99214 OFFICE O/P EST MOD 30 MIN: CPT | Performed by: FAMILY MEDICINE

## 2024-02-26 PROCEDURE — 72070 X-RAY EXAM THORAC SPINE 2VWS: CPT | Mod: TC,FY

## 2024-02-26 PROCEDURE — G0463 HOSPITAL OUTPT CLINIC VISIT: HCPCS | Performed by: FAMILY MEDICINE

## 2024-02-26 RX ORDER — INDOMETHACIN 25 MG/1
25 CAPSULE ORAL 2 TIMES DAILY WITH MEALS
Qty: 40 CAPSULE | Refills: 1 | Status: SHIPPED | OUTPATIENT
Start: 2024-02-26 | End: 2024-03-28

## 2024-02-26 RX ORDER — TRAMADOL HYDROCHLORIDE 50 MG/1
50 TABLET ORAL 3 TIMES DAILY PRN
Qty: 30 TABLET | Refills: 0 | Status: SHIPPED | OUTPATIENT
Start: 2024-02-26

## 2024-02-26 RX ORDER — SPIRONOLACTONE 50 MG/1
50 TABLET, FILM COATED ORAL 2 TIMES DAILY
Qty: 180 TABLET | Refills: 2 | Status: SHIPPED | OUTPATIENT
Start: 2024-02-26 | End: 2024-06-13

## 2024-02-26 ASSESSMENT — PAIN SCALES - GENERAL: PAINLEVEL: MODERATE PAIN (5)

## 2024-02-26 NOTE — TELEPHONE ENCOUNTER
Spironolactone (Aldactone) 50 MG tablet    Last Written Prescription Date:  07/20/2023  Last Fill Quantity: 180,   # refills: 2  Last Office Visit: 02/26/2024

## 2024-02-29 DIAGNOSIS — Z90.49 ACQUIRED ABSENCE OF OTHER SPECIFIED PARTS OF DIGESTIVE TRACT: ICD-10-CM

## 2024-02-29 DIAGNOSIS — R19.7 DIARRHEA DUE TO MALABSORPTION: ICD-10-CM

## 2024-02-29 DIAGNOSIS — K90.9 DIARRHEA DUE TO MALABSORPTION: ICD-10-CM

## 2024-02-29 DIAGNOSIS — M35.3 PMR (POLYMYALGIA RHEUMATICA) (H): ICD-10-CM

## 2024-02-29 NOTE — TELEPHONE ENCOUNTER
Prednisone      Last Written Prescription Date:  10/24/23  Last Fill Quantity: 60,   # refills: 1  Last Office Visit: 2/26/24  Future Office visit:    Next 5 appointments (look out 90 days)      Mar 28, 2024 10:15 AM  (Arrive by 10:00 AM)  [facility] with [facility] MD  [facility] [address]  [facility] [address]  [phone]             Routing refill request to provider for review/approval because:      Bentyl      Last Written Prescription Date:  10/2/23  Last Fill Quantity: 540,   # refills: 0  Last Office Visit: 2/26/24  Future Office visit:    Next 5 appointments (look out 90 days)      Mar 28, 2024 10:15 AM  (Arrive by 10:00 AM)  SHORT with Ellis Edward MD  [facility] [address]  [facility] [address]  [phone]             Routing refill request to provider for review/approval because:      Lomotil      Last Written Prescription Date:  10/2/23  Last Fill Quantity: 120,   # refills: 0  Last Office Visit: 2/26/24  Future Office visit:    Next 5 appointments (look out 90 days)      Mar 28, 2024 10:15 AM  (Arrive by 10:00 AM)  SHORT with Ellis Edward MD  [facility] [address] [facility] [address]  [phone]             Routing refill request to provider for review/approval because:

## 2024-03-01 ENCOUNTER — HOSPITAL ENCOUNTER (OUTPATIENT)
Dept: MRI IMAGING | Facility: HOSPITAL | Age: 69
Discharge: HOME | End: 2024-03-01
Attending: FAMILY MEDICINE | Admitting: FAMILY MEDICINE
Payer: COMMERCIAL

## 2024-03-01 DIAGNOSIS — G89.29 CHRONIC BILATERAL THORACIC BACK PAIN: ICD-10-CM

## 2024-03-01 DIAGNOSIS — M54.6 CHRONIC BILATERAL THORACIC BACK PAIN: ICD-10-CM

## 2024-03-01 PROCEDURE — 72146 MRI CHEST SPINE W/O DYE: CPT

## 2024-03-01 RX ORDER — DIPHENOXYLATE HCL/ATROPINE 2.5-.025MG
1-2 TABLET ORAL 4 TIMES DAILY PRN
Qty: 120 TABLET | Refills: 1 | Status: SHIPPED | OUTPATIENT
Start: 2024-03-01 | End: 2024-03-19

## 2024-03-01 RX ORDER — DICYCLOMINE HYDROCHLORIDE 10 MG/1
20 CAPSULE ORAL
Qty: 540 CAPSULE | Refills: 1 | Status: SHIPPED | OUTPATIENT
Start: 2024-03-01 | End: 2024-06-11

## 2024-03-01 RX ORDER — PREDNISONE 1 MG/1
2 TABLET ORAL DAILY
Qty: 60 TABLET | Refills: 0 | Status: SHIPPED | OUTPATIENT
Start: 2024-03-01 | End: 2024-04-05

## 2024-03-12 ENCOUNTER — TELEPHONE (OUTPATIENT)
Dept: FAMILY MEDICINE | Facility: OTHER | Age: 69
End: 2024-03-12

## 2024-03-12 ENCOUNTER — OFFICE VISIT (OUTPATIENT)
Dept: FAMILY MEDICINE | Facility: OTHER | Age: 69
End: 2024-03-12
Attending: FAMILY MEDICINE
Payer: COMMERCIAL

## 2024-03-12 VITALS
HEART RATE: 78 BPM | TEMPERATURE: 97.9 F | DIASTOLIC BLOOD PRESSURE: 64 MMHG | WEIGHT: 105 LBS | OXYGEN SATURATION: 99 % | BODY MASS INDEX: 20.51 KG/M2 | SYSTOLIC BLOOD PRESSURE: 106 MMHG

## 2024-03-12 DIAGNOSIS — R30.0 DYSURIA: ICD-10-CM

## 2024-03-12 DIAGNOSIS — N39.0 URINARY TRACT INFECTION WITH HEMATURIA, SITE UNSPECIFIED: Primary | ICD-10-CM

## 2024-03-12 DIAGNOSIS — R31.9 URINARY TRACT INFECTION WITH HEMATURIA, SITE UNSPECIFIED: Primary | ICD-10-CM

## 2024-03-12 LAB
BACTERIA #/AREA URNS HPF: ABNORMAL /HPF
RBC #/AREA URNS AUTO: ABNORMAL /HPF
WBC #/AREA URNS AUTO: ABNORMAL /HPF

## 2024-03-12 PROCEDURE — 87086 URINE CULTURE/COLONY COUNT: CPT | Mod: ZL | Performed by: FAMILY MEDICINE

## 2024-03-12 PROCEDURE — 87186 SC STD MICRODIL/AGAR DIL: CPT | Mod: ZL | Performed by: FAMILY MEDICINE

## 2024-03-12 PROCEDURE — G0463 HOSPITAL OUTPT CLINIC VISIT: HCPCS

## 2024-03-12 PROCEDURE — 81015 MICROSCOPIC EXAM OF URINE: CPT | Mod: ZL | Performed by: FAMILY MEDICINE

## 2024-03-12 PROCEDURE — 99213 OFFICE O/P EST LOW 20 MIN: CPT | Performed by: FAMILY MEDICINE

## 2024-03-12 RX ORDER — CEPHALEXIN 500 MG/1
500 CAPSULE ORAL 2 TIMES DAILY
Qty: 21 CAPSULE | Refills: 0 | Status: SHIPPED | OUTPATIENT
Start: 2024-03-12 | End: 2024-03-28

## 2024-03-12 ASSESSMENT — PAIN SCALES - GENERAL: PAINLEVEL: NO PAIN (0)

## 2024-03-12 NOTE — PROGRESS NOTES
Assessment & Plan     Dysuria  With positive UA.  Keflex and follow.  No red flags.  She has had a lot of these over the years and this is a familiar situation to her.    - UA Microscopic with Reflex to Culture; Future  - UA Microscopic with Reflex to Culture  - Urine Culture    Urinary tract infection with hematuria, site unspecified  As above.  Vitals stable.  No red flags.  Keflex and await cx.    - cephALEXin (KEFLEX) 500 MG capsule; Take 1 capsule (500 mg) by mouth 2 times daily                  No follow-ups on file.    Cristina Clemente is a 68 year old, presenting for the following health issues:  UTI    HPI       URINARY TRACT SYMPTOMS    Duration: today   Description  dysuria, frequency, and urgency  Intensity:  moderate  Accompanying signs and symptoms:  Fever/chills: no   Flank pain no   Nausea and vomiting: no   Vaginal symptoms: none  Abdominal/Pelvic Pain: YES- pressure   History  History of frequent UTI's: YES  History of kidney stones: no   Sexually Active:   Possibility of pregnancy: No  Precipitating or alleviating factors: None  Therapies tried and outcome: pyridium             Review of Systems  Constitutional, HEENT, cardiovascular, pulmonary, gi and gu systems are negative, except as otherwise noted.      Objective    /64   Pulse 78   Temp 97.9  F (36.6  C) (Tympanic)   Wt 47.6 kg (105 lb)   SpO2 99%   BMI 20.51 kg/m    Body mass index is 20.51 kg/m .  Physical Exam   GENERAL: alert and no distress  NECK: no adenopathy, no asymmetry, masses, or scars  RESP: lungs clear to auscultation - no rales, rhonchi or wheezes  CV: regular rate and rhythm, normal S1 S2, no S3 or S4, no murmur, click or rub, no peripheral edema  ABDOMEN: soft, nontender, no hepatosplenomegaly, no masses and bowel sounds normal  MS: no gross musculoskeletal defects noted, no edema    UA positive awaiting cx.         Signed Electronically by: Ellis Edward MD

## 2024-03-14 LAB — BACTERIA UR CULT: ABNORMAL

## 2024-03-19 ENCOUNTER — TRANSFERRED RECORDS (OUTPATIENT)
Dept: HEALTH INFORMATION MANAGEMENT | Facility: HOSPITAL | Age: 69
End: 2024-03-19

## 2024-03-19 DIAGNOSIS — K90.9 DIARRHEA DUE TO MALABSORPTION: ICD-10-CM

## 2024-03-19 DIAGNOSIS — R19.7 DIARRHEA DUE TO MALABSORPTION: ICD-10-CM

## 2024-03-19 RX ORDER — DIPHENOXYLATE HCL/ATROPINE 2.5-.025MG
1-2 TABLET ORAL 4 TIMES DAILY PRN
Qty: 120 TABLET | Refills: 0 | Status: SHIPPED | OUTPATIENT
Start: 2024-03-19 | End: 2024-03-28

## 2024-03-19 NOTE — TELEPHONE ENCOUNTER
diphenoxylate-atropine (LOMOTIL) 2.5-0.025 MG tablet 120 tablet 1 3/1/2024     Last Office Visit: 03/12/2024  Future Office visit:    Next 5 appointments (look out 90 days)      Mar 28, 2024 10:15 AM  (Arrive by 10:00 AM)  SHORT with Ellis Edward MD  [facility] ) [address]  [facility] [address]  [phone]             Routing refill request to provider for review/approval because:

## 2024-03-22 NOTE — PROGRESS NOTES
Assessment & Plan     Other osteoporosis with current pathological fracture with delayed healing, subsequent encounter  Nice review.  Interested in other therapies.  Was on boniva, then fosamax and still on that.  Advised referral to the expert to get the right med going forward.  Sent.    - Adult Endocrinology  Referral; Future    Chronic thoracic back pain, unspecified back pain laterality  Update this helped in past.  Indocin made her tired, which is strange but it was quite clear it did by report.  Sent.  Monitor for side effects, etc.    - nabumetone (RELAFEN) 750 MG tablet; Take 1 tablet (750 mg) by mouth 2 times daily    Diarrhea due to malabsorption  Ongoing.  Lomotil daily.  I tried to send a 3 month supply.  She takes chronically and it's just the way it is with this one.    - diphenoxylate-atropine (LOMOTIL) 2.5-0.025 MG tablet; Take 1-2 tablets by mouth 4 times daily as needed for diarrhea                  No follow-ups on file.    Cristina Clemente is a 68 year old, presenting for the following health issues:  Recheck Medication    HPI     Medication Followup of indomethacin and tramadol   Taking Medication as prescribed: yes-stopped indomethacin due to sleepiness   Side Effects:  sleepy  Medication Helping Symptoms:  tramadol is helping         Review of Systems  Constitutional, HEENT, cardiovascular, pulmonary, gi and gu systems are negative, except as otherwise noted.      Objective    /66   Pulse 63   Temp 97  F (36.1  C) (Tympanic)   Ht 1.524 m (5')   Wt 47.6 kg (105 lb)   SpO2 96%   BMI 20.51 kg/m    Body mass index is 20.51 kg/m .  Physical Exam   GENERAL: alert and no distress  NECK: no adenopathy, no asymmetry, masses, or scars  RESP: lungs clear to auscultation - no rales, rhonchi or wheezes  CV: regular rate and rhythm, normal S1 S2, no S3 or S4, no murmur, click or rub, no peripheral edema  ABDOMEN: soft, nontender, no hepatosplenomegaly, no masses and bowel sounds  normal  MS: no gross musculoskeletal defects noted, no edema            Signed Electronically by: Ellis Edward MD

## 2024-03-28 ENCOUNTER — OFFICE VISIT (OUTPATIENT)
Dept: FAMILY MEDICINE | Facility: OTHER | Age: 69
End: 2024-03-28
Attending: FAMILY MEDICINE
Payer: COMMERCIAL

## 2024-03-28 VITALS
OXYGEN SATURATION: 96 % | WEIGHT: 105 LBS | HEIGHT: 60 IN | HEART RATE: 63 BPM | TEMPERATURE: 97 F | SYSTOLIC BLOOD PRESSURE: 112 MMHG | DIASTOLIC BLOOD PRESSURE: 66 MMHG | BODY MASS INDEX: 20.62 KG/M2

## 2024-03-28 DIAGNOSIS — M80.80XG OTHER OSTEOPOROSIS WITH CURRENT PATHOLOGICAL FRACTURE WITH DELAYED HEALING, SUBSEQUENT ENCOUNTER: Primary | ICD-10-CM

## 2024-03-28 DIAGNOSIS — K90.9 DIARRHEA DUE TO MALABSORPTION: ICD-10-CM

## 2024-03-28 DIAGNOSIS — M54.6 CHRONIC THORACIC BACK PAIN, UNSPECIFIED BACK PAIN LATERALITY: ICD-10-CM

## 2024-03-28 DIAGNOSIS — G89.29 CHRONIC THORACIC BACK PAIN, UNSPECIFIED BACK PAIN LATERALITY: ICD-10-CM

## 2024-03-28 DIAGNOSIS — R19.7 DIARRHEA DUE TO MALABSORPTION: ICD-10-CM

## 2024-03-28 PROCEDURE — G0463 HOSPITAL OUTPT CLINIC VISIT: HCPCS

## 2024-03-28 PROCEDURE — 99214 OFFICE O/P EST MOD 30 MIN: CPT | Performed by: FAMILY MEDICINE

## 2024-03-28 RX ORDER — DIPHENOXYLATE HCL/ATROPINE 2.5-.025MG
1-2 TABLET ORAL 4 TIMES DAILY PRN
Qty: 540 TABLET | Refills: 2 | Status: SHIPPED | OUTPATIENT
Start: 2024-03-28

## 2024-03-28 ASSESSMENT — PAIN SCALES - GENERAL: PAINLEVEL: NO PAIN (0)

## 2024-04-01 ENCOUNTER — OFFICE VISIT (OUTPATIENT)
Dept: FAMILY MEDICINE | Facility: OTHER | Age: 69
End: 2024-04-01
Attending: FAMILY MEDICINE
Payer: COMMERCIAL

## 2024-04-01 VITALS
DIASTOLIC BLOOD PRESSURE: 60 MMHG | HEART RATE: 79 BPM | SYSTOLIC BLOOD PRESSURE: 106 MMHG | HEIGHT: 60 IN | TEMPERATURE: 97.2 F | BODY MASS INDEX: 20.62 KG/M2 | WEIGHT: 105 LBS | OXYGEN SATURATION: 94 %

## 2024-04-01 DIAGNOSIS — R30.0 DYSURIA: Primary | ICD-10-CM

## 2024-04-01 LAB
ALBUMIN UR-MCNC: ABNORMAL MG/DL
APPEARANCE UR: ABNORMAL
BACTERIA #/AREA URNS HPF: ABNORMAL /HPF
BILIRUB UR QL STRIP: NEGATIVE
COLOR UR AUTO: ABNORMAL
GLUCOSE UR STRIP-MCNC: 100 MG/DL
HGB UR QL STRIP: ABNORMAL
KETONES UR STRIP-MCNC: NEGATIVE MG/DL
LEUKOCYTE ESTERASE UR QL STRIP: ABNORMAL
NITRATE UR QL: POSITIVE
PH UR STRIP: 6 [PH] (ref 5–7)
RBC #/AREA URNS AUTO: ABNORMAL /HPF
SP GR UR STRIP: <=1.005 (ref 1–1.03)
UROBILINOGEN UR STRIP-ACNC: 1 E.U./DL
WBC #/AREA URNS AUTO: ABNORMAL /HPF

## 2024-04-01 PROCEDURE — 81001 URINALYSIS AUTO W/SCOPE: CPT | Mod: ZL | Performed by: FAMILY MEDICINE

## 2024-04-01 PROCEDURE — 87086 URINE CULTURE/COLONY COUNT: CPT | Mod: ZL | Performed by: FAMILY MEDICINE

## 2024-04-01 PROCEDURE — G0463 HOSPITAL OUTPT CLINIC VISIT: HCPCS

## 2024-04-01 PROCEDURE — 81003 URINALYSIS AUTO W/O SCOPE: CPT | Mod: ZL | Performed by: FAMILY MEDICINE

## 2024-04-01 PROCEDURE — 87186 SC STD MICRODIL/AGAR DIL: CPT | Mod: ZL | Performed by: FAMILY MEDICINE

## 2024-04-01 PROCEDURE — 99213 OFFICE O/P EST LOW 20 MIN: CPT | Performed by: FAMILY MEDICINE

## 2024-04-01 RX ORDER — NITROFURANTOIN 25; 75 MG/1; MG/1
100 CAPSULE ORAL 2 TIMES DAILY
Qty: 14 CAPSULE | Refills: 0 | Status: SHIPPED | OUTPATIENT
Start: 2024-04-01 | End: 2024-06-13

## 2024-04-01 ASSESSMENT — PAIN SCALES - GENERAL: PAINLEVEL: NO PAIN (0)

## 2024-04-01 NOTE — PROGRESS NOTES
Assessment & Plan     Dysuria  Had full resolution of sx a couple of weeks ago following an ecoli infection sensitive to keflex.  She has the sx back.  UA pending.  Macrobid coverage and await cx.  No red flags.    - UA Macroscopic with reflex to Microscopic and Culture; Future  - nitroFURantoin macrocrystal-monohydrate (MACROBID) 100 MG capsule; Take 1 capsule (100 mg) by mouth 2 times daily  - UA Macroscopic with reflex to Microscopic and Culture                  No follow-ups on file.    Cristina Clemente is a 68 year old, presenting for the following health issues:  UTI    HPI     URINARY TRACT SYMPTOMS    Duration: Friday   Description  dysuria and frequency  Intensity:  moderate  Accompanying signs and symptoms:  Fever/chills: no   Flank pain no   Nausea and vomiting: no   Vaginal symptoms: none  Abdominal/Pelvic Pain: no   History  History of frequent UTI's: YES  History of kidney stones: no   Sexually Active:   Possibility of pregnancy: No  Precipitating or alleviating factors: None  Therapies tried and outcome: pyridium  and increase fluid intake              Review of Systems  Constitutional, HEENT, cardiovascular, pulmonary, gi and gu systems are negative, except as otherwise noted.      Objective    /60   Pulse 79   Temp 97.2  F (36.2  C) (Tympanic)   Ht 1.524 m (5')   Wt 47.6 kg (105 lb)   SpO2 94%   BMI 20.51 kg/m    Body mass index is 20.51 kg/m .  Physical Exam   GENERAL: alert and no distress  NECK: no adenopathy, no asymmetry, masses, or scars  RESP: lungs clear to auscultation - no rales, rhonchi or wheezes  CV: regular rate and rhythm, normal S1 S2, no S3 or S4, no murmur, click or rub, no peripheral edema  ABDOMEN: soft, nontender, no hepatosplenomegaly, no masses and bowel sounds normal  MS: no gross musculoskeletal defects noted, no edema    UA pending.  Macrobid coverage while we await cx.         Signed Electronically by: Ellis Edward MD

## 2024-04-03 LAB — BACTERIA UR CULT: ABNORMAL

## 2024-04-13 DIAGNOSIS — M10.041 IDIOPATHIC GOUT OF RIGHT HAND, UNSPECIFIED CHRONICITY: ICD-10-CM

## 2024-04-15 RX ORDER — ALLOPURINOL 100 MG/1
100 TABLET ORAL 2 TIMES DAILY
Qty: 180 TABLET | Refills: 3 | Status: SHIPPED | OUTPATIENT
Start: 2024-04-15

## 2024-04-22 NOTE — TELEPHONE ENCOUNTER
8:12 AM    Reason for Call: OVERBOOK    Patient is having the following symptoms: thinks torn rotary cuff  Possible herniafor 2 weeks.    The patient is requesting an appointment for ASAP with Dr Camarillo.    Was an appointment offered for this call? Yes    Preferred method for responding to this message: Telephone Call has appointment on 05/30/17    If we cannot reach you directly, may we leave a detailed response at the number you provided? Yes    Can this message wait until your PCP/provider returns, if unavailable today? YES,     Catarina Shin      
Patient scheduled/notified  
Please schedule patient for date/time: Friday 2:00 arrive 1:40    Have patient go to ER/Urgent Care Center. Urgent Care hours are 9:30 am to 8 pm, open 7 days a week. No.    Provider will call patient.No.    Other:      
60year-old male with h/o seizure, HTN, aortic dissection s/p repair, CAD and VHD with prior CABG/bio-AVR/bio-MVR, bowel ischemia treated with resection, ESRD was on dialysis in past (pt no longer on dialysis), AVMs s/p cauterization, atrial fibrillation/flutter with previous RFA, PVCs, subdural/subarachnoid hemorrhage, restarted Eliquis 8/2023, MUSTAPHA performed 9/27/23 with +MV vegetation and source thought to be AV graft which was removed 10/1/23, treated with 6 week A/B course, repeat MUSTAPHA in December 2023 negative for any vegetations. AV fistula thought to be responsible for the infection which has been removed.  He was schedule for BLAINE occlusion/Watchman implant and was noted to have SANTOS (Cr 5; baseline ~2) and anemia (Hg 7.1).   He was admitted with Hypertensive Urgency and SANTOS on CKD 3 (likely ATN). Cardiac medications were adjusted. He was transfused 1 PRBC. No GI Bleeding during hospitalization. Seen by GI and was recommended outpatient follow up for Colonoscopy.   He was started on dialysis. Followed closely by Nephrology. Seen by EP for history of PAF / Flutter who recommended to resume Eliquis if no GI Bleeding. GI cleared him to resume Eliquis however patient does not want to resume until colonoscopy is done as an outpatient. Discussed with GI who would arrange Colonoscopy after discharge.   During hospitalization, note to have NSVT likely due to metabolic derangements. Medically stable for discharge with close outpatient follow ups.

## 2024-04-24 ENCOUNTER — APPOINTMENT (OUTPATIENT)
Dept: CT IMAGING | Facility: HOSPITAL | Age: 69
End: 2024-04-24
Attending: STUDENT IN AN ORGANIZED HEALTH CARE EDUCATION/TRAINING PROGRAM
Payer: COMMERCIAL

## 2024-04-24 ENCOUNTER — HOSPITAL ENCOUNTER (EMERGENCY)
Facility: HOSPITAL | Age: 69
Discharge: HOME | End: 2024-04-24
Attending: STUDENT IN AN ORGANIZED HEALTH CARE EDUCATION/TRAINING PROGRAM | Admitting: STUDENT IN AN ORGANIZED HEALTH CARE EDUCATION/TRAINING PROGRAM
Payer: COMMERCIAL

## 2024-04-24 ENCOUNTER — APPOINTMENT (OUTPATIENT)
Dept: GENERAL RADIOLOGY | Facility: HOSPITAL | Age: 69
End: 2024-04-24
Attending: SURGERY
Payer: COMMERCIAL

## 2024-04-24 VITALS
OXYGEN SATURATION: 93 % | RESPIRATION RATE: 18 BRPM | HEART RATE: 74 BPM | SYSTOLIC BLOOD PRESSURE: 94 MMHG | DIASTOLIC BLOOD PRESSURE: 70 MMHG | TEMPERATURE: 98.3 F

## 2024-04-24 DIAGNOSIS — K59.00 CONSTIPATION, UNSPECIFIED CONSTIPATION TYPE: ICD-10-CM

## 2024-04-24 DIAGNOSIS — R10.9 ABDOMINAL PAIN, UNSPECIFIED ABDOMINAL LOCATION: ICD-10-CM

## 2024-04-24 LAB
ALBUMIN SERPL BCG-MCNC: 4.2 G/DL (ref 3.5–5.2)
ALBUMIN UR-MCNC: NEGATIVE MG/DL
ALP SERPL-CCNC: 96 U/L (ref 40–150)
ALT SERPL W P-5'-P-CCNC: 28 U/L (ref 0–50)
ANION GAP SERPL CALCULATED.3IONS-SCNC: 12 MMOL/L (ref 7–15)
ANION GAP SERPL CALCULATED.3IONS-SCNC: 12 MMOL/L (ref 7–15)
APPEARANCE UR: CLEAR
AST SERPL W P-5'-P-CCNC: 33 U/L (ref 0–45)
BACTERIA #/AREA URNS HPF: ABNORMAL /HPF
BASOPHILS # BLD AUTO: 0.1 10E3/UL (ref 0–0.2)
BASOPHILS NFR BLD AUTO: 1 %
BILIRUB SERPL-MCNC: 0.6 MG/DL
BILIRUB UR QL STRIP: NEGATIVE
BUN SERPL-MCNC: 30.8 MG/DL (ref 8–23)
BUN SERPL-MCNC: 34.8 MG/DL (ref 8–23)
CALCIUM SERPL-MCNC: 10.6 MG/DL (ref 8.8–10.2)
CALCIUM SERPL-MCNC: 9.3 MG/DL (ref 8.8–10.2)
CHLORIDE SERPL-SCNC: 89 MMOL/L (ref 98–107)
CHLORIDE SERPL-SCNC: 94 MMOL/L (ref 98–107)
COLOR UR AUTO: ABNORMAL
CREAT SERPL-MCNC: 0.83 MG/DL (ref 0.51–0.95)
CREAT SERPL-MCNC: 0.98 MG/DL (ref 0.51–0.95)
DEPRECATED HCO3 PLAS-SCNC: 26 MMOL/L (ref 22–29)
DEPRECATED HCO3 PLAS-SCNC: 27 MMOL/L (ref 22–29)
EGFRCR SERPLBLD CKD-EPI 2021: 62 ML/MIN/1.73M2
EGFRCR SERPLBLD CKD-EPI 2021: 75 ML/MIN/1.73M2
EOSINOPHIL # BLD AUTO: 0.1 10E3/UL (ref 0–0.7)
EOSINOPHIL NFR BLD AUTO: 1 %
ERYTHROCYTE [DISTWIDTH] IN BLOOD BY AUTOMATED COUNT: 13.4 % (ref 10–15)
GLUCOSE SERPL-MCNC: 100 MG/DL (ref 70–99)
GLUCOSE SERPL-MCNC: 124 MG/DL (ref 70–99)
GLUCOSE UR STRIP-MCNC: NEGATIVE MG/DL
HCT VFR BLD AUTO: 43.7 % (ref 35–47)
HGB BLD-MCNC: 15.1 G/DL (ref 11.7–15.7)
HGB UR QL STRIP: NEGATIVE
HOLD SPECIMEN: NORMAL
IMM GRANULOCYTES # BLD: 0.1 10E3/UL
IMM GRANULOCYTES NFR BLD: 1 %
KETONES UR STRIP-MCNC: NEGATIVE MG/DL
LEUKOCYTE ESTERASE UR QL STRIP: NEGATIVE
LYMPHOCYTES # BLD AUTO: 1.1 10E3/UL (ref 0.8–5.3)
LYMPHOCYTES NFR BLD AUTO: 11 %
MCH RBC QN AUTO: 30.4 PG (ref 26.5–33)
MCHC RBC AUTO-ENTMCNC: 34.6 G/DL (ref 31.5–36.5)
MCV RBC AUTO: 88 FL (ref 78–100)
MONOCYTES # BLD AUTO: 1 10E3/UL (ref 0–1.3)
MONOCYTES NFR BLD AUTO: 10 %
MUCOUS THREADS #/AREA URNS LPF: PRESENT /LPF
NEUTROPHILS # BLD AUTO: 7.8 10E3/UL (ref 1.6–8.3)
NEUTROPHILS NFR BLD AUTO: 78 %
NITRATE UR QL: NEGATIVE
NRBC # BLD AUTO: 0 10E3/UL
NRBC BLD AUTO-RTO: 0 /100
PH UR STRIP: 5.5 [PH] (ref 4.7–8)
PLATELET # BLD AUTO: 319 10E3/UL (ref 150–450)
POTASSIUM SERPL-SCNC: 4.4 MMOL/L (ref 3.4–5.3)
POTASSIUM SERPL-SCNC: 5.5 MMOL/L (ref 3.4–5.3)
PROT SERPL-MCNC: 7.4 G/DL (ref 6.4–8.3)
RBC # BLD AUTO: 4.96 10E6/UL (ref 3.8–5.2)
RBC URINE: 0 /HPF
SODIUM SERPL-SCNC: 128 MMOL/L (ref 135–145)
SODIUM SERPL-SCNC: 132 MMOL/L (ref 135–145)
SP GR UR STRIP: 1.02 (ref 1–1.03)
SQUAMOUS EPITHELIAL: 0 /HPF
UROBILINOGEN UR STRIP-MCNC: NORMAL MG/DL
WBC # BLD AUTO: 10.1 10E3/UL (ref 4–11)
WBC URINE: 0 /HPF

## 2024-04-24 PROCEDURE — 36415 COLL VENOUS BLD VENIPUNCTURE: CPT | Performed by: STUDENT IN AN ORGANIZED HEALTH CARE EDUCATION/TRAINING PROGRAM

## 2024-04-24 PROCEDURE — 74177 CT ABD & PELVIS W/CONTRAST: CPT

## 2024-04-24 PROCEDURE — 96376 TX/PRO/DX INJ SAME DRUG ADON: CPT

## 2024-04-24 PROCEDURE — 258N000003 HC RX IP 258 OP 636: Performed by: STUDENT IN AN ORGANIZED HEALTH CARE EDUCATION/TRAINING PROGRAM

## 2024-04-24 PROCEDURE — 85025 COMPLETE CBC W/AUTO DIFF WBC: CPT | Performed by: STUDENT IN AN ORGANIZED HEALTH CARE EDUCATION/TRAINING PROGRAM

## 2024-04-24 PROCEDURE — 81001 URINALYSIS AUTO W/SCOPE: CPT | Performed by: STUDENT IN AN ORGANIZED HEALTH CARE EDUCATION/TRAINING PROGRAM

## 2024-04-24 PROCEDURE — 96361 HYDRATE IV INFUSION ADD-ON: CPT

## 2024-04-24 PROCEDURE — 99285 EMERGENCY DEPT VISIT HI MDM: CPT | Performed by: STUDENT IN AN ORGANIZED HEALTH CARE EDUCATION/TRAINING PROGRAM

## 2024-04-24 PROCEDURE — 82565 ASSAY OF CREATININE: CPT | Performed by: STUDENT IN AN ORGANIZED HEALTH CARE EDUCATION/TRAINING PROGRAM

## 2024-04-24 PROCEDURE — 36415 COLL VENOUS BLD VENIPUNCTURE: CPT | Performed by: EMERGENCY MEDICINE

## 2024-04-24 PROCEDURE — 250N000011 HC RX IP 250 OP 636: Performed by: STUDENT IN AN ORGANIZED HEALTH CARE EDUCATION/TRAINING PROGRAM

## 2024-04-24 PROCEDURE — 74270 X-RAY XM COLON 1CNTRST STD: CPT

## 2024-04-24 PROCEDURE — 96374 THER/PROPH/DIAG INJ IV PUSH: CPT | Mod: XU

## 2024-04-24 PROCEDURE — 96375 TX/PRO/DX INJ NEW DRUG ADDON: CPT

## 2024-04-24 PROCEDURE — 99285 EMERGENCY DEPT VISIT HI MDM: CPT | Mod: 25

## 2024-04-24 RX ORDER — HYDROMORPHONE HYDROCHLORIDE 1 MG/ML
0.5 INJECTION, SOLUTION INTRAMUSCULAR; INTRAVENOUS; SUBCUTANEOUS EVERY 30 MIN PRN
Status: DISCONTINUED | OUTPATIENT
Start: 2024-04-24 | End: 2024-04-24 | Stop reason: HOSPADM

## 2024-04-24 RX ORDER — ONDANSETRON 2 MG/ML
4 INJECTION INTRAMUSCULAR; INTRAVENOUS ONCE
Status: COMPLETED | OUTPATIENT
Start: 2024-04-24 | End: 2024-04-24

## 2024-04-24 RX ORDER — IOPAMIDOL 755 MG/ML
52 INJECTION, SOLUTION INTRAVASCULAR ONCE
Status: COMPLETED | OUTPATIENT
Start: 2024-04-24 | End: 2024-04-24

## 2024-04-24 RX ADMIN — HYDROMORPHONE HYDROCHLORIDE 0.5 MG: 1 INJECTION, SOLUTION INTRAMUSCULAR; INTRAVENOUS; SUBCUTANEOUS at 12:25

## 2024-04-24 RX ADMIN — HYDROMORPHONE HYDROCHLORIDE 0.5 MG: 1 INJECTION, SOLUTION INTRAMUSCULAR; INTRAVENOUS; SUBCUTANEOUS at 08:06

## 2024-04-24 RX ADMIN — DIATRIZOATE MEGLUMINE AND DIATRIZOATE SODIUM 360 ML: 660; 100 SOLUTION ORAL; RECTAL at 09:43

## 2024-04-24 RX ADMIN — ONDANSETRON 4 MG: 2 INJECTION INTRAMUSCULAR; INTRAVENOUS at 07:27

## 2024-04-24 RX ADMIN — IOPAMIDOL 25 ML: 755 INJECTION, SOLUTION INTRAVENOUS at 07:36

## 2024-04-24 RX ADMIN — SODIUM CHLORIDE 1000 ML: 9 INJECTION, SOLUTION INTRAVENOUS at 08:07

## 2024-04-24 ASSESSMENT — ACTIVITIES OF DAILY LIVING (ADL)
ADLS_ACUITY_SCORE: 36
ADLS_ACUITY_SCORE: 38

## 2024-04-24 ASSESSMENT — COLUMBIA-SUICIDE SEVERITY RATING SCALE - C-SSRS
6. HAVE YOU EVER DONE ANYTHING, STARTED TO DO ANYTHING, OR PREPARED TO DO ANYTHING TO END YOUR LIFE?: NO
2. HAVE YOU ACTUALLY HAD ANY THOUGHTS OF KILLING YOURSELF IN THE PAST MONTH?: NO
1. IN THE PAST MONTH, HAVE YOU WISHED YOU WERE DEAD OR WISHED YOU COULD GO TO SLEEP AND NOT WAKE UP?: NO

## 2024-04-24 NOTE — ED NOTES
"CT tech Chrystal called, and stated the patient c/o her left should \"felt hot\", only approximately 25 mL of contrast was administered.   MD made aware.  "

## 2024-04-24 NOTE — ED NOTES
Patient's O2 dropped to mid 80s after Dilaudid, administered 2lpm of O2 via NC.  Patient reports her pain has improved and is down to a 2 out of 10.

## 2024-04-24 NOTE — DISCHARGE INSTRUCTIONS
- Please take Miralax, Senna, and enemas as needed for your symptoms  - Please return to the Emergency Room if you do not improve, feel worse, or have any new or concerning symptoms. We would especially want to see you back if you experience worsening pain or persistent vomiting  - Please follow up with a primary care physician in 2-3 days to ensure you are improving and to recheck your labs.

## 2024-04-24 NOTE — ED TRIAGE NOTES
Pt in for evaluation of generalized abdominal pain. Reports sx have been ongoing for the last 2 nights. States she had her colon removed in 2000. Has issues with loose stools and does take lomotil prn. States she now feels like she has a blockage as she has not had a stool in the last 2 days.

## 2024-04-24 NOTE — ED PROVIDER NOTES
History     Chief Complaint   Patient presents with    Abdominal Pain     HPI  Chyna Delgado is a 68 year old female who presents with abdominal pain, constipation, vomiting, for the past 2+ days. PMH colectomy and J-pouch formation 2/2 multiple precancerous lesions, as well as other abdominal surgeries, SBOs. She is concerned she has recurrent SBO. No fevers. No CP. No SOB. No radiation of pain to back.     Allergies:  Allergies   Allergen Reactions    Codeine Swelling     Throat swelling-Patient can tolerate Hydrocodone & morphine    Nortriptyline Other (See Comments)     Crying        Problem List:    Patient Active Problem List    Diagnosis Date Noted    Chronic thoracic back pain, unspecified back pain laterality 10/13/2023     Priority: Medium    Trochanteric bursitis of right hip 05/15/2023     Priority: Medium    Diarrhea due to malabsorption 05/15/2023     Priority: Medium    Closed fracture of left hip (H) 02/19/2023     Priority: Medium    Closed fracture of left hip, initial encounter (H) 02/19/2023     Priority: Medium    Idiopathic gout of right hand, unspecified chronicity 09/09/2020     Priority: Medium    PMR (polymyalgia rheumatica) (H24) 09/09/2020     Priority: Medium    Osteoporosis 09/03/2019     Priority: Medium    Hip fracture requiring operative repair (H) 02/25/2019     Priority: Medium    Closed fracture of neck of right femur (H) 02/23/2019     Priority: Medium    Functional diarrhea 10/26/2018     Priority: Medium    Iron deficiency 04/18/2018     Priority: Medium    Myalgia 05/30/2017     Priority: Medium    Abdominal muscle strain 05/05/2017     Priority: Medium    Strain of flexor muscle of hip, unspecified laterality, initial encounter 05/05/2017     Priority: Medium    Right shoulder pain 11/03/2016     Priority: Medium    ACP (advance care planning) 05/09/2016     Priority: Medium     Advance Care Planning 5/9/2016: ACP Review of Chart / Resources Provided:  Reviewed chart  for advance care plan.  Chyna VALVERDE Danny has been provided information and resources to begin or update their advance care plan.  Added by Lina PONCE Buus            Pain of toe of right foot 05/09/2016     Priority: Medium    Arthritis 02/12/2016     Priority: Medium    Graves disease 11/05/2015     Priority: Medium    Numbness and tingling in right hand 04/20/2015     Priority: Medium    Restless legs syndrome 04/16/2014     Priority: Medium    Somatic dysfunction of pelvic region 10/08/2013     Priority: Medium    Seborrheic keratosis 07/12/2013     Priority: Medium     Imo Update utility      Mild persistent asthma 06/28/2013     Priority: Medium    Sacroiliac pain 06/28/2013     Priority: Medium    Benign neoplasm of stomach 08/16/2012     Priority: Medium    Colon polyposis 08/16/2012     Priority: Medium    Family history of colonic polyps 08/16/2012     Priority: Medium    Family history of skin cancer 08/16/2012     Priority: Medium    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     Priority: Medium     Overview:   IMO Update 10/11      Seborrheic dermatitis, unspecified 10/12/2011     Priority: Medium     Overview:   IMO Update 10/11      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     Priority: Medium    Hypothyroidism 07/07/2011     Priority: Medium        Past Medical History:    Past Medical History:   Diagnosis Date    Abnormal mammogram, unspecified 01/08/2003    Back Pain 02/10/2000    Benign neoplasm of colon 03/10/2000    Benign paroxysmal positional vertigo 06/07/2012    Depressive disorder, not elsewhere classified 02/10/2004    Diarrhea 12/15/2010    Gastric polyp 12/11/2015, 12/18/2017    History of normal mammogram 07/18/2014, 1/18/2019    Idiopathic osteoporosis 12/15/2010    Interstitial emphysema (H) 12/15/2010    menopausal or female climacteric states 08/31/1999    Mixed hyperlipidemia 12/15/2010    Other bursitis disorders 02/04/2011    Other forms of retinal detachment(361.89) 12/15/2010    Other malaise and  fatigue 01/10/2003    Personal history of tobacco use, presenting hazards to health 12/15/2010    Polymyalgia rheumatica (H24)     Polyposis of colon     Restless legs syndrome (RLS) 12/15/2010    Rotator cuff tendonitis     Rotator cuff tendonitis     Sacroiliitis, not elsewhere classified (H24) 12/15/2010    Tobacco use disorder 08/22/2012    Unspecified asthma(493.90) 12/15/2010       Past Surgical History:    Past Surgical History:   Procedure Laterality Date    ARTHROPLASTY HIP ANTERIOR Right 05/15/2023    Procedure: Right Direct Anterior Total Hip Arthroplasty;  Surgeon: Kurt Jordan MD;  Location: [facility]    benign tumors removed from back      CATARACT EXTRACTION Right 06/21/2022    CATARACT EXTRACTION Left 07/12/2022    CLOSED REDUCTION, PERCUTANEOUS PINNING HIP Right 02/24/2019    Procedure: Percutaneous Screw Fixation of Right Hip Fracture;  Surgeon: Amrik Kolb DO;  Location: [facility]    COLECTOMY TOTAL      COLON SURGERY N/A     HX: Total colectomy with J-pouch reconstruction.     ENDOSCOPY UPPER, COLONOSCOPY, COMBINED N/A 09/05/2014    Procedure: COMBINED ENDOSCOPY UPPER, COLONOSCOPY;  Surgeon: Delbert Patel MD;  Location: [facility]    ENDOSCOPY UPPER, COLONOSCOPY, COMBINED N/A 05/09/2019    Procedure: UPPER ENDOSCOPY  WITH BIOPSIES AND  COLONOSCOPY WITH BIOPSIES;  Surgeon: Tai Diaz MD;  Location: [facility]    ENDOSCOPY UPPER, COLONOSCOPY, COMBINED N/A 12/8/2023    Procedure: Upper Endoscopy with biopsy and polypectomy  and Ileoscopy;  Surgeon: Al Howe MD;  Location: [facility]    ESOPHAGOSCOPY, GASTROSCOPY, DUODENOSCOPY (EGD), COMBINED N/A 12/11/2015    Procedure: COMBINED ESOPHAGOSCOPY, GASTROSCOPY, DUODENOSCOPY (EGD);  Surgeon: Delbert Patel MD;  Location: [facility]    ESOPHAGOSCOPY, GASTROSCOPY, DUODENOSCOPY (EGD), COMBINED N/A 12/18/2017    Procedure: COMBINED ESOPHAGOSCOPY, GASTROSCOPY, DUODENOSCOPY (EGD);  UPPER ENDOSCOPY WITH BIOPSY;  Surgeon: Delbert Patel MD;  Location: [facility]     excised-benign  2002    tongue lesion    HC REPAIR OF NASAL SEPTUM  2002    Deviated nasal septum    LAPAROSCOPIC CHOLECYSTECTOMY      lumpectomy Right breast      Breast Lump    MOUTH SURGERY      removal of spot from roof of mouth    pilonidal cyst surgery  1976    removal of colon and large intestine  2000    Familial polyposis    REMOVE HARDWARE ARTHROPLASTY HIP Right 07/20/2022    Procedure: Right Hip Hardware Removal (Cannulated Screws);  Surgeon: Luis Fernando Marcial MD;  Location: [facility]    Right etopic pregnancy      Pregnancy    TONSILLECTOMY      tonsillitus    UPPER GI ENDOSCOPY  2009    Stomach polyps       Family History:    Family History   Problem Relation Age of Onset    Other - See Comments Father         Atrial Fibrillation    Cancer Father         bladder ca    Colon Polyps Father     Heart Disease Father         Pacemaker    Rheumatoid Arthritis Father     C.A.D. Father 75        CABG    Chronic Obstructive Pulmonary Disease Mother     Heart Disease Mother         Pacemaker    Other - See Comments Mother         dementia    C.A.D. Mother 70        CABG    C.A.D. Maternal Grandmother     Unknown/Adopted Maternal Grandfather     Unknown/Adopted Paternal Grandmother     Unknown/Adopted Paternal Grandfather     Asthma Sister     Cerebrovascular Disease Sister     Gastrointestinal Disease Sister         peptic ulcers    Hypertension Sister     Gastrointestinal Disease Sister         stomach tumors    Rheumatoid Arthritis Sister     Cancer Sister         facial cancer    Asthma Sister     Cancer Maternal Aunt         renal ca       Social History:  Marital Status:   [2]  Social History     Tobacco Use    Smoking status: Every Day     Current packs/day: 0.50     Average packs/day: 0.5 packs/day for 54.3 years (27.2 ttl pk-yrs)     Types: Cigarettes     Start date: 1/1/1970     Passive exposure: Current    Smokeless tobacco: Never   Vaping Use    Vaping status: Never Used   Substance Use Topics     Alcohol use: Yes     Comment: Weekly 3 drinks    Drug use: Never        Medications:    acetaminophen (TYLENOL) 325 MG tablet  albuterol (PROAIR HFA/PROVENTIL HFA/VENTOLIN HFA) 108 (90 Base) MCG/ACT inhaler  alendronate (FOSAMAX) 70 MG tablet  allopurinol (ZYLOPRIM) 100 MG tablet  amoxicillin (AMOXIL) 500 MG capsule  aspirin 81 MG EC tablet  atorvastatin (LIPITOR) 20 MG tablet  calcium carbonate 750 MG CHEW  ciprofloxacin (CETRAXAL) 0.2 % otic solution  dicyclomine (BENTYL) 10 MG capsule  diphenoxylate-atropine (LOMOTIL) 2.5-0.025 MG tablet  ferrous sulfate (IRON) 325 (65 FE) MG tablet  fluticasone-salmeterol (ADVAIR) 250-50 MCG/ACT inhaler  gabapentin (NEURONTIN) 400 MG capsule  GNP VITAMIN D MAXIMUM STRENGTH 50 MCG (2000 UT) tablet  ketotifen (ZADITOR/REFRESH ANTI-ITCH) 0.025 % SOLN ophthalmic solution  loperamide (IMODIUM) 2 MG capsule  meloxicam (MOBIC) 15 MG tablet  montelukast (SINGULAIR) 10 MG tablet  Multiple Vitamin (MULTIVITAMIN) per tablet  nabumetone (RELAFEN) 750 MG tablet  nitroFURantoin macrocrystal-monohydrate (MACROBID) 100 MG capsule  omeprazole (PRILOSEC) 40 MG DR capsule  potassium chloride ER (KLOR-CON M) 20 MEQ CR tablet  pramipexole (MIRAPEX) 0.125 MG tablet  predniSONE (DELTASONE) 1 MG tablet  PSYLLIUM PO  Simethicone 125 MG CAPS  spironolactone (ALDACTONE) 50 MG tablet  traMADol (ULTRAM) 50 MG tablet          Review of Systems   All other systems reviewed and are negative.      Physical Exam   BP: 114/77  Pulse: 84  Temp: 98.3  F (36.8  C)  Resp: 22  SpO2: 97 %      Physical Exam  Vitals and nursing note reviewed.   Constitutional:       General: She is not in acute distress.     Appearance: She is well-developed. She is not ill-appearing or toxic-appearing.   HENT:      Head: Normocephalic and atraumatic.      Mouth/Throat:      Mouth: Mucous membranes are moist.   Eyes:      Extraocular Movements: Extraocular movements intact.   Cardiovascular:      Rate and Rhythm: Normal rate and regular  rhythm.      Heart sounds: Normal heart sounds.   Pulmonary:      Effort: Pulmonary effort is normal.   Abdominal:      General: There is distension.      Tenderness: There is generalized abdominal tenderness. There is guarding. There is no rebound. Negative signs include Maloney's sign, Rovsing's sign and McBurney's sign.   Skin:     General: Skin is warm.      Capillary Refill: Capillary refill takes less than 2 seconds.   Neurological:      General: No focal deficit present.      Mental Status: She is alert.         ED Course     ED Course as of 04/24/24 1205   Wed Apr 24, 2024   0817 Discussed with surgery. Dr. Howe reviewed CT and will talk with radiology as he feels CT shows more constipation. Plan still pending.   1014 XR Colon Water Soluble Diagnostic   1108 Discussed findings with surgery and they feel she is appropriate for discharge for admit to medicine for enteritis. Discussed with patient. She had a partial bowel movement with some improvement in symptoms after enema. Given her ongoing symptoms, offered treatment of constipation further in the ER but she prefers treatment at home.   1118 Findings were discussed with the patient. Additional verbal instructions were discussed with the patient as well. Instructed to follow up with a primary care provider within 3-4 days. Also discussed specific warning signs and instructed to return to the ED if there are any concerns. Patient voiced understanding of instructions, questions were answered and the patient was discharged home in stable condition.   1146 Rechecking electrolytes.   1205 Basic metabolic panel(!)  Reassuring electrolytes. No hyperkalemia and hyponatremia improved. Okay for discharge.     Procedures              Results for orders placed or performed during the hospital encounter of 04/24/24 (from the past 24 hour(s))   CBC with Platelets & Differential    Narrative    The following orders were created for panel order CBC with Platelets &  Differential.  Procedure                               Abnormality         Status                     ---------                               -----------         ------                     CBC with platelets and d...[[id number]]                      Final result                 Please view results for these tests on the individual orders.   Comprehensive metabolic panel   Result Value Ref Range    Sodium 128 (L) 135 - 145 mmol/L    Potassium 5.5 (H) 3.4 - 5.3 mmol/L    Carbon Dioxide (CO2) 27 22 - 29 mmol/L    Anion Gap 12 7 - 15 mmol/L    Urea Nitrogen 34.8 (H) 8.0 - 23.0 mg/dL    Creatinine 0.98 (H) 0.51 - 0.95 mg/dL    GFR Estimate 62 >60 mL/min/1.73m2    Calcium 10.6 (H) 8.8 - 10.2 mg/dL    Chloride 89 (L) 98 - 107 mmol/L    Glucose 124 (H) 70 - 99 mg/dL    Alkaline Phosphatase 96 40 - 150 U/L    AST 33 0 - 45 U/L    ALT 28 0 - 50 U/L    Protein Total 7.4 6.4 - 8.3 g/dL    Albumin 4.2 3.5 - 5.2 g/dL    Bilirubin Total 0.6 <=1.2 mg/dL   [place] [facility]    Narrative    The following orders were created for panel order [place] [facility].  Procedure                               Abnormality         Status                     ---------                               -----------         ------                     Extra Red Top Tube[[id number]]                               Final result               Extra Green Top (Lithium...[[id number]]                      Final result               Extra Purple Top Tube[[id number]]                            Final result                 Please view results for these tests on the individual orders.   CBC with platelets and differential   Result Value Ref Range    WBC Count 10.1 4.0 - 11.0 10e3/uL    RBC Count 4.96 3.80 - 5.20 10e6/uL    Hemoglobin 15.1 11.7 - 15.7 g/dL    Hematocrit 43.7 35.0 - 47.0 %    MCV 88 78 - 100 fL    MCH 30.4 26.5 - 33.0 pg    MCHC 34.6 31.5 - 36.5 g/dL    RDW 13.4 10.0 - 15.0 %    Platelet Count 319 150 - 450 10e3/uL    % Neutrophils 78 %    % Lymphocytes 11 %    %  Monocytes 10 %    % Eosinophils 1 %    % Basophils 1 %    % Immature Granulocytes 1 %    NRBCs per 100 WBC 0 <1 /100    Absolute Neutrophils 7.8 1.6 - 8.3 10e3/uL    Absolute Lymphocytes 1.1 0.8 - 5.3 10e3/uL    Absolute Monocytes 1.0 0.0 - 1.3 10e3/uL    Absolute Eosinophils 0.1 0.0 - 0.7 10e3/uL    Absolute Basophils 0.1 0.0 - 0.2 10e3/uL    Absolute Immature Granulocytes 0.1 <=0.4 10e3/uL    Absolute NRBCs 0.0 10e3/uL   Extra Red Top Tube   Result Value Ref Range    Hold Specimen JIC    Extra Green Top (Lithium Heparin) Tube   Result Value Ref Range    Hold Specimen JIC    Extra Purple Top Tube   Result Value Ref Range    Hold Specimen JIC    CT Abdomen Pelvis w Contrast    Narrative    CT ABDOMEN PELVIS W CONTRAST    CLINICAL HISTORY: Female, age 68 years,  Concern for bowel  obstruction, hx of colectomy;    Comparison:  No relevant prior imaging.    TECHNIQUE:  CT scanwas performed of the abdomen and pelvis with IV  contrast. Axial, sagittal and coronal images were reviewed. If  present, MIP and/or 3-D images were constructed by the technologist.    FINDINGS:  Interstitial thickening and atelectasis in the lung bases is likely  chronic. Visualized portions of the heart demonstrate no acute  abnormality.    Stomach and duodenum: Marked distention of the duodenum. The liver:  Moderate intrahepatic and axial hepatic biliary dilatation. The  gallbladder surgically absent.    Spleen: Unremarkable    Pancreas: Unremarkable.    Adrenal glands: Unremarkable.    Kidneys: 3 cm cortical cyst of the left kidney. Right-sided pelvic  kidney.    Ureters: Visualized portions of the ureters are unremarkable. Streak  artifact arising from the right hip arthroplasty limits evaluation.    Large and small bowel: Postoperative changes consistent with history  of colon resection. There are numerous distended, gas and fluid-filled  loops of small bowel.    Atherosclerotic calcifications are seen at the origin of the celiac  trunk and  the superior mesenteric artery as well as scattered  throughout the abdominal aorta and arterial structures of the  abdomen/pelvis. There is no distinct evidence of acute vascular  abnormality.    Retroperitoneal and mesenteric nodes are normal in size. Small volume  of free fluid is seen extending into the lower pelvis.    Bony structures: Right hip arthroplasty. Generalized osteopenia.  Sclerotic changes of the left and right SI joint with suggestion of a  healing insufficiency fracture involving the left sacral ala.      Impression    IMPRESSION:   Postoperative changes consistent with history of colon resection.  Marked distention of the small bowel and portions of the remaining  colon concerning for distal obstruction, less likely adynamic ileus.    Healed/healing insufficiency fracture of the left hemisacrum adjacent  to the SI joint.     Nonacute findings as described above.      This facility minimizes radiation dose by adjusting the mA and/or kV  according to each patient size.      This CT scan was performed using one or more the following dose  reduction techniques:    -Automated exposure control,  -Adjustment of the mA and/or kV according to patient's size, and/or,  -Use of iterative reconstruction technique.    YEIMI HIGHTOWER MD         SYSTEM ID:  [id number]   UA with Microscopic reflex to Culture    Specimen: Urine, Midstream   Result Value Ref Range    Color Urine Light Yellow Colorless, Straw, Light Yellow, Yellow    Appearance Urine Clear Clear    Glucose Urine Negative Negative mg/dL    Bilirubin Urine Negative Negative    Ketones Urine Negative Negative mg/dL    Specific Gravity Urine 1.020 1.003 - 1.035    Blood Urine Negative Negative    pH Urine 5.5 4.7 - 8.0    Protein Albumin Urine Negative Negative mg/dL    Urobilinogen Urine Normal Normal, 2.0 mg/dL    Nitrite Urine Negative Negative    Leukocyte Esterase Urine Negative Negative    Bacteria Urine Few (A) None Seen /HPF    Mucus Urine  Present (A) None Seen /LPF    RBC Urine 0 <=2 /HPF    WBC Urine 0 <=5 /HPF    Squamous Epithelials Urine 0 <=1 /HPF    Narrative    Urine Culture not indicated   XR Colon Water Soluble Diagnostic    Narrative    Examination:  Water soluble enema 4/24/2024 9:41 AM.    History: Prior history of J-pouch/cholectomy with large volume of  stool and small bowel dilation    Comparison: CT scan abdomen and pelvis 4/24/2024    Fluoroscopy time: 0.4 low-dose pulsed    Technique: Gastrografin was introduced into the J-pouch/small bowel  through a rectal tube under gravity.    Findings: The CT scan demonstrates a large volume of stool within the  J-pouch and distention of the small bowel.    A water soluble enema was performed with opacification of the  J-pouch/distal small bowel. No evidence of mass or stricture.  Large  volume of stool is seen within the distal small bowel.    Medications and radiation dose: .92uGy*m2, Kerma 10.7mGy, 0.4  mins        Impression    Impression: No evidence of obstruction. No evidence of mass or  stricture.    Large volume of stool in the distal small bowel/J-pouch.    YEIMI HIGHTOWER MD         SYSTEM ID:  [id number]   Basic metabolic panel   Result Value Ref Range    Sodium 132 (L) 135 - 145 mmol/L    Potassium 4.4 3.4 - 5.3 mmol/L    Chloride 94 (L) 98 - 107 mmol/L    Carbon Dioxide (CO2) 26 22 - 29 mmol/L    Anion Gap 12 7 - 15 mmol/L    Urea Nitrogen 30.8 (H) 8.0 - 23.0 mg/dL    Creatinine 0.83 0.51 - 0.95 mg/dL    GFR Estimate 75 >60 mL/min/1.73m2    Calcium 9.3 8.8 - 10.2 mg/dL    Glucose 100 (H) 70 - 99 mg/dL       Medications   HYDROmorphone (PF) (DILAUDID) injection 0.5 mg (0.5 mg Intravenous $Given 4/24/24 0806)   sodium chloride (PF) 0.9% PF flush 50 mL (50 mLs Intravenous $Given 4/24/24 0736)   iopamidol (ISOVUE-370) solution 52 mL (25 mLs Intravenous $Given 4/24/24 0736)   sodium chloride 0.9% BOLUS 1,000 mL (0 mLs Intravenous Stopped 4/24/24 4308)   ondansetron (ZOFRAN)  injection 4 mg (4 mg Intravenous $Given 4/24/24 1005)   diatrizoate meglumine-sodium (GASTROGRAFIN/GASTROVIEW) 66-10 % solution 360 mL (360 mLs Rectal $Given by Other Clinician 4/24/24 8725)       Assessments & Plan (with Medical Decision Making)     I have reviewed the nursing notes.    Abdominal pain with distention, vomiting, and multiple risk factors for SBO. Plan on CT abdomen. Given hx and symptoms/exam, less consistent with ischemic colitis or AAA dissection. Disposition pending work up. Will give zofran/dilaudid for symptom control.    See ED Course.    I have reviewed the findings, diagnosis, plan and need for follow up with the patient.      New Prescriptions    No medications on file       Final diagnoses:   Constipation, unspecified constipation type   Abdominal pain, unspecified abdominal location       4/24/2024   [facility]       Pedro Luis Zavaleta MD  04/24/24 3935

## 2024-04-24 NOTE — ED NOTES
"Patient reports a \"hot\" sensation in her left hand/wrist after getting Zofran.  Will notify MD.  "

## 2024-04-30 ENCOUNTER — TELEPHONE (OUTPATIENT)
Dept: FAMILY MEDICINE | Facility: OTHER | Age: 69
End: 2024-04-30

## 2024-04-30 ENCOUNTER — OFFICE VISIT (OUTPATIENT)
Dept: FAMILY MEDICINE | Facility: OTHER | Age: 69
End: 2024-04-30
Attending: FAMILY MEDICINE
Payer: COMMERCIAL

## 2024-04-30 VITALS
BODY MASS INDEX: 19.73 KG/M2 | HEART RATE: 81 BPM | WEIGHT: 101 LBS | TEMPERATURE: 97.3 F | DIASTOLIC BLOOD PRESSURE: 60 MMHG | OXYGEN SATURATION: 98 % | SYSTOLIC BLOOD PRESSURE: 94 MMHG

## 2024-04-30 DIAGNOSIS — K63.5 COLON POLYPOSIS: ICD-10-CM

## 2024-04-30 DIAGNOSIS — K59.00 CONSTIPATION, UNSPECIFIED CONSTIPATION TYPE: Primary | ICD-10-CM

## 2024-04-30 PROCEDURE — G0463 HOSPITAL OUTPT CLINIC VISIT: HCPCS

## 2024-04-30 PROCEDURE — 99213 OFFICE O/P EST LOW 20 MIN: CPT | Performed by: FAMILY MEDICINE

## 2024-04-30 ASSESSMENT — PAIN SCALES - GENERAL: PAINLEVEL: NO PAIN (0)

## 2024-04-30 NOTE — TELEPHONE ENCOUNTER
[phone]  8:22 AM    Reason for Call: Phone Call    Description:  Chyna called stating that she is messed up, afraid to take her  gas x , metamucil , antidiarrheal to thicken up. Her bowels are just brown water and her stomach hurts. She was seen at ER last week.  Just not sure what to do.  Please call Chyna to advise.     Was an appointment offered for this call? No  If yes : Appointment type              Date    Preferred method for responding to this message: Telephone Call  What is your phone number [phone] ?      If we cannot reach you directly, may we leave a detailed response at the number you provided? Yes    Can this message wait until your PCP/provider returns, if available today? Not applicable    Estrella Wyatt

## 2024-04-30 NOTE — PROGRESS NOTES
Assessment & Plan     Constipation, unspecified constipation type  Improved.  Still had some pain last night but distension improved.  Overall, walking the line between constipation and diarrhea all the time.      Colon polyposis  Nice review.  She is going to talk to colorectal surgery about having a colostomy.  She had it for a month after the original surgery, and since it's been out this walking of the line is getting increasingly difficult.  Even though she doesn't want the bag she is looking at that option as this isn't working.  I offered any help I can and she is going to call and try and set it up herself.            MED REC REQUIRED  Post Medication Reconciliation Status:         No follow-ups on file.    Cristina Clemente is a 68 year old, presenting for the following health issues:  ER F/U        4/30/2024    12:52 PM   Additional Questions   Roomed by Donya   Accompanied by self     HPI     ED/UC Followup:    Facility:  [facility] [place]  Date of visit: 4/24/24  Reason for visit: constipation   Current Status: still having problems         Review of Systems  Constitutional, HEENT, cardiovascular, pulmonary, gi and gu systems are negative, except as otherwise noted.      Objective    BP 94/60   Pulse 81   Temp 97.3  F (36.3  C) (Tympanic)   Wt 45.8 kg (101 lb)   SpO2 98%   BMI 19.73 kg/m    Body mass index is 19.73 kg/m .  Physical Exam   GENERAL: alert and no distress  NECK: no adenopathy, no asymmetry, masses, or scars  RESP: lungs clear to auscultation - no rales, rhonchi or wheezes  CV: regular rate and rhythm, normal S1 S2, no S3 or S4, no murmur, click or rub, no peripheral edema  ABDOMEN: soft, nontender, no hepatosplenomegaly, no masses and bowel sounds normal.  Slight pressure just right of midline lower abdomen without any peritoneal signs.    MS: no gross musculoskeletal defects noted, no edema    Workup and imaging, labs reviewed         Signed Electronically by: Ellis Edward MD

## 2024-05-08 ENCOUNTER — TRANSFERRED RECORDS (OUTPATIENT)
Dept: HEALTH INFORMATION MANAGEMENT | Facility: CLINIC | Age: 69
End: 2024-05-08

## 2024-05-20 DIAGNOSIS — M81.0 SENILE OSTEOPOROSIS: Primary | ICD-10-CM

## 2024-06-05 DIAGNOSIS — J45.40 MODERATE PERSISTENT ASTHMA, UNSPECIFIED WHETHER COMPLICATED: ICD-10-CM

## 2024-06-05 RX ORDER — FLUTICASONE PROPIONATE AND SALMETEROL 250; 50 UG/1; UG/1
1 POWDER RESPIRATORY (INHALATION) 2 TIMES DAILY
Qty: 60 EACH | Refills: 2 | Status: SHIPPED | OUTPATIENT
Start: 2024-06-05

## 2024-06-11 DIAGNOSIS — Z90.49 ACQUIRED ABSENCE OF OTHER SPECIFIED PARTS OF DIGESTIVE TRACT: ICD-10-CM

## 2024-06-11 DIAGNOSIS — M35.3 PMR (POLYMYALGIA RHEUMATICA) (H): ICD-10-CM

## 2024-06-11 RX ORDER — DICYCLOMINE HYDROCHLORIDE 10 MG/1
20 CAPSULE ORAL
Qty: 540 CAPSULE | Refills: 1 | Status: SHIPPED | OUTPATIENT
Start: 2024-06-11

## 2024-06-11 RX ORDER — PREDNISONE 1 MG/1
2 TABLET ORAL DAILY
Qty: 60 TABLET | Refills: 0 | Status: SHIPPED | OUTPATIENT
Start: 2024-06-11 | End: 2024-07-02

## 2024-06-13 ENCOUNTER — OFFICE VISIT (OUTPATIENT)
Dept: FAMILY MEDICINE | Facility: OTHER | Age: 69
End: 2024-06-13
Attending: FAMILY MEDICINE
Payer: COMMERCIAL

## 2024-06-13 VITALS
BODY MASS INDEX: 19.94 KG/M2 | HEART RATE: 87 BPM | RESPIRATION RATE: 18 BRPM | OXYGEN SATURATION: 96 % | WEIGHT: 105.6 LBS | DIASTOLIC BLOOD PRESSURE: 60 MMHG | SYSTOLIC BLOOD PRESSURE: 104 MMHG | HEIGHT: 61 IN | TEMPERATURE: 97.8 F

## 2024-06-13 DIAGNOSIS — M54.6 CHRONIC THORACIC BACK PAIN, UNSPECIFIED BACK PAIN LATERALITY: Primary | ICD-10-CM

## 2024-06-13 DIAGNOSIS — G89.29 CHRONIC THORACIC BACK PAIN, UNSPECIFIED BACK PAIN LATERALITY: Primary | ICD-10-CM

## 2024-06-13 DIAGNOSIS — M25.511 ACUTE PAIN OF RIGHT SHOULDER: ICD-10-CM

## 2024-06-13 DIAGNOSIS — M35.3 PMR (POLYMYALGIA RHEUMATICA) (H): ICD-10-CM

## 2024-06-13 DIAGNOSIS — E87.6 HYPOKALEMIA: ICD-10-CM

## 2024-06-13 LAB
ANION GAP SERPL CALCULATED.3IONS-SCNC: 10 MMOL/L (ref 7–15)
BUN SERPL-MCNC: 16.3 MG/DL (ref 8–23)
CALCIUM SERPL-MCNC: 9.7 MG/DL (ref 8.8–10.2)
CHLORIDE SERPL-SCNC: 104 MMOL/L (ref 98–107)
CREAT SERPL-MCNC: 0.75 MG/DL (ref 0.51–0.95)
DEPRECATED HCO3 PLAS-SCNC: 28 MMOL/L (ref 22–29)
EGFRCR SERPLBLD CKD-EPI 2021: 85 ML/MIN/1.73M2
GLUCOSE SERPL-MCNC: 96 MG/DL (ref 70–99)
POTASSIUM SERPL-SCNC: 4.4 MMOL/L (ref 3.4–5.3)
SODIUM SERPL-SCNC: 142 MMOL/L (ref 135–145)

## 2024-06-13 PROCEDURE — G0463 HOSPITAL OUTPT CLINIC VISIT: HCPCS

## 2024-06-13 PROCEDURE — 80048 BASIC METABOLIC PNL TOTAL CA: CPT | Mod: ZL | Performed by: FAMILY MEDICINE

## 2024-06-13 PROCEDURE — 36415 COLL VENOUS BLD VENIPUNCTURE: CPT | Mod: ZL | Performed by: FAMILY MEDICINE

## 2024-06-13 PROCEDURE — 99214 OFFICE O/P EST MOD 30 MIN: CPT | Performed by: FAMILY MEDICINE

## 2024-06-13 RX ORDER — CALCITONIN SALMON 200 [IU]/.09ML
SPRAY, METERED NASAL
COMMUNITY
Start: 2024-05-17

## 2024-06-13 ASSESSMENT — PAIN SCALES - GENERAL: PAINLEVEL: SEVERE PAIN (7)

## 2024-06-13 NOTE — PROGRESS NOTES
Assessment & Plan     Chronic thoracic back pain, unspecified back pain laterality  Improving with the endocrine tx of the bones (calcitonin spray).  Nice review.  She can't really get down on the prednisone any farther and is working with endocrine with other changes related to bone density.     PMR (polymyalgia rheumatica) (H24)  As above.  On the very low dose prednisone.      Acute pain of right shoulder  Discussed.  Biggest issue.  Fall about 6 weeks ago with ongoing pain and immobility.  Wants MRI which I agree she's given this long enough.  Xray and MRI and follow.    - XR SHOULDER RIGHT G/E 2 VIEWS (Clinic Performed)  - MR Shoulder Right w/o Contrast; Future    Hypokalemia  History of.  She is off the spironolactone per endocrine.  Bp is fine.  Ensure bmp stable.   - Basic metabolic panel; Future  - Basic metabolic panel                No follow-ups on file.    Cristina Clemente is a 68 year old, presenting for the following health issues:  Bowel Problems and Pain        6/13/2024     9:48 AM   Additional Questions   Roomed by Sushila ALONSO     Follow Up on Bowel Changes  Description: patient is still having the same problem, still having diarrhea- normal for her- it has been getting worse    Shoulder Pain  Onset: x on 5/5/24-   Description: fell on 5/5/24 getting into bed  Location: right shoulder  Character: Sharp, Dull ache, and throbbing  Intensity: moderate, severe  Progression of Symptoms: same  Accompanying Signs & Symptoms:  Other symptoms: radiation of pain from right shoulder down into wrist, numbness, tingling, and weakness of right arm or use the right hand   History:   Previous similar pain: no     Precipitating factors:   Trauma or overuse: YES- fell  Alleviating factors:  Improved by: peppermint oil    Therapies Tried and outcome: ice, rest     Patient would also like to discuss recent visit with Endocrine through [facility].      Review of Systems  Constitutional, HEENT,  "cardiovascular, pulmonary, gi and gu systems are negative, except as otherwise noted.      Objective    /60 (BP Location: Right arm, Patient Position: Sitting, Cuff Size: Adult Regular)   Pulse 87   Temp 97.8  F (36.6  C) (Tympanic)   Resp 18   Ht 1.537 m (5' 0.5\")   Wt 47.9 kg (105 lb 9.6 oz)   SpO2 96%   BMI 20.28 kg/m    Body mass index is 20.28 kg/m .  Physical Exam   GENERAL: alert and no distress  NECK: no adenopathy, no asymmetry, masses, or scars  RESP: lungs clear to auscultation - no rales, rhonchi or wheezes  CV: regular rate and rhythm, normal S1 S2, no S3 or S4, no murmur, click or rub, no peripheral edema  ABDOMEN: soft, nontender, no hepatosplenomegaly, no masses and bowel sounds normal  MS: no gross musculoskeletal defects noted, no edema        Imaging pending as above and will be reviewed.      Signed Electronically by: Ellis Edward MD    "

## 2024-06-26 ENCOUNTER — HOSPITAL ENCOUNTER (OUTPATIENT)
Dept: MRI IMAGING | Facility: HOSPITAL | Age: 69
Discharge: HOME | End: 2024-06-26
Attending: FAMILY MEDICINE | Admitting: FAMILY MEDICINE
Payer: COMMERCIAL

## 2024-06-26 DIAGNOSIS — S46.011A TRAUMATIC COMPLETE TEAR OF RIGHT ROTATOR CUFF, INITIAL ENCOUNTER: Primary | ICD-10-CM

## 2024-06-26 DIAGNOSIS — M25.411 EFFUSION OF RIGHT SHOULDER JOINT: ICD-10-CM

## 2024-06-26 DIAGNOSIS — M25.511 ACUTE PAIN OF RIGHT SHOULDER: ICD-10-CM

## 2024-06-26 PROCEDURE — 73221 MRI JOINT UPR EXTREM W/O DYE: CPT | Mod: RT

## 2024-07-02 DIAGNOSIS — M35.3 PMR (POLYMYALGIA RHEUMATICA) (H): ICD-10-CM

## 2024-07-02 RX ORDER — PREDNISONE 1 MG/1
2 TABLET ORAL DAILY
Qty: 60 TABLET | Refills: 0 | Status: SHIPPED | OUTPATIENT
Start: 2024-07-11 | End: 2024-08-05

## 2024-07-02 NOTE — TELEPHONE ENCOUNTER
Prednisone 1 mg tab      Last Written Prescription Date:  6-11-24  Last Fill Quantity: 60,   # refills: 0  Last Office Visit: 6-13-24

## 2024-07-10 DIAGNOSIS — M35.3 PMR (POLYMYALGIA RHEUMATICA) (H): ICD-10-CM

## 2024-07-10 DIAGNOSIS — R09.82 POST-NASAL DRIP: ICD-10-CM

## 2024-07-10 DIAGNOSIS — K21.00 GASTROESOPHAGEAL REFLUX DISEASE WITH ESOPHAGITIS WITHOUT HEMORRHAGE: ICD-10-CM

## 2024-07-10 RX ORDER — OMEPRAZOLE 40 MG/1
40 CAPSULE, DELAYED RELEASE ORAL DAILY
Qty: 90 CAPSULE | Refills: 3 | Status: SHIPPED | OUTPATIENT
Start: 2024-07-10

## 2024-07-10 NOTE — TELEPHONE ENCOUNTER
montelukast (SINGULAIR) 10 MG tablet 90 tablet 0 1/16/2024 --     gabapentin (NEURONTIN) 400 MG capsule 540 capsule 1 2/6/2024     Last Office Visit: 06/13/2024  Future Office visit:       Routing refill request to provider for review/approval because:

## 2024-07-11 RX ORDER — MONTELUKAST SODIUM 10 MG/1
10 TABLET ORAL AT BEDTIME
Qty: 90 TABLET | Refills: 3 | Status: SHIPPED | OUTPATIENT
Start: 2024-07-11

## 2024-07-11 RX ORDER — GABAPENTIN 400 MG/1
1200 CAPSULE ORAL 2 TIMES DAILY
Qty: 540 CAPSULE | Refills: 3 | Status: SHIPPED | OUTPATIENT
Start: 2024-07-11

## 2024-07-20 NOTE — DISCHARGE SUMMARY
Date of Admission: 5/15/23    Date of Discharge: 5/16/23    Admitting Physician: Kurt Jordan MD    Consultations: Hospitalist Service    Admitting Diagnosis: Right Hip Osteoarthritis    Discharge Diagnosis: Right Hip Osteoarthritis    Surgical Procedures Performed: Right Direct Anterior CATY    Hospital Complications: None    Discharge Medications: No Changes to home meds; Oxycodone 5-10 mg po every 4 hours added for pain control; ASA 81 BID for DVT prophylaxis    Discharge Disposition: Home    Discharge Diet: As at home    Discharge Activity: 25% WBRight Lower Extremity; No Hip ROM precautions    Hospital Course:   The patient underwent a Right Direct Anterior CATY.  There were no intraoperative or immediate post operative complications.  Once stable in the PACU they were transferred to the hospital floor where they remained for the remainder of the hospital stay.  Pain management transitioned from IV to oral pain medications.  Physical/Occupational Therapy was consulted for early mobility and gait training as well as ADL training.  DVT prophylaxis was initiated on POD #1.  Vital signs and hemoglobin remained stable. The Hospitalist team was consulted for management of medical co-morbidities.  At time of discharge the patient was medically stable and cleared by Therapy for safe discharge home.        Follow-Up: 10-14 days [facility]    Questions: Call [phone] or [phone]   Fracture of radius, closed

## 2024-07-30 ENCOUNTER — TRANSFERRED RECORDS (OUTPATIENT)
Dept: HEALTH INFORMATION MANAGEMENT | Facility: CLINIC | Age: 69
End: 2024-07-30

## 2024-08-02 ENCOUNTER — MYC MEDICAL ADVICE (OUTPATIENT)
Dept: FAMILY MEDICINE | Facility: OTHER | Age: 69
End: 2024-08-02

## 2024-08-02 ENCOUNTER — PREP FOR PROCEDURE (OUTPATIENT)
Dept: SURGERY | Facility: CLINIC | Age: 69
End: 2024-08-02

## 2024-08-02 DIAGNOSIS — Z87.891 PERSONAL HISTORY OF TOBACCO USE: Primary | ICD-10-CM

## 2024-08-02 DIAGNOSIS — M12.811 ROTATOR CUFF ARTHROPATHY OF RIGHT SHOULDER: Primary | ICD-10-CM

## 2024-08-05 DIAGNOSIS — M35.3 PMR (POLYMYALGIA RHEUMATICA) (H): ICD-10-CM

## 2024-08-05 DIAGNOSIS — G25.81 RESTLESS LEGS SYNDROME: ICD-10-CM

## 2024-08-05 RX ORDER — PREDNISONE 1 MG/1
2 TABLET ORAL DAILY
Qty: 180 TABLET | Refills: 0 | Status: SHIPPED | OUTPATIENT
Start: 2024-08-05 | End: 2024-08-16

## 2024-08-05 RX ORDER — POTASSIUM CHLORIDE 1500 MG/1
20 TABLET, EXTENDED RELEASE ORAL 3 TIMES DAILY
Qty: 90 TABLET | Refills: 9 | Status: SHIPPED | OUTPATIENT
Start: 2024-08-05

## 2024-08-07 ENCOUNTER — TELEPHONE (OUTPATIENT)
Dept: FAMILY MEDICINE | Facility: OTHER | Age: 69
End: 2024-08-07

## 2024-08-07 NOTE — TELEPHONE ENCOUNTER
9:34 AM    Reason for Call: Phone Call    Description:   Aydin would like a call from the nurse regarding a medication that Chyna is on. Please call Aydin to advise    Was an appointment offered for this call? No  If yes : Appointment type              Date    Preferred method for responding to this message: Telephone Call  What is your phone number ?  [phone]     If we cannot reach you directly, may we leave a detailed response at the number you provided? Yes    Can this message wait until your PCP/provider returns, if available today? Not applicable    Estrella Wyatt

## 2024-08-07 NOTE — TELEPHONE ENCOUNTER
Spoke with pt's , pt was prescribed a new medication by [facility].  Pharmacy is unable to get it and he would like it sent to another pharmacy.  Explained that he would need to speak with the prescribing provider.

## 2024-08-16 ENCOUNTER — TELEPHONE (OUTPATIENT)
Dept: FAMILY MEDICINE | Facility: OTHER | Age: 69
End: 2024-08-16

## 2024-08-16 DIAGNOSIS — M35.3 PMR (POLYMYALGIA RHEUMATICA) (H): ICD-10-CM

## 2024-08-16 RX ORDER — PREDNISONE 1 MG/1
2 TABLET ORAL DAILY
Qty: 180 TABLET | Refills: 0 | OUTPATIENT
Start: 2024-08-16

## 2024-08-16 RX ORDER — PREDNISONE 1 MG/1
2 TABLET ORAL DAILY
Qty: 180 TABLET | Refills: 0 | Status: SHIPPED | OUTPATIENT
Start: 2024-08-16

## 2024-08-16 NOTE — TELEPHONE ENCOUNTER
10:39 AM    Reason for Call: Phone Call    Description: Chyna says that she had refills last week but now she can not find her prednisone. She doesn't know if it accidentally got thrown away with her empties or what. She called the pharmacy and they told her to call her Doctor. Please call Chyna to advise    Was an appointment offered for this call? No  If yes : Appointment type              Date    Preferred method for responding to this message: Telephone Call  What is your phone number ?  [phone]     If we cannot reach you directly, may we leave a detailed response at the number you provided? Yes        Estrella Wyatt

## 2024-08-22 DIAGNOSIS — G89.29 CHRONIC THORACIC BACK PAIN, UNSPECIFIED BACK PAIN LATERALITY: ICD-10-CM

## 2024-08-22 DIAGNOSIS — M54.6 CHRONIC THORACIC BACK PAIN, UNSPECIFIED BACK PAIN LATERALITY: ICD-10-CM

## 2024-08-22 RX ORDER — MELOXICAM 15 MG/1
TABLET ORAL
Qty: 30 TABLET | Refills: 2 | Status: SHIPPED | OUTPATIENT
Start: 2024-08-22

## 2024-09-11 ENCOUNTER — HOSPITAL ENCOUNTER (OUTPATIENT)
Dept: CT IMAGING | Facility: HOSPITAL | Age: 69
Discharge: HOME | End: 2024-09-11
Attending: FAMILY MEDICINE | Admitting: FAMILY MEDICINE
Payer: COMMERCIAL

## 2024-09-11 DIAGNOSIS — Z87.891 PERSONAL HISTORY OF TOBACCO USE: ICD-10-CM

## 2024-09-11 PROCEDURE — 71271 CT THORAX LUNG CANCER SCR C-: CPT

## 2024-09-17 ENCOUNTER — TELEPHONE (OUTPATIENT)
Dept: FAMILY MEDICINE | Facility: OTHER | Age: 69
End: 2024-09-17

## 2024-09-17 NOTE — TELEPHONE ENCOUNTER
3:11 PM    Reason for Call: OVERBOOK    Patient is having the following symptoms:  for uti    The patient is requesting an appointment for tomorrow afternoon/has appt in the morning with Dr Edward.    Was an appointment offered for this call? No  If yes : Appointment type              Date    Preferred method for responding to this message: Telephone Call  What is your phone number ?[phone]     If we cannot reach you directly, may we leave a detailed response at the number you provided? Yes    Can this message wait until your PCP/provider returns, if unavailable today? Not applicable    Barb Lopes

## 2024-09-18 ENCOUNTER — TELEPHONE (OUTPATIENT)
Dept: CARE COORDINATION | Facility: OTHER | Age: 69
End: 2024-09-18

## 2024-09-18 ENCOUNTER — HOSPITAL ENCOUNTER (EMERGENCY)
Facility: HOSPITAL | Age: 69
Discharge: HOME | End: 2024-09-18
Attending: NURSE PRACTITIONER | Admitting: NURSE PRACTITIONER
Payer: COMMERCIAL

## 2024-09-18 ENCOUNTER — APPOINTMENT (OUTPATIENT)
Dept: CT IMAGING | Facility: HOSPITAL | Age: 69
End: 2024-09-18
Attending: NURSE PRACTITIONER
Payer: COMMERCIAL

## 2024-09-18 ENCOUNTER — OFFICE VISIT (OUTPATIENT)
Dept: FAMILY MEDICINE | Facility: OTHER | Age: 69
End: 2024-09-18
Attending: STUDENT IN AN ORGANIZED HEALTH CARE EDUCATION/TRAINING PROGRAM
Payer: COMMERCIAL

## 2024-09-18 VITALS
SYSTOLIC BLOOD PRESSURE: 82 MMHG | OXYGEN SATURATION: 94 % | RESPIRATION RATE: 16 BRPM | HEIGHT: 61 IN | TEMPERATURE: 98.9 F | DIASTOLIC BLOOD PRESSURE: 40 MMHG | WEIGHT: 105 LBS | BODY MASS INDEX: 19.83 KG/M2 | HEART RATE: 95 BPM

## 2024-09-18 VITALS
OXYGEN SATURATION: 97 % | TEMPERATURE: 98.7 F | SYSTOLIC BLOOD PRESSURE: 90 MMHG | RESPIRATION RATE: 21 BRPM | HEART RATE: 86 BPM | DIASTOLIC BLOOD PRESSURE: 59 MMHG

## 2024-09-18 DIAGNOSIS — I95.9 HYPOTENSION, UNSPECIFIED HYPOTENSION TYPE: Primary | ICD-10-CM

## 2024-09-18 DIAGNOSIS — N39.0 SEPSIS DUE TO URINARY TRACT INFECTION (H): ICD-10-CM

## 2024-09-18 DIAGNOSIS — A41.9 SEPSIS DUE TO URINARY TRACT INFECTION (H): ICD-10-CM

## 2024-09-18 DIAGNOSIS — N39.0 COMPLICATED UTI (URINARY TRACT INFECTION): ICD-10-CM

## 2024-09-18 LAB
ALBUMIN SERPL BCG-MCNC: 3.9 G/DL (ref 3.5–5.2)
ALBUMIN UR-MCNC: ABNORMAL G/DL
ALP SERPL-CCNC: 168 U/L (ref 40–150)
ALT SERPL W P-5'-P-CCNC: 22 U/L (ref 0–50)
ANION GAP SERPL CALCULATED.3IONS-SCNC: 11 MMOL/L (ref 7–15)
APPEARANCE UR: CLEAR
AST SERPL W P-5'-P-CCNC: 24 U/L (ref 0–45)
BASOPHILS # BLD AUTO: 0.1 10E3/UL (ref 0–0.2)
BASOPHILS NFR BLD AUTO: 0 %
BILIRUB SERPL-MCNC: 0.5 MG/DL
BILIRUB UR QL STRIP: ABNORMAL
BUN SERPL-MCNC: 26.6 MG/DL (ref 8–23)
CALCIUM SERPL-MCNC: 9.5 MG/DL (ref 8.8–10.4)
CHLORIDE SERPL-SCNC: 96 MMOL/L (ref 98–107)
COLOR UR AUTO: ABNORMAL
CREAT SERPL-MCNC: 1.08 MG/DL (ref 0.51–0.95)
CRP SERPL-MCNC: 128.35 MG/L
EGFRCR SERPLBLD CKD-EPI 2021: 55 ML/MIN/1.73M2
EOSINOPHIL # BLD AUTO: 0.2 10E3/UL (ref 0–0.7)
EOSINOPHIL NFR BLD AUTO: 1 %
ERYTHROCYTE [DISTWIDTH] IN BLOOD BY AUTOMATED COUNT: 13.9 % (ref 10–15)
GLUCOSE SERPL-MCNC: 143 MG/DL (ref 70–99)
GLUCOSE UR STRIP-MCNC: ABNORMAL MG/DL
HCO3 SERPL-SCNC: 23 MMOL/L (ref 22–29)
HCT VFR BLD AUTO: 37.2 % (ref 35–47)
HGB BLD-MCNC: 12.7 G/DL (ref 11.7–15.7)
HGB UR QL STRIP: ABNORMAL
HOLD SPECIMEN: NORMAL
IMM GRANULOCYTES # BLD: 0.2 10E3/UL
IMM GRANULOCYTES NFR BLD: 1 %
KETONES UR STRIP-MCNC: ABNORMAL MG/DL
LACTATE SERPL-SCNC: 2.4 MMOL/L (ref 0.7–2)
LEUKOCYTE ESTERASE UR QL STRIP: ABNORMAL
LYMPHOCYTES # BLD AUTO: 0.6 10E3/UL (ref 0.8–5.3)
LYMPHOCYTES NFR BLD AUTO: 3 %
MCH RBC QN AUTO: 30.5 PG (ref 26.5–33)
MCHC RBC AUTO-ENTMCNC: 34.1 G/DL (ref 31.5–36.5)
MCV RBC AUTO: 89 FL (ref 78–100)
MONOCYTES # BLD AUTO: 0.7 10E3/UL (ref 0–1.3)
MONOCYTES NFR BLD AUTO: 4 %
MUCOUS THREADS #/AREA URNS LPF: PRESENT /LPF
NEUTROPHILS # BLD AUTO: 19.2 10E3/UL (ref 1.6–8.3)
NEUTROPHILS NFR BLD AUTO: 92 %
NITRATE UR QL: ABNORMAL
NRBC # BLD AUTO: 0 10E3/UL
NRBC BLD AUTO-RTO: 0 /100
PH UR STRIP: ABNORMAL [PH]
PLATELET # BLD AUTO: 205 10E3/UL (ref 150–450)
POTASSIUM SERPL-SCNC: 5.1 MMOL/L (ref 3.4–5.3)
PROCALCITONIN SERPL IA-MCNC: 3.36 NG/ML
PROT SERPL-MCNC: 6.5 G/DL (ref 6.4–8.3)
RBC # BLD AUTO: 4.17 10E6/UL (ref 3.8–5.2)
RBC URINE: 1 /HPF
SODIUM SERPL-SCNC: 130 MMOL/L (ref 135–145)
SP GR UR STRIP: ABNORMAL
SQUAMOUS EPITHELIAL: 1 /HPF
TRANSITIONAL EPI: <1 /HPF
UROBILINOGEN UR STRIP-MCNC: ABNORMAL MG/DL
WBC # BLD AUTO: 20.9 10E3/UL (ref 4–11)
WBC URINE: 28 /HPF

## 2024-09-18 PROCEDURE — 36415 COLL VENOUS BLD VENIPUNCTURE: CPT | Performed by: NURSE PRACTITIONER

## 2024-09-18 PROCEDURE — 84145 PROCALCITONIN (PCT): CPT | Performed by: NURSE PRACTITIONER

## 2024-09-18 PROCEDURE — 86140 C-REACTIVE PROTEIN: CPT | Mod: ZL | Performed by: STUDENT IN AN ORGANIZED HEALTH CARE EDUCATION/TRAINING PROGRAM

## 2024-09-18 PROCEDURE — 85025 COMPLETE CBC W/AUTO DIFF WBC: CPT | Mod: ZL | Performed by: STUDENT IN AN ORGANIZED HEALTH CARE EDUCATION/TRAINING PROGRAM

## 2024-09-18 PROCEDURE — 81001 URINALYSIS AUTO W/SCOPE: CPT | Mod: ZL | Performed by: STUDENT IN AN ORGANIZED HEALTH CARE EDUCATION/TRAINING PROGRAM

## 2024-09-18 PROCEDURE — G0463 HOSPITAL OUTPT CLINIC VISIT: HCPCS

## 2024-09-18 PROCEDURE — 87040 BLOOD CULTURE FOR BACTERIA: CPT | Performed by: NURSE PRACTITIONER

## 2024-09-18 PROCEDURE — 250N000011 HC RX IP 250 OP 636: Performed by: NURSE PRACTITIONER

## 2024-09-18 PROCEDURE — 82040 ASSAY OF SERUM ALBUMIN: CPT | Mod: ZL | Performed by: STUDENT IN AN ORGANIZED HEALTH CARE EDUCATION/TRAINING PROGRAM

## 2024-09-18 PROCEDURE — 74177 CT ABD & PELVIS W/CONTRAST: CPT

## 2024-09-18 PROCEDURE — 83605 ASSAY OF LACTIC ACID: CPT | Mod: ZL | Performed by: STUDENT IN AN ORGANIZED HEALTH CARE EDUCATION/TRAINING PROGRAM

## 2024-09-18 PROCEDURE — 87086 URINE CULTURE/COLONY COUNT: CPT | Mod: ZL | Performed by: STUDENT IN AN ORGANIZED HEALTH CARE EDUCATION/TRAINING PROGRAM

## 2024-09-18 PROCEDURE — G0463 HOSPITAL OUTPT CLINIC VISIT: HCPCS | Mod: 25,27

## 2024-09-18 PROCEDURE — 96365 THER/PROPH/DIAG IV INF INIT: CPT | Mod: XU

## 2024-09-18 PROCEDURE — 87186 SC STD MICRODIL/AGAR DIL: CPT | Mod: ZL | Performed by: STUDENT IN AN ORGANIZED HEALTH CARE EDUCATION/TRAINING PROGRAM

## 2024-09-18 PROCEDURE — 36415 COLL VENOUS BLD VENIPUNCTURE: CPT | Mod: ZL | Performed by: STUDENT IN AN ORGANIZED HEALTH CARE EDUCATION/TRAINING PROGRAM

## 2024-09-18 PROCEDURE — 99285 EMERGENCY DEPT VISIT HI MDM: CPT | Mod: 25

## 2024-09-18 PROCEDURE — 99213 OFFICE O/P EST LOW 20 MIN: CPT | Performed by: STUDENT IN AN ORGANIZED HEALTH CARE EDUCATION/TRAINING PROGRAM

## 2024-09-18 PROCEDURE — 99284 EMERGENCY DEPT VISIT MOD MDM: CPT | Performed by: NURSE PRACTITIONER

## 2024-09-18 RX ORDER — CEFTRIAXONE SODIUM 2 G/50ML
2 INJECTION, SOLUTION INTRAVENOUS ONCE
Status: COMPLETED | OUTPATIENT
Start: 2024-09-18 | End: 2024-09-18

## 2024-09-18 RX ORDER — CEFDINIR 300 MG/1
300 CAPSULE ORAL 2 TIMES DAILY
Qty: 28 CAPSULE | Refills: 0 | Status: SHIPPED | OUTPATIENT
Start: 2024-09-18 | End: 2024-10-02

## 2024-09-18 RX ORDER — IOPAMIDOL 755 MG/ML
52 INJECTION, SOLUTION INTRAVASCULAR ONCE
Status: COMPLETED | OUTPATIENT
Start: 2024-09-18 | End: 2024-09-18

## 2024-09-18 RX ADMIN — IOPAMIDOL 52 ML: 755 INJECTION, SOLUTION INTRAVENOUS at 16:51

## 2024-09-18 RX ADMIN — CEFTRIAXONE SODIUM 2 G: 2 INJECTION, SOLUTION INTRAVENOUS at 17:28

## 2024-09-18 ASSESSMENT — ENCOUNTER SYMPTOMS
RESPIRATORY NEGATIVE: 1
DIFFICULTY URINATING: 1
HEMATURIA: 0
HEMATOLOGIC/LYMPHATIC NEGATIVE: 1
GASTROINTESTINAL NEGATIVE: 1
FLANK PAIN: 1
EYES NEGATIVE: 1
ALLERGIC/IMMUNOLOGIC NEGATIVE: 1
DYSURIA: 1
NEUROLOGICAL NEGATIVE: 1
CARDIOVASCULAR NEGATIVE: 1
FATIGUE: 1
ENDOCRINE NEGATIVE: 1
PSYCHIATRIC NEGATIVE: 1
FREQUENCY: 0

## 2024-09-18 ASSESSMENT — COLUMBIA-SUICIDE SEVERITY RATING SCALE - C-SSRS
1. IN THE PAST MONTH, HAVE YOU WISHED YOU WERE DEAD OR WISHED YOU COULD GO TO SLEEP AND NOT WAKE UP?: NO
2. HAVE YOU ACTUALLY HAD ANY THOUGHTS OF KILLING YOURSELF IN THE PAST MONTH?: NO
6. HAVE YOU EVER DONE ANYTHING, STARTED TO DO ANYTHING, OR PREPARED TO DO ANYTHING TO END YOUR LIFE?: NO

## 2024-09-18 ASSESSMENT — ACTIVITIES OF DAILY LIVING (ADL)
ADLS_ACUITY_SCORE: 38
ADLS_ACUITY_SCORE: 38
ADLS_ACUITY_SCORE: 36

## 2024-09-18 ASSESSMENT — PAIN SCALES - GENERAL: PAINLEVEL: MODERATE PAIN (5)

## 2024-09-18 NOTE — DISCHARGE INSTRUCTIONS
Antibiotic use:   An antibiotic was ordered to help fight the bacterial infection that was acquired.  You need to take this medication exactly as prescribed.  DO NOT stop taking this medication until the prescribed end date, even if you are feeling better.  This way the affecting bacteria in your body are killed off.   You should eat probiotic yogurt (with live cultures) or Kefir (similar to yogurt) while taking antibiotic to promote regrowth of good bacteria in your digestive tract, which can be depleted by antibiotics.  Birth control and antibiotics (if applicable):   Birth control pills contain estrogens. Some antibiotics cause the enzymes in the liver to increase the break-down of estrogens and thereby can decrease the levels of estrogens in the body and the effectiveness of the birth control medications.  This can result in unwanted pregnancy.  To be safe it is wise to use a back-up-method of birth control while you take, and for 7 days after finishing the antibiotic.  Coumadin and antibiotics (if applicable):   Finally, if on coumadin, let your coumadin prescriber know. You likely need to have your INR checked by your Primary Care Provider in 2-3 days. Contact your clinic for an appointment.         Follow-up with your primary care provider for reevaluation.  Contact your primary care provider if you have any questions or concerns.  Do not hesitate to return to the ER if any new or worsening symptoms.     Please read the attached instructions (if any).  They highlight more specific treatments and interventions for you at home.              Thank you for letting me participate in your care and wish you a fast and uneventful recovery,    Jose Guadalupe BEY CNP    Do not hesitate to contact me with questions or concerns.  [email or web address]

## 2024-09-18 NOTE — ED TRIAGE NOTES
Pt brought over from clinic for abnormal labs, High Uric Acid. Pt also had low BP in clinic. Pt came to clinic for possible UTI. Pt had pain with urination and low back pain. Pt is afebrile, no fevers or chills at home.

## 2024-09-18 NOTE — ED PROVIDER NOTES
History     Chief Complaint   Patient presents with    Abnormal Labs     HPI  Chyna Delgado is a 68 year old individual with history of asthma, RLS, Graves' disease, osteoporosis, polymyalgia rheumatica, chronic thoracic back pain, is sent over from the clinic with elevated white count and low blood pressure.  Patient states has been having urinary hesitancy with urgency and dysuria for the past 2 days.  Went in to the clinic to be seen for this.  Apparently patient had hypotension on arrival.  Lab work was obtained in clinic showing WBC of 20.9 with a hemoglobin of 12.7.  Lactic acid was elevated at 2.4.  Sodium 130 with a potassium of 5.1.  BUN 26.6 with a creatinine of 1.08 and a GFR of 55 which is changed from normal from previous lab work 3 months ago.  CRP is elevated at 128.35.  UA was obtained that was orange in color which did obscure the urine but does have 28 WBC's noted.  Urine culture in process.  Patient denies fever or chills.  No nausea or vomiting.  No diarrhea with an ordinary for the patient.  Does have some right low back pain noted.    Allergies:  Allergies   Allergen Reactions    Codeine Swelling     Throat swelling-Patient can tolerate Hydrocodone & morphine    Nortriptyline Other (See Comments)     Crying        Problem List:    Patient Active Problem List    Diagnosis Date Noted    Chronic thoracic back pain, unspecified back pain laterality 10/13/2023     Priority: Medium    Trochanteric bursitis of right hip 05/15/2023     Priority: Medium    Diarrhea due to malabsorption 05/15/2023     Priority: Medium    Closed fracture of left hip (H) 02/19/2023     Priority: Medium    Closed fracture of left hip, initial encounter (H) 02/19/2023     Priority: Medium    Idiopathic gout of right hand, unspecified chronicity 09/09/2020     Priority: Medium    PMR (polymyalgia rheumatica) (H24) 09/09/2020     Priority: Medium    Osteoporosis 09/03/2019     Priority: Medium    Hip fracture requiring  operative repair (H) 02/25/2019     Priority: Medium    Closed fracture of neck of right femur (H) 02/23/2019     Priority: Medium    Functional diarrhea 10/26/2018     Priority: Medium    Iron deficiency 04/18/2018     Priority: Medium    Myalgia 05/30/2017     Priority: Medium    Abdominal muscle strain 05/05/2017     Priority: Medium    Strain of flexor muscle of hip, unspecified laterality, initial encounter 05/05/2017     Priority: Medium    Right shoulder pain 11/03/2016     Priority: Medium    ACP (advance care planning) 05/09/2016     Priority: Medium     Advance Care Planning 5/9/2016: ACP Review of Chart / Resources Provided:  Reviewed chart for advance care plan.  Chyna Delgado has been provided information and resources to begin or update their advance care plan.  Added by Lina PONCE Buus            Pain of toe of right foot 05/09/2016     Priority: Medium    Arthritis 02/12/2016     Priority: Medium    Graves disease 11/05/2015     Priority: Medium    Numbness and tingling in right hand 04/20/2015     Priority: Medium    Restless legs syndrome 04/16/2014     Priority: Medium    Somatic dysfunction of pelvic region 10/08/2013     Priority: Medium    Seborrheic keratosis 07/12/2013     Priority: Medium     Imo Update utility      Mild persistent asthma 06/28/2013     Priority: Medium    Sacroiliac pain 06/28/2013     Priority: Medium    Benign neoplasm of stomach 08/16/2012     Priority: Medium    Colon polyposis 08/16/2012     Priority: Medium    Family history of colonic polyps 08/16/2012     Priority: Medium    Family history of skin cancer 08/16/2012     Priority: Medium    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     Priority: Medium     Overview:   IMO Update 10/11      Seborrheic dermatitis, unspecified 10/12/2011     Priority: Medium     Overview:   IMO Update 10/11      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     Priority: Medium    Hypothyroidism 07/07/2011     Priority: Medium        Past Medical History:     Past Medical History:   Diagnosis Date    Abnormal mammogram, unspecified 01/08/2003    Back Pain 02/10/2000    Benign neoplasm of colon 03/10/2000    Benign paroxysmal positional vertigo 06/07/2012    Depressive disorder, not elsewhere classified 02/10/2004    Diarrhea 12/15/2010    Gastric polyp 12/11/2015, 12/18/2017    History of normal mammogram 07/18/2014, 1/18/2019    Idiopathic osteoporosis 12/15/2010    Interstitial emphysema (H) 12/15/2010    menopausal or female climacteric states 08/31/1999    Mixed hyperlipidemia 12/15/2010    Other bursitis disorders 02/04/2011    Other forms of retinal detachment(361.89) 12/15/2010    Other malaise and fatigue 01/10/2003    Personal history of tobacco use, presenting hazards to health 12/15/2010    Polymyalgia rheumatica (H24)     Polyposis of colon     Restless legs syndrome (RLS) 12/15/2010    Rotator cuff tendonitis     Rotator cuff tendonitis     Sacroiliitis, not elsewhere classified (H24) 12/15/2010    Tobacco use disorder 08/22/2012    Unspecified asthma(493.90) 12/15/2010       Past Surgical History:    Past Surgical History:   Procedure Laterality Date    ARTHROPLASTY HIP ANTERIOR Right 05/15/2023    Procedure: Right Direct Anterior Total Hip Arthroplasty;  Surgeon: Kurt Jordan MD;  Location: [facility]    benign tumors removed from back      CATARACT EXTRACTION Right 06/21/2022    CATARACT EXTRACTION Left 07/12/2022    CLOSED REDUCTION, PERCUTANEOUS PINNING HIP Right 02/24/2019    Procedure: Percutaneous Screw Fixation of Right Hip Fracture;  Surgeon: Amrik Kolb DO;  Location: [facility]    COLECTOMY TOTAL      COLON SURGERY N/A     HX: Total colectomy with J-pouch reconstruction.     ENDOSCOPY UPPER, COLONOSCOPY, COMBINED N/A 09/05/2014    Procedure: COMBINED ENDOSCOPY UPPER, COLONOSCOPY;  Surgeon: Delbert Patel MD;  Location: [facility]    ENDOSCOPY UPPER, COLONOSCOPY, COMBINED N/A 05/09/2019    Procedure: UPPER ENDOSCOPY  WITH BIOPSIES AND  COLONOSCOPY  WITH BIOPSIES;  Surgeon: Tai Diaz MD;  Location: [facility]    ENDOSCOPY UPPER, COLONOSCOPY, COMBINED N/A 12/8/2023    Procedure: Upper Endoscopy with biopsy and polypectomy  and Ileoscopy;  Surgeon: Al Howe MD;  Location: [facility]    ESOPHAGOSCOPY, GASTROSCOPY, DUODENOSCOPY (EGD), COMBINED N/A 12/11/2015    Procedure: COMBINED ESOPHAGOSCOPY, GASTROSCOPY, DUODENOSCOPY (EGD);  Surgeon: Delbert Patel MD;  Location: [facility]    ESOPHAGOSCOPY, GASTROSCOPY, DUODENOSCOPY (EGD), COMBINED N/A 12/18/2017    Procedure: COMBINED ESOPHAGOSCOPY, GASTROSCOPY, DUODENOSCOPY (EGD);  UPPER ENDOSCOPY WITH BIOPSY;  Surgeon: Delbert Patel MD;  Location: [facility]    excised-benign  2002    tongue lesion    HC REPAIR OF NASAL SEPTUM  2002    Deviated nasal septum    LAPAROSCOPIC CHOLECYSTECTOMY      lumpectomy Right breast      Breast Lump    MOUTH SURGERY      removal of spot from roof of mouth    pilonidal cyst surgery  1976    removal of colon and large intestine  2000    Familial polyposis    REMOVE HARDWARE ARTHROPLASTY HIP Right 07/20/2022    Procedure: Right Hip Hardware Removal (Cannulated Screws);  Surgeon: Luis Fernando Marcial MD;  Location: [facility]    Right etopic pregnancy      Pregnancy    TONSILLECTOMY      tonsillitus    UPPER GI ENDOSCOPY  2009    Stomach polyps       Family History:    Family History   Problem Relation Age of Onset    Other - See Comments Father         Atrial Fibrillation    Cancer Father         bladder ca    Colon Polyps Father     Heart Disease Father         Pacemaker    Rheumatoid Arthritis Father     C.A.D. Father 75        CABG    Chronic Obstructive Pulmonary Disease Mother     Heart Disease Mother         Pacemaker    Other - See Comments Mother         dementia    C.A.D. Mother 70        CABG    C.A.D. Maternal Grandmother     Unknown/Adopted Maternal Grandfather     Unknown/Adopted Paternal Grandmother     Unknown/Adopted Paternal Grandfather     Asthma Sister     Cerebrovascular Disease  Sister     Gastrointestinal Disease Sister         peptic ulcers    Hypertension Sister     Gastrointestinal Disease Sister         stomach tumors    Rheumatoid Arthritis Sister     Cancer Sister         facial cancer    Asthma Sister     Cancer Maternal Aunt         renal ca       Social History:  Marital Status:   [2]  Social History     Tobacco Use    Smoking status: Every Day     Current packs/day: 0.50     Average packs/day: 0.5 packs/day for 54.7 years (27.4 ttl pk-yrs)     Types: Cigarettes     Start date: 1/1/1970     Passive exposure: Current    Smokeless tobacco: Never   Vaping Use    Vaping status: Never Used   Substance Use Topics    Alcohol use: Yes     Comment: Weekly 3 drinks    Drug use: Never        Medications:    cefdinir (OMNICEF) 300 MG capsule  acetaminophen (TYLENOL) 325 MG tablet  albuterol (PROAIR HFA/PROVENTIL HFA/VENTOLIN HFA) 108 (90 Base) MCG/ACT inhaler  alendronate (FOSAMAX) 70 MG tablet  allopurinol (ZYLOPRIM) 100 MG tablet  amoxicillin (AMOXIL) 500 MG capsule  aspirin 81 MG EC tablet  atorvastatin (LIPITOR) 20 MG tablet  calcitonin, salmon, (MIACALCIN) 200 UNIT/ACT nasal spray  calcium carbonate 750 MG CHEW  ciprofloxacin (CETRAXAL) 0.2 % otic solution  dicyclomine (BENTYL) 10 MG capsule  diphenoxylate-atropine (LOMOTIL) 2.5-0.025 MG tablet  ferrous sulfate (IRON) 325 (65 FE) MG tablet  fluticasone-salmeterol (ADVAIR) 250-50 MCG/ACT inhaler  gabapentin (NEURONTIN) 400 MG capsule  GNP VITAMIN D MAXIMUM STRENGTH 50 MCG (2000 UT) tablet  ketotifen (ZADITOR/REFRESH ANTI-ITCH) 0.025 % SOLN ophthalmic solution  loperamide (IMODIUM) 2 MG capsule  meloxicam (MOBIC) 15 MG tablet  montelukast (SINGULAIR) 10 MG tablet  Multiple Vitamin (MULTIVITAMIN) per tablet  omeprazole (PRILOSEC) 40 MG DR capsule  potassium chloride mary ER (KLOR-CON M20) 20 MEQ CR tablet  pramipexole (MIRAPEX) 0.125 MG tablet  predniSONE (DELTASONE) 1 MG tablet  PSYLLIUM PO  Simethicone 125 MG CAPS  traMADol  (ULTRAM) 50 MG tablet          Review of Systems   Constitutional:  Positive for fatigue.   HENT: Negative.     Eyes: Negative.    Respiratory: Negative.     Cardiovascular: Negative.    Gastrointestinal: Negative.    Endocrine: Negative.    Genitourinary:  Positive for difficulty urinating, dysuria, flank pain (Right-sided) and urgency. Negative for frequency, hematuria, pelvic pain, vaginal bleeding and vaginal discharge.   Skin: Negative.    Allergic/Immunologic: Negative.    Neurological: Negative.    Hematological: Negative.    Psychiatric/Behavioral: Negative.         Physical Exam   BP: 96/59  Pulse: 78  Temp: 97.9  F (36.6  C)  Resp: 16  SpO2: 97 %      GENERAL APPEARANCE:  The patient is a 68 year old well-developed, individual in no acute distress that appears as stated age.  LUNGS:  Breathing is easy.  Breath sounds are equal and clear bilaterally.  No wheezes, rhonchi, or rales.  HEART:  Regular rate and rhythm with normal S1 and S2.  No murmurs, gallops, or rubs.  ABDOMEN:  Soft, flat.  No mass, guarding, or rebound.  Right lower quadrant abdominal tenderness to palpation.  Negative Rovsing.  No organomegaly or hernia.  Bowel sounds are present.  No CVA tenderness or flank mass.  No abdominal bruits or thrills present upon auscultation/palpation.  GENITOURINARY: No obvious anterior pelvic tenderness, hernia, mass noted to palpation.  PSYCHIATRIC:  The patient is awake, alert, and oriented x4.  Recent and remote memory is intact.  Appropriate mood and affect.  Calm and cooperative with history and physical exam.  SKIN:  Warm, dry, and well perfused.  Good turgor.  No lesions, nodules, or rashes are noted.  No bruising noted.      Comment: Discrepancies between my note and notes on behalf of the nursing team or other care providers are secondary to my findings reflecting my physical examination and questioning of the patient.  Any conflicting information provided is not in line with my examination of the  patient.       ED Course     ED Course as of 09/18/24 1818   Wed Sep 18, 2024   1557 1 L LR ordered.   1559 In to see patient and history/physical completed.    1621 CT abdomen pelvis of IV contrast ordered due to abdominal pain.  Ceftriaxone 2 g IV ordered due to UTI.   1700 Procalcitonin(!)  Procalcitonin 3.36.  At this time patient meets sepsis criteria.   1801 Patient having no symptoms at this time.  Patient has elevated white count, lactic acid, and procalcitonin.  Highly recommend admission for IV antibiotics.  Patient refuses admission.  Patient feeling fine.  Patient wants to be discharged home.  For this reason we will discharge on cefdinir 300 mg twice daily x 14 days for complicated UTI.  Return immediately if fever, worsening of symptoms, or any concerns.  Immediate follow-up with PCP.  Patient in agreement.          If one the following conditions is present, a 30 mL/kg bolus is recommended as part of the 6 hour bundle (IBW can be used for BMI >30, or document refusal/contraindication):      1.   Initial hypotension  defined as 2 bps < 90 or map < 65 in the 6hrs before or 3hrs after time zero.     2.  Lactate >4.      The patient has signs of Severe Sepsis as evidenced by:    1. 2 SIRS criteria, AND  2. Suspected infection, AND   3. Organ dysfunction:  SBP <90, MAP < 65, or SBP decrease of >40 from baseline due to infection and Lactic Acidosis with value >2.0    Time severe sepsis diagnosis confirmed: 1600  09/18/24 as this was the time when SBP <90 or MAP <65 and Lactate resulted, and the level was > 2.0    3 Hour Severe Sepsis Bundle Completion:    1. Initial Lactic Acid Result:   Recent Labs   Lab Test 09/18/24  1514   LACT 2.4*     2. Blood Cultures before Antibiotics: Yes  Note: Due to a national blood culture bottle shortage, reduced blood cultures may have been drawn on this patient.  3. Broad Spectrum Antibiotics Administered:  yes       Anti-infectives (From admission through now)      Start      Dose/Rate Route Frequency Ordered Stop    09/18/24 1625  cefTRIAXone IN D5W (ROCEPHIN) intermittent infusion 2 g         2 g  100 mL/hr over 30 Minutes Intravenous ONCE 09/18/24 1620 09/18/24 1755    09/18/24 0000  cefdinir (OMNICEF) 300 MG capsule         300 mg Oral 2 TIMES DAILY 09/18/24 1811 10/02/24 2359            4. Is initial hypotension present?     Yes. (Definition - 2 SBPs <90, MAP <65, or decrease > 40 from baseline)   Full 30 mL/kg bolus given (see amount below).    BMI Readings from Last 1 Encounters:   09/18/24 20.17 kg/m      30 mL/kg fluids based on weight: 1,430 mL  30 mL/kg fluids based on IBW (must be >= 60 inches tall): 1000 mL         Results for orders placed or performed during the hospital encounter of 09/18/24 (from the past 24 hour(s))   Procalcitonin   Result Value Ref Range    Procalcitonin 3.36 (H) <0.50 ng/mL   [facility] [facility]    Narrative    The following orders were created for panel order [facility] [facility].  Procedure                               Abnormality         Status                     ---------                               -----------         ------                     Extra Blood Culture Bottle[[id number]]                       Final result               Extra Blue Top Tube[[id number]]                              Final result               Extra Red Top Tube[[id number]]                               Final result               Extra Green Top (Lithium...[[id number]]                      Final result               Extra Purple Top Tube[[id number]]                            Final result               Extra Purple Top Tube ON...[[id number]]                                                   Please view results for these tests on the individual orders.   Extra Blood Culture Bottle   Result Value Ref Range    Hold Specimen JIC    Extra Blue Top Tube   Result Value Ref Range    Hold Specimen JIC    Extra Red Top Tube   Result Value Ref Range    Hold Specimen JIC    Extra Green Top (Lithium  Heparin) Tube   Result Value Ref Range    Hold Specimen JIC    Extra Purple Top Tube   Result Value Ref Range    Hold Specimen JIC    [place] Draw    Narrative    The following orders were created for panel order [place] [facility].  Procedure                               Abnormality         Status                     ---------                               -----------         ------                     Extra Green Top (Lithium...[[id number]]                      Final result                 Please view results for these tests on the individual orders.   Extra Green Top (Lithium Heparin) Tube   Result Value Ref Range    Hold Specimen JIC    CT Abdomen Pelvis w Contrast    Narrative    PROCEDURE:  CT ABDOMEN PELVIS W CONTRAST    HISTORY: Right lower quadrant abdominal pain (does have pelvic kidney  on the side)    TECHNIQUE: Helical CT of the abdomen and pelvis was performed  following injection of intravenous contrast. This CT exam was  performed using one or more the following dose reduction techniques:  automated exposure control, adjustment of the mA and/or kV according  to patient size, and/or iterative reconstruction technique.    COMPARISON: 4/24/2024.    MEDS/CONTRAST: Isovue 370 52ml Given    FINDINGS:      Limited images through the lung bases demonstrate no focal  consolidation.    Surgical absence of the gallbladder likely accounts for mild biliary  ductal prominence. No pancreatic ductal dilatation is seen. The  spleen, pancreas and adrenal glands are unremarkable. There is no  abdominal aortic aneurysm.    A right pelvic kidney and a normally located left kidney do not  demonstrate hydronephrosis. There is a left renal cortical cyst.    The small bowel is normal in caliber. The appendix is not seen. Suture  material is seen in the rectum.     No free fluid, free air or adenopathy is seen. No suspicious osseous  lesions are identified.      Impression    IMPRESSION:      No hydronephrosis, bowel obstruction or  focal inflammation.    VIRGIL DICKEY MD         SYSTEM ID:  [id number]       Medications   cefTRIAXone IN D5W (ROCEPHIN) intermittent infusion 2 g (0 g Intravenous Stopped 9/18/24 1755)   iopamidol (ISOVUE-370) solution 52 mL (52 mLs Intravenous $Given 9/18/24 1651)   sodium chloride (PF) 0.9% PF flush 50 mL (50 mLs Intravenous $Given 9/18/24 1651)       Assessments & Plan (with Medical Decision Making)     I have reviewed the nursing notes.    I have reviewed the findings, diagnosis, plan and need for follow up with the patient.  Summary:  Patient presents to the ER today for abnormal lab work in clinic.  Potential diagnosis which have been considered and evaluated include urosepsis, ureteral stone, appendicitis, as well as others. Many of these have been excluded using the various modalities and assessment as noted on the chart. At the present time, the diagnosis given seems to be the most likely UTI/sepsis.  Upon arrival, vitals signs show blood pressure 96/59 with a pulse 78.  Temperature 97.9  F.  Respirations 16 with oxygenation 97% on room air.  The patient is alert and oriented.  Cardiac and respiratory examination normal.  Right lower quadrant abdominal tenderness to palpation.  Patient does have known history of a right pelvic kidney in this area of tenderness.  Lab work was obtained prior to arrival showing leukocytosis with JESSE and elevated lactic acid.  Patient also was hypotensive in clinic with systolic blood pressure in the 70's.  Blood cultures and procalcitonin added when patient arrived.  UA was obtained but obscured by color but did show WBC's in it.  1 L LR initiated for arrival due to JESSE and possible sepsis.  Ceftriaxone 2 g IV initiated for likely urosepsis.  Due to the right lower quadrant abdominal pain did do CT abdomen pelvis of IV contrast which showed no acute abnormality.  Patient has elevation of white count, lactic acid, and procalcitonin.  Does have systolic blood pressure in  the 90's after 1 L of fluid but looking at old office records this appears to be patient baseline.  Discussed admission with patient for urosepsis.  Patient refuses admission.  Patient is cognizant and able to make this decision.  As patient shows no signs of instability at this time we will discharge home on cefdinir 300 mg twice daily x 14 days for complicated UTI.  Immediate follow-up with PCP recommended.  Return to ER if any new symptoms or worsening and admission.  Patient verbalized understand agrees plan of care.  Patient discharged home.      Critical Care Time: None    Impression and plan discussed with patient. Questions answered, concerns addressed, indications for urgent re-evaluation reviewed, and  given. Patient/Parent/Caregiver agree with treatment plan and have no further questions at this time.  AVS provided at discharge.    This note was created by the Dragon Voice Dictation System. Inadvertent typographical errors, due to software recognition problems, may still exist.             New Prescriptions    CEFDINIR (OMNICEF) 300 MG CAPSULE    Take 1 capsule (300 mg) by mouth 2 times daily for 14 days.       Final diagnoses:   Complicated UTI (urinary tract infection)   Sepsis due to urinary tract infection (H)       9/18/2024   [facility]       Jose Guadalupe Milner APRN CNP  09/18/24 [id number]

## 2024-09-18 NOTE — TELEPHONE ENCOUNTER
"DATE/TIME OF CALL RECEIVED FROM LAB:  09/18/24 at 3:24 PM   LAB TEST:  lactic acid  LAB VALUE:  2.4  PROVIDER NOTIFIED?: Yes  PROVIDER NAME: Dr. Nagel  DATE/TIME LAB VALUE REPORTED TO PROVIDER: 09/18/24, 3:26 PM  MECHANISM OF PROVIDER NOTIFICATION: Face-To-Face  PROVIDER RESPONSE: \"ok\". Provider seeing patient in clinic.    "

## 2024-09-18 NOTE — ED NOTES
AVS reviewed. All questions and concerns answered. Education reviewed, pt states no further questions at this time.

## 2024-09-18 NOTE — PROGRESS NOTES
Assessment & Plan     Hypotension, unspecified hypotension type  Patient significantly hypotensive during visit today.    Concern for bacteremia  Labs ordered and patient sent to ED for further evaluation  - CBC with platelets and differential; Future  - Comprehensive metabolic panel; Future  - CRP, inflammation; Future  - UA Macroscopic with reflex to Microscopic and Culture; Future  - Lactic acid whole blood; Future  - Lactic acid whole blood  - UA Macroscopic with reflex to Microscopic and Culture  - CRP, inflammation  - Comprehensive metabolic panel  - CBC with platelets and differential  - Urine Culture      No follow-ups on file.    Cristina Clemente is a 68 year old, presenting for the following health issues:  Dysuria        9/18/2024     2:45 PM   Additional Questions   Roomed by Katelyn Chavez   Accompanied by None         9/18/2024     2:45 PM   Patient Reported Additional Medications   Patient reports taking the following new medications None     HPI     SOB for the last month, worse today  Urinary diffuclty for two days    Genitourinary - Female  Onset/Duration: 2 days ago   Description:   Painful urination (Dysuria): YES           Frequency: YES  Blood in urine (Hematuria): No  Delay in urine (Hesitency): YES  Progression of Symptoms:  same  Accompanying Signs & Symptoms:  Fever/chills: No  Flank pain: YES  Nausea and vomiting: No  Vaginal symptoms: none  Abdominal/Pelvic Pain: No  History:   History of frequent UTI s: YES  History of kidney stones: No  Sexually Active: YES  Possibility of pregnancy: No  Precipitating or alleviating factors: None  Therapies tried and outcome: Pyridium          Review of Systems  Constitutional, HEENT, cardiovascular, pulmonary, GI, , musculoskeletal, neuro, skin, endocrine and psych systems are negative, except as otherwise noted.      Objective    BP (!) 76/51 (BP Location: Left arm, Patient Position: Sitting, Cuff Size: Adult Small)   Pulse 95   Temp 98.9  " F (37.2  C) (Tympanic)   Resp 16   Ht 1.537 m (5' 0.5\")   Wt 47.6 kg (105 lb)   SpO2 94%   BMI 20.17 kg/m    Body mass index is 20.17 kg/m .  Physical Exam   GENERAL: alert and no distress  EYES: Eyes grossly normal to inspection, PERRL and conjunctivae and sclerae normal  HENT: ear canals and TM's normal, nose and mouth without ulcers or lesions  NECK: no adenopathy, no asymmetry, masses, or scars  RESP: lungs clear to auscultation - no rales, rhonchi or wheezes  CV: regular rate and rhythm, normal S1 S2, no S3 or S4, no murmur, click or rub, no peripheral edema  ABDOMEN: soft, nontender, no hepatosplenomegaly, no masses and bowel sounds normal  MS: no gross musculoskeletal defects noted, no edema  SKIN: no suspicious lesions or rashes  NEURO: Normal strength and tone, mentation intact and speech normal  PSYCH: mentation appears normal, affect normal/bright          Signed Electronically by: Zuleyma Nagel MD    "

## 2024-09-19 ENCOUNTER — TELEPHONE (OUTPATIENT)
Dept: FAMILY MEDICINE | Facility: OTHER | Age: 69
End: 2024-09-19

## 2024-09-19 ENCOUNTER — TELEPHONE (OUTPATIENT)
Dept: CARE COORDINATION | Facility: OTHER | Age: 69
End: 2024-09-19

## 2024-09-19 NOTE — TELEPHONE ENCOUNTER
2:42 PM    Reason for Call: OVERBOOK    Patient is having the following symptoms: er follow up/was seen 09/18.    The patient is requesting an appointment for within 3 days/requesting monday with Dr Edward.    Was an appointment offered for this call? No  If yes : Appointment type              Date    Preferred method for responding to this message: Telephone Call  What is your phone number ?[phone]     If we cannot reach you directly, may we leave a detailed response at the number you provided? Yes    Can this message wait until your PCP/provider returns, if unavailable today? Not applicable    Barb Lopes

## 2024-09-19 NOTE — TELEPHONE ENCOUNTER
Patient left VM message requesting an ER follow up get scheduled with PCP. ER visit was 9/18/24.   Will route to [facility] to please call patient to schedule ER follow up.

## 2024-09-21 LAB — BACTERIA UR CULT: ABNORMAL

## 2024-09-23 ENCOUNTER — OFFICE VISIT (OUTPATIENT)
Dept: FAMILY MEDICINE | Facility: OTHER | Age: 69
End: 2024-09-23
Attending: FAMILY MEDICINE
Payer: COMMERCIAL

## 2024-09-23 VITALS
HEART RATE: 79 BPM | TEMPERATURE: 97.5 F | DIASTOLIC BLOOD PRESSURE: 64 MMHG | BODY MASS INDEX: 19.98 KG/M2 | SYSTOLIC BLOOD PRESSURE: 120 MMHG | OXYGEN SATURATION: 96 % | WEIGHT: 104 LBS

## 2024-09-23 DIAGNOSIS — N39.0 COMPLICATED URINARY TRACT INFECTION: Primary | ICD-10-CM

## 2024-09-23 PROCEDURE — 99213 OFFICE O/P EST LOW 20 MIN: CPT | Performed by: FAMILY MEDICINE

## 2024-09-23 PROCEDURE — G0463 HOSPITAL OUTPT CLINIC VISIT: HCPCS

## 2024-09-23 PROCEDURE — G2211 COMPLEX E/M VISIT ADD ON: HCPCS | Performed by: FAMILY MEDICINE

## 2024-09-23 RX ORDER — SPIRONOLACTONE 50 MG/1
50 TABLET, FILM COATED ORAL 2 TIMES DAILY
COMMUNITY
Start: 2024-09-06

## 2024-09-23 RX ORDER — ABALOPARATIDE 2000 UG/ML
80 INJECTION, SOLUTION SUBCUTANEOUS DAILY
COMMUNITY
Start: 2024-09-13

## 2024-09-23 ASSESSMENT — ANXIETY QUESTIONNAIRES
GAD7 TOTAL SCORE: 0
8. IF YOU CHECKED OFF ANY PROBLEMS, HOW DIFFICULT HAVE THESE MADE IT FOR YOU TO DO YOUR WORK, TAKE CARE OF THINGS AT HOME, OR GET ALONG WITH OTHER PEOPLE?: NOT DIFFICULT AT ALL
GAD7 TOTAL SCORE: 0
7. FEELING AFRAID AS IF SOMETHING AWFUL MIGHT HAPPEN: NOT AT ALL
GAD7 TOTAL SCORE: 0

## 2024-09-23 ASSESSMENT — ASTHMA QUESTIONNAIRES
QUESTION_1 LAST FOUR WEEKS HOW MUCH OF THE TIME DID YOUR ASTHMA KEEP YOU FROM GETTING AS MUCH DONE AT WORK, SCHOOL OR AT HOME: NONE OF THE TIME
QUESTION_5 LAST FOUR WEEKS HOW WOULD YOU RATE YOUR ASTHMA CONTROL: SOMEWHAT CONTROLLED
QUESTION_4 LAST FOUR WEEKS HOW OFTEN HAVE YOU USED YOUR RESCUE INHALER OR NEBULIZER MEDICATION (SUCH AS ALBUTEROL): NOT AT ALL
QUESTION_2 LAST FOUR WEEKS HOW OFTEN HAVE YOU HAD SHORTNESS OF BREATH: ONCE A DAY
QUESTION_3 LAST FOUR WEEKS HOW OFTEN DID YOUR ASTHMA SYMPTOMS (WHEEZING, COUGHING, SHORTNESS OF BREATH, CHEST TIGHTNESS OR PAIN) WAKE YOU UP AT NIGHT OR EARLIER THAN USUAL IN THE MORNING: NOT AT ALL
ACT_TOTALSCORE: 20
ACT_TOTALSCORE: 20

## 2024-09-23 ASSESSMENT — PAIN SCALES - GENERAL: PAINLEVEL: NO PAIN (0)

## 2024-09-23 NOTE — PROGRESS NOTES
Assessment & Plan     Complicated urinary tract infection  Resolved sx.  Appropriate abx.  No ongoing issues.  Feeling well.  Assume full resolution.          MED REC REQUIRED  Post Medication Reconciliation Status:         No follow-ups on file.    Cristina Clemente is a 68 year old, presenting for the following health issues:  ER F/U        9/23/2024     8:48 AM   Additional Questions   Roomed by Donya   Accompanied by self     HPI     ED/UC Followup:    Facility:  [facility]  Date of visit: 9/18/24  Reason for visit: uti  Current Status: improved        Review of Systems  Constitutional, HEENT, cardiovascular, pulmonary, gi and gu systems are negative, except as otherwise noted.      Objective    /64   Pulse 79   Temp 97.5  F (36.4  C) (Tympanic)   Wt 47.2 kg (104 lb)   SpO2 96%   BMI 19.98 kg/m    Body mass index is 19.98 kg/m .  Physical Exam   GENERAL: alert and no distress  NECK: no adenopathy, no asymmetry, masses, or scars  RESP: lungs clear to auscultation - no rales, rhonchi or wheezes  CV: regular rate and rhythm, normal S1 S2, no S3 or S4, no murmur, click or rub, no peripheral edema  ABDOMEN: soft, nontender, no hepatosplenomegaly, no masses and bowel sounds normal  MS: no gross musculoskeletal defects noted, no edema    Labs reviewed through ER.  None repeated.     The longitudinal plan of care for the diagnosis(es)/condition(s) as documented were addressed during this visit. Due to the added complexity in care, I will continue to support Chyna in the subsequent management and with ongoing continuity of care.        Signed Electronically by: Ellis Edward MD

## 2024-09-24 LAB — BACTERIA BLD CULT: NO GROWTH

## 2024-10-07 DIAGNOSIS — G25.81 RESTLESS LEGS SYNDROME: ICD-10-CM

## 2024-10-08 RX ORDER — PRAMIPEXOLE DIHYDROCHLORIDE 0.12 MG/1
.125-.25 TABLET ORAL AT BEDTIME
Qty: 180 TABLET | Refills: 1 | Status: SHIPPED | OUTPATIENT
Start: 2024-10-08

## 2024-10-10 NOTE — TELEPHONE ENCOUNTER
Potassium Chloride  Last Written Prescription Date: 4/6/20  Last Fill Quantity: 90 # of Refills: 0  Last Office Visit: 3/3/20       DOS 10/11  code stroke called on arrival and neurology emergently assessed patient   Briefly   61 yo [place] F with Hyperlipidemia reported to hospital secondary to right eye spots in her peripheral vision onset at 10/10/ 2030. Neurology consulted for this. Patient states she was watching television when she noticed that in her right eye in the periphery she had a floating black spot although it on her right gaze, no issues in her left eye. Prior infection of right eye. History of headache but no headache at this time, no history of migraines. Neuro exam non-focal.   CTH neg  CTA H/n neg     Impression:   1) Unilateral/monocular right eye vision abnormalities (one spot)/ floaters, no neurological symptoms at this time, in setting of recent right eye infection  Etiology: Likely opthalmologic in nature, less likely neurological (ie migraine)  2) HA     Recommendations   - f/u optho eval.   - if persists can consider MRI brain and orbits outpatient   - low suspicion for stroke. likely primary optho  - would check ESR though. low suspicion GCA. no jaw claudication, temperal tenerness  - outaptient f/u with optho and me for further workup    - check FS, glucose control <180  - GI/DVT ppx  - Counseling on diet, exercise, and medication adherence was done  - Counseling on smoking cessation and alcohol consumption offered when appropriate.  - Pain assessed and judicious use of narcotics when appropriate was discussed.    - Stroke education given when appropriate.  - Importance of fall prevention discussed.   - Differential diagnosis and plan of care discussed with patient and/or family and primary team  - Thank you for allowing me to participate in the care of this patient. Call with questions.   \spoke with patient and CDU   outpatinet f/u ; dc plan no objection   Marino Morales MD  Vascular Neurology  Office: [phone]

## 2024-10-14 ENCOUNTER — TRANSFERRED RECORDS (OUTPATIENT)
Dept: HEALTH INFORMATION MANAGEMENT | Facility: CLINIC | Age: 69
End: 2024-10-14

## 2024-10-28 ENCOUNTER — ANESTHESIA EVENT (OUTPATIENT)
Dept: SURGERY | Facility: HOSPITAL | Age: 69
End: 2024-10-28
Payer: COMMERCIAL

## 2024-10-28 ASSESSMENT — LIFESTYLE VARIABLES: TOBACCO_USE: 1

## 2024-10-28 ASSESSMENT — COPD QUESTIONNAIRES
COPD: 1
CAT_SEVERITY: MODERATE

## 2024-10-28 NOTE — OR NURSING
Pt contacted for preop call.   States she was denied through insurance and has insurance questions.   Pt and spouse will call back once insurance is sorted.  They have a call in to Dr. Yogesh Diaz's  and I transferred their call to financial office.

## 2024-10-28 NOTE — ANESTHESIA PREPROCEDURE EVALUATION
Anesthesia Pre-Procedure Evaluation    Patient: Chyna Delgado   MRN: [id number] : 1955        Procedure : Procedure(s):  Right Reverse Total Shoulder Arthroplasty          Past Medical History:   Diagnosis Date     Abnormal mammogram, unspecified 2003    Normal pathology     Back Pain 02/10/2000    Sacroiliac pain     Benign neoplasm of colon 03/10/2000     Benign paroxysmal positional vertigo 2012     Depressive disorder, not elsewhere classified 02/10/2004     Diarrhea 12/15/2010    Secondary to colectomy for familial polyposis     Gastric polyp 2015, 2017    recurrent      History of normal mammogram 2014, 2019     Idiopathic osteoporosis 12/15/2010     Interstitial emphysema (H) 12/15/2010     menopausal or female climacteric states 1999     Mixed hyperlipidemia 12/15/2010     Other bursitis disorders 2011    Greater trochanteric     Other forms of retinal detachment(361.89) 12/15/2010     Other malaise and fatigue 01/10/2003     Personal history of tobacco use, presenting hazards to health 12/15/2010     Polymyalgia rheumatica (H)      Polyposis of colon      Restless legs syndrome (RLS) 12/15/2010     Rotator cuff tendonitis      Rotator cuff tendonitis      Sacroiliitis, not elsewhere classified (H) 12/15/2010    multiple sites     Tobacco use disorder 2012     Unspecified asthma(493.90) 12/15/2010      Past Surgical History:   Procedure Laterality Date     ARTHROPLASTY HIP ANTERIOR Right 05/15/2023    Procedure: Right Direct Anterior Total Hip Arthroplasty;  Surgeon: Kurt Jordan MD;  Location: [facility]     benign tumors removed from back       CATARACT EXTRACTION Right 2022     CATARACT EXTRACTION Left 2022     CLOSED REDUCTION, PERCUTANEOUS PINNING HIP Right 2019    Procedure: Percutaneous Screw Fixation of Right Hip Fracture;  Surgeon: Amrik Kolb DO;  Location: [facility]     COLECTOMY TOTAL       COLON SURGERY N/A      HX: Total colectomy with J-pouch reconstruction.      ENDOSCOPY UPPER, COLONOSCOPY, COMBINED N/A 09/05/2014    Procedure: COMBINED ENDOSCOPY UPPER, COLONOSCOPY;  Surgeon: Delbert Patel MD;  Location: [facility]     ENDOSCOPY UPPER, COLONOSCOPY, COMBINED N/A 05/09/2019    Procedure: UPPER ENDOSCOPY  WITH BIOPSIES AND  COLONOSCOPY WITH BIOPSIES;  Surgeon: Tai Diaz MD;  Location: [facility]     ENDOSCOPY UPPER, COLONOSCOPY, COMBINED N/A 12/8/2023    Procedure: Upper Endoscopy with biopsy and polypectomy  and Ileoscopy;  Surgeon: Al Howe MD;  Location: [facility]     ESOPHAGOSCOPY, GASTROSCOPY, DUODENOSCOPY (EGD), COMBINED N/A 12/11/2015    Procedure: COMBINED ESOPHAGOSCOPY, GASTROSCOPY, DUODENOSCOPY (EGD);  Surgeon: Delbert Patel MD;  Location: [facility]     ESOPHAGOSCOPY, GASTROSCOPY, DUODENOSCOPY (EGD), COMBINED N/A 12/18/2017    Procedure: COMBINED ESOPHAGOSCOPY, GASTROSCOPY, DUODENOSCOPY (EGD);  UPPER ENDOSCOPY WITH BIOPSY;  Surgeon: Delbert Patel MD;  Location: [facility]     excised-benign  2002    tongue lesion     HC REPAIR OF NASAL SEPTUM  2002    Deviated nasal septum     LAPAROSCOPIC CHOLECYSTECTOMY       lumpectomy Right breast      Breast Lump     MOUTH SURGERY      removal of spot from roof of mouth     pilonidal cyst surgery  1976     removal of colon and large intestine  2000    Familial polyposis     REMOVE HARDWARE ARTHROPLASTY HIP Right 07/20/2022    Procedure: Right Hip Hardware Removal (Cannulated Screws);  Surgeon: Luis Fernando Marcial MD;  Location: [facility]     Right etopic pregnancy      Pregnancy     TONSILLECTOMY      tonsillitus     UPPER GI ENDOSCOPY  2009    Stomach polyps      Allergies   Allergen Reactions     Codeine Swelling     Throat swelling-Patient can tolerate Hydrocodone & morphine     Nortriptyline Other (See Comments)     Crying       Social History     Tobacco Use     Smoking status: Every Day     Current packs/day: 0.50     Average packs/day: 0.5 packs/day for 54.8 years (27.4 ttl  "pk-yrs)     Types: Cigarettes     Start date: 1/1/1970     Passive exposure: Current     Smokeless tobacco: Never   Substance Use Topics     Alcohol use: Yes     Comment: Weekly 3 drinks      Wt Readings from Last 1 Encounters:   09/23/24 47.2 kg (104 lb)        Anesthesia Evaluation   Pt has had prior anesthetic. Type: General and MAC.    No history of anesthetic complications       ROS/MED HX  ENT/Pulmonary: Comment: Recent CT stable of lungs  Possible sleep apnea   SOB, wheezing self report on HP, none noted on exam    Fluticasone-salmeterol, singulair, albuterol     Had albuterol and advair inhaler today    (+)     TWYLA risk factors, snores loudly,          tobacco use (1/2ppd x 50 years), Current use, 0.5 packs/day, 40  Pack-Year Hx,   Mild Persistent, asthma Last exacerbation: used her inhaler last night,  Treatment: Inhaled steroids, Inhaler daily and Inhaler prn,  moderate,  COPD,              Neurologic: Comment: RLS   BPPV      Cardiovascular: Comment: At baseline function which is slightly limited due to msk issues and lung issues  Benign positional vertigo     (+) Dyslipidemia - -   -  - -   Taking blood thinners (aspirin 81 mg po BID)  Instructions Given to patient: ASA taken last week.      CORDON.                      Previous cardiac testing   Echo: Date: Results:    Stress Test:  Date: Results:    ECG Reviewed:  Date: 5/9/23 Results:    5/9/23 EKG: NSR rate 67 QTc 395    appears normal, NSR, normal axis, normal intervals, no acute ST/T changes c/w ischemia, no LVH by voltage criteria, unchanged from previous tracings  Cath:  Date: Results:      METS/Exercise Tolerance: 3 - Able to walk 1-2 blocks without stopping    Hematologic:     (+)      anemia, history of blood transfusion, no previous transfusion reaction, Known PRBC Anitbodies:No - after tubal pregnancy in late 1970's/early 1980's,      Musculoskeletal: Comment: Gout  polymyalgia rheumatica  Patient right  Hip pain \"a good 10\", been bothering her " for one year  Hx right hip replacement  RLS  (+)  arthritis,             GI/Hepatic: Comment: S/p colon resection with j pouch 2000    (+) GERD, Asymptomatic on medication,                  Renal/Genitourinary: Comment: Right pelvic kidney functions fine per patient      Endo:     (+)          thyroid problem, hypothyroidism, Chronic steroid usage (polymyalgia) for Other and Arthritis. Date most recently used: current 2mg per day.        Psychiatric/Substance Use:     (+) psychiatric history depression alcohol abuse      Infectious Disease:  - neg infectious disease ROS     Malignancy: Comment: Bowel resection for precancerous cells   (-) malignancy   Other:      (+)  , H/O Chronic Pain,, other significant disability Other (comment) (uses cane)         Physical Exam    Airway        Mallampati: III   TM distance: > 3 FB   Neck ROM: full   Mouth opening: > 3 cm    Respiratory Devices and Support         Dental       (+) Multiple crowns, permanant bridges      Cardiovascular   cardiovascular exam normal          Pulmonary           breath sounds clear to auscultation   (+) decreased breath sounds       OUTSIDE LABS:  CBC:   Lab Results   Component Value Date    WBC 20.9 (H) 09/18/2024    WBC 10.1 04/24/2024    HGB 12.7 09/18/2024    HGB 15.1 04/24/2024    HCT 37.2 09/18/2024    HCT 43.7 04/24/2024     09/18/2024     04/24/2024     BMP:   Lab Results   Component Value Date     (L) 09/18/2024     06/13/2024    POTASSIUM 5.1 09/18/2024    POTASSIUM 4.4 06/13/2024    CHLORIDE 96 (L) 09/18/2024    CHLORIDE 104 06/13/2024    CO2 23 09/18/2024    CO2 28 06/13/2024    BUN 26.6 (H) 09/18/2024    BUN 16.3 06/13/2024    CR 1.08 (H) 09/18/2024    CR 0.75 06/13/2024     (H) 09/18/2024    GLC 96 06/13/2024     COAGS:   Lab Results   Component Value Date    INR 1.09 02/23/2019     POC:   Lab Results   Component Value Date     (H) 02/25/2019     HEPATIC:   Lab Results   Component Value Date     ALBUMIN 3.9 09/18/2024    PROTTOTAL 6.5 09/18/2024    ALT 22 09/18/2024    AST 24 09/18/2024    ALKPHOS 168 (H) 09/18/2024    BILITOTAL 0.5 09/18/2024     OTHER:   Lab Results   Component Value Date    LACT 2.4 (H) 09/18/2024    A1C 5.4 01/05/2023    СВЕТЛАНА 9.5 09/18/2024    PHOS 3.4 08/01/2019    MAG 1.7 05/09/2023    TSH 0.86 01/31/2024    T4 0.96 08/08/2018    T3 95 08/08/2018    CRP 9.7 (H) 09/09/2020    SED 14 01/31/2024       Anesthesia Plan    ASA Status:  3    NPO Status:  NPO Appropriate    Anesthesia Type: General.     - Airway: ETT   Induction: Intravenous, Propofol.   Maintenance: Balanced.        Consents    Anesthesia Plan(s) and associated risks, benefits, and realistic alternatives discussed. Questions answered and patient/representative(s) expressed understanding.     - Discussed: Risks, Benefits and Alternatives for BOTH SEDATION and the PROCEDURE were discussed     - Discussed with:  Patient      - Extended Intubation/Ventilatory Support Discussed: No.      - Patient is DNR/DNI Status: No     Use of blood products discussed: No .     Postoperative Care    Pain management: Peripheral nerve block (Single Shot).   PONV prophylaxis: Ondansetron (or other 5HT-3), Dexamethasone or Solumedrol     Comments:    Other Comments: Discussed risks and benefits with patient for general anesthesia including sore throat, nausea, vomiting, aspiration, dental damage, loss of airway, CV complications, stroke, MI, death. Pt wishes to proceed. Wagner LUDWIG 10/31/24    Baseline lung pathology; suprascapular and pecs blocks           Sharon Stinson, APRN CRNA    I have reviewed the pertinent notes and labs in the chart from the past 30 days and (re)examined the patient.  Any updates or changes from those notes are reflected in this note.                           # Asthma: noted on problem list

## 2024-10-31 ENCOUNTER — OFFICE VISIT (OUTPATIENT)
Dept: FAMILY MEDICINE | Facility: OTHER | Age: 69
End: 2024-10-31
Attending: FAMILY MEDICINE
Payer: COMMERCIAL

## 2024-10-31 VITALS
DIASTOLIC BLOOD PRESSURE: 62 MMHG | SYSTOLIC BLOOD PRESSURE: 110 MMHG | HEART RATE: 95 BPM | WEIGHT: 106 LBS | TEMPERATURE: 98 F | OXYGEN SATURATION: 95 % | BODY MASS INDEX: 20.36 KG/M2

## 2024-10-31 DIAGNOSIS — M25.511 CHRONIC RIGHT SHOULDER PAIN: ICD-10-CM

## 2024-10-31 DIAGNOSIS — G89.29 CHRONIC RIGHT SHOULDER PAIN: ICD-10-CM

## 2024-10-31 DIAGNOSIS — Z01.818 PREOPERATIVE EXAMINATION: Primary | ICD-10-CM

## 2024-10-31 DIAGNOSIS — Z87.891 PERSONAL HISTORY OF TOBACCO USE, PRESENTING HAZARDS TO HEALTH: ICD-10-CM

## 2024-10-31 LAB
ALBUMIN UR-MCNC: NEGATIVE MG/DL
ANION GAP SERPL CALCULATED.3IONS-SCNC: 17 MMOL/L (ref 7–15)
APPEARANCE UR: CLEAR
BASOPHILS # BLD AUTO: 0 10E3/UL (ref 0–0.2)
BASOPHILS NFR BLD AUTO: 1 %
BILIRUB UR QL STRIP: NEGATIVE
BUN SERPL-MCNC: 15.2 MG/DL (ref 8–23)
CALCIUM SERPL-MCNC: 9.5 MG/DL (ref 8.8–10.4)
CHLORIDE SERPL-SCNC: 102 MMOL/L (ref 98–107)
COLOR UR AUTO: YELLOW
CREAT SERPL-MCNC: 0.7 MG/DL (ref 0.51–0.95)
EGFRCR SERPLBLD CKD-EPI 2021: >90 ML/MIN/1.73M2
EOSINOPHIL # BLD AUTO: 0.2 10E3/UL (ref 0–0.7)
EOSINOPHIL NFR BLD AUTO: 2 %
ERYTHROCYTE [DISTWIDTH] IN BLOOD BY AUTOMATED COUNT: 13.6 % (ref 10–15)
GLUCOSE SERPL-MCNC: 107 MG/DL (ref 70–99)
GLUCOSE UR STRIP-MCNC: NEGATIVE MG/DL
HCO3 SERPL-SCNC: 22 MMOL/L (ref 22–29)
HCT VFR BLD AUTO: 43.3 % (ref 35–47)
HGB BLD-MCNC: 14.3 G/DL (ref 11.7–15.7)
HGB UR QL STRIP: NEGATIVE
IMM GRANULOCYTES # BLD: 0 10E3/UL
IMM GRANULOCYTES NFR BLD: 0 %
KETONES UR STRIP-MCNC: NEGATIVE MG/DL
LEUKOCYTE ESTERASE UR QL STRIP: NEGATIVE
LYMPHOCYTES # BLD AUTO: 1.7 10E3/UL (ref 0.8–5.3)
LYMPHOCYTES NFR BLD AUTO: 24 %
MCH RBC QN AUTO: 30.2 PG (ref 26.5–33)
MCHC RBC AUTO-ENTMCNC: 33 G/DL (ref 31.5–36.5)
MCV RBC AUTO: 92 FL (ref 78–100)
MONOCYTES # BLD AUTO: 0.6 10E3/UL (ref 0–1.3)
MONOCYTES NFR BLD AUTO: 9 %
NEUTROPHILS # BLD AUTO: 4.3 10E3/UL (ref 1.6–8.3)
NEUTROPHILS NFR BLD AUTO: 64 %
NITRATE UR QL: NEGATIVE
PH UR STRIP: 6 [PH] (ref 5–7)
PLATELET # BLD AUTO: 225 10E3/UL (ref 150–450)
POTASSIUM SERPL-SCNC: 3.8 MMOL/L (ref 3.4–5.3)
RBC # BLD AUTO: 4.73 10E6/UL (ref 3.8–5.2)
SODIUM SERPL-SCNC: 141 MMOL/L (ref 135–145)
SP GR UR STRIP: 1.02 (ref 1–1.03)
UROBILINOGEN UR STRIP-ACNC: 0.2 E.U./DL
WBC # BLD AUTO: 6.8 10E3/UL (ref 4–11)

## 2024-10-31 PROCEDURE — 36415 COLL VENOUS BLD VENIPUNCTURE: CPT | Mod: ZL | Performed by: FAMILY MEDICINE

## 2024-10-31 PROCEDURE — G0463 HOSPITAL OUTPT CLINIC VISIT: HCPCS

## 2024-10-31 PROCEDURE — 93010 ELECTROCARDIOGRAM REPORT: CPT | Performed by: INTERNAL MEDICINE

## 2024-10-31 PROCEDURE — 93005 ELECTROCARDIOGRAM TRACING: CPT | Performed by: FAMILY MEDICINE

## 2024-10-31 PROCEDURE — 85004 AUTOMATED DIFF WBC COUNT: CPT | Mod: ZL | Performed by: FAMILY MEDICINE

## 2024-10-31 PROCEDURE — 99214 OFFICE O/P EST MOD 30 MIN: CPT | Performed by: FAMILY MEDICINE

## 2024-10-31 PROCEDURE — 81003 URINALYSIS AUTO W/O SCOPE: CPT | Mod: ZL | Performed by: FAMILY MEDICINE

## 2024-10-31 PROCEDURE — 82435 ASSAY OF BLOOD CHLORIDE: CPT | Mod: ZL | Performed by: FAMILY MEDICINE

## 2024-10-31 PROCEDURE — G2211 COMPLEX E/M VISIT ADD ON: HCPCS | Performed by: FAMILY MEDICINE

## 2024-10-31 PROCEDURE — 80048 BASIC METABOLIC PNL TOTAL CA: CPT | Mod: ZL | Performed by: FAMILY MEDICINE

## 2024-10-31 ASSESSMENT — PAIN SCALES - GENERAL: PAINLEVEL_OUTOF10: MILD PAIN (2)

## 2024-10-31 NOTE — PROGRESS NOTES
Preoperative Evaluation  [facility]  [address]  [facility] [place] [address]  Phone: [phone]  Primary Provider: Ellis Edward MD  Pre-op Performing Provider: Ellis Edward MD  Oct 31, 2024             10/31/2024   Surgical Information   What procedure is being done? reverse total shoulder replacement    Facility or Hospital where procedure/surgery [facility] be performed[facility]    Who is doing the procedure / surgery? [id number]    Date of surgery / procedure: [id number]    Time of surgery / procedure: morning    Where do you plan to recover after surgery? at home with family        Patient-reported     Fax number for surgical facility: Note does not need to be faxed, will be available electronically in Epic.    Assessment & Plan     The proposed surgical procedure is considered INTERMEDIATE risk.    Preoperative examination  Doing well at normal state of health.  Recent CT stable of lungs.  EKG, labs normal thus far.  At baseline function which is slightly limited due to msk issues and lung issues.    - Basic metabolic panel; Future  - CBC with Platelets & Differential; Future  - EKG 12-lead complete w/read - Clinics  - Basic metabolic panel  - CBC with Platelets & Differential  - UA Macroscopic with reflex to Microscopic and Culture; Future    Chronic right shoulder pain  Upcoming surgery.   - UA Macroscopic with reflex to Microscopic and Culture; Future    Personal history of tobacco use, presenting hazards to health  CT reviewed and stable.  Breathing stable since then.  I did not repeat xray.        Possible Sleep Apnea:            - No identified additional risk factors other than previously addressed         Recommendation  Approval given to proceed with proposed procedure, without further diagnostic evaluation.    Cristina Clemente is a 68 year old, presenting for the following:  Pre-Op Exam          10/31/2024     8:47 AM   Additional Questions   Roomed by cyndie Knuston  by self     HPI related to upcoming procedure:         10/31/2024   Pre-Op Questionnaire   Have you ever had a heart attack or stroke? No    Have you ever had surgery on your heart or blood vessels, such as a stent placement, a coronary artery bypass, or surgery on an artery in your head, neck, heart, or legs? No    Do you have chest pain with activity? No    Do you have a history of heart failure? No    Do you currently have a cold, bronchitis or symptoms of other infection? No    Do you have a cough, shortness of breath, or wheezing? (!) YES SOB, wheezing    Do you or anyone in your family have previous history of blood clots? (!) YES father    Do you or does anyone in your family have a serious bleeding problem such as prolonged bleeding following surgeries or cuts? (!) YES father    Have you ever had problems with anemia or been told to take iron pills? (!) YES taking iron    Have you had any abnormal blood loss such as black, tarry or bloody stools, or abnormal vaginal bleeding? No    Have you ever had a blood transfusion? (!) YES    Have you ever had a transfusion reaction? No    Are you willing to have a blood transfusion if it is medically needed before, during, or after your surgery? Yes    Have you or any of your relatives ever had problems with anesthesia? No    Do you have sleep apnea, excessive snoring or daytime drowsiness? (!) YES    Do you have a CPAP machine? (!) NO     Do you have any artifical heart valves or other implanted medical devices like a pacemaker, defibrillator, or continuous glucose monitor? No    Do you have artificial joints? (!) YES    Are you allergic to latex? No        Patient-reported     Health Care Directive  Patient has a Health Care Directive on file      Preoperative Review of         Status of Chronic Conditions:  See problem list for active medical problems.  Problems all longstanding and stable, except as noted/documented.  See ROS for pertinent symptoms related to  these conditions.    Patient Active Problem List    Diagnosis Date Noted    Chronic thoracic back pain, unspecified back pain laterality 10/13/2023     Priority: Medium    Trochanteric bursitis of right hip 05/15/2023     Priority: Medium    Diarrhea due to malabsorption 05/15/2023     Priority: Medium    Closed fracture of left hip (H) 02/19/2023     Priority: Medium    Closed fracture of left hip, initial encounter (H) 02/19/2023     Priority: Medium    Idiopathic gout of right hand, unspecified chronicity 09/09/2020     Priority: Medium    PMR (polymyalgia rheumatica) (H) 09/09/2020     Priority: Medium    Osteoporosis 09/03/2019     Priority: Medium    Hip fracture requiring operative repair (H) 02/25/2019     Priority: Medium    Closed fracture of neck of right femur (H) 02/23/2019     Priority: Medium    Functional diarrhea 10/26/2018     Priority: Medium    Iron deficiency 04/18/2018     Priority: Medium    Myalgia 05/30/2017     Priority: Medium    Abdominal muscle strain 05/05/2017     Priority: Medium    Strain of flexor muscle of hip, unspecified laterality, initial encounter 05/05/2017     Priority: Medium    Right shoulder pain 11/03/2016     Priority: Medium    ACP (advance care planning) 05/09/2016     Priority: Medium     Advance Care Planning 5/9/2016: ACP Review of Chart / Resources Provided:  Reviewed chart for advance care plan.  Chyna Delgado has been provided information and resources to begin or update their advance care plan.  Added by Lina PONCE Buus            Pain of toe of right foot 05/09/2016     Priority: Medium    Arthritis 02/12/2016     Priority: Medium    Graves disease 11/05/2015     Priority: Medium    Numbness and tingling in right hand 04/20/2015     Priority: Medium    Restless legs syndrome 04/16/2014     Priority: Medium    Somatic dysfunction of pelvic region 10/08/2013     Priority: Medium    Seborrheic keratosis 07/12/2013     Priority: Medium     Imo Update utility       Mild persistent asthma 06/28/2013     Priority: Medium    Sacroiliac pain 06/28/2013     Priority: Medium    Benign neoplasm of stomach 08/16/2012     Priority: Medium    Colon polyposis 08/16/2012     Priority: Medium    Family history of colonic polyps 08/16/2012     Priority: Medium    Family history of skin cancer 08/16/2012     Priority: Medium    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     Priority: Medium     Overview:   IMO Update 10/11      Seborrheic dermatitis, unspecified 10/12/2011     Priority: Medium     Overview:   IMO Update 10/11      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     Priority: Medium    Hypothyroidism 07/07/2011     Priority: Medium      Past Medical History:   Diagnosis Date    Abnormal mammogram, unspecified 01/08/2003    Normal pathology    Back Pain 02/10/2000    Sacroiliac pain    Benign neoplasm of colon 03/10/2000    Benign paroxysmal positional vertigo 06/07/2012    Depressive disorder, not elsewhere classified 02/10/2004    Diarrhea 12/15/2010    Secondary to colectomy for familial polyposis    Gastric polyp 12/11/2015, 12/18/2017    recurrent     History of normal mammogram 07/18/2014, 1/18/2019    Idiopathic osteoporosis 12/15/2010    Interstitial emphysema (H) 12/15/2010    menopausal or female climacteric states 08/31/1999    Mixed hyperlipidemia 12/15/2010    Other bursitis disorders 02/04/2011    Greater trochanteric    Other forms of retinal detachment(361.89) 12/15/2010    Other malaise and fatigue 01/10/2003    Personal history of tobacco use, presenting hazards to health 12/15/2010    Polymyalgia rheumatica (H)     Polyposis of colon     Restless legs syndrome (RLS) 12/15/2010    Rotator cuff tendonitis     Rotator cuff tendonitis     Sacroiliitis, not elsewhere classified (H) 12/15/2010    multiple sites    Tobacco use disorder 08/22/2012    Unspecified asthma(493.90) 12/15/2010     Past Surgical History:   Procedure Laterality Date    ARTHROPLASTY HIP ANTERIOR Right 05/15/2023     Procedure: Right Direct Anterior Total Hip Arthroplasty;  Surgeon: Kurt Jordan MD;  Location: [facility]    benign tumors removed from back      CATARACT EXTRACTION Right 06/21/2022    CATARACT EXTRACTION Left 07/12/2022    CLOSED REDUCTION, PERCUTANEOUS PINNING HIP Right 02/24/2019    Procedure: Percutaneous Screw Fixation of Right Hip Fracture;  Surgeon: Amrik Kolb DO;  Location: [facility]    COLECTOMY TOTAL      COLON SURGERY N/A     HX: Total colectomy with J-pouch reconstruction.     ENDOSCOPY UPPER, COLONOSCOPY, COMBINED N/A 09/05/2014    Procedure: COMBINED ENDOSCOPY UPPER, COLONOSCOPY;  Surgeon: Delbert Patel MD;  Location: [facility]    ENDOSCOPY UPPER, COLONOSCOPY, COMBINED N/A 05/09/2019    Procedure: UPPER ENDOSCOPY  WITH BIOPSIES AND  COLONOSCOPY WITH BIOPSIES;  Surgeon: Tai Diaz MD;  Location: [facility]    ENDOSCOPY UPPER, COLONOSCOPY, COMBINED N/A 12/8/2023    Procedure: Upper Endoscopy with biopsy and polypectomy  and Ileoscopy;  Surgeon: Al Howe MD;  Location: [facility]    ESOPHAGOSCOPY, GASTROSCOPY, DUODENOSCOPY (EGD), COMBINED N/A 12/11/2015    Procedure: COMBINED ESOPHAGOSCOPY, GASTROSCOPY, DUODENOSCOPY (EGD);  Surgeon: Delbert Patel MD;  Location: [facility]    ESOPHAGOSCOPY, GASTROSCOPY, DUODENOSCOPY (EGD), COMBINED N/A 12/18/2017    Procedure: COMBINED ESOPHAGOSCOPY, GASTROSCOPY, DUODENOSCOPY (EGD);  UPPER ENDOSCOPY WITH BIOPSY;  Surgeon: Delbert Patel MD;  Location: [facility]    excised-benign  2002    tongue lesion    HC REPAIR OF NASAL SEPTUM  2002    Deviated nasal septum    LAPAROSCOPIC CHOLECYSTECTOMY      lumpectomy Right breast      Breast Lump    MOUTH SURGERY      removal of spot from roof of mouth    pilonidal cyst surgery  1976    removal of colon and large intestine  2000    Familial polyposis    REMOVE HARDWARE ARTHROPLASTY HIP Right 07/20/2022    Procedure: Right Hip Hardware Removal (Cannulated Screws);  Surgeon: Luis Fernando Marcial MD;  Location: [facility]    Right etopic  pregnancy      Pregnancy    TONSILLECTOMY      tonsillitus    UPPER GI ENDOSCOPY  2009    Stomach polyps     Current Outpatient Medications   Medication Sig Dispense Refill    acetaminophen (TYLENOL) 325 MG tablet Take 2 tablets (650 mg) by mouth every 4 hours as needed for other (mild pain) 100 tablet 0    albuterol (PROAIR HFA/PROVENTIL HFA/VENTOLIN HFA) 108 (90 Base) MCG/ACT inhaler Inhale 2 puffs into the lungs every 4 hours as needed for shortness of breath 18 g 11    alendronate (FOSAMAX) 70 MG tablet Take 1 tablet (70 mg) by mouth every 7 days 12 tablet 3    allopurinol (ZYLOPRIM) 100 MG tablet Take 1 tablet (100 mg) by mouth 2 times daily 180 tablet 3    amoxicillin (AMOXIL) 500 MG capsule TAKE FOUR CAPSULES BY MOUTH ONE HOUR BEFORE dental appointment      aspirin 81 MG EC tablet Take 1 tablet (81 mg) by mouth 2 times daily (Patient taking differently: Take 81 mg by mouth daily.) 60 tablet 0    atorvastatin (LIPITOR) 20 MG tablet Take 1 tablet (20 mg) by mouth daily 90 tablet 3    calcitonin, salmon, (MIACALCIN) 200 UNIT/ACT nasal spray Use 1 spray intranasally into one nostril(alternate) daily      calcium carbonate 750 MG CHEW Take 1 tablet (750 mg) by mouth 2 times daily 180 tablet 3    ciprofloxacin (CETRAXAL) 0.2 % otic solution Place 0.25 mLs into the right ear 2 times daily (Patient taking differently: Place 0.25 mLs into the right ear 2 times daily as needed.) 14 each 0    dicyclomine (BENTYL) 10 MG capsule Take 2 capsules (20 mg) by mouth 3 times daily (before meals) 540 capsule 1    diphenoxylate-atropine (LOMOTIL) 2.5-0.025 MG tablet Take 1-2 tablets by mouth 4 times daily as needed for diarrhea 540 tablet 2    ferrous sulfate (IRON) 325 (65 FE) MG tablet Take 325 mg by mouth 2 times daily (with meals) 90 tablet 2    fluticasone-salmeterol (ADVAIR) 250-50 MCG/ACT inhaler Inhale 1 puff into the lungs 2 times daily 60 each 2    gabapentin (NEURONTIN) 400 MG capsule Take 3 capsules (1,200 mg) by  mouth 2 times daily 540 capsule 3    GNP VITAMIN D MAXIMUM STRENGTH 50 MCG (2000 UT) tablet TAKE TWO TABLETS BY MOUTH EVERY DAY (Patient taking differently: Take 1 tablet by mouth daily.) 100 tablet 4    ketotifen (ZADITOR/REFRESH ANTI-ITCH) 0.025 % SOLN ophthalmic solution PLACE 2 DROPS INTO BOTH EYES 2 TIMES DAILY 5 mL 2    loperamide (IMODIUM) 2 MG capsule Take 6 mg by mouth 2 times daily plus additional dosing as needed.      meloxicam (MOBIC) 15 MG tablet Take 1 tablet (15 mg) by mouth daily- Do not use while using nabumetone 30 tablet 2    montelukast (SINGULAIR) 10 MG tablet Take 1 tablet (10 mg) by mouth At Bedtime 90 tablet 3    Multiple Vitamin (MULTIVITAMIN) per tablet Take 1 tablet by mouth daily Every day with food      omeprazole (PRILOSEC) 40 MG DR capsule Take 1 capsule (40 mg) by mouth daily 90 capsule 3    potassium chloride mary ER (KLOR-CON M20) 20 MEQ CR tablet Take 1 tablet (20 mEq) by mouth 3 times daily 90 tablet 9    pramipexole (MIRAPEX) 0.125 MG tablet Take 1-2 tablets (0.125-0.25 mg) by mouth At Bedtime 180 tablet 1    predniSONE (DELTASONE) 1 MG tablet Take 2 tablets (2 mg) by mouth daily 180 tablet 0    PSYLLIUM PO Take 1 Tablespoonful by mouth 2 times daily       Simethicone 125 MG CAPS Take 500 mg by mouth 2 times daily Gas-X      spironolactone (ALDACTONE) 50 MG tablet Take 50 mg by mouth 2 times daily.      traMADol (ULTRAM) 50 MG tablet Take 1 tablet (50 mg) by mouth 3 times daily as needed for severe pain 30 tablet 0    TYMLOS 3120 MCG/1.56ML SOPN injection Inject 80 mcg subcutaneously daily.         Allergies   Allergen Reactions    Codeine Swelling     Throat swelling-Patient can tolerate Hydrocodone & morphine    Nortriptyline Other (See Comments)     Crying         Social History     Tobacco Use    Smoking status: Every Day     Current packs/day: 0.50     Average packs/day: 0.5 packs/day for 54.8 years (27.4 ttl pk-yrs)     Types: Cigarettes     Start date: 1/1/1970      Passive exposure: Current    Smokeless tobacco: Never   Substance Use Topics    Alcohol use: Not Currently     Comment: 1 a month     Family History   Problem Relation Age of Onset    Other - See Comments Father         Atrial Fibrillation    Cancer Father         bladder ca    Colon Polyps Father     Heart Disease Father         Pacemaker    Rheumatoid Arthritis Father     C.A.D. Father 75        CABG    Chronic Obstructive Pulmonary Disease Mother     Heart Disease Mother         Pacemaker    Other - See Comments Mother         dementia    C.A.D. Mother 70        CABG    C.A.D. Maternal Grandmother     Unknown/Adopted Maternal Grandfather     Unknown/Adopted Paternal Grandmother     Unknown/Adopted Paternal Grandfather     Asthma Sister     Cerebrovascular Disease Sister     Gastrointestinal Disease Sister         peptic ulcers    Hypertension Sister     Gastrointestinal Disease Sister         stomach tumors    Rheumatoid Arthritis Sister     Cancer Sister         facial cancer    Asthma Sister     Cancer Maternal Aunt         renal ca     History   Drug Use Unknown             Review of Systems  CONSTITUTIONAL: NEGATIVE for fever, chills, change in weight  INTEGUMENTARY/SKIN: NEGATIVE for worrisome rashes, moles or lesions  EYES: NEGATIVE for vision changes or irritation  ENT/MOUTH: NEGATIVE for ear, mouth and throat problems  RESP: NEGATIVE for significant cough or SOB  BREAST: NEGATIVE for masses, tenderness or discharge  CV: NEGATIVE for chest pain, palpitations or peripheral edema  GI: NEGATIVE for nausea, abdominal pain, heartburn, or change in bowel habits  : NEGATIVE for frequency, dysuria, or hematuria  MUSCULOSKELETAL: NEGATIVE for significant arthralgias or myalgia  NEURO: NEGATIVE for weakness, dizziness or paresthesias  ENDOCRINE: NEGATIVE for temperature intolerance, skin/hair changes  HEME: NEGATIVE for bleeding problems  PSYCHIATRIC: NEGATIVE for changes in mood or affect    Objective    BP  "110/62   Pulse 95   Temp 98  F (36.7  C)   Wt 48.1 kg (106 lb)   SpO2 95%   BMI 20.36 kg/m     Estimated body mass index is 20.36 kg/m  as calculated from the following:    Height as of 9/18/24: 1.537 m (5' 0.5\").    Weight as of this encounter: 48.1 kg (106 lb).  Physical Exam  GENERAL: alert and no distress  EYES: Eyes grossly normal to inspection, PERRL and conjunctivae and sclerae normal  HENT: ear canals and TM's normal, nose and mouth without ulcers or lesions  NECK: no adenopathy, no asymmetry, masses, or scars  RESP: lungs clear to auscultation - no rales, rhonchi or wheezes  CV: regular rate and rhythm, normal S1 S2, no S3 or S4, no murmur, click or rub, no peripheral edema  ABDOMEN: soft, nontender, no hepatosplenomegaly, no masses and bowel sounds normal  MS: no gross musculoskeletal defects noted, no edema  SKIN: no suspicious lesions or rashes  NEURO: Normal strength and tone, mentation intact and speech normal  PSYCH: mentation appears normal, affect normal/bright    Recent Labs   Lab Test 10/31/24  0846 09/18/24  1514 06/13/24  1030   HGB 14.3 12.7  --     205  --    NA  --  130* 142   POTASSIUM  --  5.1 4.4   CR  --  1.08* 0.75        Diagnostics  Ua normal.  Cbc stable.  Bmp pending. .     EKG: appears normal, NSR, normal axis, normal intervals, no acute ST/T changes c/w ischemia, no LVH by voltage criteria, unchanged from previous tracings    Revised Cardiac Risk Index (RCRI)  The patient has the following serious cardiovascular risks for perioperative complications:   - No serious cardiac risks = 0 points     RCRI Interpretation: 0 points: Class I (very low risk - 0.4% complication rate)    The longitudinal plan of care for the diagnosis(es)/condition(s) as documented were addressed during this visit. Due to the added complexity in care, I will continue to support Chyna in the subsequent management and with ongoing continuity of care.       Signed Electronically by: Ellis Edward, " MD  A copy of this evaluation report is provided to the requesting physician.

## 2024-10-31 NOTE — OR NURSING
Spoke with BANDAR Correia and BANDAR Jain patient ok to take simethicone and lomotil am of procedure.  Patient called and updated of such.

## 2024-10-31 NOTE — H&P (VIEW-ONLY)
Preoperative Evaluation  [facility]  [address]  [facility] [place] [address]  [place]: [phone]  Primary Provider: Ellis Edward MD  Pre-op Performing Provider: Ellis Edward MD  Oct 31, 2024             10/31/2024   Surgical Information   What procedure is being done? reverse total shoulder replacement    Facility or Hospital where procedure[facility]surgery [facility] be performed[facility]    Who is doing the procedure / surgery? [id number]    Date of surgery / procedure: [id number]    Time of surgery / procedure: morning    Where do you plan to recover after surgery? at home with family        Patient-reported     Fax number for surgical facility: Note does not need to be faxed, will be available electronically in Epic.    Assessment & Plan     The proposed surgical procedure is considered INTERMEDIATE risk.    Preoperative examination  Doing well at normal state of health.  Recent CT stable of lungs.  EKG, labs normal thus far.  At baseline function which is slightly limited due to msk issues and lung issues.    - Basic metabolic panel; Future  - CBC with Platelets & Differential; Future  - EKG 12-lead complete w/read - Clinics  - Basic metabolic panel  - CBC with Platelets & Differential  - UA Macroscopic with reflex to Microscopic and Culture; Future    Chronic right shoulder pain  Upcoming surgery.   - UA Macroscopic with reflex to Microscopic and Culture; Future    Personal history of tobacco use, presenting hazards to health  CT reviewed and stable.  Breathing stable since then.  I did not repeat xray.        Possible Sleep Apnea:            - No identified additional risk factors other than previously addressed         Recommendation  Approval given to proceed with proposed procedure, without further diagnostic evaluation.    Cristina Clemente is a 68 year old, presenting for the following:  Pre-Op Exam          10/31/2024     8:47 AM   Additional Questions   Roomed by cyndie Knutson  by self     HPI related to upcoming procedure:         10/31/2024   Pre-Op Questionnaire   Have you ever had a heart attack or stroke? No    Have you ever had surgery on your heart or blood vessels, such as a stent placement, a coronary artery bypass, or surgery on an artery in your head, neck, heart, or legs? No    Do you have chest pain with activity? No    Do you have a history of heart failure? No    Do you currently have a cold, bronchitis or symptoms of other infection? No    Do you have a cough, shortness of breath, or wheezing? (!) YES SOB, wheezing    Do you or anyone in your family have previous history of blood clots? (!) YES father    Do you or does anyone in your family have a serious bleeding problem such as prolonged bleeding following surgeries or cuts? (!) YES father    Have you ever had problems with anemia or been told to take iron pills? (!) YES taking iron    Have you had any abnormal blood loss such as black, tarry or bloody stools, or abnormal vaginal bleeding? No    Have you ever had a blood transfusion? (!) YES    Have you ever had a transfusion reaction? No    Are you willing to have a blood transfusion if it is medically needed before, during, or after your surgery? Yes    Have you or any of your relatives ever had problems with anesthesia? No    Do you have sleep apnea, excessive snoring or daytime drowsiness? (!) YES    Do you have a CPAP machine? (!) NO     Do you have any artifical heart valves or other implanted medical devices like a pacemaker, defibrillator, or continuous glucose monitor? No    Do you have artificial joints? (!) YES    Are you allergic to latex? No        Patient-reported     Health Care Directive  Patient has a Health Care Directive on file      Preoperative Review of         Status of Chronic Conditions:  See problem list for active medical problems.  Problems all longstanding and stable, except as noted/documented.  See ROS for pertinent symptoms related to  these conditions.    Patient Active Problem List    Diagnosis Date Noted    Chronic thoracic back pain, unspecified back pain laterality 10/13/2023     Priority: Medium    Trochanteric bursitis of right hip 05/15/2023     Priority: Medium    Diarrhea due to malabsorption 05/15/2023     Priority: Medium    Closed fracture of left hip (H) 02/19/2023     Priority: Medium    Closed fracture of left hip, initial encounter (H) 02/19/2023     Priority: Medium    Idiopathic gout of right hand, unspecified chronicity 09/09/2020     Priority: Medium    PMR (polymyalgia rheumatica) (H) 09/09/2020     Priority: Medium    Osteoporosis 09/03/2019     Priority: Medium    Hip fracture requiring operative repair (H) 02/25/2019     Priority: Medium    Closed fracture of neck of right femur (H) 02/23/2019     Priority: Medium    Functional diarrhea 10/26/2018     Priority: Medium    Iron deficiency 04/18/2018     Priority: Medium    Myalgia 05/30/2017     Priority: Medium    Abdominal muscle strain 05/05/2017     Priority: Medium    Strain of flexor muscle of hip, unspecified laterality, initial encounter 05/05/2017     Priority: Medium    Right shoulder pain 11/03/2016     Priority: Medium    ACP (advance care planning) 05/09/2016     Priority: Medium     Advance Care Planning 5/9/2016: ACP Review of Chart / Resources Provided:  Reviewed chart for advance care plan.  Chyna Delgado has been provided information and resources to begin or update their advance care plan.  Added by Lina PONCE Buus            Pain of toe of right foot 05/09/2016     Priority: Medium    Arthritis 02/12/2016     Priority: Medium    Graves disease 11/05/2015     Priority: Medium    Numbness and tingling in right hand 04/20/2015     Priority: Medium    Restless legs syndrome 04/16/2014     Priority: Medium    Somatic dysfunction of pelvic region 10/08/2013     Priority: Medium    Seborrheic keratosis 07/12/2013     Priority: Medium     Imo Update utility       Mild persistent asthma 06/28/2013     Priority: Medium    Sacroiliac pain 06/28/2013     Priority: Medium    Benign neoplasm of stomach 08/16/2012     Priority: Medium    Colon polyposis 08/16/2012     Priority: Medium    Family history of colonic polyps 08/16/2012     Priority: Medium    Family history of skin cancer 08/16/2012     Priority: Medium    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     Priority: Medium     Overview:   IMO Update 10/11      Seborrheic dermatitis, unspecified 10/12/2011     Priority: Medium     Overview:   IMO Update 10/11      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     Priority: Medium    Hypothyroidism 07/07/2011     Priority: Medium      Past Medical History:   Diagnosis Date    Abnormal mammogram, unspecified 01/08/2003    Normal pathology    Back Pain 02/10/2000    Sacroiliac pain    Benign neoplasm of colon 03/10/2000    Benign paroxysmal positional vertigo 06/07/2012    Depressive disorder, not elsewhere classified 02/10/2004    Diarrhea 12/15/2010    Secondary to colectomy for familial polyposis    Gastric polyp 12/11/2015, 12/18/2017    recurrent     History of normal mammogram 07/18/2014, 1/18/2019    Idiopathic osteoporosis 12/15/2010    Interstitial emphysema (H) 12/15/2010    menopausal or female climacteric states 08/31/1999    Mixed hyperlipidemia 12/15/2010    Other bursitis disorders 02/04/2011    Greater trochanteric    Other forms of retinal detachment(361.89) 12/15/2010    Other malaise and fatigue 01/10/2003    Personal history of tobacco use, presenting hazards to health 12/15/2010    Polymyalgia rheumatica (H)     Polyposis of colon     Restless legs syndrome (RLS) 12/15/2010    Rotator cuff tendonitis     Rotator cuff tendonitis     Sacroiliitis, not elsewhere classified (H) 12/15/2010    multiple sites    Tobacco use disorder 08/22/2012    Unspecified asthma(493.90) 12/15/2010     Past Surgical History:   Procedure Laterality Date    ARTHROPLASTY HIP ANTERIOR Right 05/15/2023     Procedure: Right Direct Anterior Total Hip Arthroplasty;  Surgeon: Kurt Jordan MD;  Location: [facility]    benign tumors removed from back      CATARACT EXTRACTION Right 06/21/2022    CATARACT EXTRACTION Left 07/12/2022    CLOSED REDUCTION, PERCUTANEOUS PINNING HIP Right 02/24/2019    Procedure: Percutaneous Screw Fixation of Right Hip Fracture;  Surgeon: Amrik Kolb DO;  Location: [facility]    COLECTOMY TOTAL      COLON SURGERY N/A     HX: Total colectomy with J-pouch reconstruction.     ENDOSCOPY UPPER, COLONOSCOPY, COMBINED N/A 09/05/2014    Procedure: COMBINED ENDOSCOPY UPPER, COLONOSCOPY;  Surgeon: Delbert Patel MD;  Location: [facility]    ENDOSCOPY UPPER, COLONOSCOPY, COMBINED N/A 05/09/2019    Procedure: UPPER ENDOSCOPY  WITH BIOPSIES AND  COLONOSCOPY WITH BIOPSIES;  Surgeon: Tai Diaz MD;  Location: [facility]    ENDOSCOPY UPPER, COLONOSCOPY, COMBINED N/A 12/8/2023    Procedure: Upper Endoscopy with biopsy and polypectomy  and Ileoscopy;  Surgeon: Al Howe MD;  Location[facility]    ESOPHAGOSCOPY, GASTROSCOPY, DUODENOSCOPY (EGD), COMBINED N/A 12/11/2015    Procedure: COMBINED ESOPHAGOSCOPY, GASTROSCOPY, DUODENOSCOPY (EGD);  Surgeon: Delbert Patel MD;  [facility]    ESOPHAGOSCOPY, GASTROSCOPY, DUODENOSCOPY (EGD), COMBINED N/A 12/18/2017    Procedure: COMBINED ESOPHAGOSCOPY, GASTROSCOPY, DUODENOSCOPY (EGD);  UPPER ENDOSCOPY WITH BIOPSY;  Surgeon: Delbert Patel MD;  Location: [facility]    excised-benign  2002    tongue lesion    HC REPAIR OF NASAL SEPTUM  2002    Deviated nasal septum    LAPAROSCOPIC CHOLECYSTECTOMY      lumpectomy Right breast      Breast Lump    MOUTH SURGERY      removal of spot from roof of mouth    pilonidal cyst surgery  1976    removal of colon and large intestine  2000    Familial polyposis    REMOVE HARDWARE ARTHROPLASTY HIP Right 07/20/2022    Procedure: Right Hip Hardware Removal (Cannulated Screws);  Surgeon: Luis Fernando Marcial MD;  Location: [facility]    Right etopic  pregnancy      Pregnancy    TONSILLECTOMY      tonsillitus    UPPER GI ENDOSCOPY  2009    Stomach polyps     Current Outpatient Medications   Medication Sig Dispense Refill    acetaminophen (TYLENOL) 325 MG tablet Take 2 tablets (650 mg) by mouth every 4 hours as needed for other (mild pain) 100 tablet 0    albuterol (PROAIR HFA/PROVENTIL HFA/VENTOLIN HFA) 108 (90 Base) MCG/ACT inhaler Inhale 2 puffs into the lungs every 4 hours as needed for shortness of breath 18 g 11    alendronate (FOSAMAX) 70 MG tablet Take 1 tablet (70 mg) by mouth every 7 days 12 tablet 3    allopurinol (ZYLOPRIM) 100 MG tablet Take 1 tablet (100 mg) by mouth 2 times daily 180 tablet 3    amoxicillin (AMOXIL) 500 MG capsule TAKE FOUR CAPSULES BY MOUTH ONE HOUR BEFORE dental appointment      aspirin 81 MG EC tablet Take 1 tablet (81 mg) by mouth 2 times daily (Patient taking differently: Take 81 mg by mouth daily.) 60 tablet 0    atorvastatin (LIPITOR) 20 MG tablet Take 1 tablet (20 mg) by mouth daily 90 tablet 3    calcitonin, salmon, (MIACALCIN) 200 UNIT/ACT nasal spray Use 1 spray intranasally into one nostril(alternate) daily      calcium carbonate 750 MG CHEW Take 1 tablet (750 mg) by mouth 2 times daily 180 tablet 3    ciprofloxacin (CETRAXAL) 0.2 % otic solution Place 0.25 mLs into the right ear 2 times daily (Patient taking differently: Place 0.25 mLs into the right ear 2 times daily as needed.) 14 each 0    dicyclomine (BENTYL) 10 MG capsule Take 2 capsules (20 mg) by mouth 3 times daily (before meals) 540 capsule 1    diphenoxylate-atropine (LOMOTIL) 2.5-0.025 MG tablet Take 1-2 tablets by mouth 4 times daily as needed for diarrhea 540 tablet 2    ferrous sulfate (IRON) 325 (65 FE) MG tablet Take 325 mg by mouth 2 times daily (with meals) 90 tablet 2    fluticasone-salmeterol (ADVAIR) 250-50 MCG/ACT inhaler Inhale 1 puff into the lungs 2 times daily 60 each 2    gabapentin (NEURONTIN) 400 MG capsule Take 3 capsules (1,200 mg) by  mouth 2 times daily 540 capsule 3    GNP VITAMIN D MAXIMUM STRENGTH 50 MCG (2000 UT) tablet TAKE TWO TABLETS BY MOUTH EVERY DAY (Patient taking differently: Take 1 tablet by mouth daily.) 100 tablet 4    ketotifen (ZADITOR/REFRESH ANTI-ITCH) 0.025 % SOLN ophthalmic solution PLACE 2 DROPS INTO BOTH EYES 2 TIMES DAILY 5 mL 2    loperamide (IMODIUM) 2 MG capsule Take 6 mg by mouth 2 times daily plus additional dosing as needed.      meloxicam (MOBIC) 15 MG tablet Take 1 tablet (15 mg) by mouth daily- Do not use while using nabumetone 30 tablet 2    montelukast (SINGULAIR) 10 MG tablet Take 1 tablet (10 mg) by mouth At Bedtime 90 tablet 3    Multiple Vitamin (MULTIVITAMIN) per tablet Take 1 tablet by mouth daily Every day with food      omeprazole (PRILOSEC) 40 MG DR capsule Take 1 capsule (40 mg) by mouth daily 90 capsule 3    potassium chloride mary ER (KLOR-CON M20) 20 MEQ CR tablet Take 1 tablet (20 mEq) by mouth 3 times daily 90 tablet 9    pramipexole (MIRAPEX) 0.125 MG tablet Take 1-2 tablets (0.125-0.25 mg) by mouth At Bedtime 180 tablet 1    predniSONE (DELTASONE) 1 MG tablet Take 2 tablets (2 mg) by mouth daily 180 tablet 0    PSYLLIUM PO Take 1 Tablespoonful by mouth 2 times daily       Simethicone 125 MG CAPS Take 500 mg by mouth 2 times daily Gas-X      spironolactone (ALDACTONE) 50 MG tablet Take 50 mg by mouth 2 times daily.      traMADol (ULTRAM) 50 MG tablet Take 1 tablet (50 mg) by mouth 3 times daily as needed for severe pain 30 tablet 0    TYMLOS 3120 MCG/1.56ML SOPN injection Inject 80 mcg subcutaneously daily.         Allergies   Allergen Reactions    Codeine Swelling     Throat swelling-Patient can tolerate Hydrocodone & morphine    Nortriptyline Other (See Comments)     Crying         Social History     Tobacco Use    Smoking status: Every Day     Current packs/day: 0.50     Average packs/day: 0.5 packs/day for 54.8 years (27.4 ttl pk-yrs)     Types: Cigarettes     Start date: 1/1/1970      Passive exposure: Current    Smokeless tobacco: Never   Substance Use Topics    Alcohol use: Not Currently     Comment: 1 a month     Family History   Problem Relation Age of Onset    Other - See Comments Father         Atrial Fibrillation    Cancer Father         bladder ca    Colon Polyps Father     Heart Disease Father         Pacemaker    Rheumatoid Arthritis Father     C.A.D. Father 75        CABG    Chronic Obstructive Pulmonary Disease Mother     Heart Disease Mother         Pacemaker    Other - See Comments Mother         dementia    C.A.D. Mother 70        CABG    C.A.D. Maternal Grandmother     Unknown/Adopted Maternal Grandfather     Unknown/Adopted Paternal Grandmother     Unknown/Adopted Paternal Grandfather     Asthma Sister     Cerebrovascular Disease Sister     Gastrointestinal Disease Sister         peptic ulcers    Hypertension Sister     Gastrointestinal Disease Sister         stomach tumors    Rheumatoid Arthritis Sister     Cancer Sister         facial cancer    Asthma Sister     Cancer Maternal Aunt         renal ca     History   Drug Use Unknown             Review of Systems  CONSTITUTIONAL: NEGATIVE for fever, chills, change in weight  INTEGUMENTARY/SKIN: NEGATIVE for worrisome rashes, moles or lesions  EYES: NEGATIVE for vision changes or irritation  ENT/MOUTH: NEGATIVE for ear, mouth and throat problems  RESP: NEGATIVE for significant cough or SOB  BREAST: NEGATIVE for masses, tenderness or discharge  CV: NEGATIVE for chest pain, palpitations or peripheral edema  GI: NEGATIVE for nausea, abdominal pain, heartburn, or change in bowel habits  : NEGATIVE for frequency, dysuria, or hematuria  MUSCULOSKELETAL: NEGATIVE for significant arthralgias or myalgia  NEURO: NEGATIVE for weakness, dizziness or paresthesias  ENDOCRINE: NEGATIVE for temperature intolerance, skin/hair changes  HEME: NEGATIVE for bleeding problems  PSYCHIATRIC: NEGATIVE for changes in mood or affect    Objective    BP  "110/62   Pulse 95   Temp 98  F (36.7  C)   Wt 48.1 kg (106 lb)   SpO2 95%   BMI 20.36 kg/m     Estimated body mass index is 20.36 kg/m  as calculated from the following:    Height as of 9/18/24: 1.537 m (5' 0.5\").    Weight as of this encounter: 48.1 kg (106 lb).  Physical Exam  GENERAL: alert and no distress  EYES: Eyes grossly normal to inspection, PERRL and conjunctivae and sclerae normal  HENT: ear canals and TM's normal, nose and mouth without ulcers or lesions  NECK: no adenopathy, no asymmetry, masses, or scars  RESP: lungs clear to auscultation - no rales, rhonchi or wheezes  CV: regular rate and rhythm, normal S1 S2, no S3 or S4, no murmur, click or rub, no peripheral edema  ABDOMEN: soft, nontender, no hepatosplenomegaly, no masses and bowel sounds normal  MS: no gross musculoskeletal defects noted, no edema  SKIN: no suspicious lesions or rashes  NEURO: Normal strength and tone, mentation intact and speech normal  PSYCH: mentation appears normal, affect normal/bright    Recent Labs   Lab Test 10/31/24  0846 09/18/24  1514 06/13/24  1030   HGB 14.3 12.7  --     205  --    NA  --  130* 142   POTASSIUM  --  5.1 4.4   CR  --  1.08* 0.75        Diagnostics  Ua normal.  Cbc stable.  Bmp pending. .     EKG: appears normal, NSR, normal axis, normal intervals, no acute ST/T changes c/w ischemia, no LVH by voltage criteria, unchanged from previous tracings    Revised Cardiac Risk Index (RCRI)  The patient has the following serious cardiovascular risks for perioperative complications:   - No serious cardiac risks = 0 points     RCRI Interpretation: 0 points: Class I (very low risk - 0.4% complication rate)    The longitudinal plan of care for the diagnosis(es)/condition(s) as documented were addressed during this visit. Due to the added complexity in care, I will continue to support Chyna in the subsequent management and with ongoing continuity of care.       Signed Electronically by: Ellis Edward, " MD  A copy of this evaluation report is provided to the requesting physician.

## 2024-11-01 LAB
ATRIAL RATE - MUSE: 90 BPM
DIASTOLIC BLOOD PRESSURE - MUSE: NORMAL MMHG
INTERPRETATION ECG - MUSE: NORMAL
P AXIS - MUSE: 80 DEGREES
PR INTERVAL - MUSE: 150 MS
QRS DURATION - MUSE: 74 MS
QT - MUSE: 372 MS
QTC - MUSE: 455 MS
R AXIS - MUSE: 70 DEGREES
SYSTOLIC BLOOD PRESSURE - MUSE: NORMAL MMHG
T AXIS - MUSE: 71 DEGREES
VENTRICULAR RATE- MUSE: 90 BPM

## 2024-11-04 ENCOUNTER — HOSPITAL ENCOUNTER (OUTPATIENT)
Facility: HOSPITAL | Age: 69
Discharge: HOME | End: 2024-11-05
Attending: ORTHOPAEDIC SURGERY | Admitting: ORTHOPAEDIC SURGERY
Payer: COMMERCIAL

## 2024-11-04 ENCOUNTER — APPOINTMENT (OUTPATIENT)
Dept: GENERAL RADIOLOGY | Facility: HOSPITAL | Age: 69
End: 2024-11-04
Payer: COMMERCIAL

## 2024-11-04 ENCOUNTER — APPOINTMENT (OUTPATIENT)
Dept: ULTRASOUND IMAGING | Facility: HOSPITAL | Age: 69
End: 2024-11-04
Attending: NURSE ANESTHETIST, CERTIFIED REGISTERED
Payer: COMMERCIAL

## 2024-11-04 ENCOUNTER — ANESTHESIA (OUTPATIENT)
Dept: SURGERY | Facility: HOSPITAL | Age: 69
End: 2024-11-04
Payer: COMMERCIAL

## 2024-11-04 ENCOUNTER — APPOINTMENT (OUTPATIENT)
Dept: OCCUPATIONAL THERAPY | Facility: HOSPITAL | Age: 69
End: 2024-11-04
Attending: ORTHOPAEDIC SURGERY
Payer: COMMERCIAL

## 2024-11-04 DIAGNOSIS — Z96.611 STATUS POST REVERSE TOTAL REPLACEMENT OF RIGHT SHOULDER: Primary | ICD-10-CM

## 2024-11-04 PROBLEM — Z96.619 S/P REVERSE TOTAL SHOULDER ARTHROPLASTY: Status: ACTIVE | Noted: 2024-11-04

## 2024-11-04 PROCEDURE — 250N000011 HC RX IP 250 OP 636: Performed by: NURSE ANESTHETIST, CERTIFIED REGISTERED

## 2024-11-04 PROCEDURE — 999N000065 XR SHOULDER RIGHT PORT G/E 2 VIEWS: Mod: RT

## 2024-11-04 PROCEDURE — 99221 1ST HOSP IP/OBS SF/LOW 40: CPT | Mod: 24 | Performed by: INTERNAL MEDICINE

## 2024-11-04 PROCEDURE — 258N000003 HC RX IP 258 OP 636: Performed by: NURSE ANESTHETIST, CERTIFIED REGISTERED

## 2024-11-04 PROCEDURE — 370N000017 HC ANESTHESIA TECHNICAL FEE, PER MIN: Performed by: ORTHOPAEDIC SURGERY

## 2024-11-04 PROCEDURE — 23472 RECONSTRUCT SHOULDER JOINT: CPT | Performed by: ORTHOPAEDIC SURGERY

## 2024-11-04 PROCEDURE — 23472 RECONSTRUCT SHOULDER JOINT: CPT | Performed by: NURSE ANESTHETIST, CERTIFIED REGISTERED

## 2024-11-04 PROCEDURE — 250N000013 HC RX MED GY IP 250 OP 250 PS 637

## 2024-11-04 PROCEDURE — C1776 JOINT DEVICE (IMPLANTABLE): HCPCS | Performed by: ORTHOPAEDIC SURGERY

## 2024-11-04 PROCEDURE — 250N000011 HC RX IP 250 OP 636

## 2024-11-04 PROCEDURE — C1769 GUIDE WIRE: HCPCS | Performed by: ORTHOPAEDIC SURGERY

## 2024-11-04 PROCEDURE — 710N000010 HC RECOVERY PHASE 1, LEVEL 2, PER MIN: Performed by: ORTHOPAEDIC SURGERY

## 2024-11-04 PROCEDURE — 271N000001 HC OR GENERAL SUPPLY NON-STERILE: Performed by: ORTHOPAEDIC SURGERY

## 2024-11-04 PROCEDURE — 360N000077 HC SURGERY LEVEL 4, PER MIN: Performed by: ORTHOPAEDIC SURGERY

## 2024-11-04 PROCEDURE — 64415 NJX AA&/STRD BRCH PLXS IMG: CPT | Mod: XU | Performed by: NURSE ANESTHETIST, CERTIFIED REGISTERED

## 2024-11-04 PROCEDURE — 250N000025 HC SEVOFLURANE, PER MIN: Performed by: ORTHOPAEDIC SURGERY

## 2024-11-04 PROCEDURE — 999N000157 HC STATISTIC RCP TIME EA 10 MIN

## 2024-11-04 PROCEDURE — C1713 ANCHOR/SCREW BN/BN,TIS/BN: HCPCS | Performed by: ORTHOPAEDIC SURGERY

## 2024-11-04 PROCEDURE — 250N000009 HC RX 250: Performed by: NURSE ANESTHETIST, CERTIFIED REGISTERED

## 2024-11-04 PROCEDURE — 250N000013 HC RX MED GY IP 250 OP 250 PS 637: Performed by: NURSE ANESTHETIST, CERTIFIED REGISTERED

## 2024-11-04 PROCEDURE — 999N000141 HC STATISTIC PRE-PROCEDURE NURSING ASSESSMENT: Performed by: ORTHOPAEDIC SURGERY

## 2024-11-04 PROCEDURE — 64450 NJX AA&/STRD OTHER PN/BRANCH: CPT | Mod: XU | Performed by: NURSE ANESTHETIST, CERTIFIED REGISTERED

## 2024-11-04 PROCEDURE — 258N000003 HC RX IP 258 OP 636

## 2024-11-04 PROCEDURE — 97165 OT EVAL LOW COMPLEX 30 MIN: CPT | Mod: GO

## 2024-11-04 PROCEDURE — 272N000001 HC OR GENERAL SUPPLY STERILE: Performed by: ORTHOPAEDIC SURGERY

## 2024-11-04 PROCEDURE — 250N000009 HC RX 250

## 2024-11-04 DEVICE — IMPLANTABLE DEVICE: Type: IMPLANTABLE DEVICE | Site: SHOULDER | Status: FUNCTIONAL

## 2024-11-04 RX ORDER — ALLOPURINOL 100 MG/1
100 TABLET ORAL 2 TIMES DAILY
Status: DISCONTINUED | OUTPATIENT
Start: 2024-11-04 | End: 2024-11-05 | Stop reason: HOSPADM

## 2024-11-04 RX ORDER — OXYCODONE HYDROCHLORIDE 5 MG/1
10 TABLET ORAL
Status: DISCONTINUED | OUTPATIENT
Start: 2024-11-04 | End: 2024-11-04

## 2024-11-04 RX ORDER — HYDROMORPHONE HCL IN WATER/PF 6 MG/30 ML
0.4 PATIENT CONTROLLED ANALGESIA SYRINGE INTRAVENOUS
Status: DISCONTINUED | OUTPATIENT
Start: 2024-11-04 | End: 2024-11-05 | Stop reason: HOSPADM

## 2024-11-04 RX ORDER — BUPIVACAINE HYDROCHLORIDE 2.5 MG/ML
INJECTION, SOLUTION EPIDURAL; INFILTRATION; INTRACAUDAL
Status: COMPLETED | OUTPATIENT
Start: 2024-11-04 | End: 2024-11-04

## 2024-11-04 RX ORDER — SODIUM CHLORIDE, SODIUM LACTATE, POTASSIUM CHLORIDE, CALCIUM CHLORIDE 600; 310; 30; 20 MG/100ML; MG/100ML; MG/100ML; MG/100ML
INJECTION, SOLUTION INTRAVENOUS CONTINUOUS
Status: DISCONTINUED | OUTPATIENT
Start: 2024-11-04 | End: 2024-11-04 | Stop reason: HOSPADM

## 2024-11-04 RX ORDER — BUPIVACAINE HYDROCHLORIDE AND EPINEPHRINE 2.5; 5 MG/ML; UG/ML
INJECTION, SOLUTION EPIDURAL; INFILTRATION; INTRACAUDAL; PERINEURAL
Status: DISCONTINUED
Start: 2024-11-04 | End: 2024-11-04 | Stop reason: HOSPADM

## 2024-11-04 RX ORDER — DEXAMETHASONE SODIUM PHOSPHATE 4 MG/ML
INJECTION, SOLUTION INTRA-ARTICULAR; INTRALESIONAL; INTRAMUSCULAR; INTRAVENOUS; SOFT TISSUE PRN
Status: DISCONTINUED | OUTPATIENT
Start: 2024-11-04 | End: 2024-11-04

## 2024-11-04 RX ORDER — ALBUTEROL SULFATE 90 UG/1
2 INHALANT RESPIRATORY (INHALATION) EVERY 4 HOURS PRN
Status: DISCONTINUED | OUTPATIENT
Start: 2024-11-04 | End: 2024-11-05 | Stop reason: HOSPADM

## 2024-11-04 RX ORDER — CEFAZOLIN SODIUM/WATER 2 G/20 ML
2 SYRINGE (ML) INTRAVENOUS SEE ADMIN INSTRUCTIONS
Status: DISCONTINUED | OUTPATIENT
Start: 2024-11-04 | End: 2024-11-04 | Stop reason: HOSPADM

## 2024-11-04 RX ORDER — POTASSIUM CHLORIDE 1500 MG/1
20 TABLET, EXTENDED RELEASE ORAL 3 TIMES DAILY
Status: DISCONTINUED | OUTPATIENT
Start: 2024-11-04 | End: 2024-11-05 | Stop reason: HOSPADM

## 2024-11-04 RX ORDER — FENTANYL CITRATE 50 UG/ML
INJECTION, SOLUTION INTRAMUSCULAR; INTRAVENOUS PRN
Status: DISCONTINUED | OUTPATIENT
Start: 2024-11-04 | End: 2024-11-04

## 2024-11-04 RX ORDER — SPIRONOLACTONE 25 MG/1
50 TABLET ORAL 2 TIMES DAILY
Status: DISCONTINUED | OUTPATIENT
Start: 2024-11-04 | End: 2024-11-05 | Stop reason: HOSPADM

## 2024-11-04 RX ORDER — OXYCODONE HYDROCHLORIDE 5 MG/1
5-10 TABLET ORAL EVERY 4 HOURS PRN
Qty: 30 TABLET | Refills: 0 | Status: SHIPPED | OUTPATIENT
Start: 2024-11-04

## 2024-11-04 RX ORDER — LABETALOL HYDROCHLORIDE 5 MG/ML
10 INJECTION, SOLUTION INTRAVENOUS
Status: DISCONTINUED | OUTPATIENT
Start: 2024-11-04 | End: 2024-11-04 | Stop reason: HOSPADM

## 2024-11-04 RX ORDER — DEXAMETHASONE SODIUM PHOSPHATE 4 MG/ML
4 INJECTION, SOLUTION INTRA-ARTICULAR; INTRALESIONAL; INTRAMUSCULAR; INTRAVENOUS; SOFT TISSUE
Status: DISCONTINUED | OUTPATIENT
Start: 2024-11-04 | End: 2024-11-04 | Stop reason: HOSPADM

## 2024-11-04 RX ORDER — DEXAMETHASONE SODIUM PHOSPHATE 4 MG/ML
4 INJECTION, SOLUTION INTRA-ARTICULAR; INTRALESIONAL; INTRAMUSCULAR; INTRAVENOUS; SOFT TISSUE
Status: DISCONTINUED | OUTPATIENT
Start: 2024-11-04 | End: 2024-11-04

## 2024-11-04 RX ORDER — FENTANYL CITRATE 50 UG/ML
50 INJECTION, SOLUTION INTRAMUSCULAR; INTRAVENOUS EVERY 5 MIN PRN
Status: DISCONTINUED | OUTPATIENT
Start: 2024-11-04 | End: 2024-11-04 | Stop reason: HOSPADM

## 2024-11-04 RX ORDER — OXYCODONE HYDROCHLORIDE 5 MG/1
10 TABLET ORAL EVERY 4 HOURS PRN
Status: DISCONTINUED | OUTPATIENT
Start: 2024-11-04 | End: 2024-11-05 | Stop reason: HOSPADM

## 2024-11-04 RX ORDER — TRANEXAMIC ACID 10 MG/ML
1 INJECTION, SOLUTION INTRAVENOUS ONCE
Status: COMPLETED | OUTPATIENT
Start: 2024-11-04 | End: 2024-11-04

## 2024-11-04 RX ORDER — MELOXICAM 15 MG/1
15 TABLET ORAL DAILY
COMMUNITY

## 2024-11-04 RX ORDER — OXYCODONE HYDROCHLORIDE 5 MG/1
5 TABLET ORAL EVERY 4 HOURS PRN
Status: DISCONTINUED | OUTPATIENT
Start: 2024-11-04 | End: 2024-11-05 | Stop reason: HOSPADM

## 2024-11-04 RX ORDER — HYDROMORPHONE HYDROCHLORIDE 1 MG/ML
0.2 INJECTION, SOLUTION INTRAMUSCULAR; INTRAVENOUS; SUBCUTANEOUS EVERY 5 MIN PRN
Status: DISCONTINUED | OUTPATIENT
Start: 2024-11-04 | End: 2024-11-04 | Stop reason: HOSPADM

## 2024-11-04 RX ORDER — CIPROFLOXACIN 0.5 MG/.25ML
0.25 SOLUTION/ DROPS AURICULAR (OTIC) 2 TIMES DAILY PRN
COMMUNITY

## 2024-11-04 RX ORDER — DICYCLOMINE HYDROCHLORIDE 10 MG/1
20 CAPSULE ORAL
Status: DISCONTINUED | OUTPATIENT
Start: 2024-11-04 | End: 2024-11-05 | Stop reason: HOSPADM

## 2024-11-04 RX ORDER — PANTOPRAZOLE SODIUM 40 MG/1
40 TABLET, DELAYED RELEASE ORAL 2 TIMES DAILY
Status: DISCONTINUED | OUTPATIENT
Start: 2024-11-04 | End: 2024-11-05 | Stop reason: HOSPADM

## 2024-11-04 RX ORDER — LIDOCAINE 40 MG/G
CREAM TOPICAL
Status: DISCONTINUED | OUTPATIENT
Start: 2024-11-04 | End: 2024-11-04 | Stop reason: HOSPADM

## 2024-11-04 RX ORDER — ONDANSETRON 4 MG/1
4 TABLET, ORALLY DISINTEGRATING ORAL EVERY 30 MIN PRN
Status: DISCONTINUED | OUTPATIENT
Start: 2024-11-04 | End: 2024-11-04

## 2024-11-04 RX ORDER — NALOXONE HYDROCHLORIDE 0.4 MG/ML
0.2 INJECTION, SOLUTION INTRAMUSCULAR; INTRAVENOUS; SUBCUTANEOUS
Status: DISCONTINUED | OUTPATIENT
Start: 2024-11-04 | End: 2024-11-05 | Stop reason: HOSPADM

## 2024-11-04 RX ORDER — CIPROFLOXACIN 0.5 MG/.25ML
0.25 SOLUTION/ DROPS AURICULAR (OTIC) 2 TIMES DAILY
Status: DISCONTINUED | OUTPATIENT
Start: 2024-11-04 | End: 2024-11-05 | Stop reason: HOSPADM

## 2024-11-04 RX ORDER — POLYETHYLENE GLYCOL 3350 17 G/17G
17 POWDER, FOR SOLUTION ORAL DAILY
Status: DISCONTINUED | OUTPATIENT
Start: 2024-11-05 | End: 2024-11-05 | Stop reason: HOSPADM

## 2024-11-04 RX ORDER — NALOXONE HYDROCHLORIDE 0.4 MG/ML
0.4 INJECTION, SOLUTION INTRAMUSCULAR; INTRAVENOUS; SUBCUTANEOUS
Status: DISCONTINUED | OUTPATIENT
Start: 2024-11-04 | End: 2024-11-05 | Stop reason: HOSPADM

## 2024-11-04 RX ORDER — GABAPENTIN 400 MG/1
1200 CAPSULE ORAL 2 TIMES DAILY
Status: DISCONTINUED | OUTPATIENT
Start: 2024-11-04 | End: 2024-11-05 | Stop reason: HOSPADM

## 2024-11-04 RX ORDER — AMOXICILLIN 250 MG
1 CAPSULE ORAL 2 TIMES DAILY
Status: DISCONTINUED | OUTPATIENT
Start: 2024-11-04 | End: 2024-11-05 | Stop reason: HOSPADM

## 2024-11-04 RX ORDER — NALOXONE HYDROCHLORIDE 0.4 MG/ML
0.1 INJECTION, SOLUTION INTRAMUSCULAR; INTRAVENOUS; SUBCUTANEOUS
Status: DISCONTINUED | OUTPATIENT
Start: 2024-11-04 | End: 2024-11-04

## 2024-11-04 RX ORDER — ACETAMINOPHEN 10 MG/ML
1000 INJECTION, SOLUTION INTRAVENOUS ONCE
Status: COMPLETED | OUTPATIENT
Start: 2024-11-04 | End: 2024-11-04

## 2024-11-04 RX ORDER — ONDANSETRON 4 MG/1
4 TABLET, ORALLY DISINTEGRATING ORAL EVERY 6 HOURS PRN
Status: DISCONTINUED | OUTPATIENT
Start: 2024-11-04 | End: 2024-11-05 | Stop reason: HOSPADM

## 2024-11-04 RX ORDER — BISACODYL 10 MG
10 SUPPOSITORY, RECTAL RECTAL DAILY PRN
Status: DISCONTINUED | OUTPATIENT
Start: 2024-11-04 | End: 2024-11-05 | Stop reason: HOSPADM

## 2024-11-04 RX ORDER — HYDROMORPHONE HYDROCHLORIDE 1 MG/ML
0.4 INJECTION, SOLUTION INTRAMUSCULAR; INTRAVENOUS; SUBCUTANEOUS EVERY 5 MIN PRN
Status: DISCONTINUED | OUTPATIENT
Start: 2024-11-04 | End: 2024-11-04 | Stop reason: HOSPADM

## 2024-11-04 RX ORDER — ONDANSETRON 4 MG/1
4 TABLET, ORALLY DISINTEGRATING ORAL EVERY 30 MIN PRN
Status: DISCONTINUED | OUTPATIENT
Start: 2024-11-04 | End: 2024-11-04 | Stop reason: HOSPADM

## 2024-11-04 RX ORDER — CALCIUM CARBONATE 500 MG/1
500 TABLET, CHEWABLE ORAL
Status: DISCONTINUED | OUTPATIENT
Start: 2024-11-04 | End: 2024-11-05 | Stop reason: HOSPADM

## 2024-11-04 RX ORDER — HYDROXYZINE HYDROCHLORIDE 50 MG/ML
50 INJECTION, SOLUTION INTRAMUSCULAR ONCE
Status: COMPLETED | OUTPATIENT
Start: 2024-11-04 | End: 2024-11-04

## 2024-11-04 RX ORDER — ONDANSETRON 2 MG/ML
4 INJECTION INTRAMUSCULAR; INTRAVENOUS EVERY 30 MIN PRN
Status: DISCONTINUED | OUTPATIENT
Start: 2024-11-04 | End: 2024-11-04

## 2024-11-04 RX ORDER — CALCIUM CARBONATE 750 MG/1
750 TABLET, CHEWABLE ORAL 2 TIMES DAILY
COMMUNITY

## 2024-11-04 RX ORDER — PROPOFOL 10 MG/ML
INJECTION, EMULSION INTRAVENOUS PRN
Status: DISCONTINUED | OUTPATIENT
Start: 2024-11-04 | End: 2024-11-04

## 2024-11-04 RX ORDER — CALCITONIN SALMON 200 [IU]/.09ML
1 SPRAY, METERED NASAL DAILY
Status: DISCONTINUED | OUTPATIENT
Start: 2024-11-05 | End: 2024-11-05 | Stop reason: HOSPADM

## 2024-11-04 RX ORDER — CEFAZOLIN SODIUM 2 G/100ML
2 INJECTION, SOLUTION INTRAVENOUS EVERY 8 HOURS
Status: COMPLETED | OUTPATIENT
Start: 2024-11-04 | End: 2024-11-04

## 2024-11-04 RX ORDER — HYDROCORTISONE SODIUM SUCCINATE 100 MG/2ML
50 INJECTION INTRAMUSCULAR; INTRAVENOUS ONCE
Status: COMPLETED | OUTPATIENT
Start: 2024-11-04 | End: 2024-11-04

## 2024-11-04 RX ORDER — MONTELUKAST SODIUM 10 MG/1
10 TABLET ORAL AT BEDTIME
Status: DISCONTINUED | OUTPATIENT
Start: 2024-11-04 | End: 2024-11-05 | Stop reason: HOSPADM

## 2024-11-04 RX ORDER — ATORVASTATIN CALCIUM 20 MG/1
20 TABLET, FILM COATED ORAL DAILY
Status: DISCONTINUED | OUTPATIENT
Start: 2024-11-04 | End: 2024-11-05 | Stop reason: HOSPADM

## 2024-11-04 RX ORDER — PROCHLORPERAZINE MALEATE 5 MG/1
5 TABLET ORAL EVERY 6 HOURS PRN
Status: DISCONTINUED | OUTPATIENT
Start: 2024-11-04 | End: 2024-11-05 | Stop reason: HOSPADM

## 2024-11-04 RX ORDER — DIPHENOXYLATE HYDROCHLORIDE AND ATROPINE SULFATE 2.5; .025 MG/1; MG/1
1-2 TABLET ORAL 4 TIMES DAILY PRN
Status: DISCONTINUED | OUTPATIENT
Start: 2024-11-04 | End: 2024-11-05 | Stop reason: HOSPADM

## 2024-11-04 RX ORDER — HYDROMORPHONE HCL IN WATER/PF 6 MG/30 ML
0.2 PATIENT CONTROLLED ANALGESIA SYRINGE INTRAVENOUS
Status: DISCONTINUED | OUTPATIENT
Start: 2024-11-04 | End: 2024-11-05 | Stop reason: HOSPADM

## 2024-11-04 RX ORDER — ALENDRONATE SODIUM 70 MG/1
70 TABLET ORAL
Status: DISCONTINUED | OUTPATIENT
Start: 2024-11-04 | End: 2024-11-04

## 2024-11-04 RX ORDER — CEFAZOLIN SODIUM/WATER 2 G/20 ML
2 SYRINGE (ML) INTRAVENOUS
Status: COMPLETED | OUTPATIENT
Start: 2024-11-04 | End: 2024-11-04

## 2024-11-04 RX ORDER — ACETAMINOPHEN 325 MG/1
650 TABLET ORAL EVERY 4 HOURS PRN
Status: DISCONTINUED | OUTPATIENT
Start: 2024-11-07 | End: 2024-11-05 | Stop reason: HOSPADM

## 2024-11-04 RX ORDER — ONDANSETRON 2 MG/ML
4 INJECTION INTRAMUSCULAR; INTRAVENOUS EVERY 30 MIN PRN
Status: DISCONTINUED | OUTPATIENT
Start: 2024-11-04 | End: 2024-11-04 | Stop reason: HOSPADM

## 2024-11-04 RX ORDER — LIDOCAINE 40 MG/G
CREAM TOPICAL
Status: DISCONTINUED | OUTPATIENT
Start: 2024-11-04 | End: 2024-11-05 | Stop reason: HOSPADM

## 2024-11-04 RX ORDER — NALOXONE HYDROCHLORIDE 0.4 MG/ML
0.1 INJECTION, SOLUTION INTRAMUSCULAR; INTRAVENOUS; SUBCUTANEOUS
Status: DISCONTINUED | OUTPATIENT
Start: 2024-11-04 | End: 2024-11-04 | Stop reason: HOSPADM

## 2024-11-04 RX ORDER — PRAMIPEXOLE DIHYDROCHLORIDE 0.12 MG/1
0.12 TABLET ORAL AT BEDTIME
Status: DISCONTINUED | OUTPATIENT
Start: 2024-11-04 | End: 2024-11-05 | Stop reason: HOSPADM

## 2024-11-04 RX ORDER — BUPIVACAINE HYDROCHLORIDE AND EPINEPHRINE 2.5; 5 MG/ML; UG/ML
INJECTION, SOLUTION INFILTRATION; PERINEURAL PRN
Status: DISCONTINUED | OUTPATIENT
Start: 2024-11-04 | End: 2024-11-04 | Stop reason: HOSPADM

## 2024-11-04 RX ORDER — ALBUTEROL SULFATE 0.83 MG/ML
2.5 SOLUTION RESPIRATORY (INHALATION) EVERY 4 HOURS PRN
Status: DISCONTINUED | OUTPATIENT
Start: 2024-11-04 | End: 2024-11-04 | Stop reason: HOSPADM

## 2024-11-04 RX ORDER — OXYCODONE HYDROCHLORIDE 5 MG/1
5 TABLET ORAL
Status: DISCONTINUED | OUTPATIENT
Start: 2024-11-04 | End: 2024-11-04

## 2024-11-04 RX ORDER — KETAMINE HYDROCHLORIDE 10 MG/ML
INJECTION INTRAMUSCULAR; INTRAVENOUS PRN
Status: DISCONTINUED | OUTPATIENT
Start: 2024-11-04 | End: 2024-11-04

## 2024-11-04 RX ORDER — MAGNESIUM HYDROXIDE/ALUMINUM HYDROXICE/SIMETHICONE 120; 1200; 1200 MG/30ML; MG/30ML; MG/30ML
15 SUSPENSION ORAL ONCE
Status: COMPLETED | OUTPATIENT
Start: 2024-11-04 | End: 2024-11-04

## 2024-11-04 RX ORDER — SIMETHICONE 80 MG
480 TABLET,CHEWABLE ORAL 2 TIMES DAILY
Status: DISCONTINUED | OUTPATIENT
Start: 2024-11-04 | End: 2024-11-05 | Stop reason: HOSPADM

## 2024-11-04 RX ORDER — FERROUS SULFATE 325(65) MG
325 TABLET ORAL 2 TIMES DAILY WITH MEALS
Status: DISCONTINUED | OUTPATIENT
Start: 2024-11-04 | End: 2024-11-05 | Stop reason: HOSPADM

## 2024-11-04 RX ORDER — LOPERAMIDE HYDROCHLORIDE 2 MG/1
6 CAPSULE ORAL 2 TIMES DAILY
Status: DISCONTINUED | OUTPATIENT
Start: 2024-11-04 | End: 2024-11-05 | Stop reason: HOSPADM

## 2024-11-04 RX ORDER — ONDANSETRON 2 MG/ML
4 INJECTION INTRAMUSCULAR; INTRAVENOUS EVERY 6 HOURS PRN
Status: DISCONTINUED | OUTPATIENT
Start: 2024-11-04 | End: 2024-11-05 | Stop reason: HOSPADM

## 2024-11-04 RX ORDER — ASPIRIN 81 MG/1
81 TABLET ORAL EVERY MORNING
COMMUNITY

## 2024-11-04 RX ORDER — ACETAMINOPHEN 325 MG/1
650 TABLET ORAL EVERY 4 HOURS PRN
Qty: 40 TABLET | Refills: 0 | Status: SHIPPED | OUTPATIENT
Start: 2024-11-04

## 2024-11-04 RX ORDER — FLUTICASONE FUROATE AND VILANTEROL 100; 25 UG/1; UG/1
1 POWDER RESPIRATORY (INHALATION) DAILY
Status: DISCONTINUED | OUTPATIENT
Start: 2024-11-04 | End: 2024-11-05 | Stop reason: HOSPADM

## 2024-11-04 RX ORDER — ASPIRIN 81 MG/1
81 TABLET ORAL 2 TIMES DAILY
Status: DISCONTINUED | OUTPATIENT
Start: 2024-11-04 | End: 2024-11-05 | Stop reason: HOSPADM

## 2024-11-04 RX ORDER — ACETAMINOPHEN 325 MG/1
975 TABLET ORAL EVERY 8 HOURS
Status: DISCONTINUED | OUTPATIENT
Start: 2024-11-04 | End: 2024-11-05 | Stop reason: HOSPADM

## 2024-11-04 RX ORDER — SODIUM CHLORIDE, SODIUM LACTATE, POTASSIUM CHLORIDE, CALCIUM CHLORIDE 600; 310; 30; 20 MG/100ML; MG/100ML; MG/100ML; MG/100ML
INJECTION, SOLUTION INTRAVENOUS CONTINUOUS
Status: DISCONTINUED | OUTPATIENT
Start: 2024-11-04 | End: 2024-11-05 | Stop reason: HOSPADM

## 2024-11-04 RX ORDER — FENTANYL CITRATE 50 UG/ML
25 INJECTION, SOLUTION INTRAMUSCULAR; INTRAVENOUS EVERY 5 MIN PRN
Status: DISCONTINUED | OUTPATIENT
Start: 2024-11-04 | End: 2024-11-04 | Stop reason: HOSPADM

## 2024-11-04 RX ADMIN — CEFAZOLIN SODIUM 2 G: 2 INJECTION, SOLUTION INTRAVENOUS at 15:55

## 2024-11-04 RX ADMIN — POTASSIUM CHLORIDE 20 MEQ: 1500 TABLET, EXTENDED RELEASE ORAL at 14:45

## 2024-11-04 RX ADMIN — Medication 2 G: at 09:35

## 2024-11-04 RX ADMIN — PANTOPRAZOLE SODIUM 40 MG: 40 TABLET, DELAYED RELEASE ORAL at 15:27

## 2024-11-04 RX ADMIN — ASPIRIN 81 MG: 81 TABLET, COATED ORAL at 23:17

## 2024-11-04 RX ADMIN — PROPOFOL 50 MG: 10 INJECTION, EMULSION INTRAVENOUS at 10:52

## 2024-11-04 RX ADMIN — HYDROMORPHONE HYDROCHLORIDE 0.5 MG: 1 INJECTION, SOLUTION INTRAMUSCULAR; INTRAVENOUS; SUBCUTANEOUS at 10:11

## 2024-11-04 RX ADMIN — PHENYLEPHRINE HYDROCHLORIDE 0.4 MCG/KG/MIN: 10 INJECTION INTRAVENOUS at 09:54

## 2024-11-04 RX ADMIN — BUPIVACAINE HYDROCHLORIDE 10 ML: 2.5 INJECTION, SOLUTION EPIDURAL; INFILTRATION; INTRACAUDAL at 08:32

## 2024-11-04 RX ADMIN — FERROUS SULFATE TAB 325 MG (65 MG ELEMENTAL FE) 325 MG: 325 (65 FE) TAB at 16:39

## 2024-11-04 RX ADMIN — FENTANYL CITRATE 50 MCG: 50 INJECTION, SOLUTION INTRAMUSCULAR; INTRAVENOUS at 11:22

## 2024-11-04 RX ADMIN — FENTANYL CITRATE 50 MCG: 50 INJECTION, SOLUTION INTRAMUSCULAR; INTRAVENOUS at 11:16

## 2024-11-04 RX ADMIN — MONTELUKAST 10 MG: 10 TABLET, FILM COATED ORAL at 23:17

## 2024-11-04 RX ADMIN — ACETAMINOPHEN 975 MG: 325 TABLET, FILM COATED ORAL at 20:37

## 2024-11-04 RX ADMIN — PHENYLEPHRINE HYDROCHLORIDE 200 MCG: 10 INJECTION INTRAVENOUS at 09:56

## 2024-11-04 RX ADMIN — FENTANYL CITRATE 50 MCG: 50 INJECTION INTRAMUSCULAR; INTRAVENOUS at 08:30

## 2024-11-04 RX ADMIN — Medication 10 MG: at 11:06

## 2024-11-04 RX ADMIN — CALCIUM CARBONATE (ANTACID) CHEW TAB 500 MG 500 MG: 500 CHEW TAB at 15:27

## 2024-11-04 RX ADMIN — PHENYLEPHRINE HYDROCHLORIDE 200 MCG: 10 INJECTION INTRAVENOUS at 09:53

## 2024-11-04 RX ADMIN — Medication 150 MG: at 10:40

## 2024-11-04 RX ADMIN — PHENYLEPHRINE HYDROCHLORIDE 200 MCG: 10 INJECTION INTRAVENOUS at 09:51

## 2024-11-04 RX ADMIN — OXYCODONE HYDROCHLORIDE 5 MG: 5 TABLET ORAL at 19:01

## 2024-11-04 RX ADMIN — MIDAZOLAM 1 MG: 1 INJECTION INTRAMUSCULAR; INTRAVENOUS at 08:30

## 2024-11-04 RX ADMIN — ATORVASTATIN CALCIUM 20 MG: 20 TABLET, FILM COATED ORAL at 14:34

## 2024-11-04 RX ADMIN — TRANEXAMIC ACID 0.5 G: 10 INJECTION, SOLUTION INTRAVENOUS at 10:35

## 2024-11-04 RX ADMIN — SENNOSIDES AND DOCUSATE SODIUM 1 TABLET: 50; 8.6 TABLET ORAL at 23:16

## 2024-11-04 RX ADMIN — PRAMIPEXOLE DIHYDROCHLORIDE 0.12 MG: 0.12 TABLET ORAL at 23:17

## 2024-11-04 RX ADMIN — POTASSIUM CHLORIDE 20 MEQ: 1500 TABLET, EXTENDED RELEASE ORAL at 23:17

## 2024-11-04 RX ADMIN — LOPERAMIDE HYDROCHLORIDE 6 MG: 2 CAPSULE ORAL at 23:17

## 2024-11-04 RX ADMIN — PROPOFOL 120 MG: 10 INJECTION, EMULSION INTRAVENOUS at 09:44

## 2024-11-04 RX ADMIN — CEFAZOLIN SODIUM 2 G: 2 INJECTION, SOLUTION INTRAVENOUS at 23:16

## 2024-11-04 RX ADMIN — HYDROCORTISONE SODIUM SUCCINATE 50 MG: 100 INJECTION, POWDER, FOR SOLUTION INTRAMUSCULAR; INTRAVENOUS at 08:47

## 2024-11-04 RX ADMIN — BUPIVACAINE HYDROCHLORIDE 15 ML: 2.5 INJECTION, SOLUTION EPIDURAL; INFILTRATION; INTRACAUDAL at 08:39

## 2024-11-04 RX ADMIN — Medication 10 MG: at 10:17

## 2024-11-04 RX ADMIN — SODIUM CHLORIDE, POTASSIUM CHLORIDE, SODIUM LACTATE AND CALCIUM CHLORIDE: 600; 310; 30; 20 INJECTION, SOLUTION INTRAVENOUS at 14:32

## 2024-11-04 RX ADMIN — OXYCODONE HYDROCHLORIDE 5 MG: 5 TABLET ORAL at 14:44

## 2024-11-04 RX ADMIN — SODIUM CHLORIDE, POTASSIUM CHLORIDE, SODIUM LACTATE AND CALCIUM CHLORIDE: 600; 310; 30; 20 INJECTION, SOLUTION INTRAVENOUS at 10:37

## 2024-11-04 RX ADMIN — HYDROMORPHONE HYDROCHLORIDE 0.4 MG: 1 INJECTION, SOLUTION INTRAMUSCULAR; INTRAVENOUS; SUBCUTANEOUS at 11:41

## 2024-11-04 RX ADMIN — MIDAZOLAM 1 MG: 1 INJECTION INTRAMUSCULAR; INTRAVENOUS at 08:33

## 2024-11-04 RX ADMIN — GABAPENTIN 1200 MG: 400 CAPSULE ORAL at 23:17

## 2024-11-04 RX ADMIN — HYDROMORPHONE HYDROCHLORIDE 0.4 MG: 1 INJECTION, SOLUTION INTRAMUSCULAR; INTRAVENOUS; SUBCUTANEOUS at 11:32

## 2024-11-04 RX ADMIN — PANTOPRAZOLE SODIUM 40 MG: 40 TABLET, DELAYED RELEASE ORAL at 23:17

## 2024-11-04 RX ADMIN — OXYCODONE HYDROCHLORIDE 5 MG: 5 TABLET ORAL at 23:16

## 2024-11-04 RX ADMIN — DICYCLOMINE HYDROCHLORIDE 20 MG: 10 CAPSULE ORAL at 14:34

## 2024-11-04 RX ADMIN — DICYCLOMINE HYDROCHLORIDE 20 MG: 10 CAPSULE ORAL at 16:39

## 2024-11-04 RX ADMIN — DEXAMETHASONE SODIUM PHOSPHATE 5 MG: 4 INJECTION, SOLUTION INTRA-ARTICULAR; INTRALESIONAL; INTRAMUSCULAR; INTRAVENOUS; SOFT TISSUE at 09:51

## 2024-11-04 RX ADMIN — ACETAMINOPHEN 1000 MG: 10 INJECTION INTRAVENOUS at 12:10

## 2024-11-04 RX ADMIN — PROPOFOL 30 MG: 10 INJECTION, EMULSION INTRAVENOUS at 10:46

## 2024-11-04 RX ADMIN — FENTANYL CITRATE 50 MCG: 50 INJECTION INTRAMUSCULAR; INTRAVENOUS at 09:44

## 2024-11-04 RX ADMIN — BUPIVACAINE HYDROCHLORIDE 10 ML: 2.5 INJECTION, SOLUTION EPIDURAL; INFILTRATION; INTRACAUDAL at 08:37

## 2024-11-04 RX ADMIN — HYDROMORPHONE HYDROCHLORIDE 0.4 MG: 1 INJECTION, SOLUTION INTRAMUSCULAR; INTRAVENOUS; SUBCUTANEOUS at 12:16

## 2024-11-04 RX ADMIN — HYDROMORPHONE HYDROCHLORIDE 0.4 MG: 1 INJECTION, SOLUTION INTRAMUSCULAR; INTRAVENOUS; SUBCUTANEOUS at 11:50

## 2024-11-04 RX ADMIN — HYDROXYZINE HYDROCHLORIDE 50 MG: 50 INJECTION, SOLUTION INTRAMUSCULAR at 11:36

## 2024-11-04 RX ADMIN — ALLOPURINOL 100 MG: 100 TABLET ORAL at 23:17

## 2024-11-04 RX ADMIN — SODIUM CHLORIDE, POTASSIUM CHLORIDE, SODIUM LACTATE AND CALCIUM CHLORIDE: 600; 310; 30; 20 INJECTION, SOLUTION INTRAVENOUS at 08:47

## 2024-11-04 RX ADMIN — ROCURONIUM BROMIDE 50 MG: 10 INJECTION INTRAVENOUS at 09:45

## 2024-11-04 RX ADMIN — TRANEXAMIC ACID 1 G: 10 INJECTION, SOLUTION INTRAVENOUS at 09:47

## 2024-11-04 RX ADMIN — ALUMINUM HYDROXIDE, MAGNESIUM HYDROXIDE, AND SIMETHICONE 15 ML: 200; 200; 20 SUSPENSION ORAL at 14:33

## 2024-11-04 ASSESSMENT — ACTIVITIES OF DAILY LIVING (ADL)
ADLS_ACUITY_SCORE: 0

## 2024-11-04 NOTE — ANESTHESIA PROCEDURE NOTES
Pectoralis Procedure Note    Pre-Procedure   Staff -        CRNA: Masood Sandoval APRN CRNA       Other Anesthesia Staff: Will Yepez APRN CRNA       Performed By: CRNA       Location: pre-op       Procedure Start/Stop Times: 11/4/2024 8:35 AM and 11/4/2024 8:40 AM       Pre-Anesthestic Checklist: patient identified, IV checked, site marked, risks and benefits discussed, informed consent, monitors and equipment checked, pre-op evaluation, at physician/surgeon's request and post-op pain management  Timeout:       Correct Patient: Yes        Correct Procedure: Yes        Correct Site: Yes        Correct Position: Yes        Correct Laterality: Yes        Site Marked: Yes  Procedure Documentation  Procedure: Pectoralis             Pectoralis I and Pectoralis II       Diagnosis: RIGHT REVERSE TOTAL SHOULDER       Laterality: right       Patient Position: supine       Patient Prep/Sterile Barriers: sterile gloves, mask       Skin prep: Chloraprep       Needle Type: insulated       Needle Gauge: 20.        Needle Length (Inches): 4        Ultrasound guided       1. Ultrasound was used to identify targeted nerve, plexus, vascular marker, or fascial plane and place a needle adjacent to it in real-time.       2. Ultrasound was used to visualize the spread of anesthetic in close proximity to the above referenced structure.       3. A permanent image is entered into the patient's record.       4. The visualized anatomic structures appeared normal.       5. There were no apparent abnormal pathologic findings.    Assessment/Narrative         The placement was positive for bleeding at site.       The placement was negative for: blood aspirated and painful injection       Paresthesias: No.       Bolus given via needle. no blood aspirated via catheter.        Secured via.        Insertion/Infusion Method: Single Shot       Complications: none       Injection made incrementally with aspirations every 5  "mL.    Medication(s) Administered   Bupivacaine 0.25% PF (Infiltration) - Infiltration   10 mL - 11/4/2024 8:37:00 AM   15 mL - 11/4/2024 8:39:00 AM  Medication Administration Time: 11/4/2024 8:35 AM      FOR [facility] ONLY:   Pain Team Contact information: please page the Pain Team Via [employer]. Search \"Pain\". During daytime hours, please page the attending first. At night please page the resident first.      "

## 2024-11-04 NOTE — PROGRESS NOTES
11/04/24 1500   Appointment Info   Signing Clinician's Name / Credentials (OT) Melisa ZAMUDIO OTR/L   Living Environment   People in Home spouse   Current Living Arrangements house   Home Accessibility stairs within home   Number of Stairs, Within Home, Primary three   Stair Railings, Within Home, Primary railings safe and in good condition;railing on right side (ascending)   Transportation Anticipated family or friend will provide   Living Environment Comments Has a shower/tub combo   Self-Care   Usual Activity Tolerance good   Current Activity Tolerance moderate   Equipment Currently Used at Home grab bar, tub/shower;shower chair   Fall history within last six months no   Instrumental Activities of Daily Living (IADL)   Previous Responsibilities laundry;meal prep;housekeeping   IADL Comments Spouse helps with meals and will be doing the laundry/housework as patient heals.   General Information   Onset of Illness/Injury or Date of Surgery 11/04/24   Referring Physician Marleny Gabriel NP   Existing Precautions/Restrictions weight bearing   Right Upper Extremity (Weight-bearing Status) non weight-bearing (NWB)   General Observations and Info Patient underwent a (R) reverse total shoulder arthroplasty with Dr. Forrest. OK for Codmans. AROM/PROM of elbow/wrist/hand ok. Fist pumps every hour while awake. Sling on at all times except for exercise, hygiene, and skin assessment.   Cognitive Status Examination   Orientation Status orientation to person, place and time   Pain Assessment   Patient Currently in Pain Yes, see Vital Sign flowsheet   Range of Motion Comprehensive   Comment, General Range of Motion (L) UE WFL. (R) elbow/wrist and hand WFL.   Bed Mobility   Bed Mobility No deficits identified   Transfers   Transfers other (see comments)  (assist with IV pole.)   Activities of Daily Living   BADL Assessment/Intervention upper body dressing;lower body dressing;clothing fastener management;toileting;feeding   Upper Body  Dressing Assessment/Training   Comment, (Upper Body Dressing) Asssit with IV type gown due to (R) sling and shoulder restrictions on the (R).   [place] Level (Upper Body Dressing) upper body dressing skills;moderate assist (50% patient effort)   Lower Body Dressing Assessment/Training   Comment, (Lower Body Dressing) Patient able to karen pullup after setup.   [place] Level (Lower Body Dressing) set up   Clothes Fastener Management   [place] Level (Clothes Fastener Management) snaps;maximum assist (25% patient effort)   Comment, (Clothes Fastener Management) Patient needed assist with snaps on gown. Able to manage opening and closing fasteners on sling.   Eating/Self Feeding   [place] Level (Feeding) set up   Comment, (Feeding) Patient assisted with opening some containers s/p surgery. Able to manage putting butter and jelly on muffin with (L) hand.   Toileting   Comment, (Toileting) Patient able to complete silvia care and manage of pullup. Assist with IV pole.   [place] Level (Toileting) toileting skills   Clinical Impression   Criteria for Skilled Therapeutic Interventions Met (OT) Yes, treatment indicated   OT Diagnosis (R) reverse total shoulder arthroplasty.   OT Problem List-Impairments impacting ADL problems related to;post-surgical precautions   ADL comments/analysis Patient does require some assist with UB dressing s/p surgery, sling and precautions   Assessment of Occupational Performance 1-3 Performance Deficits   Identified Performance Deficits UB dressing, UE exercises.   Planned Therapy Interventions (OT) ADL retraining;home program guidelines   Intervention Comments Reviewed what patient can do for PROM/AROM elbow wrist and hand. Patient demonstrated indep with opening sling to perform these movements. Discussed fist pumps every hour while awake. OT demonstrated Codmans to patient. Didn't have patient complete due to increase in pain after ADL tasks.   Clinical Decision Making  Complexity (OT) problem focused assessment/low complexity   Risk & Benefits of therapy have been explained participants voiced agreement with care plan   Clinical Impression Comments Patient pleasant, willing to work on completing ADL tasks within her restrictions.   OT Total Evaluation Time   OT Eval, Low Complexity Minutes (76871) 28   OT Goals   Therapy Frequency (OT) 5 times/week   OT Predicted Duration/Target Date for Goal Attainment 11/18/24   OT Goals Upper Body Dressing;OT Goal 1   OT: Upper Body Dressing Modified independent   OT: Goal 1 Patient to demonstrate indep with HEP for codmans, elbow/wrist/hand AROM/PROM and fist pumps. ,   OT Discharge Planning   OT Plan Work on improving indep with ADL within UE restrictions.   OT Discharge Recommendation (DC Rec) home  (spouse to assist)   OT Rationale for DC Rec Patient is completing many ADL tasks near baseline. Barriers are sling, IV pole, and (R) UE restrictions. Patient does have spouse to assist with tasks as needed.   OT Brief overview of current status Patient able to complete TT indep, silvia care indep and donning brief indep. Assist needed for UB dressing due to sling, (R) UE restrictions and IV. Patient is able to fasten/unfasten sling to complete elbow/wrist and hand PROM/AROM.   Total Session Time   Total Session Time (sum of timed and untimed services) 28

## 2024-11-04 NOTE — PLAN OF CARE
[facility] Inpatient Admission Note:    Patient admitted to 3230/3230-1 at approximately 1300 via bed accompanied by spouse from surgery . Report received from Yi in SBAR format at 1302 via face to face in room. Patient is alert and oriented X 3, reports pain; rates at 3 on 0-10 scale.  Patient oriented to room, unit, hourly rounding, and plan of care. Explained admission packet and patient handbook with patient bill of rights brochure. Will continue to monitor and document as needed.     Inpatient Nursing criteria listed below was met:    Health care directives status obtained and documented: Yes    Patient identifies a surrogate decision maker: Yes If yes, who: , Contact Information:See Chat    Clergy visit ordered if patient requests: N/A    Skin issues/needs documented: Yes    Isolation Patient: no     Fall Prevention Yes: Care plan updated, education given and documented, sticker and magnet in place: Yes    Care Plan initiated: Yes    Education Documented (including assessment): Yes    Patient has discharge needs : No       Katherine Catherine RN   11/4/2024  2:12 PM

## 2024-11-04 NOTE — INTERVAL H&P NOTE
I have reviewed the surgical (or preoperative) H&P that is linked to this encounter, and examined the patient. There are no significant changes    Clinical Conditions Present on Arrival:  Clinically Significant Risk Factors Present on Admission                  # Drug Induced Platelet Defect: home medication list includes an antiplatelet medication

## 2024-11-04 NOTE — OP NOTE
Preoperative Diagnosis: Right Shoulder Osteoarthritis    Postoperative Diagnosis: Right Shoulder Osteoarthritis    Procedure: Right Reverse Total Shoulder Arthroplasty    Surgeon: Kurt Jordan MD    Assistants: Jhonatan GASTON; Marleny Gabriel DNP    A skilled first assistant was required for patient positioning, retracting, and carrying out the procedure in a safe and effective manner    Anesthesia:   1) General Endotracheal Anesthesia/Peripheral Block  2) Local Injection around surgical site    Findings:   Satisfactory TSA with stable ROM to 140 degrees flexion and abduction; 80 degrees External rotation and 45 degrees internal rotation   2) Adequate hemostasis     Implants:   Donjoy Size 32 -4 Glenosphere  Donjoy Size 10 Short Humeral Stem  Standard Glenoid Baseplate  Standard Polyethylene Insert      Estimated Blood Loss: 100 ml    Specimens: None    Drains: None    Complications: None    Indications:   This is a 68 year old patient with long standing right shoulder pain, severe osteoarthritis and rotator cuff deficiency.  They have failed nonoperative treatment including use of NSAIDs; Tylenol; injections and exercise.  Given the continued symptoms they wished to proceed with a shoulder replacement.  The risks including pain, bleeding, infection, myocardial infarction, component failure, need for revision operation were discussed with the patient.  The surgical consent was signed and surgical site was marked.  All questions regarding postoperative disposition were answered.      Description of Procedure:   Following peripheral nerve blocks in the preoperative area, the patient was taken to the operating room and placed under general anesthesia.  They were placed in the beach chair position with the head in neutral sagittal and coronal alignment. The Right Shoulder was prepped with a Chloraprep wipe and sponge then draped in sterile fashion.  A timeout was called to identify the correct patient and surgical site.   A deltopectoral incision was made and dissection was carried down to the conjoin tendon and subscapularis.  A subscapularis peel was performed and the tendon was tagged with a suture.  The anterior capsule was released off the humeral head and glenoid allowing anterior dislocation of the humeral head.  Arthritis and rotator cuff tearing was noted.  Osteophytes were trimmed.  A extramedullary cutting guide was utilized to make the humeral head osteotomy.  The humeral protector plate was placed and the glenoid was exposed.  Resection of the labrum was completed in a 360 degree manner.  Further anterior, inferior and posterior capsule releases were completed until adequate exposure of the glenoid was obtained.  Care was taken to avoid injury or retraction on the aillary nerve.   The pear shaped glenoid drill guide was used and drilled bi-cortically.  The glenoid tap was then placed.  Reaming of the glenoid was carried out until adequate subchondral bone was exposed.  The standard baseplate was placed along with 3 locking screws.  The edges of the baseplate were cleaned of soft tissue and the 32 -4 glenosphere was placed along with the set screw.  The humerus was brought anteriorly and externally rotated to allow exposure of the proximal humerus.  Reaming and broaching was completed up to a size 10 humeral stem.  The final humeral stem was placed.  A standard thickness humeral insert was placed and the shoulder was taken through range of motion.  The shoulder flexion and abduction was easily carried out to 140 degrees; external rotation to 80 and internal rotation to 50.  The standard thickness humeral insert was placed and the shoulder was reduced.  The subscapularis was not repaired due to excessive tension and poor tissue quality.  Copious amounts of irrigation was utilized.  The deltopectoral interval was closed with a running #1 vicryl suture.  The subcutaneous tissue was closed with 2-0 vicryl and the skin was  closed with staples.  A sterile Aquacel dressing was applied along with a sling with abductor pillow.  The patient was awakened, extubated and transferred to PACU in stable condition    Postoperative Plan:   Sling at all times, no DVT prophylaxis; Follow-up in 10-14 days in [facility].  X-rays at follow-up AP and Scapular Y of the Shoulder.

## 2024-11-04 NOTE — ANESTHESIA PROCEDURE NOTES
Suprascapular Procedure Note    Pre-Procedure   Staff -        CRNA: Masood Sandoval APRN CRNA       Other Anesthesia Staff: Will Yepez APRN CRNA       Performed By: CRNA       Location: pre-op       Procedure Start/Stop Times: 11/4/2024 8:30 AM and 11/4/2024 8:34 AM       Pre-Anesthestic Checklist: patient identified, IV checked, site marked, risks and benefits discussed, informed consent, monitors and equipment checked, pre-op evaluation, at physician/surgeon's request and post-op pain management  Timeout:       Correct Patient: Yes        Correct Procedure: Yes        Correct Site: Yes        Correct Position: Yes        Correct Laterality: Yes        Site Marked: Yes  Procedure Documentation  Procedure: Suprascapular       Diagnosis: RIGHT REVERSE TOTAL SHOULDER SURGERY       Laterality: right       Patient Position: sitting       Patient Prep/Sterile Barriers: sterile gloves, mask       Skin prep: Chloraprep       Needle Type: insulated       Needle Gauge: 20.        Needle Length (Inches): 4        Ultrasound guided       1. Ultrasound was used to identify targeted nerve, plexus, vascular marker, or fascial plane and place a needle adjacent to it in real-time.       2. Ultrasound was used to visualize the spread of anesthetic in close proximity to the above referenced structure.       3. A permanent image is entered into the patient's record.       4. The visualized anatomic structures appeared normal.       5. There were no apparent abnormal pathologic findings.    Assessment/Narrative         The placement was negative for: blood aspirated, painful injection and site bleeding       Paresthesias: No.       Bolus given via needle. no blood aspirated via catheter.        Secured via.        Insertion/Infusion Method: Single Shot       Complications: none       Injection made incrementally with aspirations every 5 mL.    Medication(s) Administered   Bupivacaine 0.25% PF (Infiltration) -  "Infiltration   10 mL - 11/4/2024 8:32:00 AM  Medication Administration Time: 11/4/2024 8:30 AM      FOR [facility]) ONLY:   Pain Team Contact information: please page the Pain Team Via [employer]. Search \"Pain\". During daytime hours, please page the attending first. At night please page the resident first.      "

## 2024-11-04 NOTE — H&P
[facility]    History and Physical - Hospitalist Service       Date of Admission:  11/4/2024    Assessment & Plan      Chyna Delgado is a 68 year old female admitted on 11/4/2024. She presents today for right reverse total shoulder arthroplasty done under the care of Dr. Jordan.    1) Ortho: Right reverse total shoulder arthroplasty completed without any complications.  Continue pain management.  In addition therapy has been ordered at this time.    2) GERD will continue outpatient PPI    3) hyperlipidemia continue outpatient statin    4) Gout continue outpatient allopurinol.        Diet: Advance Diet as Tolerated: Regular Diet Adult  Discharge Instruction - Regular Diet Adult    DVT Prophylaxis: Aspirin twice daily  [place] Catheter: Not present  Lines: None     Cardiac Monitoring: None  Code Status: Full Code      Clinically Significant Risk Factors Present on Admission                 # Drug Induced Platelet Defect: home medication list includes an antiplatelet medication                 # Asthma: noted on problem list        Disposition Plan   Medically Ready for Discharge: Anticipated Tomorrow           Valente Deluca DO  Hospitalist Service  [facility]  Securely message with [employer] (more info)  Text page via [employer] Paging/Directory     ______________________________________________________________________    Chief Complaint   Degenerative joint disease of the right shoulder.    History is obtained from the patient    History of Present Illness   Chyna Delgado is a 68 year old female with degenerative joint disease of the right shoulder and ongoing pain underwent a right total shoulderright reverse total shoulder arthroplasty today under the care of Dr. Kurt hargrove.  Procedure was without any complications.  Postoperatively the patient remains vitally stable.  She does report some pain in the right shoulder.  She denies any associated chest pain or shortness of breath.   She denies any nausea.  She does have a history of osteoporosis, emphysema, vertigo, depression, hyperlipidemia, and Polmyalgia rheumatica.      Past Medical History    Past Medical History:   Diagnosis Date    Abnormal mammogram, unspecified 01/08/2003    Normal pathology    Back Pain 02/10/2000    Sacroiliac pain    Benign neoplasm of colon 03/10/2000    Benign paroxysmal positional vertigo 06/07/2012    Depressive disorder, not elsewhere classified 02/10/2004    Diarrhea 12/15/2010    Secondary to colectomy for familial polyposis    Gastric polyp 12/11/2015, 12/18/2017    recurrent     History of normal mammogram 07/18/2014, 1/18/2019    Idiopathic osteoporosis 12/15/2010    Interstitial emphysema (H) 12/15/2010    menopausal or female climacteric states 08/31/1999    Mixed hyperlipidemia 12/15/2010    Other bursitis disorders 02/04/2011    Greater trochanteric    Other forms of retinal detachment(361.89) 12/15/2010    Other malaise and fatigue 01/10/2003    Personal history of tobacco use, presenting hazards to health 12/15/2010    Polymyalgia rheumatica (H)     Polyposis of colon     Restless legs syndrome (RLS) 12/15/2010    Rotator cuff tendonitis     Rotator cuff tendonitis     Sacroiliitis, not elsewhere classified (H) 12/15/2010    multiple sites    Tobacco use disorder 08/22/2012    Unspecified asthma(493.90) 12/15/2010       Past Surgical History   Past Surgical History:   Procedure Laterality Date    ARTHROPLASTY HIP ANTERIOR Right 05/15/2023    Procedure: Right Direct Anterior Total Hip Arthroplasty;  Surgeon: Kurt Jordan MD;  Location: [facility]    benign tumors removed from back      CATARACT EXTRACTION Right 06/21/2022    CATARACT EXTRACTION Left 07/12/2022    CLOSED REDUCTION, PERCUTANEOUS PINNING HIP Right 02/24/2019    Procedure: Percutaneous Screw Fixation of Right Hip Fracture;  Surgeon: Amrik Kolb DO;  Location: [facility]    COLECTOMY TOTAL      COLON SURGERY N/A     HX: Total colectomy with  J-pouch reconstruction.     ENDOSCOPY UPPER, COLONOSCOPY, COMBINED N/A 09/05/2014    Procedure: COMBINED ENDOSCOPY UPPER, COLONOSCOPY;  Surgeon: Delbert Patel MD;  Location: [facility]    ENDOSCOPY UPPER, COLONOSCOPY, COMBINED N/A 05/09/2019    Procedure: UPPER ENDOSCOPY  WITH BIOPSIES AND  COLONOSCOPY WITH BIOPSIES;  Surgeon: Tai Diaz MD;  Location: [facility]    ENDOSCOPY UPPER, COLONOSCOPY, COMBINED N/A 12/8/2023    Procedure: Upper Endoscopy with biopsy and polypectomy  and Ileoscopy;  Surgeon: Al Howe MD;  Location: [facility]    ESOPHAGOSCOPY, GASTROSCOPY, DUODENOSCOPY (EGD), COMBINED N/A 12/11/2015    Procedure: COMBINED ESOPHAGOSCOPY, GASTROSCOPY, DUODENOSCOPY (EGD);  Surgeon: Delbert Patel MD;  Location: [facility]    ESOPHAGOSCOPY, GASTROSCOPY, DUODENOSCOPY (EGD), COMBINED N/A 12/18/2017    Procedure: COMBINED ESOPHAGOSCOPY, GASTROSCOPY, DUODENOSCOPY (EGD);  UPPER ENDOSCOPY WITH BIOPSY;  Surgeon: Delbert Patel MD;  Location: [facility]    excised-benign  2002    tongue lesion    HC REPAIR OF NASAL SEPTUM  2002    Deviated nasal septum    LAPAROSCOPIC CHOLECYSTECTOMY      lumpectomy Right breast      Breast Lump    MOUTH SURGERY      removal of spot from roof of mouth    pilonidal cyst surgery  1976    removal of colon and large intestine  2000    Familial polyposis    REMOVE HARDWARE ARTHROPLASTY HIP Right 07/20/2022    Procedure: Right Hip Hardware Removal (Cannulated Screws);  Surgeon: Luis Fernando Marcial MD;  Location: [facility]    Right etopic pregnancy      Pregnancy    TONSILLECTOMY      tonsillitus    UPPER GI ENDOSCOPY  2009    Stomach polyps       Prior to Admission Medications   Prior to Admission Medications   Prescriptions Last Dose Informant Patient Reported? Taking?   GNP VITAMIN D MAXIMUM STRENGTH 50 MCG (2000 UT) tablet Past Month Self No Yes   Sig: TAKE TWO TABLETS BY MOUTH EVERY DAY   Multiple Vitamin (MULTIVITAMIN) per tablet Past Month Self Yes Yes   Sig: Take 1 tablet by mouth daily with  food.   PSYLLIUM PO 11/3/2024 Self Yes Yes   Sig: Take 1 Tablespoonful by mouth 2 times daily    Simethicone 500 MG CAPS 11/4/2024 Morning Self Yes Yes   Sig: Take 500 mg by mouth 2 times daily. Gas-X   TYMLOS 3120 MCG/1.56ML SOPN injection 11/3/2024 Morning  Yes Yes   Sig: Inject 80 mcg subcutaneously daily.   albuterol (PROAIR HFA/PROVENTIL HFA/VENTOLIN HFA) 108 (90 Base) MCG/ACT inhaler 11/3/2024  No Yes   Sig: Inhale 2 puffs into the lungs every 4 hours as needed for shortness of breath   allopurinol (ZYLOPRIM) 100 MG tablet 11/3/2024 Bedtime  No Yes   Sig: Take 1 tablet (100 mg) by mouth 2 times daily   amoxicillin (AMOXIL) 500 MG capsule More than a month  Yes Yes   Sig: Take 2,000 mg by mouth once as needed (prior to dental appointments).   aspirin 81 MG EC tablet Past Month  Yes Yes   Sig: Take 81 mg by mouth every morning.   atorvastatin (LIPITOR) 20 MG tablet 11/3/2024 Morning  No Yes   Sig: Take 1 tablet (20 mg) by mouth daily   calcitonin, salmon, (MIACALCIN) 200 UNIT/ACT nasal spray 11/4/2024 Morning  Yes Yes   Sig: Spray 1 spray into one nostril alternating nostrils daily.   calcium carbonate 750 MG CHEW Past Month  Yes Yes   Sig: Take 750 mg by mouth 2 times daily.   ciprofloxacin (CETRAXAL) 0.2 % otic solution More than a month  Yes Yes   Sig: Place 0.25 mLs into the right ear 2 times daily as needed (pain in ear after showering).   dicyclomine (BENTYL) 10 MG capsule Past Month  No Yes   Sig: Take 2 capsules (20 mg) by mouth 3 times daily (before meals)   diphenoxylate-atropine (LOMOTIL) 2.5-0.025 MG tablet 11/4/2024 Morning  No Yes   Sig: Take 1-2 tablets by mouth 4 times daily as needed for diarrhea   ferrous sulfate (IRON) 325 (65 FE) MG tablet Past Month Self Yes Yes   Sig: Take 325 mg by mouth 2 times daily (with meals)   fluticasone-salmeterol (ADVAIR) 250-50 MCG/ACT inhaler 11/3/2024  No Yes   Sig: Inhale 1 puff into the lungs 2 times daily   gabapentin (NEURONTIN) 400 MG capsule 11/4/2024  Morning  No Yes   Sig: Take 3 capsules (1,200 mg) by mouth 2 times daily   ketotifen (ZADITOR/REFRESH ANTI-ITCH) 0.025 % SOLN ophthalmic solution Past Week Self No Yes   Sig: PLACE 2 DROPS INTO BOTH EYES 2 TIMES DAILY   loperamide (IMODIUM) 2 MG capsule 11/4/2024 Morning Self Yes Yes   Sig: Take 6 mg by mouth 2 times daily plus additional dosing as needed.   meloxicam (MOBIC) 15 MG tablet Past Month  Yes Yes   Sig: Take 15 mg by mouth daily.   montelukast (SINGULAIR) 10 MG tablet 11/3/2024  No Yes   Sig: Take 1 tablet (10 mg) by mouth At Bedtime   omeprazole (PRILOSEC) 40 MG DR capsule 11/3/2024  No Yes   Sig: Take 1 capsule (40 mg) by mouth daily   potassium chloride mary ER (KLOR-CON M20) 20 MEQ CR tablet Past Month  No Yes   Sig: Take 1 tablet (20 mEq) by mouth 3 times daily   pramipexole (MIRAPEX) 0.125 MG tablet 11/3/2024  No Yes   Sig: Take 1-2 tablets (0.125-0.25 mg) by mouth At Bedtime   Patient taking differently: Take 0.125 mg by mouth at bedtime.   predniSONE (DELTASONE) 1 MG tablet 11/3/2024 Morning  No Yes   Sig: Take 2 tablets (2 mg) by mouth daily   spironolactone (ALDACTONE) 50 MG tablet 11/3/2024  Yes Yes   Sig: Take 50 mg by mouth 2 times daily.   traMADol (ULTRAM) 50 MG tablet More than a month  No Yes   Sig: Take 1 tablet (50 mg) by mouth 3 times daily as needed for severe pain      Facility-Administered Medications: None        Review of Systems    The 10 point Review of Systems is negative other than noted in the HPI or here.     Social History   I have reviewed this patient's social history and updated it with pertinent information if needed.  Social History     Tobacco Use    Smoking status: Every Day     Current packs/day: 0.50     Average packs/day: 0.5 packs/day for 54.8 years (27.4 ttl pk-yrs)     Types: Cigarettes     Start date: 1/1/1970     Passive exposure: Current    Smokeless tobacco: Never   Vaping Use    Vaping status: Never Used   Substance Use Topics    Alcohol use: Not  Currently     Comment: 1 a month    Drug use: Never         Family History   I have reviewed this patient's family history and updated it with pertinent information if needed.  Family History   Problem Relation Age of Onset    Other - See Comments Father         Atrial Fibrillation    Cancer Father         bladder ca    Colon Polyps Father     Heart Disease Father         Pacemaker    Rheumatoid Arthritis Father     C.A.D. Father 75        CABG    Chronic Obstructive Pulmonary Disease Mother     Heart Disease Mother         Pacemaker    Other - See Comments Mother         dementia    C.A.D. Mother 70        CABG    C.A.D. Maternal Grandmother     Unknown/Adopted Maternal Grandfather     Unknown/Adopted Paternal Grandmother     Unknown/Adopted Paternal Grandfather     Asthma Sister     Cerebrovascular Disease Sister     Gastrointestinal Disease Sister         peptic ulcers    Hypertension Sister     Gastrointestinal Disease Sister         stomach tumors    Rheumatoid Arthritis Sister     Cancer Sister         facial cancer    Asthma Sister     Cancer Maternal Aunt         renal ca         Allergies   Allergies   Allergen Reactions    Codeine Swelling     Throat swelling-Patient can tolerate Hydrocodone & morphine    Nortriptyline Other (See Comments)     Crying         Physical Exam   Vital Signs: Temp: 97.7  F (36.5  C) Temp src: Tympanic BP: 108/62 Pulse: 72   Resp: 12 SpO2: 96 % O2 Device: Nasal cannula Oxygen Delivery: 1 LPM  Weight: 103 lbs 6.4 oz    Constitutional: awake, alert, cooperative, no apparent distress, and appears stated age  Respiratory: No increased work of breathing, good air exchange, clear to auscultation bilaterally, no crackles or wheezing  Cardiovascular: Normal apical impulse, regular rate and rhythm, normal S1 and S2, no S3 or S4, and no murmur noted  Skin: no bruising or bleeding  Musculoskeletal: Right arm and shoulder in a rigid immobilizer.  Neurologic: Awake, alert, oriented to name,  place and time.  Cranial nerves II-XII are grossly intact.  Motor is 5 out of 5 bilaterally.  Cerebellar finger to nose, heel to shin intact.  Sensory is intact.  Babinski down going, Romberg negative, and gait is normal.  Neuropsychiatric: General: normal, calm, and normal eye contact  Level of consciousness: alert / normal  Affect: normal  Orientation: oriented to self, place, time and situation    Medical Decision Making       30 MINUTES SPENT BY ME on the date of service doing chart review, history, exam, documentation & further activities per the note.      Data         Imaging results reviewed over the past 24 hrs:   Recent Results (from the past 24 hours)   XR Shoulder Right Port G/E 2 Views    Narrative    PROCEDURE:  XR SHOULDER RIGHT PORT G/E 2 VIEWS    HISTORY: Status post surgery    COMPARISON:  None.    TECHNIQUE:  2 views of the right shoulder were obtained.    FINDINGS:  There is a right shoulder prosthesis. The prosthetic  elements appear well seated. The acromioclavicular joint appears  normal. Gas is seen in the soft tissues.       Impression    IMPRESSION: Right shoulder prosthesis in place      AYAZ BROCK MD         SYSTEM ID:  [id number]

## 2024-11-04 NOTE — PLAN OF CARE
Patient pain now tolerable after IV tylenol, fentanyl, dilaudid and IM vistaril. Pain 5/10 and tolerable. Remains on 2lpm NC as she drops intermittently on room air. Dressing is clean,dry, and intact. Skin tear dressing also CDI. Vitals stable.  notified. Patient transferred to room 3230. Report given to GARRETT Jackson.

## 2024-11-04 NOTE — MEDICATION SCRIBE - ADMISSION MEDICATION HISTORY
Medication Scribe Admission Medication History    Admission medication history is complete. The information provided in this note is only as accurate as the sources available at the time of the update.    Information Source(s): Patient, Nissa/Qian, and [facility]  via in-person    Pertinent Information:   Patient manages her own medications- significant discomfort during review which impacted her reliability. She has been holding her vitamins/supplements and NSAID's for at least 2 weeks.    Reports this morning the only medications she took were her gabapentin, lomotil (1 tab), calcitonin and simethicone.     Changes made to PTA medication list:  Added: None  Deleted: alendronate- pt is now on alternate therapy prescribed by [facility] (tymlos injection)  Changed:   Asa listed in chart BID, pt takes once daily (held for surgery)  Cetraxal- originally prescribed BID, pt uses as needed for ear pain      Allergies reviewed with patient and updates made in EHR: yes    Medication History Completed By: Kerline Worthington 11/4/2024 1:44 PM    PTA Med List   Medication Sig Note Last Dose/Taking    albuterol (PROAIR HFA/PROVENTIL HFA/VENTOLIN HFA) 108 (90 Base) MCG/ACT inhaler Inhale 2 puffs into the lungs every 4 hours as needed for shortness of breath  11/3/2024    allopurinol (ZYLOPRIM) 100 MG tablet Take 1 tablet (100 mg) by mouth 2 times daily  11/3/2024 Bedtime    amoxicillin (AMOXIL) 500 MG capsule Take 2,000 mg by mouth once as needed (prior to dental appointments).  More than a month    aspirin 81 MG EC tablet Take 81 mg by mouth every morning.  Past Month    atorvastatin (LIPITOR) 20 MG tablet Take 1 tablet (20 mg) by mouth daily  11/3/2024 Morning    calcitonin, salmon, (MIACALCIN) 200 UNIT/ACT nasal spray Spray 1 spray into one nostril alternating nostrils daily.  11/4/2024 Morning    calcium carbonate 750 MG CHEW Take 750 mg by mouth 2 times daily.  Past Month    ciprofloxacin (CETRAXAL) 0.2 % otic  solution Place 0.25 mLs into the right ear 2 times daily as needed (pain in ear after showering).  More than a month    dicyclomine (BENTYL) 10 MG capsule Take 2 capsules (20 mg) by mouth 3 times daily (before meals)  Past Month    diphenoxylate-atropine (LOMOTIL) 2.5-0.025 MG tablet Take 1-2 tablets by mouth 4 times daily as needed for diarrhea  11/4/2024 Morning    ferrous sulfate (IRON) 325 (65 FE) MG tablet Take 325 mg by mouth 2 times daily (with meals)  Past Month    fluticasone-salmeterol (ADVAIR) 250-50 MCG/ACT inhaler Inhale 1 puff into the lungs 2 times daily  11/3/2024    gabapentin (NEURONTIN) 400 MG capsule Take 3 capsules (1,200 mg) by mouth 2 times daily  11/4/2024 Morning    GNP VITAMIN D MAXIMUM STRENGTH 50 MCG (2000 UT) tablet TAKE TWO TABLETS BY MOUTH EVERY DAY  Past Month    ketotifen (ZADITOR/REFRESH ANTI-ITCH) 0.025 % SOLN ophthalmic solution PLACE 2 DROPS INTO BOTH EYES 2 TIMES DAILY 11/4/2024: Uses when she remembers Past Week    loperamide (IMODIUM) 2 MG capsule Take 6 mg by mouth 2 times daily plus additional dosing as needed.  11/4/2024 Morning    meloxicam (MOBIC) 15 MG tablet Take 15 mg by mouth daily.  Past Month    montelukast (SINGULAIR) 10 MG tablet Take 1 tablet (10 mg) by mouth At Bedtime  11/3/2024    Multiple Vitamin (MULTIVITAMIN) per tablet Take 1 tablet by mouth daily with food.  Past Month    omeprazole (PRILOSEC) 40 MG DR capsule Take 1 capsule (40 mg) by mouth daily  11/3/2024    potassium chloride mary ER (KLOR-CON M20) 20 MEQ CR tablet Take 1 tablet (20 mEq) by mouth 3 times daily  Past Month    pramipexole (MIRAPEX) 0.125 MG tablet Take 1-2 tablets (0.125-0.25 mg) by mouth At Bedtime (Patient taking differently: Take 0.125 mg by mouth at bedtime.)  11/3/2024    predniSONE (DELTASONE) 1 MG tablet Take 2 tablets (2 mg) by mouth daily  11/3/2024 Morning    PSYLLIUM PO Take 1 Tablespoonful by mouth 2 times daily   11/3/2024    Simethicone 500 MG CAPS Take 500 mg by mouth 2  times daily. Gas-X  11/4/2024 Morning    spironolactone (ALDACTONE) 50 MG tablet Take 50 mg by mouth 2 times daily.  11/3/2024    traMADol (ULTRAM) 50 MG tablet Take 1 tablet (50 mg) by mouth 3 times daily as needed for severe pain  More than a month    TYMLOS 3120 MCG/1.56ML SOPN injection Inject 80 mcg subcutaneously daily.  11/3/2024 Morning

## 2024-11-04 NOTE — ANESTHESIA CARE TRANSFER NOTE
Patient: Chyna Delgado    Procedure: Procedure(s):  Right Reverse Total Shoulder Arthroplasty       Diagnosis: Rotator cuff arthropathy of right shoulder [M12.811]  Diagnosis Additional Information: No value filed.    Anesthesia Type:   General     Note:    Oropharynx: oropharynx clear of all foreign objects and spontaneously breathing  Level of Consciousness: drowsy  Oxygen Supplementation: room air    Independent Airway: airway patency satisfactory and stable  Dentition: dentition unchanged  Vital Signs Stable: post-procedure vital signs reviewed and stable  Report to RN Given: handoff report given  Patient transferred to: PACU    Handoff Report: Identifed the Patient, Identified the Reponsible Provider, Reviewed the pertinent medical history, Discussed the surgical course, Reviewed Intra-OP anesthesia mangement and issues during anesthesia, Set expectations for post-procedure period and Allowed opportunity for questions and acknowledgement of understanding  Vitals:  Vitals Value Taken Time   /84 11/04/24 1105   Temp     Pulse 83 11/04/24 1110   Resp 18 11/04/24 1110   SpO2 91 % 11/04/24 1110   Vitals shown include unfiled device data.    Electronically Signed By: MAIDA Gudino CRNA  November 4, 2024  11:12 AM

## 2024-11-04 NOTE — CARE PLAN
Patient having a lot of pain despite interventions. Contacted anesthesia multiple times, see orders placed. Patient agreeable that it is not a good plan to go home. Notified Jhonatan, PAC. Jhonatan in to see patient and plan is for patient to be admitted to hospital.

## 2024-11-04 NOTE — ANESTHESIA POSTPROCEDURE EVALUATION
Patient: Chyna Delgado    Procedure: Procedure(s):  Right Reverse Total Shoulder Arthroplasty       Anesthesia Type:  General    Note:  Disposition: Inpatient   Postop Pain Control: Challenging            Challenges/Interventions: Multimodal therapy            Sign Out: Well controlled pain   PONV: No   Neuro/Psych: Uneventful            Sign Out: Acceptable/Baseline neuro status   Airway/Respiratory: Uneventful            Sign Out: Acceptable/Baseline resp. status   CV/Hemodynamics: Uneventful            Sign Out: Acceptable CV status; No obvious hypovolemia; No obvious fluid overload   Other NRE: NONE   DID A NON-ROUTINE EVENT OCCUR? No       Last vitals:  Vitals Value Taken Time   /76 11/04/24 1245   Temp 97.6  F (36.4  C) 11/04/24 1245   Pulse 80 11/04/24 1245   Resp 15 11/04/24 1245   SpO2 99 % 11/04/24 1246   Vitals shown include unfiled device data.    Electronically Signed By: MAIDA Wu CRNA  November 4, 2024  1:21 PM

## 2024-11-04 NOTE — ANESTHESIA PROCEDURE NOTES
Airway       Patient location during procedure: OR       Procedure Start/Stop Times: 11/4/2024 9:46 AM  Staff -        CRNA: Masood Sandoval APRN CRNA       Performed By: CRNA  Consent for Airway        Urgency: elective  Indications and Patient Condition       Indications for airway management: silvia-procedural       Induction type:intravenous       Mask difficulty assessment: 1 - vent by mask    Final Airway Details       Final airway type: endotracheal airway       Successful airway: ETT - single  Endotracheal Airway Details        ETT size (mm): 6.5       Cuffed: yes       Successful intubation technique: direct laryngoscopy       DL Blade Type: MAC 3       Grade View of Cords: 1       Adjucts: stylet       Position: Right       Measured from: lips       Secured at (cm): 21       Bite block used: None    Post intubation assessment        Placement verified by: capnometry, equal breath sounds and chest rise        Number of attempts at approach: 1       Number of other approaches attempted: 0       Secured with: tape       Ease of procedure: easy       Dentition: Unchanged and Intact    Medication(s) Administered   Medication Administration Time: 11/4/2024 9:46 AM

## 2024-11-05 VITALS
DIASTOLIC BLOOD PRESSURE: 61 MMHG | WEIGHT: 103.4 LBS | HEART RATE: 74 BPM | SYSTOLIC BLOOD PRESSURE: 115 MMHG | TEMPERATURE: 97.8 F | HEIGHT: 61 IN | OXYGEN SATURATION: 96 % | BODY MASS INDEX: 19.52 KG/M2 | RESPIRATION RATE: 17 BRPM

## 2024-11-05 LAB
HGB BLD-MCNC: 11.5 G/DL (ref 11.7–15.7)
HOLD SPECIMEN: NORMAL

## 2024-11-05 PROCEDURE — 85018 HEMOGLOBIN: CPT

## 2024-11-05 PROCEDURE — 250N000013 HC RX MED GY IP 250 OP 250 PS 637

## 2024-11-05 PROCEDURE — 36415 COLL VENOUS BLD VENIPUNCTURE: CPT

## 2024-11-05 RX ADMIN — CALCIUM CARBONATE (ANTACID) CHEW TAB 500 MG 500 MG: 500 CHEW TAB at 07:49

## 2024-11-05 RX ADMIN — LOPERAMIDE HYDROCHLORIDE 6 MG: 2 CAPSULE ORAL at 07:44

## 2024-11-05 RX ADMIN — OXYCODONE HYDROCHLORIDE 5 MG: 5 TABLET ORAL at 04:00

## 2024-11-05 RX ADMIN — ACETAMINOPHEN 975 MG: 325 TABLET, FILM COATED ORAL at 04:01

## 2024-11-05 RX ADMIN — ALLOPURINOL 100 MG: 100 TABLET ORAL at 07:44

## 2024-11-05 RX ADMIN — SIMETHICONE 480 MG: 80 TABLET, CHEWABLE ORAL at 07:49

## 2024-11-05 RX ADMIN — SENNOSIDES AND DOCUSATE SODIUM 1 TABLET: 50; 8.6 TABLET ORAL at 07:43

## 2024-11-05 RX ADMIN — OXYCODONE HYDROCHLORIDE 10 MG: 5 TABLET ORAL at 10:09

## 2024-11-05 RX ADMIN — ATORVASTATIN CALCIUM 20 MG: 20 TABLET, FILM COATED ORAL at 07:44

## 2024-11-05 RX ADMIN — ASPIRIN 81 MG: 81 TABLET, COATED ORAL at 07:44

## 2024-11-05 RX ADMIN — PANTOPRAZOLE SODIUM 40 MG: 40 TABLET, DELAYED RELEASE ORAL at 07:43

## 2024-11-05 RX ADMIN — POTASSIUM CHLORIDE 20 MEQ: 1500 TABLET, EXTENDED RELEASE ORAL at 07:43

## 2024-11-05 RX ADMIN — DICYCLOMINE HYDROCHLORIDE 20 MG: 10 CAPSULE ORAL at 07:44

## 2024-11-05 RX ADMIN — GABAPENTIN 1200 MG: 400 CAPSULE ORAL at 07:42

## 2024-11-05 RX ADMIN — SPIRONOLACTONE 50 MG: 25 TABLET ORAL at 07:43

## 2024-11-05 RX ADMIN — FERROUS SULFATE TAB 325 MG (65 MG ELEMENTAL FE) 325 MG: 325 (65 FE) TAB at 07:44

## 2024-11-05 ASSESSMENT — ACTIVITIES OF DAILY LIVING (ADL)
ADLS_ACUITY_SCORE: 0

## 2024-11-05 NOTE — PLAN OF CARE
Patient discharged at 10:28 AM via wheel chair accompanied by spouse and staff. Prescriptions sent to patients preferred pharmacy. All belongings sent with patient.     Discharge instructions reviewed with Patient. Listed belongings gathered and returned to patient. Clothing and belongings.    Patient discharged to Home.     Surgical Patient   Surgical Procedures during stay: Right shoulder surgery.  Did patient receive discharge instruction on wound care and recognition of infection symptoms? Yes    MISC  Follow up appointment made:  Yes  Home medications returned to patient: Yes  Patient reports pain was well managed at discharge: Yes      Katherine Catherine RN   11/5/2024  10:49 AM

## 2024-11-05 NOTE — DISCHARGE INSTRUCTIONS
You have a follow up appt at [facility] in [place] with Dr. Jordan on Tuesday, November 19th at 10:30 a.m.    If you need to change this appt date please call [phone].

## 2024-11-05 NOTE — DISCHARGE SUMMARY
Date of Admission: 11/4/24    Date of Discharge: 11/5/24    Admitting Physician: Kurt Jordan MD    Consultations: Hospitalist Service    Admitting Diagnosis: Right Shoulder Osteoarthritis    Discharge Diagnosis: Right Shoulder Osteoarthritis    Surgical Procedures Performed: Right Total Shoulder Arthroplasty    Hospital Complications: None    Discharge Medications: No Changes to home meds; Oxycodone 5-10 mg po every 4 hours added for pain control;     Discharge Disposition: Home    Discharge Diet: As at home    Discharge Activity: Sling Right Upper Extremity at all times; NWB Right Upper Extremity    Hospital Course:   The patient underwent a Right TSA.  There were no intraoperative or immediate post operative complications.  Once stable in the PACU they were transferred to the hospital floor where they remained for the remainder of the hospital stay.  Pain management transitioned from IV to oral pain medications.  Physical/Occupational Therapy was consulted for early mobility and ADL training.  Vital signs and hemoglobin remained stable. The Hospitalist team was consulted for management of medical co-morbidities.  At time of discharge the patient was medically stable and cleared by Therapy for safe discharge home.        Follow-Up: 10-14 days in [facility]    Questions: Call [phone] or [phone]

## 2024-11-05 NOTE — PLAN OF CARE
Plan of Care Reviewed With: patient    Overall Patient Progress: progressing    Pt is alert and oriented, makes needs known. VS and assessments as charted. Pt reported pain well managed with PRN oxy given this shift and cold packs. Pt voided urine and had a BM this shift. Fluids discontinued at 0400 as pt is tolerating oral intake well.     Face to face report given with opportunity to observe patient.    Report given to GARRETT Jackson RN   11/5/2024  7:32 AM

## 2024-11-05 NOTE — PLAN OF CARE
"Plan of Care Reviewed With:     Overall Patient Progress:    /54 (BP Location: Left arm, Patient Position: Semi-Flower's, Cuff Size: Adult Regular)   Pulse 77   Temp 97.9  F (36.6  C) (Tympanic)   Resp 18   Ht 1.549 m (5' 1\")   Wt 46.9 kg (103 lb 6.4 oz)   SpO2 93%   BMI 19.54 kg/m      Pt had a total right shoulder replacement. Patients pain is been managed with meds and ice packs and has been maintained in at a therapeutic level. She is tolerating food well with no N/V, patient makes needs known appropriately, pt is her for a night stay and is to be leaving tomorrow. Patient is passing gas and voiding.    Face to face report given with opportunity to observe patient.    Report given to GARRETT Howell RN   11/4/2024  7:26 PM    "

## 2024-11-07 DIAGNOSIS — M35.3 PMR (POLYMYALGIA RHEUMATICA) (H): ICD-10-CM

## 2024-11-07 DIAGNOSIS — J45.40 MODERATE PERSISTENT ASTHMA, UNSPECIFIED WHETHER COMPLICATED: ICD-10-CM

## 2024-11-07 RX ORDER — FLUTICASONE PROPIONATE AND SALMETEROL 250; 50 UG/1; UG/1
1 POWDER RESPIRATORY (INHALATION) 2 TIMES DAILY
Qty: 60 EACH | Refills: 5 | Status: SHIPPED | OUTPATIENT
Start: 2024-11-07

## 2024-11-07 RX ORDER — PREDNISONE 1 MG/1
2 TABLET ORAL DAILY
Qty: 180 TABLET | Refills: 0 | Status: SHIPPED | OUTPATIENT
Start: 2024-11-07

## 2024-11-07 NOTE — PROGRESS NOTES
The documentation of 50mcg of Fentanyl given on induction was not done during this procedure. Created addendum and fixed the discrepancy in the chart with Cristobal Martin present.

## 2024-11-07 NOTE — TELEPHONE ENCOUNTER
prednisone 1 mg tablet         Last Written Prescription Date:  8/16/24  Last Fill Quantity: 180,   # refills: 0  Last Office Visit: 10/31/24  Future Office visit:       Routing refill request to provider for review/approval because:  Drug not on the [facility], P or [facility] refill protocol or controlled substance

## 2024-11-07 NOTE — TELEPHONE ENCOUNTER
Fluticasone-salmeterol 250-50 mcg/act inhaler       Last Written Prescription Date:  6-5-24  Last Fill Quantity: 60,   # refills: 2  Last Office Visit: 10-31-24

## 2024-11-13 NOTE — BRIEF OP NOTE
[facility]    Brief Operative Note    Pre-operative diagnosis: GASTRIC POLYP, HX OF POLYPS  Post-operative diagnosis Gastric polyps, Duodenal polyp, Ileitis and Pouchitis  Procedure: Procedure(s):  UPPER ENDOSCOPY  WITH BIOPSIES AND  COLONOSCOPY WITH BIOPSIES  Surgeon: Surgeon(s) and Role:     * Tai Diaz MD - Primary  Anesthesia: Monitor Anesthesia Care   Estimated blood loss: None  Drains: None  Specimens:   ID Type Source Tests Collected by Time Destination   A : small bowel biopsies Biopsy Small Intestine, Duodenum SURGICAL PATHOLOGY EXAM Tai Diaz MD 5/9/2019 10:15 AM    B : duodenal polyp Biopsy Small Intestine, Duodenum SURGICAL PATHOLOGY EXAM Tai Diaz MD 5/9/2019 10:15 AM    C : gastric polyps Polyp Other SURGICAL PATHOLOGY EXAM Tai Diaz MD 5/9/2019 10:17 AM    D : gastric biopsies Tissue Other SURGICAL PATHOLOGY EXAM Tai Diaz MD 5/9/2019 10:18 AM    E : esophagus biopsies @35cm r/o Jewell's Biopsy Esophagus SURGICAL PATHOLOGY EXAM Tai Diaz MD 5/9/2019 10:19 AM    F : ileum ulcer Biopsy Small Intestine, Ileum SURGICAL PATHOLOGY EXAM Tai Diaz MD 5/9/2019 10:30 AM    G : Ileum pouch biopsies Biopsy Small Intestine, Ileum SURGICAL PATHOLOGY EXAM Tai Diaz MD 5/9/2019 10:31 AM    H : rectal cuff biopsies Biopsy Large Intestine, Rectum SURGICAL PATHOLOGY EXAM Tai Diaz MD 5/9/2019 10:33 AM      Findings:   None.  Complications: None.  Implants:  * No implants in log *          Detail Level: Zone 24: patient reports that minocycline made her feel ill so she only took it for 3 days, she has continued to use topicals. Due to intolerance of minocycline we discussed treating with azithromycin 3 days a week for up to 3 months. Patient reports she is using a steroid inhaler and nose spray daily, we discussed that this is likely contributing to this condition and causing prolonged symptoms. She is under care of Dr. Sherman for allergy management. \\n\\nPrevious notes:\\nPatient has been using leftover  prescriptions.

## 2024-11-16 DIAGNOSIS — R19.7 DIARRHEA DUE TO MALABSORPTION: ICD-10-CM

## 2024-11-16 DIAGNOSIS — M54.6 PAIN IN THORACIC SPINE: ICD-10-CM

## 2024-11-16 DIAGNOSIS — K90.9 DIARRHEA DUE TO MALABSORPTION: ICD-10-CM

## 2024-11-18 ENCOUNTER — TELEPHONE (OUTPATIENT)
Dept: FAMILY MEDICINE | Facility: OTHER | Age: 69
End: 2024-11-18

## 2024-11-18 RX ORDER — MELOXICAM 15 MG/1
TABLET ORAL
Qty: 30 TABLET | Refills: 2 | Status: SHIPPED | OUTPATIENT
Start: 2024-11-18

## 2024-11-18 RX ORDER — DIPHENOXYLATE HYDROCHLORIDE AND ATROPINE SULFATE 2.5; .025 MG/1; MG/1
1-2 TABLET ORAL 4 TIMES DAILY PRN
Qty: 540 TABLET | Refills: 2 | Status: SHIPPED | OUTPATIENT
Start: 2024-11-18

## 2024-11-18 NOTE — TELEPHONE ENCOUNTER
12:08 PM    Reason for Call: OVERBOOK    Patient is having the following symptoms: Hives, legs, arms , face for  4 days.    The patient is requesting an appointment for ASAP with Dr. qureshi.    Was an appointment offered for this call? No  If yes : Appointment type              Date    Preferred method for responding to this message: Telephone Call  What is your phone number ?  [phone]     If we cannot reach you directly, may we leave a detailed response at the number you provided? No    Can this message wait until your PCP/provider returns, if unavailable today? Not applicable    Estrella Wyatt

## 2024-11-19 ENCOUNTER — TRANSFERRED RECORDS (OUTPATIENT)
Dept: HEALTH INFORMATION MANAGEMENT | Facility: CLINIC | Age: 69
End: 2024-11-19

## 2024-11-19 ENCOUNTER — OFFICE VISIT (OUTPATIENT)
Dept: FAMILY MEDICINE | Facility: OTHER | Age: 69
End: 2024-11-19
Attending: FAMILY MEDICINE
Payer: COMMERCIAL

## 2024-11-19 VITALS
OXYGEN SATURATION: 98 % | WEIGHT: 103 LBS | TEMPERATURE: 97.6 F | DIASTOLIC BLOOD PRESSURE: 54 MMHG | BODY MASS INDEX: 19.46 KG/M2 | SYSTOLIC BLOOD PRESSURE: 102 MMHG | HEART RATE: 74 BPM

## 2024-11-19 DIAGNOSIS — M81.0 OSTEOPOROSIS, UNSPECIFIED OSTEOPOROSIS TYPE, UNSPECIFIED PATHOLOGICAL FRACTURE PRESENCE: ICD-10-CM

## 2024-11-19 DIAGNOSIS — T78.40XA ALLERGIC REACTION, INITIAL ENCOUNTER: Primary | ICD-10-CM

## 2024-11-19 PROCEDURE — G0463 HOSPITAL OUTPT CLINIC VISIT: HCPCS

## 2024-11-19 RX ORDER — TRIAMCINOLONE ACETONIDE 5 MG/G
CREAM TOPICAL 2 TIMES DAILY
Qty: 45 G | Refills: 2 | Status: SHIPPED | OUTPATIENT
Start: 2024-11-19

## 2024-11-19 RX ORDER — PREDNISONE 20 MG/1
20 TABLET ORAL DAILY
Qty: 10 TABLET | Refills: 0 | Status: SHIPPED | OUTPATIENT
Start: 2024-11-19

## 2024-11-19 ASSESSMENT — PAIN SCALES - GENERAL: PAINLEVEL_OUTOF10: NO PAIN (0)

## 2024-11-19 NOTE — PROGRESS NOTES
Assessment & Plan     Allergic reaction, initial encounter  Very likely the new drug she is on.  She stopped it already and it's improving but still had some hives over the weekend and yesterday.  Tcm cream for the itch.  Prednisone burst for the reaction though she may hold that now as it's calming.  I listed her tymlos drug as an allergy and stopped it.  They will communicate with endocrine to find alternative.  Though I'm not 100% sure it's this drug a full history has no new foods, soaps, etc and stopping this seemed to help so I think it's fair to assume.    - triamcinolone (ARISTOCORT HP) 0.5 % external cream; Apply topically 2 times daily.  - predniSONE (DELTASONE) 20 MG tablet; Take 1 tablet (20 mg) by mouth daily.    Osteoporosis, unspecified osteoporosis type, unspecified pathological fracture presence  As above.  Keep on this and find alternative drug.  Allergy history as above.                  No follow-ups on file.    Cristina Clemente is a 68 year old, presenting for the following health issues:  Hives        11/19/2024     1:15 PM   Additional Questions   Roomed by cyndie ALONSO       Hives     Duration: Thursday   Description (location/character/radiation): arms and legs  Intensity:  moderate  Accompanying signs and symptoms: itchy  History (similar episodes/previous evaluation): None  Precipitating or alleviating factors: None  Therapies tried and outcome: stopped tymlos on Friday, OTC creams             Review of Systems  Constitutional, HEENT, cardiovascular, pulmonary, gi and gu systems are negative, except as otherwise noted.      Objective    /54   Pulse 74   Temp 97.6  F (36.4  C) (Tympanic)   Wt 46.7 kg (103 lb)   SpO2 98%   BMI 19.46 kg/m    Body mass index is 19.46 kg/m .  Physical Exam   GENERAL: alert and no distress  EYES: Eyes grossly normal to inspection, PERRL and conjunctivae and sclerae normal  HENT: ear canals and TM's normal, nose and mouth without ulcers or  lesions  NECK: no adenopathy, no asymmetry, masses, or scars  RESP: lungs clear to auscultation - no rales, rhonchi or wheezes  CV: regular rate and rhythm, normal S1 S2, no S3 or S4, no murmur, click or rub, no peripheral edema  ABDOMEN: soft, nontender, no hepatosplenomegaly, no masses and bowel sounds normal  MS: no gross musculoskeletal defects noted, no edema    The longitudinal plan of care for the diagnosis(es)/condition(s) as documented were addressed during this visit. Due to the added complexity in care, I will continue to support Chyna in the subsequent management and with ongoing continuity of care.        Signed Electronically by: Ellis Edward MD

## 2024-12-08 ENCOUNTER — MYC MEDICAL ADVICE (OUTPATIENT)
Dept: FAMILY MEDICINE | Facility: OTHER | Age: 69
End: 2024-12-08

## 2024-12-08 DIAGNOSIS — T78.40XA ALLERGIC REACTION, INITIAL ENCOUNTER: ICD-10-CM

## 2024-12-09 RX ORDER — PREDNISONE 20 MG/1
20 TABLET ORAL DAILY
Qty: 10 TABLET | Refills: 0 | Status: SHIPPED | OUTPATIENT
Start: 2024-12-09

## 2024-12-18 ENCOUNTER — PREP FOR PROCEDURE (OUTPATIENT)
Dept: SURGERY | Facility: OTHER | Age: 69
End: 2024-12-18

## 2024-12-18 ENCOUNTER — TELEPHONE (OUTPATIENT)
Dept: FAMILY MEDICINE | Facility: OTHER | Age: 69
End: 2024-12-18

## 2024-12-18 ENCOUNTER — TELEPHONE (OUTPATIENT)
Dept: SURGERY | Facility: OTHER | Age: 69
End: 2024-12-18

## 2024-12-18 DIAGNOSIS — Z87.19 HISTORY OF GASTRIC POLYP: Primary | ICD-10-CM

## 2024-12-18 NOTE — TELEPHONE ENCOUNTER
December 18, 2024  9:11 AM    Reason for Call: OVERBOOK    Patient is having upper endoscopy 2/7 with Dr Howe [place]    The patient is requesting an appointment for PRE-OP with Dr Edward.    Preferred method for responding to this message: Telephone Call  What is your phone number ? [phone]    If we cannot reach you directly, may we leave a detailed response at the number you provided? Yes    -Maureen Mulligan on 12/18/2024 at 9:11 AM

## 2024-12-18 NOTE — TELEPHONE ENCOUNTER
----- Message from Al Howe sent at 12/18/2024  8:53 AM CST -----  Thank you, can we arrange a follow up upper endoscopy.     Yovani  ----- Message -----  From: Anamaria Ramirez RN  Sent: 12/18/2024   8:49 AM CST  To: Al Howe MD; Chrystal Orellana RN    I sent this to myself from last yr. Please advise on next steps. Thank you!  ----- Message -----  From: Anamaria Ramirez RN  Sent: 12/18/2024   8:00 AM CST  To: Anamaria Ramirez RN      ----- Message -----  From: Al Howe MD  Sent: 12/18/2023   8:46 AM CST  To: [facility]    Polyps in stomach that were sampled were NOT adenomas which are the kind that can turn into cancer but are the kind we see for people who take PPI medication. There was some changes noted on random stomach biopsies that should be follow up in one year and would stay on an acid reducing medication.

## 2024-12-18 NOTE — TELEPHONE ENCOUNTER
Called patient regarding message below. EGD scheduled with Dr. Howe for Feb 7th, 2025. Instructions sent in the mail. Pre op appt made. She has no questions/concerns at this time but will reach out if any occur.     Anamaria Ramirez RN on 12/18/2024 at 9:11 AM

## 2024-12-27 DIAGNOSIS — M54.6 PAIN IN THORACIC SPINE: ICD-10-CM

## 2024-12-27 NOTE — TELEPHONE ENCOUNTER
Prabhakar      Last Written Prescription Date:  11/18/24  Last Fill Quantity: 30,   # refills: 2  Last Office Visit: 11/19/24  Future Office visit:    Next 5 appointments (look out 90 days)      Mar 03, 2025 10:45 AM  (Arrive by 10:30 AM)  Pre-Operative Physical with Ellis Edward MD  [facility] [address] [facility] [address]  [phone]

## 2024-12-28 ENCOUNTER — HEALTH MAINTENANCE LETTER (OUTPATIENT)
Age: 69
End: 2024-12-28

## 2024-12-30 ENCOUNTER — OFFICE VISIT (OUTPATIENT)
Dept: FAMILY MEDICINE | Facility: OTHER | Age: 69
End: 2024-12-30
Attending: FAMILY MEDICINE
Payer: COMMERCIAL

## 2024-12-30 VITALS
SYSTOLIC BLOOD PRESSURE: 112 MMHG | DIASTOLIC BLOOD PRESSURE: 68 MMHG | TEMPERATURE: 98.5 F | BODY MASS INDEX: 20.22 KG/M2 | WEIGHT: 107 LBS | HEART RATE: 91 BPM | OXYGEN SATURATION: 98 %

## 2024-12-30 DIAGNOSIS — R21 RASH AND NONSPECIFIC SKIN ERUPTION: Primary | ICD-10-CM

## 2024-12-30 DIAGNOSIS — T78.40XA ALLERGIC REACTION, INITIAL ENCOUNTER: ICD-10-CM

## 2024-12-30 PROCEDURE — G0463 HOSPITAL OUTPT CLINIC VISIT: HCPCS

## 2024-12-30 RX ORDER — PREDNISONE 20 MG/1
20 TABLET ORAL DAILY
Qty: 10 TABLET | Refills: 0 | Status: SHIPPED | OUTPATIENT
Start: 2024-12-30

## 2024-12-30 RX ORDER — MELOXICAM 15 MG/1
TABLET ORAL
Qty: 30 TABLET | Refills: 0 | Status: SHIPPED | OUTPATIENT
Start: 2024-12-30

## 2024-12-30 ASSESSMENT — PAIN SCALES - GENERAL: PAINLEVEL_OUTOF10: MILD PAIN (2)

## 2024-12-30 NOTE — PROGRESS NOTES
Assessment & Plan     Rash and nonspecific skin eruption  Improved today but intermittent and ongoing.  Steroids help.  Suspect allergy.  Would like to see allergist.  Will send to either [facility] or [facility] depending on when she gets back to us.  Follow otherwise with the steroid with a flare.      Allergic reaction, initial encounter  As above.  Steroid burst if occurs.  Otherwise seeing allergy.  She is off the gabapentin.  No other new drugs but she continues  to try and find a pattern.    - predniSONE (DELTASONE) 20 MG tablet; Take 1 tablet (20 mg) by mouth daily.                No follow-ups on file.    Cristina Clemente is a 69 year old, presenting for the following health issues:  Derm Problem        12/30/2024     1:04 PM   Additional Questions   Roomed by Donya ALONSO       Derm problem     Duration: on and off every 3 days-last one was 12/25  Description (location/character/radiation): all over   Intensity:  moderate  Accompanying signs and symptoms: severe itching  History (similar episodes/previous evaluation): None  Precipitating or alleviating factors: None  Therapies tried and outcome: prednisone, stopped tymlos and calcitonin            Review of Systems  Constitutional, HEENT, cardiovascular, pulmonary, gi and gu systems are negative, except as otherwise noted.      Objective    /68   Pulse 91   Temp 98.5  F (36.9  C) (Tympanic)   Wt 48.5 kg (107 lb)   SpO2 98%   BMI 20.22 kg/m    Body mass index is 20.22 kg/m .  Physical Exam   GENERAL: alert and no distress  NECK: no adenopathy, no asymmetry, masses, or scars  RESP: lungs clear to auscultation - no rales, rhonchi or wheezes  CV: regular rate and rhythm, normal S1 S2, no S3 or S4, no murmur, click or rub, no peripheral edema  ABDOMEN: soft, nontender, no hepatosplenomegaly, no masses and bowel sounds normal  MS: no gross musculoskeletal defects noted, no edema    The longitudinal plan of care for the diagnosis(es)/condition(s) as  documented were addressed during this visit. Due to the added complexity in care, I will continue to support Chyna in the subsequent management and with ongoing continuity of care.        Signed Electronically by: Ellis Edward MD

## 2025-01-27 ENCOUNTER — TRANSFERRED RECORDS (OUTPATIENT)
Dept: HEALTH INFORMATION MANAGEMENT | Facility: CLINIC | Age: 70
End: 2025-01-27

## 2025-02-18 DIAGNOSIS — E78.1 HYPERTRIGLYCERIDEMIA: ICD-10-CM

## 2025-02-18 DIAGNOSIS — M35.3 PMR (POLYMYALGIA RHEUMATICA): ICD-10-CM

## 2025-02-18 RX ORDER — PREDNISONE 1 MG/1
2 TABLET ORAL DAILY
Qty: 180 TABLET | Refills: 0 | Status: SHIPPED | OUTPATIENT
Start: 2025-02-18

## 2025-02-18 NOTE — TELEPHONE ENCOUNTER
prednisone 1 mg tablet       Routing refill request to provider for review/approval because:  Drug not [place] the [facility] [place] [facility] refill protocol or controlled substance

## 2025-02-18 NOTE — TELEPHONE ENCOUNTER
Lipitor      Last Written Prescription Date:  2/5/24  Last Fill Quantity: 90,   # refills: 3  Last Office Visit: 12/30/24  Future Office visit:    Next 5 appointments (look out 90 days)      Mar 03, 2025 10:45 AM  (Arrive by 10:30 AM)  Pre-Operative Physical with Ellis Edward MD  [facility] ) [address] [facility] [address]  [phone]             Routing refill request to provider for review/approval because:

## 2025-02-18 NOTE — TELEPHONE ENCOUNTER
Prednisone      Last Written Prescription Date:  12/30/24  Last Fill Quantity: 10,   # refills: 0  Last Office Visit: 12/30/24  Future Office visit:    Next 5 appointments (look out 90 days)      Mar 03, 2025 10:45 AM  (Arrive by 10:30 AM)  Pre-Operative Physical with Ellis Edward MD  [facility] [address] [facility] [address]  [phone]             Routing refill request to provider for review/approval because:

## 2025-02-19 RX ORDER — ATORVASTATIN CALCIUM 20 MG/1
20 TABLET, FILM COATED ORAL DAILY
Qty: 90 TABLET | Refills: 3 | Status: SHIPPED | OUTPATIENT
Start: 2025-02-19

## 2025-03-03 ENCOUNTER — OFFICE VISIT (OUTPATIENT)
Dept: FAMILY MEDICINE | Facility: OTHER | Age: 70
End: 2025-03-03
Attending: FAMILY MEDICINE
Payer: COMMERCIAL

## 2025-03-03 VITALS
HEART RATE: 81 BPM | RESPIRATION RATE: 16 BRPM | OXYGEN SATURATION: 97 % | BODY MASS INDEX: 21.18 KG/M2 | WEIGHT: 112.1 LBS | DIASTOLIC BLOOD PRESSURE: 60 MMHG | TEMPERATURE: 98.3 F | SYSTOLIC BLOOD PRESSURE: 98 MMHG

## 2025-03-03 DIAGNOSIS — K21.9 GASTROESOPHAGEAL REFLUX DISEASE WITHOUT ESOPHAGITIS: ICD-10-CM

## 2025-03-03 DIAGNOSIS — Z01.818 PREOP EXAMINATION: Primary | ICD-10-CM

## 2025-03-03 LAB
ANION GAP SERPL CALCULATED.3IONS-SCNC: 13 MMOL/L (ref 7–15)
ATRIAL RATE - MUSE: 76 BPM
BASOPHILS # BLD AUTO: 0 10E3/UL (ref 0–0.2)
BASOPHILS NFR BLD AUTO: 0 %
BUN SERPL-MCNC: 21.8 MG/DL (ref 8–23)
CALCIUM SERPL-MCNC: 9.5 MG/DL (ref 8.8–10.4)
CHLORIDE SERPL-SCNC: 102 MMOL/L (ref 98–107)
CREAT SERPL-MCNC: 0.72 MG/DL (ref 0.51–0.95)
DIASTOLIC BLOOD PRESSURE - MUSE: NORMAL MMHG
EGFRCR SERPLBLD CKD-EPI 2021: 89 ML/MIN/1.73M2
EOSINOPHIL # BLD AUTO: 0.1 10E3/UL (ref 0–0.7)
EOSINOPHIL NFR BLD AUTO: 1 %
ERYTHROCYTE [DISTWIDTH] IN BLOOD BY AUTOMATED COUNT: 13.5 % (ref 10–15)
GLUCOSE SERPL-MCNC: 100 MG/DL (ref 70–99)
HCO3 SERPL-SCNC: 25 MMOL/L (ref 22–29)
HCT VFR BLD AUTO: 40.7 % (ref 35–47)
HGB BLD-MCNC: 13.8 G/DL (ref 11.7–15.7)
IMM GRANULOCYTES # BLD: 0 10E3/UL
IMM GRANULOCYTES NFR BLD: 0 %
INTERPRETATION ECG - MUSE: NORMAL
LYMPHOCYTES # BLD AUTO: 1.1 10E3/UL (ref 0.8–5.3)
LYMPHOCYTES NFR BLD AUTO: 10 %
MCH RBC QN AUTO: 31.4 PG (ref 26.5–33)
MCHC RBC AUTO-ENTMCNC: 33.9 G/DL (ref 31.5–36.5)
MCV RBC AUTO: 93 FL (ref 78–100)
MONOCYTES # BLD AUTO: 0.9 10E3/UL (ref 0–1.3)
MONOCYTES NFR BLD AUTO: 8 %
NEUTROPHILS # BLD AUTO: 8.4 10E3/UL (ref 1.6–8.3)
NEUTROPHILS NFR BLD AUTO: 80 %
P AXIS - MUSE: 72 DEGREES
PLATELET # BLD AUTO: 206 10E3/UL (ref 150–450)
POTASSIUM SERPL-SCNC: 4.4 MMOL/L (ref 3.4–5.3)
PR INTERVAL - MUSE: 160 MS
QRS DURATION - MUSE: 76 MS
QT - MUSE: 386 MS
QTC - MUSE: 434 MS
R AXIS - MUSE: 52 DEGREES
RBC # BLD AUTO: 4.4 10E6/UL (ref 3.8–5.2)
SODIUM SERPL-SCNC: 140 MMOL/L (ref 135–145)
SYSTOLIC BLOOD PRESSURE - MUSE: NORMAL MMHG
T AXIS - MUSE: 54 DEGREES
VENTRICULAR RATE- MUSE: 76 BPM
WBC # BLD AUTO: 10.6 10E3/UL (ref 4–11)

## 2025-03-03 PROCEDURE — 80048 BASIC METABOLIC PNL TOTAL CA: CPT | Mod: ZL | Performed by: FAMILY MEDICINE

## 2025-03-03 PROCEDURE — G2211 COMPLEX E/M VISIT ADD ON: HCPCS | Performed by: FAMILY MEDICINE

## 2025-03-03 PROCEDURE — G0463 HOSPITAL OUTPT CLINIC VISIT: HCPCS | Performed by: FAMILY MEDICINE

## 2025-03-03 PROCEDURE — 93010 ELECTROCARDIOGRAM REPORT: CPT | Performed by: INTERNAL MEDICINE

## 2025-03-03 PROCEDURE — 82374 ASSAY BLOOD CARBON DIOXIDE: CPT | Mod: ZL | Performed by: FAMILY MEDICINE

## 2025-03-03 PROCEDURE — 85025 COMPLETE CBC W/AUTO DIFF WBC: CPT | Mod: ZL | Performed by: FAMILY MEDICINE

## 2025-03-03 PROCEDURE — 93005 ELECTROCARDIOGRAM TRACING: CPT | Performed by: FAMILY MEDICINE

## 2025-03-03 PROCEDURE — 99213 OFFICE O/P EST LOW 20 MIN: CPT | Performed by: FAMILY MEDICINE

## 2025-03-03 PROCEDURE — 36415 COLL VENOUS BLD VENIPUNCTURE: CPT | Mod: ZL | Performed by: FAMILY MEDICINE

## 2025-03-03 RX ORDER — TERIPARATIDE 250 UG/ML
INJECTION, SOLUTION SUBCUTANEOUS
COMMUNITY
Start: 2025-02-07

## 2025-03-03 ASSESSMENT — ASTHMA QUESTIONNAIRES
QUESTION_1 LAST FOUR WEEKS HOW MUCH OF THE TIME DID YOUR ASTHMA KEEP YOU FROM GETTING AS MUCH DONE AT WORK, SCHOOL OR AT HOME: NONE OF THE TIME
ACT_TOTALSCORE: 19
QUESTION_5 LAST FOUR WEEKS HOW WOULD YOU RATE YOUR ASTHMA CONTROL: WELL CONTROLLED
QUESTION_2 LAST FOUR WEEKS HOW OFTEN HAVE YOU HAD SHORTNESS OF BREATH: MORE THAN ONCE A DAY
ACT_TOTALSCORE: 19
QUESTION_4 LAST FOUR WEEKS HOW OFTEN HAVE YOU USED YOUR RESCUE INHALER OR NEBULIZER MEDICATION (SUCH AS ALBUTEROL): ONCE A WEEK OR LESS
QUESTION_3 LAST FOUR WEEKS HOW OFTEN DID YOUR ASTHMA SYMPTOMS (WHEEZING, COUGHING, SHORTNESS OF BREATH, CHEST TIGHTNESS OR PAIN) WAKE YOU UP AT NIGHT OR EARLIER THAN USUAL IN THE MORNING: NOT AT ALL

## 2025-03-03 ASSESSMENT — PAIN SCALES - GENERAL: PAINLEVEL_OUTOF10: NO PAIN (0)

## 2025-03-03 NOTE — H&P (VIEW-ONLY)
Preoperative Evaluation  [facility]  [address]  [facility] [address]  Phone: [phone]  Primary Provider: Ellis Edward MD  Pre-op Performing Provider: Ellis Edward MD  Mar 3, 2025             3/3/2025   Surgical Information   What procedure is being done? endoscopy   Facility or Hospital where procedure/surgery [facility] be performed[facility]   Who is doing the procedure / surgery? dr patricia   Date of surgery / procedure: 03/21   Time of surgery / procedure: tbd   Where do you plan to recover after surgery? at home with family     Fax number for surgical facility: Note does not need to be faxed, will be available electronically in Epic.    Assessment & Plan     The proposed surgical procedure is considered LOW risk.    Preop examination  Low risk procedure.  Stable patient with no acute concerns.  No indication for further workup and procedure can proceed.    - Basic metabolic panel; Future  - CBC with Platelets & Differential; Future  - EKG 12-lead complete w/read - Clinics  - Basic metabolic panel  - CBC with Platelets & Differential    Gastroesophageal reflux disease without esophagitis  Upcoming scope.       Possible Sleep Apnea:            - No identified additional risk factors other than previously addressed         Recommendation  Approval given to proceed with proposed procedure, without further diagnostic evaluation.    Cristina Clemente is a 69 year old, presenting for the following:  Pre-Op Exam          3/3/2025    10:20 AM   Additional Questions   Roomed by Mikhail Lindsay lpn   Accompanied by self     HPI: upcoming scope.           3/3/2025   Pre-Op Questionnaire   Have you ever had a heart attack or stroke? No   Have you ever had surgery on your heart or blood vessels, such as a stent placement, a coronary artery bypass, or surgery on an artery in your head, neck, heart, or legs? No   Do you have chest pain with activity? No   Do you have a history of  heart failure? No   Do you currently have a cold, bronchitis or symptoms of other infection? No   Do you have a cough, shortness of breath, or wheezing? (!) YES cough and wheeze   Do you or anyone in your family have previous history of blood clots? (!) YES dad   Do you or does anyone in your family have a serious bleeding problem such as prolonged bleeding following surgeries or cuts? (!) YES dad   Have you ever had problems with anemia or been told to take iron pills? No   Have you had any abnormal blood loss such as black, tarry or bloody stools, or abnormal vaginal bleeding? (!) YES abnormal vaginal bleeding as a teen   Have you ever had a blood transfusion? (!) YES   Have you ever had a transfusion reaction? No   Are you willing to have a blood transfusion if it is medically needed before, during, or after your surgery? Yes   Have you or any of your relatives ever had problems with anesthesia? No   Do you have sleep apnea, excessive snoring or daytime drowsiness? (!) YES   Do you have a CPAP machine? (!) NO snoring   Do you have any artifical heart valves or other implanted medical devices like a pacemaker, defibrillator, or continuous glucose monitor? No   Do you have artificial joints? (!) YES   Are you allergic to latex? No     Health Care Directive  Patient has a Health Care Directive on file      Preoperative Review of         Status of Chronic Conditions:  See problem list for active medical problems.  Problems all longstanding and stable, except as noted/documented.  See ROS for pertinent symptoms related to these conditions.    Patient Active Problem List    Diagnosis Date Noted    S/p reverse total shoulder arthroplasty 11/04/2024     Priority: Medium    Chronic thoracic back pain, unspecified back pain laterality 10/13/2023     Priority: Medium    Trochanteric bursitis of right hip 05/15/2023     Priority: Medium    Diarrhea due to malabsorption 05/15/2023     Priority: Medium    Closed fracture of  left hip (H) 02/19/2023     Priority: Medium    Closed fracture of left hip, initial encounter (H) 02/19/2023     Priority: Medium    Idiopathic gout of right hand, unspecified chronicity 09/09/2020     Priority: Medium    PMR (polymyalgia rheumatica) 09/09/2020     Priority: Medium    Osteoporosis 09/03/2019     Priority: Medium    Hip fracture requiring operative repair (H) 02/25/2019     Priority: Medium    Closed fracture of neck of right femur (H) 02/23/2019     Priority: Medium    Functional diarrhea 10/26/2018     Priority: Medium    Iron deficiency 04/18/2018     Priority: Medium    Myalgia 05/30/2017     Priority: Medium    Abdominal muscle strain 05/05/2017     Priority: Medium    Strain of flexor muscle of hip, unspecified laterality, initial encounter 05/05/2017     Priority: Medium    Right shoulder pain 11/03/2016     Priority: Medium    ACP (advance care planning) 05/09/2016     Priority: Medium     Advance Care Planning 5/9/2016: ACP Review of Chart / Resources Provided:  Reviewed chart for advance care plan.  Chyna Delgado has been provided information and resources to begin or update their advance care plan.  Added by Lina PONCE Buus            Pain of toe of right foot 05/09/2016     Priority: Medium    Arthritis 02/12/2016     Priority: Medium    Graves disease 11/05/2015     Priority: Medium    Numbness and tingling in right hand 04/20/2015     Priority: Medium    Restless legs syndrome 04/16/2014     Priority: Medium    Somatic dysfunction of pelvic region 10/08/2013     Priority: Medium    Seborrheic keratosis 07/12/2013     Priority: Medium     Imo Update utility      Mild persistent asthma 06/28/2013     Priority: Medium    Sacroiliac pain 06/28/2013     Priority: Medium    Benign neoplasm of stomach 08/16/2012     Priority: Medium    Colon polyposis 08/16/2012     Priority: Medium    Family history of colonic polyps 08/16/2012     Priority: Medium    Family history of skin cancer  08/16/2012     Priority: Medium    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     Priority: Medium     Overview:   IMO Update 10/11      Seborrheic dermatitis, unspecified 10/12/2011     Priority: Medium     Overview:   IMO Update 10/11      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     Priority: Medium    Hypothyroidism 07/07/2011     Priority: Medium      Past Medical History:   Diagnosis Date    Abnormal mammogram, unspecified 01/08/2003    Normal pathology    Back Pain 02/10/2000    Sacroiliac pain    Benign neoplasm of colon 03/10/2000    Benign paroxysmal positional vertigo 06/07/2012    Depressive disorder, not elsewhere classified 02/10/2004    Diarrhea 12/15/2010    Secondary to colectomy for familial polyposis    Gastric polyp 12/11/2015, 12/18/2017    recurrent     History of normal mammogram 07/18/2014, 1/18/2019    Idiopathic osteoporosis 12/15/2010    Interstitial emphysema (H) 12/15/2010    menopausal or female climacteric states 08/31/1999    Mixed hyperlipidemia 12/15/2010    Other bursitis disorders 02/04/2011    Greater trochanteric    Other forms of retinal detachment(361.89) 12/15/2010    Other malaise and fatigue 01/10/2003    Personal history of tobacco use, presenting hazards to health 12/15/2010    Polymyalgia rheumatica     Polyposis of colon     Restless legs syndrome (RLS) 12/15/2010    Rotator cuff tendonitis     Rotator cuff tendonitis     Sacroiliitis, not elsewhere classified 12/15/2010    multiple sites    Tobacco use disorder 08/22/2012    Unspecified asthma(493.90) 12/15/2010     Past Surgical History:   Procedure Laterality Date    ARTHROPLASTY HIP ANTERIOR Right 05/15/2023    Procedure: Right Direct Anterior Total Hip Arthroplasty;  Surgeon: Kurt Jordan MD;  Location: [facility]    benign tumors removed from back      CATARACT EXTRACTION Right 06/21/2022    CATARACT EXTRACTION Left 07/12/2022    CLOSED REDUCTION, PERCUTANEOUS PINNING HIP Right 02/24/2019    Procedure: Percutaneous Screw Fixation of Right  Hip Fracture;  Surgeon: Amrik Kolb DO;  Location: [facility]    COLECTOMY TOTAL      COLON SURGERY N/A     HX: Total colectomy with J-pouch reconstruction.     ENDOSCOPY UPPER, COLONOSCOPY, COMBINED N/A 09/05/2014    Procedure: COMBINED ENDOSCOPY UPPER, COLONOSCOPY;  Surgeon: Delbert Patel MD;  Location: [facility]    ENDOSCOPY UPPER, COLONOSCOPY, COMBINED N/A 05/09/2019    Procedure: UPPER ENDOSCOPY  WITH BIOPSIES AND  COLONOSCOPY WITH BIOPSIES;  Surgeon: Tai Diaz MD;  Location: [facility]    ENDOSCOPY UPPER, COLONOSCOPY, COMBINED N/A 12/8/2023    Procedure: Upper Endoscopy with biopsy and polypectomy  and Ileoscopy;  Surgeon: Al Howe MD;  Location: [facility]    ESOPHAGOSCOPY, GASTROSCOPY, DUODENOSCOPY (EGD), COMBINED N/A 12/11/2015    Procedure: COMBINED ESOPHAGOSCOPY, GASTROSCOPY, DUODENOSCOPY (EGD);  Surgeon: Delbert Patel MD;  [facility]    ESOPHAGOSCOPY, GASTROSCOPY, DUODENOSCOPY (EGD), COMBINED N/A 12/18/2017    Procedure: COMBINED ESOPHAGOSCOPY, GASTROSCOPY, DUODENOSCOPY (EGD);  UPPER ENDOSCOPY WITH BIOPSY;  Surgeon: Delbert Patel MD;  Location: [facility]    excised-benign  2002    tongue lesion    HC REPAIR OF NASAL SEPTUM  2002    Deviated nasal septum    LAPAROSCOPIC CHOLECYSTECTOMY      lumpectomy Right breast      Breast Lump    MOUTH SURGERY      removal of spot from roof of mouth    pilonidal cyst surgery  1976    removal of colon and large intestine  2000    Familial polyposis    REMOVE HARDWARE ARTHROPLASTY HIP Right 07/20/2022    Procedure: Right Hip Hardware Removal (Cannulated Screws);  Surgeon: Luis Fernando Marcial MD;  [facility]    REVERSE ARTHROPLASTY SHOULDER Right 11/4/2024    Procedure: Right Reverse Total Shoulder Arthroplasty;  Surgeon: Kurt Jordan MD;  Location: [facility]    Right etopic pregnancy      Pregnancy    TONSILLECTOMY      tonsillitus    UPPER GI ENDOSCOPY  2009    Stomach polyps     Current Outpatient Medications   Medication Sig Dispense Refill     acetaminophen (TYLENOL) 325 MG tablet Take 2 tablets (650 mg) by mouth every 4 hours as needed for other (mild pain). 40 tablet 0    albuterol (PROAIR HFA/PROVENTIL HFA/VENTOLIN HFA) 108 (90 Base) MCG/ACT inhaler Inhale 2 puffs into the lungs every 4 hours as needed for shortness of breath 18 g 11    allopurinol (ZYLOPRIM) 100 MG tablet Take 1 tablet (100 mg) by mouth 2 times daily 180 tablet 3    amoxicillin (AMOXIL) 500 MG capsule Take 2,000 mg by mouth once as needed (prior to dental appointments).      aspirin 81 MG EC tablet Take 81 mg by mouth every morning.      atorvastatin (LIPITOR) 20 MG tablet Take 1 tablet (20 mg) by mouth daily 90 tablet 3    calcium carbonate 750 MG CHEW Take 750 mg by mouth 2 times daily.      ciprofloxacin (CETRAXAL) 0.2 % otic solution Place 0.25 mLs into the right ear 2 times daily as needed (pain in ear after showering).      dicyclomine (BENTYL) 10 MG capsule Take 2 capsules (20 mg) by mouth 3 times daily (before meals) 540 capsule 1    diphenoxylate-atropine (LOMOTIL) 2.5-0.025 MG tablet Take 1-2 tablets by mouth 4 times daily as needed for diarrhea 540 tablet 2    ferrous sulfate (IRON) 325 (65 FE) MG tablet Take 325 mg by mouth 2 times daily (with meals) 90 tablet 2    fluticasone-salmeterol (ADVAIR) 250-50 MCG/ACT inhaler Inhale 1 puff into the lungs 2 times daily 60 each 5    GNP VITAMIN D MAXIMUM STRENGTH 50 MCG (2000 UT) tablet TAKE TWO TABLETS BY MOUTH EVERY  tablet 4    ketotifen (ZADITOR/REFRESH ANTI-ITCH) 0.025 % SOLN ophthalmic solution PLACE 2 DROPS INTO BOTH EYES 2 TIMES DAILY 5 mL 2    loperamide (IMODIUM) 2 MG capsule Take 6 mg by mouth 2 times daily plus additional dosing as needed.      meloxicam (MOBIC) 15 MG tablet Take 1 tablet (15 mg) by mouth daily- Do not use while using nabumetone 30 tablet 0    montelukast (SINGULAIR) 10 MG tablet Take 1 tablet (10 mg) by mouth At Bedtime 90 tablet 3    Multiple Vitamin (MULTIVITAMIN) per tablet Take 1 tablet by  mouth daily with food.      omeprazole (PRILOSEC) 40 MG DR capsule Take 1 capsule (40 mg) by mouth daily 90 capsule 3    oxyCODONE (ROXICODONE) 5 MG tablet Take 1-2 tablets (5-10 mg) by mouth every 4 hours as needed for moderate to severe pain. 30 tablet 0    potassium chloride mary ER (KLOR-CON M20) 20 MEQ CR tablet Take 1 tablet (20 mEq) by mouth 3 times daily 90 tablet 9    pramipexole (MIRAPEX) 0.125 MG tablet Take 1-2 tablets (0.125-0.25 mg) by mouth At Bedtime (Patient taking differently: Take 0.125 mg by mouth at bedtime.) 180 tablet 1    predniSONE (DELTASONE) 1 MG tablet Take 2 tablets (2 mg) by mouth daily 180 tablet 0    predniSONE (DELTASONE) 20 MG tablet Take 1 tablet (20 mg) by mouth daily. 10 tablet 0    PSYLLIUM PO Take 1 Tablespoonful by mouth 2 times daily       Simethicone 500 MG CAPS Take 500 mg by mouth 2 times daily. Gas-X      spironolactone (ALDACTONE) 50 MG tablet Take 50 mg by mouth 2 times daily.      teriparatide, recombinant, (FORTEO) 600 MCG/2.4ML SOPN injection INJECT 20 mcg (0.08 mL) subcutaneously daily      triamcinolone (ARISTOCORT HP) 0.5 % external cream Apply topically 2 times daily. 45 g 2       Allergies   Allergen Reactions    Codeine Swelling     Throat swelling-Patient can tolerate Hydrocodone & morphine    Nortriptyline Other (See Comments)     Crying     Tymlos [Abaloparatide] Hives        Social History     Tobacco Use    Smoking status: Every Day     Current packs/day: 0.50     Average packs/day: 0.5 packs/day for 55.2 years (27.6 ttl pk-yrs)     Types: Cigarettes     Start date: 1/1/1970     Passive exposure: Current    Smokeless tobacco: Never   Substance Use Topics    Alcohol use: Not Currently     Comment: 1 a month     Family History   Problem Relation Age of Onset    Other - See Comments Father         Atrial Fibrillation    Cancer Father         bladder ca    Colon Polyps Father     Heart Disease Father         Pacemaker    Rheumatoid Arthritis Father     C.A.D.  "Father 75        CABG    Chronic Obstructive Pulmonary Disease Mother     Heart Disease Mother         Pacemaker    Other - See Comments Mother         dementia    C.A.D. Mother 70        CABG    C.A.D. Maternal Grandmother     Unknown/Adopted Maternal Grandfather     Unknown/Adopted Paternal Grandmother     Unknown/Adopted Paternal Grandfather     Asthma Sister     Cerebrovascular Disease Sister     Gastrointestinal Disease Sister         peptic ulcers    Hypertension Sister     Gastrointestinal Disease Sister         stomach tumors    Rheumatoid Arthritis Sister     Cancer Sister         facial cancer    Asthma Sister     Cancer Maternal Aunt         renal ca     History   Drug Use Unknown             Review of Systems  CONSTITUTIONAL: NEGATIVE for fever, chills, change in weight  INTEGUMENTARY/SKIN: NEGATIVE for worrisome rashes, moles or lesions  EYES: NEGATIVE for vision changes or irritation  ENT/MOUTH: NEGATIVE for ear, mouth and throat problems  RESP: NEGATIVE for significant cough or SOB  BREAST: NEGATIVE for masses, tenderness or discharge  CV: NEGATIVE for chest pain, palpitations or peripheral edema  GI: NEGATIVE for nausea, abdominal pain, heartburn, or change in bowel habits  : NEGATIVE for frequency, dysuria, or hematuria  MUSCULOSKELETAL: NEGATIVE for significant arthralgias or myalgia  NEURO: NEGATIVE for weakness, dizziness or paresthesias  ENDOCRINE: NEGATIVE for temperature intolerance, skin/hair changes  HEME: NEGATIVE for bleeding problems  PSYCHIATRIC: NEGATIVE for changes in mood or affect    Objective    BP 98/60 (BP Location: Right arm, Patient Position: Sitting, Cuff Size: Adult Regular)   Pulse 81   Temp 98.3  F (36.8  C) (Tympanic)   Resp 16   Wt 50.8 kg (112 lb 1.6 oz)   SpO2 97%   BMI 21.18 kg/m     Estimated body mass index is 21.18 kg/m  as calculated from the following:    Height as of 11/4/24: 1.549 m (5' 1\").    Weight as of this encounter: 50.8 kg (112 lb 1.6 " oz).  Physical Exam  GENERAL: alert and no distress  EYES: Eyes grossly normal to inspection, PERRL and conjunctivae and sclerae normal  HENT: ear canals and TM's normal, nose and mouth without ulcers or lesions  NECK: no adenopathy, no asymmetry, masses, or scars  RESP: lungs clear to auscultation - no rales, rhonchi or wheezes  CV: regular rate and rhythm, normal S1 S2, no S3 or S4, no murmur, click or rub, no peripheral edema  ABDOMEN: soft, nontender, no hepatosplenomegaly, no masses and bowel sounds normal  MS: no gross musculoskeletal defects noted, no edema  SKIN: no suspicious lesions or rashes  NEURO: Normal strength and tone, mentation intact and speech normal  PSYCH: mentation appears normal, affect normal/bright    Recent Labs   Lab Test 11/05/24  0623 10/31/24  0846 09/18/24  1514   HGB 11.5* 14.3 12.7   PLT  --  225 205   NA  --  141 130*   POTASSIUM  --  3.8 5.1   CR  --  0.70 1.08*        Diagnostics  Cbc normal.  Bmp pending.     EKG: appears normal, NSR, normal axis, normal intervals, no acute ST/T changes c/w ischemia, no LVH by voltage criteria, unchanged from previous tracings    Revised Cardiac Risk Index (RCRI)  The patient has the following serious cardiovascular risks for perioperative complications:   - No serious cardiac risks = 0 points     RCRI Interpretation: 0 points: Class I (very low risk - 0.4% complication rate)    The longitudinal plan of care for the diagnosis(es)/condition(s) as documented were addressed during this visit. Due to the added complexity in care, I will continue to support Chyna in the subsequent management and with ongoing continuity of care.     Signed Electronically by: Ellis Edward MD  A copy of this evaluation report is provided to the requesting physician.

## 2025-03-03 NOTE — PROGRESS NOTES
Preoperative Evaluation  [facility]  [address]  [facility] [place] [address]  Phone: [phone]  Primary Provider: Ellis Edward MD  Pre-op Performing Provider: Ellis Edward MD  Mar 3, 2025             3/3/2025   Surgical Information   What procedure is being done? endoscopy   Facility or Hospital where procedure/surgery [facility] be performed[facility]   Who is doing the procedure / surgery? dr patricia   Date of surgery / procedure: 03/21   Time of surgery / procedure: tbd   Where do you plan to recover after surgery? at home with family     Fax number for surgical facility: Note does not need to be faxed, will be available electronically in Epic.    Assessment & Plan     The proposed surgical procedure is considered LOW risk.    Preop examination  Low risk procedure.  Stable patient with no acute concerns.  No indication for further workup and procedure can proceed.    - Basic metabolic panel; Future  - CBC with Platelets & Differential; Future  - EKG 12-lead complete w/read - Clinics  - Basic metabolic panel  - CBC with Platelets & Differential    Gastroesophageal reflux disease without esophagitis  Upcoming scope.       Possible Sleep Apnea:            - No identified additional risk factors other than previously addressed         Recommendation  Approval given to proceed with proposed procedure, without further diagnostic evaluation.    Cristina Clemente is a 69 year old, presenting for the following:  Pre-Op Exam          3/3/2025    10:20 AM   Additional Questions   Roomed by Mikhail Lindsay lpn   Accompanied by self     HPI: upcoming scope.           3/3/2025   Pre-Op Questionnaire   Have you ever had a heart attack or stroke? No   Have you ever had surgery on your heart or blood vessels, such as a stent placement, a coronary artery bypass, or surgery on an artery in your head, neck, heart, or legs? No   Do you have chest pain with activity? No   Do you have a history of  heart failure? No   Do you currently have a cold, bronchitis or symptoms of other infection? No   Do you have a cough, shortness of breath, or wheezing? (!) YES cough and wheeze   Do you or anyone in your family have previous history of blood clots? (!) YES dad   Do you or does anyone in your family have a serious bleeding problem such as prolonged bleeding following surgeries or cuts? (!) YES dad   Have you ever had problems with anemia or been told to take iron pills? No   Have you had any abnormal blood loss such as black, tarry or bloody stools, or abnormal vaginal bleeding? (!) YES abnormal vaginal bleeding as a teen   Have you ever had a blood transfusion? (!) YES   Have you ever had a transfusion reaction? No   Are you willing to have a blood transfusion if it is medically needed before, during, or after your surgery? Yes   Have you or any of your relatives ever had problems with anesthesia? No   Do you have sleep apnea, excessive snoring or daytime drowsiness? (!) YES   Do you have a CPAP machine? (!) NO snoring   Do you have any artifical heart valves or other implanted medical devices like a pacemaker, defibrillator, or continuous glucose monitor? No   Do you have artificial joints? (!) YES   Are you allergic to latex? No     Health Care Directive  Patient has a Health Care Directive on file      Preoperative Review of         Status of Chronic Conditions:  See problem list for active medical problems.  Problems all longstanding and stable, except as noted/documented.  See ROS for pertinent symptoms related to these conditions.    Patient Active Problem List    Diagnosis Date Noted    S/p reverse total shoulder arthroplasty 11/04/2024     Priority: Medium    Chronic thoracic back pain, unspecified back pain laterality 10/13/2023     Priority: Medium    Trochanteric bursitis of right hip 05/15/2023     Priority: Medium    Diarrhea due to malabsorption 05/15/2023     Priority: Medium    Closed fracture of  left hip (H) 02/19/2023     Priority: Medium    Closed fracture of left hip, initial encounter (H) 02/19/2023     Priority: Medium    Idiopathic gout of right hand, unspecified chronicity 09/09/2020     Priority: Medium    PMR (polymyalgia rheumatica) 09/09/2020     Priority: Medium    Osteoporosis 09/03/2019     Priority: Medium    Hip fracture requiring operative repair (H) 02/25/2019     Priority: Medium    Closed fracture of neck of right femur (H) 02/23/2019     Priority: Medium    Functional diarrhea 10/26/2018     Priority: Medium    Iron deficiency 04/18/2018     Priority: Medium    Myalgia 05/30/2017     Priority: Medium    Abdominal muscle strain 05/05/2017     Priority: Medium    Strain of flexor muscle of hip, unspecified laterality, initial encounter 05/05/2017     Priority: Medium    Right shoulder pain 11/03/2016     Priority: Medium    ACP (advance care planning) 05/09/2016     Priority: Medium     Advance Care Planning 5/9/2016: ACP Review of Chart / Resources Provided:  Reviewed chart for advance care plan.  Chyna Delgado has been provided information and resources to begin or update their advance care plan.  Added by Lina PONCE Buus            Pain of toe of right foot 05/09/2016     Priority: Medium    Arthritis 02/12/2016     Priority: Medium    Graves disease 11/05/2015     Priority: Medium    Numbness and tingling in right hand 04/20/2015     Priority: Medium    Restless legs syndrome 04/16/2014     Priority: Medium    Somatic dysfunction of pelvic region 10/08/2013     Priority: Medium    Seborrheic keratosis 07/12/2013     Priority: Medium     Imo Update utility      Mild persistent asthma 06/28/2013     Priority: Medium    Sacroiliac pain 06/28/2013     Priority: Medium    Benign neoplasm of stomach 08/16/2012     Priority: Medium    Colon polyposis 08/16/2012     Priority: Medium    Family history of colonic polyps 08/16/2012     Priority: Medium    Family history of skin cancer  08/16/2012     Priority: Medium    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     Priority: Medium     Overview:   IMO Update 10/11      Seborrheic dermatitis, unspecified 10/12/2011     Priority: Medium     Overview:   IMO Update 10/11      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     Priority: Medium    Hypothyroidism 07/07/2011     Priority: Medium      Past Medical History:   Diagnosis Date    Abnormal mammogram, unspecified 01/08/2003    Normal pathology    Back Pain 02/10/2000    Sacroiliac pain    Benign neoplasm of colon 03/10/2000    Benign paroxysmal positional vertigo 06/07/2012    Depressive disorder, not elsewhere classified 02/10/2004    Diarrhea 12/15/2010    Secondary to colectomy for familial polyposis    Gastric polyp 12/11/2015, 12/18/2017    recurrent     History of normal mammogram 07/18/2014, 1/18/2019    Idiopathic osteoporosis 12/15/2010    Interstitial emphysema (H) 12/15/2010    menopausal or female climacteric states 08/31/1999    Mixed hyperlipidemia 12/15/2010    Other bursitis disorders 02/04/2011    Greater trochanteric    Other forms of retinal detachment(361.89) 12/15/2010    Other malaise and fatigue 01/10/2003    Personal history of tobacco use, presenting hazards to health 12/15/2010    Polymyalgia rheumatica     Polyposis of colon     Restless legs syndrome (RLS) 12/15/2010    Rotator cuff tendonitis     Rotator cuff tendonitis     Sacroiliitis, not elsewhere classified 12/15/2010    multiple sites    Tobacco use disorder 08/22/2012    Unspecified asthma(493.90) 12/15/2010     Past Surgical History:   Procedure Laterality Date    ARTHROPLASTY HIP ANTERIOR Right 05/15/2023    Procedure: Right Direct Anterior Total Hip Arthroplasty;  Surgeon: Kurt Jordan MD;  Location: [facility]    benign tumors removed from back      CATARACT EXTRACTION Right 06/21/2022    CATARACT EXTRACTION Left 07/12/2022    CLOSED REDUCTION, PERCUTANEOUS PINNING HIP Right 02/24/2019    Procedure: Percutaneous Screw Fixation of Right  Hip Fracture;  Surgeon: Amrik Kolb DO;  Location: [facility]    COLECTOMY TOTAL      COLON SURGERY N/A     HX: Total colectomy with J-pouch reconstruction.     ENDOSCOPY UPPER, COLONOSCOPY, COMBINED N/A 09/05/2014    Procedure: COMBINED ENDOSCOPY UPPER, COLONOSCOPY;  Surgeon: Delbert Patel MD;  Location: [facility]    ENDOSCOPY UPPER, COLONOSCOPY, COMBINED N/A 05/09/2019    Procedure: UPPER ENDOSCOPY  WITH BIOPSIES AND  COLONOSCOPY WITH BIOPSIES;  Surgeon: Tai Diaz MD;  Location: [facility]    ENDOSCOPY UPPER, COLONOSCOPY, COMBINED N/A 12/8/2023    Procedure: Upper Endoscopy with biopsy and polypectomy  and Ileoscopy;  Surgeon: Al Howe MD;  Location: [facility]    ESOPHAGOSCOPY, GASTROSCOPY, DUODENOSCOPY (EGD), COMBINED N/A 12/11/2015    Procedure: COMBINED ESOPHAGOSCOPY, GASTROSCOPY, DUODENOSCOPY (EGD);  Surgeon: Delbert Patel MD;  [facility]    ESOPHAGOSCOPY, GASTROSCOPY, DUODENOSCOPY (EGD), COMBINED N/A 12/18/2017    Procedure: COMBINED ESOPHAGOSCOPY, GASTROSCOPY, DUODENOSCOPY (EGD);  UPPER ENDOSCOPY WITH BIOPSY;  Surgeon: Delbert Patel MD;  Location: [facility]    excised-benign  2002    tongue lesion    HC REPAIR OF NASAL SEPTUM  2002    Deviated nasal septum    LAPAROSCOPIC CHOLECYSTECTOMY      lumpectomy Right breast      Breast Lump    MOUTH SURGERY      removal of spot from roof of mouth    pilonidal cyst surgery  1976    removal of colon and large intestine  2000    Familial polyposis    REMOVE HARDWARE ARTHROPLASTY HIP Right 07/20/2022    Procedure: Right Hip Hardware Removal (Cannulated Screws);  Surgeon: Luis Fernando Marcial MD;  [facility]    REVERSE ARTHROPLASTY SHOULDER Right 11/4/2024    Procedure: Right Reverse Total Shoulder Arthroplasty;  Surgeon: Kurt Jordan MD;  [facility]    Right etopic pregnancy      Pregnancy    TONSILLECTOMY      tonsillitus    UPPER GI ENDOSCOPY  2009    Stomach polyps     Current Outpatient Medications   Medication Sig Dispense Refill     acetaminophen (TYLENOL) 325 MG tablet Take 2 tablets (650 mg) by mouth every 4 hours as needed for other (mild pain). 40 tablet 0    albuterol (PROAIR HFA/PROVENTIL HFA/VENTOLIN HFA) 108 (90 Base) MCG/ACT inhaler Inhale 2 puffs into the lungs every 4 hours as needed for shortness of breath 18 g 11    allopurinol (ZYLOPRIM) 100 MG tablet Take 1 tablet (100 mg) by mouth 2 times daily 180 tablet 3    amoxicillin (AMOXIL) 500 MG capsule Take 2,000 mg by mouth once as needed (prior to dental appointments).      aspirin 81 MG EC tablet Take 81 mg by mouth every morning.      atorvastatin (LIPITOR) 20 MG tablet Take 1 tablet (20 mg) by mouth daily 90 tablet 3    calcium carbonate 750 MG CHEW Take 750 mg by mouth 2 times daily.      ciprofloxacin (CETRAXAL) 0.2 % otic solution Place 0.25 mLs into the right ear 2 times daily as needed (pain in ear after showering).      dicyclomine (BENTYL) 10 MG capsule Take 2 capsules (20 mg) by mouth 3 times daily (before meals) 540 capsule 1    diphenoxylate-atropine (LOMOTIL) 2.5-0.025 MG tablet Take 1-2 tablets by mouth 4 times daily as needed for diarrhea 540 tablet 2    ferrous sulfate (IRON) 325 (65 FE) MG tablet Take 325 mg by mouth 2 times daily (with meals) 90 tablet 2    fluticasone-salmeterol (ADVAIR) 250-50 MCG/ACT inhaler Inhale 1 puff into the lungs 2 times daily 60 each 5    GNP VITAMIN D MAXIMUM STRENGTH 50 MCG (2000 UT) tablet TAKE TWO TABLETS BY MOUTH EVERY  tablet 4    ketotifen (ZADITOR/REFRESH ANTI-ITCH) 0.025 % SOLN ophthalmic solution PLACE 2 DROPS INTO BOTH EYES 2 TIMES DAILY 5 mL 2    loperamide (IMODIUM) 2 MG capsule Take 6 mg by mouth 2 times daily plus additional dosing as needed.      meloxicam (MOBIC) 15 MG tablet Take 1 tablet (15 mg) by mouth daily- Do not use while using nabumetone 30 tablet 0    montelukast (SINGULAIR) 10 MG tablet Take 1 tablet (10 mg) by mouth At Bedtime 90 tablet 3    Multiple Vitamin (MULTIVITAMIN) per tablet Take 1 tablet by  mouth daily with food.      omeprazole (PRILOSEC) 40 MG DR capsule Take 1 capsule (40 mg) by mouth daily 90 capsule 3    oxyCODONE (ROXICODONE) 5 MG tablet Take 1-2 tablets (5-10 mg) by mouth every 4 hours as needed for moderate to severe pain. 30 tablet 0    potassium chloride mary ER (KLOR-CON M20) 20 MEQ CR tablet Take 1 tablet (20 mEq) by mouth 3 times daily 90 tablet 9    pramipexole (MIRAPEX) 0.125 MG tablet Take 1-2 tablets (0.125-0.25 mg) by mouth At Bedtime (Patient taking differently: Take 0.125 mg by mouth at bedtime.) 180 tablet 1    predniSONE (DELTASONE) 1 MG tablet Take 2 tablets (2 mg) by mouth daily 180 tablet 0    predniSONE (DELTASONE) 20 MG tablet Take 1 tablet (20 mg) by mouth daily. 10 tablet 0    PSYLLIUM PO Take 1 Tablespoonful by mouth 2 times daily       Simethicone 500 MG CAPS Take 500 mg by mouth 2 times daily. Gas-X      spironolactone (ALDACTONE) 50 MG tablet Take 50 mg by mouth 2 times daily.      teriparatide, recombinant, (FORTEO) 600 MCG/2.4ML SOPN injection INJECT 20 mcg (0.08 mL) subcutaneously daily      triamcinolone (ARISTOCORT HP) 0.5 % external cream Apply topically 2 times daily. 45 g 2       Allergies   Allergen Reactions    Codeine Swelling     Throat swelling-Patient can tolerate Hydrocodone & morphine    Nortriptyline Other (See Comments)     Crying     Tymlos [Abaloparatide] Hives        Social History     Tobacco Use    Smoking status: Every Day     Current packs/day: 0.50     Average packs/day: 0.5 packs/day for 55.2 years (27.6 ttl pk-yrs)     Types: Cigarettes     Start date: 1/1/1970     Passive exposure: Current    Smokeless tobacco: Never   Substance Use Topics    Alcohol use: Not Currently     Comment: 1 a month     Family History   Problem Relation Age of Onset    Other - See Comments Father         Atrial Fibrillation    Cancer Father         bladder ca    Colon Polyps Father     Heart Disease Father         Pacemaker    Rheumatoid Arthritis Father     C.A.D.  "Father 75        CABG    Chronic Obstructive Pulmonary Disease Mother     Heart Disease Mother         Pacemaker    Other - See Comments Mother         dementia    C.A.D. Mother 70        CABG    C.A.D. Maternal Grandmother     Unknown/Adopted Maternal Grandfather     Unknown/Adopted Paternal Grandmother     Unknown/Adopted Paternal Grandfather     Asthma Sister     Cerebrovascular Disease Sister     Gastrointestinal Disease Sister         peptic ulcers    Hypertension Sister     Gastrointestinal Disease Sister         stomach tumors    Rheumatoid Arthritis Sister     Cancer Sister         facial cancer    Asthma Sister     Cancer Maternal Aunt         renal ca     History   Drug Use Unknown             Review of Systems  CONSTITUTIONAL: NEGATIVE for fever, chills, change in weight  INTEGUMENTARY/SKIN: NEGATIVE for worrisome rashes, moles or lesions  EYES: NEGATIVE for vision changes or irritation  ENT/MOUTH: NEGATIVE for ear, mouth and throat problems  RESP: NEGATIVE for significant cough or SOB  BREAST: NEGATIVE for masses, tenderness or discharge  CV: NEGATIVE for chest pain, palpitations or peripheral edema  GI: NEGATIVE for nausea, abdominal pain, heartburn, or change in bowel habits  : NEGATIVE for frequency, dysuria, or hematuria  MUSCULOSKELETAL: NEGATIVE for significant arthralgias or myalgia  NEURO: NEGATIVE for weakness, dizziness or paresthesias  ENDOCRINE: NEGATIVE for temperature intolerance, skin/hair changes  HEME: NEGATIVE for bleeding problems  PSYCHIATRIC: NEGATIVE for changes in mood or affect    Objective    BP 98/60 (BP Location: Right arm, Patient Position: Sitting, Cuff Size: Adult Regular)   Pulse 81   Temp 98.3  F (36.8  C) (Tympanic)   Resp 16   Wt 50.8 kg (112 lb 1.6 oz)   SpO2 97%   BMI 21.18 kg/m     Estimated body mass index is 21.18 kg/m  as calculated from the following:    Height as of 11/4/24: 1.549 m (5' 1\").    Weight as of this encounter: 50.8 kg (112 lb 1.6 " oz).  Physical Exam  GENERAL: alert and no distress  EYES: Eyes grossly normal to inspection, PERRL and conjunctivae and sclerae normal  HENT: ear canals and TM's normal, nose and mouth without ulcers or lesions  NECK: no adenopathy, no asymmetry, masses, or scars  RESP: lungs clear to auscultation - no rales, rhonchi or wheezes  CV: regular rate and rhythm, normal S1 S2, no S3 or S4, no murmur, click or rub, no peripheral edema  ABDOMEN: soft, nontender, no hepatosplenomegaly, no masses and bowel sounds normal  MS: no gross musculoskeletal defects noted, no edema  SKIN: no suspicious lesions or rashes  NEURO: Normal strength and tone, mentation intact and speech normal  PSYCH: mentation appears normal, affect normal/bright    Recent Labs   Lab Test 11/05/24  0623 10/31/24  0846 09/18/24  1514   HGB 11.5* 14.3 12.7   PLT  --  225 205   NA  --  141 130*   POTASSIUM  --  3.8 5.1   CR  --  0.70 1.08*        Diagnostics  Cbc normal.  Bmp pending.     EKG: appears normal, NSR, normal axis, normal intervals, no acute ST/T changes c/w ischemia, no LVH by voltage criteria, unchanged from previous tracings    Revised Cardiac Risk Index (RCRI)  The patient has the following serious cardiovascular risks for perioperative complications:   - No serious cardiac risks = 0 points     RCRI Interpretation: 0 points: Class I (very low risk - 0.4% complication rate)    The longitudinal plan of care for the diagnosis(es)/condition(s) as documented were addressed during this visit. Due to the added complexity in care, I will continue to support Chyna in the subsequent management and with ongoing continuity of care.     Signed Electronically by: Ellis Edward MD  A copy of this evaluation report is provided to the requesting physician.

## 2025-03-21 ENCOUNTER — HOSPITAL ENCOUNTER (OUTPATIENT)
Facility: HOSPITAL | Age: 70
Discharge: HOME | End: 2025-03-21
Attending: SURGERY | Admitting: SURGERY
Payer: COMMERCIAL

## 2025-03-21 VITALS
SYSTOLIC BLOOD PRESSURE: 110 MMHG | BODY MASS INDEX: 21.14 KG/M2 | OXYGEN SATURATION: 98 % | TEMPERATURE: 97.8 F | DIASTOLIC BLOOD PRESSURE: 81 MMHG | HEART RATE: 78 BPM | RESPIRATION RATE: 16 BRPM | HEIGHT: 61 IN | WEIGHT: 112 LBS

## 2025-03-21 PROCEDURE — 360N000075 HC SURGERY LEVEL 2, PER MIN: Performed by: SURGERY

## 2025-03-21 PROCEDURE — 88305 TISSUE EXAM BY PATHOLOGIST: CPT | Mod: 26 | Performed by: PATHOLOGY

## 2025-03-21 PROCEDURE — 999N000141 HC STATISTIC PRE-PROCEDURE NURSING ASSESSMENT: Performed by: SURGERY

## 2025-03-21 PROCEDURE — 250N000009 HC RX 250: Performed by: SURGERY

## 2025-03-21 PROCEDURE — 43239 EGD BIOPSY SINGLE/MULTIPLE: CPT | Performed by: SURGERY

## 2025-03-21 PROCEDURE — 370N000017 HC ANESTHESIA TECHNICAL FEE, PER MIN: Performed by: SURGERY

## 2025-03-21 PROCEDURE — 88305 TISSUE EXAM BY PATHOLOGIST: CPT | Mod: TC | Performed by: SURGERY

## 2025-03-21 PROCEDURE — 272N000001 HC OR GENERAL SUPPLY STERILE: Performed by: SURGERY

## 2025-03-21 PROCEDURE — 710N000012 HC RECOVERY PHASE 2, PER MINUTE: Performed by: SURGERY

## 2025-03-21 RX ORDER — DEXAMETHASONE SODIUM PHOSPHATE 10 MG/ML
4 INJECTION, SOLUTION INTRAMUSCULAR; INTRAVENOUS
Status: DISCONTINUED | OUTPATIENT
Start: 2025-03-21 | End: 2025-03-21 | Stop reason: HOSPADM

## 2025-03-21 RX ORDER — ONDANSETRON 4 MG/1
4 TABLET, ORALLY DISINTEGRATING ORAL EVERY 30 MIN PRN
Status: DISCONTINUED | OUTPATIENT
Start: 2025-03-21 | End: 2025-03-21 | Stop reason: HOSPADM

## 2025-03-21 RX ORDER — NALOXONE HYDROCHLORIDE 0.4 MG/ML
0.1 INJECTION, SOLUTION INTRAMUSCULAR; INTRAVENOUS; SUBCUTANEOUS
Status: DISCONTINUED | OUTPATIENT
Start: 2025-03-21 | End: 2025-03-21 | Stop reason: HOSPADM

## 2025-03-21 RX ORDER — SODIUM CHLORIDE, SODIUM LACTATE, POTASSIUM CHLORIDE, CALCIUM CHLORIDE 600; 310; 30; 20 MG/100ML; MG/100ML; MG/100ML; MG/100ML
INJECTION, SOLUTION INTRAVENOUS CONTINUOUS
Status: DISCONTINUED | OUTPATIENT
Start: 2025-03-21 | End: 2025-03-21 | Stop reason: HOSPADM

## 2025-03-21 RX ORDER — ONDANSETRON 2 MG/ML
4 INJECTION INTRAMUSCULAR; INTRAVENOUS EVERY 30 MIN PRN
Status: DISCONTINUED | OUTPATIENT
Start: 2025-03-21 | End: 2025-03-21 | Stop reason: HOSPADM

## 2025-03-21 RX ORDER — LIDOCAINE 40 MG/G
CREAM TOPICAL
Status: DISCONTINUED | OUTPATIENT
Start: 2025-03-21 | End: 2025-03-21 | Stop reason: HOSPADM

## 2025-03-21 ASSESSMENT — ACTIVITIES OF DAILY LIVING (ADL)
ADLS_ACUITY_SCORE: 54

## 2025-03-21 NOTE — OP NOTE
Chyna Delgado MRN# [id number]   YOB: 1955 Age: 69 year old      Date of Admission:  3/21/2025    Primary care provider: Ellis Edward    PREOPERATIVE DIAGNOSIS:  FAP      POSTOPERATIVE DIAGNOSES:    Same       PROCEDURE:  Esophagogastroduodenoscopy with cold forceps biopsies.       HISTORY OF PRESENT ILLNESS: With known FAP history of numerous gastric polyps here for follow up surveillance.      OPERATIVE FINDINGS:  The gastroesophageal junction was noted 36 cm from the incisors.  There were no polyps noted in duodenum as far as could be seen. There were inumerable polyps in the corpus, fundus and cardia of the stomach and a number was sampled.     Specimen(s) submitted to pathology:  Antral biopsies, Polyps, duodenum    PROCEDURE:  With the patient in the supine position on the transport cart, IV sedation was administered by the nurse anesthetist.  The patient's correct identity and planned procedure were confirmed during a requisite timeout pause.  A bite block was placed and the fiberoptic endoscope was introduced and negotiated through the cricopharyngeus without difficulty.  The length of the esophagus was examined without findings.  The gastroesophageal junction was noted 36 cm from the incisors; the Z line was regular without ulceration, bleeding source or stricture.  There was no  evidence of Jewell's change.  The stomach was entered and the pylorus was traversed easily.  The gastric mucosa was noted ot have numerous polyps in the fundus and corpus of the stomach, antrum was relatively spared. There was a sampling of polyps taken in the above mentioned area with biopsy forceps.      Random cold forceps biopsies were obtained of the antrum for H. pylori.  Hemostasis was judged adequate on visualization.   The endoscope was returned to the GE junction.  A second circumferential examination of the esophagus was performed on slow withdrawal of the endoscope without additional finding.   The procedure was satisfactorially tolerated; there was no appreciable blood loss and the patient was returned to Day Surgery in good condition.      Al Howe MD  3/21/2025  1:14 PM

## 2025-03-21 NOTE — OR NURSING
Patient and responsible adult given discharge instructions with no questions regarding instructions. Ag score 20. Pain level 0/10. Tolerated cookies and juice. Discharged from unit via ambulation. Patient discharged to home with spouse.

## 2025-04-08 ENCOUNTER — TELEPHONE (OUTPATIENT)
Dept: FAMILY MEDICINE | Facility: OTHER | Age: 70
End: 2025-04-08

## 2025-04-08 DIAGNOSIS — G25.81 RESTLESS LEGS SYNDROME: ICD-10-CM

## 2025-04-08 NOTE — TELEPHONE ENCOUNTER
Received PA request from [employer] for dicyclomine (BENTYL) 10 MG capsule, Submitted on CMM, waiting for response.

## 2025-04-08 NOTE — TELEPHONE ENCOUNTER
Received PA APPROVAL from [facility] for dicyclomine (BENTYL) 10 MG capsule.  Dates 01/01/25 - 12/31/2025

## 2025-04-08 NOTE — TELEPHONE ENCOUNTER
Mirapex      Last Written Prescription Date:  10/08/24  Last Fill Quantity: 180,   # refills: 1  Last Office Visit: 03/03/25  Future Office visit:

## 2025-04-10 RX ORDER — PRAMIPEXOLE DIHYDROCHLORIDE 0.12 MG/1
.125-.25 TABLET ORAL AT BEDTIME
Qty: 180 TABLET | Refills: 2 | Status: SHIPPED | OUTPATIENT
Start: 2025-04-10

## 2025-04-14 ENCOUNTER — OFFICE VISIT (OUTPATIENT)
Dept: FAMILY MEDICINE | Facility: OTHER | Age: 70
End: 2025-04-14
Attending: FAMILY MEDICINE
Payer: COMMERCIAL

## 2025-04-14 ENCOUNTER — TELEPHONE (OUTPATIENT)
Dept: FAMILY MEDICINE | Facility: OTHER | Age: 70
End: 2025-04-14

## 2025-04-14 VITALS
SYSTOLIC BLOOD PRESSURE: 110 MMHG | HEIGHT: 61 IN | HEART RATE: 93 BPM | BODY MASS INDEX: 21.28 KG/M2 | TEMPERATURE: 99.3 F | WEIGHT: 112.7 LBS | OXYGEN SATURATION: 95 % | DIASTOLIC BLOOD PRESSURE: 52 MMHG

## 2025-04-14 DIAGNOSIS — R30.0 DYSURIA: Primary | ICD-10-CM

## 2025-04-14 DIAGNOSIS — R10.9 FLANK PAIN: ICD-10-CM

## 2025-04-14 LAB
ALBUMIN UR-MCNC: ABNORMAL G/DL
APPEARANCE UR: ABNORMAL
BACTERIA #/AREA URNS HPF: ABNORMAL /HPF
BASOPHILS # BLD AUTO: 0.1 10E3/UL (ref 0–0.2)
BASOPHILS NFR BLD AUTO: 0 %
BILIRUB UR QL STRIP: ABNORMAL
COLOR UR AUTO: ABNORMAL
EOSINOPHIL # BLD AUTO: 0.2 10E3/UL (ref 0–0.7)
EOSINOPHIL NFR BLD AUTO: 1 %
ERYTHROCYTE [DISTWIDTH] IN BLOOD BY AUTOMATED COUNT: 13.1 % (ref 10–15)
GLUCOSE UR STRIP-MCNC: ABNORMAL MG/DL
HCT VFR BLD AUTO: 38.2 % (ref 35–47)
HGB BLD-MCNC: 12.9 G/DL (ref 11.7–15.7)
HGB UR QL STRIP: ABNORMAL
IMM GRANULOCYTES # BLD: 0.1 10E3/UL
IMM GRANULOCYTES NFR BLD: 0 %
KETONES UR STRIP-MCNC: ABNORMAL MG/DL
LEUKOCYTE ESTERASE UR QL STRIP: ABNORMAL
LYMPHOCYTES # BLD AUTO: 0.6 10E3/UL (ref 0.8–5.3)
LYMPHOCYTES NFR BLD AUTO: 3 %
MCH RBC QN AUTO: 31.5 PG (ref 26.5–33)
MCHC RBC AUTO-ENTMCNC: 33.8 G/DL (ref 31.5–36.5)
MCV RBC AUTO: 93 FL (ref 78–100)
MONOCYTES # BLD AUTO: 1.2 10E3/UL (ref 0–1.3)
MONOCYTES NFR BLD AUTO: 5 %
NEUTROPHILS # BLD AUTO: 19.4 10E3/UL (ref 1.6–8.3)
NEUTROPHILS NFR BLD AUTO: 90 %
NITRATE UR QL: ABNORMAL
NRBC # BLD AUTO: 0 10E3/UL
NRBC BLD AUTO-RTO: 0 /100
PH UR STRIP: ABNORMAL [PH]
PLATELET # BLD AUTO: 211 10E3/UL (ref 150–450)
RBC # BLD AUTO: 4.09 10E6/UL (ref 3.8–5.2)
RBC #/AREA URNS AUTO: ABNORMAL /HPF
SP GR UR STRIP: ABNORMAL
UROBILINOGEN UR STRIP-ACNC: ABNORMAL
WBC # BLD AUTO: 21.5 10E3/UL (ref 4–11)
WBC #/AREA URNS AUTO: ABNORMAL /HPF

## 2025-04-14 PROCEDURE — 81003 URINALYSIS AUTO W/O SCOPE: CPT | Mod: ZL | Performed by: FAMILY MEDICINE

## 2025-04-14 PROCEDURE — 36415 COLL VENOUS BLD VENIPUNCTURE: CPT | Mod: ZL | Performed by: FAMILY MEDICINE

## 2025-04-14 PROCEDURE — 81015 MICROSCOPIC EXAM OF URINE: CPT | Mod: ZL | Performed by: FAMILY MEDICINE

## 2025-04-14 PROCEDURE — 85025 COMPLETE CBC W/AUTO DIFF WBC: CPT | Mod: ZL | Performed by: FAMILY MEDICINE

## 2025-04-14 PROCEDURE — 87086 URINE CULTURE/COLONY COUNT: CPT | Mod: ZL | Performed by: FAMILY MEDICINE

## 2025-04-14 PROCEDURE — 87186 SC STD MICRODIL/AGAR DIL: CPT | Mod: ZL | Performed by: FAMILY MEDICINE

## 2025-04-14 PROCEDURE — G0463 HOSPITAL OUTPT CLINIC VISIT: HCPCS

## 2025-04-14 RX ORDER — TERIPARATIDE 250 UG/ML
20 INJECTION, SOLUTION SUBCUTANEOUS DAILY
COMMUNITY
Start: 2025-04-10

## 2025-04-14 RX ORDER — CEPHALEXIN 500 MG/1
500 CAPSULE ORAL 3 TIMES DAILY
Qty: 21 CAPSULE | Refills: 1 | Status: SHIPPED | OUTPATIENT
Start: 2025-04-14

## 2025-04-14 ASSESSMENT — ANXIETY QUESTIONNAIRES
1. FEELING NERVOUS, ANXIOUS, OR ON EDGE: NOT AT ALL
4. TROUBLE RELAXING: NOT AT ALL
7. FEELING AFRAID AS IF SOMETHING AWFUL MIGHT HAPPEN: NOT AT ALL
2. NOT BEING ABLE TO STOP OR CONTROL WORRYING: NOT AT ALL
GAD7 TOTAL SCORE: 1
6. BECOMING EASILY ANNOYED OR IRRITABLE: SEVERAL DAYS
8. IF YOU CHECKED OFF ANY PROBLEMS, HOW DIFFICULT HAVE THESE MADE IT FOR YOU TO DO YOUR WORK, TAKE CARE OF THINGS AT HOME, OR GET ALONG WITH OTHER PEOPLE?: NOT DIFFICULT AT ALL
GAD7 TOTAL SCORE: 1
IF YOU CHECKED OFF ANY PROBLEMS ON THIS QUESTIONNAIRE, HOW DIFFICULT HAVE THESE PROBLEMS MADE IT FOR YOU TO DO YOUR WORK, TAKE CARE OF THINGS AT HOME, OR GET ALONG WITH OTHER PEOPLE: NOT DIFFICULT AT ALL
7. FEELING AFRAID AS IF SOMETHING AWFUL MIGHT HAPPEN: NOT AT ALL
3. WORRYING TOO MUCH ABOUT DIFFERENT THINGS: NOT AT ALL
GAD7 TOTAL SCORE: 1
5. BEING SO RESTLESS THAT IT IS HARD TO SIT STILL: NOT AT ALL

## 2025-04-14 ASSESSMENT — ASTHMA QUESTIONNAIRES
QUESTION_1 LAST FOUR WEEKS HOW MUCH OF THE TIME DID YOUR ASTHMA KEEP YOU FROM GETTING AS MUCH DONE AT WORK, SCHOOL OR AT HOME: NONE OF THE TIME
QUESTION_2 LAST FOUR WEEKS HOW OFTEN HAVE YOU HAD SHORTNESS OF BREATH: ONCE OR TWICE A WEEK
QUESTION_3 LAST FOUR WEEKS HOW OFTEN DID YOUR ASTHMA SYMPTOMS (WHEEZING, COUGHING, SHORTNESS OF BREATH, CHEST TIGHTNESS OR PAIN) WAKE YOU UP AT NIGHT OR EARLIER THAN USUAL IN THE MORNING: NOT AT ALL
ACT_TOTALSCORE: 22
QUESTION_4 LAST FOUR WEEKS HOW OFTEN HAVE YOU USED YOUR RESCUE INHALER OR NEBULIZER MEDICATION (SUCH AS ALBUTEROL): ONCE A WEEK OR LESS
QUESTION_5 LAST FOUR WEEKS HOW WOULD YOU RATE YOUR ASTHMA CONTROL: WELL CONTROLLED

## 2025-04-14 ASSESSMENT — PAIN SCALES - GENERAL: PAINLEVEL_OUTOF10: SEVERE PAIN (8)

## 2025-04-14 NOTE — PROGRESS NOTES
Assessment & Plan     Dysuria  With back pain.  Wbc elevated and urine positive.  Keflex using last cx to ensure it was sensitive last time.  Cx pending.  Bmp pending given the flank pain and consideration for pyelo.  Will follow this closely.    - UA Macroscopic with reflex to Microscopic and Culture; Future  - UA Macroscopic with reflex to Microscopic and Culture  - Urine Microscopic Exam  - Urine Culture; Future  - Urine Culture  - CBC with platelets and differential; Future  - cephALEXin (KEFLEX) 500 MG capsule; Take 1 capsule (500 mg) by mouth 3 times daily.  - CBC with platelets and differential    Flank pain  As above.  Consider this could be an early pyelo.  Cx pending with sensitivities.  Wbc elevated at 21K final pending.  Follow this closely with any change for the worse and she understands.  Doubt sepsis,.    - CBC with platelets and differential; Future  - cephALEXin (KEFLEX) 500 MG capsule; Take 1 capsule (500 mg) by mouth 3 times daily.  - CBC with platelets and differential          Nicotine/Tobacco Cessation  She reports that she has been smoking cigarettes. She started smoking about 55 years ago. She has a 27.6 pack-year smoking history. She has been exposed to tobacco smoke. She has never used smokeless tobacco.  Nicotine/Tobacco Cessation Plan  Information offered: Patient not interested at this time            No follow-ups on file.    Cristina Clemente is a 69 year old, presenting for the following health issues:  Urinary Problem        4/14/2025     9:30 AM   Additional Questions   Roomed by Sushila ALONSO      Genitourinary - Female  Onset/Duration: x 2 days   Description:   Painful urination (Dysuria): YES           Frequency: YES  Blood in urine (Hematuria): No  Delay in urine (Hesitency): No  Progression of Symptoms:  worsening  Accompanying Signs & Symptoms:  Fever/chills: YES  Flank pain: YES  Nausea and vomiting: No  Vaginal symptoms: none  Abdominal/Pelvic Pain: No  History:  "  History of frequent UTI s: YES  History of kidney stones: No  Sexually Active: YES  Possibility of pregnancy: No  Therapies tried and outcome:  OTC medication that turns urine very orange             Review of Systems  Constitutional, HEENT, cardiovascular, pulmonary, gi and gu systems are negative, except as otherwise noted.      Objective    /52 (BP Location: Right arm, Patient Position: Sitting, Cuff Size: Adult Regular)   Pulse 93   Temp 99.3  F (37.4  C) (Tympanic)   Ht 1.549 m (5' 1\")   Wt 51.1 kg (112 lb 11.2 oz)   SpO2 95%   BMI 21.29 kg/m    Body mass index is 21.29 kg/m .  Physical Exam   GENERAL: alert and no distress  EYES: Eyes grossly normal to inspection, PERRL and conjunctivae and sclerae normal  HENT: ear canals and TM's normal, nose and mouth without ulcers or lesions  NECK: no adenopathy, no asymmetry, masses, or scars  RESP: lungs clear to auscultation - no rales, rhonchi or wheezes  CV: regular rate and rhythm, normal S1 S2, no S3 or S4, no murmur, click or rub, no peripheral edema  ABDOMEN: soft, nontender, no hepatosplenomegaly, no masses and bowel sounds normal  MS: no gross musculoskeletal defects noted, no edema    UA as above.  Pending cx and bmp.  Cbc preliminary with neutrophilia.  Will follow that as well.      The longitudinal plan of care for the diagnosis(es)/condition(s) as documented were addressed during this visit. Due to the added complexity in care, I will continue to support Chyna in the subsequent management and with ongoing continuity of care.        Signed Electronically by: Ellis Edward MD    "

## 2025-04-14 NOTE — TELEPHONE ENCOUNTER
8:25 AM    Reason for Call: OVERBOOK    Patient is having the following symptoms: poss uti.    The patient is requesting an appointment for Today with Dr Edward.    Was an appointment offered for this call? No  If yes : Appointment type              Date    Preferred method for responding to this message: Telephone Call  What is your phone number ?[phone]     If we cannot reach you directly, may we leave a detailed response at the number you provided? Yes    Can this message wait until your PCP/provider returns, if unavailable today? Not applicable    Barb Lopes

## 2025-04-16 DIAGNOSIS — M10.041 IDIOPATHIC GOUT OF RIGHT HAND, UNSPECIFIED CHRONICITY: ICD-10-CM

## 2025-04-16 LAB — BACTERIA UR CULT: ABNORMAL

## 2025-04-16 RX ORDER — ALLOPURINOL 100 MG/1
100 TABLET ORAL 2 TIMES DAILY
Qty: 180 TABLET | Refills: 3 | Status: SHIPPED | OUTPATIENT
Start: 2025-04-16

## 2025-04-16 NOTE — TELEPHONE ENCOUNTER
Gout Agents Protocol [id number]/16/2025 08:44 AM   Protocol Details Has Uric Acid on file in past 12 months and value is less than 6       Uric Acid   Date Value Ref Range Status   01/05/2023 4.3 2.4 - 5.7 mg/dL Final   04/29/2021 3.3 2.6 - 6.0 mg/dL Final

## 2025-04-16 NOTE — TELEPHONE ENCOUNTER
Zyloprim       Last Written Prescription Date:  4/15/2024  Last Fill Quantity: 180,   # refills: 3  Last Office Visit: 4/14/2025  Future Office visit:

## 2025-04-28 ENCOUNTER — MYC MEDICAL ADVICE (OUTPATIENT)
Dept: FAMILY MEDICINE | Facility: OTHER | Age: 70
End: 2025-04-28

## 2025-04-28 DIAGNOSIS — N39.0 RECURRENT UTI: ICD-10-CM

## 2025-04-28 DIAGNOSIS — R10.9 FLANK PAIN: ICD-10-CM

## 2025-04-28 DIAGNOSIS — R30.0 DYSURIA: ICD-10-CM

## 2025-04-28 DIAGNOSIS — R35.0 URINARY FREQUENCY: ICD-10-CM

## 2025-04-28 DIAGNOSIS — R30.0 DYSURIA: Primary | ICD-10-CM

## 2025-04-28 RX ORDER — CEPHALEXIN 500 MG/1
500 CAPSULE ORAL 3 TIMES DAILY
Qty: 21 CAPSULE | Refills: 1 | Status: SHIPPED | OUTPATIENT
Start: 2025-04-28

## 2025-04-28 NOTE — TELEPHONE ENCOUNTER
Keflex       Last Written Prescription Date:  4/14/2025  Last Fill Quantity: 21,   # refills: 1  Last Office Visit: 4/14/2025  Future Office visit:

## 2025-04-28 NOTE — TELEPHONE ENCOUNTER
Yes, ok to keep on hand but ideal to get UA so we can get the culture and the information.  Let's also recommend urology for recurrent UTI.  Thanks.  Ellis Edward MD

## 2025-04-29 ENCOUNTER — LAB (OUTPATIENT)
Dept: LAB | Facility: OTHER | Age: 70
End: 2025-04-29
Payer: COMMERCIAL

## 2025-04-29 DIAGNOSIS — R30.0 DYSURIA: ICD-10-CM

## 2025-04-29 DIAGNOSIS — R35.0 URINARY FREQUENCY: ICD-10-CM

## 2025-04-29 LAB
ALBUMIN UR-MCNC: NEGATIVE MG/DL
APPEARANCE UR: CLEAR
BILIRUB UR QL STRIP: NEGATIVE
COLOR UR AUTO: YELLOW
GLUCOSE UR STRIP-MCNC: NEGATIVE MG/DL
HGB UR QL STRIP: NEGATIVE
KETONES UR STRIP-MCNC: NEGATIVE MG/DL
LEUKOCYTE ESTERASE UR QL STRIP: ABNORMAL
NITRATE UR QL: NEGATIVE
PH UR STRIP: 6 [PH] (ref 4.7–8)
RBC URINE: 0 /HPF
SP GR UR STRIP: 1.01 (ref 1–1.03)
SQUAMOUS EPITHELIAL: 0 /HPF
UROBILINOGEN UR STRIP-MCNC: NORMAL MG/DL
WBC URINE: 0 /HPF

## 2025-04-29 PROCEDURE — 87086 URINE CULTURE/COLONY COUNT: CPT | Mod: ZL

## 2025-04-29 PROCEDURE — 81003 URINALYSIS AUTO W/O SCOPE: CPT | Mod: ZL

## 2025-04-29 NOTE — TELEPHONE ENCOUNTER
Yes, we will see the culture results and that will help us know better.  It could be but usually 7 days is adequate treatment even for serious infections.  Thanks.  Ellis Edward MD

## 2025-05-01 LAB — BACTERIA UR CULT: NORMAL

## 2025-05-02 LAB — BACTERIA UR CULT: NO GROWTH

## 2025-05-05 ENCOUNTER — LAB (OUTPATIENT)
Dept: LAB | Facility: OTHER | Age: 70
End: 2025-05-05
Payer: COMMERCIAL

## 2025-05-05 DIAGNOSIS — M81.0 SENILE OSTEOPOROSIS: ICD-10-CM

## 2025-05-05 LAB
ALBUMIN SERPL BCG-MCNC: 4 G/DL (ref 3.5–5.2)
ALP SERPL-CCNC: 116 U/L (ref 40–150)
ALT SERPL W P-5'-P-CCNC: 31 U/L (ref 0–50)
ANION GAP SERPL CALCULATED.3IONS-SCNC: 14 MMOL/L (ref 7–15)
AST SERPL W P-5'-P-CCNC: 24 U/L (ref 0–45)
BILIRUB SERPL-MCNC: 0.2 MG/DL
BUN SERPL-MCNC: 17.3 MG/DL (ref 8–23)
CALCIUM SERPL-MCNC: 9.5 MG/DL (ref 8.8–10.4)
CHLORIDE SERPL-SCNC: 101 MMOL/L (ref 98–107)
CREAT SERPL-MCNC: 0.75 MG/DL (ref 0.51–0.95)
EGFRCR SERPLBLD CKD-EPI 2021: 85 ML/MIN/1.73M2
FOLATE SERPL-MCNC: 18.8 NG/ML (ref 4.6–34.8)
GLUCOSE SERPL-MCNC: 116 MG/DL (ref 70–99)
HCO3 SERPL-SCNC: 25 MMOL/L (ref 22–29)
PHOSPHATE SERPL-MCNC: 3.9 MG/DL (ref 2.5–4.5)
POTASSIUM SERPL-SCNC: 3.8 MMOL/L (ref 3.4–5.3)
PROT SERPL-MCNC: 7.1 G/DL (ref 6.4–8.3)
PTH-INTACT SERPL-MCNC: 29 PG/ML (ref 15–65)
SODIUM SERPL-SCNC: 140 MMOL/L (ref 135–145)
TSH SERPL DL<=0.005 MIU/L-ACNC: 1.09 UIU/ML (ref 0.3–4.2)
VIT B12 SERPL-MCNC: 958 PG/ML (ref 232–1245)
VIT D+METAB SERPL-MCNC: 37 NG/ML (ref 20–50)

## 2025-05-05 PROCEDURE — 80053 COMPREHEN METABOLIC PANEL: CPT | Mod: ZL

## 2025-05-05 PROCEDURE — 82746 ASSAY OF FOLIC ACID SERUM: CPT | Mod: ZL

## 2025-05-05 PROCEDURE — 82306 VITAMIN D 25 HYDROXY: CPT | Mod: ZL

## 2025-05-05 PROCEDURE — 84100 ASSAY OF PHOSPHORUS: CPT | Mod: ZL

## 2025-05-05 PROCEDURE — 83970 ASSAY OF PARATHORMONE: CPT | Mod: ZL

## 2025-05-05 PROCEDURE — 82784 ASSAY IGA/IGD/IGG/IGM EACH: CPT | Mod: ZL

## 2025-05-05 PROCEDURE — 83521 IG LIGHT CHAINS FREE EACH: CPT | Mod: ZL

## 2025-05-05 PROCEDURE — 84443 ASSAY THYROID STIM HORMONE: CPT | Mod: ZL

## 2025-05-05 PROCEDURE — 82607 VITAMIN B-12: CPT | Mod: ZL

## 2025-05-05 PROCEDURE — 36415 COLL VENOUS BLD VENIPUNCTURE: CPT | Mod: ZL

## 2025-05-06 ENCOUNTER — MYC REFILL (OUTPATIENT)
Dept: FAMILY MEDICINE | Facility: OTHER | Age: 70
End: 2025-05-06

## 2025-05-06 ENCOUNTER — LAB (OUTPATIENT)
Dept: LAB | Facility: OTHER | Age: 70
End: 2025-05-06
Payer: COMMERCIAL

## 2025-05-06 DIAGNOSIS — R30.0 DYSURIA: ICD-10-CM

## 2025-05-06 DIAGNOSIS — R10.9 FLANK PAIN: ICD-10-CM

## 2025-05-06 DIAGNOSIS — M81.0 SENILE OSTEOPOROSIS: ICD-10-CM

## 2025-05-06 DIAGNOSIS — N39.0 RECURRENT UTI (URINARY TRACT INFECTION): ICD-10-CM

## 2025-05-06 LAB
ALBUMIN UR-MCNC: NEGATIVE MG/DL
APPEARANCE UR: CLEAR
BILIRUB UR QL STRIP: NEGATIVE
COLOR UR AUTO: YELLOW
GLUCOSE UR STRIP-MCNC: NEGATIVE MG/DL
HGB UR QL STRIP: NEGATIVE
IGA SERPL-MCNC: 91 MG/DL (ref 84–499)
IGG SERPL-MCNC: 590 MG/DL (ref 610–1616)
IGM SERPL-MCNC: 60 MG/DL (ref 35–242)
KAPPA LC FREE SER-MCNC: 1.19 MG/DL (ref 0.33–1.94)
KAPPA LC FREE/LAMBDA FREE SER NEPH: 0.5 {RATIO} (ref 0.26–1.65)
KETONES UR STRIP-MCNC: NEGATIVE MG/DL
LAMBDA LC FREE SERPL-MCNC: 2.39 MG/DL (ref 0.57–2.63)
LEUKOCYTE ESTERASE UR QL STRIP: NEGATIVE
Lab: NORMAL
Lab: NORMAL
NITRATE UR QL: NEGATIVE
PERFORMING LABORATORY: NORMAL
PERFORMING LABORATORY: NORMAL
PH UR STRIP: 6.5 [PH] (ref 4.7–8)
SP GR UR STRIP: 1.02 (ref 1–1.03)
SPECIMEN STATUS: NORMAL
SPECIMEN STATUS: NORMAL
TEST NAME: NORMAL
TEST NAME: NORMAL
UROBILINOGEN UR STRIP-MCNC: NORMAL MG/DL

## 2025-05-06 PROCEDURE — 36415 COLL VENOUS BLD VENIPUNCTURE: CPT | Mod: ZL

## 2025-05-06 RX ORDER — CEPHALEXIN 500 MG/1
500 CAPSULE ORAL 3 TIMES DAILY
Qty: 21 CAPSULE | Refills: 1 | Status: SHIPPED | OUTPATIENT
Start: 2025-05-06

## 2025-05-06 NOTE — TELEPHONE ENCOUNTER
cephALEXin (KEFLEX) 500 MG capsule       Last Written Prescription Date:  4/28/25  Last Fill Quantity: 21,   # refills: 1  Last Office Visit: 4/14/25  Future Office visit:       Routing refill request to provider for review/approval because:  Acne/Rosacea Medications (Oral) Protocol Ksirts3605/06/2025 05:43 AM   Protocol Details Medication indicated for associated diagnosis

## 2025-05-07 ENCOUNTER — RESULTS FOLLOW-UP (OUTPATIENT)
Dept: UROLOGY | Facility: OTHER | Age: 70
End: 2025-05-07

## 2025-05-07 DIAGNOSIS — M54.6 PAIN IN THORACIC SPINE: ICD-10-CM

## 2025-05-07 DIAGNOSIS — M35.3 PMR (POLYMYALGIA RHEUMATICA): ICD-10-CM

## 2025-05-07 NOTE — TELEPHONE ENCOUNTER
Prednisone      Last Written Prescription Date:  2/18/25  Last Fill Quantity: 180,   # refills: 0  Last Office Visit: 4/14/25  Future Office visit:       Routing refill request to provider for review/approval because:      Meloxicam      Last Written Prescription Date:  12/30/24  Last Fill Quantity: 30,   # refills: 0  Last Office Visit: 4/14/25  Future Office visit:       Routing refill request to provider for review/approval because:

## 2025-05-08 LAB — MAYO MISC RESULT: ABNORMAL

## 2025-05-08 RX ORDER — PREDNISONE 1 MG/1
2 TABLET ORAL DAILY
Qty: 180 TABLET | Refills: 0 | Status: SHIPPED | OUTPATIENT
Start: 2025-05-08

## 2025-05-08 RX ORDER — MELOXICAM 15 MG/1
TABLET ORAL
Qty: 30 TABLET | Refills: 3 | Status: SHIPPED | OUTPATIENT
Start: 2025-05-08

## 2025-05-08 NOTE — TELEPHONE ENCOUNTER
prednisone 1 mg tablet       Routing refill request to provider for review/approval because:  Drug not on the [facility] [place] [facility], [facility] or [facility] refill protocol or controlled substance      meloxicam 15 mg tablet     NSAID Medications [id number]/07/2025 08:38 AM   Protocol Details Patient is age 6-64 years    Normal CBC on file in past 12 months    Always Fail Criteria - Chart Review Required       CBC RESULTS:   Recent Labs   Lab Test 04/14/25  0958   WBC 21.5*   RBC 4.09   HGB 12.9   HCT 38.2   MCV 93   MCH 31.5   MCHC 33.8   RDW 13.1

## 2025-05-09 LAB — MAYO MISC RESULT: NORMAL

## 2025-05-19 ENCOUNTER — RESULTS FOLLOW-UP (OUTPATIENT)
Dept: FAMILY MEDICINE | Facility: OTHER | Age: 70
End: 2025-05-19

## 2025-05-19 ENCOUNTER — OFFICE VISIT (OUTPATIENT)
Dept: UROLOGY | Facility: OTHER | Age: 70
End: 2025-05-19
Attending: FAMILY MEDICINE
Payer: COMMERCIAL

## 2025-05-19 ENCOUNTER — LAB (OUTPATIENT)
Dept: LAB | Facility: OTHER | Age: 70
End: 2025-05-19
Payer: COMMERCIAL

## 2025-05-19 VITALS
WEIGHT: 107 LBS | BODY MASS INDEX: 20.22 KG/M2 | SYSTOLIC BLOOD PRESSURE: 122 MMHG | TEMPERATURE: 98.4 F | RESPIRATION RATE: 16 BRPM | DIASTOLIC BLOOD PRESSURE: 80 MMHG | OXYGEN SATURATION: 98 % | HEART RATE: 68 BPM

## 2025-05-19 DIAGNOSIS — Z87.440 HISTORY OF RECURRENT UTI (URINARY TRACT INFECTION): Primary | ICD-10-CM

## 2025-05-19 DIAGNOSIS — R35.0 URINARY FREQUENCY: ICD-10-CM

## 2025-05-19 LAB
ALBUMIN UR-MCNC: 20 MG/DL
APPEARANCE UR: CLEAR
BACTERIA #/AREA URNS HPF: ABNORMAL /HPF
BILIRUB UR QL STRIP: NEGATIVE
COLOR UR AUTO: YELLOW
GLUCOSE UR STRIP-MCNC: NEGATIVE MG/DL
HGB UR QL STRIP: NEGATIVE
KETONES UR STRIP-MCNC: NEGATIVE MG/DL
LEUKOCYTE ESTERASE UR QL STRIP: ABNORMAL
MUCOUS THREADS #/AREA URNS LPF: PRESENT /LPF
NITRATE UR QL: NEGATIVE
PH UR STRIP: 6 [PH] (ref 5–9)
RBC URINE: 1 /HPF
SP GR UR STRIP: 1.02 (ref 1–1.03)
SQUAMOUS EPITHELIAL: 9 /HPF
UROBILINOGEN UR STRIP-MCNC: NORMAL MG/DL
WBC URINE: 14 /HPF

## 2025-05-19 PROCEDURE — G0463 HOSPITAL OUTPT CLINIC VISIT: HCPCS | Mod: 25

## 2025-05-19 PROCEDURE — 81003 URINALYSIS AUTO W/O SCOPE: CPT | Mod: ZL

## 2025-05-19 PROCEDURE — 87086 URINE CULTURE/COLONY COUNT: CPT | Mod: ZL

## 2025-05-19 RX ORDER — ESTRADIOL 0.1 MG/G
2 CREAM VAGINAL SEE ADMIN INSTRUCTIONS
Qty: 28 G | Refills: 4 | Status: SHIPPED | OUTPATIENT
Start: 2025-05-19

## 2025-05-19 ASSESSMENT — PAIN SCALES - GENERAL: PAINLEVEL_OUTOF10: NO PAIN (0)

## 2025-05-19 NOTE — PATIENT INSTRUCTIONS
Drink plenty of fluids, > 2 liters/day  Void after sexual activity if still sexually active   Shower using a shower wand to decrease the bacterial counts in the perineal area. If no wand available ensure that you are using a clean cloth with each cleansing.   Cranberry tabs twice a day:  Must contain 36 mg of PAC or proanthocyanidins  Probiotics:  Yogurts (acidophilus/ lactobacillus) that contain good bacteria.  Kefir 1% milkfat (plain), Cultured Rickey, Annie or Georgina or Align from your local pharmacy.   If perimenopausal or postmenopausal we will consider estrogen cream vaginally and periurethrally daily for two weeks and then twice a week there after.  I will contact you on antibiotic absorption via phone.

## 2025-05-19 NOTE — PROGRESS NOTES
Chief Complaint: Recurrent UTI  .    HPI: Ms. Chyna Delgado is a 69 year old year old female with a history of myalgia, Jocy myalgia rheumatica, mild persistent asthma, iron deficiency, Graves' disease, hypothyroidism, gout of right hand, colectomy and osteoporosis who presents today May 19, 2025 for evaluation of recurrent UTI.    Review of urine culture:    4/29/2025 no growth  4/14/25 E. Coli  9/18/2024 E. coli  4/1/24 E. coli    Patient presents with her  today who helps provide history.  She has had multiple UTIs over the last several months.  It seems as if she has symptom improvement with a 7-day course of antibiotics and then has recurrence of symptoms approximately 1 week later.  Patient's symptoms include burning with urination, increased urgency and frequency, and increase in urinary leakage.  With 2 recent UTIs she was noted to have fever and chills.  These seem to come on quite quickly within 24 hours of urethral burning onset.  She does not have suprapubic pain, back pain, nausea or vomiting with her infections.  She does not notice anything that makes her symptoms better.  She does question whether sexual intercourse is a precipitating factor.  However she is only occasionally active and has frequent UTIs.  She had a colectomy approximately 28 years ago and has frequent loose somewhat explosive stools.  On occasion she will see parts of her pills in her stools and questions whether she is adequately absorbing antibiotics.  Her and her  question whether she may need a longer course of antibiotics when treating UTIs.    Patient voids every 2-3 hours during the day and is getting up 1-2 during the night.  Denies stress leakage with coughing/sneezing/squatting.  Denies urgency leakages when waiting too long to void.      Patient drinks 32 ounces of water per day. Patient consumes 2 servings of caffeine in the form of coffee per day.With the last beverage at 10 AM.  Drinks occasional  alcohol-containing beverages. Does not typically consume spicy foods.  Patient has functional diarrhea secondary to colectomy.  She utilizes Gas-X, Metamucil and antidiarrheals daily.    Past Medical History:   Diagnosis Date    Abnormal mammogram, unspecified 01/08/2003    Normal pathology    Back Pain 02/10/2000    Sacroiliac pain    Benign neoplasm of colon 03/10/2000    Benign paroxysmal positional vertigo 06/07/2012    Depressive disorder, not elsewhere classified 02/10/2004    Diarrhea 12/15/2010    Secondary to colectomy for familial polyposis    Gastric polyp 12/11/2015, 12/18/2017    recurrent     History of normal mammogram 07/18/2014, 1/18/2019    Idiopathic osteoporosis 12/15/2010    Interstitial emphysema (H) 12/15/2010    menopausal or female climacteric states 08/31/1999    Mixed hyperlipidemia 12/15/2010    Other bursitis disorders 02/04/2011    Greater trochanteric    Other forms of retinal detachment(361.89) 12/15/2010    Other malaise and fatigue 01/10/2003    Personal history of tobacco use, presenting hazards to health 12/15/2010    Polymyalgia rheumatica     Polyposis of colon     Restless legs syndrome (RLS) 12/15/2010    Rotator cuff tendonitis     Rotator cuff tendonitis     Sacroiliitis, not elsewhere classified 12/15/2010    multiple sites    Tobacco use disorder 08/22/2012    Unspecified asthma(493.90) 12/15/2010       Past Surgical History:   Procedure Laterality Date    ARTHROPLASTY HIP ANTERIOR Right 05/15/2023    Procedure: Right Direct Anterior Total Hip Arthroplasty;  Surgeon: Kurt Jordan MD;  Location: [facility]    benign tumors removed from back      CATARACT EXTRACTION Right 06/21/2022    CATARACT EXTRACTION Left 07/12/2022    CLOSED REDUCTION, PERCUTANEOUS PINNING HIP Right 02/24/2019    Procedure: Percutaneous Screw Fixation of Right Hip Fracture;  Surgeon: Amrik Kolb DO;  Location: [facility]    COLECTOMY TOTAL      COLON SURGERY N/A     HX: Total colectomy with J-pouch  reconstruction.     ENDOSCOPY UPPER, COLONOSCOPY, COMBINED N/A 09/05/2014    Procedure: COMBINED ENDOSCOPY UPPER, COLONOSCOPY;  Surgeon: Delbert Patel MD;  Location: [facility]    ENDOSCOPY UPPER, COLONOSCOPY, COMBINED N/A 05/09/2019    Procedure: UPPER ENDOSCOPY  WITH BIOPSIES AND  COLONOSCOPY WITH BIOPSIES;  Surgeon: Tai Diaz MD;  Location: [facility]    ENDOSCOPY UPPER, COLONOSCOPY, COMBINED N/A 12/8/2023    Procedure: Upper Endoscopy with biopsy and polypectomy  and Ileoscopy;  Surgeon: Al Howe MD;  Location: [facility]    ESOPHAGOSCOPY, GASTROSCOPY, DUODENOSCOPY (EGD), COMBINED N/A 12/11/2015    Procedure: COMBINED ESOPHAGOSCOPY, GASTROSCOPY, DUODENOSCOPY (EGD);  Surgeon: Delbert Patel MD;  Location: [facility]    ESOPHAGOSCOPY, GASTROSCOPY, DUODENOSCOPY (EGD), COMBINED N/A 12/18/2017    Procedure: COMBINED ESOPHAGOSCOPY, GASTROSCOPY, DUODENOSCOPY (EGD);  UPPER ENDOSCOPY WITH BIOPSY;  Surgeon: Delbert Patel MD;  Location: [facility]    ESOPHAGOSCOPY, GASTROSCOPY, DUODENOSCOPY (EGD), COMBINED N/A 3/21/2025    Procedure: ESOPHAGOGASTRODUODENOSCOPY with biopsy and polypectomy;  Surgeon: Al Howe MD;  Location: [facility]    excised-benign  2002    tongue lesion    HC REPAIR OF NASAL SEPTUM  2002    Deviated nasal septum    LAPAROSCOPIC CHOLECYSTECTOMY      lumpectomy Right breast      Breast Lump    MOUTH SURGERY      removal of spot from roof of mouth    pilonidal cyst surgery  1976    removal of colon and large intestine  2000    Familial polyposis    REMOVE HARDWARE ARTHROPLASTY HIP Right 07/20/2022    Procedure: Right Hip Hardware Removal (Cannulated Screws);  Surgeon: Luis Fernando Marcial MD;  Location: [facility]    REVERSE ARTHROPLASTY SHOULDER Right 11/4/2024    Procedure: Right Reverse Total Shoulder Arthroplasty;  Surgeon: Kurt Jordan MD;  Location: [facility]    Right etopic pregnancy      Pregnancy    TONSILLECTOMY      tonsillitus    UPPER GI ENDOSCOPY  2009    Stomach polyps       FAMILY HISTORY: Denies a  family history of kidney cancer.    SOCIAL HISTORY:    reports that she has been smoking cigarettes. She started smoking about 55 years ago. She has a 27.7 pack-year smoking history. She has been exposed to tobacco smoke. She has never used smokeless tobacco.    Current Outpatient Medications   Medication Sig Dispense Refill    acetaminophen (TYLENOL) 325 MG tablet Take 2 tablets (650 mg) by mouth every 4 hours as needed for other (mild pain). 40 tablet 0    albuterol (PROAIR HFA/PROVENTIL HFA/VENTOLIN HFA) 108 (90 Base) MCG/ACT inhaler Inhale 2 puffs into the lungs every 4 hours as needed for shortness of breath 18 g 11    allopurinol (ZYLOPRIM) 100 MG tablet Take 1 tablet (100 mg) by mouth 2 times daily 180 tablet 3    amoxicillin (AMOXIL) 500 MG capsule Take 2,000 mg by mouth once as needed (prior to dental appointments).      aspirin 81 MG EC tablet Take 81 mg by mouth every morning.      atorvastatin (LIPITOR) 20 MG tablet Take 1 tablet (20 mg) by mouth daily 90 tablet 3    calcium carbonate 750 MG CHEW Take 750 mg by mouth 2 times daily.      cephALEXin (KEFLEX) 500 MG capsule Take 1 capsule (500 mg) by mouth 3 times daily. 21 capsule 1    ciprofloxacin (CETRAXAL) 0.2 % otic solution Place 0.25 mLs into the right ear 2 times daily as needed (pain in ear after showering).      dicyclomine (BENTYL) 10 MG capsule Take 2 capsules (20 mg) by mouth 3 times daily (before meals) 540 capsule 1    diphenoxylate-atropine (LOMOTIL) 2.5-0.025 MG tablet Take 1-2 tablets by mouth 4 times daily as needed for diarrhea 540 tablet 2    ferrous sulfate (IRON) 325 (65 FE) MG tablet Take 325 mg by mouth 2 times daily (with meals) 90 tablet 2    fluticasone-salmeterol (ADVAIR) 250-50 MCG/ACT inhaler Inhale 1 puff into the lungs 2 times daily 60 each 5    GNP VITAMIN D MAXIMUM STRENGTH 50 MCG (2000 UT) tablet TAKE TWO TABLETS BY MOUTH EVERY  tablet 4    ketotifen (ZADITOR/REFRESH ANTI-ITCH) 0.025 % SOLN ophthalmic solution  PLACE 2 DROPS INTO BOTH EYES 2 TIMES DAILY 5 mL 2    loperamide (IMODIUM) 2 MG capsule Take 6 mg by mouth 2 times daily plus additional dosing as needed.      meloxicam (MOBIC) 15 MG tablet Take 1 tablet (15 mg) by mouth daily- Do not use while using nabumetone 30 tablet 3    montelukast (SINGULAIR) 10 MG tablet Take 1 tablet (10 mg) by mouth At Bedtime 90 tablet 3    Multiple Vitamin (MULTIVITAMIN) per tablet Take 1 tablet by mouth daily with food.      omeprazole (PRILOSEC) 40 MG DR capsule Take 1 capsule (40 mg) by mouth daily 90 capsule 3    potassium chloride mary ER (KLOR-CON M20) 20 MEQ CR tablet Take 1 tablet (20 mEq) by mouth 3 times daily 90 tablet 9    pramipexole (MIRAPEX) 0.125 MG tablet Take 1-2 tablets (0.125-0.25 mg) by mouth At Bedtime 180 tablet 2    predniSONE (DELTASONE) 1 MG tablet Take 2 tablets (2 mg) by mouth daily 180 tablet 0    PSYLLIUM PO Take 1 Tablespoonful by mouth 2 times daily       Simethicone 500 MG CAPS Take 500 mg by mouth 2 times daily. Gas-X      spironolactone (ALDACTONE) 50 MG tablet Take 50 mg by mouth 2 times daily.      teriparatide, recombinant, (FORTEO) 560 MCG/2.24ML SOPN injection Inject 20 mcg subcutaneously daily.      teriparatide, recombinant, (FORTEO) 600 MCG/2.4ML SOPN injection INJECT 20 mcg (0.08 mL) subcutaneously daily      triamcinolone (ARISTOCORT HP) 0.5 % external cream Apply topically 2 times daily. 45 g 2       ALLERGIES: Codeine, Nortriptyline, and Tymlos [abaloparatide]     GENERAL PHYSICAL EXAM:   Vitals: /80   Pulse 68   Temp 98.4  F (36.9  C) (Tympanic)   Resp 16   Wt 48.5 kg (107 lb)   SpO2 98%   BMI 20.22 kg/m    Body mass index is 20.22 kg/m .    GENERAL: Well groomed, well developed, well nourished female in NAD.  HEENT: Normal  CV:  Warm extremities   RESPIRATORY: Normal respiratory effort.    GI:  Soft, NT, ND,   MS: Moving all four  NEURO: Alert and oriented x 3.  PSYCH: Normal mood and affect, pleasant and agreeable during  interview and exam.    : Declined pelvic exam.  Negative for CVA tenderness or suprapubic tenderness.     PVR: Residual urine by ultrasound was 17 ml.         RADIOLOGY: The following tests were reviewed:   PROCEDURE:  CT ABDOMEN PELVIS W CONTRAST     HISTORY: Right lower quadrant abdominal pain (does have pelvic kidney  on the side)     TECHNIQUE: Helical CT of the abdomen and pelvis was performed  following injection of intravenous contrast. This CT exam was  performed using one or more the following dose reduction techniques:  automated exposure control, adjustment of the mA and/or kV according  to patient size, and/or iterative reconstruction technique.     COMPARISON: 4/24/2024.     MEDS/CONTRAST: Isovue 370 52ml Given     FINDINGS:       Limited images through the lung bases demonstrate no focal  consolidation.     Surgical absence of the gallbladder likely accounts for mild biliary  ductal prominence. No pancreatic ductal dilatation is seen. The  spleen, pancreas and adrenal glands are unremarkable. There is no  abdominal aortic aneurysm.     A right pelvic kidney and a normally located left kidney do not  demonstrate hydronephrosis. There is a left renal cortical cyst.     The small bowel is normal in caliber. The appendix is not seen. Suture  material is seen in the rectum.      No free fluid, free air or adenopathy is seen. No suspicious osseous  lesions are identified.                                                                      IMPRESSION:       No hydronephrosis, bowel obstruction or focal inflammation.  LABS: The last test results for Ms. Chyna Delgado were reviewed.   Results for orders placed or performed in visit on 05/19/25 (from the past 24 hours)   UA Macroscopic with reflex to Microscopic and Culture    Specimen: Urine, NOS   Result Value Ref Range    Color Urine Yellow Colorless, Straw, Light Yellow, Yellow    Appearance Urine Clear Clear    Glucose Urine Negative Negative mg/dL     Bilirubin Urine Negative Negative    Ketones Urine Negative Negative mg/dL    Specific Gravity Urine 1.023 1.000 - 1.030    Blood Urine Negative Negative    pH Urine 6.0 5.0 - 9.0    Protein Albumin Urine 20 (A) Negative mg/dL    Urobilinogen Urine Normal Normal mg/dL    Nitrite Urine Negative Negative    Leukocyte Esterase Urine Moderate (A) Negative    Bacteria Urine Few (A) None Seen /HPF    Mucus Urine Present (A) None Seen /LPF    RBC Urine 1 <=2 /HPF    WBC Urine 14 (H) <=5 /HPF    Squamous Epithelials Urine 9 (H) <=1 /HPF    Narrative    Urine Culture ordered based on laboratory criteria       BMP -   Recent Labs   Lab Test 05/05/25  0940 03/03/25  1056 10/31/24  0846 07/20/23  1051 05/09/23  1210 02/18/23  1652 01/05/23  1349 09/30/19  1347 08/01/19  1443    140 141   < > 139   < > 137   < > 137   POTASSIUM 3.8 4.4 3.8   < > 4.4   < > 4.8   < > 4.5   CHLORIDE 101 102 102   < > 102   < > 101   < > 103   CO2 25 25 22   < > 24   < > 25   < > 27   BUN 17.3 21.8 15.2   < > 19.1   < > 25.0*   < > 22   CR 0.75 0.72 0.70   < > 0.85   < > 1.06*   < > 0.95   * 100* 107*   < > 93   < > 109*   < > 103*   СВЕТЛАНА 9.5 9.5 9.5   < > 9.9   < > 9.7   < > 9.8   MAG  --   --   --   --  1.7  --  1.9  --  2.2   PHOS 3.9  --   --   --   --   --   --   --  3.4    < > = values in this interval not displayed.       CBC -   Recent Labs   Lab Test 04/14/25  0958 03/03/25  1056 11/05/24  0623 10/31/24  0846   WBC 21.5* 10.6  --  6.8   HGB 12.9 13.8 11.5* 14.3    206  --  225       ASSESSMENT:   Recurrent UTI in the presence of functional diarrhea and occasional leakage of stool.    PLAN:   1. History of recurrent UTI (urinary tract infection) (Primary)  Extensive discussion with patient about the definition of a UTI and typical symptoms associated with one.     Patient understands that other problems can mimic a UTI including interstitial cystitis, caffeine excess, constipation, gynecologic issues and bowel issues  and urologic malignancies such as bladder cancer.  It is important to always obtain a urine culture with every suspected UTI to rule out UTI vs other concerns.     Discussion on prevention of UTIs including drinking plenty of fluids (2L/day), maintaining a good bowel regiment with Miralax or Metamucil.  Emphasized the important of probiotics preferably fermented types.  Cranberry tablets and d-mannose supplements emphasized as well as timed voiding.  Finally discussed the use of estrogen in post-menopausal elderly women to help improve vaginal tissue health provided it is safe to use.     Good hygiene encouraged with voiding after sexual intercourse and in some cases showering before and after intercourse.      I discussed diligent hygiene given her loose somewhat explosive diarrhea, cleansing front to back, considering rinsing in the shower and using a very mild soap just around her periurethral area after each bowel movement, I did discuss vaginal estrogen and the risks associated with this.  She is going to think on this and will not  if she does not wish to start.  I encouraged her to significantly increase her fluid intake as I believe she is losing a lot of this through her stool.  We did discuss the option to utilize a prophylactic antibiotic while the vaginal estrogen takes effect to allow for healing of the bladder lining and we also discussed methenamine use with vitamin C.  Her  are somewhat concerned about the absorption of medications given her frequent emptying.  I would like to discuss further with pharmacy to see if they have any recommendations.  I did reassure them that typically antibiotics were absorbed in the small intestine which is prior to the colon however is hard to say whether with her frequent emptying things can be observed well.    - estradiol (ESTRACE) 0.1 MG/GM vaginal cream; Place 2 g vaginally See Admin Instructions. Apply a nickel sized amount nightly around the  urethra and then swirl of the remaining into the vagina for 2 weeks straight and then just twice a week thereafter.  Do not use the applicator for the application.  Dispense: 28 g; Refill: 4      66 minutes spent on the date of this encounter doing chart review, history and exam, documentation and further activities as noted above.        MAIDA Conteh [facility]

## 2025-05-19 NOTE — NURSING NOTE
"Chief Complaint   Patient presents with    Recurrent UTI       Initial /80   Pulse 68   Temp 98.4  F (36.9  C) (Tympanic)   Resp 16   Wt 48.5 kg (107 lb)   SpO2 98%   BMI 20.22 kg/m   Estimated body mass index is 20.22 kg/m  as calculated from the following:    Height as of 4/14/25: 1.549 m (5' 1\").    Weight as of this encounter: 48.5 kg (107 lb).    PVR-17    Rhea Hernández LPN      "

## 2025-05-20 DIAGNOSIS — R10.9 FLANK PAIN: ICD-10-CM

## 2025-05-20 DIAGNOSIS — R30.0 DYSURIA: ICD-10-CM

## 2025-05-21 LAB — BACTERIA UR CULT: NO GROWTH

## 2025-05-21 RX ORDER — CEPHALEXIN 500 MG/1
500 CAPSULE ORAL 3 TIMES DAILY
Qty: 21 CAPSULE | Refills: 1 | Status: SHIPPED | OUTPATIENT
Start: 2025-05-21

## 2025-05-21 NOTE — TELEPHONE ENCOUNTER
cephalexin 500 mg capsule         Last Written Prescription Date:  5/6/25  Last Fill Quantity: 21,   # refills: 1  Last Office Visit: 4/14/25  Future Office visit:       Routing refill request to provider for review/approval because:  Drug not on the [facility], [facility] or [facility] refill protocol or controlled substance

## 2025-06-02 DIAGNOSIS — R19.7 DIARRHEA DUE TO MALABSORPTION: ICD-10-CM

## 2025-06-02 DIAGNOSIS — K90.9 DIARRHEA DUE TO MALABSORPTION: ICD-10-CM

## 2025-06-03 RX ORDER — DIPHENOXYLATE HYDROCHLORIDE AND ATROPINE SULFATE 2.5; .025 MG/1; MG/1
1-2 TABLET ORAL 4 TIMES DAILY PRN
Qty: 540 TABLET | Refills: 2 | Status: SHIPPED | OUTPATIENT
Start: 2025-06-03

## 2025-06-03 NOTE — TELEPHONE ENCOUNTER
diphenoxylate-atropine 2.5 mg-0.025 mg tablet         Last Written Prescription Date:  11/18/24  Last Fill Quantity: 540,   # refills: 2  Last Office Visit: 4/14/25  Future Office visit:       Routing refill request to provider for review/approval because:  Drug not on the [facility], [facility] or [facility] refill protocol or controlled substance

## 2025-07-05 DIAGNOSIS — J45.40 MODERATE PERSISTENT ASTHMA, UNSPECIFIED WHETHER COMPLICATED: ICD-10-CM

## 2025-07-07 RX ORDER — ALBUTEROL SULFATE 90 UG/1
2 INHALANT RESPIRATORY (INHALATION) EVERY 4 HOURS PRN
Qty: 18 G | Refills: 11 | Status: SHIPPED | OUTPATIENT
Start: 2025-07-07

## 2025-07-17 DIAGNOSIS — G25.81 RESTLESS LEGS SYNDROME: ICD-10-CM

## 2025-07-17 RX ORDER — POTASSIUM CHLORIDE 1500 MG/1
20 TABLET, EXTENDED RELEASE ORAL 3 TIMES DAILY
Qty: 90 TABLET | Refills: 9 | Status: SHIPPED | OUTPATIENT
Start: 2025-07-17

## 2025-07-21 ENCOUNTER — APPOINTMENT (OUTPATIENT)
Dept: LAB | Facility: OTHER | Age: 70
End: 2025-07-21
Payer: COMMERCIAL

## 2025-07-28 ENCOUNTER — MYC REFILL (OUTPATIENT)
Dept: FAMILY MEDICINE | Facility: OTHER | Age: 70
End: 2025-07-28

## 2025-07-28 DIAGNOSIS — R10.9 FLANK PAIN: ICD-10-CM

## 2025-07-28 DIAGNOSIS — R30.0 DYSURIA: ICD-10-CM

## 2025-07-29 NOTE — TELEPHONE ENCOUNTER
KEFLEX      Last Written Prescription Date:  5-20-25  Last Fill Quantity: 21,   # refills: 01  Last Office Visit: 4-14-25  Future Office visit:       Routing refill request to provider for review/approval because:  Drug not on the [facility], [facility] or [facility] refill protocol or controlled substance

## 2025-07-30 DIAGNOSIS — M54.6 PAIN IN THORACIC SPINE: ICD-10-CM

## 2025-07-30 RX ORDER — CEPHALEXIN 500 MG/1
500 CAPSULE ORAL 3 TIMES DAILY
Qty: 21 CAPSULE | Refills: 1 | Status: SHIPPED | OUTPATIENT
Start: 2025-07-30

## 2025-07-30 RX ORDER — MELOXICAM 15 MG/1
TABLET ORAL
Qty: 90 TABLET | Refills: 3 | Status: SHIPPED | OUTPATIENT
Start: 2025-07-30

## 2025-07-30 NOTE — TELEPHONE ENCOUNTER
Routing refill request to provider for review/approval because:    Acne/Rosacea Medications (Oral) Protocol [id number]/28/2025 01:51 PM   Protocol Details Medication indicated for associated diagnosis

## 2025-07-30 NOTE — TELEPHONE ENCOUNTER
Meloxicam (Mobic) 15 mg tablet  Take 1 tablet (15 mg) by mouth daily-do not use while using nabumetone  Last Written Prescription Date:  5-8-2025  Last Fill Quantity: 30 tablet,   # refills: 3  Last Office Visit: 4-  Future Office visit:

## 2025-08-12 ENCOUNTER — MYC MEDICAL ADVICE (OUTPATIENT)
Dept: FAMILY MEDICINE | Facility: OTHER | Age: 70
End: 2025-08-12

## 2025-08-12 DIAGNOSIS — Z87.891 PERSONAL HISTORY OF TOBACCO USE: Primary | ICD-10-CM

## 2025-08-18 ENCOUNTER — HOSPITAL ENCOUNTER (OUTPATIENT)
Dept: BONE DENSITY | Facility: HOSPITAL | Age: 70
Discharge: HOME | End: 2025-08-18
Attending: INTERNAL MEDICINE
Payer: COMMERCIAL

## 2025-08-18 DIAGNOSIS — M81.0 OSTEOPOROSIS WITHOUT CURRENT PATHOLOGICAL FRACTURE: ICD-10-CM

## 2025-08-20 PROCEDURE — 87186 SC STD MICRODIL/AGAR DIL: CPT | Mod: ZL

## 2025-08-20 PROCEDURE — 81001 URINALYSIS AUTO W/SCOPE: CPT | Mod: ZL

## 2025-08-21 ENCOUNTER — LAB (OUTPATIENT)
Dept: LAB | Facility: OTHER | Age: 70
End: 2025-08-21
Payer: COMMERCIAL

## 2025-08-21 DIAGNOSIS — R30.0 DYSURIA: ICD-10-CM

## 2025-08-21 LAB
ALBUMIN UR-MCNC: NEGATIVE MG/DL
APPEARANCE UR: CLEAR
BACTERIA #/AREA URNS HPF: ABNORMAL /HPF
BILIRUB UR QL STRIP: NEGATIVE
COLOR UR AUTO: ABNORMAL
GLUCOSE UR STRIP-MCNC: NEGATIVE MG/DL
HGB UR QL STRIP: NEGATIVE
KETONES UR STRIP-MCNC: NEGATIVE MG/DL
LEUKOCYTE ESTERASE UR QL STRIP: ABNORMAL
MUCOUS THREADS #/AREA URNS LPF: PRESENT /LPF
NITRATE UR QL: NEGATIVE
PH UR STRIP: 6 [PH] (ref 4.7–8)
RBC URINE: 4 /HPF
SP GR UR STRIP: 1.02 (ref 1–1.03)
SQUAMOUS EPITHELIAL: 0 /HPF
UROBILINOGEN UR STRIP-MCNC: NORMAL MG/DL
WBC URINE: 44 /HPF

## 2025-08-23 LAB — BACTERIA UR CULT: ABNORMAL

## (undated) DEVICE — SUTURE-VICRYL 2-0 CT-2 J269H

## (undated) DEVICE — PULSE LAVAGE IRRIGATION SYSTEM 0210-114-100

## (undated) DEVICE — COVER LT HANDLE 2/PK 5160-2FG

## (undated) DEVICE — SOL NACL 0.9% IRRIG 3000ML BAG 2B7477

## (undated) DEVICE — FORCEP-COLON BIOPSY LARGE W/NEEDLE 240CM

## (undated) DEVICE — IRRIGATION-H2O 1000ML

## (undated) DEVICE — CLEANSER JET LAVAGE IRRISEPT 0.05% CHG IRRISEPT45USA

## (undated) DEVICE — CATH TRAY-16FR METER W/STATLOCK LATEX]

## (undated) DEVICE — DRSG AQUACEL AG HYDROFIBER  3.5X10" 422605

## (undated) DEVICE — TAPE STRIP-STERILE 3 X 20

## (undated) DEVICE — SUTURE-VICRYL 2-0 SH J417H

## (undated) DEVICE — TAPE DURAPORE 2"X1.5YD SILK 1538S-2

## (undated) DEVICE — Device

## (undated) DEVICE — LABEL-STERILE PREPRINTED FOR OR

## (undated) DEVICE — MOUTHPIECE W/GUARD FOR ENDOSCOPY

## (undated) DEVICE — CANISTER-SUCTION 2000CC

## (undated) DEVICE — KIT SHOULDER POSITIONING BEACH CHAIR ARM HOLDER AR-1644

## (undated) DEVICE — DRAPE IOBAN INCISE 13X13" 6640EZ

## (undated) DEVICE — PREP CHLORAPREP 26ML TINTED HI-LITE ORANGE 930815

## (undated) DEVICE — SU VICRYL 1 CT-1 27" UND J261H

## (undated) DEVICE — PACK BASIN SET UP SUTCNBSBBA

## (undated) DEVICE — SU VICRYL 1 CP-1 27" J468H

## (undated) DEVICE — TAPE-MEDIPORE 4" X 2YD

## (undated) DEVICE — SUCTION TUBE-YANKAUR

## (undated) DEVICE — MARKER-SKIN REG

## (undated) DEVICE — PACK-BASIN SET-UP

## (undated) DEVICE — SOL NACL 0.9% IRRIG 1000ML BOTTLE 2F7124

## (undated) DEVICE — LIGHT HANDLE COVER FOR SKYTRON LIGHTS

## (undated) DEVICE — SYRINGE-ASEPTO IRRIGATION

## (undated) DEVICE — SUTURE-MONOCRYL 3-0 PS-1 Y936H

## (undated) DEVICE — CONNECTOR ERBEFLO 2 PORT 20325-215

## (undated) DEVICE — ENDO SNARE EXACTO COLD 9MM LOOP 2.4MMX230CM 00711115

## (undated) DEVICE — DRSG XEROFORM GAUZE 1X8" LP 8884433301

## (undated) DEVICE — DRSG-SPONGE STERILE 8 X 4

## (undated) DEVICE — FORCEPS BIOPSY RADIAL JAW 4 LARGE W/NEEDLE 240CM M00513332

## (undated) DEVICE — CANISTER SUCTION MEDI-VAC GUARDIAN 2000ML 90D 65651-220

## (undated) DEVICE — HOOD STRYKER FOR ORTHO 0408-800-400

## (undated) DEVICE — BONE WAX 2.5GM W31G

## (undated) DEVICE — PACK-SET UP-CUSTOM

## (undated) DEVICE — IMPLANTABLE DEVICE
Type: IMPLANTABLE DEVICE | Site: HIP | Status: NON-FUNCTIONAL
Removed: 2019-02-24

## (undated) DEVICE — TUBING SUCTION 20FT N620A

## (undated) DEVICE — DRAPE POUCH INSTRUMENT 1018

## (undated) DEVICE — PROTECTER ALEXIS TISSUE SMALL MEDIUM 2.5-8CM INCS HR004

## (undated) DEVICE — STRYKER

## (undated) DEVICE — JELLY LUBRICATING SURGILUBE 2OZ TUBE

## (undated) DEVICE — GOWN-SURG XL LVL 3 REINFORCED

## (undated) DEVICE — CAUTERY PAD-POLYHESIVE II ADULT

## (undated) DEVICE — SUTURE-VICRYL 2-0 CT-1 VCP259H

## (undated) DEVICE — FORCEP-COLON BIOPSY STD W/NEEDLE 160CM

## (undated) DEVICE — TAPE MICROFOAM 4" 1528-4

## (undated) DEVICE — ADH SKIN CLOSURE PREMIERPRO EXOFIN MICOR HV 0.5ML 3471

## (undated) DEVICE — BLADE SAGITTAL 18MM X 90MM X 1.27MM BR4118-127-090

## (undated) DEVICE — EYE PREP BETADINE 5% SOLUTION 30ML 0065-0411-30

## (undated) DEVICE — STPL SKIN 35W 6.9MM  PXW35

## (undated) DEVICE — APPLICATOR-CHLORAPREP 26ML TINTED CHG 2%+ 70% IPA-SURGICAL

## (undated) DEVICE — GOWN SMARTGOWN 2XL LEVEL 4 RAGLAN 39075

## (undated) DEVICE — SU DERMABOND ADVANCED .7ML DNX12

## (undated) DEVICE — SUTURE VICRYL+ 0 27IN CP-1 UND VCP267H

## (undated) DEVICE — LABEL STERILE PREPRINTED FOR OR FRRH01-2M

## (undated) DEVICE — PACK SHOULDER ARTHROSCOPY CUSTOM SOP32SAMBD

## (undated) DEVICE — DRAPE SHEET REV FOLD 3/4 9349

## (undated) DEVICE — SPONGE-LAPAROTOMY PADS 18 X 18

## (undated) DEVICE — DRSG TEGADERM 4X4 3/4" 1626

## (undated) DEVICE — DRAPE-U DRAPE PLASTIC SPLIT SHEET 60" X 72"

## (undated) DEVICE — SUCTION TUBE YANKAUR K61

## (undated) DEVICE — SYR 20ML LL W/O NDL 302830

## (undated) DEVICE — SU MONOCRYL 3-0 PS-1 27" Y936H

## (undated) DEVICE — DRAPE IOBAN LG .375X23.5" 6648EZ

## (undated) DEVICE — SPONGE LAP 18X18" X8435

## (undated) DEVICE — IMM SHOULDER  ABDUCT ULTRASLING III MED 11-0449-3

## (undated) DEVICE — DRSG-ABDOMINAL 5 X 9

## (undated) DEVICE — GLV-8.5 ORTHO PROTEXIS PI LF/PF

## (undated) DEVICE — DRAPE-C-ARM

## (undated) DEVICE — SYR 30ML SLIP TIP W/O NDL 302833

## (undated) DEVICE — DRAPE-C-ARMOR

## (undated) DEVICE — ENDO TRAP POLYP E-TRAP 00711099

## (undated) DEVICE — SUTURE TICRON 5 KV 40 2818 89 8886281889

## (undated) DEVICE — SLEEVE SCD EXPRESS KNEE LENGTH MED 9529

## (undated) DEVICE — DRSG-KERLIX 6 X 6 3/4 FLUFF

## (undated) DEVICE — LIGHT HANDLE COVER

## (undated) DEVICE — CAUTERY PENCIL

## (undated) DEVICE — DRSG GAUZE 4X4" 3033

## (undated) DEVICE — WATER STERILE 1000ML STERILE UROMATIC 2B-71-14

## (undated) DEVICE — SOL WATER IRRIG 1000ML BOTTLE 2F7114

## (undated) DEVICE — BLADE-SCALPEL #10

## (undated) DEVICE — BLADE 10 RB BK SS STRL LF DISP 371210

## (undated) DEVICE — SUCTION MANIFOLD NEPTUNE 2 SYS 1 PORT 702-025-000

## (undated) DEVICE — DRAPE-THREE QUARTER (LARGE) SHEET

## (undated) DEVICE — ESU GROUND PAD ADULT W/CORD E7507

## (undated) DEVICE — ESU ELEC BLADE 4" COATED

## (undated) DEVICE — KIT PATIENT CARE HANA TABLE PROFX SUPINE 6855

## (undated) DEVICE — DRAPE-STERI 45X60CM #1010

## (undated) DEVICE — CONNECTOR-ERBEFLO 2 PORT

## (undated) DEVICE — STRATAFIX 1 SYMMETRIC PDS 24" DYED W/40MM 1/2 CIRC SXPP1A444

## (undated) DEVICE — DRSG-XEROFORM 1X8

## (undated) DEVICE — DRILL BIT FLEXIBLE REFLECTION 25MM  71362925

## (undated) DEVICE — SCD SLEEVE-KNEE REG.

## (undated) DEVICE — DRAPE IOBAN ISOLATION VERTICAL 320X21CM 6617

## (undated) DEVICE — GOWN-SURG XL/XLONG LVL 4 IMPERVIOUS

## (undated) DEVICE — SUTURE-VICRYL 1-0 UNDYED CT-1 J947H

## (undated) DEVICE — DRAPE-VERTICAL ISOLATION

## (undated) DEVICE — SUCTION MANIFOLD NEPTUNE 2 SYS 4 PORT 0702-020-000

## (undated) DEVICE — TUBING-SUCTION 20FT

## (undated) DEVICE — SENSOR-OXISENSOR II ADULT

## (undated) DEVICE — STERI-STRIP-1/2" X 4"

## (undated) DEVICE — PACK DIRECT ANTERIOR HIP SOPCNAHMB4

## (undated) DEVICE — CAUTERY-MEGADYNE TIP

## (undated) DEVICE — STRAP STIRRUP W/SLIP 30187-030

## (undated) DEVICE — FORCEP COLON BIOPSY STD W/NEEDLE 160CM M00513390

## (undated) DEVICE — ENDO BITE BLOCK ADULT OLYMPUS LATEX FREE MAJ-1632

## (undated) DEVICE — PACK-LAPAROTOMY-CUSTOM

## (undated) RX ORDER — PROPOFOL 10 MG/ML
INJECTION, EMULSION INTRAVENOUS
Status: DISPENSED
Start: 2019-02-24

## (undated) RX ORDER — FENTANYL CITRATE 50 UG/ML
INJECTION, SOLUTION INTRAMUSCULAR; INTRAVENOUS
Status: DISPENSED
Start: 2024-11-04

## (undated) RX ORDER — FENTANYL CITRATE-0.9 % NACL/PF 10 MCG/ML
PLASTIC BAG, INJECTION (ML) INTRAVENOUS
Status: DISPENSED
Start: 2024-11-04

## (undated) RX ORDER — ONDANSETRON 2 MG/ML
INJECTION INTRAMUSCULAR; INTRAVENOUS
Status: DISPENSED
Start: 2023-05-15

## (undated) RX ORDER — FENTANYL CITRATE 50 UG/ML
INJECTION, SOLUTION INTRAMUSCULAR; INTRAVENOUS
Status: DISPENSED
Start: 2023-05-15

## (undated) RX ORDER — ONDANSETRON 2 MG/ML
INJECTION INTRAMUSCULAR; INTRAVENOUS
Status: DISPENSED
Start: 2022-07-20

## (undated) RX ORDER — KETAMINE HCL IN NACL, ISO-OSM 100MG/10ML
SYRINGE (ML) INJECTION
Status: DISPENSED
Start: 2019-02-24

## (undated) RX ORDER — FENTANYL CITRATE-0.9 % NACL/PF 10 MCG/ML
PLASTIC BAG, INJECTION (ML) INTRAVENOUS
Status: DISPENSED
Start: 2023-05-15

## (undated) RX ORDER — LIDOCAINE HYDROCHLORIDE 20 MG/ML
INJECTION, SOLUTION EPIDURAL; INFILTRATION; INTRACAUDAL; PERINEURAL
Status: DISPENSED
Start: 2017-12-18

## (undated) RX ORDER — PROPOFOL 10 MG/ML
INJECTION, EMULSION INTRAVENOUS
Status: DISPENSED
Start: 2019-05-09

## (undated) RX ORDER — PROPOFOL 10 MG/ML
INJECTION, EMULSION INTRAVENOUS
Status: DISPENSED
Start: 2023-12-08

## (undated) RX ORDER — LIDOCAINE HYDROCHLORIDE 20 MG/ML
INJECTION, SOLUTION EPIDURAL; INFILTRATION; INTRACAUDAL; PERINEURAL
Status: DISPENSED
Start: 2019-02-24

## (undated) RX ORDER — BUPIVACAINE HYDROCHLORIDE 2.5 MG/ML
INJECTION, SOLUTION EPIDURAL; INFILTRATION; INTRACAUDAL
Status: DISPENSED
Start: 2024-11-04

## (undated) RX ORDER — ONDANSETRON 2 MG/ML
INJECTION INTRAMUSCULAR; INTRAVENOUS
Status: DISPENSED
Start: 2024-11-04

## (undated) RX ORDER — FENTANYL CITRATE-0.9 % NACL/PF 10 MCG/ML
PLASTIC BAG, INJECTION (ML) INTRAVENOUS
Status: DISPENSED
Start: 2022-07-20

## (undated) RX ORDER — EPHEDRINE SULFATE 50 MG/ML
INJECTION, SOLUTION INTRAMUSCULAR; INTRAVENOUS; SUBCUTANEOUS
Status: DISPENSED
Start: 2023-05-15

## (undated) RX ORDER — DEXAMETHASONE SODIUM PHOSPHATE 10 MG/ML
INJECTION, SOLUTION INTRAMUSCULAR; INTRAVENOUS
Status: DISPENSED
Start: 2023-05-15

## (undated) RX ORDER — DEXAMETHASONE SODIUM PHOSPHATE 10 MG/ML
INJECTION, SOLUTION INTRAMUSCULAR; INTRAVENOUS
Status: DISPENSED
Start: 2022-07-20

## (undated) RX ORDER — FENTANYL CITRATE 50 UG/ML
INJECTION, SOLUTION INTRAMUSCULAR; INTRAVENOUS
Status: DISPENSED
Start: 2022-07-20

## (undated) RX ORDER — DEXAMETHASONE SODIUM PHOSPHATE 10 MG/ML
INJECTION, SOLUTION INTRAMUSCULAR; INTRAVENOUS
Status: DISPENSED
Start: 2024-11-04

## (undated) RX ORDER — EPHEDRINE SULFATE 50 MG/ML
INJECTION, SOLUTION INTRAVENOUS
Status: DISPENSED
Start: 2019-02-24

## (undated) RX ORDER — KETOROLAC TROMETHAMINE 30 MG/ML
INJECTION, SOLUTION INTRAMUSCULAR; INTRAVENOUS
Status: DISPENSED
Start: 2023-05-15

## (undated) RX ORDER — HYDROMORPHONE HYDROCHLORIDE 1 MG/ML
INJECTION, SOLUTION INTRAMUSCULAR; INTRAVENOUS; SUBCUTANEOUS
Status: DISPENSED
Start: 2024-11-04

## (undated) RX ORDER — PROPOFOL 10 MG/ML
INJECTION, EMULSION INTRAVENOUS
Status: DISPENSED
Start: 2023-05-15

## (undated) RX ORDER — PROPOFOL 10 MG/ML
INJECTION, EMULSION INTRAVENOUS
Status: DISPENSED
Start: 2024-11-04

## (undated) RX ORDER — DEXMEDETOMIDINE HYDROCHLORIDE 100 UG/ML
INJECTION, SOLUTION INTRAVENOUS
Status: DISPENSED
Start: 2023-05-15

## (undated) RX ORDER — PROPOFOL 10 MG/ML
INJECTION, EMULSION INTRAVENOUS
Status: DISPENSED
Start: 2022-07-20

## (undated) RX ORDER — PROPOFOL 10 MG/ML
INJECTION, EMULSION INTRAVENOUS
Status: DISPENSED
Start: 2017-12-18